# Patient Record
Sex: FEMALE | Race: WHITE | NOT HISPANIC OR LATINO | Employment: FULL TIME | URBAN - METROPOLITAN AREA
[De-identification: names, ages, dates, MRNs, and addresses within clinical notes are randomized per-mention and may not be internally consistent; named-entity substitution may affect disease eponyms.]

---

## 2017-01-16 ENCOUNTER — OFFICE VISIT (OUTPATIENT)
Dept: OPHTHALMOLOGY | Facility: CLINIC | Age: 52
End: 2017-01-16
Attending: OPHTHALMOLOGY
Payer: COMMERCIAL

## 2017-01-16 DIAGNOSIS — H18.529 MAP-DOT-FINGERPRINT CORNEAL DYSTROPHY: ICD-10-CM

## 2017-01-16 DIAGNOSIS — H52.203 HYPEROPIA WITH ASTIGMATISM AND PRESBYOPIA, BILATERAL: ICD-10-CM

## 2017-01-16 DIAGNOSIS — H18.529 ABMD (ANTERIOR BASEMENT MEMBRANE DYSTROPHY): ICD-10-CM

## 2017-01-16 DIAGNOSIS — H52.03 HYPEROPIA WITH ASTIGMATISM AND PRESBYOPIA, BILATERAL: ICD-10-CM

## 2017-01-16 DIAGNOSIS — H26.9 CATARACT OF BOTH EYES: ICD-10-CM

## 2017-01-16 DIAGNOSIS — H52.4 HYPEROPIA WITH ASTIGMATISM AND PRESBYOPIA, BILATERAL: ICD-10-CM

## 2017-01-16 DIAGNOSIS — H10.13 ALLERGIC CONJUNCTIVITIS, BILATERAL: Primary | ICD-10-CM

## 2017-01-16 DIAGNOSIS — H02.403 BLEPHAROPTOSIS, BILATERAL: ICD-10-CM

## 2017-01-16 PROCEDURE — 99213 OFFICE O/P EST LOW 20 MIN: CPT | Mod: ZF

## 2017-01-16 PROCEDURE — 92015 DETERMINE REFRACTIVE STATE: CPT | Mod: ZF

## 2017-01-16 RX ORDER — OLOPATADINE HYDROCHLORIDE 2 MG/ML
1 SOLUTION/ DROPS OPHTHALMIC DAILY
Qty: 1 BOTTLE | Refills: 11 | Status: SHIPPED | OUTPATIENT
Start: 2017-01-16 | End: 2017-09-19

## 2017-01-16 ASSESSMENT — EXTERNAL EXAM - RIGHT EYE: OD_EXAM: NORMAL

## 2017-01-16 ASSESSMENT — CONF VISUAL FIELD
METHOD: COUNTING FINGERS
OS_NORMAL: 1
OD_NORMAL: 1

## 2017-01-16 ASSESSMENT — REFRACTION_MANIFEST
OS_ADD: +2.00
OD_ADD: +2.00
OD_SPHERE: +0.25
OS_AXIS: 005
OD_CYLINDER: SPHERE
OS_SPHERE: +1.50
OS_CYLINDER: +0.50

## 2017-01-16 ASSESSMENT — MARGIN REFLEX DISTANCE
OD_MRD1: 2
OS_MRD1: 2

## 2017-01-16 ASSESSMENT — CUP TO DISC RATIO
OS_RATIO: 0.55
OD_RATIO: 0.55

## 2017-01-16 ASSESSMENT — VISUAL ACUITY
OS_SC: 20/40
OS_SC: 20/400
OS_SC+: -3
METHOD: SNELLEN - LINEAR
OD_SC: J16
OD_SC: 20/20

## 2017-01-16 ASSESSMENT — EXTERNAL EXAM - LEFT EYE: OS_EXAM: NORMAL

## 2017-01-16 ASSESSMENT — TONOMETRY
OS_IOP_MMHG: 16
OD_IOP_MMHG: 18
IOP_METHOD: APPLANATION

## 2017-01-16 NOTE — PROGRESS NOTES
CC: Comprehensive Eye Exam    HPI: Reina Quan is a 51 year old female who presents for comprehensive eye exam. Thinks eyes seem to have gotten worse, blurry at distance and at near. She is using Cromyolyn allergy eye drops 4 times a day but they burn. Has had itchy eyes her entire life. Does not use ointment at night. Does use artificial tears. Does not do warm compresses or lid scrubs.     POH: dry eye syndrome, allergic conjunctivitis, hyperopia/presbyopia   PMH: glomerular focal segmental sclerosis, ulcerative colitis, skin cancer   PSH: kidney transplant 1985 & 6/1992, cholecystectomy, Mohs   Meds: per EPIC, on sirolimus and prednisone  Allergies: ampicillin  FH: father- DM, mother- HTN  SOC: never smoker    Current Eye Medications:   Cromolyn QID OU  Erythromycin QHS OU  Artifical Tears PRN    Assessment & Plan   Reina Quan is a 51 year old female with the following diagnoses:     1. ABMD (anterior basement membrane dystrophy)  2. Map-dot-fingerprint corneal dystrophy   - inferior corneal changes consistent with ABMD L>>R on exam today   - when questioned further, she does describe symptomatic episodes   - begin ophthalmic ointment/Refresh PM QHS OU   - increase lubrication with artificial tears QID OU   - begin lid hygiene with warm compresses and lid scrubs   - hold on punctal plugs given allergic conjunctivitis     3. Allergic conjunctivitis, bilateral   - papillary conjunctivitis on exam today   - using very old Cromyln drops   - switch to Pataday QDAY OU or Patanol BID OU depending on insurance coverage    4. Blepharoptosis, bilateral   - patient has noticed her eyelids interfere with her vision   - request oculoplastic eval    5. Cataract of both eyes   - mild, not visually significant   - monitor     6. Hyperopia with astigmatism and presbyopia, bilateral   - will hold MRx today until surface is improved    Return 1 month with me, next available with Dr. Whiting or Dr. Chavez of  oculoplastics    Seen and discussed with Dr. Elizabeth,    Brad Huerta MD PGY-3  Ophthalmology      Teaching statement:  I have confirmed the content of the chief complaint, history of present illness, review of systems, past medical/surgical and past family/social history sections as obtained by others and edited the information as needed.    I have interviewed and examined the patient and confirm the pertinent findings. I agree with the findings and plan as documented.    Mary Elizabeth MD  Comprehensive Ophthalmology & Ocular Pathology  Department of Ophthalmology and Visual Neurosciences  [email or web address]  Pager [phone]

## 2017-01-16 NOTE — MR AVS SNAPSHOT
After Visit Summary   1/16/2017    Reina Quan    MRN: [id number]           Patient Information     Date Of Birth          1965        Visit Information        Provider Department      1/16/2017 1:00 PM Brad Huerta MD [facility]        Today's Diagnoses     Allergic conjunctivitis, bilateral    -  1     ABMD (anterior basement membrane dystrophy)         Map-dot-fingerprint corneal dystrophy           Care Instructions    You have anterior basement membrane dystrophy (ABMD) or map-dot-fingerprint dystrophy  Lubricate your eyes well  Begin new allergy eye drops as written on bottle  Begin artifical tear ointment (Refresh PM) at night  Begin artificial tears (Refresh) 4 times daily both eyes  Try to do warm compresses  Referral to occuloplastic surgeon: Dr. Whiting or Dr. Chavez     Return 1 month        Follow-ups after your visit        Follow-up notes from your care team     Return in about 1 month (around 2/16/2017) for next available occuloplastics, 1 month with Dr. Huerta.      Your next 10 appointments already scheduled     Jan 19, 2017 10:00 AM   (Arrive by 9:45 AM)   NEW PLASTICS with Kylee Whiting MD   [facility] ([facility])    [address] [place] [id number]   [phone]            Feb 17, 2017  9:40 AM   (Arrive by 9:10 AM)   Return Kidney Transplant with David Burns MD   [facility] Nephrology ([facility])    [address] [place] [id number] [phone]            Feb 17, 2017 11:00 AM   (Arrive by 10:45 AM)   Transplant Skin Check with Tr Ni MD   [facility] Dermatology ([facility])    [address] [place] [id number]   [phone]            Feb 20, 2017  7:45 AM   RETURN GENERAL with Brad Huerta MD   Eye Clinic ([facility])    [facility]  [address]  [facility] [address]   [phone]              Who to contact     Please call your clinic at [phone] to:    Ask questions about your health    Make or cancel appointments    Discuss your medicines    Learn about your test results    Speak to your doctor   If you have compliments or concerns about an experience at your clinic, or if you wish to file a complaint, please contact [facility] Patient Relations at [phone] or email us at [email or web address]         Additional Information About Your Visit        Set[employer] Information     [employer] gives you secure access to your electronic health record. If you see a primary care provider, you can also send messages to your care team and make appointments. If you have questions, please call your primary care clinic.  If you do not have a primary care provider, please call [phone] and they will assist you.      [employer] is an electronic gateway that provides easy, online access to your medical records. With [employer], you can request a clinic appointment, read your test results, renew a prescription or communicate with your care team.     To access your existing account, please contact your [facility] Clinic or call [phone] for assistance.        Care EveryWhere ID     This is your Care EveryWhere ID. This could be used by other organizations to access your [place] medical records  [id number]        Your Vitals Were     Last Period                   02/14/2014            Blood Pressure from Last 3 Encounters:   11/29/16 102/50   11/22/16 114/58   11/20/16 124/69    Weight from Last 3 Encounters:   11/29/16 55.963 kg (123 lb 6 oz)   11/22/16 59.109 kg (130 lb 5 oz)   11/20/16 59.829 kg (131 lb 14.4 oz)              Today, you had the following     No orders found for display         Today's Medication Changes          These changes are accurate as of: 1/16/17   3:11 PM.  If you have any questions, ask your nurse or doctor.               Start taking these medicines.        Dose/Directions    olopatadine HCl 0.2 % Soln   Commonly known as:  PATADAY   Used for:  Allergic conjunctivitis, bilateral        Dose:  1 drop   Place 1 drop into both eyes daily   Quantity:  1 Bottle   Refills:  11            Where to get your medicines      These medications were sent to [facility] - [place], [place] - [address] AT [facility] [address] & [facility]  [address], [facility] [address]     Phone:  [phone]    - olopatadine HCl 0.2 % Soln             Primary Care Provider Office Phone # Fax #    Velvet Smith PA-C [phone]       [facility] [address] [address] [facility] [address]        Thank you!     Thank you for choosing EYE CLINIC  for your care. Our goal is always to provide you with excellent care. Hearing back from our patients is one way we can continue to improve our services. Please take a few minutes to complete the written survey that you may receive in the mail after your visit with us. Thank you!             Your Updated Medication List - Protect others around you: Learn how to safely use, store and throw away your medicines at www.disposemymeds.org.          This list is accurate as of: 1/16/17  3:11 PM.  Always use your most recent med list.                   Brand Name Dispense Instructions for use    atorvastatin 10 MG tablet    LIPITOR    30 tablet    Take 1 tablet (10 mg) by mouth daily       clobetasol 0.05 % external solution    TEMOVATE    50 mL    Apply topically 2 times daily To itchy and red areas of scalp as needed.       cromolyn 4 % ophthalmic solution    OPTICROM    1 Bottle    Place 1 drop into both eyes 4 times daily       erythromycin ophthalmic ointment    ROMYCIN    1 Tube    Place 0.25 inches into both eyes At Bedtime Inside lower lid eye night at bed time        FIRST-MOUTHWASH BLM Susp     237 mL    Swish and swallow 5-10 mLs in mouth every 6 hours as needed       olopatadine HCl 0.2 % Soln    PATADAY    1 Bottle    Place 1 drop into both eyes daily       ondansetron 4 MG ODT tab    ZOFRAN-ODT    30 tablet    Take 1 tablet (4 mg) by mouth every 6 hours as needed for nausea       PARoxetine 20 MG tablet    PAXIL    90 tablet    Take 1 tablet every morning.       predniSONE 5 MG tablet    DELTASONE    15 tablet    Take 1 tablet (5 mg) by mouth every other day       sirolimus 1 MG tablet    RAPAMUNE - GENERIC EQUIVALENT    90 tablet    Take 3 tablets (3 mg) by mouth daily

## 2017-01-16 NOTE — NURSING NOTE
Chief Complaints and History of Present Illnesses   Patient presents with     Annual Eye Exam     HPI    Affected eye(s):  Both   Symptoms:     Blurred vision (Comment: both distance and near, fluctuates a lot per pt)   Difficulty with reading (Comment: uses +3.50 )   No floaters   No flashes   Itching   Burning (Comment: when instilling gtts)         Do you have eye pain now?:  No      Comments:      Patient notes that her eye drops burn, uses Patanol about QOD in BE, not good at using, uses AT prn about QOD per pt    Estrellita Riojas January 16, 2017 1:30 PM

## 2017-01-16 NOTE — PATIENT INSTRUCTIONS
You have anterior basement membrane dystrophy (ABMD) or map-dot-fingerprint dystrophy  Lubricate your eyes well  Begin new allergy eye drops as written on bottle  Begin artifical tear ointment (Refresh PM) at night  Begin artificial tears (Refresh) 4 times daily both eyes  Try to do warm compresses  Referral to occuloplastic surgeon: Dr. Whiting or Dr. Chavez     Return 1 month

## 2017-01-19 ENCOUNTER — TELEPHONE (OUTPATIENT)
Dept: OPHTHALMOLOGY | Facility: CLINIC | Age: 52
End: 2017-01-19

## 2017-01-20 ENCOUNTER — TELEPHONE (OUTPATIENT)
Dept: OPHTHALMOLOGY | Facility: CLINIC | Age: 52
End: 2017-01-20

## 2017-01-20 NOTE — TELEPHONE ENCOUNTER
Olopatadine covered by insurance-- [place] approved  Pt aware via telephone and pharmacy (jamar armstrong) aware  David Baez RN 3:31 PM 01/20/2017    Note to PA specialist for review  David Baez RN 3:31 PM 01/20/2017

## 2017-02-16 DIAGNOSIS — Z79.60 LONG-TERM USE OF IMMUNOSUPPRESSANT MEDICATION: ICD-10-CM

## 2017-02-16 DIAGNOSIS — Z94.0 RENAL TRANSPLANT RECIPIENT: ICD-10-CM

## 2017-02-16 LAB
ERYTHROCYTE [DISTWIDTH] IN BLOOD BY AUTOMATED COUNT: 16.2 % (ref 10–15)
HCT VFR BLD AUTO: 36.3 % (ref 35–47)
HGB BLD-MCNC: 11.5 G/DL (ref 11.7–15.7)
MCH RBC QN AUTO: 27.7 PG (ref 26.5–33)
MCHC RBC AUTO-ENTMCNC: 31.7 G/DL (ref 31.5–36.5)
MCV RBC AUTO: 88 FL (ref 78–100)
PLATELET # BLD AUTO: 29 10E9/L (ref 150–450)
RBC # BLD AUTO: 4.15 10E12/L (ref 3.8–5.2)
WBC # BLD AUTO: 4.9 10E9/L (ref 4–11)

## 2017-02-16 PROCEDURE — 85027 COMPLETE CBC AUTOMATED: CPT | Performed by: INTERNAL MEDICINE

## 2017-02-16 PROCEDURE — 36415 COLL VENOUS BLD VENIPUNCTURE: CPT | Performed by: INTERNAL MEDICINE

## 2017-02-16 PROCEDURE — 80048 BASIC METABOLIC PNL TOTAL CA: CPT | Performed by: INTERNAL MEDICINE

## 2017-02-16 PROCEDURE — 80195 ASSAY OF SIROLIMUS: CPT | Performed by: INTERNAL MEDICINE

## 2017-02-16 ASSESSMENT — ENCOUNTER SYMPTOMS
TACHYCARDIA: 0
EYE PAIN: 1
EXERCISE INTOLERANCE: 0
ORTHOPNEA: 0
TROUBLE SWALLOWING: 0
NECK MASS: 0
EYE WATERING: 1
SORE THROAT: 0
DECREASED LIBIDO: 0
LIGHT-HEADEDNESS: 1
HOT FLASHES: 1
SMELL DISTURBANCE: 0
JOINT SWELLING: 1
HYPERTENSION: 0
LEG SWELLING: 1
MUSCLE CRAMPS: 1
SYNCOPE: 0
STIFFNESS: 1
BACK PAIN: 1
PANIC: 0
SINUS PAIN: 0
ARTHRALGIAS: 1
EYE REDNESS: 0
SINUS CONGESTION: 0
HOARSE VOICE: 0
DEPRESSION: 1
DOUBLE VISION: 0
HYPOTENSION: 0
INSOMNIA: 0
PALPITATIONS: 0
MUSCLE WEAKNESS: 1
POOR WOUND HEALING: 1
EYE IRRITATION: 1
TASTE DISTURBANCE: 0
NECK PAIN: 1

## 2017-02-17 ENCOUNTER — OFFICE VISIT (OUTPATIENT)
Dept: NEPHROLOGY | Facility: CLINIC | Age: 52
End: 2017-02-17
Attending: INTERNAL MEDICINE
Payer: COMMERCIAL

## 2017-02-17 ENCOUNTER — OFFICE VISIT (OUTPATIENT)
Dept: DERMATOLOGY | Facility: CLINIC | Age: 52
End: 2017-02-17

## 2017-02-17 VITALS
SYSTOLIC BLOOD PRESSURE: 122 MMHG | DIASTOLIC BLOOD PRESSURE: 81 MMHG | WEIGHT: 126.7 LBS | BODY MASS INDEX: 23.32 KG/M2 | TEMPERATURE: 97.9 F | HEART RATE: 71 BPM | OXYGEN SATURATION: 99 % | HEIGHT: 62 IN

## 2017-02-17 DIAGNOSIS — Z94.0 KIDNEY REPLACED BY TRANSPLANT: Primary | ICD-10-CM

## 2017-02-17 DIAGNOSIS — D22.5 MELANOCYTIC NEVUS OF TRUNK: Primary | ICD-10-CM

## 2017-02-17 DIAGNOSIS — Z94.89 TRANSPLANT RECIPIENT: ICD-10-CM

## 2017-02-17 DIAGNOSIS — Z12.83 SCREENING EXAM FOR SKIN CANCER: ICD-10-CM

## 2017-02-17 DIAGNOSIS — D84.9 IMMUNOSUPPRESSED STATUS (H): ICD-10-CM

## 2017-02-17 DIAGNOSIS — L66.10 LICHEN PLANOPILARIS: ICD-10-CM

## 2017-02-17 DIAGNOSIS — L57.0 AK (ACTINIC KERATOSIS): ICD-10-CM

## 2017-02-17 DIAGNOSIS — N18.30 CKD (CHRONIC KIDNEY DISEASE) STAGE 3, GFR 30-59 ML/MIN (H): ICD-10-CM

## 2017-02-17 DIAGNOSIS — Z85.89 HISTORY OF SQUAMOUS CELL CARCINOMA: ICD-10-CM

## 2017-02-17 LAB
ANION GAP SERPL CALCULATED.3IONS-SCNC: 11 MMOL/L (ref 3–14)
BUN SERPL-MCNC: 42 MG/DL (ref 7–30)
CALCIUM SERPL-MCNC: 10.3 MG/DL (ref 8.5–10.1)
CHLORIDE SERPL-SCNC: 101 MMOL/L (ref 94–109)
CO2 SERPL-SCNC: 25 MMOL/L (ref 20–32)
CREAT SERPL-MCNC: 1.43 MG/DL (ref 0.52–1.04)
GFR SERPL CREATININE-BSD FRML MDRD: 39 ML/MIN/1.7M2
GLUCOSE SERPL-MCNC: 88 MG/DL (ref 70–99)
POTASSIUM SERPL-SCNC: 4 MMOL/L (ref 3.4–5.3)
SIROLIMUS BLD-MCNC: 3.4 UG/L (ref 5–15)
SODIUM SERPL-SCNC: 137 MMOL/L (ref 133–144)
TME LAST DOSE: ABNORMAL H

## 2017-02-17 PROCEDURE — 99213 OFFICE O/P EST LOW 20 MIN: CPT | Mod: ZF

## 2017-02-17 RX ORDER — CLOBETASOL PROPIONATE 0.5 MG/ML
SOLUTION TOPICAL 2 TIMES DAILY
Qty: 50 ML | Refills: 11 | Status: SHIPPED | OUTPATIENT
Start: 2017-02-17 | End: 2018-04-24

## 2017-02-17 RX ORDER — KETOCONAZOLE 20 MG/ML
SHAMPOO TOPICAL DAILY PRN
Qty: 120 ML | Refills: 11 | Status: SHIPPED | OUTPATIENT
Start: 2017-02-17 | End: 2018-02-28

## 2017-02-17 RX ORDER — OLOPATADINE HYDROCHLORIDE 1 MG/ML
SOLUTION/ DROPS OPHTHALMIC
Refills: 11 | COMMUNITY
Start: 2017-01-20 | End: 2018-02-27

## 2017-02-17 RX ORDER — CALCIPOTRIENE 50 UG/G
CREAM TOPICAL 2 TIMES DAILY
Qty: 60 G | Refills: 3 | Status: SHIPPED | OUTPATIENT
Start: 2017-02-17 | End: 2017-07-11

## 2017-02-17 RX ORDER — KETOCONAZOLE 20 MG/ML
SHAMPOO TOPICAL
Refills: 4 | COMMUNITY
Start: 2016-12-18 | End: 2017-02-17

## 2017-02-17 RX ORDER — FLUOROURACIL 50 MG/G
CREAM TOPICAL 2 TIMES DAILY
Qty: 40 G | Refills: 1 | Status: SHIPPED | OUTPATIENT
Start: 2017-02-17 | End: 2017-07-11

## 2017-02-17 RX ORDER — HYDROXYCHLOROQUINE SULFATE 200 MG/1
200 TABLET, FILM COATED ORAL 2 TIMES DAILY
Qty: 60 TABLET | Refills: 5 | Status: SHIPPED | OUTPATIENT
Start: 2017-02-17 | End: 2019-05-22

## 2017-02-17 ASSESSMENT — PAIN SCALES - GENERAL
PAINLEVEL: NO PAIN (0)
PAINLEVEL: NO PAIN (0)

## 2017-02-17 NOTE — LETTER
2/17/2017       RE: Reina Quan  [address] [address]     Dear Colleague,    Thank you for referring your patient, Reina Quan, to the [facility] DERMATOLOGY at [facility]. Please see a copy of my visit note below.    [facility] Dermatology Note      Dermatology Problem List:  1. S/p Kidney transplant, 1992  2. Multiple SCCs in past requiring excision.  3. Actinic Keratoses of hands, knees.  4. Lichen Planopilaris    Encounter Date: Feb 17, 2017    CC:  Chief Complaint   Patient presents with     Skin Check     skin check, and scalp checked as well.         History of Present Illness:  Ms. Reina Quan is a 51 year old female who presents to the transplant skin care clinic for a skin check and ongoing evaluation and treatment of her lichen planopilaris. She notes several new scaley patches on her left and right hands as well as her left knee, which are itchy and become irritated with her frequent exposure to soaps and bleachers as part of her job as a . She has tried 5 -FU in the past for her hands with limited success.  She also notes that her lichen planopilaris is bothering her more now, and that she often is preventing from sleeping by her scalp itch. She notices it more at the front of her scalp where it it difficult to hide. She did have an eye exam recently that was normal . She did have recent lab work, including a normal BMP yesterday, a normal AST last November, and a normal ALT last June.      Past Medical History:   Patient Active Problem List   Diagnosis     Ulcerative colitis (H)     Migraine     Allergic rhinitis     Hearing loss     Kidney replaced by transplant     Lichen planus     Giant Platelet syndrome     Nephrotic syndrome in diseases classified elsewhere     Major depression in partial remission (H)     CARDIOVASCULAR SCREENING; LDL GOAL LESS THAN 100     Health Care Home     Actinic keratosis      History of skin cancer     History of immunosuppression therapy     CKD (chronic kidney disease) stage 3, GFR 30-59 ml/min     High risk medications (not anticoagulants) long-term use     Seborrheic dermatitis     Dermatitis     Vitiligo     History of SCC (squamous cell carcinoma) of skin     Lichen planopilaris     Hyperlipidemia with target LDL less than 130     Status post kidney transplant     Enterococcus UTI     Past Medical History   Diagnosis Date     Actinic keratosis      Allergic rhinitis, cause unspecified      Anemia      anxiety/depression      PAXIL     congenital hearing loss      uses hearing aids     Dry eyes      Giant Platelet syndrome      Glomerular Focal Sclerosis--transplant 1985      Kidney replaced by transplant 1992     RAPAMUNE/ SIROLIMUS      Lichen planopilaris      LPP followed by derm on doxycycline/ clobetasol     Lichen planus      Menorrhagia      migraine      Squamous cell carcinoma (H)      8 x      Thrombocytopenia (H)      Ulcerative colitis, unspecified      biposy neg 1996     UTI (lower urinary tract infection)      recurrent Dr Burns [facility]     Past Surgical History   Procedure Laterality Date     C nonspecific procedure       Renal transplant right side     C nonspecific procedure       cholecystectomy     Transplant       kidney     Cholecystectomy       Esophagoscopy, gastroscopy, duodenoscopy (egd), combined  11/20/2012     Procedure: COMBINED ESOPHAGOSCOPY, GASTROSCOPY, DUODENOSCOPY (EGD), BIOPSY SINGLE OR MULTIPLE;;  Surgeon: Valentin Hahn MD;  Location:  GI     Mohs micrographic procedure       Dilation and curettage, operative hysteroscopy with morcellator, combined N/A 11/10/2015     Procedure: COMBINED DILATION AND CURETTAGE, OPERATIVE HYSTEROSCOPY WITH MORCELLATOR;  Surgeon: Ghada Melendez MD;  Location: [facility] OR     Biopsy of skin lesion         Social History:  The patient works as a .   Family History:  Family history of eczema in son.      Medications:  Current Outpatient Prescriptions   Medication Sig Dispense Refill     calcipotriene (DOVONOX) 0.005 % cream Apply topically 2 times daily Apply with fluorouracil cream BID for 4 days 60 g 3     fluorouracil (EFUDEX) 5 % cream Apply topically 2 times daily Apply with calcipotriene cream BID for 4 days 40 g 1     hydroxychloroquine (PLAQUENIL) 200 MG tablet Take 1 tablet (200 mg) by mouth 2 times daily 60 tablet 5     triamcinolone acetonide (KENALOG) 10 MG/ML injection Inject 2 mLs (20 mg) into the skin once for 1 dose 2 mL 0     clobetasol (TEMOVATE) 0.05 % external solution Apply topically 2 times daily To itchy and red areas of scalp as needed. 50 mL 11     ketoconazole (NIZORAL) 2 % shampoo Apply topically daily as needed for itching or irritation 120 mL 11     olopatadine HCl (PATADAY) 0.2 % SOLN Place 1 drop into both eyes daily 1 Bottle 11     sirolimus (RAPAMUNE - GENERIC EQUIVALENT) 1 MG tablet Take 3 tablets (3 mg) by mouth daily 90 tablet 3     predniSONE (DELTASONE) 5 MG tablet Take 1 tablet (5 mg) by mouth every other day 15 tablet 6     atorvastatin (LIPITOR) 10 MG tablet Take 1 tablet (10 mg) by mouth daily 30 tablet 11     cromolyn (OPTICROM) 4 % ophthalmic solution Place 1 drop into both eyes 4 times daily 1 Bottle 11     PARoxetine (PAXIL) 20 MG tablet Take 1 tablet every morning. 90 tablet 1     erythromycin (ROMYCIN) ophthalmic ointment Place 0.25 inches into both eyes At Bedtime Inside lower lid eye night at bed time 1 Tube 3     olopatadine (PATANOL) 0.1 % ophthalmic solution Reported on 2/17/2017  11     DPH-Lido-AlHydr-MgHydr-Simeth (FIRST-MOUTHWASH BLM) SUSP Swish and swallow 5-10 mLs in mouth every 6 hours as needed (Patient not taking: Reported on 2/17/2017) 237 mL 1     ondansetron (ZOFRAN-ODT) 4 MG disintegrating tablet Take 1 tablet (4 mg) by mouth every 6 hours as needed for nausea (Patient not taking: Reported on 2/17/2017) 30 tablet 0     Allergies   Allergen  Reactions     Ampicillin Swelling     Swelling of mouth and tongue.      Seasonal Allergies Other (See Comments)     Itchy eyes and rhinitis.         Review of Systems:  -Constitutional: The patient denies fatigue, fevers, chills, unintended weight loss, and night sweats.  -HEENT: Patient denies nonhealing oral sores.  -Skin: As above in HPI. No additional skin concerns.    Physical exam:  Vitals: LMP 02/14/2014  GEN: well appearing, pleasant female in no acute distress.   SKIN: Total skin excluding the undergarment areas was performed. The exam included the head/face, scalp, neck, both arms, chest, back, abdomen, both legs, digits and/or nails.   - There are multiple papules with erythema, overlying scale and irregular texture consistent with actinic keratoses on the hands bilaterally, the right forearm and the right knee.   - Dense collections of benign appearing nevi on the hands, trunk, upper extremities and back.   - Her scalp demonstrated large patches of scarring and follicular loss on the occipital aspect, as well as several patches of erythema with overlying scale on the crown of the scalp. Along these patches there;s evidence of follicular cuffing with scale as well polytrichia in these areas.     -No other lesions of concern on areas examined.     Impression/Plan:  1. Actinic Keratoses. Cryotherapy was preformed today, but to help with continued control of her AKs she was advised to start another round of topical 5-FU. She had not had success with this in the past, but has never tried it with calcipotriene cream as an adjunct. She was advised that there is a greater likelihood of good results with the two therapies combined    Cryotherapy procedure note (performed by faculty): After verbal consent and discussion of risks and benefits including but no limited to dyspigmentation/scar, blister, infection, recurrence,12 keratotic lesions was(were) treated with 1-2mm freeze border for 2 cycles with liquid  nitrogen: 5 on the left hadn, 5 on the right hand, 1 on the right forearm, and 1 on the right shin.. Post cryotherapy instructions were provided.     She will start another round of Efudex 5% cream that she'll take with calcipotriene cream. She will apply both of them twice a day on her hands and lower legs for 4 days.     2. Lichen Planopilaris. Appears less well controlled today. She will receive Kenalog today and then start on hydroxychlorquine treatment for maintenance therapy.    Kenalog intralesional injection procedure note (performed by faculty): After verbal consent and discussion of risks including but not limited to atrophy, pain, and bruising, cleansing with isopropyl alcohol, time out was performed, 2 total cc of Kenalog 10 mg/cc was injected into multiple lesions on the scalp.  The patient tolerated the procedure well and left the Dermatology clinic in good condition.    Start hydroxychloroquine 200 mg po bid.     Continue Ketoconazole shampoo    Continue clobetasol topical therapy to areas that flare.     3. Transplant recipient with resulting immunosuppressed status (on prednisone and sirolimus)  - discussed role of immune system in surveillance of skin for precancers and early lesions  - discussed increased risk of skin cancers in patients on chronic immunosuppression; some agents confer greater risk than others  - counseled regarding photoprotection (SPF30+ broad spectrum sun screen, UPF clothing, broad brimmed hats, avoidance of sunlight during peak hours ~11a-3p)    4. Benign melanocytic nevi of the trunk  - reassurance provided; no lesions concerning for malignancy  - photoprotection (regular use of SPF30+ broad spectrum sunscreen and sun protective clothing) recommended  - ABCDE of melanoma discussed    Follow-up in 3 months.    Staff Involved:  Scribed by Luciano Mcnulty, MS4 for Dr. Ni.      Staff attestation:  The documentation recorded by the scribe accurately reflects the services I  personally performed and the decisions I personally made.    Tr Ni MD  Staff Dermatologist    Department of Dermatology

## 2017-02-17 NOTE — PROGRESS NOTES
[facility] Dermatology Note      Dermatology Problem List:  1. S/p Kidney transplant, 1992  2. Multiple SCCs in past requiring excision.  3. Actinic Keratoses of hands, knees.  4. Lichen Planopilaris    Encounter Date: Feb 17, 2017    CC:  Chief Complaint   Patient presents with     Skin Check     skin check, and scalp checked as well.         History of Present Illness:  Ms. Reina Quan is a 51 year old female who presents to the transplant skin care clinic for a skin check and ongoing evaluation and treatment of her lichen planopilaris. She notes several new scaley patches on her left and right hands as well as her left knee, which are itchy and become irritated with her frequent exposure to soaps and bleachers as part of her job as a . She has tried 5 -FU in the past for her hands with limited success.  She also notes that her lichen planopilaris is bothering her more now, and that she often is preventing from sleeping by her scalp itch. She notices it more at the front of her scalp where it it difficult to hide. She did have an eye exam recently that was normal . She did have recent lab work, including a normal BMP yesterday, a normal AST last November, and a normal ALT last June.      Past Medical History:   Patient Active Problem List   Diagnosis     Ulcerative colitis (H)     Migraine     Allergic rhinitis     Hearing loss     Kidney replaced by transplant     Lichen planus     Giant Platelet syndrome     Nephrotic syndrome in diseases classified elsewhere     Major depression in partial remission (H)     CARDIOVASCULAR SCREENING; LDL GOAL LESS THAN 100     Health Care Home     Actinic keratosis     History of skin cancer     History of immunosuppression therapy     CKD (chronic kidney disease) stage 3, GFR 30-59 ml/min     High risk medications (not anticoagulants) long-term use     Seborrheic dermatitis     Dermatitis     Vitiligo     History of SCC (squamous cell  carcinoma) of skin     Lichen planopilaris     Hyperlipidemia with target LDL less than 130     Status post kidney transplant     Enterococcus UTI     Past Medical History   Diagnosis Date     Actinic keratosis      Allergic rhinitis, cause unspecified      Anemia      anxiety/depression      PAXIL     congenital hearing loss      uses hearing aids     Dry eyes      Giant Platelet syndrome      Glomerular Focal Sclerosis--transplant 1985      Kidney replaced by transplant 1992     RAPAMUNE/ SIROLIMUS      Lichen planopilaris      LPP followed by derm on doxycycline/ clobetasol     Lichen planus      Menorrhagia      migraine      Squamous cell carcinoma (H)      8 x      Thrombocytopenia (H)      Ulcerative colitis, unspecified      biposy neg 1996     UTI (lower urinary tract infection)      recurrent Dr Burns [facility]     Past Surgical History   Procedure Laterality Date     C nonspecific procedure       Renal transplant right side     C nonspecific procedure       cholecystectomy     Transplant       kidney     Cholecystectomy       Esophagoscopy, gastroscopy, duodenoscopy (egd), combined  11/20/2012     Procedure: COMBINED ESOPHAGOSCOPY, GASTROSCOPY, DUODENOSCOPY (EGD), BIOPSY SINGLE OR MULTIPLE;;  Surgeon: Valentin Hahn MD;  Location:  GI     Mohs micrographic procedure       Dilation and curettage, operative hysteroscopy with morcellator, combined N/A 11/10/2015     Procedure: COMBINED DILATION AND CURETTAGE, OPERATIVE HYSTEROSCOPY WITH MORCELLATOR;  Surgeon: Ghada Melendez MD;  Location: [facility] OR     Biopsy of skin lesion         Social History:  The patient works as a .   Family History:  Family history of eczema in son.     Medications:  Current Outpatient Prescriptions   Medication Sig Dispense Refill     calcipotriene (DOVONOX) 0.005 % cream Apply topically 2 times daily Apply with fluorouracil cream BID for 4 days 60 g 3     fluorouracil (EFUDEX) 5 % cream Apply topically 2 times daily Apply  with calcipotriene cream BID for 4 days 40 g 1     hydroxychloroquine (PLAQUENIL) 200 MG tablet Take 1 tablet (200 mg) by mouth 2 times daily 60 tablet 5     triamcinolone acetonide (KENALOG) 10 MG/ML injection Inject 2 mLs (20 mg) into the skin once for 1 dose 2 mL 0     clobetasol (TEMOVATE) 0.05 % external solution Apply topically 2 times daily To itchy and red areas of scalp as needed. 50 mL 11     ketoconazole (NIZORAL) 2 % shampoo Apply topically daily as needed for itching or irritation 120 mL 11     olopatadine HCl (PATADAY) 0.2 % SOLN Place 1 drop into both eyes daily 1 Bottle 11     sirolimus (RAPAMUNE - GENERIC EQUIVALENT) 1 MG tablet Take 3 tablets (3 mg) by mouth daily 90 tablet 3     predniSONE (DELTASONE) 5 MG tablet Take 1 tablet (5 mg) by mouth every other day 15 tablet 6     atorvastatin (LIPITOR) 10 MG tablet Take 1 tablet (10 mg) by mouth daily 30 tablet 11     cromolyn (OPTICROM) 4 % ophthalmic solution Place 1 drop into both eyes 4 times daily 1 Bottle 11     PARoxetine (PAXIL) 20 MG tablet Take 1 tablet every morning. 90 tablet 1     erythromycin (ROMYCIN) ophthalmic ointment Place 0.25 inches into both eyes At Bedtime Inside lower lid eye night at bed time 1 Tube 3     olopatadine (PATANOL) 0.1 % ophthalmic solution Reported on 2/17/2017  11     DPH-Lido-AlHydr-MgHydr-Simeth (FIRST-MOUTHWASH BLM) SUSP Swish and swallow 5-10 mLs in mouth every 6 hours as needed (Patient not taking: Reported on 2/17/2017) 237 mL 1     ondansetron (ZOFRAN-ODT) 4 MG disintegrating tablet Take 1 tablet (4 mg) by mouth every 6 hours as needed for nausea (Patient not taking: Reported on 2/17/2017) 30 tablet 0     Allergies   Allergen Reactions     Ampicillin Swelling     Swelling of mouth and tongue.      Seasonal Allergies Other (See Comments)     Itchy eyes and rhinitis.         Review of Systems:  -Constitutional: The patient denies fatigue, fevers, chills, unintended weight loss, and night sweats.  -HEENT:  Patient denies nonhealing oral sores.  -Skin: As above in HPI. No additional skin concerns.    Physical exam:  Vitals: LMP 02/14/2014  GEN: well appearing, pleasant female in no acute distress.   SKIN: Total skin excluding the undergarment areas was performed. The exam included the head/face, scalp, neck, both arms, chest, back, abdomen, both legs, digits and/or nails.   - There are multiple papules with erythema, overlying scale and irregular texture consistent with actinic keratoses on the hands bilaterally, the right forearm and the right knee.   - Dense collections of benign appearing nevi on the hands, trunk, upper extremities and back.   - Her scalp demonstrated large patches of scarring and follicular loss on the occipital aspect, as well as several patches of erythema with overlying scale on the crown of the scalp. Along these patches there;s evidence of follicular cuffing with scale as well polytrichia in these areas.     -No other lesions of concern on areas examined.     Impression/Plan:  1. Actinic Keratoses. Cryotherapy was preformed today, but to help with continued control of her AKs she was advised to start another round of topical 5-FU. She had not had success with this in the past, but has never tried it with calcipotriene cream as an adjunct. She was advised that there is a greater likelihood of good results with the two therapies combined    Cryotherapy procedure note (performed by faculty): After verbal consent and discussion of risks and benefits including but no limited to dyspigmentation/scar, blister, infection, recurrence,12 keratotic lesions was(were) treated with 1-2mm freeze border for 2 cycles with liquid nitrogen: 5 on the left hadn, 5 on the right hand, 1 on the right forearm, and 1 on the right shin.. Post cryotherapy instructions were provided.     She will start another round of Efudex 5% cream that she'll take with calcipotriene cream. She will apply both of them twice a day on  her hands and lower legs for 4 days.     2. Lichen Planopilaris. Appears less well controlled today. She will receive Kenalog today and then start on hydroxychlorquine treatment for maintenance therapy.    Kenalog intralesional injection procedure note (performed by faculty): After verbal consent and discussion of risks including but not limited to atrophy, pain, and bruising, cleansing with isopropyl alcohol, time out was performed, 2 total cc of Kenalog 10 mg/cc was injected into multiple lesions on the scalp.  The patient tolerated the procedure well and left the Dermatology clinic in good condition.    Start hydroxychloroquine 200 mg po bid.     Continue Ketoconazole shampoo    Continue clobetasol topical therapy to areas that flare.     3. Transplant recipient with resulting immunosuppressed status (on prednisone and sirolimus)  - discussed role of immune system in surveillance of skin for precancers and early lesions  - discussed increased risk of skin cancers in patients on chronic immunosuppression; some agents confer greater risk than others  - counseled regarding photoprotection (SPF30+ broad spectrum sun screen, UPF clothing, broad brimmed hats, avoidance of sunlight during peak hours ~11a-3p)    4. Benign melanocytic nevi of the trunk  - reassurance provided; no lesions concerning for malignancy  - photoprotection (regular use of SPF30+ broad spectrum sunscreen and sun protective clothing) recommended  - ABCDE of melanoma discussed    Follow-up in 3 months.    Staff Involved:  Scribed by Luciano Mcnulty, MS4 for Dr. Ni.      Staff attestation:  The documentation recorded by the scribe accurately reflects the services I personally performed and the decisions I personally made.    Tr Ni MD  Staff Dermatologist    Department of Dermatology

## 2017-02-17 NOTE — PATIENT INSTRUCTIONS
Recommendations for dry skin and dermatitis   1. Bathe or shower daily in lukewarm water  2. Use a gentle non-soap detergent cleanser  - Soaps are alkaline (which can irritate sensitive skin) and remove natural moisturizing factors   - Recommended products, in no particular order, include:   - Bars:    - Aveeno Moisturizing Bar    - Cetaphil Gentle Cleansing Bar    - Dove Sensitive Skin Unscented Beauty Bar    - Olay Ultra Moisture Bar   - Liquid Cleansers:    - Aquanil Cleanser    - CeraVe Hydrating Cleanser    - Cetaphil Gentle Skin Cleanser  - Avoid scented soaps or bath additives unless your doctor tells you otherwise  - Focus on washing the face, underarms, and underwear areas; other sites usually do not need frequent washing  3. Rinse off thoroughly, then pat dry until skin is slightly damp  4. Apply moisturizer to damp skin within 3-5 minutes of exiting the bath/shower  - Recommended products, in no particular order, include:   - Lotions (thinner/lighter, but may be less effective)    - AmLactin Cerapeutic Restoring Body Lotion    - CeraVe Facial Moisturizing Lotion (AM and/or PM)    - Lubriderm Advanced Therapy Lotion   - Creams (thicker, likely the best balance of effectiveness and feel)    - AmLactin Ultra Hydrating Body Cream    - Aveeno Eczema Therapy Moisturizing Cream    - Aveeno Eczema Therapy Itch Relief Balm    - CeraVe Itch Relief Moisturizing Cream   - Ointments (thickest)    - Vaseline  5. If prescribed a topical steroid medication, this may be applied before or after the moisturizer (whichever order you prefer)  6. Reapply moisturizer one or two additional times throughout the day when dry skin is present; once this improves, reduce to daily or every other day as needed to prevent recurrence  7. If dry skin or dermatitis is present on the hands, keep moisturizer near the sink and apply after washing and drying your hands  8. A humidifier may be helpful during the winter months (when ambient  humidity is very low)

## 2017-02-17 NOTE — LETTER
"2/17/2017      RE: Reina Quan  [address]  [facility] [address]       Reason for Visit:  Reina Quan is a 51 year old year old female with LDKT 25 y ago, who presents for \"annual\" evaluation.      HPI:   Was hospitalized in November for E. Coli pyelonephritis (resistant to Bactrim). Had been UTI free for 1 1/2 years since Bactrim was restarted. Was stopped by ID while on other antibiotics and because of concern that creatinine elevation to 2 was due to trimethoprim/smx. Has returned to school for interior design. No current health concerns. Has new skin lesions on hands and has derm surg appt today.    Baseline creatinine had been 1.3-1.5.    ROS: A comprehensive review of systems (10 point) was performed and was negative except as noted in the HPI.    Patient Active Problem List   Diagnosis     Ulcerative colitis (H)     Migraine     Allergic rhinitis     Hearing loss     Kidney replaced by transplant     Lichen planus     Giant Platelet syndrome     Nephrotic syndrome in diseases classified elsewhere     Major depression in partial remission (H)     CARDIOVASCULAR SCREENING; LDL GOAL LESS THAN 100     Health Care Home     Actinic keratosis     History of skin cancer     History of immunosuppression therapy     CKD (chronic kidney disease) stage 3, GFR 30-59 ml/min     High risk medications (not anticoagulants) long-term use     Seborrheic dermatitis     Dermatitis     Vitiligo     History of SCC (squamous cell carcinoma) of skin     Lichen planopilaris     Hyperlipidemia with target LDL less than 130     Status post kidney transplant     Enterococcus UTI     Personal Hx:   History     Social History     Marital Status:      Spouse Name: N/A     Number of Children: 3     Years of Education: N/A     Occupational History     [employer] store      Social History Main Topics     Smoking status: Never Smoker      Smokeless tobacco: Never Used     Alcohol Use: Yes      Comment: 2x /week     " Drug Use: No     Sexual Activity:     Partners: Male     Birth Control/ Protection: Surgical     Other Topics Concern     Not on file     Social History Narrative    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  Living in [place], has a significant other x 7 months. Previously . In a monogamous relationship. Never smoker, social EtOH use.        Pain Score this Visit: No Pain (0)    Allergies   Allergen Reactions     Ampicillin Swelling     Swelling of mouth and tongue.      Seasonal Allergies Other (See Comments)     Itchy eyes and rhinitis.     Medications:  Prior to Admission medications    Medication Sig Start Date End Date Taking? Authorizing Provider   azaTHIOprine (IMURAN) 50 MG tablet Take 1 tablet (50 mg) by mouth daily 11/1/13  Yes David Burns MD   sirolimus (RAPAMUNE) 1 MG tablet Take 3 tablets (3 mg) by mouth daily 11/1/13  Yes David Burns MD   PARoxetine (PAXIL) 20 MG tablet Take 1 tablet every morning. 10/10/13  Yes Sammy Otoole DO   triamcinolone (KENALOG) 0.1 % ointment Apply  topically 2 times daily. 4/16/13  Yes Otoniel Wilson MD   predniSONE (DELTASONE) 5 MG tablet Take 1 tablet by mouth every other day. 2/25/13  Yes David Burns MD   oxyCODONE-acetaminophen (PERCOCET) 5-325 MG per tablet Take 1-2 tablets by mouth every 4 hours as needed for pain. 1/23/13  Yes Franky Longoria MD   ketoconazole (NIZORAL) 2 % shampoo Apply  topically daily as needed for itching. 12/19/12  Yes Otoniel Wilson MD   clobetasol (TEMOVATE) 0.05 % ointment Apply  topically 2 times daily. 12/19/12  Yes Otoniel Wilson MD   clobetasol (TEMOVATE) 0.05 % external solution Apply and gently massage into affected area(s) of the scalp daily 10/24/12  Yes Otoniel Wilson MD   fluocinolone acetonide (DERMA-SMOOTHE/FS SCALP) 0.01 % OIL Massage into scalp daily at bedtime and wash off in morning with Tgel or zinc shampoos 6/11/12  Yes Aniya Owens MD   MULTI-VITAMIN OR  "TABS 1 DAILY   Yes      Vitals:  /81  Pulse 71  Temp 97.9  F (36.6  C) (Oral)  Ht 1.575 m (5' 2\")  Wt 57.5 kg (126 lb 11.2 oz)  LMP 02/14/2014  SpO2 99%  BMI 23.17 kg/m2    Exam:   Alert, in no acute distress. Thin, healthy appearing.  HEENT: Normocephalic, eyes, ears, nose, mouth grossly normal  Nodes:Mild submandibular adenopathy.  Chest: Clear to percussion and auscultation. Normal breath sounds bilaterally  Heart: Regular rate and rhythm. Normal S1 and S2, without murmur, gallop or rub  Abdomen: Normal bowel sounds. No masses or tenderness. Graft is firm in RLQ but not tender; no bruit.  Extremities: No edema  Neuro: Oriented. Normal gait. No tremor.  Skin: Multiple scars on extremities from excisions.     Results:   Recent Results (from the past 168 hour(s))   CBC with platelets    Collection Time: 02/16/17  1:30 PM   Result Value Ref Range    WBC 4.9 4.0 - 11.0 10e9/L    RBC Count 4.15 3.8 - 5.2 10e12/L    Hemoglobin 11.5 (L) 11.7 - 15.7 g/dL    Hematocrit 36.3 35.0 - 47.0 %    MCV 88 78 - 100 fl    MCH 27.7 26.5 - 33.0 pg    MCHC 31.7 31.5 - 36.5 g/dL    RDW 16.2 (H) 10.0 - 15.0 %    Platelet Count 29 (LL) 150 - 450 10e9/L   Basic metabolic panel    Collection Time: 02/16/17  1:30 PM   Result Value Ref Range    Sodium 137 133 - 144 mmol/L    Potassium 4.0 3.4 - 5.3 mmol/L    Chloride 101 94 - 109 mmol/L    Carbon Dioxide 25 20 - 32 mmol/L    Anion Gap 11 3 - 14 mmol/L    Glucose 88 70 - 99 mg/dL    Urea Nitrogen 42 (H) 7 - 30 mg/dL    Creatinine 1.43 (H) 0.52 - 1.04 mg/dL    GFR Estimate 39 (L) >60 mL/min/1.7m2    GFR Estimate If Black 47 (L) >60 mL/min/1.7m2    Calcium 10.3 (H) 8.5 - 10.1 mg/dL   Sirolimus level    Collection Time: 02/16/17  1:30 PM   Result Value Ref Range    Sirolimus Last Dose 1115 2/15/2017     Sirolimus Level 3.4 (L) 5.0 - 15.0 ug/L        Assessment and Plan:   1. LDKT  2. Chronic Kidney Disease (CKD), Stage 3  Had BOYD with pyelonephritis but creatinine is back to her new " "\"baseline\" of 1.5; stable. Sirolimus levels have been 3-6.    3. Anemia- hgb ok.    4. Recurrent UTIs - see HPI. I will discuss with Dr. Disla (ID) value of restarting Bactrim  5. Skin cancer - followed carefully by derm.   6. Depression - stable on meds  7. Hyperlipidemia - on  atorvastain 10 mg.     Return to clinic in 12 months with labs.    Assessment and plan was discussed with the patient.      David Burns MD        CC  Patient Care Team:  Velvet Smith PA-C as PCP - General (Physician Assistant)  Jamar Kennedy MD as Hospitalist (Student in organized health care education/training program)  Paulina Garces MD as MD (Dermatology)  Henok Carmona MD as MD (Dermatology)  April Wang MD as MD (Ophthalmology)  Mary Sheikh MD as MD (Dermatology)  Amanda Disla MD as MD (Infectious Diseases)            "

## 2017-02-17 NOTE — MR AVS SNAPSHOT
After Visit Summary   2/17/2017    Reina Quan    MRN: [id number]           Patient Information     Date Of Birth          1965        Visit Information        Provider Department      2/17/2017 9:40 AM David Burns MD [facility] Nephrology         Follow-ups after your visit        Your next 10 appointments already scheduled     Feb 17, 2017  9:40 AM CST   (Arrive by 9:10 AM)   Return Kidney Transplant with David Burns MD   [facility] Nephrology ([facility])    [address] [place] [id number] [phone]            Feb 17, 2017 11:00 AM CST   (Arrive by 10:45 AM)   Transplant Skin Check with Tr Ni MD   [facility] Dermatology ([facility])    [address] [place] [id number] [phone]            Feb 20, 2017  7:45 AM CST   RETURN GENERAL with Brad Huerta MD   Eye Clinic ([facility])    [facility]  [address]  [address] [address]  [facility] [id number] [phone]              Who to contact     If you have questions or need follow up information about today's clinic visit or your schedule please contact [facility] NEPHROLOGY directly at [phone].  Normal or non-critical lab and imaging results will be communicated to you by MyChart, letter or phone within 4 business days after the clinic has received the results. If you do not hear from us within 7 days, please contact the clinic through MyChart or phone. If you have a critical or abnormal lab result, we will notify you by phone as soon as possible.  Submit refill requests through qcue or call your pharmacy and they will forward the refill request to us. Please allow 3 business days for your refill to be completed.          Additional Information About Your Visit        Conversion Logichart Information     qcue gives you secure access to your electronic health record. If you see a  primary care provider, you can also send messages to your care team and make appointments. If you have questions, please call your primary care clinic.  If you do not have a primary care provider, please call [phone] and they will assist you.        Care EveryWhere ID     This is your Care EveryWhere ID. This could be used by other organizations to access your [place] medical records  [id number]        Your Vitals Were     Last Period                   02/14/2014            Blood Pressure from Last 3 Encounters:   11/29/16 102/50   11/22/16 114/58   11/20/16 124/69    Weight from Last 3 Encounters:   11/29/16 56 kg (123 lb 6 oz)   11/22/16 59.1 kg (130 lb 5 oz)   11/20/16 59.8 kg (131 lb 14.4 oz)              Today, you had the following     No orders found for display       Primary Care Provider Office Phone # Fax #    Velvet Smith PA-C [phone]       [facility] [address] [facility] [address]        Thank you!     Thank you for choosing [facility] NEPHROLOGY  for your care. Our goal is always to provide you with excellent care. Hearing back from our patients is one way we can continue to improve our services. Please take a few minutes to complete the written survey that you may receive in the mail after your visit with us. Thank you!             Your Updated Medication List - Protect others around you: Learn how to safely use, store and throw away your medicines at www.disposemymeds.org.          This list is accurate as of: 2/17/17  9:04 AM.  Always use your most recent med list.                   Brand Name Dispense Instructions for use    atorvastatin 10 MG tablet    LIPITOR    30 tablet    Take 1 tablet (10 mg) by mouth daily       clobetasol 0.05 % external solution    TEMOVATE    50 mL    Apply topically 2 times daily To itchy and red areas of scalp as needed.       cromolyn 4 % ophthalmic solution    OPTICROM    1 Bottle    Place 1 drop into both  eyes 4 times daily       erythromycin ophthalmic ointment    ROMYCIN    1 Tube    Place 0.25 inches into both eyes At Bedtime Inside lower lid eye night at bed time       FIRST-MOUTHWASH BLM Susp     237 mL    Swish and swallow 5-10 mLs in mouth every 6 hours as needed       olopatadine HCl 0.2 % Soln    PATADAY    1 Bottle    Place 1 drop into both eyes daily       ondansetron 4 MG ODT tab    ZOFRAN-ODT    30 tablet    Take 1 tablet (4 mg) by mouth every 6 hours as needed for nausea       PARoxetine 20 MG tablet    PAXIL    90 tablet    Take 1 tablet every morning.       predniSONE 5 MG tablet    DELTASONE    15 tablet    Take 1 tablet (5 mg) by mouth every other day       sirolimus 1 MG tablet    RAPAMUNE - GENERIC EQUIVALENT    90 tablet    Take 3 tablets (3 mg) by mouth daily

## 2017-02-17 NOTE — MR AVS SNAPSHOT
After Visit Summary   2/17/2017    Reina Quan    MRN: [id number]           Patient Information     Date Of Birth          1965        Visit Information        Provider Department      2/17/2017 11:00 AM Tr Ni MD [facility] Dermatology        Today's Diagnoses     AK (actinic keratosis)    -  1    Lichen planopilaris        History of squamous cell carcinoma          Care Instructions    Recommendations for dry skin and dermatitis   1. Bathe or shower daily in lukewarm water  2. Use a gentle non-soap detergent cleanser  - Soaps are alkaline (which can irritate sensitive skin) and remove natural moisturizing factors   - Recommended products, in no particular order, include:   - Bars:    - Aveeno Moisturizing Bar    - Cetaphil Gentle Cleansing Bar    - Dove Sensitive Skin Unscented Beauty Bar    - Olay Ultra Moisture Bar   - Liquid Cleansers:    - Aquanil Cleanser    - CeraVe Hydrating Cleanser    - Cetaphil Gentle Skin Cleanser  - Avoid scented soaps or bath additives unless your doctor tells you otherwise  - Focus on washing the face, underarms, and underwear areas; other sites usually do not need frequent washing  3. Rinse off thoroughly, then pat dry until skin is slightly damp  4. Apply moisturizer to damp skin within 3-5 minutes of exiting the bath/shower  - Recommended products, in no particular order, include:   - Lotions (thinner/lighter, but may be less effective)    - AmLactin Cerapeutic Restoring Body Lotion    - CeraVe Facial Moisturizing Lotion (AM and/or PM)    - Lubriderm Advanced Therapy Lotion   - Creams (thicker, likely the best balance of effectiveness and feel)    - AmLactin Ultra Hydrating Body Cream    - Aveeno Eczema Therapy Moisturizing Cream    - Aveeno Eczema Therapy Itch Relief Balm    - CeraVe Itch Relief Moisturizing Cream   - Ointments (thickest)    - Vaseline  5. If prescribed a topical steroid medication, this may be applied before or after the  moisturizer (whichever order you prefer)  6. Reapply moisturizer one or two additional times throughout the day when dry skin is present; once this improves, reduce to daily or every other day as needed to prevent recurrence  7. If dry skin or dermatitis is present on the hands, keep moisturizer near the sink and apply after washing and drying your hands  8. A humidifier may be helpful during the winter months (when ambient humidity is very low)          Follow-ups after your visit        Your next 10 appointments already scheduled     Feb 20, 2017  7:45 AM CST   RETURN GENERAL with Brad Huerta MD   Eye Clinic ([facility])    [address]  [address] [facility] [address]  [address]  [place] [address] [phone]            May 17, 2017 10:00 AM CDT   (Arrive by 9:45 AM)   Return Visit with Tr Ni MD   [facility] Dermatology ([facility] [place])    [address] [address] [phone]            Feb 16, 2018 10:40 AM CST   (Arrive by 10:10 AM)   Return Kidney Transplant with David Burns MD   [facility] Nephrology ([facility])    [address] [address] [phone]              Who to contact     Please call your clinic at [phone] to:    Ask questions about your health    Make or cancel appointments    Discuss your medicines    Learn about your test results    Speak to your doctor   If you have compliments or concerns about an experience at your clinic, or if you wish to file a complaint, please contact [facility] Patient Relations at [phone] or email us at [email or web address]         Additional Information About Your Visit        MyChart Information     [employer] gives you secure access to your electronic health record. If you see a primary care provider, you can also send messages to your care team and make  appointments. If you have questions, please call your primary care clinic.  If you do not have a primary care provider, please call [phone] and they will assist you.      [employer] is an electronic gateway that provides easy, online access to your medical records. With [employer], you can request a clinic appointment, read your test results, renew a prescription or communicate with your care team.     To access your existing account, please contact your [facility] or call [phone] for assistance.        Care EveryWhere ID     This is your Care EveryWhere ID. This could be used by other organizations to access your [facility] medical records  [id number]        Your Vitals Were     Last Period                   02/14/2014            Blood Pressure from Last 3 Encounters:   02/17/17 122/81   11/29/16 102/50   11/22/16 114/58    Weight from Last 3 Encounters:   02/17/17 57.5 kg (126 lb 11.2 oz)   11/29/16 56 kg (123 lb 6 oz)   11/22/16 59.1 kg (130 lb 5 oz)              We Performed the Following     DESTRUCT PREMALIGNANT LESION, 2-14     DESTRUCT PREMALIGNANT LESION, FIRST     INJECTION INTO SKIN LESIONS >7          Today's Medication Changes          These changes are accurate as of: 2/17/17 12:10 PM.  If you have any questions, ask your nurse or doctor.               Start taking these medicines.        Dose/Directions    calcipotriene 0.005 % cream   Commonly known as:  DOVONOX   Used for:  AK (actinic keratosis)   Started by:  Tr Ni MD        Apply topically 2 times daily Apply with fluorouracil cream BID for 4 days   Quantity:  60 g   Refills:  3       fluorouracil 5 % cream   Commonly known as:  EFUDEX   Used for:  AK (actinic keratosis)   Started by:  Tr Ni MD        Apply topically 2 times daily Apply with calcipotriene cream BID for 4 days   Quantity:  40 g   Refills:  1       hydroxychloroquine 200 MG tablet   Commonly known as:  PLAQUENIL   Used  for:  Lichen planopilaris   Started by:  Tr Ni MD        Dose:  200 mg   Take 1 tablet (200 mg) by mouth 2 times daily   Quantity:  60 tablet   Refills:  5       triamcinolone acetonide 10 MG/ML injection   Commonly known as:  KENALOG   Used for:  Lichen planopilaris   Started by:  Tr Ni MD        Dose:  2 mL   Inject 2 mLs (20 mg) into the skin once for 1 dose   Quantity:  2 mL   Refills:  0         These medicines have changed or have updated prescriptions.        Dose/Directions    ketoconazole 2 % shampoo   Commonly known as:  NIZORAL   This may have changed:    - how to take this  - when to take this  - reasons to take this   Used for:  Lichen planopilaris   Changed by:  Tr Ni MD        Apply topically daily as needed for itching or irritation   Quantity:  120 mL   Refills:  11            Where to get your medicines      These medications were sent to [facility] [address] [facility] [address] [facility] AT [place] [address] & [facility]  [address] [place] [address]     Phone:  [phone]     calcipotriene 0.005 % cream    clobetasol 0.05 % external solution    fluorouracil 5 % cream    hydroxychloroquine 200 MG tablet    ketoconazole 2 % shampoo         Some of these will need a paper prescription and others can be bought over the counter.  Ask your nurse if you have questions.     You don't need a prescription for these medications     triamcinolone acetonide 10 MG/ML injection                Primary Care Provider Office Phone # Fax #    Velvet Smith PA-C [phone]       [facility] [address] [address]  [facility] [address]        Thank you!     Thank you for choosing [facility] DERMAT[facility]  for your care. Our goal is always to provide you with excellent care. Hearing back from our patients is one way we can continue to improve our services. Please take a few minutes to complete the written survey  that you may receive in the mail after your visit with us. Thank you!             Your Updated Medication List - Protect others around you: Learn how to safely use, store and throw away your medicines at www.disposemymeds.org.          This list is accurate as of: 2/17/17 12:10 PM.  Always use your most recent med list.                   Brand Name Dispense Instructions for use    atorvastatin 10 MG tablet    LIPITOR    30 tablet    Take 1 tablet (10 mg) by mouth daily       calcipotriene 0.005 % cream    DOVONOX    60 g    Apply topically 2 times daily Apply with fluorouracil cream BID for 4 days       clobetasol 0.05 % external solution    TEMOVATE    50 mL    Apply topically 2 times daily To itchy and red areas of scalp as needed.       cromolyn 4 % ophthalmic solution    OPTICROM    1 Bottle    Place 1 drop into both eyes 4 times daily       erythromycin ophthalmic ointment    ROMYCIN    1 Tube    Place 0.25 inches into both eyes At Bedtime Inside lower lid eye night at bed time       FIRST-MOUTHWASH BLM Susp     237 mL    Swish and swallow 5-10 mLs in mouth every 6 hours as needed       fluorouracil 5 % cream    EFUDEX    40 g    Apply topically 2 times daily Apply with calcipotriene cream BID for 4 days       hydroxychloroquine 200 MG tablet    PLAQUENIL    60 tablet    Take 1 tablet (200 mg) by mouth 2 times daily       ketoconazole 2 % shampoo    NIZORAL    120 mL    Apply topically daily as needed for itching or irritation       * olopatadine HCl 0.2 % Soln    PATADAY    1 Bottle    Place 1 drop into both eyes daily       * olopatadine 0.1 % ophthalmic solution    PATANOL     Reported on 2/17/2017       ondansetron 4 MG ODT tab    ZOFRAN-ODT    30 tablet    Take 1 tablet (4 mg) by mouth every 6 hours as needed for nausea       PARoxetine 20 MG tablet    PAXIL    90 tablet    Take 1 tablet every morning.       predniSONE 5 MG tablet    DELTASONE    15 tablet    Take 1 tablet (5 mg) by mouth every other day        sirolimus 1 MG tablet    RAPAMUNE - GENERIC EQUIVALENT    90 tablet    Take 3 tablets (3 mg) by mouth daily       triamcinolone acetonide 10 MG/ML injection    KENALOG    2 mL    Inject 2 mLs (20 mg) into the skin once for 1 dose       * Notice:  This list has 2 medication(s) that are the same as other medications prescribed for you. Read the directions carefully, and ask your doctor or other care provider to review them with you.

## 2017-02-17 NOTE — NURSING NOTE
Dermatology Rooming Note    Reina Quan's goals for this visit include:   Chief Complaint   Patient presents with     Skin Check     skin check, and scalp checked as well.       Lizbeth Ya LPN

## 2017-02-17 NOTE — NURSING NOTE
"Chief Complaint   Patient presents with     RECHECK     Follow up CKD stage 3       Initial /81  Pulse 71  Temp 97.9  F (36.6  C) (Oral)  Ht 1.575 m (5' 2\")  Wt 57.5 kg (126 lb 11.2 oz)  LMP 02/14/2014  SpO2 99%  BMI 23.17 kg/m2 Estimated body mass index is 23.17 kg/(m^2) as calculated from the following:    Height as of this encounter: 1.575 m (5' 2\").    Weight as of this encounter: 57.5 kg (126 lb 11.2 oz).  Medication Reconciliation: complete   Oliva MEDRANO CMA    "

## 2017-02-17 NOTE — PROGRESS NOTES
"Reason for Visit:  Reina Quan is a 51 year old year old female with LDKT 25 y ago, who presents for \"annual\" evaluation.      HPI:   Was hospitalized in November for E. Coli pyelonephritis (resistant to Bactrim). Had been UTI free for 1 1/2 years since Bactrim was restarted. Was stopped by ID while on other antibiotics and because of concern that creatinine elevation to 2 was due to trimethoprim/smx. Has returned to school for interior design. No current health concerns. Has new skin lesions on hands and has derm surg appt today.    Baseline creatinine had been 1.3-1.5.    ROS: A comprehensive review of systems (10 point) was performed and was negative except as noted in the HPI.    Patient Active Problem List   Diagnosis     Ulcerative colitis (H)     Migraine     Allergic rhinitis     Hearing loss     Kidney replaced by transplant     Lichen planus     Giant Platelet syndrome     Nephrotic syndrome in diseases classified elsewhere     Major depression in partial remission (H)     CARDIOVASCULAR SCREENING; LDL GOAL LESS THAN 100     Health Care Home     Actinic keratosis     History of skin cancer     History of immunosuppression therapy     CKD (chronic kidney disease) stage 3, GFR 30-59 ml/min     High risk medications (not anticoagulants) long-term use     Seborrheic dermatitis     Dermatitis     Vitiligo     History of SCC (squamous cell carcinoma) of skin     Lichen planopilaris     Hyperlipidemia with target LDL less than 130     Status post kidney transplant     Enterococcus UTI     Personal Hx:   History     Social History     Marital Status:      Spouse Name: N/A     Number of Children: 3     Years of Education: N/A     Occupational History     [employer]      Social History Main Topics     Smoking status: Never Smoker      Smokeless tobacco: Never Used     Alcohol Use: Yes      Comment: 2x /week     Drug Use: No     Sexual Activity:     Partners: Male     Birth Control/ Protection: " Surgical     Other Topics Concern     Not on file     Social History Narrative    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  Living in [place], has a significant other x 7 months. Previously . In a monogamous relationship. Never smoker, social EtOH use.        Pain Score this Visit: No Pain (0)    Allergies   Allergen Reactions     Ampicillin Swelling     Swelling of mouth and tongue.      Seasonal Allergies Other (See Comments)     Itchy eyes and rhinitis.     Medications:  Prior to Admission medications    Medication Sig Start Date End Date Taking? Authorizing Provider   azaTHIOprine (IMURAN) 50 MG tablet Take 1 tablet (50 mg) by mouth daily 11/1/13  Yes David Burns MD   sirolimus (RAPAMUNE) 1 MG tablet Take 3 tablets (3 mg) by mouth daily 11/1/13  Yes David Burns MD   PARoxetine (PAXIL) 20 MG tablet Take 1 tablet every morning. 10/10/13  Yes Sammy Otoole,    triamcinolone (KENALOG) 0.1 % ointment Apply  topically 2 times daily. 4/16/13  Yes Otoniel Wilson MD   predniSONE (DELTASONE) 5 MG tablet Take 1 tablet by mouth every other day. 2/25/13  Yes David Burns MD   oxyCODONE-acetaminophen (PERCOCET) 5-325 MG per tablet Take 1-2 tablets by mouth every 4 hours as needed for pain. 1/23/13  Yes Franky Longoria MD   ketoconazole (NIZORAL) 2 % shampoo Apply  topically daily as needed for itching. 12/19/12  Yes Otoniel Wilson MD   clobetasol (TEMOVATE) 0.05 % ointment Apply  topically 2 times daily. 12/19/12  Yes Otoniel Wilson MD   clobetasol (TEMOVATE) 0.05 % external solution Apply and gently massage into affected area(s) of the scalp daily 10/24/12  Yes Otoniel Wilson MD   fluocinolone acetonide (DERMA-SMOOTHE/FS SCALP) 0.01 % OIL Massage into scalp daily at bedtime and wash off in morning with Tgel or zinc shampoos 6/11/12  Yes Aniya Owens MD   MULTI-VITAMIN OR TABS 1 DAILY   Yes      Vitals:  /81  Pulse 71  Temp 97.9  F (36.6  C) (Oral)   "Ht 1.575 m (5' 2\")  Wt 57.5 kg (126 lb 11.2 oz)  LMP 02/14/2014  SpO2 99%  BMI 23.17 kg/m2    Exam:   Alert, in no acute distress. Thin, healthy appearing.  HEENT: Normocephalic, eyes, ears, nose, mouth grossly normal  Nodes:Mild submandibular adenopathy.  Chest: Clear to percussion and auscultation. Normal breath sounds bilaterally  Heart: Regular rate and rhythm. Normal S1 and S2, without murmur, gallop or rub  Abdomen: Normal bowel sounds. No masses or tenderness. Graft is firm in RLQ but not tender; no bruit.  Extremities: No edema  Neuro: Oriented. Normal gait. No tremor.  Skin: Multiple scars on extremities from excisions.     Results:   Recent Results (from the past 168 hour(s))   CBC with platelets    Collection Time: 02/16/17  1:30 PM   Result Value Ref Range    WBC 4.9 4.0 - 11.0 10e9/L    RBC Count 4.15 3.8 - 5.2 10e12/L    Hemoglobin 11.5 (L) 11.7 - 15.7 g/dL    Hematocrit 36.3 35.0 - 47.0 %    MCV 88 78 - 100 fl    MCH 27.7 26.5 - 33.0 pg    MCHC 31.7 31.5 - 36.5 g/dL    RDW 16.2 (H) 10.0 - 15.0 %    Platelet Count 29 (LL) 150 - 450 10e9/L   Basic metabolic panel    Collection Time: 02/16/17  1:30 PM   Result Value Ref Range    Sodium 137 133 - 144 mmol/L    Potassium 4.0 3.4 - 5.3 mmol/L    Chloride 101 94 - 109 mmol/L    Carbon Dioxide 25 20 - 32 mmol/L    Anion Gap 11 3 - 14 mmol/L    Glucose 88 70 - 99 mg/dL    Urea Nitrogen 42 (H) 7 - 30 mg/dL    Creatinine 1.43 (H) 0.52 - 1.04 mg/dL    GFR Estimate 39 (L) >60 mL/min/1.7m2    GFR Estimate If Black 47 (L) >60 mL/min/1.7m2    Calcium 10.3 (H) 8.5 - 10.1 mg/dL   Sirolimus level    Collection Time: 02/16/17  1:30 PM   Result Value Ref Range    Sirolimus Last Dose 1115 2/15/2017     Sirolimus Level 3.4 (L) 5.0 - 15.0 ug/L        Assessment and Plan:   1. LDKT  2. Chronic Kidney Disease (CKD), Stage 3  Had BOYD with pyelonephritis but creatinine is back to her new \"baseline\" of 1.5; stable. Sirolimus levels have been 3-6.    3. Anemia- hgb ok.    4. " Recurrent UTIs - see HPI. I will discuss with Dr. Disla (ID) value of restarting Bactrim  5. Skin cancer - followed carefully by derm.   6. Depression - stable on meds  7. Hyperlipidemia - on  atorvastain 10 mg.     Return to clinic in 12 months with labs.    Assessment and plan was discussed with the patient.    CC  Patient Care Team:  Velvet Smith PA-C as PCP - General (Physician Assistant)  Jai Kennedy MD as Hospitalist (Student in organized health care education/training program)  Paulina Garces MD as MD (Dermatology)  Henok Carmona MD as MD (Dermatology)  April Wang MD as MD (Ophthalmology)  Mary Sheikh MD as MD (Dermatology)  Amanda Disla MD as MD (Infectious Diseases)  JAI KENNEDY

## 2017-06-09 DIAGNOSIS — D84.9 IMMUNOSUPPRESSED STATUS (H): ICD-10-CM

## 2017-06-09 DIAGNOSIS — Z79.60 LONG-TERM USE OF IMMUNOSUPPRESSANT MEDICATION: ICD-10-CM

## 2017-06-09 DIAGNOSIS — Z94.0 RENAL TRANSPLANT RECIPIENT: ICD-10-CM

## 2017-06-09 DIAGNOSIS — Z94.0 KIDNEY TRANSPLANT RECIPIENT: Primary | ICD-10-CM

## 2017-06-09 LAB
ANION GAP SERPL CALCULATED.3IONS-SCNC: 7 MMOL/L (ref 3–14)
BUN SERPL-MCNC: 46 MG/DL (ref 7–30)
CALCIUM SERPL-MCNC: 9.9 MG/DL (ref 8.5–10.1)
CHLORIDE SERPL-SCNC: 107 MMOL/L (ref 94–109)
CO2 SERPL-SCNC: 26 MMOL/L (ref 20–32)
CREAT SERPL-MCNC: 1.57 MG/DL (ref 0.52–1.04)
ERYTHROCYTE [DISTWIDTH] IN BLOOD BY AUTOMATED COUNT: 15.4 % (ref 10–15)
GFR SERPL CREATININE-BSD FRML MDRD: 35 ML/MIN/1.7M2
GLUCOSE SERPL-MCNC: 96 MG/DL (ref 70–99)
HCT VFR BLD AUTO: 36.4 % (ref 35–47)
HGB BLD-MCNC: 11.6 G/DL (ref 11.7–15.7)
MCH RBC QN AUTO: 28.2 PG (ref 26.5–33)
MCHC RBC AUTO-ENTMCNC: 31.9 G/DL (ref 31.5–36.5)
MCV RBC AUTO: 88 FL (ref 78–100)
PLATELET # BLD AUTO: 42 10E9/L (ref 150–450)
POTASSIUM SERPL-SCNC: 4.1 MMOL/L (ref 3.4–5.3)
RBC # BLD AUTO: 4.12 10E12/L (ref 3.8–5.2)
SODIUM SERPL-SCNC: 140 MMOL/L (ref 133–144)
WBC # BLD AUTO: 5.1 10E9/L (ref 4–11)

## 2017-06-09 PROCEDURE — 85027 COMPLETE CBC AUTOMATED: CPT | Performed by: INTERNAL MEDICINE

## 2017-06-09 PROCEDURE — 80048 BASIC METABOLIC PNL TOTAL CA: CPT | Performed by: INTERNAL MEDICINE

## 2017-06-09 PROCEDURE — 80195 ASSAY OF SIROLIMUS: CPT | Performed by: INTERNAL MEDICINE

## 2017-06-09 PROCEDURE — 36415 COLL VENOUS BLD VENIPUNCTURE: CPT | Performed by: INTERNAL MEDICINE

## 2017-06-12 LAB
SIROLIMUS BLD-MCNC: 3.7 UG/L (ref 5–15)
TME LAST DOSE: 2100 H

## 2017-06-13 ENCOUNTER — TELEPHONE (OUTPATIENT)
Dept: DERMATOLOGY | Facility: CLINIC | Age: 52
End: 2017-06-13

## 2017-06-13 NOTE — TELEPHONE ENCOUNTER
Patient called in and left message with call center to be scheduled earlier than what they have available for a TSC. Areas of concern are on back.     Left voice message for patient to contact clinic.

## 2017-06-16 ENCOUNTER — OFFICE VISIT (OUTPATIENT)
Dept: DERMATOLOGY | Facility: CLINIC | Age: 52
End: 2017-06-16

## 2017-06-16 DIAGNOSIS — D48.5 NEOPLASM OF UNCERTAIN BEHAVIOR OF SKIN: ICD-10-CM

## 2017-06-16 DIAGNOSIS — L80 VITILIGO: ICD-10-CM

## 2017-06-16 DIAGNOSIS — Z51.81 MEDICATION MONITORING ENCOUNTER: ICD-10-CM

## 2017-06-16 DIAGNOSIS — F34.1 DYSTHYMIC DISORDER: ICD-10-CM

## 2017-06-16 DIAGNOSIS — D84.9 IMMUNOSUPPRESSION (H): Primary | ICD-10-CM

## 2017-06-16 DIAGNOSIS — Z85.828 HISTORY OF NONMELANOMA SKIN CANCER: ICD-10-CM

## 2017-06-16 DIAGNOSIS — L81.4 SOLAR LENTIGINOSIS: ICD-10-CM

## 2017-06-16 PROCEDURE — 88305 TISSUE EXAM BY PATHOLOGIST: CPT | Performed by: DERMATOLOGY

## 2017-06-16 ASSESSMENT — PAIN SCALES - GENERAL: PAINLEVEL: NO PAIN (0)

## 2017-06-16 NOTE — LETTER
"6/16/2017     RE: Reina Quan  [address] [place] [address]     Dear Colleague,    Thank you for referring your patient, Reina Quan, to the [facility] DERMATOLOGY at [facility]. Please see a copy of my visit note below.    [facility] Dermatology Note      Dermatology Problem List:  1.NMSC  -SCC \"at least in situ\" 02/2015  -SCC, R anterior shin, s/p excision 06/2014  -SCCIS, s/p excision 05/2014  -SCC, L dorsal thumb, s/p excision 03/2014  -SCC ([facility]), right hand, s/p excision 06/2009  -SCC, R cheek, s/p excision 08/18/2008    2. LPP  -Plaquenil 250 mg initiated 11/2008 and restarted 2017  -Clobetasol 2008  -ILK    3. AK  -HAK, left first finger web, s/p Bx 10/19/2016  -HAK, left second knuckle, s/p Bx 10/19/2016  -HAK biopsied from left dorsal index finger 03/24/2015  -HAK, R dorsal second MCP of hand 03/24/2015  -HAK biopsied form L dorsal hand 02/10/2015  -HAK rebiopsied 03/10/2015  -AK and lichenoid HAK biopsied from L dorsal 3rd MCP 07/10/2014  -AK biopsied from R lateral shin 03/2014  -HAK biopsied from R dorsal thumb 03/2014  -AK rebiopsied from R dorsal thumb 05/2014   -again rebiopsied and read as \"Actinic keratosis with scar\" 06/10/2014  -for the fourth time biopsied 07/10/2014 with recommendation for re-sampling if recurs  -for the fifth time biopsied: Pigmented AK biopsied form R dorsal thumb 02/10/2015  -for the sixth time biopsied 03/10/2015 HAK  -HAK biopsied from R index finger 03/2014    4. Immunocompromised status  -Currently: prednisone, sirolimus  -s/p kidney transplant 2/2 glomerulosclerosis  -s/p azathioprine, cyclosporin, prednisone    5. Hx benign lesion biopsy  -BLK, R dorsal hand, s/p Bx 06/2009  -Macular SK, R midle finger, s/p Bx 07/2014  -\"keratosis, ulcerated, base not seen,\" left dorsal hand fourth web space, s/p biopsy 03/01/2016  -\"endophytic proliferation of mildly atypical squamous epithelim, extending to " "the deep specimen margin\"    Encounter Date: Jun 16, 2017    CC:  Chief Complaint   Patient presents with     Derm Problem     Kathy is here for a skin check, states she has several areas of concern.  Does report having a history of several SCC          History of Present Illness:  Ms. Reina Quan is a 52 year old female who presents today for skin check due to taking sirolimus for kidney transplant and history of NMSC. Additionally she has lichen planopilaris that she would like checked today.     She last saw Dr. Ni 2/2017 at which time she was started on Plaquenil which she states has improved her LPP.     Her primary concerns today are two painful lesions that have persisted. She states her left anterior leg has a painful hypopigmented lesion she recently noticed and is unsure of how long she has had it. Additionally has regions of her hands bilaterally the she would like assessed. In 2/2017 she had 5 lesions on the right hand and 5 lesions on the left hand treated with cryotherapy, noting many have resolved but continues to have a painful raised \"spot\" on her right hand. These lesions do not bleed or become pruritic, but rather cause pain.     She denies other areas of concern and denies new or changing moles.     Past Medical History:   Patient Active Problem List   Diagnosis     Ulcerative colitis (H)     Migraine     Allergic rhinitis     Hearing loss     Kidney replaced by transplant     Lichen planus     Giant Platelet syndrome     Nephrotic syndrome in diseases classified elsewhere     Major depression in partial remission (H)     CARDIOVASCULAR SCREENING; LDL GOAL LESS THAN 100     Health Care Home     Actinic keratosis     History of skin cancer     History of immunosuppression therapy     CKD (chronic kidney disease) stage 3, GFR 30-59 ml/min     High risk medications (not anticoagulants) long-term use     Seborrheic dermatitis     Dermatitis     Vitiligo     History of SCC (squamous cell " carcinoma) of skin     Lichen planopilaris     Hyperlipidemia with target LDL less than 130     Status post kidney transplant     Enterococcus UTI     Past Medical History:   Diagnosis Date     Actinic keratosis      Allergic rhinitis, cause unspecified      Anemia      anxiety/depression     PAXIL     congenital hearing loss     uses hearing aids     Dry eyes      Giant Platelet syndrome      Glomerular Focal Sclerosis--transplant 1985      Kidney replaced by transplant 1992    RAPAMUNE/ SIROLIMUS      Lichen planopilaris     LPP followed by derm on doxycycline/ clobetasol     Lichen planus      Menorrhagia      migraine      Squamous cell carcinoma (H)     8 x      Thrombocytopenia (H)      Ulcerative colitis, unspecified     biposy neg 1996     UTI (lower urinary tract infection)     recurrent Dr Burns [facility]     Past Surgical History:   Procedure Laterality Date     BIOPSY OF SKIN LESION       C NONSPECIFIC PROCEDURE      Renal transplant right side     C NONSPECIFIC PROCEDURE      cholecystectomy     CHOLECYSTECTOMY       DILATION AND CURETTAGE, OPERATIVE HYSTEROSCOPY WITH MORCELLATOR, COMBINED N/A 11/10/2015    Procedure: COMBINED DILATION AND CURETTAGE, OPERATIVE HYSTEROSCOPY WITH MORCELLATOR;  Surgeon: Ghada Melendez MD;  Location: [facility] OR     ESOPHAGOSCOPY, GASTROSCOPY, DUODENOSCOPY (EGD), COMBINED  11/20/2012    Procedure: COMBINED ESOPHAGOSCOPY, GASTROSCOPY, DUODENOSCOPY (EGD), BIOPSY SINGLE OR MULTIPLE;;  Surgeon: Valentin Hahn MD;  Location:  GI     MOHS MICROGRAPHIC PROCEDURE       TRANSPLANT      kidney       Social History:  The patient works. The patient denies use of tanning beds.    Family History:  There is no family history of skin cancer.    Medications:  Current Outpatient Prescriptions   Medication Sig Dispense Refill     nitrofurantoin, macrocrystal-monohydrate, (MACROBID) 100 MG capsule One by mouth every other day 14 capsule 11     olopatadine (PATANOL) 0.1 % ophthalmic solution  Reported on 2/17/2017  11     calcipotriene (DOVONOX) 0.005 % cream Apply topically 2 times daily Apply with fluorouracil cream BID for 4 days 60 g 3     fluorouracil (EFUDEX) 5 % cream Apply topically 2 times daily Apply with calcipotriene cream BID for 4 days 40 g 1     hydroxychloroquine (PLAQUENIL) 200 MG tablet Take 1 tablet (200 mg) by mouth 2 times daily 60 tablet 5     clobetasol (TEMOVATE) 0.05 % external solution Apply topically 2 times daily To itchy and red areas of scalp as needed. 50 mL 11     ketoconazole (NIZORAL) 2 % shampoo Apply topically daily as needed for itching or irritation 120 mL 11     olopatadine HCl (PATADAY) 0.2 % SOLN Place 1 drop into both eyes daily 1 Bottle 11     DPH-Lido-AlHydr-MgHydr-Simeth (FIRST-MOUTHWASH BLM) SUSP Swish and swallow 5-10 mLs in mouth every 6 hours as needed (Patient not taking: Reported on 2/17/2017) 237 mL 1     ondansetron (ZOFRAN-ODT) 4 MG disintegrating tablet Take 1 tablet (4 mg) by mouth every 6 hours as needed for nausea (Patient not taking: Reported on 2/17/2017) 30 tablet 0     sirolimus (RAPAMUNE - GENERIC EQUIVALENT) 1 MG tablet Take 3 tablets (3 mg) by mouth daily 90 tablet 3     predniSONE (DELTASONE) 5 MG tablet Take 1 tablet (5 mg) by mouth every other day 15 tablet 6     atorvastatin (LIPITOR) 10 MG tablet Take 1 tablet (10 mg) by mouth daily 30 tablet 11     cromolyn (OPTICROM) 4 % ophthalmic solution Place 1 drop into both eyes 4 times daily 1 Bottle 11     PARoxetine (PAXIL) 20 MG tablet Take 1 tablet every morning. 90 tablet 1     Allergies   Allergen Reactions     Ampicillin Swelling     Swelling of mouth and tongue.      Seasonal Allergies Other (See Comments)     Itchy eyes and rhinitis.         Review of Systems:  -Constitutional: The patient denies fatigue, fevers, chills, unintended weight loss, and night sweats.  -HEENT: Patient denies nonhealing oral sores.  -Skin: As above in HPI. No additional skin concerns.    Physical  exam:  Vitals: LMP 02/14/2014  Breastfeeding? No  GEN: This is a well developed, well-nourished female in no acute distress, in a pleasant mood.    SKIN: Total skin excluding the undergarment areas was performed. The exam included the head/face, neck, both arms, chest, back, abdomen, both legs, digits and/or nails.   -Right dorsal hand at the 3rd MCP joint - tender, erythematous papule with scale   -Left superior anterior shin - tender, erythematous papule  -multiple brown macules on sun exposed areas   -loss of pigmentation on digits of hands and feet bilaterally, consistent with digital acral vitiligo   -Patient declined breast and genital examination today.  -Scalp: no scarring with alopecia on vertex of scalp, without erythema or hyperfollicular keratosis.   -No other lesions of concern on areas examined.     Impression/Plan:  1. Immunosuppression     On Sirolimus for kidney transplant, patient understands the importance of regular monitoring skin examinations due to her immunosuppression.     2. History of nonmelanoma skin cancer    Patient has had 9 NMSC removed.     Recommended using sun protective clothing and sun screen with SF 50    3. Neoplasm of uncertain behavior   Punch biopsy:  After discussion of benefits and risks including but not limited to bleeding/bruising, pain/swelling, infection, scar, incomplete removal, nerve damage/numbness, recurrence, and non-diagnostic biopsy, written consent, verbal consent and photographs were obtained. Time-out was performed. The area was cleaned with isopropyl alcohol. ***mL of 1% lidocaine with epinephrine was injected to obtain adequate anesthesia of the lesion on the right dorsal hand and left superior anterior shin. A 3 mm punch biopsy was performed at each site.  4-0 prolene sutures were utilized to approximate the epidermal edges.  White petroleum jelly/VaselineTM and a bandage was applied to the wound.  Explicit verbal and written wound care instructions  were provided.  The patient left the Dermatology Clinic in good condition. The patient was counseled to follow up for suture removal in approximately 14 days.    4. Solar Lentiginosis     ***        5. Medication monitoring encounter     Will check LFTs today for monitoring of Plaquenil.    Follow-up 3 months     Staff Involved:  Scribed by Marleni Pittman, MS3 for Dr. Lagunas.      Again, thank you for allowing me to participate in the care of your patient.      Sincerely,    Sarah Lagunas MD

## 2017-06-16 NOTE — PATIENT INSTRUCTIONS
Go to lab      Wound Care After a Biopsy    What is a skin biopsy?  A skin biopsy allows the doctor to examine a very small piece of tissue under the microscope to determine the diagnosis and the best treatment for the skin condition. A local anesthetic (numbing medicine)  is injected with a very small needle into the skin area to be tested. A small piece of skin is taken from the area. Sometimes a suture (stitch) is used.     What are the risks of a skin biopsy?  I will experience scar, bleeding, swelling, pain, crusting and redness. I may experience incomplete removal or recurrence. Risks of this procedure are excessive bleeding, bruising, infection, nerve damage, numbness, thick (hypertrophic or keloidal) scar and non-diagnostic biopsy.    How should I care for my wound for the first 24 hours?    Keep the wound dry and covered for 24 hours    If it bleeds, hold direct pressure on the area for 15 minutes. If bleeding does not stop then go to the emergency room    Avoid strenuous exercise the first 1-2 days or as your doctor instructs you    How should I care for the wound after 24 hours?    After 24 hours, remove the bandage    You may bathe or shower as normal    If you had a scalp biopsy, you can shampoo as usual and can use shower water to clean the biopsy site daily    Clean the wound twice a day with gentle soap and water    Do not scrub, be gentle    Apply white petroleum/Vaseline after cleaning the wound with a cotton swab or a clean finger, and keep the site covered with a Bandaid /bandage. Bandages are not necessary with a scalp biopsy    If you are unable to cover the site with a Bandaid /bandage, re-apply ointment 2-3 times a day to keep the site moist. Moisture will help with healing    Avoid strenuous activity for first 1-2 days    Avoid lakes, rivers, pools, and oceans until the stitches are removed or the site is healed    How do I clean my wound?    Wash hands thoroughly with soap or use hand   before all wound care    Clean the wound with gentle soap and water    Apply white petroleum/Vaseline  to wound after it is clean    Replace the Bandaid /bandage to keep the wound covered for the first few days or as instructed by your doctor    If you had a scalp biopsy, warm shower water to the area on a daily basis should suffice    What should I use to clean my wound?     Cotton-tipped applicators (Qtips )    White petroleum jelly (Vaseline ). Use a clean new container and use Q-tips to apply.    Bandaids   as needed    Gentle soap     How should I care for my wound long term?    Do not get your wound dirty    Keep up with wound care for one week or until the area is healed.    A small scab will form and fall off by itself when the area is completely healed. The area will be red and will become pink in color as it heals. Sun protection is very important for how your scar will turn out. Sunscreen with an SPF 30 or greater is recommended once the area is healed.    If you have stitches, stitches need to be removed in 11-14  days. You may return to our clinic for this or you may have it done locally at your doctor s office.    You should have some soreness but it should be mild and slowly go away over several days. Talk to your doctor about using tylenol for pain,    When should I call my doctor?  If you have increased:     Pain or swelling    Pus or drainage (clear or slightly yellow drainage is ok)    Temperature over 100F    Spreading redness or warmth around wound    When will I hear about my results?  The biopsy results can take 2-3 weeks to come back. The clinic will call you with the results, send you a [employer] message, or have you schedule a follow-up clinic or phone time to discuss the results. Contact our clinics if you do not hear from us in 3 weeks.     Who should I call with questions?    [facility]: [phone]     [facility]  [place]: [phone]    For urgent needs outside of business hours call the [facility] at [phone] and ask for the dermatology resident on call

## 2017-06-16 NOTE — MR AVS SNAPSHOT
After Visit Summary   6/16/2017    Reina Quan    MRN: [id number]           Patient Information     Date Of Birth          1965        Visit Information        Provider Department      6/16/2017 1:15 PM Sarah Lagunas MD [facility] Dermatology        Today's Diagnoses     Immunosuppression (H)    -  1    History of nonmelanoma skin cancer        Solar lentiginosis        Vitiligo        Medication monitoring encounter        Neoplasm of uncertain behavior of skin          Care Instructions    Go to lab      Wound Care After a Biopsy    What is a skin biopsy?  A skin biopsy allows the doctor to examine a very small piece of tissue under the microscope to determine the diagnosis and the best treatment for the skin condition. A local anesthetic (numbing medicine)  is injected with a very small needle into the skin area to be tested. A small piece of skin is taken from the area. Sometimes a suture (stitch) is used.     What are the risks of a skin biopsy?  I will experience scar, bleeding, swelling, pain, crusting and redness. I may experience incomplete removal or recurrence. Risks of this procedure are excessive bleeding, bruising, infection, nerve damage, numbness, thick (hypertrophic or keloidal) scar and non-diagnostic biopsy.    How should I care for my wound for the first 24 hours?    Keep the wound dry and covered for 24 hours    If it bleeds, hold direct pressure on the area for 15 minutes. If bleeding does not stop then go to the emergency room    Avoid strenuous exercise the first 1-2 days or as your doctor instructs you    How should I care for the wound after 24 hours?    After 24 hours, remove the bandage    You may bathe or shower as normal    If you had a scalp biopsy, you can shampoo as usual and can use shower water to clean the biopsy site daily    Clean the wound twice a day with gentle soap and water    Do not scrub, be gentle    Apply white petroleum/Vaseline after cleaning  the wound with a cotton swab or a clean finger, and keep the site covered with a Bandaid /bandage. Bandages are not necessary with a scalp biopsy    If you are unable to cover the site with a Bandaid /bandage, re-apply ointment 2-3 times a day to keep the site moist. Moisture will help with healing    Avoid strenuous activity for first 1-2 days    Avoid lakes, rivers, pools, and oceans until the stitches are removed or the site is healed    How do I clean my wound?    Wash hands thoroughly with soap or use hand  before all wound care    Clean the wound with gentle soap and water    Apply white petroleum/Vaseline  to wound after it is clean    Replace the Bandaid /bandage to keep the wound covered for the first few days or as instructed by your doctor    If you had a scalp biopsy, warm shower water to the area on a daily basis should suffice    What should I use to clean my wound?     Cotton-tipped applicators (Qtips )    White petroleum jelly (Vaseline ). Use a clean new container and use Q-tips to apply.    Bandaids   as needed    Gentle soap     How should I care for my wound long term?    Do not get your wound dirty    Keep up with wound care for one week or until the area is healed.    A small scab will form and fall off by itself when the area is completely healed. The area will be red and will become pink in color as it heals. Sun protection is very important for how your scar will turn out. Sunscreen with an SPF 30 or greater is recommended once the area is healed.    If you have stitches, stitches need to be removed in 11-14  days. You may return to our clinic for this or you may have it done locally at your doctor s office.    You should have some soreness but it should be mild and slowly go away over several days. Talk to your doctor about using tylenol for pain,    When should I call my doctor?  If you have increased:     Pain or swelling    Pus or drainage (clear or slightly yellow drainage is  ok)    Temperature over 100F    Spreading redness or warmth around wound    When will I hear about my results?  The biopsy results can take 2-3 weeks to come back. The clinic will call you with the results, send you a [facility] message, or have you schedule a follow-up clinic or phone time to discuss the results. Contact our clinics if you do not hear from us in 3 weeks.     Who should I call with questions?    [facility]: [phone]     [facility]: [phone]    For urgent needs outside of business hours call the [facility] at [phone] and ask for the dermatology resident on call              Follow-ups after your visit        Follow-up notes from your care team     Return in about 3 months (around 9/16/2017).      Your next 10 appointments already scheduled     Feb 16, 2018 10:40 AM CST   (Arrive by 10:10 AM)   Return Kidney Transplant with David Burns MD   [facility] ([facility] [place])    [address] [place] [id number] [phone]              Future tests that were ordered for you today     Open Future Orders        Priority Expected Expires Ordered    TSH with free T4 reflex Routine  6/16/2018 6/16/2017            Who to contact     Please call your clinic at [phone] to:    Ask questions about your health    Make or cancel appointments    Discuss your medicines    Learn about your test results    Speak to your doctor   If you have compliments or concerns about an experience at your clinic, or if you wish to file a complaint, please contact [facility] Patient Relations at [phone] or email us at [email or web address]         Additional Information About Your Visit        MyChart Information     [employer] gives you secure access to your electronic health record. If you see a primary care provider, you can also send messages to your  care team and make appointments. If you have questions, please call your primary care clinic.  If you do not have a primary care provider, please call [phone] and they will assist you.      [employer] is an electronic gateway that provides easy, online access to your medical records. With [employer], you can request a clinic appointment, read your test results, renew a prescription or communicate with your care team.     To access your existing account, please contact your [facility] or call [phone] for assistance.        Care EveryWhere ID     This is your Care EveryWhere ID. This could be used by other organizations to access your [place] medical records  [id number]        Your Vitals Were     Last Period Breastfeeding?                02/14/2014 No           Blood Pressure from Last 3 Encounters:   02/17/17 122/81   11/29/16 102/50   11/22/16 114/58    Weight from Last 3 Encounters:   02/17/17 57.5 kg (126 lb 11.2 oz)   11/29/16 56 kg (123 lb 6 oz)   11/22/16 59.1 kg (130 lb 5 oz)              We Performed the Following     ALT     AST     BIOPSY SKIN/SUBQ/MUC MEM, EACH ADDTL LESION     BIOPSY SKIN/SUBQ/MUC MEM, SINGLE LESION     Surgical pathology exam          Today's Medication Changes          These changes are accurate as of: 6/16/17  2:36 PM.  If you have any questions, ask your nurse or doctor.               Stop taking these medicines if you haven't already. Please contact your care team if you have questions.     erythromycin ophthalmic ointment   Commonly known as:  ROMYCIN   Stopped by:  Sarah Lagunas MD                    Primary Care Provider Office Phone # Fax #    Velvet Smith PA-C [phone]       [facility] [address] [address] [facility] [address]        Thank you!     Thank you for choosing [facility] DERMATOLOGY  for your care. Our goal is always to provide you with excellent care. Hearing back from our patients is one  way we can continue to improve our services. Please take a few minutes to complete the written survey that you may receive in the mail after your visit with us. Thank you!             Your Updated Medication List - Protect others around you: Learn how to safely use, store and throw away your medicines at www.disposemymeds.org.          This list is accurate as of: 6/16/17  2:36 PM.  Always use your most recent med list.                   Brand Name Dispense Instructions for use    atorvastatin 10 MG tablet    LIPITOR    30 tablet    Take 1 tablet (10 mg) by mouth daily       calcipotriene 0.005 % cream    DOVONOX    60 g    Apply topically 2 times daily Apply with fluorouracil cream BID for 4 days       clobetasol 0.05 % external solution    TEMOVATE    50 mL    Apply topically 2 times daily To itchy and red areas of scalp as needed.       cromolyn 4 % ophthalmic solution    OPTICROM    1 Bottle    Place 1 drop into both eyes 4 times daily       fluorouracil 5 % cream    EFUDEX    40 g    Apply topically 2 times daily Apply with calcipotriene cream BID for 4 days       hydroxychloroquine 200 MG tablet    PLAQUENIL    60 tablet    Take 1 tablet (200 mg) by mouth 2 times daily       ketoconazole 2 % shampoo    NIZORAL    120 mL    Apply topically daily as needed for itching or irritation       lidocaine visc 2% & diphenhydramine 12.5mg/5mL & maalox/mylanta w/simethicone (1:1:1 v/v/v) Susp compounding kit     237 mL    Swish and swallow 5-10 mLs in mouth every 6 hours as needed       nitrofurantoin (macrocrystal-monohydrate) 100 MG capsule    MACROBID    14 capsule    One by mouth every other day       * olopatadine HCl 0.2 % Soln    PATADAY    1 Bottle    Place 1 drop into both eyes daily       * olopatadine 0.1 % ophthalmic solution    PATANOL     Reported on 2/17/2017       ondansetron 4 MG ODT tab    ZOFRAN-ODT    30 tablet    Take 1 tablet (4 mg) by mouth every 6 hours as needed for nausea       PARoxetine 20 MG  tablet    PAXIL    90 tablet    Take 1 tablet every morning.       predniSONE 5 MG tablet    DELTASONE    15 tablet    Take 1 tablet (5 mg) by mouth every other day       sirolimus 1 MG tablet    RAPAMUNE - GENERIC EQUIVALENT    90 tablet    Take 3 tablets (3 mg) by mouth daily       * Notice:  This list has 2 medication(s) that are the same as other medications prescribed for you. Read the directions carefully, and ask your doctor or other care provider to review them with you.

## 2017-06-16 NOTE — TELEPHONE ENCOUNTER
PARoxetine (PAXIL) 20 MG     Last Written Prescription Date: 7/2/15  Last Fill Quantity: 90, # refills: 1  Last Office Visit with G primary care provider:  11/29/16        Last PHQ-9 score on record=   PHQ-9 SCORE 5/18/2016   Total Score -   Total Score 8

## 2017-06-16 NOTE — NURSING NOTE
Dermatology Rooming Note    Reina Quan's goals for this visit include:   Chief Complaint   Patient presents with     Derm Problem     Kathy is here for a skin check, states she has several areas of concern.  Does report having a history of several SCC          Kathy Alonso LPN

## 2017-06-16 NOTE — PROGRESS NOTES
"[facility] Dermatology Note      Dermatology Problem List:  1.NMSC  -SCC \"at least in situ\" 02/2015  -SCC, R anterior shin, s/p excision 06/2014  -SCCIS, s/p excision 05/2014  -SCC, L dorsal thumb, s/p excision 03/2014  -SCC (KA), right hand, s/p excision 06/2009  -SCC, R cheek, s/p excision 08/18/2008    2. LPP  -Plaquenil 250 mg initiated 11/2008 and restarted 2017  -Clobetasol 2008  -ILK    3. AK  -HAK, left first finger web, s/p Bx 10/19/2016  -HAK, left second knuckle, s/p Bx 10/19/2016  -HAK biopsied from left dorsal index finger 03/24/2015  -HAK, R dorsal second MCP of hand 03/24/2015  -HAK biopsied form L dorsal hand 02/10/2015  -HAK rebiopsied 03/10/2015  -AK and lichenoid HAK biopsied from L dorsal 3rd MCP 07/10/2014  -AK biopsied from R lateral shin 03/2014  -HAK biopsied from R dorsal thumb 03/2014  -AK rebiopsied from R dorsal thumb 05/2014   -again rebiopsied and read as \"Actinic keratosis with scar\" 06/10/2014  -for the fourth time biopsied 07/10/2014 with recommendation for re-sampling if recurs  -for the fifth time biopsied: Pigmented AK biopsied form R dorsal thumb 02/10/2015  -for the sixth time biopsied 03/10/2015 HAK  -HAK biopsied from R index finger 03/2014    4. Immunocompromised status  -Currently: prednisone, sirolimus  -s/p kidney transplant 2/2 glomerulosclerosis  -s/p azathioprine, cyclosporin, prednisone    5. Hx benign lesion biopsy  -BLK, R dorsal hand, s/p Bx 06/2009  -Macular SK, R midle finger, s/p Bx 07/2014  -\"keratosis, ulcerated, base not seen,\" left dorsal hand fourth web space, s/p biopsy 03/01/2016  -\"endophytic proliferation of mildly atypical squamous epithelim, extending to the deep specimen margin\"    Encounter Date: Jun 16, 2017    CC:  Chief Complaint   Patient presents with     Derm Problem     Kathy is here for a skin check, states she has several areas of concern.  Does report having a history of several SCC          History of Present " "Illness:  Ms. Reina Quan is a 52 year old female who presents today for skin check due to taking sirolimus for kidney transplant and history of NMSC. Additionally she has lichen planopilaris that she would like checked today.     She last saw Dr. Ni 2/2017 at which time she was started on Plaquenil which she states has improved her LPP.     Her primary concerns today are two painful lesions that have persisted. She states her left anterior leg has a painful hypopigmented lesion she recently noticed and is unsure of how long she has had it. Additionally has regions of her hands bilaterally the she would like assessed. In 2/2017 she had 5 lesions on the right hand and 5 lesions on the left hand treated with cryotherapy, noting many have resolved but continues to have a painful raised \"spot\" on her right hand. These lesions do not bleed or become pruritic, but rather cause pain.     She denies other areas of concern and denies new or changing moles.     Past Medical History:   Patient Active Problem List   Diagnosis     Ulcerative colitis (H)     Migraine     Allergic rhinitis     Hearing loss     Kidney replaced by transplant     Lichen planus     Giant Platelet syndrome     Nephrotic syndrome in diseases classified elsewhere     Major depression in partial remission (H)     CARDIOVASCULAR SCREENING; LDL GOAL LESS THAN 100     Health Care Home     Actinic keratosis     History of skin cancer     History of immunosuppression therapy     CKD (chronic kidney disease) stage 3, GFR 30-59 ml/min     High risk medications (not anticoagulants) long-term use     Seborrheic dermatitis     Dermatitis     Vitiligo     History of SCC (squamous cell carcinoma) of skin     Lichen planopilaris     Hyperlipidemia with target LDL less than 130     Status post kidney transplant     Enterococcus UTI     Past Medical History:   Diagnosis Date     Actinic keratosis      Allergic rhinitis, cause unspecified      Anemia      " anxiety/depression     PAXIL     congenital hearing loss     uses hearing aids     Dry eyes      Giant Platelet syndrome      Glomerular Focal Sclerosis--transplant 1985      Kidney replaced by transplant 1992    RAPAMUNE/ SIROLIMUS      Lichen planopilaris     LPP followed by derm on doxycycline/ clobetasol     Lichen planus      Menorrhagia      migraine      Squamous cell carcinoma (H)     8 x      Thrombocytopenia (H)      Ulcerative colitis, unspecified     biposy neg 1996     UTI (lower urinary tract infection)     recurrent Dr Burns [facility]     Past Surgical History:   Procedure Laterality Date     BIOPSY OF SKIN LESION       C NONSPECIFIC PROCEDURE      Renal transplant right side     C NONSPECIFIC PROCEDURE      cholecystectomy     CHOLECYSTECTOMY       DILATION AND CURETTAGE, OPERATIVE HYSTEROSCOPY WITH MORCELLATOR, COMBINED N/A 11/10/2015    Procedure: COMBINED DILATION AND CURETTAGE, OPERATIVE HYSTEROSCOPY WITH MORCELLATOR;  Surgeon: Ghada Melendez MD;  Location: [facility] OR     ESOPHAGOSCOPY, GASTROSCOPY, DUODENOSCOPY (EGD), COMBINED  11/20/2012    Procedure: COMBINED ESOPHAGOSCOPY, GASTROSCOPY, DUODENOSCOPY (EGD), BIOPSY SINGLE OR MULTIPLE;;  Surgeon: Valentin Hahn MD;  Location: [facility] GI     MOHS MICROGRAPHIC PROCEDURE       TRANSPLANT      kidney       Social History:  The patient works. The patient denies use of tanning beds.    Family History:  There is no family history of skin cancer.    Medications:  Current Outpatient Prescriptions   Medication Sig Dispense Refill     nitrofurantoin, macrocrystal-monohydrate, (MACROBID) 100 MG capsule One by mouth every other day 14 capsule 11     olopatadine (PATANOL) 0.1 % ophthalmic solution Reported on 2/17/2017  11     calcipotriene (DOVONOX) 0.005 % cream Apply topically 2 times daily Apply with fluorouracil cream BID for 4 days 60 g 3     fluorouracil (EFUDEX) 5 % cream Apply topically 2 times daily Apply with calcipotriene cream BID for 4 days 40 g 1      hydroxychloroquine (PLAQUENIL) 200 MG tablet Take 1 tablet (200 mg) by mouth 2 times daily 60 tablet 5     clobetasol (TEMOVATE) 0.05 % external solution Apply topically 2 times daily To itchy and red areas of scalp as needed. 50 mL 11     ketoconazole (NIZORAL) 2 % shampoo Apply topically daily as needed for itching or irritation 120 mL 11     olopatadine HCl (PATADAY) 0.2 % SOLN Place 1 drop into both eyes daily 1 Bottle 11     DPH-Lido-AlHydr-MgHydr-Simeth (FIRST-MOUTHWASH BLM) SUSP Swish and swallow 5-10 mLs in mouth every 6 hours as needed (Patient not taking: Reported on 2/17/2017) 237 mL 1     ondansetron (ZOFRAN-ODT) 4 MG disintegrating tablet Take 1 tablet (4 mg) by mouth every 6 hours as needed for nausea (Patient not taking: Reported on 2/17/2017) 30 tablet 0     sirolimus (RAPAMUNE - GENERIC EQUIVALENT) 1 MG tablet Take 3 tablets (3 mg) by mouth daily 90 tablet 3     predniSONE (DELTASONE) 5 MG tablet Take 1 tablet (5 mg) by mouth every other day 15 tablet 6     atorvastatin (LIPITOR) 10 MG tablet Take 1 tablet (10 mg) by mouth daily 30 tablet 11     cromolyn (OPTICROM) 4 % ophthalmic solution Place 1 drop into both eyes 4 times daily 1 Bottle 11     PARoxetine (PAXIL) 20 MG tablet Take 1 tablet every morning. 90 tablet 1     Allergies   Allergen Reactions     Ampicillin Swelling     Swelling of mouth and tongue.      Seasonal Allergies Other (See Comments)     Itchy eyes and rhinitis.         Review of Systems:  -Constitutional: The patient denies fatigue, fevers, chills, unintended weight loss, and night sweats.  -HEENT: Patient denies nonhealing oral sores.  -Skin: As above in HPI. No additional skin concerns.    Physical exam:  Vitals: LMP 02/14/2014  Breastfeeding? No  GEN: This is a well developed, well-nourished female in no acute distress, in a pleasant mood.    SKIN: Total skin excluding the undergarment areas was performed. The exam included the head/face, neck, both arms, chest, back,  abdomen, both legs, digits and/or nails.   -Right dorsal hand at the 3rd MCP joint - tender, erythematous papule with scale   -Left superior anterior shin - tender, erythematous papule  -multiple brown macules on sun exposed areas   -loss of pigmentation on digits of hands and feet bilaterally, consistent with digital acral vitiligo   -Patient declined breast and genital examination today.  -Scalp: no scarring with alopecia on vertex of scalp, without erythema or perirfollicular keratosis.   -No other lesions of concern on areas examined.     Impression/Plan:  1. Immunosuppression     On Sirolimus for kidney transplant, patient understands the importance of regular monitoring skin examinations due to her immunosuppression.     2. History of nonmelanoma skin cancer, Patient has had 9 NMSC removed.     Recommended using sun protective clothing and sun screen with SF 50    3. Neoplasm of uncertain behavior   Punch biopsy:  After discussion of benefits and risks including but not limited to bleeding/bruising, pain/swelling, infection, scar, incomplete removal, nerve damage/numbness, recurrence, and non-diagnostic biopsy, written consent, verbal consent and photographs were obtained. Time-out was performed. The area was cleaned with isopropyl alcohol. .5mL of 1% lidocaine with epinephrine was injected to obtain adequate anesthesia of the lesion on the right dorsal hand and left superior anterior shin. A 3 mm punch biopsy was performed at each site.  4-0 prolene sutures were utilized to approximate the epidermal edges.  White petroleum jelly/VaselineTM and a bandage was applied to the wound.  Explicit verbal and written wound care instructions were provided.  The patient left the Dermatology Clinic in good condition. The patient was counseled to follow up for suture removal in approximately 14 days.    4. Solar Lentiginosis     Reviewed nature    5. Medication monitoring encounter/LPP    Will check LFTs today for  monitoring of Plaquenil.    May continue clobetasol and ketoconazole shampoo    Follow-up 3 months     Staff Involved:  Scribed by Marleni Pittman MS3 for Dr. Lagunas.      Sarah Lagunas MD    Department of Dermatology  [facility]: Phone: [phone], Fax:[phone]  [facility]: Phone: [phone], Fax: [phone]

## 2017-06-20 NOTE — TELEPHONE ENCOUNTER
Routing refill request to provider for review/approval because:  A break in medication, not filled here since 2015.   PHQ 9 is due to be updated.   Portia Barker, RN  Triage Nurse

## 2017-06-21 LAB — COPATH REPORT: NORMAL

## 2017-06-21 RX ORDER — PAROXETINE 20 MG/1
TABLET, FILM COATED ORAL
Qty: 90 TABLET | Refills: 1 | OUTPATIENT
Start: 2017-06-21

## 2017-07-05 DIAGNOSIS — F32.4 MAJOR DEPRESSIVE DISORDER WITH SINGLE EPISODE, IN PARTIAL REMISSION (H): ICD-10-CM

## 2017-07-07 RX ORDER — PAROXETINE 20 MG/1
TABLET, FILM COATED ORAL
Qty: 90 TABLET | Refills: 0 | OUTPATIENT
Start: 2017-07-07

## 2017-07-07 NOTE — TELEPHONE ENCOUNTER
This is declined again due to patient needs an appointment.   Not filled in more than 2 years here.   Portia Barker, RN  Triage Nurse

## 2017-07-11 ENCOUNTER — OFFICE VISIT (OUTPATIENT)
Dept: FAMILY MEDICINE | Facility: CLINIC | Age: 52
End: 2017-07-11
Payer: COMMERCIAL

## 2017-07-11 VITALS
BODY MASS INDEX: 23.76 KG/M2 | DIASTOLIC BLOOD PRESSURE: 80 MMHG | SYSTOLIC BLOOD PRESSURE: 128 MMHG | HEART RATE: 86 BPM | TEMPERATURE: 98.2 F | WEIGHT: 129.9 LBS | OXYGEN SATURATION: 98 %

## 2017-07-11 DIAGNOSIS — Z12.39 SCREENING FOR BREAST CANCER: Primary | ICD-10-CM

## 2017-07-11 DIAGNOSIS — F34.1 DYSTHYMIC DISORDER: ICD-10-CM

## 2017-07-11 PROCEDURE — 99213 OFFICE O/P EST LOW 20 MIN: CPT | Performed by: PHYSICIAN ASSISTANT

## 2017-07-11 RX ORDER — PAROXETINE 20 MG/1
TABLET, FILM COATED ORAL
Qty: 90 TABLET | Refills: 1 | Status: SHIPPED | OUTPATIENT
Start: 2017-07-11 | End: 2018-02-27

## 2017-07-11 NOTE — NURSING NOTE
"Chief Complaint   Patient presents with     Recheck Medication     needs refills of Paxil       Initial /74 (BP Location: Right arm, Patient Position: Chair, Cuff Size: Adult Regular)  Pulse 86  Temp 98.2  F (36.8  C) (Oral)  Wt 129 lb 14.4 oz (58.9 kg)  LMP 02/14/2014  SpO2 98%  Breastfeeding? No  BMI 23.76 kg/m2 Estimated body mass index is 23.76 kg/(m^2) as calculated from the following:    Height as of 2/17/17: 5' 2\" (1.575 m).    Weight as of this encounter: 129 lb 14.4 oz (58.9 kg).  Medication Reconciliation: incomplete   Apurva Velásquez CMA (AAMA)      "

## 2017-07-11 NOTE — LETTER
My Depression Action Plan  Name: Reina Quan   Date of Birth 1965  Date: 7/11/2017    My doctor: Velvet Smith   My clinic: [facility]  [address] [facility] [address] [phone]          GREEN    ZONE   Good Control    What it looks like:     Things are going generally well. You have normal up s and down s. You may even feel depressed from time to time, but bad moods usually last less than a day.   What you need to do:  1. Continue to care for yourself (see self care plan)  2. Check your depression survival kit and update it as needed  3. Follow your physician s recommendations including any medication.  4. Do not stop taking medication unless you consult with your physician first.           YELLOW         ZONE Getting Worse    What it looks like:     Depression is starting to interfere with your life.     It may be hard to get out of bed; you may be starting to isolate yourself from others.    Symptoms of depression are starting to last most all day and this has happened for several days.     You may have suicidal thoughts but they are not constant.   What you need to do:     1. Call your care team, your response to treatment will improve if you keep your care team informed of your progress. Yellow periods are signs an adjustment may need to be made.     2. Continue your self-care, even if you have to fake it!    3. Talk to someone in your support network    4. Open up your depression survival kit           RED    ZONE Medical Alert - Get Help    What it looks like:     Depression is seriously interfering with your life.     You may experience these or other symptoms: You can t get out of bed most days, can t work or engage in other necessary activities, you have trouble taking care of basic hygiene, or basic responsibilities, thoughts of suicide or death that will not go away, self-injurious behavior.     What you need to do:  1. Call your  care team and request a same-day appointment. If they are not available (weekends or after hours) call your local crisis line, emergency room or 911.      Electronically signed by: Velvet Smith, July 11, 2017    Depression Self Care Plan / Survival Kit    Self-Care for Depression  Here s the deal. Your body and mind are really not as separate as most people think.  What you do and think affects how you feel and how you feel influences what you do and think. This means if you do things that people who feel good do, it will help you feel better.  Sometimes this is all it takes.  There is also a place for medication and therapy depending on how severe your depression is, so be sure to consult with your medical provider and/ or Behavioral Health Consultant if your symptoms are worsening or not improving.     In order to better manage my stress, I will:    Exercise  Get some form of exercise, every day. This will help reduce pain and release endorphins, the  feel good  chemicals in your brain. This is almost as good as taking antidepressants!  This is not the same as joining a gym and then never going! (they count on that by the way ) It can be as simple as just going for a walk or doing some gardening, anything that will get you moving.      Hygiene   Maintain good hygiene (Get out of bed in the morning, Make your bed, Brush your teeth, Take a shower, and Get dressed like you were going to work, even if you are unemployed).  If your clothes don't fit try to get ones that do.    Diet  I will strive to eat foods that are good for me, drink plenty of water, and avoid excessive sugar, caffeine, alcohol, and other mood-altering substances.  Some foods that are helpful in depression are: complex carbohydrates, B vitamins, flaxseed, fish or fish oil, fresh fruits and vegetables.    Psychotherapy  I agree to participate in Individual Therapy (if recommended).    Medication  If prescribed medications, I agree to take  them.  Missing doses can result in serious side effects.  I understand that drinking alcohol, or other illicit drug use, may cause potential side effects.  I will not stop my medication abruptly without first discussing it with my provider.    Staying Connected With Others  I will stay in touch with my friends, family members, and my primary care provider/team.    Use your imagination  Be creative.  We all have a creative side; it doesn t matter if it s oil painting, sand castles, or mud pies! This will also kick up the endorphins.    Witness Beauty  (AKA stop and smell the roses) Take a look outside, even in mid-winter. Notice colors, textures. Watch the squirrels and birds.     Service to others  Be of service to others.  There is always someone else in need.  By helping others we can  get out of ourselves  and remember the really important things.  This also provides opportunities for practicing all the other parts of the program.    Humor  Laugh and be silly!  Adjust your TV habits for less news and crime-drama and more comedy.    Control your stress  Try breathing deep, massage therapy, biofeedback, and meditation. Find time to relax each day.     My support system    Clinic Contact:  Phone number:    Contact 1:  Phone number:    Contact 2:  Phone number:    Worship/:  Phone number:    Therapist:  Phone number:    Local crisis center:    Phone number:    Other community support:  Phone number:

## 2017-07-11 NOTE — PROGRESS NOTES
"  SUBJECTIVE:                                                    Reina Quan is a 52 year old female who presents to clinic today for the following health issues:      Medication Followup of Paxil    Taking Medication as prescribed: yes    Side Effects:  None    Medication Helping Symptoms:  yes       Patient is here today to follow up on Paxil  Has been out for 1 week, feeling that she is out- more tearful  She notes she is a bit more overwhelmed right now with \"life\"  She is trying to move and her son will be leaving for college soon  She is frustrated with the behavior of her ex  Feeling like she is not a \"good mom\" right now  No SI/HI  No drug or alcohol use    Problem list and histories reviewed & adjusted, as indicated.  Additional history: as documented    Patient Active Problem List   Diagnosis     Ulcerative colitis (H)     Migraine     Allergic rhinitis     Hearing loss     Kidney replaced by transplant     Lichen planus     Giant Platelet syndrome     Nephrotic syndrome in diseases classified elsewhere     Major depression in partial remission (H)     CARDIOVASCULAR SCREENING; LDL GOAL LESS THAN 100     Health Care Home     Actinic keratosis     History of skin cancer     History of immunosuppression therapy     CKD (chronic kidney disease) stage 3, GFR 30-59 ml/min     High risk medications (not anticoagulants) long-term use     Seborrheic dermatitis     Dermatitis     Vitiligo     History of SCC (squamous cell carcinoma) of skin     Lichen planopilaris     Hyperlipidemia with target LDL less than 130     Status post kidney transplant     Enterococcus UTI     Past Surgical History:   Procedure Laterality Date     BIOPSY OF SKIN LESION       C NONSPECIFIC PROCEDURE      Renal transplant right side     C NONSPECIFIC PROCEDURE      cholecystectomy     CHOLECYSTECTOMY       DILATION AND CURETTAGE, OPERATIVE HYSTEROSCOPY WITH MORCELLATOR, COMBINED N/A 11/10/2015    Procedure: COMBINED DILATION AND " CURETTAGE, OPERATIVE HYSTEROSCOPY WITH MORCELLATOR;  Surgeon: Ghada Melendez MD;  Location: [facility] OR     ESOPHAGOSCOPY, GASTROSCOPY, DUODENOSCOPY (EGD), COMBINED  11/20/2012    Procedure: COMBINED ESOPHAGOSCOPY, GASTROSCOPY, DUODENOSCOPY (EGD), BIOPSY SINGLE OR MULTIPLE;;  Surgeon: Valentin Hahn MD;  Location: [facility] GI     MOHS MICROGRAPHIC PROCEDURE       TRANSPLANT      kidney       Social History   Substance Use Topics     Smoking status: Never Smoker     Smokeless tobacco: Never Used     Alcohol use 0.0 oz/week     0 Standard drinks or equivalent per week      Comment: 2x /week     Family History   Problem Relation Age of Onset     DIABETES Father      KIDNEY DISEASE Father      nephrolithiasis     Hypertension Mother      C.A.D. Paternal Grandfather      Blood Disease Maternal Grandmother      Asthma Sister      CANCER Other      no family hx of skin cancer     Glaucoma No family hx of      Macular Degeneration No family hx of      Melanoma No family hx of      Skin Cancer No family hx of          Current Outpatient Prescriptions   Medication Sig Dispense Refill     PARoxetine (PAXIL) 20 MG tablet Take 1 tablet every morning. 90 tablet 1     nitrofurantoin, macrocrystal-monohydrate, (MACROBID) 100 MG capsule One by mouth every other day 14 capsule 11     olopatadine (PATANOL) 0.1 % ophthalmic solution Reported on 2/17/2017  11     hydroxychloroquine (PLAQUENIL) 200 MG tablet Take 1 tablet (200 mg) by mouth 2 times daily 60 tablet 5     clobetasol (TEMOVATE) 0.05 % external solution Apply topically 2 times daily To itchy and red areas of scalp as needed. 50 mL 11     ketoconazole (NIZORAL) 2 % shampoo Apply topically daily as needed for itching or irritation 120 mL 11     olopatadine HCl (PATADAY) 0.2 % SOLN Place 1 drop into both eyes daily 1 Bottle 11     sirolimus (RAPAMUNE - GENERIC EQUIVALENT) 1 MG tablet Take 3 tablets (3 mg) by mouth daily 90 tablet 3     predniSONE (DELTASONE) 5 MG tablet Take 1 tablet  (5 mg) by mouth every other day 15 tablet 6     atorvastatin (LIPITOR) 10 MG tablet Take 1 tablet (10 mg) by mouth daily 30 tablet 11     cromolyn (OPTICROM) 4 % ophthalmic solution Place 1 drop into both eyes 4 times daily 1 Bottle 11     [DISCONTINUED] PARoxetine (PAXIL) 20 MG tablet Take 1 tablet every morning. 90 tablet 1     Allergies   Allergen Reactions     Ampicillin Swelling     Swelling of mouth and tongue.      Seasonal Allergies Other (See Comments)     Itchy eyes and rhinitis.       Reviewed and updated as needed this visit by clinical staff       Reviewed and updated as needed this visit by Provider         ROS:  Constitutional, HEENT, cardiovascular, pulmonary, gi and gu systems are negative, except as otherwise noted.    OBJECTIVE:     /80 (BP Location: Right arm, Patient Position: Chair, Cuff Size: Adult Regular)  Pulse 86  Temp 98.2  F (36.8  C) (Oral)  Wt 129 lb 14.4 oz (58.9 kg)  LMP 02/14/2014  SpO2 98%  Breastfeeding? No  BMI 23.76 kg/m2  Body mass index is 23.76 kg/(m^2).  GENERAL: healthy, alert and no distress  NECK: no adenopathy, no asymmetry, masses, or scars and thyroid normal to palpation  RESP: lungs clear to auscultation - no rales, rhonchi or wheezes  CV: regular rate and rhythm, normal S1 S2, no S3 or S4, no murmur, click or rub, no peripheral edema and peripheral pulses strong  MS: no gross musculoskeletal defects noted, no edema    Diagnostic Test Results:  none     ASSESSMENT/PLAN:             1. Depression  Chronic issue, PHQ9/GAD7 updated and reviewed.  Will refill meds for 6 months.  DAP given.  F/U if symptoms worsen or do not improve.  - PARoxetine (PAXIL) 20 MG tablet; Take 1 tablet every morning.  Dispense: 90 tablet; Refill: 1  - DEPRESSION ACTION PLAN (DAP)    Risks, benefits and alternatives were discussed with patient. Agreeable to the plan of care.      Velvet Smith PA-C  [facility]

## 2017-07-11 NOTE — MR AVS SNAPSHOT
After Visit Summary   7/11/2017    Reina Quan    MRN: [id number]           Patient Information     Date Of Birth          1965        Visit Information        Provider Department      7/11/2017 2:30 PM Velvet Smith PA-C [facility]        Today's Diagnoses     Depression           Follow-ups after your visit        Your next 10 appointments already scheduled     Sep 19, 2017  8:45 AM CDT   (Arrive by 8:30 AM)   Return Visit with Brad Bauman MD   [facility] Dermatology ([facility])    [address] [place] [id number] [phone]            Feb 16, 2018 10:40 AM CST   (Arrive by 10:10 AM)   Return Kidney Transplant with David Burns MD   [facility] Nephrology ([facility])    [address] [place] [id number] [phone]              Who to contact     If you have questions or need follow up information about today's clinic visit or your schedule please contact [facility] directly at [phone].  Normal or non-critical lab and imaging results will be communicated to you by TapToLearnhart, letter or phone within 4 business days after the clinic has received the results. If you do not hear from us within 7 days, please contact the clinic through TapToLearnhart or phone. If you have a critical or abnormal lab result, we will notify you by phone as soon as possible.  Submit refill requests through [employer] or call your pharmacy and they will forward the refill request to us. Please allow 3 business days for your refill to be completed.          Additional Information About Your Visit        MyChart Information     [employer] gives you secure access to your electronic health record. If you see a primary care provider, you can also send messages to your care team and make appointments. If you have questions, please call your primary care clinic.  If you do not  have a primary care provider, please call [phone] and they will assist you.        Care EveryWhere ID     This is your Care EveryWhere ID. This could be used by other organizations to access your [facility] medical records  [id number]        Your Vitals Were     Pulse Temperature Last Period Pulse Oximetry Breastfeeding? BMI (Body Mass Index)    86 98.2  F (36.8  C) (Oral) 02/14/2014 98% No 23.76 kg/m2       Blood Pressure from Last 3 Encounters:   07/11/17 144/74   02/17/17 122/81   11/29/16 102/50    Weight from Last 3 Encounters:   07/11/17 129 lb 14.4 oz (58.9 kg)   02/17/17 126 lb 11.2 oz (57.5 kg)   11/29/16 123 lb 6 oz (56 kg)              We Performed the Following     DEPRESSION ACTION PLAN (DAP)          Where to get your medicines      These medications were sent to [facility] [address] [place] [address] [address] [place] [address] & [facility]  [address] [facility] [address]     Phone:  [phone]     PARoxetine 20 MG tablet          Primary Care Provider Office Phone # Fax #    Velvet Smith PA-C [phone]       [facility] [address] [facility] [address]        Equal Access to Services     ANTONIA HUERTAS AH: Hadii aad ku hadasho Soomaali, waaxda luqadaha, qaybta kaalmada junito, adriane samano. So [facility] [phone].    ATENCIÓN: Si habla español, tiene a simms disposición servicios gratuitos de asistencia lingüística. Di al [phone].    We comply with applicable federal civil rights laws and [place] laws. We do not discriminate on the basis of race, color, national origin, age, disability sex, sexual orientation or gender identity.            Thank you!     Thank you for choosing [facility]  for your care. Our goal is always to provide you with excellent care. Hearing back from our patients is one way we can continue to improve our services. Please take a few minutes  to complete the written survey that you may receive in the mail after your visit with us. Thank you!             Your Updated Medication List - Protect others around you: Learn how to safely use, store and throw away your medicines at www.disposemymeds.org.          This list is accurate as of: 7/11/17  2:49 PM.  Always use your most recent med list.                   Brand Name Dispense Instructions for use Diagnosis    atorvastatin 10 MG tablet    LIPITOR    30 tablet    Take 1 tablet (10 mg) by mouth daily    Hyperlipidemia LDL goal < 130       calcipotriene 0.005 % cream    DOVONOX    60 g    Apply topically 2 times daily Apply with fluorouracil cream BID for 4 days    AK (actinic keratosis)       clobetasol 0.05 % external solution    TEMOVATE    50 mL    Apply topically 2 times daily To itchy and red areas of scalp as needed.    Lichen planopilaris       cromolyn 4 % ophthalmic solution    OPTICROM    1 Bottle    Place 1 drop into both eyes 4 times daily    Other chronic allergic conjunctivitis       fluorouracil 5 % cream    EFUDEX    40 g    Apply topically 2 times daily Apply with calcipotriene cream BID for 4 days    AK (actinic keratosis)       hydroxychloroquine 200 MG tablet    PLAQUENIL    60 tablet    Take 1 tablet (200 mg) by mouth 2 times daily    Lichen planopilaris       ketoconazole 2 % shampoo    NIZORAL    120 mL    Apply topically daily as needed for itching or irritation    Lichen planopilaris       lidocaine visc 2% & diphenhydramine 12.5mg/5mL & maalox/mylanta w/simethicone (1:1:1 v/v/v) Susp compounding kit     237 mL    Swish and swallow 5-10 mLs in mouth every 6 hours as needed    Throat pain       nitroFURantoin (macrocrystal-monohydrate) 100 MG capsule    MACROBID    14 capsule    One by mouth every other day    Urinary tract infection, site unspecified       * olopatadine HCl 0.2 % Soln    PATADAY    1 Bottle    Place 1 drop into both eyes daily    Allergic conjunctivitis, bilateral        * olopatadine 0.1 % ophthalmic solution    PATANOL     Reported on 2/17/2017        ondansetron 4 MG ODT tab    ZOFRAN-ODT    30 tablet    Take 1 tablet (4 mg) by mouth every 6 hours as needed for nausea    Nausea       PARoxetine 20 MG tablet    PAXIL    90 tablet    Take 1 tablet every morning.    Dysthymic disorder       predniSONE 5 MG tablet    DELTASONE    15 tablet    Take 1 tablet (5 mg) by mouth every other day    Kidney replaced by transplant       sirolimus 1 MG tablet    RAPAMUNE - GENERIC EQUIVALENT    90 tablet    Take 3 tablets (3 mg) by mouth daily    Living-donor kidney transplant recipient       * Notice:  This list has 2 medication(s) that are the same as other medications prescribed for you. Read the directions carefully, and ask your doctor or other care provider to review them with you.

## 2017-07-17 ASSESSMENT — ANXIETY QUESTIONNAIRES
5. BEING SO RESTLESS THAT IT IS HARD TO SIT STILL: NOT AT ALL
GAD7 TOTAL SCORE: 11
7. FEELING AFRAID AS IF SOMETHING AWFUL MIGHT HAPPEN: MORE THAN HALF THE DAYS
IF YOU CHECKED OFF ANY PROBLEMS ON THIS QUESTIONNAIRE, HOW DIFFICULT HAVE THESE PROBLEMS MADE IT FOR YOU TO DO YOUR WORK, TAKE CARE OF THINGS AT HOME, OR GET ALONG WITH OTHER PEOPLE: SOMEWHAT DIFFICULT
1. FEELING NERVOUS, ANXIOUS, OR ON EDGE: SEVERAL DAYS
3. WORRYING TOO MUCH ABOUT DIFFERENT THINGS: MORE THAN HALF THE DAYS
2. NOT BEING ABLE TO STOP OR CONTROL WORRYING: NEARLY EVERY DAY
6. BECOMING EASILY ANNOYED OR IRRITABLE: MORE THAN HALF THE DAYS

## 2017-07-17 ASSESSMENT — PATIENT HEALTH QUESTIONNAIRE - PHQ9: 5. POOR APPETITE OR OVEREATING: SEVERAL DAYS

## 2017-07-18 ASSESSMENT — ANXIETY QUESTIONNAIRES: GAD7 TOTAL SCORE: 11

## 2017-07-18 ASSESSMENT — PATIENT HEALTH QUESTIONNAIRE - PHQ9: SUM OF ALL RESPONSES TO PHQ QUESTIONS 1-9: 2

## 2017-08-05 ENCOUNTER — HEALTH MAINTENANCE LETTER (OUTPATIENT)
Age: 52
End: 2017-08-05

## 2017-08-25 ENCOUNTER — TELEPHONE (OUTPATIENT)
Dept: DERMATOLOGY | Facility: CLINIC | Age: 52
End: 2017-08-25

## 2017-08-28 NOTE — TELEPHONE ENCOUNTER
[facility] Prior Authorization Team   Phone: [phone]  Fax: [phone]      PA Initiation    Medication: clobetasol (TEMOVATE) 0.05 % external solution  Insurance Company: [employer] [place] - Phone [phone] Fax [phone]  Pharmacy Filling the Rx: "[facility] [address] [place], [place] [address] AT [place] [address] & [facility]  Filling Pharmacy Phone: [phone]  Filling Pharmacy Fax: [phone]  Start Date: 8/28/2017

## 2017-09-01 NOTE — TELEPHONE ENCOUNTER
Called [employer] at [phone], spoke to [employer] rep who stated that the request was received and still under review. Review typically takes 4-5 calendar days but can take up to 10 days.

## 2017-09-11 NOTE — TELEPHONE ENCOUNTER
Prior Authorization Approval    Authorization Effective Date: 8/22/2017  Authorization Expiration Date: 8/22/2018  Medication: clobetasol (TEMOVATE) 0.05 % external solution- Approved  Approved Dose/Quantity: 50ml per 30 days  Reference #: [id number]   Insurance Company: [employer] [place] - Phone [phone] Fax [phone]  Expected CoPay: $6.00     CoPay Card Available: No   Foundation Assistance Needed:    Which Pharmacy is filling the prescription (Not needed for infusion/clinic administered): [facility] [address] [place] [address] [facility] AT [place] [address] & [facility]  Pharmacy Notified: Yes  Patient Notified: Yes

## 2017-09-19 ENCOUNTER — OFFICE VISIT (OUTPATIENT)
Dept: DERMATOLOGY | Facility: CLINIC | Age: 52
End: 2017-09-19

## 2017-09-19 DIAGNOSIS — L43.9 LICHEN PLANUS: ICD-10-CM

## 2017-09-19 DIAGNOSIS — L57.0 AK (ACTINIC KERATOSIS): ICD-10-CM

## 2017-09-19 DIAGNOSIS — D48.5 NEOPLASM OF UNCERTAIN BEHAVIOR OF SKIN: Primary | ICD-10-CM

## 2017-09-19 PROCEDURE — 88305 TISSUE EXAM BY PATHOLOGIST: CPT | Performed by: DERMATOLOGY

## 2017-09-19 PROCEDURE — 88342 IMHCHEM/IMCYTCHM 1ST ANTB: CPT | Performed by: DERMATOLOGY

## 2017-09-19 RX ORDER — LIDOCAINE HYDROCHLORIDE AND EPINEPHRINE 10; 10 MG/ML; UG/ML
3 INJECTION, SOLUTION INFILTRATION; PERINEURAL ONCE
Qty: 3 ML | Refills: 0 | OUTPATIENT
Start: 2017-09-19 | End: 2017-09-19

## 2017-09-19 ASSESSMENT — PAIN SCALES - GENERAL
PAINLEVEL: NO PAIN (0)
PAINLEVEL: MILD PAIN (2)

## 2017-09-19 NOTE — NURSING NOTE
Lidocaine  3mL once for one use, starting 9/19/2017 ending 9/19/2017,  2mL disp, R-0, injection  Injected by Mckayla Hairston, CMA

## 2017-09-19 NOTE — NURSING NOTE
"Dermatology Rooming Note    Reina Quan's goals for this visit include:   Chief Complaint   Patient presents with     Skin Check     Kathy DELATORRE states \" I have a few spots that concern me.\"     Codi Crocker LPN  "

## 2017-09-19 NOTE — MR AVS SNAPSHOT
After Visit Summary   9/19/2017    Reina Quan    MRN: [id number]           Patient Information     Date Of Birth          1965        Visit Information        Provider Department      9/19/2017 8:45 AM Brad Bauman MD [facility] Dermatology        Today's Diagnoses     Neoplasm of uncertain behavior of skin    -  1      Care Instructions    Wound Care After a Biopsy    What is a skin biopsy?  A skin biopsy allows the doctor to examine a very small piece of tissue under the microscope to determine the diagnosis and the best treatment for the skin condition. A local anesthetic (numbing medicine)  is injected with a very small needle into the skin area to be tested. A small piece of skin is taken from the area. Sometimes a suture (stitch) is used.     What are the risks of a skin biopsy?  I will experience scar, bleeding, swelling, pain, crusting and redness. I may experience incomplete removal or recurrence. Risks of this procedure are excessive bleeding, bruising, infection, nerve damage, numbness, thick (hypertrophic or keloidal) scar and non-diagnostic biopsy.    How should I care for my wound for the first 24 hours?    Keep the wound dry and covered for 24 hours    If it bleeds, hold direct pressure on the area for 15 minutes. If bleeding does not stop then go to the emergency room    Avoid strenuous exercise the first 1-2 days or as your doctor instructs you    How should I care for the wound after 24 hours?    After 24 hours, remove the bandage    You may bathe or shower as normal    If you had a scalp biopsy, you can shampoo as usual and can use shower water to clean the biopsy site daily    Clean the wound twice a day with gentle soap and water    Do not scrub, be gentle    Apply white petroleum/Vaseline after cleaning the wound with a cotton swab or a clean finger, and keep the site covered with a Bandaid /bandage. Bandages are not necessary with a scalp biopsy    If you are  unable to cover the site with a Bandaid /bandage, re-apply ointment 2-3 times a day to keep the site moist. Moisture will help with healing    Avoid strenuous activity for first 1-2 days    Avoid lakes, rivers, pools, and oceans until the stitches are removed or the site is healed    How do I clean my wound?    Wash hands thoroughly with soap or use hand  before all wound care    Clean the wound with gentle soap and water    Apply white petroleum/Vaseline  to wound after it is clean    Replace the Bandaid /bandage to keep the wound covered for the first few days or as instructed by your doctor    If you had a scalp biopsy, warm shower water to the area on a daily basis should suffice    What should I use to clean my wound?     Cotton-tipped applicators (Qtips )    White petroleum jelly (Vaseline ). Use a clean new container and use Q-tips to apply.    Bandaids   as needed    Gentle soap     How should I care for my wound long term?    Do not get your wound dirty    Keep up with wound care for one week or until the area is healed.    A small scab will form and fall off by itself when the area is completely healed. The area will be red and will become pink in color as it heals. Sun protection is very important for how your scar will turn out. Sunscreen with an SPF 30 or greater is recommended once the area is healed.    If you have stitches, stitches need to be removed in 14 days. You may return to our clinic for this or you may have it done locally at your doctor s office.    You should have some soreness but it should be mild and slowly go away over several days. Talk to your doctor about using tylenol for pain,    When should I call my doctor?  If you have increased:     Pain or swelling    Pus or drainage (clear or slightly yellow drainage is ok)    Temperature over 100F    Spreading redness or warmth around wound    When will I hear about my results?  The biopsy results can take 2-3 weeks to come back.  The clinic will call you with the results, send you a Bilna message, or have you schedule a follow-up clinic or phone time to discuss the results. Contact our clinics if you do not hear from us in 3 weeks.     Who should I call with questions?    [facility]: [phone]     [facility]: [phone]    For urgent needs outside of business hours call the [facility] at [phone] and ask for the dermatology resident on call            Follow-ups after your visit        Your next 10 appointments already scheduled     Feb 16, 2018 10:40 AM CST   (Arrive by 10:10 AM)   Return Kidney Transplant with David Burns MD   [facility] ([facility] [place])    [address] [place] [id number] [phone]              Who to contact     Please call your clinic at [phone] to:    Ask questions about your health    Make or cancel appointments    Discuss your medicines    Learn about your test results    Speak to your doctor   If you have compliments or concerns about an experience at your clinic, or if you wish to file a complaint, please contact [facility] Patient Relations at [phone] or email us at [email or web address]         Additional Information About Your Visit        [employer] Information     [employer] gives you secure access to your electronic health record. If you see a primary care provider, you can also send messages to your care team and make appointments. If you have questions, please call your primary care clinic.  If you do not have a primary care provider, please call [phone] and they will assist you.      [employer] is an electronic gateway that provides easy, online access to your medical records. With [employer], you can request a clinic appointment, read your test results, renew a prescription or communicate with your care team.     To access  your existing account, please contact your [facility] or call [phone] for assistance.        Care EveryWhere ID     This is your Care EveryWhere ID. This could be used by other organizations to access your [facility] medical records  [id number]        Your Vitals Were     Last Period                   02/14/2014            Blood Pressure from Last 3 Encounters:   07/11/17 128/80   02/17/17 122/81   11/29/16 102/50    Weight from Last 3 Encounters:   07/11/17 58.9 kg (129 lb 14.4 oz)   02/17/17 57.5 kg (126 lb 11.2 oz)   11/29/16 56 kg (123 lb 6 oz)              We Performed the Following     BIOPSY SKIN/SUBQ/MUC MEM, EACH ADDTL LESION     BIOPSY SKIN/SUBQ/MUC MEM, SINGLE LESION     Surgical pathology exam          Today's Medication Changes          These changes are accurate as of: 9/19/17 10:00 AM.  If you have any questions, ask your nurse or doctor.               Start taking these medicines.        Dose/Directions    lidocaine 1% with EPINEPHrine 1:100,000 1 %-1:879073 injection   Used for:  Neoplasm of uncertain behavior of skin   Started by:  Brad Bauman MD        Dose:  3 mL   Inject 3 mLs into the skin once for 1 dose   Quantity:  3 mL   Refills:  0            Where to get your medicines      Some of these will need a paper prescription and others can be bought over the counter.  Ask your nurse if you have questions.     You don't need a prescription for these medications     lidocaine 1% with EPINEPHrine 1:100,000 1 %-1:601542 injection                Primary Care Provider Office Phone # Fax #    Velvet Smith PA-C [phone]       [address] [facility] [address]        Equal Access to Services     [facility] Center[facility] : Hadii aad lenore soliman Soemily, waaxda luqadaha, qaybta kaalmada adeegyada, adriane samano. So [facility] [phone].    ATENCIÓN: Si habla español, tiene a simms disposición servicios gratuitos de  rastaa lingüística. Di al [phone].    We comply with applicable federal civil rights laws and [place] laws. We do not discriminate on the basis of race, color, national origin, age, disability sex, sexual orientation or gender identity.            Thank you!     Thank you for choosing [facility] DERMATOLOGY  for your care. Our goal is always to provide you with excellent care. Hearing back from our patients is one way we can continue to improve our services. Please take a few minutes to complete the written survey that you may receive in the mail after your visit with us. Thank you!             Your Updated Medication List - Protect others around you: Learn how to safely use, store and throw away your medicines at www.disposemymeds.org.          This list is accurate as of: 9/19/17 10:00 AM.  Always use your most recent med list.                   Brand Name Dispense Instructions for use Diagnosis    atorvastatin 10 MG tablet    LIPITOR    30 tablet    Take 1 tablet (10 mg) by mouth daily    Hyperlipidemia LDL goal < 130       clobetasol 0.05 % external solution    TEMOVATE    50 mL    Apply topically 2 times daily To itchy and red areas of scalp as needed.    Lichen planopilaris       cromolyn 4 % ophthalmic solution    OPTICROM    1 Bottle    Place 1 drop into both eyes 4 times daily    Other chronic allergic conjunctivitis       hydroxychloroquine 200 MG tablet    PLAQUENIL    60 tablet    Take 1 tablet (200 mg) by mouth 2 times daily    Lichen planopilaris       ketoconazole 2 % shampoo    NIZORAL    120 mL    Apply topically daily as needed for itching or irritation    Lichen planopilaris       lidocaine 1% with EPINEPHrine 1:100,000 1 %-1:988144 injection     3 mL    Inject 3 mLs into the skin once for 1 dose    Neoplasm of uncertain behavior of skin       nitroFURantoin (macrocrystal-monohydrate) 100 MG capsule    MACROBID    14 capsule    One by mouth every other day    Urinary tract infection,  site unspecified       olopatadine 0.1 % ophthalmic solution    PATANOL     Reported on 2/17/2017        PARoxetine 20 MG tablet    PAXIL    90 tablet    Take 1 tablet every morning.    Dysthymic disorder       predniSONE 5 MG tablet    DELTASONE    15 tablet    Take 1 tablet (5 mg) by mouth every other day    Kidney replaced by transplant       sirolimus 1 MG tablet    GENERIC EQUIVALENT    90 tablet    Take 3 tablets (3 mg) by mouth daily    Living-donor kidney transplant recipient

## 2017-09-19 NOTE — PROGRESS NOTES
"[facility] Dermatology Note    Dermatology Problem List:  1.NMSC  -SCC \"at least in situ\" 02/2015  -SCC, R anterior shin, s/p excision 06/2014  -SCCIS, s/p excision 05/2014  -SCC, L dorsal thumb, s/p excision 03/2014  -SCC (KA), right hand, s/p excision 06/2009  -SCC, R cheek, s/p excision 08/18/2008     2. LPP, last ILK 2/17/17  -Plaquenil 250 mg initiated 11/2008 and restarted 2017  -Clobetasol solution since 2008  -ILK intermittently     3. AK  -HAK, left first finger web, s/p Bx 10/19/2016  -HAK, left second knuckle, s/p Bx 10/19/2016  -HAK biopsied from left dorsal index finger 03/24/2015  -HAK, R dorsal second MCP of hand 03/24/2015  -HAK biopsied form L dorsal hand 02/10/2015  -HAK rebiopsied 03/10/2015  -AK and lichenoid HAK biopsied from L dorsal 3rd MCP 07/10/2014  -AK biopsied from R lateral shin 03/2014  -HAK biopsied from R dorsal thumb 03/2014  -AK rebiopsied from R dorsal thumb 05/2014   -again rebiopsied and read as \"Actinic keratosis with scar\" 06/10/2014  -for the fourth time biopsied 07/10/2014 with recommendation for re-sampling if recurs  -for the fifth time biopsied: Pigmented AK biopsied form R dorsal thumb 02/10/2015  -for the sixth time biopsied 03/10/2015 HAK  -HAK biopsied from R index finger 03/2014     4. Immunocompromised status  -Currently: prednisone, sirolimus  -s/p kidney transplant 2/2 glomerulosclerosis  -s/p azathioprine, cyclosporin, prednisone     5. Hx benign lesion biopsy  -right second metacarpal dorsal hand - lichenoid reaction  -left superior anterior chin- benign keratosis  -BLK, R dorsal hand, s/p Bx 06/2009  -Macular SK, R midle finger, s/p Bx 07/2014  -\"keratosis, ulcerated, base not seen,\" left dorsal hand fourth web space, s/p biopsy 03/01/2016  -\"endophytic proliferation of mildly atypical squamous epithelim, extending to the deep specimen margin\"    6. Vitiligo    Encounter Date: Sep 19, 2017    CC:   Chief Complaint   Patient presents with     " "Skin Check     TSC, Kathy states \" I have a few spots that concern me.\"     History of Present Illness:  Ms. Reina Quan is a 52 year old female with kidney transplant taking sirolimus who presents for evaluation of TSC. Pt was last seen 6/16/2017.    Several spots that concerned her, couple in the legs and hand. Started noticed it since last visit. Did not bleed or irritated. She often come to the clinic every 3-6 months.     The scalp has been acting up lately. She has been taking plaquenil twice a day. She is using ketoconazole shampoo every 3 days. She has not been using clobetasol everyday, only used it when it is itchy. On average every other day.        Past Medical History:   Patient Active Problem List   Diagnosis     Ulcerative colitis (H)     Migraine     Allergic rhinitis     Hearing loss     Kidney replaced by transplant     Lichen planus     Giant Platelet syndrome     Nephrotic syndrome in diseases classified elsewhere     Major depression in partial remission (H)     CARDIOVASCULAR SCREENING; LDL GOAL LESS THAN 100     Health Care Home     Actinic keratosis     History of skin cancer     History of immunosuppression therapy     CKD (chronic kidney disease) stage 3, GFR 30-59 ml/min     High risk medications (not anticoagulants) long-term use     Seborrheic dermatitis     Dermatitis     Vitiligo     History of SCC (squamous cell carcinoma) of skin     Lichen planopilaris     Hyperlipidemia with target LDL less than 130     Status post kidney transplant     Enterococcus UTI     Past Medical History:   Diagnosis Date     Actinic keratosis      Allergic rhinitis, cause unspecified      Anemia      anxiety/depression     PAXIL     congenital hearing loss     uses hearing aids     Dry eyes      Giant Platelet syndrome      Glomerular Focal Sclerosis--transplant 1985      Kidney replaced by transplant 1992    RAPAMUNE/ SIROLIMUS      Lichen planopilaris     LPP followed by derm on doxycycline/ " clobetasol     Lichen planus      Menorrhagia      migraine      Squamous cell carcinoma     8 x      Thrombocytopenia (H)      Ulcerative colitis, unspecified     biposy neg 1996     UTI (lower urinary tract infection)     recurrent Dr Burns [facility]     Past Surgical History:   Procedure Laterality Date     BIOPSY OF SKIN LESION       C NONSPECIFIC PROCEDURE      Renal transplant right side     C NONSPECIFIC PROCEDURE      cholecystectomy     CHOLECYSTECTOMY       DILATION AND CURETTAGE, OPERATIVE HYSTEROSCOPY WITH MORCELLATOR, COMBINED N/A 11/10/2015    Procedure: COMBINED DILATION AND CURETTAGE, OPERATIVE HYSTEROSCOPY WITH MORCELLATOR;  Surgeon: Ghada Melendez MD;  Location: [facility] OR     ESOPHAGOSCOPY, GASTROSCOPY, DUODENOSCOPY (EGD), COMBINED  11/20/2012    Procedure: COMBINED ESOPHAGOSCOPY, GASTROSCOPY, DUODENOSCOPY (EGD), BIOPSY SINGLE OR MULTIPLE;;  Surgeon: Valentin Hahn MD;  Location:  GI     MOHS MICROGRAPHIC PROCEDURE       TRANSPLANT      kidney       Social History:  The patient works as a . The patient denies use of tanning beds.    Family History:  There is no family history of skin cancer. and There is no family history of melanoma.    Medications:  Current Outpatient Prescriptions   Medication Sig Dispense Refill     PARoxetine (PAXIL) 20 MG tablet Take 1 tablet every morning. 90 tablet 1     nitrofurantoin, macrocrystal-monohydrate, (MACROBID) 100 MG capsule One by mouth every other day 14 capsule 11     olopatadine (PATANOL) 0.1 % ophthalmic solution Reported on 2/17/2017  11     hydroxychloroquine (PLAQUENIL) 200 MG tablet Take 1 tablet (200 mg) by mouth 2 times daily 60 tablet 5     clobetasol (TEMOVATE) 0.05 % external solution Apply topically 2 times daily To itchy and red areas of scalp as needed. 50 mL 11     ketoconazole (NIZORAL) 2 % shampoo Apply topically daily as needed for itching or irritation 120 mL 11     sirolimus (RAPAMUNE - GENERIC EQUIVALENT) 1 MG tablet Take 3  tablets (3 mg) by mouth daily 90 tablet 3     predniSONE (DELTASONE) 5 MG tablet Take 1 tablet (5 mg) by mouth every other day 15 tablet 6     atorvastatin (LIPITOR) 10 MG tablet Take 1 tablet (10 mg) by mouth daily 30 tablet 11     cromolyn (OPTICROM) 4 % ophthalmic solution Place 1 drop into both eyes 4 times daily 1 Bottle 11        Allergies   Allergen Reactions     Ampicillin Swelling     Swelling of mouth and tongue.      Seasonal Allergies Other (See Comments)     Itchy eyes and rhinitis.         Review of Systems:  -Not pertinent to the current visit.  -Constitutional: The patient denies fatigue, fevers, chills, unintended weight loss, and night sweats.  -HEENT: Patient denies nonhealing oral sores.  -Skin: As above in HPI. No additional skin concerns.    Physical exam:  Vitals: LMP 02/14/2014  GEN: This is a well developed, well-nourished female in no acute distress, in a pleasant mood.    SKIN: Total skin excluding the undergarment areas was performed. The exam included the head/face, neck, both arms, chest, back, abdomen, both legs, digits and/or nails.     -multiple brown macules on sun exposed areas   -loss of pigmentation on digits of hands and feet bilaterally, consistent with digital acral vitiligo     -left dorsum of 1st and  And two spot 2nd digit with keratotic scale s/p cryotherapy  -shave biopsied   Left mid-forearm ventral side:  hyperkeratotic erythematous papule ~ 1 cm   Left proximal anterior shin (around the prior site of biopsy): erythematous nodule ~ 0.5 cm   Right lateral proximal lower leg: erythematous nodule ~ 0.5 cm   Right lateral distal lower leg: erythematous nodule ~ 0.5 cm  - photography performed without biopsy   Right medial distal lower legs ~ 0.5 cm x 2 spots     -Scalp   no erythema or generalized erythema   With perifollicular scale and well defined area of scarred scalp   LPPAI 0.67 (pruritus and perifollicular scale)    -No other lesions of concern on areas examined.      Impression/Plan:  1. Immunosuppression - On Sirolimus and prednisone for kidney transplant, patient understands the importance of regular monitoring skin examinations due to her immunosuppression.      2. History of nonmelanoma skin cancer- Patient has had 9 NMSC removed. Last NMSC on 2015     3. Neoplasm of uncertain behavior x4 - left forearm, left shin, right calf x2    Shave biopsy:  After discussion of benefits and risks including but not limited to bleeding/bruising, pain/swelling, infection, scar, incomplete removal, nerve damage/numbness, recurrence, and non-diagnostic biopsy, written consent, verbal consent and photographs were obtained. Time-out was performed. The areas were cleaned with isopropyl alcohol.  A total of 5 ml of 1% lidocaine was injected into the 4 areas to obtain adequate anesthesia. A  shave biopsy was performed in each of 4 cases. Hemostasis was achieved with aluminium chloride. Vaseline and a sterile dressing were applied. The patient tolerated the procedure and no complications were noted. The patient was provided with verbal and written post care instructions.      Photography taken at the 4 biopsy spots + 3 more spots that are not biopsied     4. Solar Lentiginosis and vitiligo, same as prior     5. Actinic keratosis x3 on dorsal hand    Cryotherapy procedure note: After verbal consent and discussion of risks and benefits including but no limited to dyspigmentation/scar, blister, and pain, 3 areas as noted above was(were) treated with 1-2mm freeze border for 2 cycles with liquid nitrogen. Post cryotherapy instructions were provided.     6. LPP  Worsening of itchiness and noted to have perifollicular scale (that were not there on 6/16/2017). Pt has not been using clobetasol solution every day    Clobetasol solution to every day    Continue ketaconazole shampoo    Continue daily plaquenil      CC Dr. Smith on close of this encounter.  Follow-up in 3 months, earlier for new or  changing lesions.      staffed the patient.    Staff Involved:  Resident(Cleve Fernando)/Staff(as above)    I have seen and examined this patient and agree with the assessment and plan as documented in the resident's note, and was present for all procedures.    Brad Bauman MD  Dermatology Attending

## 2017-09-19 NOTE — LETTER
"9/19/2017       RE: Reina Quan  [address]  [facility] [address]     Dear Colleague,    Thank you for referring your patient, Reina Quan, to the [facility] DERMATOLOGY at [facility]. Please see a copy of my visit note below.    [facility] Dermatology Note    Dermatology Problem List:  1.NMSC  -SCC \"at least in situ\" 02/2015  -SCC, R anterior shin, s/p excision 06/2014  -SCCIS, s/p excision 05/2014  -SCC, L dorsal thumb, s/p excision 03/2014  -SCC (KA), right hand, s/p excision 06/2009  -SCC, R cheek, s/p excision 08/18/2008     2. LPP, last ILK 2/17/17  -Plaquenil 250 mg initiated 11/2008 and restarted 2017  -Clobetasol solution since 2008  -ILK intermittently     3. AK  -HAK, left first finger web, s/p Bx 10/19/2016  -HAK, left second knuckle, s/p Bx 10/19/2016  -HAK biopsied from left dorsal index finger 03/24/2015  -HAK, R dorsal second MCP of hand 03/24/2015  -HAK biopsied form L dorsal hand 02/10/2015  -HAK rebiopsied 03/10/2015  -AK and lichenoid HAK biopsied from L dorsal 3rd MCP 07/10/2014  -AK biopsied from R lateral shin 03/2014  -HAK biopsied from R dorsal thumb 03/2014  -AK rebiopsied from R dorsal thumb 05/2014   -again rebiopsied and read as \"Actinic keratosis with scar\" 06/10/2014  -for the fourth time biopsied 07/10/2014 with recommendation for re-sampling if recurs  -for the fifth time biopsied: Pigmented AK biopsied form R dorsal thumb 02/10/2015  -for the sixth time biopsied 03/10/2015 HAK  -HAK biopsied from R index finger 03/2014     4. Immunocompromised status  -Currently: prednisone, sirolimus  -s/p kidney transplant 2/2 glomerulosclerosis  -s/p azathioprine, cyclosporin, prednisone     5. Hx benign lesion biopsy  -right second metacarpal dorsal hand - lichenoid reaction  -left superior anterior chin- benign keratosis  -BLK, R dorsal hand, s/p Bx 06/2009  -Macular SK, R midle finger, s/p Bx 07/2014  -\"keratosis, ulcerated, " "base not seen,\" left dorsal hand fourth web space, s/p biopsy 03/01/2016  -\"endophytic proliferation of mildly atypical squamous epithelim, extending to the deep specimen margin\"    6. Vitiligo    Encounter Date: Sep 19, 2017    CC:   Chief Complaint   Patient presents with     Skin Check     TSC, Kathy states \" I have a few spots that concern me.\"     History of Present Illness:  Ms. Reina Quan is a 52 year old female with kidney transplant taking sirolimus who presents for evaluation of TSC. Pt was last seen 6/16/2017.    Several spots that concerned her, couple in the legs and hand. Started noticed it since last visit. Did not bleed or irritated. She often come to the clinic every 3-6 months.     The scalp has been acting up lately. She has been taking plaquenil twice a day. She is using ketoconazole shampoo every 3 days. She has not been using clobetasol everyday, only used it when it is itchy. On average every other day.        Past Medical History:   Patient Active Problem List   Diagnosis     Ulcerative colitis (H)     Migraine     Allergic rhinitis     Hearing loss     Kidney replaced by transplant     Lichen planus     Giant Platelet syndrome     Nephrotic syndrome in diseases classified elsewhere     Major depression in partial remission (H)     CARDIOVASCULAR SCREENING; LDL GOAL LESS THAN 100     Health Care Home     Actinic keratosis     History of skin cancer     History of immunosuppression therapy     CKD (chronic kidney disease) stage 3, GFR 30-59 ml/min     High risk medications (not anticoagulants) long-term use     Seborrheic dermatitis     Dermatitis     Vitiligo     History of SCC (squamous cell carcinoma) of skin     Lichen planopilaris     Hyperlipidemia with target LDL less than 130     Status post kidney transplant     Enterococcus UTI     Past Medical History:   Diagnosis Date     Actinic keratosis      Allergic rhinitis, cause unspecified      Anemia      anxiety/depression     " PAXIL     congenital hearing loss     uses hearing aids     Dry eyes      Giant Platelet syndrome      Glomerular Focal Sclerosis--transplant 1985      Kidney replaced by transplant 1992    RAPAMUNE/ SIROLIMUS      Lichen planopilaris     LPP followed by derm on doxycycline/ clobetasol     Lichen planus      Menorrhagia      migraine      Squamous cell carcinoma     8 x      Thrombocytopenia (H)      Ulcerative colitis, unspecified     biposy neg 1996     UTI (lower urinary tract infection)     recurrent Dr Burns [facility]     Past Surgical History:   Procedure Laterality Date     BIOPSY OF SKIN LESION       C NONSPECIFIC PROCEDURE      Renal transplant right side     C NONSPECIFIC PROCEDURE      cholecystectomy     CHOLECYSTECTOMY       DILATION AND CURETTAGE, OPERATIVE HYSTEROSCOPY WITH MORCELLATOR, COMBINED N/A 11/10/2015    Procedure: COMBINED DILATION AND CURETTAGE, OPERATIVE HYSTEROSCOPY WITH MORCELLATOR;  Surgeon: Ghada Melendez MD;  Location: [facility] OR     ESOPHAGOSCOPY, GASTROSCOPY, DUODENOSCOPY (EGD), COMBINED  11/20/2012    Procedure: COMBINED ESOPHAGOSCOPY, GASTROSCOPY, DUODENOSCOPY (EGD), BIOPSY SINGLE OR MULTIPLE;;  Surgeon: Valentin Hahn MD;  Location: [facility] GI     MOHS MICROGRAPHIC PROCEDURE       TRANSPLANT      kidney       Social History:  The patient works as a . The patient denies use of tanning beds.    Family History:  There is no family history of skin cancer. and There is no family history of melanoma.    Medications:  Current Outpatient Prescriptions   Medication Sig Dispense Refill     PARoxetine (PAXIL) 20 MG tablet Take 1 tablet every morning. 90 tablet 1     nitrofurantoin, macrocrystal-monohydrate, (MACROBID) 100 MG capsule One by mouth every other day 14 capsule 11     olopatadine (PATANOL) 0.1 % ophthalmic solution Reported on 2/17/2017  11     hydroxychloroquine (PLAQUENIL) 200 MG tablet Take 1 tablet (200 mg) by mouth 2 times daily 60 tablet 5     clobetasol (TEMOVATE) 0.05 %  external solution Apply topically 2 times daily To itchy and red areas of scalp as needed. 50 mL 11     ketoconazole (NIZORAL) 2 % shampoo Apply topically daily as needed for itching or irritation 120 mL 11     sirolimus (RAPAMUNE - GENERIC EQUIVALENT) 1 MG tablet Take 3 tablets (3 mg) by mouth daily 90 tablet 3     predniSONE (DELTASONE) 5 MG tablet Take 1 tablet (5 mg) by mouth every other day 15 tablet 6     atorvastatin (LIPITOR) 10 MG tablet Take 1 tablet (10 mg) by mouth daily 30 tablet 11     cromolyn (OPTICROM) 4 % ophthalmic solution Place 1 drop into both eyes 4 times daily 1 Bottle 11        Allergies   Allergen Reactions     Ampicillin Swelling     Swelling of mouth and tongue.      Seasonal Allergies Other (See Comments)     Itchy eyes and rhinitis.         Review of Systems:  -Not pertinent to the current visit.  -Constitutional: The patient denies fatigue, fevers, chills, unintended weight loss, and night sweats.  -HEENT: Patient denies nonhealing oral sores.  -Skin: As above in HPI. No additional skin concerns.    Physical exam:  Vitals: [facility] 02/14/2014  GEN: This is a well developed, well-nourished female in no acute distress, in a pleasant mood.    SKIN: Total skin excluding the undergarment areas was performed. The exam included the head/face, neck, both arms, chest, back, abdomen, both legs, digits and/or nails.     -multiple brown macules on sun exposed areas   -loss of pigmentation on digits of hands and feet bilaterally, consistent with digital acral vitiligo     -left dorsum of 1st and  And two spot 2nd digit with keratotic scale s/p cryotherapy  -shave biopsied   Left mid-forearm ventral side:  hyperkeratotic erythematous papule ~ 1 cm   Left proximal anterior shin (around the prior site of biopsy): erythematous nodule ~ 0.5 cm   Right lateral proximal lower leg: erythematous nodule ~ 0.5 cm   Right lateral distal lower leg: erythematous nodule ~ 0.5 cm  - photography performed without  biopsy   Right medial distal lower legs ~ 0.5 cm x 2 spots     -Scalp   no erythema or generalized erythema   With perifollicular scale and well defined area of scarred scalp   LPPAI 0.67 (pruritus and perifollicular scale)    -No other lesions of concern on areas examined.     Impression/Plan:  1. Immunosuppression - On Sirolimus and prednisone for kidney transplant, patient understands the importance of regular monitoring skin examinations due to her immunosuppression.      2. History of nonmelanoma skin cancer- Patient has had 9 NMSC removed. Last NMSC on 2015     3. Neoplasm of uncertain behavior x4 - left forearm, left shin, right calf x2    Shave biopsy:  After discussion of benefits and risks including but not limited to bleeding/bruising, pain/swelling, infection, scar, incomplete removal, nerve damage/numbness, recurrence, and non-diagnostic biopsy, written consent, verbal consent and photographs were obtained. Time-out was performed. The areas were cleaned with isopropyl alcohol.  A total of 5 ml of 1% lidocaine was injected into the 4 areas to obtain adequate anesthesia. A  shave biopsy was performed in each of 4 cases. Hemostasis was achieved with aluminium chloride. Vaseline and a sterile dressing were applied. The patient tolerated the procedure and no complications were noted. The patient was provided with verbal and written post care instructions.      Photography taken at the 4 biopsy spots + 3 more spots that are not biopsied     4. Solar Lentiginosis and vitiligo, same as prior     5. Actinic keratosis x3 on dorsal hand    Cryotherapy procedure note: After verbal consent and discussion of risks and benefits including but no limited to dyspigmentation/scar, blister, and pain, 3 areas as noted above was(were) treated with 1-2mm freeze border for 2 cycles with liquid nitrogen. Post cryotherapy instructions were provided.     6. LPP  Worsening of itchiness and noted to have perifollicular scale (that  were not there on 6/16/2017). Pt has not been using clobetasol solution every day    Clobetasol solution to every day    Continue ketaconazole shampoo    Continue daily plaquenil      CC Dr. Smith on close of this encounter.  Follow-up in 3 months, earlier for new or changing lesions.      staffed the patient.    Staff Involved:  Resident(Cleve Fernando)/Staff(as above)    I have seen and examined this patient and agree with the assessment and plan as documented in the resident's note, and was present for all procedures.    Brad Bauman MD  Dermatology Attending      Pictures were placed in Pt's chart today for future reference.

## 2017-09-19 NOTE — PATIENT INSTRUCTIONS
Wound Care After a Biopsy    What is a skin biopsy?  A skin biopsy allows the doctor to examine a very small piece of tissue under the microscope to determine the diagnosis and the best treatment for the skin condition. A local anesthetic (numbing medicine)  is injected with a very small needle into the skin area to be tested. A small piece of skin is taken from the area. Sometimes a suture (stitch) is used.     What are the risks of a skin biopsy?  I will experience scar, bleeding, swelling, pain, crusting and redness. I may experience incomplete removal or recurrence. Risks of this procedure are excessive bleeding, bruising, infection, nerve damage, numbness, thick (hypertrophic or keloidal) scar and non-diagnostic biopsy.    How should I care for my wound for the first 24 hours?    Keep the wound dry and covered for 24 hours    If it bleeds, hold direct pressure on the area for 15 minutes. If bleeding does not stop then go to the emergency room    Avoid strenuous exercise the first 1-2 days or as your doctor instructs you    How should I care for the wound after 24 hours?    After 24 hours, remove the bandage    You may bathe or shower as normal    If you had a scalp biopsy, you can shampoo as usual and can use shower water to clean the biopsy site daily    Clean the wound twice a day with gentle soap and water    Do not scrub, be gentle    Apply white petroleum/Vaseline after cleaning the wound with a cotton swab or a clean finger, and keep the site covered with a Bandaid /bandage. Bandages are not necessary with a scalp biopsy    If you are unable to cover the site with a Bandaid /bandage, re-apply ointment 2-3 times a day to keep the site moist. Moisture will help with healing    Avoid strenuous activity for first 1-2 days    Avoid lakes, rivers, pools, and oceans until the stitches are removed or the site is healed    How do I clean my wound?    Wash hands thoroughly with soap or use hand  before all  wound care    Clean the wound with gentle soap and water    Apply white petroleum/Vaseline  to wound after it is clean    Replace the Bandaid /bandage to keep the wound covered for the first few days or as instructed by your doctor    If you had a scalp biopsy, warm shower water to the area on a daily basis should suffice    What should I use to clean my wound?     Cotton-tipped applicators (Qtips )    White petroleum jelly (Vaseline ). Use a clean new container and use Q-tips to apply.    Bandaids   as needed    Gentle soap     How should I care for my wound long term?    Do not get your wound dirty    Keep up with wound care for one week or until the area is healed.    A small scab will form and fall off by itself when the area is completely healed. The area will be red and will become pink in color as it heals. Sun protection is very important for how your scar will turn out. Sunscreen with an SPF 30 or greater is recommended once the area is healed.    If you have stitches, stitches need to be removed in 14 days. You may return to our clinic for this or you may have it done locally at your doctor s office.    You should have some soreness but it should be mild and slowly go away over several days. Talk to your doctor about using tylenol for pain,    When should I call my doctor?  If you have increased:     Pain or swelling    Pus or drainage (clear or slightly yellow drainage is ok)    Temperature over 100F    Spreading redness or warmth around wound    When will I hear about my results?  The biopsy results can take 2-3 weeks to come back. The clinic will call you with the results, send you a [employer] message, or have you schedule a follow-up clinic or phone time to discuss the results. Contact our clinics if you do not hear from us in 3 weeks.     Who should I call with questions?    [facility]: [phone]     [facility]: [phone]    For  urgent needs outside of business hours call the [facility] at [phone] and ask for the dermatology resident on call  Cryotherapy    What is it?    Use of a very cold liquid, such as liquid nitrogen, to freeze and destroy abnormal skin cells that need to be removed    What should I expect?    Tenderness and redness    A small blister that might grow and fill with dark purple blood. There may be crusting.    More than one treatment may be needed if the lesions do not go away.    How do I care for the treated area?    Gently wash the area with your hands when bathing.    Use a thin layer of Vaseline to help with healing. You may use a Band-Aid.     The area should heal within 7-10 days and may leave behind a pink or lighter color.     Do not use an antibiotic or Neosporin ointment.     You may take acetaminophen (Tylenol) for pain.     Call your Doctor if you have:    Severe pain    Signs of infection (warmth, redness, cloudy yellow drainage, and or a bad smell)    Questions or concerns    Who should I call with questions?       [facility]: [phone]       [facility]: [phone]       For urgent needs outside of business hours call the [facility] at [phone]        and ask for the dermatology resident on call

## 2017-09-25 LAB — COPATH REPORT: NORMAL

## 2017-09-27 ENCOUNTER — TELEPHONE (OUTPATIENT)
Dept: TRANSPLANT | Facility: CLINIC | Age: 52
End: 2017-09-27

## 2017-09-27 DIAGNOSIS — Z79.60 LONG-TERM USE OF IMMUNOSUPPRESSANT MEDICATION: ICD-10-CM

## 2017-09-27 DIAGNOSIS — L80 VITILIGO: ICD-10-CM

## 2017-09-27 DIAGNOSIS — Z51.81 MEDICATION MONITORING ENCOUNTER: ICD-10-CM

## 2017-09-27 DIAGNOSIS — Z94.0 KIDNEY TRANSPLANT RECIPIENT: ICD-10-CM

## 2017-09-27 DIAGNOSIS — D84.9 IMMUNOSUPPRESSED STATUS (H): ICD-10-CM

## 2017-09-27 LAB
ERYTHROCYTE [DISTWIDTH] IN BLOOD BY AUTOMATED COUNT: 15.7 % (ref 10–15)
HCT VFR BLD AUTO: 35.3 % (ref 35–47)
HGB BLD-MCNC: 11.1 G/DL (ref 11.7–15.7)
MCH RBC QN AUTO: 27.8 PG (ref 26.5–33)
MCHC RBC AUTO-ENTMCNC: 31.4 G/DL (ref 31.5–36.5)
MCV RBC AUTO: 88 FL (ref 78–100)
PLATELET # BLD AUTO: 40 10E9/L (ref 150–450)
PROT UR-MCNC: 0.38 G/L
PROT/CREAT 24H UR: 0.34 G/G CR (ref 0–0.2)
RBC # BLD AUTO: 4 10E12/L (ref 3.8–5.2)
WBC # BLD AUTO: 3.8 10E9/L (ref 4–11)

## 2017-09-27 PROCEDURE — 80195 ASSAY OF SIROLIMUS: CPT | Performed by: INTERNAL MEDICINE

## 2017-09-27 PROCEDURE — 84156 ASSAY OF PROTEIN URINE: CPT | Performed by: INTERNAL MEDICINE

## 2017-09-27 PROCEDURE — 84450 TRANSFERASE (AST) (SGOT): CPT | Performed by: INTERNAL MEDICINE

## 2017-09-27 PROCEDURE — 85027 COMPLETE CBC AUTOMATED: CPT | Performed by: INTERNAL MEDICINE

## 2017-09-27 PROCEDURE — 80048 BASIC METABOLIC PNL TOTAL CA: CPT | Performed by: INTERNAL MEDICINE

## 2017-09-27 PROCEDURE — 84460 ALANINE AMINO (ALT) (SGPT): CPT | Performed by: INTERNAL MEDICINE

## 2017-09-27 PROCEDURE — 84443 ASSAY THYROID STIM HORMONE: CPT | Performed by: INTERNAL MEDICINE

## 2017-09-27 PROCEDURE — 36415 COLL VENOUS BLD VENIPUNCTURE: CPT | Performed by: INTERNAL MEDICINE

## 2017-09-27 PROCEDURE — 84439 ASSAY OF FREE THYROXINE: CPT | Performed by: INTERNAL MEDICINE

## 2017-09-27 NOTE — TELEPHONE ENCOUNTER
DATE:  9/27/2017   TIME OF RECEIPT FROM LAB:  12:54 PM  LAB TEST:  Platelets  LAB VALUE:  40  RESULTS GIVEN WITH READ-BACK TO (PROVIDER):  Ana M Blanton RN  TIME LAB VALUE REPORTED TO PROVIDER:   12:54 PM

## 2017-09-28 ENCOUNTER — TELEPHONE (OUTPATIENT)
Dept: TRANSPLANT | Facility: CLINIC | Age: 52
End: 2017-09-28

## 2017-09-28 DIAGNOSIS — Z94.0 KIDNEY REPLACED BY TRANSPLANT: Primary | ICD-10-CM

## 2017-09-28 LAB
ALT SERPL W P-5'-P-CCNC: 23 U/L (ref 0–50)
ANION GAP SERPL CALCULATED.3IONS-SCNC: 8 MMOL/L (ref 3–14)
AST SERPL W P-5'-P-CCNC: 24 U/L (ref 0–45)
BUN SERPL-MCNC: 27 MG/DL (ref 7–30)
CALCIUM SERPL-MCNC: 9.6 MG/DL (ref 8.5–10.1)
CHLORIDE SERPL-SCNC: 105 MMOL/L (ref 94–109)
CO2 SERPL-SCNC: 26 MMOL/L (ref 20–32)
CREAT SERPL-MCNC: 1.56 MG/DL (ref 0.52–1.04)
GFR SERPL CREATININE-BSD FRML MDRD: 35 ML/MIN/1.7M2
GLUCOSE SERPL-MCNC: 98 MG/DL (ref 70–99)
POTASSIUM SERPL-SCNC: 4.6 MMOL/L (ref 3.4–5.3)
SIROLIMUS BLD-MCNC: <2 UG/L (ref 5–15)
SODIUM SERPL-SCNC: 139 MMOL/L (ref 133–144)
T4 FREE SERPL-MCNC: 0.8 NG/DL (ref 0.76–1.46)
TME LAST DOSE: ABNORMAL H
TSH SERPL DL<=0.005 MIU/L-ACNC: 4.97 MU/L (ref 0.4–4)

## 2017-09-28 NOTE — TELEPHONE ENCOUNTER
Sirolimus level low < 2.0   Phone call made to patient to discuss low level.  Left return number for coordinator for call back.

## 2017-09-29 ENCOUNTER — TELEPHONE (OUTPATIENT)
Dept: DERMATOLOGY | Facility: CLINIC | Age: 52
End: 2017-09-29

## 2017-09-29 DIAGNOSIS — C44.729 SCC (SQUAMOUS CELL CARCINOMA), LEG, LEFT: Primary | ICD-10-CM

## 2017-09-29 NOTE — TELEPHONE ENCOUNTER
Notes Recorded by Mckayla Hairston CMA on 9/29/2017 at 9:51 AM  Referral placed. Kathy has read the [employer] message.  ------    Notes Recorded by Brad Bauman MD on 9/27/2017 at 1:34 PM  Mckayla,    I am sending this patient a [employer] note with her path results.  Can you please place a Mohs referral for her for the spot on her left shin, since it arose in a scar?    Thanks,  Neville

## 2017-10-03 NOTE — TELEPHONE ENCOUNTER
Pt returned call to inform us that it was not through level will recheck in a week if further questions please call pt

## 2017-10-03 NOTE — TELEPHONE ENCOUNTER
Call placed to patient: No answer. Detailed voice message left requesting a return call to discuss recent sirolimus level and informing patient to recheck level in one weeks. Order sent

## 2017-10-03 NOTE — TELEPHONE ENCOUNTER
Sirolimus level low < 2.0     Goal 4-6  Please phone patient and ask if last Sirolimus level was a good level.  Please have patient repeat level within 1 week.

## 2017-10-04 DIAGNOSIS — L66.10 LICHEN PLANOPILARIS: ICD-10-CM

## 2017-10-04 NOTE — TELEPHONE ENCOUNTER
Attempted to call pt to give results per Dr. Lagunas.  Also upon chart review, Dr. Lagunas agrees per labs it is ok to continue Plaquenil, new Rx was sent to Pharmacy.

## 2017-10-05 ENCOUNTER — TELEPHONE (OUTPATIENT)
Dept: DERMATOLOGY | Facility: CLINIC | Age: 52
End: 2017-10-05

## 2017-10-06 NOTE — TELEPHONE ENCOUNTER
Dr. Bauman, You last saw this patient. I think she needs a refill of plaquenil. I think her labs look good but forwarding to you just in case as you last saw her. She was told to follow up in 3 months.

## 2017-10-08 PROBLEM — D48.5 NEOPLASM OF UNCERTAIN BEHAVIOR OF SKIN: Status: ACTIVE | Noted: 2017-10-08

## 2017-10-25 ENCOUNTER — TELEPHONE (OUTPATIENT)
Dept: DERMATOLOGY | Facility: CLINIC | Age: 52
End: 2017-10-25

## 2017-10-25 ENCOUNTER — OFFICE VISIT (OUTPATIENT)
Dept: DERMATOLOGY | Facility: CLINIC | Age: 52
End: 2017-10-25

## 2017-10-25 VITALS — SYSTOLIC BLOOD PRESSURE: 133 MMHG | HEART RATE: 68 BPM | DIASTOLIC BLOOD PRESSURE: 85 MMHG

## 2017-10-25 DIAGNOSIS — C44.729 SQUAMOUS CELL CARCINOMA OF LEG, LEFT: Primary | ICD-10-CM

## 2017-10-25 ASSESSMENT — PAIN SCALES - GENERAL: PAINLEVEL: NO PAIN (0)

## 2017-10-25 NOTE — LETTER
10/25/2017       RE: Reina Quan  [address]  [facility] [address]     Dear Colleague,    Thank you for referring your patient, Reina Quan, to the [facility] DERMATOLOGIC SURGERY at [facility]. Please see a copy of my visit note below.    MOHS MICROGRAPHIC SURGERY REPORT   Oct 25, 2017    Surgeon: Kan Cummings DO  Resident: MD Alondra    INDICATION:    Preoperative Diagnosis: primary squamous cell carcinoma, superficially invasive, well differentiated  Location: left anterior shin  Postoperative Diagnosis: Same  Preoperative Lesion size: 1.0 x 0.8 cm    After appropriate discussion and informed consent for Mohs surgery and possible repair of the Mohs surgery defect, the patient underwent Mohs surgery as follows:    STAGE I:  The patient was placed on the operating room table.  The area was cleansed with chlorhexidine and infiltrated with 1% Lidocaine and epinephrine. Tumor was debulked with a 3mm curette. Using a #15-blade, complete excision was made around the tumor in 1 section.  Hemostasis was obtained by electrodesiccation.  A dressing was placed.  Tissue was kept as 1 tissue block that was subsequently mapped, color coded and processed in the Mohs Laboratory.  Microscopic tumor  was not found in the tissue block.    With the lesion clear of micrographic tumor, surgery was considered complete.  The defect extended to the fat and measured 1.5 x 1.3 cm.    RECONSTRUCTIVE DERMATOLOGIC SURGERY REPORT  We discussed the options for wound management in full with the patient including risks/benefits/possible outcomes.     Intermediate Layered Linear Closure:  Because of the size and full thickness nature of the defect and tightness of the surrounding skin, an intermediate closure was planned.  Patient was prepped with chlorhexidine, local anesthesia administered, and draped in a sterile fashion. The wound edges were not undermined. Hemostasis was obtained. A  circumferential running subcutaneous 4-0 Vicryl suture was deployed to narrow the shallow wound.  The wound edges were then advanced, nearly closed. Postoperative length was 0.6 cm.      Estimated blood loss, minimal; complications, none; bandaging and wound care, routine. Patient was discharged in good condition. She may return as needed for wound healing concerns. If she has none, follow up with Dr Bauman in general dermatology clinic as scheduled is appropriate.     I, Arturo Clarke, am serving as a scribe to document services personally performed by MACY Maldonado.MARLENY., based on data collection and the provider's statements to me. ,    Provider Disclosure:   The documentation recorded by the scribe accurately reflects the services I personally performed and the decisions made by me.  I personally performed the procedures today.    Kan Cummings DO    Department of Dermatology  [facility]: Phone: [phone], Fax:[phone]  [facility]: Phone: [phone], Fax: [phone]

## 2017-10-25 NOTE — PROGRESS NOTES
MOHS MICROGRAPHIC SURGERY REPORT   Oct 25, 2017    Surgeon: Kan Cummings DO  Resident: MD Alondra    INDICATION:    Preoperative Diagnosis: primary squamous cell carcinoma, superficially invasive, well differentiated  Location: left anterior shin  Postoperative Diagnosis: Same  Preoperative Lesion size: 1.0 x 0.8 cm    After appropriate discussion and informed consent for Mohs surgery and possible repair of the Mohs surgery defect, the patient underwent Mohs surgery as follows:    STAGE I:  The patient was placed on the operating room table.  The area was cleansed with chlorhexidine and infiltrated with 1% Lidocaine and epinephrine. Tumor was debulked with a 3mm curette. Using a #15-blade, complete excision was made around the tumor in 1 section.  Hemostasis was obtained by electrodesiccation.  A dressing was placed.  Tissue was kept as 1 tissue block that was subsequently mapped, color coded and processed in the Mohs Laboratory.  Microscopic tumor  was not found in the tissue block.    With the lesion clear of micrographic tumor, surgery was considered complete.  The defect extended to the fat and measured 1.5 x 1.3 cm.    RECONSTRUCTIVE DERMATOLOGIC SURGERY REPORT  We discussed the options for wound management in full with the patient including risks/benefits/possible outcomes.     Intermediate Layered Linear Closure:  Because of the size and full thickness nature of the defect and tightness of the surrounding skin, an intermediate closure was planned.  Patient was prepped with chlorhexidine, local anesthesia administered, and draped in a sterile fashion. The wound edges were not undermined. Hemostasis was obtained. A circumferential running subcutaneous 4-0 Vicryl suture was deployed to narrow the shallow wound.  The wound edges were then advanced, nearly closed. Postoperative length was 0.6 cm.      Estimated blood loss, minimal; complications, none; bandaging and wound care, routine. Patient was discharged in  good condition. She may return as needed for wound healing concerns. If she has none, follow up with Dr Bauman in general dermatology clinic as scheduled is appropriate.     I, Arturo Clarke, am serving as a scribe to document services personally performed by Dr. Kan Cummings, D.MARLENY., based on data collection and the provider's statements to me. ,    Provider Disclosure:   The documentation recorded by the scribe accurately reflects the services I personally performed and the decisions made by me.  I personally performed the procedures today.    Kan Cummings DO    Department of Dermatology  [facility]: Phone: [phone], Fax:[phone]  [facility]: Phone: [phone], Fax: [phone]

## 2017-10-25 NOTE — TELEPHONE ENCOUNTER
History of Skin cancer : yes    History of Mohs Surgery: yes    History of a solid organ transplant: yes Organ(s): kidney Year(s): 1992 Creatinine: 1.59 Bone Marrow Transplant : no (year, no)    Immunosuppressive Medications: yes (if yes, which ones: prednisone, rampamune)    HIV or Hepatitis B or C : no    Chronic lymphocytic leukemia: no    Diabetes: no (Type 1 or 2: )    Any Bleeding disorders: no    Do you have a Pacemaker or Defibrillator: no (year placed: )    Artificial heart valve: no (mechanical or porcine: )    Joint Replacement in the last 2 years: no Joint(s):  Year(s):     Do you typically take Prophylactic Antibiotics before seeing a dentist or having a procedure?: Typically for kidney transplant but not before procedures    If yes, verify that patient has the antibiotic on hand and instruct to take one hour before their surgery appointment.    If they do not have the antibiotic, verify the patients pharmacy and notify Dr. Sheikh by printing the pre-mohs call sheet.    Do you wear a C Pap Mask: no    If yes, and procedure is on the face, ask patient to bring in the mask with them (Mask only)    Do you have any mobility issues: no    If yes, is a van service is required to transport you to/from your appointment? no    Smoking History (tobacco of any sort): no    Do you have any other health issues that we should know about? no    Do you take any of the following Blood Thinners:    Aspirin: no    Plavix/Aggrastat/Brilinta: no    Warfarin: no (Last INR: no Date: no)    Pradaxa/Eliquis/Xarelto: no    Ibuprofen (Advil/Motrin): prn    Naproxen (Aleve): no    Vitamin E: no    Fish Oil: no    Ginkgo biloba: no    Other:no    donePaient was instructed of the following: Ibuprofen, naproxen, Vitamin E, Fish Oil, and Ginkgo biloba should be stopped 1 week prior to and 1 week after the procedure.    Medication Allergies: see chart    Are medications in Epic: in chart    Done Patient was reminded to take all  medications as usual, other than those listed above, on the day of surgery     donePatient was instructed to bring all medications to clinic on day of surgery     done Patient will bring a     (A  is helpful for the following reasons: some patients need medications to relax, but once given, patients should not drive; sometimes bandages obstruct your vision making it difficult to see and unsafe to drive; the day can get long so it s nice to have a klaus; sometimes the procedure wears people out/makes them quite tired)     dnoe Photograph of the surgical site(s) is/are available     donePatient was reminded that this can be an all-day procedure and that no other appointments should be scheduled on the day of Mohs

## 2017-10-25 NOTE — NURSING NOTE
Chief Complaint   Patient presents with     Skin Cancer     Mohs on the left anterior yousif, SCC     Shahida Sepulveda CMA

## 2017-10-25 NOTE — NURSING NOTE
Closure performed on the left anterior shin. Injected 4.0ml of Xylocaine  (Lidocaine 1% and Epinephrine  (1:100,000))    Defect Photo: AF  Closure Photo: AF    Present for procedure:     Valentina Gaytan MD and Shahida Sepulveda CMA

## 2017-10-25 NOTE — MR AVS SNAPSHOT
After Visit Summary   10/25/2017    Reina Quan    MRN: [id number]           Patient Information     Date Of Birth          1965        Visit Information        Provider Department      10/25/2017 8:00 AM Kan Cummings MD [facility]        Care Instructions    Wound care instructions for Superficial Wounds      After 24 hours you should remove the bandage and begin daily dressing changes as follows:    1. Remove Dressing    2.   Clean and dry the area with tap water using a Q-tip or sterile gauze pad    3.   Apply polysporin ointment, bacitracin ointment, aquaphor ointment, or vaseline ointment over entire wound. Do NOT use neosporin ointment.    4.   Cover the wound with a band-aid or a sterile non-stick gauze pad and micropore paper tape.    Repeat these instructions at least once a day until the wound has completely healed.    No dietary restrictions.    Continue normal activity    The wound will not heal better when exposed to air and allowed to dry out. The wound will heal faster with a better cosmetic result if it is kept moist with ointment and covered with a bandage.  Do not let the wound dry out.    What to expect:    All wounds develop a small ring of redness surrounding the wound that indicate healing. Severe itching with extensive redness usually indicates sensitivity to the ointment or bandage tape used to dress the wound. You should call the nurse triage line if this occurs.    It is normal for there to be bruising or swelling around your surgical site, especially when it is near, or on, the eyelid.    If your wound is draining, this is normal. Larger wounds tend to drain more than smaller wounds. Please call if the draining is extensive or if there is yellow/green discharge. After about a week, the wound size should shrink. The wound is healed when you can see skin has formed over the entire area. A healed wound has a healthy shiny appearance and is  red/dark pink in color. Wounds may take four to six weeks to heal. Larger wounds generally take a few weeks longer to heal than smaller wounds. Once the wound is healed, you may stop dressing changes.    There may be a tightness as the wound heals, but this is normal and will gradually decrease and disappear.    Your wound may be sensitive to temperature changes after it is healed. Avoid extreme temperature changes if you are having discomfort, but this will improve with time.    If the wound seems to itch once it is healed, you may use vaseline to help relieve the itching.          Phone numbers:  During business hours (M-F 8:00-4:30 p.m.)  [facility]-  [phone] Option 1 appt. desk  [phone]  Option 3 nurse triage line  ---------------------------------------------------------  Evenings/Weekends/Holidays  Hospital - [phone]   TTY for hearing vacyimjh-[phone]  *Ask  to page dermatologist on-call  Emergency Lcrs-[phone]  TTY for hearing impaired- [phone]            Follow-ups after your visit        Your next 10 appointments already scheduled     Jan 11, 2018 11:00 AM CST   (Arrive by 10:45 AM)   Return Visit with Brad Bauman MD   [facility] Dermatology ([facility])    [address] [place] [id number] [phone]            Feb 16, 2018 10:40 AM CST   (Arrive by 10:10 AM)   Return Kidney Transplant with David Burns MD   [facility] Nephrology ([facility])    [address] [place] [address] [phone]              Who to contact     Please call your clinic at [phone] to:    Ask questions about your health    Make or cancel appointments    Discuss your medicines    Learn about your test results    Speak to your doctor   If you have compliments or concerns about an experience at your clinic, or if you wish to file a complaint, please  contact [facility] Patient Relations at [phone] or email us at [email or web address]         Additional Information About Your Visit        Jaisonharravin Information     Loop[employer] gives you secure access to your electronic health record. If you see a primary care provider, you can also send messages to your care team and make appointments. If you have questions, please call your primary care clinic.  If you do not have a primary care provider, please call [phone] and they will assist you.      [email or web address] is an electronic gateway that provides easy, online access to your medical records. With [email or web address], you can request a clinic appointment, read your test results, renew a prescription or communicate with your care team.     To access your existing account, please contact your [facility] or call [phone] for assistance.        Care EveryWhere ID     This is your Care EveryWhere ID. This could be used by other organizations to access your [facility] medical records  [id number]        Your Vitals Were     Pulse Last Period                68 02/14/2014           Blood Pressure from Last 3 Encounters:   10/25/17 133/85   07/11/17 128/80   02/17/17 122/81    Weight from Last 3 Encounters:   07/11/17 58.9 kg (129 lb 14.4 oz)   02/17/17 57.5 kg (126 lb 11.2 oz)   11/29/16 56 kg (123 lb 6 oz)              Today, you had the following     No orders found for display       Primary Care Provider Office Phone # Fax #    Velvet Smith PA-C [phone]       [address] [address] [facility] [address]        Equal Access to Services     MARTIR HUERTAS : Hadii aad ku hadasho Soomaali, waaxda luqadaha, qaybta kaalmada adeegyada, adriane samano. So [facility] [phone].    ATENCIÓN: Si habla español, tiene a simms disposición servicios gratuitos de asistencia lingüística. Llame al [phone].    We comply with applicable  federal civil rights laws and [place] laws. We do not discriminate on the basis of race, color, national origin, age, disability, sex, sexual orientation, or gender identity.            Thank you!     Thank you for choosing [facility] DERMAT[facility] SUR[facility]  for your care. Our goal is always to provide you with excellent care. Hearing back from our patients is one way we can continue to improve our services. Please take a few minutes to complete the written survey that you may receive in the mail after your visit with us. Thank you!             Your Updated Medication List - Protect others around you: Learn how to safely use, store and throw away your medicines at www.disposemymeds.org.          This list is accurate as of: 10/25/17 10:18 AM.  Always use your most recent med list.                   Brand Name Dispense Instructions for use Diagnosis    atorvastatin 10 MG tablet    LIPITOR    30 tablet    Take 1 tablet (10 mg) by mouth daily    Hyperlipidemia LDL goal < 130       clobetasol 0.05 % external solution    TEMOVATE    50 mL    Apply topically 2 times daily To itchy and red areas of scalp as needed.    Lichen planopilaris       cromolyn 4 % ophthalmic solution    OPTICROM    1 Bottle    Place 1 drop into both eyes 4 times daily    Other chronic allergic conjunctivitis       hydroxychloroquine 200 MG tablet    PLAQUENIL    60 tablet    Take 1 tablet (200 mg) by mouth 2 times daily    Lichen planopilaris       ketoconazole 2 % shampoo    NIZORAL    120 mL    Apply topically daily as needed for itching or irritation    Lichen planopilaris       nitroFURantoin (macrocrystal-monohydrate) 100 MG capsule    MACROBID    14 capsule    One by mouth every other day    Urinary tract infection, site unspecified       olopatadine 0.1 % ophthalmic solution    PATANOL     Reported on 2/17/2017        PARoxetine 20 MG tablet    PAXIL    90 tablet    Take 1 tablet every morning.    Dysthymic disorder       predniSONE 5 MG  tablet    DELTASONE    15 tablet    Take 1 tablet (5 mg) by mouth every other day    Kidney replaced by transplant       sirolimus 1 MG tablet    GENERIC EQUIVALENT    90 tablet    Take 3 tablets (3 mg) by mouth daily    Living-donor kidney transplant recipient

## 2017-10-25 NOTE — NURSING NOTE
1st layer performed on the left anterior shin. Injected 4.0ml of Xylocaine  (Lidocaine 1% and Epinephrine  (1:100,000))      Present for procedure:    Dr. Cummings and Shahida Sepulveda CMA

## 2017-10-25 NOTE — NURSING NOTE
Vaseline, dental rolls, gauze and micropore tape with coban to secure for pressure. Instructions gone over with patient, verbalized understanding and all questions answered. Patient denied any pain.    Shahida Sepulveda CMA

## 2017-10-25 NOTE — PATIENT INSTRUCTIONS
Wound care instructions for Superficial Wounds      After 24 hours you should remove the bandage and begin daily dressing changes as follows:    1. Remove Dressing    2.   Clean and dry the area with tap water using a Q-tip or sterile gauze pad    3.   Apply polysporin ointment, bacitracin ointment, aquaphor ointment, or vaseline ointment over entire wound. Do NOT use neosporin ointment.    4.   Cover the wound with a band-aid or a sterile non-stick gauze pad and micropore paper tape.    Repeat these instructions at least once a day until the wound has completely healed.    No dietary restrictions.    Continue normal activity    The wound will not heal better when exposed to air and allowed to dry out. The wound will heal faster with a better cosmetic result if it is kept moist with ointment and covered with a bandage.  Do not let the wound dry out.    What to expect:    All wounds develop a small ring of redness surrounding the wound that indicate healing. Severe itching with extensive redness usually indicates sensitivity to the ointment or bandage tape used to dress the wound. You should call the nurse triage line if this occurs.    It is normal for there to be bruising or swelling around your surgical site, especially when it is near, or on, the eyelid.    If your wound is draining, this is normal. Larger wounds tend to drain more than smaller wounds. Please call if the draining is extensive or if there is yellow/green discharge. After about a week, the wound size should shrink. The wound is healed when you can see skin has formed over the entire area. A healed wound has a healthy shiny appearance and is red/dark pink in color. Wounds may take four to six weeks to heal. Larger wounds generally take a few weeks longer to heal than smaller wounds. Once the wound is healed, you may stop dressing changes.    There may be a tightness as the wound heals, but this is normal and will gradually decrease and  disappear.    Your wound may be sensitive to temperature changes after it is healed. Avoid extreme temperature changes if you are having discomfort, but this will improve with time.    If the wound seems to itch once it is healed, you may use vaseline to help relieve the itching.          Phone numbers:  During business hours (M-F 8:00-4:30 p.m.)  [facility]-  [phone] Option 1 appt. desk  [phone]  Option 3 nurse triage line  ---------------------------------------------------------  Evenings/Weekends/Holidays  Hospital - [phone]   TTY for hearing ynlnxnky-[phone]  *Ask  to page dermatologist on-call  Emergency Zzwg-[phone]  TTY for hearing impaired- [phone]

## 2017-11-03 ENCOUNTER — TELEPHONE (OUTPATIENT)
Dept: NEPHROLOGY | Facility: CLINIC | Age: 52
End: 2017-11-03

## 2017-11-03 DIAGNOSIS — E78.5 HYPERLIPIDEMIA: Primary | ICD-10-CM

## 2017-11-03 RX ORDER — ATORVASTATIN CALCIUM 10 MG/1
10 TABLET, FILM COATED ORAL DAILY
Qty: 30 TABLET | Refills: 11 | Status: SHIPPED | OUTPATIENT
Start: 2017-11-03 | End: 2018-02-16

## 2017-12-26 ENCOUNTER — TELEPHONE (OUTPATIENT)
Dept: TRANSPLANT | Facility: CLINIC | Age: 52
End: 2017-12-26

## 2017-12-26 DIAGNOSIS — E78.5 HYPERLIPIDEMIA: ICD-10-CM

## 2017-12-26 DIAGNOSIS — Z94.0 KIDNEY TRANSPLANT RECIPIENT: ICD-10-CM

## 2017-12-26 DIAGNOSIS — D84.9 IMMUNOSUPPRESSED STATUS (H): ICD-10-CM

## 2017-12-26 DIAGNOSIS — Z79.60 LONG-TERM USE OF IMMUNOSUPPRESSANT MEDICATION: ICD-10-CM

## 2017-12-26 LAB
ERYTHROCYTE [DISTWIDTH] IN BLOOD BY AUTOMATED COUNT: 15.4 % (ref 10–15)
HCT VFR BLD AUTO: 36.2 % (ref 35–47)
HGB BLD-MCNC: 11.3 G/DL (ref 11.7–15.7)
MCH RBC QN AUTO: 27.6 PG (ref 26.5–33)
MCHC RBC AUTO-ENTMCNC: 31.2 G/DL (ref 31.5–36.5)
MCV RBC AUTO: 88 FL (ref 78–100)
PLATELET # BLD AUTO: 32 10E9/L (ref 150–450)
RBC # BLD AUTO: 4.1 10E12/L (ref 3.8–5.2)
WBC # BLD AUTO: 3.5 10E9/L (ref 4–11)

## 2017-12-26 PROCEDURE — 80061 LIPID PANEL: CPT | Performed by: INTERNAL MEDICINE

## 2017-12-26 PROCEDURE — 85027 COMPLETE CBC AUTOMATED: CPT | Performed by: INTERNAL MEDICINE

## 2017-12-26 PROCEDURE — 80195 ASSAY OF SIROLIMUS: CPT | Performed by: INTERNAL MEDICINE

## 2017-12-26 PROCEDURE — 36415 COLL VENOUS BLD VENIPUNCTURE: CPT | Performed by: INTERNAL MEDICINE

## 2017-12-26 PROCEDURE — 80048 BASIC METABOLIC PNL TOTAL CA: CPT | Performed by: INTERNAL MEDICINE

## 2017-12-26 NOTE — TELEPHONE ENCOUNTER
DATE:  12/26/2017   TIME OF RECEIPT FROM LAB:  0949  LAB TEST:  Platelets  LAB VALUE:  32   RESULTS GIVEN WITH READ-BACK TO (PROVIDER):  REKHA LAZCANO LPN  TIME LAB VALUE REPORTED TO PROVIDER:   [id number] Jailyn Hurst RN

## 2017-12-27 LAB
ANION GAP SERPL CALCULATED.3IONS-SCNC: 5 MMOL/L (ref 3–14)
BUN SERPL-MCNC: 30 MG/DL (ref 7–30)
CALCIUM SERPL-MCNC: 9.2 MG/DL (ref 8.5–10.1)
CHLORIDE SERPL-SCNC: 109 MMOL/L (ref 94–109)
CHOLEST SERPL-MCNC: 278 MG/DL
CO2 SERPL-SCNC: 28 MMOL/L (ref 20–32)
CREAT SERPL-MCNC: 1.63 MG/DL (ref 0.52–1.04)
GFR SERPL CREATININE-BSD FRML MDRD: 33 ML/MIN/1.7M2
GLUCOSE SERPL-MCNC: 96 MG/DL (ref 70–99)
HDLC SERPL-MCNC: 93 MG/DL
LDLC SERPL CALC-MCNC: 150 MG/DL
NONHDLC SERPL-MCNC: 185 MG/DL
POTASSIUM SERPL-SCNC: 4.1 MMOL/L (ref 3.4–5.3)
SIROLIMUS BLD-MCNC: 5.5 UG/L (ref 5–15)
SODIUM SERPL-SCNC: 142 MMOL/L (ref 133–144)
TME LAST DOSE: NORMAL H
TRIGL SERPL-MCNC: 177 MG/DL

## 2018-01-11 ENCOUNTER — OFFICE VISIT (OUTPATIENT)
Dept: DERMATOLOGY | Facility: CLINIC | Age: 53
End: 2018-01-11
Payer: COMMERCIAL

## 2018-01-11 DIAGNOSIS — L57.0 AK (ACTINIC KERATOSIS): ICD-10-CM

## 2018-01-11 DIAGNOSIS — Z85.828 HISTORY OF NONMELANOMA SKIN CANCER: ICD-10-CM

## 2018-01-11 DIAGNOSIS — D48.5 NEOPLASM OF UNCERTAIN BEHAVIOR OF SKIN: Primary | ICD-10-CM

## 2018-01-11 PROCEDURE — 88305 TISSUE EXAM BY PATHOLOGIST: CPT | Performed by: DERMATOLOGY

## 2018-01-11 RX ORDER — LIDOCAINE HYDROCHLORIDE AND EPINEPHRINE 10; 10 MG/ML; UG/ML
3 INJECTION, SOLUTION INFILTRATION; PERINEURAL ONCE
Qty: 3 ML | Refills: 0 | OUTPATIENT
Start: 2018-01-11 | End: 2018-01-11

## 2018-01-11 ASSESSMENT — PAIN SCALES - GENERAL: PAINLEVEL: NO PAIN (0)

## 2018-01-11 NOTE — NURSING NOTE
Dermatology Rooming Note    Reina Quan's goals for this visit include:   Chief Complaint   Patient presents with     Skin Check     TSC, Kathy notes several lesions of concern.     Codi Crocker LPN

## 2018-01-11 NOTE — PROGRESS NOTES
CHIEF COMPLAINT:  Followup skin check.      SUBJECTIVE:  Reina is a very pleasant 52-year-old female with a past medical history significant for kidney transplant, currently on sirolimus, as well as a history of many nonmelanoma skin cancers, predominantly SCC and SCCIS.  She was last seen in our clinic on 09/19/2017 at which time we biopsied 4 areas of concern.  Three of these demonstrated benign findings, but on the left anterior shin, biopsy demonstrated a superficial invasive squamous cell carcinoma.  This was subsequently treated with Mohs micrographic surgery by Dr. Kan Cummings in our clinic on 10/25/2017.  Today Reina reports that this area is slowly healing.  Today she has several areas of concern, including 2 spots on her left dorsal finger which are tender to the touch and have rapidly recurred after cryotherapy.  She has 2 similar spots also on the right knee and right medial foot.  Otherwise, she has several rough scaly areas but they are not tender to the touch.  She denies any new or changing moles.      REVIEW OF SYSTEMS:  No recent fevers.      PHYSICAL EXAMINATION:   GENERAL:  This is a well-appearing, well-nourished female with a normal mood and affect who is oriented x3.   SKIN:  A cutaneous exam of the head, neck, chest, back, bilateral upper and lower extremities was performed.  On the left dorsal second digit, there are 2 firm, rough, scaly pink papules which are each 4 mm in diameter.  On the left dorsal hand, there is a flat-topped 3 mm, rough, pink scaly papule consistent with actinic keratosis.  On the bilateral arms and legs, there are innumerable solar lentigines, which are uniformly light tan and approximately 2-3 mm in diameter.  On the right anterior knee, there is a hyperkeratotic 3 mm firm, pink papule.  On the right medial foot along the instep, there is a 4 mm, firm, tender to touch pink papule.  On the left thenar eminence, there is a firm, tender 4 mm pink papule.       ASSESSMENT/PLAN:   1.  Neoplasm of uncertain behavior x5.  While each of these resemble hypertrophic actinic keratosis, Reina complains that they are tender and they have recurred after cryotherapy.  Thus, each was biopsied to rule out squamous cell carcinoma.  Please see the procedure note below.   2.  Actinic keratoses x2 on the hands.  These were each treated with liquid nitrogen x5 seconds x2 cycles.  The patient tolerated this without complication.   3.  Solar lentigines.  Reassurance was provided as to the benign nature of these lesions.   4.  History of squamous cell carcinoma on the left shin.  Clinically, there is no evidence of recurrence today.  Reassurance was provided.   5.  She will follow up in our clinic for a full body skin exam in 6 months' time.      PROCEDURE:  After signed informed consent, the affected areas were swabbed with an alcohol pad and injected with 1% lidocaine.  Shave biopsies x5 were taken and sent for histopathology.  In each case, hemostasis was achieved with aluminum chloride 20%.  The defects were covered with petrolatum and a bandage.  The patient tolerated these without complication.       Brad Bauman MD  Dermatology Attending

## 2018-01-11 NOTE — MR AVS SNAPSHOT
After Visit Summary   1/11/2018    Reina Quan    MRN: [id number]           Patient Information     Date Of Birth          1965        Visit Information        Provider Department      1/11/2018 11:00 AM Brad Bauman MD [facility]        Today's Diagnoses     Neoplasm of uncertain behavior of skin    -  1    AK (actinic keratosis)        History of nonmelanoma skin cancer          Care Instructions    Wound Care After a Biopsy    What is a skin biopsy?  A skin biopsy allows the doctor to examine a very small piece of tissue under the microscope to determine the diagnosis and the best treatment for the skin condition. A local anesthetic (numbing medicine)  is injected with a very small needle into the skin area to be tested. A small piece of skin is taken from the area. Sometimes a suture (stitch) is used.     What are the risks of a skin biopsy?  I will experience scar, bleeding, swelling, pain, crusting and redness. I may experience incomplete removal or recurrence. Risks of this procedure are excessive bleeding, bruising, infection, nerve damage, numbness, thick (hypertrophic or keloidal) scar and non-diagnostic biopsy.    How should I care for my wound for the first 24 hours?    Keep the wound dry and covered for 24 hours    If it bleeds, hold direct pressure on the area for 15 minutes. If bleeding does not stop then go to the emergency room    Avoid strenuous exercise the first 1-2 days or as your doctor instructs you    How should I care for the wound after 24 hours?    After 24 hours, remove the bandage    You may bathe or shower as normal    If you had a scalp biopsy, you can shampoo as usual and can use shower water to clean the biopsy site daily    Clean the wound twice a day with gentle soap and water    Do not scrub, be gentle    Apply white petroleum/Vaseline after cleaning the wound with a cotton swab or a clean finger, and keep the site covered with a  Bandaid /bandage. Bandages are not necessary with a scalp biopsy    If you are unable to cover the site with a Bandaid /bandage, re-apply ointment 2-3 times a day to keep the site moist. Moisture will help with healing    Avoid strenuous activity for first 1-2 days    Avoid lakes, rivers, pools, and oceans until the stitches are removed or the site is healed    How do I clean my wound?    Wash hands thoroughly with soap or use hand  before all wound care    Clean the wound with gentle soap and water    Apply white petroleum/Vaseline  to wound after it is clean    Replace the Bandaid /bandage to keep the wound covered for the first few days or as instructed by your doctor    If you had a scalp biopsy, warm shower water to the area on a daily basis should suffice    What should I use to clean my wound?     Cotton-tipped applicators (Qtips )    White petroleum jelly (Vaseline ). Use a clean new container and use Q-tips to apply.    Bandaids   as needed    Gentle soap     How should I care for my wound long term?    Do not get your wound dirty    Keep up with wound care for one week or until the area is healed.    A small scab will form and fall off by itself when the area is completely healed. The area will be red and will become pink in color as it heals. Sun protection is very important for how your scar will turn out. Sunscreen with an SPF 30 or greater is recommended once the area is healed.    If you have stitches, stitches need to be removed in  days. You may return to our clinic for this or you may have it done locally at your doctor s office.    You should have some soreness but it should be mild and slowly go away over several days. Talk to your doctor about using tylenol for pain,    When should I call my doctor?  If you have increased:     Pain or swelling    Pus or drainage (clear or slightly yellow drainage is ok)    Temperature over 100F    Spreading redness or warmth around wound    When will I  hear about my results?  The biopsy results can take 2-3 weeks to come back. The clinic will call you with the results, send you a mychart message, or have you schedule a follow-up clinic or phone time to discuss the results. Contact our clinics if you do not hear from us in 3 weeks.     Who should I call with questions?    [facility]: [phone]     [facility]: [phone]    For urgent needs outside of business hours call the [facility] at [phone] and ask for the dermatology resident on call              Follow-ups after your visit        Follow-up notes from your care team     Return in about 3 months (around 4/11/2018).      Your next 10 appointments already scheduled     Feb 16, 2018 10:40 AM CST   (Arrive by 10:10 AM)   Return Kidney Transplant with David Burns MD   [facility] ([facility])    [address]  [address]  [place] [address] [phone]            Apr 17, 2018 10:45 AM CDT   (Arrive by 10:30 AM)   Return Visit with Brad Bauman MD   [facility] ([facility])    [address]  [address]rd Floor  [place] [address] [phone]              Who to contact     Please call your clinic at [phone] to:    Ask questions about your health    Make or cancel appointments    Discuss your medicines    Learn about your test results    Speak to your doctor   If you have compliments or concerns about an experience at your clinic, or if you wish to file a complaint, please contact [facility] Patient Relations at [phone] or email us at [email or web address]         Additional Information About Your Visit        MyChart Information     [employer] gives you secure access to your electronic health record. If you see a primary care provider, you can also send messages to your care team and make  appointments. If you have questions, please call your primary care clinic.  If you do not have a primary care provider, please call [phone] and they will assist you.      [employer] is an electronic gateway that provides easy, online access to your medical records. With [employer], you can request a clinic appointment, read your test results, renew a prescription or communicate with your care team.     To access your existing account, please contact your [facility] or call [phone] for assistance.        Care EveryWhere ID     This is your Care EveryWhere ID. This could be used by other organizations to access your [place] medical records  [id number]        Your Vitals Were     Last Period                   02/14/2014            Blood Pressure from Last 3 Encounters:   10/25/17 133/85   07/11/17 128/80   02/17/17 122/81    Weight from Last 3 Encounters:   07/11/17 58.9 kg (129 lb 14.4 oz)   02/17/17 57.5 kg (126 lb 11.2 oz)   11/29/16 56 kg (123 lb 6 oz)              We Performed the Following     BIOPSY SKIN/SUBQ/MUC MEM, EACH ADDTL LESION     BIOPSY SKIN/SUBQ/MUC MEM, EACH ADDTL LESION     BIOPSY SKIN/SUBQ/MUC MEM, SINGLE LESION     DESTRUCT PREMALIGNANT LESION, 2-14     DESTRUCT PREMALIGNANT LESION, FIRST     Surgical pathology exam     Surgical pathology exam          Today's Medication Changes          These changes are accurate as of: 1/11/18 11:59 PM.  If you have any questions, ask your nurse or doctor.               Start taking these medicines.        Dose/Directions    lidocaine 1% with EPINEPHrine 1:100,000 1 %-1:005719 injection   Used for:  Neoplasm of uncertain behavior of skin   Started by:  Brad Bauman MD        Dose:  3 mL   Inject 3 mLs into the skin once for 1 dose   Quantity:  3 mL   Refills:  0            Where to get your medicines      Some of these will need a paper prescription and others can be bought over the counter.  Ask your nurse if you have  questions.     You don't need a prescription for these medications     lidocaine 1% with EPINEPHrine 1:100,000 1 %-1:127030 injection                Primary Care Provider Office Phone # Fax #    Velvet Smith PA-C [phone] [address] [place] [address]        Equal Access to Services     MARTIR HUERTAS : Hadii aad ku hadasho Soomaali, waaxda luqadaha, qaybta kaalmada adeegyada, waxay idiin hayaan adeeg kharash lalatoyan . So [facility] [phone].    ATENCIÓN: Si habla espricky, tiene a simms disposición servicios gratuitos de asistencia lingüística. [facility] [phone].    We comply with applicable federal civil rights laws and [place] laws. We do not discriminate on the basis of race, color, national origin, age, disability, sex, sexual orientation, or gender identity.            Thank you!     Thank you for choosing [facility]  for your care. Our goal is always to provide you with excellent care. Hearing back from our patients is one way we can continue to improve our services. Please take a few minutes to complete the written survey that you may receive in the mail after your visit with us. Thank you!             Your Updated Medication List - Protect others around you: Learn how to safely use, store and throw away your medicines at www.disposemymeds.org.          This list is accurate as of: 1/11/18 11:59 PM.  Always use your most recent med list.                   Brand Name Dispense Instructions for use Diagnosis    atorvastatin 10 MG tablet    LIPITOR    30 tablet    Take 1 tablet (10 mg) by mouth daily    Hyperlipidemia       clobetasol 0.05 % external solution    TEMOVATE    50 mL    Apply topically 2 times daily To itchy and red areas of scalp as needed.    Lichen planopilaris       cromolyn 4 % ophthalmic solution    OPTICROM    1 Bottle    Place 1 drop into both eyes 4 times daily    Other chronic allergic conjunctivitis       hydroxychloroquine 200 MG tablet     PLAQUENIL    60 tablet    Take 1 tablet (200 mg) by mouth 2 times daily    Lichen planopilaris       ketoconazole 2 % shampoo    NIZORAL    120 mL    Apply topically daily as needed for itching or irritation    Lichen planopilaris       lidocaine 1% with EPINEPHrine 1:100,000 1 %-1:546427 injection     3 mL    Inject 3 mLs into the skin once for 1 dose    Neoplasm of uncertain behavior of skin       nitroFURantoin (macrocrystal-monohydrate) 100 MG capsule    MACROBID    14 capsule    One by mouth every other day    Urinary tract infection, site unspecified       olopatadine 0.1 % ophthalmic solution    PATANOL     Reported on 2/17/2017        PARoxetine 20 MG tablet    PAXIL    90 tablet    Take 1 tablet every morning.    Dysthymic disorder       predniSONE 5 MG tablet    DELTASONE    15 tablet    Take 1 tablet (5 mg) by mouth every other day    Kidney replaced by transplant       sirolimus 1 MG tablet    GENERIC EQUIVALENT    90 tablet    Take 3 tablets (3 mg) by mouth daily    Living-donor kidney transplant recipient

## 2018-01-11 NOTE — PATIENT INSTRUCTIONS

## 2018-01-11 NOTE — LETTER
1/11/2018       RE: Reina Quan  [address]  [facility] [place] [address]     Dear Colleague,    Thank you for referring your patient, Reina Quan, to the [facility] at [facility]. Please see a copy of my visit note below.    CHIEF COMPLAINT:  Followup skin check.      SUBJECTIVE:  Reian is a very pleasant 52-year-old female with a past medical history significant for kidney transplant, currently on sirolimus, as well as a history of many nonmelanoma skin cancers, predominantly SCC and SCCIS.  She was last seen in our clinic on 09/19/2017 at which time we biopsied 4 areas of concern.  Three of these demonstrated benign findings, but on the left anterior shin, biopsy demonstrated a superficial invasive squamous cell carcinoma.  This was subsequently treated with Mohs micrographic surgery by Dr. Kan Cummings in our clinic on 10/25/2017.  Today Reina reports that this area is slowly healing.  Today she has several areas of concern, including 2 spots on her left dorsal finger which are tender to the touch and have rapidly recurred after cryotherapy.  She has 2 similar spots also on the right knee and right medial foot.  Otherwise, she has several rough scaly areas but they are not tender to the touch.  She denies any new or changing moles.      REVIEW OF SYSTEMS:  No recent fevers.      PHYSICAL EXAMINATION:   GENERAL:  This is a well-appearing, well-nourished female with a normal mood and affect who is oriented x3.   SKIN:  A cutaneous exam of the head, neck, chest, back, bilateral upper and lower extremities was performed.  On the left dorsal second digit, there are 2 firm, rough, scaly pink papules which are each 4 mm in diameter.  On the left dorsal hand, there is a flat-topped 3 mm, rough, pink scaly papule consistent with actinic keratosis.  On the bilateral arms and legs, there are innumerable solar lentigines, which are uniformly light tan and  approximately 2-3 mm in diameter.  On the right anterior knee, there is a hyperkeratotic 3 mm firm, pink papule.  On the right medial foot along the instep, there is a 4 mm, firm, tender to touch pink papule.  On the left thenar eminence, there is a firm, tender 4 mm pink papule.      ASSESSMENT/PLAN:   1.  Neoplasm of uncertain behavior x5.  While each of these resemble hypertrophic actinic keratosis, Reina complains that they are tender and they have recurred after cryotherapy.  Thus, each was biopsied to rule out squamous cell carcinoma.  Please see the procedure note below.   2.  Actinic keratoses x2 on the hands.  These were each treated with liquid nitrogen x5 seconds x2 cycles.  The patient tolerated this without complication.   3.  Solar lentigines.  Reassurance was provided as to the benign nature of these lesions.   4.  History of squamous cell carcinoma on the left shin.  Clinically, there is no evidence of recurrence today.  Reassurance was provided.   5.  She will follow up in our clinic for a full body skin exam in 6 months' time.      PROCEDURE:  After signed informed consent, the affected areas were swabbed with an alcohol pad and injected with 1% lidocaine.  Shave biopsies x5 were taken and sent for histopathology.  In each case, hemostasis was achieved with aluminum chloride 20%.  The defects were covered with petrolatum and a bandage.  The patient tolerated these without complication.       Brad Bauman MD  Dermatology Attending

## 2018-01-12 LAB — COPATH REPORT: NORMAL

## 2018-01-14 PROBLEM — Z85.828 HISTORY OF NONMELANOMA SKIN CANCER: Status: ACTIVE | Noted: 2018-01-14

## 2018-01-16 ENCOUNTER — TELEPHONE (OUTPATIENT)
Dept: DERMATOLOGY | Facility: CLINIC | Age: 53
End: 2018-01-16

## 2018-01-16 DIAGNOSIS — T14.8XXA BLEEDING FROM WOUND: Primary | ICD-10-CM

## 2018-01-16 NOTE — TELEPHONE ENCOUNTER
"Pt called concerned about the biopsy site to the arch of her foot. States she woke up in the middle of the night to let the dog out and the site started bleeding, \"i've never seen so much blood\".  States she placed a dressing on the area and went back to bed with her leg elevated but woke up with a blood soaked dressing.  PT is wondering if she can be seen today by Dr. Bauman or if she should go to an urgent care near her.  Message was sent to the provider and his team to review and call pt with recommendations.   "

## 2018-01-16 NOTE — TELEPHONE ENCOUNTER
Returned call to Ms. Quan regarding bleeding biopsy site on the arch of her foot. She had biopsy in Dr. Bauman's clinic on 1/11/18, and while no problems over the weeked, she has developed bleeding that continues to soak dressings for the past 12-14 hours.     We discussed trying firm compression, no peeking, to the wound bed, with foot elevated if possible, for 15 minutes at a time. If bleeding has not stopped after 3 separate sessions of 15 min compression and elevation, I instructed her to go to urgent care, as Dr. Bauman is only in clinic until noon and then will be in the hospital.    She voiced understanding.    Katlyn Joel MD  PGY-4 Dermatology  Pager: [phone]

## 2018-01-20 ENCOUNTER — HEALTH MAINTENANCE LETTER (OUTPATIENT)
Age: 53
End: 2018-01-20

## 2018-02-02 ASSESSMENT — ENCOUNTER SYMPTOMS
LOSS OF CONSCIOUSNESS: 0
LEG PAIN: 0
WEAKNESS: 0
MEMORY LOSS: 0
INCREASED ENERGY: 1
DOUBLE VISION: 0
ALTERED TEMPERATURE REGULATION: 1
TREMORS: 0
DECREASED APPETITE: 0
FLANK PAIN: 0
POOR WOUND HEALING: 0
EYE IRRITATION: 1
DISTURBANCES IN COORDINATION: 0
HALLUCINATIONS: 0
SLEEP DISTURBANCES DUE TO BREATHING: 0
HOT FLASHES: 1
HEADACHES: 1
EXERCISE INTOLERANCE: 0
POLYPHAGIA: 0
EYE WATERING: 1
DIFFICULTY URINATING: 0
SKIN CHANGES: 0
NAIL CHANGES: 0
DECREASED LIBIDO: 0
HYPOTENSION: 0
DIZZINESS: 0
FEVER: 0
DYSURIA: 0
EYE REDNESS: 0
LIGHT-HEADEDNESS: 0
PALPITATIONS: 1
BRUISES/BLEEDS EASILY: 1
NUMBNESS: 0
SPEECH CHANGE: 0
POLYDIPSIA: 0
WEIGHT LOSS: 0
SWOLLEN GLANDS: 0
HYPERTENSION: 1
ORTHOPNEA: 0
FATIGUE: 1
SEIZURES: 0
WEIGHT GAIN: 1
TINGLING: 0
SYNCOPE: 0
HEMATURIA: 0
EYE PAIN: 1
PARALYSIS: 0

## 2018-02-16 ENCOUNTER — OFFICE VISIT (OUTPATIENT)
Dept: NEPHROLOGY | Facility: CLINIC | Age: 53
End: 2018-02-16
Attending: INTERNAL MEDICINE
Payer: COMMERCIAL

## 2018-02-16 VITALS
OXYGEN SATURATION: 99 % | DIASTOLIC BLOOD PRESSURE: 87 MMHG | HEIGHT: 62 IN | WEIGHT: 128.4 LBS | HEART RATE: 76 BPM | BODY MASS INDEX: 23.63 KG/M2 | SYSTOLIC BLOOD PRESSURE: 130 MMHG

## 2018-02-16 DIAGNOSIS — N39.0 URINARY TRACT INFECTION WITHOUT HEMATURIA, SITE UNSPECIFIED: ICD-10-CM

## 2018-02-16 DIAGNOSIS — I10 HYPERTENSION, UNSPECIFIED TYPE: Primary | ICD-10-CM

## 2018-02-16 DIAGNOSIS — E78.01 FAMILIAL HYPERCHOLESTEROLEMIA: ICD-10-CM

## 2018-02-16 PROCEDURE — G0463 HOSPITAL OUTPT CLINIC VISIT: HCPCS | Mod: ZF

## 2018-02-16 RX ORDER — ATORVASTATIN CALCIUM 10 MG/1
10 TABLET, FILM COATED ORAL DAILY
Qty: 30 TABLET | Refills: 11 | Status: SHIPPED | OUTPATIENT
Start: 2018-02-16 | End: 2019-05-22

## 2018-02-16 RX ORDER — LISINOPRIL 5 MG/1
5 TABLET ORAL DAILY
Qty: 30 TABLET | Refills: 11 | Status: SHIPPED | OUTPATIENT
Start: 2018-02-16 | End: 2019-05-22

## 2018-02-16 ASSESSMENT — PAIN SCALES - GENERAL: PAINLEVEL: NO PAIN (0)

## 2018-02-16 NOTE — PROGRESS NOTES
"Reason for Visit:  Reina Quan is a 52 year old year old female with LDKT 25 y ago, who presents for \"annual\" evaluation.      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 HAs had home BPs greater than 140 in last few months. No wt loss, nausea, insomnia, edema, fever, chills, pain, or adenopathy. Had several AKs removed from hands last month.    Baseline creatinine had been 1.3-1.8.    ROS: A comprehensive review of systems (10 point) was performed and was negative except as noted in the HPI.    Patient Active Problem List   Diagnosis     Ulcerative colitis (H)     Migraine     Allergic rhinitis     Hearing loss     Kidney replaced by transplant     Lichen planus     Giant Platelet syndrome     Nephrotic syndrome in diseases classified elsewhere     Major depression in partial remission (H)     CARDIOVASCULAR SCREENING; LDL GOAL LESS THAN 100     Health Care Home     Actinic keratosis     History of skin cancer     History of immunosuppression therapy     CKD (chronic kidney disease) stage 3, GFR 30-59 ml/min     High risk medications (not anticoagulants) long-term use     Seborrheic dermatitis     Dermatitis     Vitiligo     History of SCC (squamous cell carcinoma) of skin     Lichen planopilaris     Hyperlipidemia with target LDL less than 130     Status post kidney transplant     Enterococcus UTI     AK (actinic keratosis)     Neoplasm of uncertain behavior of skin     History of nonmelanoma skin cancer     Personal Hx:   History     Social History     Marital Status:      Spouse Name: N/A     Number of Children: 3     Years of Education: N/A     Occupational History     [employer]      Social History Main Topics     Smoking status: Never Smoker      Smokeless tobacco: Never Used     Alcohol Use: Yes      Comment: 2x /week     Drug Use: No     Sexual Activity:     " Partners: Male     Birth Control/ Protection: Surgical     Other Topics Concern     Not on file     Social History Narrative    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  Living in [place], has a significant other x 7 months. Previously . In a monogamous relationship. Never smoker, social EtOH use.        Pain Score this Visit: Data Unavailable    Allergies   Allergen Reactions     Ampicillin Swelling     Swelling of mouth and tongue.      Seasonal Allergies Other (See Comments)     Itchy eyes and rhinitis.     Medications:  Current Outpatient Prescriptions   Medication     atorvastatin (LIPITOR) 10 MG tablet     PARoxetine (PAXIL) 20 MG tablet     nitrofurantoin, macrocrystal-monohydrate, (MACROBID) 100 MG capsule     hydroxychloroquine (PLAQUENIL) 200 MG tablet     clobetasol (TEMOVATE) 0.05 % external solution     ketoconazole (NIZORAL) 2 % shampoo     sirolimus (RAPAMUNE - GENERIC EQUIVALENT) 1 MG tablet     predniSONE (DELTASONE) 5 MG tablet     [DISCONTINUED] atorvastatin (LIPITOR) 10 MG tablet     olopatadine (PATANOL) 0.1 % ophthalmic solution     cromolyn (OPTICROM) 4 % ophthalmic solution     No current facility-administered medications for this visit.        Vitals:  B/P: 130/87, T: Data Unavailable, P: 76     Exam:   Alert, in no acute distress. Thin, healthy appearing.  HEENT: Normocephalic, eyes, ears, nose, mouth grossly normal  Nodes:Mild submandibular adenopathy.  Chest: Clear to percussion and auscultation. Normal breath sounds bilaterally  Heart: Regular rate and rhythm. Normal S1 and S2, without murmur, gallop or rub  Abdomen: Normal bowel sounds. No masses or tenderness. Graft is firm in RLQ but not tender; no bruit.  Extremities: No edema  Neuro: Oriented. Normal gait. No tremor.  Skin: Multiple scars on extremities from excisions.     Results:   No results found for this or any previous visit (from the past 168 hour(s)).   12/26/18 creat 1.6 eGFR 33 nl lytes Hgb  11.3 WBC 3.5    Assessment and Plan:   1. LDKT  2. Chronic Kidney Disease (CKD), Stage 3  Renal function within range.  3. Anemia- hgb ok.    4. Recurrent UTIs - see HPI. I discused with Dr. Disla (ID) and she agrees that restarting Bactrim might be of benefit since most past UTIs have been sensitive (not last one).  5. Skin cancer - followed carefully by derm.   6. Depression - stable on meds  7. Hyperlipidemia - remained hyper cholesterolemic when stopped atorvastatin, so will continue on  atorvastain 10 mg.   8. Hypertension - NEW - will start lisinopril 5 mg (gets regular labs already)     Return to clinic in 12 months with labs.    Assessment and plan was discussed with the patient.    CC  Patient Care Team:  Velvet Smith PA-C as PCP - General (Physician Assistant)  Jai Kennedy MD as Hospitalist (Student in organized health care education/training program)  Hneok Carmona MD as MD (Dermatology)  Mary Sheikh MD as MD (Dermatology)  Amanda Disla MD as MD (Infectious Diseases)  JAI KENNEDY

## 2018-02-16 NOTE — MR AVS SNAPSHOT
After Visit Summary   2/16/2018    Reina Quan    MRN: [id number]           Patient Information     Date Of Birth          1965        Visit Information        Provider Department      2/16/2018 10:40 AM David Burns MD [facility]        Today's Diagnoses     Hypertension, unspecified type    -  1    Familial hypercholesterolemia           Follow-ups after your visit        Follow-up notes from your care team     Return in about 1 year (around 2/16/2019).      Your next 10 appointments already scheduled     Apr 17, 2018 10:45 AM CDT   (Arrive by 10:30 AM)   Return Visit with Brad Bauman MD   [facility] ([facility])    [address]  3rd Floor  [facility] [address] [phone]              Who to contact     If you have questions or need follow up information about today's clinic visit or your schedule please contact [facility] directly at [phone].  Normal or non-critical lab and imaging results will be communicated to you by MyChart, letter or phone within 4 business days after the clinic has received the results. If you do not hear from us within 7 days, please contact the clinic through PartyLinehart or phone. If you have a critical or abnormal lab result, we will notify you by phone as soon as possible.  Submit refill requests through Event[employer]ar or call your pharmacy and they will forward the refill request to us. Please allow 3 business days for your refill to be completed.          Additional Information About Your Visit        PartyLinehart Information     Event[employer]ar gives you secure access to your electronic health record. If you see a primary care provider, you can also send messages to your care team and make appointments. If you have questions, please call your primary care clinic.  If you do not have a primary care provider, please call [phone] and they will assist you.        Care EveryWhere ID     This is your  "Care EveryWhere ID. This could be used by other organizations to access your [place] medical records  [id number]        Your Vitals Were     Pulse Height Last Period Pulse Oximetry BMI (Body Mass Index)       76 1.575 m (5' 2\") 05/14/2014 99% 23.48 kg/m2        Blood Pressure from Last 3 Encounters:   02/16/18 130/87   10/25/17 133/85   07/11/17 128/80    Weight from Last 3 Encounters:   02/16/18 58.2 kg (128 lb 6.4 oz)   07/11/17 58.9 kg (129 lb 14.4 oz)   02/17/17 57.5 kg (126 lb 11.2 oz)              Today, you had the following     No orders found for display         Today's Medication Changes          These changes are accurate as of 2/16/18 11:59 PM.  If you have any questions, ask your nurse or doctor.               Start taking these medicines.        Dose/Directions    lisinopril 5 MG tablet   Commonly known as:  PRINIVIL/ZESTRIL   Used for:  Hypertension, unspecified type   Started by:  David Burns MD        Dose:  5 mg   Take 1 tablet (5 mg) by mouth daily   Quantity:  30 tablet   Refills:  11            Where to get your medicines      These medications were sent to [facility] [address] [address] [facility] AT [place] [address] & [facility]  [address] [place] [address]     Phone:  [phone]     atorvastatin 10 MG tablet    lisinopril 5 MG tablet                Primary Care Provider Office Phone # Fax #    Velvet Smith PA-C [phone] [address]  [facility] [address]        Equal Access to Services     [facility] AH: Hadii tamia soliman Soemily, waaxda luqadaha, qaybta kaalmada adeegyaana, adriane samano. So [facility] [phone].    ATENCIÓN: Si habla español, tiene a simms disposición servicios gratuitos de asistencia lingüística. Llame al [phone].    We comply with applicable federal civil rights laws and [place] laws. We do not discriminate on the basis of race, color, national origin, " age, disability, sex, sexual orientation, or gender identity.            Thank you!     Thank you for choosing [facility]  for your care. Our goal is always to provide you with excellent care. Hearing back from our patients is one way we can continue to improve our services. Please take a few minutes to complete the written survey that you may receive in the mail after your visit with us. Thank you!             Your Updated Medication List - Protect others around you: Learn how to safely use, store and throw away your medicines at www.disposemymeds.org.          This list is accurate as of 2/16/18 11:59 PM.  Always use your most recent med list.                   Brand Name Dispense Instructions for use Diagnosis    atorvastatin 10 MG tablet    LIPITOR    30 tablet    Take 1 tablet (10 mg) by mouth daily    Familial hypercholesterolemia       clobetasol 0.05 % external solution    TEMOVATE    50 mL    Apply topically 2 times daily To itchy and red areas of scalp as needed.    Lichen planopilaris       cromolyn 4 % ophthalmic solution    OPTICROM    1 Bottle    Place 1 drop into both eyes 4 times daily    Other chronic allergic conjunctivitis       hydroxychloroquine 200 MG tablet    PLAQUENIL    60 tablet    Take 1 tablet (200 mg) by mouth 2 times daily    Lichen planopilaris       ketoconazole 2 % shampoo    NIZORAL    120 mL    Apply topically daily as needed for itching or irritation    Lichen planopilaris       lisinopril 5 MG tablet    PRINIVIL/ZESTRIL    30 tablet    Take 1 tablet (5 mg) by mouth daily    Hypertension, unspecified type       nitroFURantoin (macrocrystal-monohydrate) 100 MG capsule    MACROBID    14 capsule    One by mouth every other day    Urinary tract infection, site unspecified       olopatadine 0.1 % ophthalmic solution    PATANOL     Reported on 2/17/2017        PARoxetine 20 MG tablet    PAXIL    90 tablet    Take 1 tablet every morning.    Dysthymic disorder       predniSONE 5  MG tablet    DELTASONE    15 tablet    Take 1 tablet (5 mg) by mouth every other day    Kidney replaced by transplant       sirolimus 1 MG tablet    GENERIC EQUIVALENT    90 tablet    Take 3 tablets (3 mg) by mouth daily    Living-donor kidney transplant recipient

## 2018-02-16 NOTE — LETTER
"2/16/2018      RE: Reina Quan  [address]  On license of [facility] [address]       Reason for Visit:  Reina Quan is a 52 year old year old female with LDKT 25 y ago, who presents for \"annual\" evaluation.      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 HAs had home BPs greater than 140 in last few months. No wt loss, nausea, insomnia, edema, fever, chills, pain, or adenopathy. Had several AKs removed from hands last month.    Baseline creatinine had been 1.3-1.8.    ROS: A comprehensive review of systems (10 point) was performed and was negative except as noted in the HPI.    Patient Active Problem List   Diagnosis     Ulcerative colitis (H)     Migraine     Allergic rhinitis     Hearing loss     Kidney replaced by transplant     Lichen planus     Giant Platelet syndrome     Nephrotic syndrome in diseases classified elsewhere     Major depression in partial remission (H)     CARDIOVASCULAR SCREENING; LDL GOAL LESS THAN 100     Health Care Home     Actinic keratosis     History of skin cancer     History of immunosuppression therapy     CKD (chronic kidney disease) stage 3, GFR 30-59 ml/min     High risk medications (not anticoagulants) long-term use     Seborrheic dermatitis     Dermatitis     Vitiligo     History of SCC (squamous cell carcinoma) of skin     Lichen planopilaris     Hyperlipidemia with target LDL less than 130     Status post kidney transplant     Enterococcus UTI     AK (actinic keratosis)     Neoplasm of uncertain behavior of skin     History of nonmelanoma skin cancer     Personal Hx:   History     Social History     Marital Status:      Spouse Name: N/A     Number of Children: 3     Years of Education: N/A     Occupational History     [employer] store      Social History Main Topics     Smoking status: Never Smoker      Smokeless tobacco: Never Used     Alcohol " Use: Yes      Comment: 2x /week     Drug Use: No     Sexual Activity:     Partners: Male     Birth Control/ Protection: Surgical     Other Topics Concern     Not on file     Social History Narrative    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  Living in [place], has a significant other x 7 months. Previously . In a monogamous relationship. Never smoker, social EtOH use.        Pain Score this Visit: Data Unavailable    Allergies   Allergen Reactions     Ampicillin Swelling     Swelling of mouth and tongue.      Seasonal Allergies Other (See Comments)     Itchy eyes and rhinitis.     Medications:  Current Outpatient Prescriptions   Medication     atorvastatin (LIPITOR) 10 MG tablet     PARoxetine (PAXIL) 20 MG tablet     nitrofurantoin, macrocrystal-monohydrate, (MACROBID) 100 MG capsule     hydroxychloroquine (PLAQUENIL) 200 MG tablet     clobetasol (TEMOVATE) 0.05 % external solution     ketoconazole (NIZORAL) 2 % shampoo     sirolimus (RAPAMUNE - GENERIC EQUIVALENT) 1 MG tablet     predniSONE (DELTASONE) 5 MG tablet     [DISCONTINUED] atorvastatin (LIPITOR) 10 MG tablet     olopatadine (PATANOL) 0.1 % ophthalmic solution     cromolyn (OPTICROM) 4 % ophthalmic solution     No current facility-administered medications for this visit.        Vitals:  B/P: 130/87, T: Data Unavailable, P: 76     Exam:   Alert, in no acute distress. Thin, healthy appearing.  HEENT: Normocephalic, eyes, ears, nose, mouth grossly normal  Nodes:Mild submandibular adenopathy.  Chest: Clear to percussion and auscultation. Normal breath sounds bilaterally  Heart: Regular rate and rhythm. Normal S1 and S2, without murmur, gallop or rub  Abdomen: Normal bowel sounds. No masses or tenderness. Graft is firm in RLQ but not tender; no bruit.  Extremities: No edema  Neuro: Oriented. Normal gait. No tremor.  Skin: Multiple scars on extremities from excisions.     Results:   No results found for this or any previous visit  (from the past 168 hour(s)).   12/26/18 creat 1.6 eGFR 33 nl lytes Hgb 11.3 WBC 3.5    Assessment and Plan:   1. LDKT  2. Chronic Kidney Disease (CKD), Stage 3  Renal function within range.  3. Anemia- hgb ok.    4. Recurrent UTIs - see HPI. I will discuss with Dr. Disla (ID) value of restarting Bactrim  5. Skin cancer - followed carefully by derm.   6. Depression - stable on meds  7. Hyperlipidemia - remained hyper cholesterolemic when stopped atorvastatin, so will continue on  atorvastain 10 mg.   8. Hypertension - NEW - will start lisinopril 5 mg (gets regular labs already)     Return to clinic in 12 months with labs.    Assessment and plan was discussed with the patient.    David Burns MD    CC  Patient Care Team:  Velvet Smith PA-C as PCP - General (Physician Assistant)  Jamar Kennedy MD as Hospitalist (Student in organized health care education/training program)  Henok Carmona MD as MD (Dermatology)  Mary Sheikh MD as MD (Dermatology)  Amanda Disla MD as MD (Infectious Diseases)

## 2018-02-16 NOTE — NURSING NOTE
"Chief Complaint   Patient presents with     RECHECK     Kidney follow up       Initial /87  Pulse 76  Ht 1.575 m (5' 2\")  Wt 58.2 kg (128 lb 6.4 oz)  LMP 05/14/2014  SpO2 99%  BMI 23.48 kg/m2 Estimated body mass index is 23.48 kg/(m^2) as calculated from the following:    Height as of this encounter: 1.575 m (5' 2\").    Weight as of this encounter: 58.2 kg (128 lb 6.4 oz).  Medication Reconciliation: complete   SANTOSH TORRES CMA      "

## 2018-02-21 RX ORDER — SULFAMETHOXAZOLE AND TRIMETHOPRIM 400; 80 MG/1; MG/1
1 TABLET ORAL DAILY
Qty: 90 TABLET | Refills: 3 | Status: SHIPPED | OUTPATIENT
Start: 2018-02-21 | End: 2019-05-22

## 2018-02-27 ENCOUNTER — OFFICE VISIT (OUTPATIENT)
Dept: OPHTHALMOLOGY | Facility: CLINIC | Age: 53
End: 2018-02-27
Attending: OPHTHALMOLOGY
Payer: COMMERCIAL

## 2018-02-27 DIAGNOSIS — F34.1 DYSTHYMIC DISORDER: ICD-10-CM

## 2018-02-27 DIAGNOSIS — Z79.899 LONG-TERM USE OF PLAQUENIL: ICD-10-CM

## 2018-02-27 DIAGNOSIS — H04.129 DRY EYE: Primary | ICD-10-CM

## 2018-02-27 DIAGNOSIS — H02.403 BLEPHAROPTOSIS, BILATERAL: ICD-10-CM

## 2018-02-27 PROCEDURE — 92015 DETERMINE REFRACTIVE STATE: CPT | Mod: ZF

## 2018-02-27 PROCEDURE — 83861 MICROFLUID ANALY TEARS: CPT | Mod: ZP | Performed by: OPHTHALMOLOGY

## 2018-02-27 PROCEDURE — G0463 HOSPITAL OUTPT CLINIC VISIT: HCPCS | Mod: ZF

## 2018-02-27 PROCEDURE — 92250 FUNDUS PHOTOGRAPHY W/I&R: CPT | Mod: ZF | Performed by: OPHTHALMOLOGY

## 2018-02-27 PROCEDURE — 92134 CPTRZ OPH DX IMG PST SGM RTA: CPT | Mod: ZF | Performed by: OPHTHALMOLOGY

## 2018-02-27 ASSESSMENT — CONF VISUAL FIELD
OS_NORMAL: 1
OD_NORMAL: 1
METHOD: COUNTING FINGERS

## 2018-02-27 ASSESSMENT — REFRACTION_WEARINGRX
OD_ADD: +2.00
OS_AXIS: 005
OS_SPHERE: +1.50
OS_CYLINDER: +0.50
OS_ADD: +2.00
OD_SPHERE: +0.25
OD_CYLINDER: SPHERE

## 2018-02-27 ASSESSMENT — VISUAL ACUITY
OS_SC+: +1
OS_SC: 20/60
METHOD: SNELLEN - LINEAR
OD_SC: 20/25
OS_PH_SC: 20/30

## 2018-02-27 ASSESSMENT — REFRACTION_MANIFEST
OS_ADD: +2.50
OD_ADD: +2.50
OS_CYLINDER: +0.25
OD_CYLINDER: +0.50
OD_AXIS: 179
OS_SPHERE: +1.25
OS_AXIS: 035
OD_SPHERE: +0.50

## 2018-02-27 ASSESSMENT — TONOMETRY
OS_IOP_MMHG: 14
OD_IOP_MMHG: 14
IOP_METHOD: ICARE

## 2018-02-27 ASSESSMENT — EXTERNAL EXAM - RIGHT EYE: OD_EXAM: NORMAL

## 2018-02-27 ASSESSMENT — CUP TO DISC RATIO
OD_RATIO: 0.55
OS_RATIO: 0.55

## 2018-02-27 ASSESSMENT — EXTERNAL EXAM - LEFT EYE: OS_EXAM: NORMAL

## 2018-02-27 NOTE — Clinical Note
Patient reported she might be taking plaquenil 200 mg daily instead of twice a day. We did plaquenil toxicity baseline screening for her today. May be higher risk due to history of renal transplant.

## 2018-02-27 NOTE — PROGRESS NOTES
CC: Comprehensive Eye Exam    HPI: Reina Quan is a 52 year old female who presents for comprehensive eye exam. Thinks eyes seem to have gotten worse, blurry at distance and at near. She is using Cromyolyn allergy eye drops 4 times a day but they burn. Has had itchy eyes her entire life. Does not use ointment at night. Does use artificial tears. Does not do warm compresses or lid scrubs.     Interval history:  Still having dry itchy eyes frequently. Not using any drops.   Currently on Bactrim for UTI.   Started plaquenil last year, was prescribed 200 mg twice a day but only taking daily.    POH: dry eye syndrome, allergic conjunctivitis, hyperopia/presbyopia   PMH: glomerular focal segmental sclerosis, ulcerative colitis, skin cancer, HTN   PSH: kidney transplant 1985 & 6/1992, cholecystectomy, Mohs   Meds: per EPIC, on sirolimus and prednisone and plaquenil  Allergies: ampicillin  FH: father- DM, mother- HTN  SOC: never smoker    Current Eye Medications:   None    Assessment & Plan   Reina Quan is a 52 year old female with the following diagnoses:     1. ABMD (anterior basement membrane dystrophy)  2. Map-dot-fingerprint corneal dystrophy, Dry eyes   - lubrication with artificial tears QID OU   - begin lid hygiene with warm compresses and lid scrubs    2. Blepharoptosis, bilateral   - oculoplastics referral    3. Cataract of both eyes   - mild, not visually significant   - monitor     4. Hyperopia with astigmatism and presbyopia, bilateral   - given MRx today, refracts to 20/20    5. Plaquenil use   -Started 200 mg twice a day in 2/2012 by dermatology. Using 200mg daily, has been on and off med   - history of kidney transplant   - baseline testing done and normal today      Seen and discussed with Dr. Whitt,    Return 1 year for FAF, OCT Mac, 10-2 visual field     Perry Carmona MD  PGY3          Complete documentation of historical and exam elements from today's encounter can be found in the full  encounter summary report (not reduplicated in this progress note).  I personally obtained the chief complaint(s) and history of present illness.  I confirmed and edited as necessary the review of systems, past medical/surgical history, family history, social history, and examination findings as documented by others; and I examined the patient myself.  I personally reviewed the relevant tests, images, and reports as documented above.  I personally reviewed the ophthalmic test(s) associated with this encounter, agree with the interpretation(s) as documented by the resident/fellow, and have edited the corresponding report(s) as necessary.   I formulated and edited as necessary the assessment and plan and discussed the findings and management plan with the patient and family    Rigoberto Whitt MD PhD  Vitreoretinal Surgery Fellow  [facility]

## 2018-02-27 NOTE — TELEPHONE ENCOUNTER
"Requested Prescriptions   Pending Prescriptions Disp Refills     PARoxetine (PAXIL) 20 MG tablet  Last Written Prescription Date:  7/11/17  Last Fill Quantity: 90,  # refills: 1   Last office visit: 7/11/2017 with prescribing provider:  7/11/2017     Future Office Visit:     90 tablet 1     Sig: Take 1 tablet every morning.    SSRIs Protocol Failed    2/27/2018  5:47 PM       Failed - PHQ-9 score less than 5 in past 6 months    The PHQ-9 criteria is meant to fail. It requires a PHQ-9 score review  PHQ-9 SCORE 7/2/2015 5/18/2016 7/17/2017   Total Score 5 - -   Total Score - 8 2     DEBRA-7 SCORE 11/5/2015 5/18/2016 7/17/2017   Total Score 2 3 11              Failed - No positive pregnancy test in last 12 months       Failed - Recent (6 mo) or future visit with authorizing provider's specialty    Patient had office visit in the last 6 months or has a visit in the next 30 days with authorizing provider.  See \"Patient Info\" tab in inbasket, or \"Choose Columns\" in Meds & Orders section of the refill encounter.           Passed - Patient is age 18 or older       Passed - No active pregnancy on record          "

## 2018-02-28 ENCOUNTER — MYC MEDICAL ADVICE (OUTPATIENT)
Dept: FAMILY MEDICINE | Facility: CLINIC | Age: 53
End: 2018-02-28

## 2018-02-28 DIAGNOSIS — L66.10 LICHEN PLANOPILARIS: ICD-10-CM

## 2018-02-28 RX ORDER — KETOCONAZOLE 20 MG/ML
SHAMPOO TOPICAL DAILY PRN
Qty: 120 ML | Refills: 11 | Status: SHIPPED | OUTPATIENT
Start: 2018-02-28 | End: 2019-05-22

## 2018-02-28 ASSESSMENT — ANXIETY QUESTIONNAIRES
GAD7 TOTAL SCORE: 4
3. WORRYING TOO MUCH ABOUT DIFFERENT THINGS: SEVERAL DAYS
7. FEELING AFRAID AS IF SOMETHING AWFUL MIGHT HAPPEN: NOT AT ALL
GAD7 TOTAL SCORE: 4
4. TROUBLE RELAXING: SEVERAL DAYS
7. FEELING AFRAID AS IF SOMETHING AWFUL MIGHT HAPPEN: NOT AT ALL
5. BEING SO RESTLESS THAT IT IS HARD TO SIT STILL: NOT AT ALL
6. BECOMING EASILY ANNOYED OR IRRITABLE: SEVERAL DAYS
2. NOT BEING ABLE TO STOP OR CONTROL WORRYING: SEVERAL DAYS
1. FEELING NERVOUS, ANXIOUS, OR ON EDGE: NOT AT ALL
GAD7 TOTAL SCORE: 4

## 2018-02-28 ASSESSMENT — PATIENT HEALTH QUESTIONNAIRE - PHQ9
SUM OF ALL RESPONSES TO PHQ QUESTIONS 1-9: 5
SUM OF ALL RESPONSES TO PHQ QUESTIONS 1-9: 5
10. IF YOU CHECKED OFF ANY PROBLEMS, HOW DIFFICULT HAVE THESE PROBLEMS MADE IT FOR YOU TO DO YOUR WORK, TAKE CARE OF THINGS AT HOME, OR GET ALONG WITH OTHER PEOPLE: NOT DIFFICULT AT ALL

## 2018-02-28 NOTE — TELEPHONE ENCOUNTER
Received refill request for ketoconazole shampoo as the resident on call. Reviewed patient's chart and attached communication. Patient last seen 1/11/18 for skin check. RTC scheduled for 4/17/18. Although not specifically discussed, as this is a low risk medication and appropriate for refills. After reviewing the medication list and assessment and plan from last visit, the refill request was accepted.    Jennifer Griggs MD  Medicine-Dermatology PGY-4  [phone]

## 2018-02-28 NOTE — TELEPHONE ENCOUNTER
Last apt: 1/11/18  Future apt: 4/17/18     ASSESSMENT/PLAN:   1.  Neoplasm of uncertain behavior x5.  While each of these resemble hypertrophic actinic keratosis, Reina complains that they are tender and they have recurred after cryotherapy.  Thus, each was biopsied to rule out squamous cell carcinoma.  Please see the procedure note below.   2.  Actinic keratoses x2 on the hands.  These were each treated with liquid nitrogen x5 seconds x2 cycles.  The patient tolerated this without complication.   3.  Solar lentigines.  Reassurance was provided as to the benign nature of these lesions.   4.  History of squamous cell carcinoma on the left shin.  Clinically, there is no evidence of recurrence today.  Reassurance was provided.   5.  She will follow up in our clinic for a full body skin exam in 6 months' time.       PROCEDURE:  After signed informed consent, the affected areas were swabbed with an alcohol pad and injected with 1% lidocaine.  Shave biopsies x5 were taken and sent for histopathology.  In each case, hemostasis was achieved with aluminum chloride 20%.  The defects were covered with petrolatum and a bandage.  The patient tolerated these without complication.

## 2018-03-01 ASSESSMENT — PATIENT HEALTH QUESTIONNAIRE - PHQ9: SUM OF ALL RESPONSES TO PHQ QUESTIONS 1-9: 5

## 2018-03-01 ASSESSMENT — ANXIETY QUESTIONNAIRES: GAD7 TOTAL SCORE: 4

## 2018-03-02 RX ORDER — PAROXETINE 20 MG/1
TABLET, FILM COATED ORAL
Qty: 90 TABLET | Refills: 1 | Status: SHIPPED | OUTPATIENT
Start: 2018-03-02 | End: 2019-06-04

## 2018-04-24 DIAGNOSIS — L66.10 LICHEN PLANOPILARIS: ICD-10-CM

## 2018-04-25 RX ORDER — CLOBETASOL PROPIONATE 0.5 MG/ML
SOLUTION TOPICAL
Qty: 50 ML | Refills: 0 | OUTPATIENT
Start: 2018-04-25

## 2018-04-25 RX ORDER — CLOBETASOL PROPIONATE 0.5 MG/ML
SOLUTION TOPICAL
Qty: 50 ML | Refills: 1 | Status: SHIPPED | OUTPATIENT
Start: 2018-04-25 | End: 2019-05-22

## 2018-04-25 NOTE — TELEPHONE ENCOUNTER
Medication not discussed at her last visit.    Last seen 1/11/18: No future appointment scheduled.    Neoplasm of uncertain behavior x5.  While each of these resemble hypertrophic actinic keratosis, Reina complains that they are tender and they have recurred after cryotherapy.  Thus, each was biopsied to rule out squamous cell carcinoma.  Please see the procedure note below.   2.  Actinic keratoses x2 on the hands.  These were each treated with liquid nitrogen x5 seconds x2 cycles.  The patient tolerated this without complication.   3.  Solar lentigines.  Reassurance was provided as to the benign nature of these lesions.   4.  History of squamous cell carcinoma on the left shin.  Clinically, there is no evidence of recurrence today.  Reassurance was provided.   5.  She will follow up in our clinic for a full body skin exam in 6 months' time.

## 2018-05-21 ENCOUNTER — TELEPHONE (OUTPATIENT)
Dept: TRANSPLANT | Facility: CLINIC | Age: 53
End: 2018-05-21

## 2018-05-21 DIAGNOSIS — Z94.0 KIDNEY TRANSPLANT RECIPIENT: ICD-10-CM

## 2018-05-21 DIAGNOSIS — D84.9 IMMUNOSUPPRESSED STATUS (H): ICD-10-CM

## 2018-05-21 DIAGNOSIS — Z79.60 LONG-TERM USE OF IMMUNOSUPPRESSANT MEDICATION: ICD-10-CM

## 2018-05-21 LAB
ANION GAP SERPL CALCULATED.3IONS-SCNC: 10 MMOL/L (ref 3–14)
BUN SERPL-MCNC: 34 MG/DL (ref 7–30)
CALCIUM SERPL-MCNC: 10.2 MG/DL (ref 8.5–10.1)
CHLORIDE SERPL-SCNC: 107 MMOL/L (ref 94–109)
CO2 SERPL-SCNC: 21 MMOL/L (ref 20–32)
CREAT SERPL-MCNC: 1.62 MG/DL (ref 0.52–1.04)
ERYTHROCYTE [DISTWIDTH] IN BLOOD BY AUTOMATED COUNT: 16.2 % (ref 10–15)
GFR SERPL CREATININE-BSD FRML MDRD: 33 ML/MIN/1.7M2
GLUCOSE SERPL-MCNC: 106 MG/DL (ref 70–99)
HCT VFR BLD AUTO: 34.4 % (ref 35–47)
HGB BLD-MCNC: 10.9 G/DL (ref 11.7–15.7)
MCH RBC QN AUTO: 28.5 PG (ref 26.5–33)
MCHC RBC AUTO-ENTMCNC: 31.7 G/DL (ref 31.5–36.5)
MCV RBC AUTO: 90 FL (ref 78–100)
PLATELET # BLD AUTO: 35 10E9/L (ref 150–450)
POTASSIUM SERPL-SCNC: 4.4 MMOL/L (ref 3.4–5.3)
RBC # BLD AUTO: 3.83 10E12/L (ref 3.8–5.2)
SODIUM SERPL-SCNC: 138 MMOL/L (ref 133–144)
WBC # BLD AUTO: 5.5 10E9/L (ref 4–11)

## 2018-05-21 PROCEDURE — 80195 ASSAY OF SIROLIMUS: CPT | Performed by: INTERNAL MEDICINE

## 2018-05-21 PROCEDURE — 85027 COMPLETE CBC AUTOMATED: CPT | Performed by: INTERNAL MEDICINE

## 2018-05-21 PROCEDURE — 36415 COLL VENOUS BLD VENIPUNCTURE: CPT | Performed by: INTERNAL MEDICINE

## 2018-05-21 PROCEDURE — 80048 BASIC METABOLIC PNL TOTAL CA: CPT | Performed by: INTERNAL MEDICINE

## 2018-05-21 NOTE — TELEPHONE ENCOUNTER
DATE:  5/21/2018   TIME OF RECEIPT FROM LAB:  4:19 PM  LAB TEST:  Platelets  LAB VALUE:  35  RESULTS GIVEN WITH READ-BACK TO (PROVIDER):  Freddy Lee RN  TIME LAB VALUE REPORTED TO PROVIDER:   4:25 PM

## 2018-05-22 LAB
SIROLIMUS BLD-MCNC: 7.3 UG/L (ref 5–15)
TME LAST DOSE: NORMAL H

## 2018-05-23 ENCOUNTER — TELEPHONE (OUTPATIENT)
Dept: TRANSPLANT | Facility: CLINIC | Age: 53
End: 2018-05-23

## 2018-05-23 DIAGNOSIS — Z94.0 KIDNEY REPLACED BY TRANSPLANT: Primary | ICD-10-CM

## 2018-05-23 NOTE — TELEPHONE ENCOUNTER
Patient Call: Transplant Lab/Orders    Post Transplant Days: [phone]  When patient is less than 60 days post-transplant, route high priority    Reason for Call: Rapa was not a full 24 hour trough 05/21/18    Reina reported it as a 7 hour trough. Had labs drawn unplanned.  Callback needed? Yes    Return Call Needed  Same as documented in contacts section  When to return call?: Greater than one day: Route standard priority

## 2018-05-23 NOTE — TELEPHONE ENCOUNTER
ISSUE:  Per below, rapamune level not accurate    PLAN/TASK:  Ask that she repeat level when able, enter order.

## 2018-05-24 NOTE — TELEPHONE ENCOUNTER
Call placed to patient: Patient verbalize understanding to repeat level ensuring a 24 hour trough. Order placed

## 2018-05-29 ENCOUNTER — OFFICE VISIT (OUTPATIENT)
Dept: FAMILY MEDICINE | Facility: CLINIC | Age: 53
End: 2018-05-29
Payer: COMMERCIAL

## 2018-05-29 ENCOUNTER — RADIANT APPOINTMENT (OUTPATIENT)
Dept: GENERAL RADIOLOGY | Facility: CLINIC | Age: 53
End: 2018-05-29
Attending: PHYSICIAN ASSISTANT
Payer: COMMERCIAL

## 2018-05-29 VITALS
DIASTOLIC BLOOD PRESSURE: 70 MMHG | WEIGHT: 129.7 LBS | OXYGEN SATURATION: 98 % | HEIGHT: 62 IN | BODY MASS INDEX: 23.87 KG/M2 | TEMPERATURE: 98.1 F | HEART RATE: 77 BPM | RESPIRATION RATE: 16 BRPM | SYSTOLIC BLOOD PRESSURE: 112 MMHG

## 2018-05-29 DIAGNOSIS — Z12.11 SPECIAL SCREENING FOR MALIGNANT NEOPLASMS, COLON: ICD-10-CM

## 2018-05-29 DIAGNOSIS — L20.82 FLEXURAL ECZEMA: Primary | ICD-10-CM

## 2018-05-29 DIAGNOSIS — R05.3 PERSISTENT COUGH FOR 3 WEEKS OR LONGER: ICD-10-CM

## 2018-05-29 DIAGNOSIS — Z12.31 VISIT FOR SCREENING MAMMOGRAM: ICD-10-CM

## 2018-05-29 PROCEDURE — 71046 X-RAY EXAM CHEST 2 VIEWS: CPT | Mod: FY

## 2018-05-29 PROCEDURE — 99214 OFFICE O/P EST MOD 30 MIN: CPT | Performed by: PHYSICIAN ASSISTANT

## 2018-05-29 RX ORDER — TRIAMCINOLONE ACETONIDE 1 MG/G
CREAM TOPICAL
Qty: 30 G | Refills: 0 | Status: SHIPPED | OUTPATIENT
Start: 2018-05-29 | End: 2018-08-13

## 2018-05-29 NOTE — PROGRESS NOTES
SUBJECTIVE:   Reina Quan is a 53 year old female who presents to clinic today for the following health issues:      1. Rash      Duration: 1-2 weeks    Description  Location: arms near inner elbows, inner calves, chest and going up neck  Itching: severe    Intensity:  severe    Accompanying signs and symptoms: skin looks itchy, also has bug bites    History (similar episodes/previous evaluation): has eczema; usually gets a rash similar to this one every year during warm months but this year is especially bad    Precipitating or alleviating factors:  New exposures:  None  Recent travel: no      Therapies tried and outcome: hydrocortisone cream -  not effective; prednisone only helps with the eczema    Patient is here today for rash that is worsening  Ongoing for a few weeks  Notes some bug bites, but feels like more is going on  No recent travel, soaps/shampoos  Tried Cortisone 10 without relief  Has had this before    2. Ongoing Cough  Patient is also concerned about persistent cough since October, no other symptoms  Is worse while sleeping  In September patient moved into a new house that she was gutting  No treatments tried, has not been seen for it  No fever, chills, weight loss, hot flashes  Feels like it should be loose and wet but nothing comes up    Problem list and histories reviewed & adjusted, as indicated.  Additional history: as documented    Patient Active Problem List   Diagnosis     Ulcerative colitis (H)     Migraine     Allergic rhinitis     Hearing loss     Kidney replaced by transplant     Lichen planus     Giant Platelet syndrome     Nephrotic syndrome in diseases classified elsewhere     Major depression in partial remission (H)     CARDIOVASCULAR SCREENING; LDL GOAL LESS THAN 100     Health Care Home     Actinic keratosis     History of skin cancer     History of immunosuppression therapy     CKD (chronic kidney disease) stage 3, GFR 30-59 ml/min     High risk medications (not  anticoagulants) long-term use     Seborrheic dermatitis     Dermatitis     Vitiligo     History of SCC (squamous cell carcinoma) of skin     Lichen planopilaris     Hyperlipidemia with target LDL less than 130     Status post kidney transplant     Enterococcus UTI     AK (actinic keratosis)     Neoplasm of uncertain behavior of skin     History of nonmelanoma skin cancer     HTN (hypertension)     Past Surgical History:   Procedure Laterality Date     BIOPSY OF SKIN LESION       C NONSPECIFIC PROCEDURE      Renal transplant right side     C NONSPECIFIC PROCEDURE      cholecystectomy     CHOLECYSTECTOMY       DILATION AND CURETTAGE, OPERATIVE HYSTEROSCOPY WITH MORCELLATOR, COMBINED N/A 11/10/2015    Procedure: COMBINED DILATION AND CURETTAGE, OPERATIVE HYSTEROSCOPY WITH MORCELLATOR;  Surgeon: Ghada Melendez MD;  Location: [facility]     ESOPHAGOSCOPY, GASTROSCOPY, DUODENOSCOPY (EGD), COMBINED  11/20/2012    Procedure: COMBINED ESOPHAGOSCOPY, GASTROSCOPY, DUODENOSCOPY (EGD), BIOPSY SINGLE OR MULTIPLE;;  Surgeon: Valentin Hahn MD;  Location: [facility]     MOHS MICROGRAPHIC PROCEDURE       TRANSPLANT      kidney       Social History   Substance Use Topics     Smoking status: Never Smoker     Smokeless tobacco: Never Used     Alcohol use 0.0 oz/week     0 Standard drinks or equivalent per week      Comment: 2x /week     Family History   Problem Relation Age of Onset     DIABETES Father      KIDNEY DISEASE Father      nephrolithiasis     Hypertension Mother      C.A.D. Paternal Grandfather      Blood Disease Maternal Grandmother      Asthma Sister      CANCER Other      no family hx of skin cancer     Glaucoma No family hx of      Macular Degeneration No family hx of      Melanoma No family hx of      Skin Cancer No family hx of          Current Outpatient Prescriptions   Medication Sig Dispense Refill     atorvastatin (LIPITOR) 10 MG tablet Take 1 tablet (10 mg) by mouth daily 30 tablet 11     clobetasol (TEMOVATE) 0.05 %  "external solution APPLY TWICE DAILY TO ITCHY AND RED AREAS OF SCALP AS NEEDED 50 mL 1     hydroxychloroquine (PLAQUENIL) 200 MG tablet Take 1 tablet (200 mg) by mouth 2 times daily 60 tablet 5     ketoconazole (NIZORAL) 2 % shampoo Apply topically daily as needed for itching or irritation 120 mL 11     lisinopril (PRINIVIL/ZESTRIL) 5 MG tablet Take 1 tablet (5 mg) by mouth daily 30 tablet 11     PARoxetine (PAXIL) 20 MG tablet Take 1 tablet every morning. 90 tablet 1     predniSONE (DELTASONE) 5 MG tablet Take 1 tablet (5 mg) by mouth every other day 15 tablet 6     sirolimus (RAPAMUNE - GENERIC EQUIVALENT) 1 MG tablet Take 3 tablets (3 mg) by mouth daily 90 tablet 3     sulfamethoxazole-trimethoprim (BACTRIM/SEPTRA) 400-80 MG per tablet Take 1 tablet by mouth daily 90 tablet 3     triamcinolone (KENALOG) 0.1 % cream Apply sparingly to affected area three times daily for 14 days. 30 g 0     Allergies   Allergen Reactions     Ampicillin Swelling     Swelling of mouth and tongue.      Seasonal Allergies Other (See Comments)     Itchy eyes and rhinitis.       Reviewed and updated as needed this visit by clinical staff  Tobacco  Allergies  Meds  Med Hx  Surg Hx  Fam Hx  Soc Hx      Reviewed and updated as needed this visit by Provider         ROS:  Constitutional, HEENT, cardiovascular, pulmonary, gi and gu systems are negative, except as otherwise noted.    OBJECTIVE:     /70 (BP Location: Right arm, Patient Position: Chair, Cuff Size: Adult Regular)  Pulse 77  Temp 98.1  F (36.7  C) (Oral)  Resp 16  Ht 5' 2\" (1.575 m)  Wt 129 lb 11.2 oz (58.8 kg)  LMP 05/14/2014 (Approximate)  SpO2 98%  Breastfeeding? No  BMI 23.72 kg/m2  Body mass index is 23.72 kg/(m^2).  GENERAL: healthy, alert and no distress  EYES: Eyes grossly normal to inspection, PERRL and conjunctivae and sclerae normal  HENT: ear canals and TM's normal, nose and mouth without ulcers or lesions  NECK: no adenopathy, no asymmetry, masses, " or scars and thyroid normal to palpation  RESP: lungs clear to auscultation - no rales, rhonchi or wheezes  CV: regular rate and rhythm, normal S1 S2, no S3 or S4, no murmur, click or rub, no peripheral edema and peripheral pulses strong  MS: no gross musculoskeletal defects noted, no edema  SKIN: on inner elbows, shoulders, central upper chest and lower legs there is large macular rash that is erythematous and excoriated    Diagnostic Test Results:  CXR - negative    ASSESSMENT/PLAN:             1. Flexural eczema  New problem, will give stronger steroid cream.  If symptoms worsen or do not improve, consider dermatology appointment.  - triamcinolone (KENALOG) 0.1 % cream; Apply sparingly to affected area three times daily for 14 days.  Dispense: 30 g; Refill: 0    2. Persistent cough for 3 weeks or longer  Chronic issue, suspect is reactive to house renovations.  CXR negative.  Will trial inhaled steroid, if not improving with addition of inhaler, may refer to Pulmonology.  - XR Chest 2 Views; Future  - beclomethasone (QVAR) 40 MCG/ACT Inhaler; Inhale 2 puffs into the lungs 2 times daily  Dispense: 1 Inhaler; Refill: 1    3. Visit for screening mammogram  - MA SCREENING DIGITAL BILAT - Future  (s+30); Future    4. Special screening for malignant neoplasms, colon  - GASTROENTEROLOGY ADULT REF PROCEDURE ONLY Tanja Clarke [phone]; [facility]    Risks, benefits and alternatives were discussed with patient. Agreeable to the plan of care.      Velvet Smith PA-C  [facility]

## 2018-05-29 NOTE — MR AVS SNAPSHOT
After Visit Summary   5/29/2018    Reina Quan    MRN: [id number]           Patient Information     Date Of Birth          1965        Visit Information        Provider Department      5/29/2018 2:50 PM Velvet Smith PA-C [facility]        Today's Diagnoses     Flexural eczema    -  1    Persistent cough for 3 weeks or longer        Visit for screening mammogram        Special screening for malignant neoplasms, colon           Follow-ups after your visit        Additional Services     GASTROENTEROLOGY ADULT REF PROCEDURE ONLY Tanja Clarek [phone]; [facility]       Last Lab Result: Creatinine (mg/dL)       Date                     Value                 05/21/2018               1.62 (H)         ----------  Body mass index is 23.72 kg/(m^2).      Patient will be contacted to schedule procedure.     Please be aware that coverage of these services is subject to the terms and limitations of your health insurance plan.  Call member services at your health plan with any benefit or coverage questions.  Any procedures must be performed at a [place] facility OR coordinated by your clinic's referral office.    Please bring the following with you to your appointment:    (1) Any X-Rays, CTs or MRIs which have been performed.  Contact the facility where they were done to arrange for  prior to your scheduled appointment.    (2) List of current medications   (3) This referral request   (4) Any documents/labs given to you for this referral                  Follow-up notes from your care team     Return in about 6 months (around 11/29/2018) for Physical Exam.      Your next 10 appointments already scheduled     Jun 22, 2018 11:00 AM CDT   MA SCREENING DIGITAL BILATERAL with [facility]MA[facility]   [facility] ([facility])    [address] [address] [address] [facility] [address] [phone]           Do not use any powder, lotion or deodorant under  "your arms or on your breast. If you do, we will ask you to remove it before your exam.  Wear comfortable, two-piece clothing.  If you have any allergies, tell your care team.  Bring any previous mammograms from other facilities or have them mailed to the breast center.              Future tests that were ordered for you today     Open Future Orders        Priority Expected Expires Ordered    MA SCREENING DIGITAL BILAT - Future  (s+30) Routine  5/29/2019 5/29/2018            Who to contact     If you have questions or need follow up information about today's clinic visit or your schedule please contact [facility] directly at [phone].  Normal or non-critical lab and imaging results will be communicated to you by StarMobilehart, letter or phone within 4 business days after the clinic has received the results. If you do not hear from us within 7 days, please contact the clinic through StarMobilehart or phone. If you have a critical or abnormal lab result, we will notify you by phone as soon as possible.  Submit refill requests through [employer] or call your pharmacy and they will forward the refill request to us. Please allow 3 business days for your refill to be completed.          Additional Information About Your Visit        StarMobileharQomuty Information     [employer] gives you secure access to your electronic health record. If you see a primary care provider, you can also send messages to your care team and make appointments. If you have questions, please call your primary care clinic.  If you do not have a primary care provider, please call [phone] and they will assist you.        Care EveryWhere ID     This is your Care EveryWhere ID. This could be used by other organizations to access your [place] medical records  [id number]        Your Vitals Were     Pulse Temperature Respirations Height Last Period Pulse Oximetry    77 98.1  F (36.7  C) (Oral) 16 5' 2\" (1.575 m) 05/14/2014 (Approximate) 98%    Breastfeeding? " BMI (Body Mass Index)                No 23.72 kg/m2           Blood Pressure from Last 3 Encounters:   05/29/18 112/70   02/16/18 130/87   10/25/17 133/85    Weight from Last 3 Encounters:   05/29/18 129 lb 11.2 oz (58.8 kg)   02/16/18 128 lb 6.4 oz (58.2 kg)   07/11/17 129 lb 14.4 oz (58.9 kg)              We Performed the Following     GASTROENTEROLOGY ADULT REF PROCEDURE ONLY Tanja Shepherdge [phone]; [facility]          Today's Medication Changes          These changes are accurate as of 5/29/18  3:16 PM.  If you have any questions, ask your nurse or doctor.               Start taking these medicines.        Dose/Directions    beclomethasone 40 MCG/ACT Inhaler   Commonly known as:  QVAR   Used for:  Persistent cough for 3 weeks or longer   Started by:  Velvet Smith PA-C        Dose:  2 puff   Inhale 2 puffs into the lungs 2 times daily   Quantity:  1 Inhaler   Refills:  1       triamcinolone 0.1 % cream   Commonly known as:  KENALOG   Used for:  Flexural eczema   Started by:  Velvet Smith PA-C        Apply sparingly to affected area three times daily for 14 days.   Quantity:  30 g   Refills:  0            Where to get your medicines      These medications were [facility] to [facility] [address] [place], [place] - [address] [facility] AT [facility] [address] [facility]  [address] [facility] [address]     Phone:  [phone]     beclomethasone 40 MCG/ACT Inhaler    triamcinolone 0.1 % cream                Primary Care Provider Office Phone # Fax #    Velvet Smith PA-C [phone] [address]  [facility] [address]        Equal Access to Services     [facility] KIYA AH: Zay Morin, waaxda luqadaha, qaybta kaalmada adeegyada, adriane samano. So [facility] [phone].    ATENCIÓN: Si habla español, tiene a simms disposición servicios gratuitos de asistencia lingüística. Llame al [phone].    We  comply with applicable federal civil rights laws and [place] laws. We do not discriminate on the basis of race, color, national origin, age, disability, sex, sexual orientation, or gender identity.            Thank you!     Thank you for choosing [facility]  for your care. Our goal is always to provide you with excellent care. Hearing back from our patients is one way we can continue to improve our services. Please take a few minutes to complete the written survey that you may receive in the mail after your visit with us. Thank you!             Your Updated Medication List - Protect others around you: Learn how to safely use, store and throw away your medicines at www.disposemymeds.org.          This list is accurate as of 5/29/18  3:16 PM.  Always use your most recent med list.                   Brand Name Dispense Instructions for use Diagnosis    atorvastatin 10 MG tablet    LIPITOR    30 tablet    Take 1 tablet (10 mg) by mouth daily    Familial hypercholesterolemia       beclomethasone 40 MCG/ACT Inhaler    QVAR    1 Inhaler    Inhale 2 puffs into the lungs 2 times daily    Persistent cough for 3 weeks or longer       clobetasol 0.05 % external solution    TEMOVATE    50 mL    APPLY TWICE DAILY TO ITCHY AND RED AREAS OF SCALP AS NEEDED    Lichen planopilaris       hydroxychloroquine 200 MG tablet    PLAQUENIL    60 tablet    Take 1 tablet (200 mg) by mouth 2 times daily    Lichen planopilaris       ketoconazole 2 % shampoo    NIZORAL    120 mL    Apply topically daily as needed for itching or irritation    Lichen planopilaris       lisinopril 5 MG tablet    PRINIVIL/ZESTRIL    30 tablet    Take 1 tablet (5 mg) by mouth daily    Hypertension, unspecified type       PARoxetine 20 MG tablet    PAXIL    90 tablet    Take 1 tablet every morning.    Dysthymic disorder       predniSONE 5 MG tablet    DELTASONE    15 tablet    Take 1 tablet (5 mg) by mouth every other day    Kidney replaced by  transplant       sirolimus 1 MG tablet    GENERIC EQUIVALENT    90 tablet    Take 3 tablets (3 mg) by mouth daily    Living-donor kidney transplant recipient       sulfamethoxazole-trimethoprim 400-80 MG per tablet    BACTRIM/SEPTRA    90 tablet    Take 1 tablet by mouth daily    Urinary tract infection without hematuria, site unspecified       triamcinolone 0.1 % cream    KENALOG    30 g    Apply sparingly to affected area three times daily for 14 days.    Flexural eczema

## 2018-05-30 ENCOUNTER — TELEPHONE (OUTPATIENT)
Dept: FAMILY MEDICINE | Facility: CLINIC | Age: 53
End: 2018-05-30

## 2018-05-30 DIAGNOSIS — R05.3 PERSISTENT COUGH: Primary | ICD-10-CM

## 2018-05-30 NOTE — TELEPHONE ENCOUNTER
PA Initiation    Medication: Qvar Inhaler  Insurance Company: [employer] - Phone [phone] Fax [phone]  Pharmacy Filling the Rx: [facility] [address] [place] [address] AT [place] [address] & [facility]  Filling Pharmacy Phone: [phone]  Filling Pharmacy Fax:    Start Date: 5/30/2018    THIS HAS BEEN SUBMITTED BY THE PRIOR-AUTHORIZATION TEAM. ANY QUESTIONS PLEASE CALL [phone]. THANK YOU

## 2018-05-30 NOTE — TELEPHONE ENCOUNTER
Prior Authorization Retail Medication Request    Medication/Dose: Qvar Inhaler  ICD code (if different than what is on RX):  Persistent Cough for 3 weeks or longer; R05  Previously Tried and Failed:  none  Rationale:  Persistent cough    Insurance Name:  BCBC- Blue Plus  Insurance ID:  [id number]      Pharmacy Information (if different than what is on RX)  Name:  [facility]  Phone:  [phone]  Fax: [phone]    Saurabh Brizuela CMA ([facility])

## 2018-05-31 NOTE — TELEPHONE ENCOUNTER
PRIOR AUTHORIZATION DENIED    Medication: Qvar Inhaler DENIED    Denial Date: 5/31/2018    Denial Rational:MUST TRY/FAIL ARNUITY ELLIPTA:        Appeal Information: IF YOU WOULD LIKE TO APPEAL, PLEASE SUPPLY PA TEAM WITH A LETTER OF MEDICAL NECESSITY.

## 2018-06-01 NOTE — TELEPHONE ENCOUNTER
Per denial letter below patient must try/fail Arnuity Ellipta which is an acceptable formulary alternative.

## 2018-06-22 ENCOUNTER — RADIANT APPOINTMENT (OUTPATIENT)
Dept: MAMMOGRAPHY | Facility: CLINIC | Age: 53
End: 2018-06-22
Payer: COMMERCIAL

## 2018-06-22 DIAGNOSIS — Z12.31 VISIT FOR SCREENING MAMMOGRAM: ICD-10-CM

## 2018-06-22 PROCEDURE — 77067 SCR MAMMO BI INCL CAD: CPT | Mod: TC

## 2018-06-25 ENCOUNTER — TELEPHONE (OUTPATIENT)
Dept: FAMILY MEDICINE | Facility: CLINIC | Age: 53
End: 2018-06-25

## 2018-06-25 DIAGNOSIS — R05.3 PERSISTENT COUGH FOR 3 WEEKS OR LONGER: ICD-10-CM

## 2018-06-27 NOTE — TELEPHONE ENCOUNTER
"Requested Prescriptions   Pending Prescriptions Disp Refills     beclomethasone (QVAR) 40 MCG/ACT Inhaler 1 Inhaler 1     Sig: Inhale 2 puffs into the lungs 2 times daily    Inhaled Steroids Protocol Passed    6/27/2018  8:13 AM       Passed - Patient is age 12 or older       Passed - Recent (12 mo) or future (30 days) visit within the authorizing provider's specialty    Patient had office visit in the last 12 months or has a visit in the next 30 days with authorizing provider or within the authorizing provider's specialty.  See \"Patient Info\" tab in inbasket, or \"Choose Columns\" in Meds & Orders section of the refill encounter.            Last Written Prescription Date:  5/29/2018  Last Fill Quantity: 1,  # refills: 1   Last office visit: 5/29/2018 with prescribing provider:  Velvet Smith    Routing refill request to provider for review/approval because:  Please advise if patient should continue. Dx: persistent cough  No dx of COPD or ASTHMA on problem list      Gladis GARVIN RN, BSN, PHN  Loreto Porras RN        "

## 2018-06-27 NOTE — TELEPHONE ENCOUNTER
Not due for a refill.     beclomethasone (QVAR) 40 MCG/ACT Inhaler was filled on 5/29/2018, qty 1 with 1 refills.

## 2018-07-09 DIAGNOSIS — Z94.0 LIVING-DONOR KIDNEY TRANSPLANT RECIPIENT: Primary | ICD-10-CM

## 2018-07-09 RX ORDER — SIROLIMUS 1 MG/1
3 TABLET, FILM COATED ORAL DAILY
Qty: 90 TABLET | Refills: 11 | Status: SHIPPED | OUTPATIENT
Start: 2018-07-09 | End: 2019-08-27

## 2018-07-09 NOTE — TELEPHONE ENCOUNTER
Patient Call: Medication Refill  Route to LPN  Instruct the patient to first contact their pharmacy. If they have called their pharmacy and require further assistance, route to LPN.    Pharmacy Name: [facility]  Pharmacy Location: [place]  Name of Medication: Rapamune Dose: 1 mg   Pt has been having financial problems and getting her Rapa- has been not following current  dose to make it last longer- Has been out for awhile- tried to get it refilled and it was denied   When will the patient be out of this medication?: Less than 24 hours ([facility] LPN, then page if no answer)

## 2018-07-10 ENCOUNTER — MYC MEDICAL ADVICE (OUTPATIENT)
Dept: FAMILY MEDICINE | Facility: CLINIC | Age: 53
End: 2018-07-10

## 2018-07-10 NOTE — TELEPHONE ENCOUNTER
Received notice of denial from insurance.  Velvet requests that patient ask her insurance which ones are covered.  Sent Hi[employer]version message.  Saurabh Brizuela CMA ([facility])

## 2018-07-17 NOTE — TELEPHONE ENCOUNTER
2nd attempt, LM for pt to call back.  Please ask if she contacted her insurance yet and if so, what did they say?  Saurabh Brizuela CMA ([facility])

## 2018-08-13 DIAGNOSIS — L20.82 FLEXURAL ECZEMA: ICD-10-CM

## 2018-08-13 NOTE — TELEPHONE ENCOUNTER
"Requested Prescriptions   Pending Prescriptions Disp Refills     triamcinolone (KENALOG) 0.1 % cream [Pharmacy Med Name: TRIAMCINOLONE 0.1% CREAM   30GM] 30 g 0    Last Written Prescription Date:  5/29/18  Last Fill Quantity: 30g,  # refills: 0   Last office visit: 5/29/2018 with prescribing provider:  Bailey Future Office Visit:  None scheduled   Sig: APPLY SPARINGLY EXTERNALLY TO THE AFFECTED AREA THREE TIMES DAILY FOR 14 DAYS    Topical Steroids and Nonsteroidals Protocol Passed    8/13/2018  3:03 PM       Passed - Patient is age 6 or older       Passed - Authorizing prescriber's most recent note related to this medication read.    If refill request is for ophthalmic use, please forward request to provider for approval.         Passed - High potency steroid not ordered       Passed - Recent (12 mo) or future (30 days) visit within the authorizing provider's specialty    Patient had office visit in the last 12 months or has a visit in the next 30 days with authorizing provider or within the authorizing provider's specialty.  See \"Patient Info\" tab in inbasket, or \"Choose Columns\" in Meds & Orders section of the refill encounter.              "

## 2018-08-14 RX ORDER — TRIAMCINOLONE ACETONIDE 1 MG/G
CREAM TOPICAL
Qty: 30 G | Refills: 0 | Status: SHIPPED | OUTPATIENT
Start: 2018-08-14 | End: 2019-05-22

## 2018-10-04 ENCOUNTER — TELEPHONE (OUTPATIENT)
Dept: TRANSPLANT | Facility: CLINIC | Age: 53
End: 2018-10-04

## 2018-10-04 DIAGNOSIS — Z94.0 KIDNEY REPLACED BY TRANSPLANT: ICD-10-CM

## 2018-10-04 DIAGNOSIS — D84.9 IMMUNOSUPPRESSED STATUS (H): ICD-10-CM

## 2018-10-04 DIAGNOSIS — Z94.0 KIDNEY TRANSPLANT RECIPIENT: ICD-10-CM

## 2018-10-04 DIAGNOSIS — Z79.60 LONG-TERM USE OF IMMUNOSUPPRESSANT MEDICATION: ICD-10-CM

## 2018-10-04 LAB
ERYTHROCYTE [DISTWIDTH] IN BLOOD BY AUTOMATED COUNT: 15.4 % (ref 10–15)
HCT VFR BLD AUTO: 32.8 % (ref 35–47)
HGB BLD-MCNC: 10.2 G/DL (ref 11.7–15.7)
MCH RBC QN AUTO: 28.3 PG (ref 26.5–33)
MCHC RBC AUTO-ENTMCNC: 31.1 G/DL (ref 31.5–36.5)
MCV RBC AUTO: 91 FL (ref 78–100)
PLATELET # BLD AUTO: 49 10E9/L (ref 150–450)
RBC # BLD AUTO: 3.61 10E12/L (ref 3.8–5.2)
WBC # BLD AUTO: 4.8 10E9/L (ref 4–11)

## 2018-10-04 PROCEDURE — 85027 COMPLETE CBC AUTOMATED: CPT | Performed by: INTERNAL MEDICINE

## 2018-10-04 PROCEDURE — 80195 ASSAY OF SIROLIMUS: CPT | Performed by: INTERNAL MEDICINE

## 2018-10-04 PROCEDURE — 80048 BASIC METABOLIC PNL TOTAL CA: CPT | Performed by: INTERNAL MEDICINE

## 2018-10-04 PROCEDURE — 36415 COLL VENOUS BLD VENIPUNCTURE: CPT | Performed by: INTERNAL MEDICINE

## 2018-10-04 NOTE — TELEPHONE ENCOUNTER
DATE:  10/4/2018   TIME OF RECEIPT FROM LAB:  5918  LAB TEST:  plt  LAB VALUE:  49  RESULTS GIVEN WITH READ-BACK TO (PROVIDER): Earnestine Thompsonrow  TIME LAB VALUE REPORTED TO PROVIDER:   1514

## 2018-10-05 ENCOUNTER — TELEPHONE (OUTPATIENT)
Dept: TRANSPLANT | Facility: CLINIC | Age: 53
End: 2018-10-05

## 2018-10-05 DIAGNOSIS — Z94.0 KIDNEY REPLACED BY TRANSPLANT: Primary | ICD-10-CM

## 2018-10-05 LAB
ALBUMIN UR-MCNC: ABNORMAL MG/DL
ANION GAP SERPL CALCULATED.3IONS-SCNC: 8 MMOL/L (ref 3–14)
APPEARANCE UR: CLEAR
BILIRUB UR QL STRIP: NEGATIVE
BUN SERPL-MCNC: 42 MG/DL (ref 7–30)
CALCIUM SERPL-MCNC: 9.9 MG/DL (ref 8.5–10.1)
CHLORIDE SERPL-SCNC: 104 MMOL/L (ref 94–109)
CO2 SERPL-SCNC: 26 MMOL/L (ref 20–32)
COLOR UR AUTO: YELLOW
CREAT SERPL-MCNC: 1.96 MG/DL (ref 0.52–1.04)
GFR SERPL CREATININE-BSD FRML MDRD: 27 ML/MIN/1.7M2
GLUCOSE SERPL-MCNC: 91 MG/DL (ref 70–99)
GLUCOSE UR STRIP-MCNC: NEGATIVE MG/DL
HGB UR QL STRIP: NEGATIVE
KETONES UR STRIP-MCNC: NEGATIVE MG/DL
LEUKOCYTE ESTERASE UR QL STRIP: NEGATIVE
NITRATE UR QL: NEGATIVE
PH UR STRIP: 6 PH (ref 5–7)
POTASSIUM SERPL-SCNC: 4.6 MMOL/L (ref 3.4–5.3)
RBC #/AREA URNS AUTO: NORMAL /HPF
SIROLIMUS BLD-MCNC: 3.3 UG/L (ref 5–15)
SODIUM SERPL-SCNC: 138 MMOL/L (ref 133–144)
SOURCE: ABNORMAL
SP GR UR STRIP: 1.02 (ref 1–1.03)
TME LAST DOSE: ABNORMAL H
UROBILINOGEN UR STRIP-ACNC: 0.2 EU/DL (ref 0.2–1)
WBC #/AREA URNS AUTO: NORMAL /HPF

## 2018-10-05 PROCEDURE — 81001 URINALYSIS AUTO W/SCOPE: CPT | Performed by: INTERNAL MEDICINE

## 2018-10-05 NOTE — TELEPHONE ENCOUNTER
Call returned from pt. She states that she overall has been feeling poorly for the past two weeks, with a lot of fatigue. She also states that she has had frequent urination.   I asked that she go to collect UA/UC today, as well as EBC/CMV (orders placed)  Over the weekend, I asked that she hydrate as well as possible over the weekend and to repeat all transplant labs next week, including an accurate 24 hour sirolimus level (orders placed)  If she decompensates further, or develops fever - she should be seen in ED - and seen by PCP when able.  Pt verbalizes understanding of plan

## 2018-10-05 NOTE — TELEPHONE ENCOUNTER
ISSUE:  Creatinine above baseline at 1.96     PLAN:  Call placed to pt. No answer. No vm set up, to try back.

## 2018-10-08 ENCOUNTER — TELEPHONE (OUTPATIENT)
Dept: TRANSPLANT | Facility: CLINIC | Age: 53
End: 2018-10-08

## 2018-10-08 DIAGNOSIS — Z94.0 KIDNEY REPLACED BY TRANSPLANT: Primary | ICD-10-CM

## 2018-10-08 NOTE — TELEPHONE ENCOUNTER
Call placed to pt to check in on status (elevated Cr last week, UA negative)  No answer. No vm set up, to try back.

## 2018-10-08 NOTE — TELEPHONE ENCOUNTER
Call returned from pt. She states that she feels better currently. I did ask that she hydrate well and repeat all transplant labs this week, orders in place.  Pt verbalizes understanding of plan

## 2018-10-13 ENCOUNTER — HEALTH MAINTENANCE LETTER (OUTPATIENT)
Age: 53
End: 2018-10-13

## 2018-10-17 ENCOUNTER — TELEPHONE (OUTPATIENT)
Dept: TRANSPLANT | Facility: CLINIC | Age: 53
End: 2018-10-17

## 2018-10-17 DIAGNOSIS — Z94.0 KIDNEY REPLACED BY TRANSPLANT: ICD-10-CM

## 2018-10-17 LAB
CREAT UR-MCNC: 81 MG/DL
ERYTHROCYTE [DISTWIDTH] IN BLOOD BY AUTOMATED COUNT: 15.4 % (ref 10–15)
HCT VFR BLD AUTO: 34.1 % (ref 35–47)
HGB BLD-MCNC: 10.7 G/DL (ref 11.7–15.7)
MCH RBC QN AUTO: 28.5 PG (ref 26.5–33)
MCHC RBC AUTO-ENTMCNC: 31.4 G/DL (ref 31.5–36.5)
MCV RBC AUTO: 91 FL (ref 78–100)
PLATELET # BLD AUTO: 45 10E9/L (ref 150–450)
PROT UR-MCNC: 0.24 G/L
PROT/CREAT 24H UR: 0.29 G/G CR (ref 0–0.2)
RBC # BLD AUTO: 3.76 10E12/L (ref 3.8–5.2)
SIROLIMUS BLD-MCNC: 4.4 UG/L (ref 5–15)
TME LAST DOSE: ABNORMAL H
WBC # BLD AUTO: 4.4 10E9/L (ref 4–11)

## 2018-10-17 PROCEDURE — 87799 DETECT AGENT NOS DNA QUANT: CPT | Performed by: INTERNAL MEDICINE

## 2018-10-17 PROCEDURE — 36415 COLL VENOUS BLD VENIPUNCTURE: CPT | Performed by: INTERNAL MEDICINE

## 2018-10-17 PROCEDURE — 80048 BASIC METABOLIC PNL TOTAL CA: CPT | Performed by: INTERNAL MEDICINE

## 2018-10-17 PROCEDURE — 85027 COMPLETE CBC AUTOMATED: CPT | Performed by: INTERNAL MEDICINE

## 2018-10-17 PROCEDURE — 80195 ASSAY OF SIROLIMUS: CPT | Performed by: INTERNAL MEDICINE

## 2018-10-17 PROCEDURE — 84156 ASSAY OF PROTEIN URINE: CPT | Performed by: INTERNAL MEDICINE

## 2018-10-17 NOTE — TELEPHONE ENCOUNTER
DATE:  10/17/2018   TIME OF RECEIPT FROM LAB:  9639  LAB TEST:  Platelets  LAB VALUE:  45  RESULTS GIVEN WITH READ-BACK TO (PROVIDER): Earnestine Thompsonrow  TIME LAB VALUE REPORTED TO PROVIDER:   0834

## 2018-10-18 DIAGNOSIS — E55.9 VITAMIN D DEFICIENCY: Primary | ICD-10-CM

## 2018-10-18 LAB
ANION GAP SERPL CALCULATED.3IONS-SCNC: 11 MMOL/L (ref 3–14)
BUN SERPL-MCNC: 31 MG/DL (ref 7–30)
CALCIUM SERPL-MCNC: 10.5 MG/DL (ref 8.5–10.1)
CHLORIDE SERPL-SCNC: 103 MMOL/L (ref 94–109)
CO2 SERPL-SCNC: 22 MMOL/L (ref 20–32)
CREAT SERPL-MCNC: 1.79 MG/DL (ref 0.52–1.04)
EBV DNA # SPEC NAA+PROBE: NORMAL {COPIES}/ML
EBV DNA SPEC NAA+PROBE-LOG#: NORMAL {LOG_COPIES}/ML
GFR SERPL CREATININE-BSD FRML MDRD: 30 ML/MIN/1.7M2
GLUCOSE SERPL-MCNC: 108 MG/DL (ref 70–99)
POTASSIUM SERPL-SCNC: 4.3 MMOL/L (ref 3.4–5.3)
SODIUM SERPL-SCNC: 136 MMOL/L (ref 133–144)

## 2018-10-19 LAB
CMV DNA SPEC NAA+PROBE-ACNC: NORMAL [IU]/ML
CMV DNA SPEC NAA+PROBE-LOG#: NORMAL {LOG_IU}/ML
SPECIMEN SOURCE: NORMAL

## 2018-11-28 NOTE — TELEPHONE ENCOUNTER
Any acceptable alternatives?  Velvet Smith PA-C     OTHER SURGICAL HISTORY  01/24/2017    Spinal cord stimulator implant    OTHER SURGICAL HISTORY      [employer] Protege MRI stimulator Model# 0314, 2 octrode leads Model# 5128 at T7/T8. MRI CONDITIONAL 1.5 ONLY CAN ONLY HAVE HEAD IN TR COIL AND EXTREMITY MRI    ID REPAIR ROTATOR CUFF,ACUTE Left 4-30-14    Rotator Cuff Repair,arthroscope labral debridement, open SAD    TONSILLECTOMY      TUNNELED VENOUS PORT PLACEMENT         Allergies   Allergen Reactions    Ace Inhibitors      Unknown reaction    Penicillin G Itching    Penicillins Itching         Current Outpatient Prescriptions:     oxyCODONE-acetaminophen (PERCOCET) 5-325 MG per tablet, Take 1 tablet by mouth 3 times daily as needed for Pain for up to 30 days. ., Disp: 90 tablet, Rfl: 0    isoniazid (NYDRAZID) 300 MG tablet, Take 1 tablet by mouth daily, Disp: 90 tablet, Rfl: 2    pyridoxine (RA VITAMIN B-6) 50 MG tablet, Take 1 tablet by mouth daily, Disp: 90 tablet, Rfl: 2    b complex-C-folic acid (NEPHROCAPS) 1 MG capsule, Take 1 capsule by mouth daily, Disp: 90 capsule, Rfl: 1    docusate sodium (COLACE) 100 MG capsule, Take 1 capsule by mouth 2 times daily as needed for Constipation, Disp: 180 capsule, Rfl: 1    omeprazole (PRILOSEC) 20 MG delayed release capsule, Take 1 capsule by mouth daily, Disp: 90 capsule, Rfl: 1    aspirin 81 MG EC tablet, Take 1 tablet by mouth daily, Disp: 90 tablet, Rfl: 5    Handicap Placard MISC, by Does not apply route M51.36-Degenerative disc disease Z98.890-Decompressive lumbar laminectomy N18.6-ESRD on HD MWF E08.42-Diabetic polyneuropathy EXPIRES: 12/20/2022, Disp: 1 each, Rfl: 0    midodrine (PROAMATINE) 10 MG tablet, Take 10 mg by mouth See Admin Instructions I tab before dialysis, 1 tab midway through dialysis, as needed for SBP<110, Disp: , Rfl:     Misc.  Devices (RAISED TOILET SEAT/LOCK) [facility] RAISED [place] SE[place]   DX:  M48.06, M96.1,  M51.36     WEIGHT  LBS, Disp: 1 each, Rfl: 0  

## 2019-02-15 ENCOUNTER — TELEPHONE (OUTPATIENT)
Dept: TRANSPLANT | Facility: CLINIC | Age: 54
End: 2019-02-15

## 2019-02-15 NOTE — TELEPHONE ENCOUNTER
"Transplant Social Work Services Phone Call      Data: No insurance  Intervention: Received call from patient that she currently does not have insurance. Patient was wondering if there were any assistance programs available. Patient is currently on sirolimus. Discussed the [employer] Patient Assistance Program which patient was interested in. Patient reported she has not had insurance for a few months. Inquired about immunosuppression. Patient reported she has been \"rationing my sirolimus- taking 2 every other day when I should be taking 3 daily\". Asked how much patient has left. She has 11 days worth if she takes 3 pills a day.  Assessment: Previously had [employer] but moved to [place] so she is no longer eligible [place] [employer]. Patient is employed but cannot get employer insurance until April 1st. Patient would benefit from the patient assistance program if she qualifies.   Education provided by [facility]: [employer] Patient Assistance program, taking medications as prescribed and reaching out to transplatnt team  Plan: This writer emailed patient [employer] Patient Assistance application. Patient to complete and email back to this writer. Once received, writer will fax completed application to the program.     Gertrude Sanabria [facility]    Kidney/Pancreas/Auto Islet Transplant Programs      "

## 2019-02-15 NOTE — TELEPHONE ENCOUNTER
Received message from [facility] stating that pt is having insurance issues and missing IS dosing    PLAN:  Call placed to pt. She confirms that she has been occasionally taking Rapa only every other day. I asked that she take it as ordered (3mg daily) and obtain labs next week. She has been advised to fill out assistance program provided to her via email from [facility] today. To check in with Reina next week.  Pt verbalizes understanding of plan

## 2019-02-20 ENCOUNTER — TELEPHONE (OUTPATIENT)
Dept: TRANSPLANT | Facility: CLINIC | Age: 54
End: 2019-02-20

## 2019-02-20 NOTE — TELEPHONE ENCOUNTER
Received patient's portion of [employer] Patient Assistance application. Faxed completed application to [employer]. Can take up to two weeks for approval/denial.    Gertrude Sanabria, [facility]    Kidney/Pancreas/Auto Islet Transplant Programs

## 2019-03-28 ENCOUNTER — TELEPHONE (OUTPATIENT)
Dept: TRANSPLANT | Facility: CLINIC | Age: 54
End: 2019-03-28

## 2019-03-28 NOTE — TELEPHONE ENCOUNTER
ISSUE:  Recently IS noncompliance last month  Needs labs    PLAN:  Call placed to pt. No answer. Left detailed vm asking that she collect labs and return phone call.

## 2019-04-12 NOTE — TELEPHONE ENCOUNTER
"Third call placed to pt. She states that she has been \"very busy\" lately. Lab compliance has been reinforced, as this is crucial in monitoring organ function. Kathy states that she has enough IS meds currently, and will be obtaining labs ASAP. She is asking for follow-up appt - message sent to SOT .   "

## 2019-04-15 ENCOUNTER — TELEPHONE (OUTPATIENT)
Dept: TRANSPLANT | Facility: CLINIC | Age: 54
End: 2019-04-15

## 2019-04-15 NOTE — TELEPHONE ENCOUNTER
----- Message from Earnestine Marino RN sent at 4/12/2019 10:13 AM CDT -----  Regarding: appt  Please call her (number listed in comment) to set up annual nephrology appt

## 2019-05-07 ENCOUNTER — TELEPHONE (OUTPATIENT)
Dept: TRANSPLANT | Facility: CLINIC | Age: 54
End: 2019-05-07

## 2019-05-07 DIAGNOSIS — Z94.0 KIDNEY REPLACED BY TRANSPLANT: ICD-10-CM

## 2019-05-07 LAB
ANION GAP SERPL CALCULATED.3IONS-SCNC: 5 MMOL/L (ref 3–14)
BUN SERPL-MCNC: 37 MG/DL (ref 7–30)
CALCIUM SERPL-MCNC: 10.3 MG/DL (ref 8.5–10.1)
CHLORIDE SERPL-SCNC: 106 MMOL/L (ref 94–109)
CO2 SERPL-SCNC: 26 MMOL/L (ref 20–32)
CREAT SERPL-MCNC: 1.57 MG/DL (ref 0.52–1.04)
ERYTHROCYTE [DISTWIDTH] IN BLOOD BY AUTOMATED COUNT: 16.3 % (ref 10–15)
GFR SERPL CREATININE-BSD FRML MDRD: 37 ML/MIN/{1.73_M2}
GLUCOSE SERPL-MCNC: 115 MG/DL (ref 70–99)
HCT VFR BLD AUTO: 34.6 % (ref 35–47)
HGB BLD-MCNC: 10.8 G/DL (ref 11.7–15.7)
MCH RBC QN AUTO: 28.2 PG (ref 26.5–33)
MCHC RBC AUTO-ENTMCNC: 31.2 G/DL (ref 31.5–36.5)
MCV RBC AUTO: 90 FL (ref 78–100)
PLATELET # BLD AUTO: 37 10E9/L (ref 150–450)
POTASSIUM SERPL-SCNC: 3.9 MMOL/L (ref 3.4–5.3)
PROT UR-MCNC: 0.68 G/L
PROT/CREAT 24H UR: 0.68 G/G CR (ref 0–0.2)
RBC # BLD AUTO: 3.83 10E12/L (ref 3.8–5.2)
SODIUM SERPL-SCNC: 137 MMOL/L (ref 133–144)
WBC # BLD AUTO: 5 10E9/L (ref 4–11)

## 2019-05-07 PROCEDURE — 80195 ASSAY OF SIROLIMUS: CPT | Performed by: INTERNAL MEDICINE

## 2019-05-07 PROCEDURE — 36415 COLL VENOUS BLD VENIPUNCTURE: CPT | Performed by: INTERNAL MEDICINE

## 2019-05-07 PROCEDURE — 85027 COMPLETE CBC AUTOMATED: CPT | Performed by: INTERNAL MEDICINE

## 2019-05-07 PROCEDURE — 84156 ASSAY OF PROTEIN URINE: CPT | Performed by: INTERNAL MEDICINE

## 2019-05-07 PROCEDURE — 80048 BASIC METABOLIC PNL TOTAL CA: CPT | Performed by: INTERNAL MEDICINE

## 2019-05-07 NOTE — TELEPHONE ENCOUNTER
DATE:  5/7/2019   TIME OF RECEIPT FROM LAB:  1506  LAB TEST:  Platelets  LAB VALUE:  37  RESULTS GIVEN WITH READ-BACK TO (PROVIDER):  REKHA LAZCANO LPN  TIME LAB VALUE REPORTED TO PROVIDER:   1508 Earnestine Marino RN    
This is pt's baseline. Giant platelet syndrome.   
Satisfactory

## 2019-05-08 LAB
SIROLIMUS BLD-MCNC: 11.6 UG/L (ref 5–15)
TME LAST DOSE: 900 H

## 2019-05-10 ENCOUNTER — TELEPHONE (OUTPATIENT)
Dept: TRANSPLANT | Facility: CLINIC | Age: 54
End: 2019-05-10

## 2019-05-10 ENCOUNTER — DOCUMENTATION ONLY (OUTPATIENT)
Dept: CARE COORDINATION | Facility: CLINIC | Age: 54
End: 2019-05-10

## 2019-05-10 DIAGNOSIS — Z94.0 KIDNEY REPLACED BY TRANSPLANT: Primary | ICD-10-CM

## 2019-05-10 NOTE — TELEPHONE ENCOUNTER
ISSUE:  Sirolimus level elevated at 11.6 (s/b 3.5) -does not appear to be a 24 hour trough  Overdue for annual nephrology appt    PLAN/TASK:  Please ask that pt repeat this, emphasizing 24 hour level. Ask if she would like to set up nephrology visit - forward to scheduling.

## 2019-05-13 NOTE — TELEPHONE ENCOUNTER
Call placed to patient. Patient confirms inaccurate trough level and v\u to repeat level and schedule annual transplant appointment. Order sent

## 2019-05-20 ENCOUNTER — HOSPITAL ENCOUNTER (EMERGENCY)
Facility: CLINIC | Age: 54
Discharge: HOME | End: 2019-05-20
Attending: EMERGENCY MEDICINE | Admitting: EMERGENCY MEDICINE
Payer: COMMERCIAL

## 2019-05-20 VITALS
DIASTOLIC BLOOD PRESSURE: 89 MMHG | RESPIRATION RATE: 16 BRPM | OXYGEN SATURATION: 98 % | SYSTOLIC BLOOD PRESSURE: 134 MMHG | HEART RATE: 80 BPM | TEMPERATURE: 98.7 F

## 2019-05-20 DIAGNOSIS — D69.6 THROMBOCYTOPENIA (H): ICD-10-CM

## 2019-05-20 DIAGNOSIS — H11.31 SUBCONJUNCTIVAL HEMATOMA, RIGHT: ICD-10-CM

## 2019-05-20 LAB
BASOPHILS # BLD AUTO: 0 10E9/L (ref 0–0.2)
BASOPHILS NFR BLD AUTO: 0.7 %
DIFFERENTIAL METHOD BLD: ABNORMAL
EOSINOPHIL # BLD AUTO: 0.3 10E9/L (ref 0–0.7)
EOSINOPHIL NFR BLD AUTO: 5.9 %
ERYTHROCYTE [DISTWIDTH] IN BLOOD BY AUTOMATED COUNT: 16.8 % (ref 10–15)
HCT VFR BLD AUTO: 35.5 % (ref 35–47)
HGB BLD-MCNC: 10.7 G/DL (ref 11.7–15.7)
IMM GRANULOCYTES # BLD: 0 10E9/L (ref 0–0.4)
IMM GRANULOCYTES NFR BLD: 0.2 %
LYMPHOCYTES # BLD AUTO: 0.6 10E9/L (ref 0.8–5.3)
LYMPHOCYTES NFR BLD AUTO: 13.1 %
MCH RBC QN AUTO: 27.9 PG (ref 26.5–33)
MCHC RBC AUTO-ENTMCNC: 30.1 G/DL (ref 31.5–36.5)
MCV RBC AUTO: 92 FL (ref 78–100)
MONOCYTES # BLD AUTO: 0.5 10E9/L (ref 0–1.3)
MONOCYTES NFR BLD AUTO: 10.9 %
NEUTROPHILS # BLD AUTO: 3.1 10E9/L (ref 1.6–8.3)
NEUTROPHILS NFR BLD AUTO: 69.2 %
NRBC # BLD AUTO: 0 10*3/UL
NRBC BLD AUTO-RTO: 0 /100
PLATELET # BLD AUTO: 47 10E9/L (ref 150–450)
RBC # BLD AUTO: 3.84 10E12/L (ref 3.8–5.2)
WBC # BLD AUTO: 4.4 10E9/L (ref 4–11)

## 2019-05-20 PROCEDURE — 99283 EMERGENCY DEPT VISIT LOW MDM: CPT

## 2019-05-20 PROCEDURE — 85025 COMPLETE CBC W/AUTO DIFF WBC: CPT | Performed by: EMERGENCY MEDICINE

## 2019-05-20 ASSESSMENT — ENCOUNTER SYMPTOMS
EYE DISCHARGE: 0
HEADACHES: 0
EYE REDNESS: 1
EYE PAIN: 1
EYE ITCHING: 0
PHOTOPHOBIA: 0
FEVER: 0

## 2019-05-20 NOTE — ED PROVIDER NOTES
"  History     Chief Complaint:  Right eye problem    HPI   Reina Quan is a 54 year old female status post kidney transplant who presents with right eye hemorrhage. The patient states that today she was driving in her car just an hour or so prior to arrival when she started noticing a pain and swelling sensation in her right eye. Upon investigation she noticed that she had blood/hemorrhage in her right lateral eye that was painful to any movement of the eye. The patient denies any known injury or contact with substances in the eye. She has no history of bleeding disorders though has \"giant platelet syndrome\" and has platelets that run on the low end of 30s to 40s. She is not on blood thinners. The patient denies any vision loss or changes in the right eye. She denies any symptoms in the left eye. There has been no fevers, chills, or headache.    Allergies:  Ampicillin  Seasonal Allergies     Medications:    Lipitor  Qvar  Temovate  Plaquenil  Lisinopril  Paxil    Problem List:      Past Medical History:    Hyperlipidemia  Vitiligo  CKD stage III  Major depression in partial remission  Ulcerative colitis  Migraine  Allergic rhinitis  Giant platelet syndrome  Nephrotic syndrome  Hypertension   Thrombocytopenia  Menorrhagia  Glomerular focal sclerosis     Past Surgical History:    Right renal transplant  Cholecystectomy  D&C  EGD  Mohs micrographic procedure    Family History:    Diabetes, kidney disease, hypertension, CAD    Social History:  Smoking Status: Never Smoker  Alcohol Use: Yes  Patient presents alone.   Marital Status:        Review of Systems   Constitutional: Negative for fever.   Eyes: Positive for pain and redness. Negative for photophobia, discharge, itching and visual disturbance.   Neurological: Negative for headaches.   All other systems reviewed and are negative.      Physical Exam     Patient Vitals for the past 24 hrs:   BP Temp Pulse Resp SpO2   05/20/19 1458 134/89 -- -- -- -- "   05/20/19 1312 (!) 168/109 98.7  F (37.1  C) 80 16 98 %       Physical Exam  Vital signs and nursing notes reviewed.     Constitutional: sitting in chair appears comfortable  HENT: No evidence of facial or head injury.    Eyes: She has a large subconjunctival hemorrhage involving the right eye at the lateral inferior aspect predominantly. No evidence of hyphema. Extra ocular movements are normal. No peripheral vision loss. Visual acuity is normal in the right eye. Ocular pressure is 19 (right) and 20 (Left).  Neck: normal range of motion  Cardiovascular: Normal rate.    Pulmonary/Chest: No respiratory distress.   Musculoskeletal: normal range of motion.  Neurological: Alert and oriented. No focal weakness  Skin: Skin is warm and dry. No rash noted.   Psych: normal affect    Focused      Emergency Department Course     Laboratory:  CBC: HGB 10.7 (L), PLT 47 (LL), o/w WNL (WBC 4.4)     Emergency Department Course:  Past medical records, nursing notes, and vitals reviewed.  1532: I performed an exam of the patient and obtained history, as documented above.     1434: I discussed the case with Dr. Elizalde of Ophthalmology regarding the patient.      1440: I rechecked the patient. Findings and plan explained to the Patient. Patient discharged home with instructions regarding supportive care, medications, and reasons to return. The importance of close follow-up was reviewed.    Impression & Plan      Medical Decision Making:  Reina Quan is a 54 year old female who presents with redness to the right eye with mild discomfort. On exam she had evidence of a very large subconjunctival hemorrhage. There was no involvement of the cornea. She had no hyphema. Her visual acuity was normal and her ocular pressures were also normal. She has mild discomfort with extraocular movements but no limitation noted. She is a somewhat complicated by a history of thrombocytopenia which is not new. I discussed with the patient at length  about findings and diagnosis that this will typically resolve on its own. I did consult Dr. Elizalde of ophthalmology at the [facility] as well and he agreed that she can be safely discharged home. He is happy to follow up with her tomorrow in his clinic if the patient wants the follow up appointment. She can also follow up with her PCP. She is aware that if she develops a sudden vision loss/change, or pain in the eye she is to return for reevaluation and should return immediately. The patient understands the plan and is discharged home.    Diagnosis:    ICD-10-CM    1. Subconjunctival hematoma, right H11.31    2. Thrombocytopenia (H) D69.6      Carlton Alonso  5/20/2019   [facility] EMERGENCY DEPARTMENT  I, Carlton Alonso, am serving as a scribe at 3:32 PM on 5/20/2019 to document services personally performed by Dr. Peña based on my observations and the provider's statements to me.       Escobar Peña MD  05/21/19 0862

## 2019-05-20 NOTE — ED AVS SNAPSHOT
[facility] Emergency Department  [address]  [facility] [address]  Phone:  [phone]  Fax:  [phone]                                    Reina Quan   MRN: [id number]    Department:  [facility] Emergency Department   Date of Visit:  5/20/2019           After Visit Summary Signature Page    I have received my discharge instructions, and my questions have been answered. I have discussed any challenges I see with this plan with the nurse or doctor.    ..........................................................................................................................................  Patient/Patient Representative Signature      ..........................................................................................................................................  Patient Representative Print Name and Relationship to Patient    ..................................................               ................................................  Date                                   Time    ..........................................................................................................................................  Reviewed by Signature/Title    ...................................................              ..............................................  Date                                               Time          22EPIC Rev 08/18

## 2019-05-20 NOTE — ED TRIAGE NOTES
Patient presents to the ED reporting an area of localized swelling and pain in the right eye. Erythema noted. Patient states while driving felt an area of swelling develop in the eye which has since gotten larger. Reports vision in right eye is blurred.

## 2019-05-21 ENCOUNTER — TELEPHONE (OUTPATIENT)
Dept: OPHTHALMOLOGY | Facility: CLINIC | Age: 54
End: 2019-05-21

## 2019-05-21 NOTE — TELEPHONE ENCOUNTER
Large conjunctival hemorrhage f/u from ED visit 5-20-19  Transplant pt and h/o thrombocytopenia    Pt states blood on all part of white part of eye  Offered appt today with Dr. Elizalde and pt unable    Scheduled with Dr. Vale tomorrow AM    Pt aware of date/time/location  David Baez RN 1:07 PM 05/21/19      [facility]    Phone Message    May a detailed message be left on voicemail: yes    Reason for Call: Other: per pt- was in ED on 05/20/19 for a Hemorrhage of eye, please review ED report and call pt to schedule a hospital f/u thanks!!     Action Taken: Message routed to:  [facility] ([facility] eye

## 2019-05-22 ENCOUNTER — OFFICE VISIT (OUTPATIENT)
Dept: OPHTHALMOLOGY | Facility: CLINIC | Age: 54
End: 2019-05-22
Attending: OPTOMETRIST
Payer: COMMERCIAL

## 2019-05-22 DIAGNOSIS — Z79.899 HIGH RISK MEDICATION USE: Primary | ICD-10-CM

## 2019-05-22 DIAGNOSIS — H02.831 DERMATOCHALASIS OF BOTH UPPER EYELIDS: ICD-10-CM

## 2019-05-22 DIAGNOSIS — H11.31 SUBCONJUNCTIVAL HEMORRHAGE OF RIGHT EYE: Primary | ICD-10-CM

## 2019-05-22 DIAGNOSIS — Z79.899 LONG-TERM USE OF PLAQUENIL: ICD-10-CM

## 2019-05-22 DIAGNOSIS — Z79.899 ENCOUNTER FOR LONG-TERM (CURRENT) USE OF HIGH-RISK MEDICATION: ICD-10-CM

## 2019-05-22 DIAGNOSIS — H18.529 ABMD (ANTERIOR BASEMENT MEMBRANE DYSTROPHY): ICD-10-CM

## 2019-05-22 DIAGNOSIS — H02.834 DERMATOCHALASIS OF BOTH UPPER EYELIDS: ICD-10-CM

## 2019-05-22 PROCEDURE — G0463 HOSPITAL OUTPT CLINIC VISIT: HCPCS | Mod: ZF

## 2019-05-22 PROCEDURE — 92134 CPTRZ OPH DX IMG PST SGM RTA: CPT | Mod: ZF | Performed by: OPTOMETRIST

## 2019-05-22 PROCEDURE — 92082 INTERMEDIATE VISUAL FIELD XM: CPT | Mod: ZF | Performed by: OPTOMETRIST

## 2019-05-22 ASSESSMENT — VISUAL ACUITY
OS_PH_SC: 20/30
METHOD: SNELLEN - LINEAR
OD_SC: 20/30
OS_SC: 20/40
OS_PH_SC+: -2

## 2019-05-22 ASSESSMENT — TONOMETRY
IOP_METHOD: ICARE
OS_IOP_MMHG: 15
OD_IOP_MMHG: 15

## 2019-05-22 ASSESSMENT — CUP TO DISC RATIO
OD_RATIO: 0.55
OS_RATIO: 0.55

## 2019-05-22 ASSESSMENT — EXTERNAL EXAM - RIGHT EYE: OD_EXAM: NORMAL

## 2019-05-22 ASSESSMENT — EXTERNAL EXAM - LEFT EYE: OS_EXAM: NORMAL

## 2019-05-22 NOTE — PROGRESS NOTES
HPI:  This is an ED follow up from 05/20/19. Patient has a history of low platelets (last count was 47 on 05/20/19). She does rub her eyes a lot due allergies. No constipation. No blood thinners. No heavy coughing. She also takes plaquenil and has been since 02/2012 prescribed by dermatology. Patient is also s/p kidney transplant 1985 and 06/1992.       Pertinent Medical History:    Low platelets.     Giant platelet syndrome    Migraine    Hearing loss    Allergic rhinitis    Hyperlipidemia    Hypertension    CKD stage 3    Kidney transplant 1985 and 06/1992    Squamous cell carcinoma on the left shin.     Ocular History:    ABMD both eyes.     Blepharoptosis both eyes.     Cataract both eyes    Plaquenil use    Allergic Conjunctivitis - previously used Patanol.     Eye Medications:    None    Assessment and Plan:  1.   Subconjunctival Hemorrhage right eye.     Likely due to low platelets, giant platelet syndrome, and/or eye rubbing.    Try not rub the eyes.     Photos taken and attached to note below.     Monitor in 2 weeks.     2.   Plaquenil use    Started 200 mg BID on 02/2012 by dermatology    Discontinue plaquenil since last year.     Macular OCT and visual fields does not show plaquenil retinopathy.     3.   History of Kidney Transplant    Monitor    4.   Dermatochalasis, both eyes.     Patient is not bothered. Monitor.     5.   Cataract, both eyes.     Not visually significant. Monitor.     6.   ABMD    Continue preservative free artificial tears QID both eyes.     7.   Allergic Conjunctivitis, both eyes.     Previously used Patanol BID both eyes.       Medical History:  Past Medical History:   Diagnosis Date     Actinic keratosis      Allergic rhinitis, cause unspecified      Anemia      anxiety/depression     PAXIL     congenital hearing loss     uses hearing aids     Dry eyes      Giant Platelet syndrome      Glomerular Focal Sclerosis--transplant 1985      Kidney replaced by transplant 1992    RAPAMUNE/  SIROLIMUS      Lichen planopilaris     LPP followed by derm on doxycycline/ clobetasol     Lichen planus      Menorrhagia      migraine      Squamous cell carcinoma     8 x      Thrombocytopenia (H)      Ulcerative colitis, unspecified     biposy neg 1996     UTI (lower urinary tract infection)     recurrent Dr Burns [facility]       Medications:  Current Outpatient Medications   Medication Sig Dispense Refill     atorvastatin (LIPITOR) 10 MG tablet Take 1 tablet (10 mg) by mouth daily 30 tablet 11     beclomethasone (QVAR) 40 MCG/ACT Inhaler Inhale 2 puffs into the lungs 2 times daily 1 Inhaler 1     clobetasol (TEMOVATE) 0.05 % external solution APPLY TWICE DAILY TO ITCHY AND RED AREAS OF SCALP AS NEEDED 50 mL 1     fluticasone furoate (ARNUITY ELLIPTA) 100 MCG/ACT AEPB inhalation powder Inhale 1 puff into the lungs daily 1 each 0     hydroxychloroquine (PLAQUENIL) 200 MG tablet Take 1 tablet (200 mg) by mouth 2 times daily 60 tablet 5     ketoconazole (NIZORAL) 2 % shampoo Apply topically daily as needed for itching or irritation 120 mL 11     lisinopril (PRINIVIL/ZESTRIL) 5 MG tablet Take 1 tablet (5 mg) by mouth daily 30 tablet 11     PARoxetine (PAXIL) 20 MG tablet Take 1 tablet every morning. 90 tablet 1     predniSONE (DELTASONE) 5 MG tablet Take 1 tablet (5 mg) by mouth every other day 15 tablet 6     sirolimus (GENERIC EQUIVALENT) 1 MG tablet Take 3 tablets (3 mg) by mouth daily 90 tablet 11     sulfamethoxazole-trimethoprim (BACTRIM/SEPTRA) 400-80 MG per tablet Take 1 tablet by mouth daily 90 tablet 3     triamcinolone (KENALOG) 0.1 % cream APPLY SPARINGLY EXTERNALLY TO THE AFFECTED AREA THREE TIMES DAILY FOR 14 DAYS 30 g 0   Complete documentation of historical and exam elements from today's encounter can be found in the full encounter summary report (not reduplicated in this progress note). I personally obtained the chief complaint(s) and history of present illness.  I confirmed and edited as necessary the  review of systems, past medical/surgical history, family history, social history, and examination findings as documented by others; and I examined the patient myself. I personally reviewed the relevant tests, images, and reports as documented above. I formulated and edited as necessary the assessment and plan and discussed the findings and management plan with the patient and family. - Mily Vale OD

## 2019-05-22 NOTE — NURSING NOTE
Patient presents for consultation for Encounter for long-term (current) use of high-risk medication with OVF10-2 and MAC OCT. The current vision is- RE no change, not any better- wants to assure the vision will return and see if its anything more than heme. 4/10 pain, discomfort with headache and FBS. Persistent redness- always itching and tears due to allergies. No f/f. OTC ATS eye drops. Krysta Franks COT 11:03 AM May 22, 2019

## 2019-05-23 ENCOUNTER — TELEPHONE (OUTPATIENT)
Dept: OPHTHALMOLOGY | Facility: CLINIC | Age: 54
End: 2019-05-23

## 2019-05-23 NOTE — TELEPHONE ENCOUNTER
No callback by 1020 5-24-19  Left message may call main scheduling line [phone] option 3 to schedule f/u with  in couple weeks   David Baez RN 10:24 AM 05/24/19        Left message with direct number at 1400  David Baez RN 2:00 PM 05/23/19        2 week follow up subconjunctival hemorrhage    Above message from Dr. Vale received today 5-23-19  Will contact pt to schedule  David Baez RN 1:34 PM 05/23/19       PT refused Zofran and morphine. PT states Zofran does not work and she wants phenergan. DR Kimble notified.      Fernando Xavier, RN  01/10/19 0729

## 2019-05-24 ENCOUNTER — TELEPHONE (OUTPATIENT)
Dept: OPHTHALMOLOGY | Facility: CLINIC | Age: 54
End: 2019-05-24

## 2019-05-24 NOTE — TELEPHONE ENCOUNTER
----- Message from Mily Vale OD sent at 5/22/2019 12:57 PM CDT -----  2 week follow up for subconjunctival hemorrhage

## 2019-05-28 ENCOUNTER — OFFICE VISIT (OUTPATIENT)
Dept: DERMATOLOGY | Facility: CLINIC | Age: 54
End: 2019-05-28
Payer: COMMERCIAL

## 2019-05-28 DIAGNOSIS — Z85.828 HISTORY OF SQUAMOUS CELL CARCINOMA OF SKIN: ICD-10-CM

## 2019-05-28 DIAGNOSIS — D22.9 MULTIPLE BENIGN MELANOCYTIC NEVI: ICD-10-CM

## 2019-05-28 DIAGNOSIS — D48.5 NEOPLASM OF UNCERTAIN BEHAVIOR OF SKIN: Primary | ICD-10-CM

## 2019-05-28 DIAGNOSIS — Z94.0 KIDNEY TRANSPLANT RECIPIENT: ICD-10-CM

## 2019-05-28 ASSESSMENT — PAIN SCALES - GENERAL
PAINLEVEL: NO PAIN (0)
PAINLEVEL: NO PAIN (0)

## 2019-05-28 NOTE — NURSING NOTE
Dermatology Rooming Note    Reina Quan's goals for this visit include:   Chief Complaint   Patient presents with     Skin Check     Kathy is here today for a skin check. She is concerned about a spot below her knee and a on her hands.     Bela Diaz CMA

## 2019-05-28 NOTE — PROGRESS NOTES
[facility] Dermatology Note      Dermatology Problem List:  Last TBSE 5/25/2019  1. History of kidney transplant over 20 years ago, on chronic immunosuppression  2. History of multiple SCC's   3. Seven pending biopsies performed 5/28/19, see below    Encounter Date: May 28, 2019    CC:   Chief Complaint   Patient presents with     Skin Check     Kathy is here today for a skin check. She is concerned about a spot below her knee and a on her hands.       History of Present Illness:  Ms. Reina Quan is a 54 year old female who with a history of skin cancer presents for full body skin examination. Her last full body skin examination was 1/11/2018. Today, she notices a few tender pink spots on her body that she is particularly concerned about. She particularly notices these lesions on her right shin and dorsal hands. She denies any lesions that are bleeding or itching. She uses sunscreen regularly on a daily basis. She is otherwise feeling well and in her usual state of health. Of note, she recently graduated from [employer].     Past Medical History:   Patient Active Problem List   Diagnosis     Ulcerative colitis (H)     Migraine     Allergic rhinitis     Hearing loss     Kidney replaced by transplant     Lichen planus     Giant Platelet syndrome     Nephrotic syndrome in diseases classified elsewhere     Major depression in partial remission (H)     CARDIOVASCULAR SCREENING; LDL GOAL LESS THAN 100     Health Care Home     Actinic keratosis     History of skin cancer     History of immunosuppression therapy     CKD (chronic kidney disease) stage 3, GFR 30-59 ml/min (H)     High risk medications (not anticoagulants) long-term use     Seborrheic dermatitis     Dermatitis     Vitiligo     History of SCC (squamous cell carcinoma) of skin     Lichen planopilaris     Hyperlipidemia with target LDL less than 130     Status post kidney transplant     Enterococcus UTI     AK (actinic  keratosis)     Neoplasm of uncertain behavior of skin     History of nonmelanoma skin cancer     HTN (hypertension)     Past Medical History:   Diagnosis Date     Actinic keratosis      Allergic rhinitis, cause unspecified      Anemia      anxiety/depression     PAXIL     congenital hearing loss     uses hearing aids     Dry eyes      Giant Platelet syndrome      Glomerular Focal Sclerosis--transplant 1985      Kidney replaced by transplant 1992    RAPAMUNE/ SIROLIMUS      Lichen planopilaris     LPP followed by derm on doxycycline/ clobetasol     Lichen planus      Menorrhagia      migraine      Squamous cell carcinoma     8 x      Thrombocytopenia (H)      Ulcerative colitis, unspecified     biposy neg 1996     UTI (lower urinary tract infection)     recurrent Dr Burns [facility]     Past Surgical History:   Procedure Laterality Date     BIOPSY OF SKIN LESION       C NONSPECIFIC PROCEDURE      Renal transplant right side     C NONSPECIFIC PROCEDURE      cholecystectomy     CHOLECYSTECTOMY       DILATION AND CURETTAGE, OPERATIVE HYSTEROSCOPY WITH MORCELLATOR, COMBINED N/A 11/10/2015    Procedure: COMBINED DILATION AND CURETTAGE, OPERATIVE HYSTEROSCOPY WITH MORCELLATOR;  Surgeon: Ghada Melendez MD;  [facility]: [facility]     ESOPHAGOSCOPY, GASTROSCOPY, DUODENOSCOPY (EGD), COMBINED  11/20/2012    Procedure: COMBINED ESOPHAGOSCOPY, GASTROSCOPY, DUODENOSCOPY (EGD), BIOPSY SINGLE OR MULTIPLE;;  Surgeon: Valentin Hahn MD;  Location: [facility]     MOHS MICROGRAPHIC PROCEDURE       TRANSPLANT      kidney       Social History:   reports that she has never smoked. She has never used smokeless tobacco. She reports that she drinks alcohol. She reports that she does not use drugs.    Family History:  Family History   Problem Relation Age of Onset     Diabetes Father      Kidney Disease Father         nephrolithiasis     Hypertension Mother      C.A.D. Paternal Grandfather      Blood Disease Maternal Grandmother      Asthma Sister       Cancer Other         no family hx of skin cancer     Glaucoma No family hx of      Macular Degeneration No family hx of      Melanoma No family hx of      Skin Cancer No family hx of        Medications:  Current Outpatient Medications   Medication Sig Dispense Refill     PARoxetine (PAXIL) 20 MG tablet Take 1 tablet every morning. 90 tablet 1     sirolimus (GENERIC EQUIVALENT) 1 MG tablet Take 3 tablets (3 mg) by mouth daily 90 tablet 11        Allergies   Allergen Reactions     Ampicillin Swelling     Swelling of mouth and tongue.      Seasonal Allergies Other (See Comments)     Itchy eyes and rhinitis.       Review of Systems:  -Const: Denies fevers, chills or changes in weight.   -Constitutional: The patient denies fatigue, fevers, chills, unintended weight loss, and night sweats.  -HEENT: Patient denies nonhealing oral sores.  -Skin: As above in HPI. No additional skin concerns.    Physical Exam:  Vitals: LMP 05/14/2014 (Approximate)   GEN: This is a well developed, well-nourished female in no acute distress, in a pleasant mood.    SKIN: Full skin examination including the head/face, neck, both arms, chest, back, abdomen, both legs, digits and/or nails, buttocks.   -3mm pink papule with telangiectasias on left nasal ala  -5mm brown macule with darker superior half on left dorsal forearm  -2mm tender pink papule on right dorsal hand  -2mm pink papule with keratin core on left dorsal hand  -3mm pink papule with keratin core on right anterior thigh  -1.5cm tender pink scaling papule on right shin, superior adjacent to previous SCC scar  -2mm pink papule on right shin, inferior adjacent to previous SCC scar  -Multiple regular brown pigmented macules and papules are identified on the face, trunk and extremities.   -Tan macules mainly concentrated in sun exposed areas  -No other lesions of concern on areas examined.                         Impression/Plan:  1. Neoplasms of Uncertain Behavior x 7:   -NUB1: 3mm pink  papule with telangiectasias on left nasal ala  -NUB 2: 5mm brown macule with darker superior half on left dorsal forearm  -NUB 3: 2mm tender pink papule on right dorsal hand  -NUB 4: 2mm pink papule with keratin core on left dorsal hand  -NUB 5: 3mm pink papule with keratin core on right anterior thigh  -NUB 6: 1.5cm tender pink scaling papule on right shin, superior adjacent to previous SCC scar  -NUB 7: 2mm pink papule on right shin, inferior adjacent to previous SCC scar    Shave biopsy:  After discussion of benefits and risks including but not limited to bleeding/bruising, pain/swelling, infection, scar, incomplete removal, nerve damage/numbness, recurrence, and non-diagnostic biopsy, written consent, verbal consent and photographs were obtained. Time-out was performed. The areas were cleaned with isopropyl alcohol. 6ml of 1% lidocaine with 1:100,000 epinephrine was injected to obtain adequate anesthesia. A shave biopsy was performed. Hemostasis was achieved with aluminium chloride. Vaseline and a sterile dressing were applied. The patient tolerated the procedure and no complications were noted. The patient was provided with verbal and written post care instructions.    2. History of kidney transplant over 20 years ago, on long-term immunosuppression    Again reviewed the increased risk of skin cancer with immunosuppression, particularly a 65 fold increase in the risk of developing squamous cell carcinoma.    Encouraged regular skin checks and reviewed the importance of sun protection, particularly sun avoidance.    3. Multiple clinically benign nevi and solar lentigines on the trunk and extremities    Recommended use of a broad spectrum sunscreen of at least SPF 30 on all sun exposed sites daily.  Apply 20 minutes prior to exposure and repeat application every two hours or after sweating or swimming.  Avoid any intentional indoor or outdoor tanning.      Continue regular skin checks; encouraged visits every 3  to 6 months.    Follow-up in 3 months, earlier for new or changing lesions.       Dr. Bauman staffed the patient.    Staff Involved:  Resident(Alla Perez)/Staff(as above)    I have seen and examined this patient and agree with the assessment and plan as documented in the resident's note, and was present for all procedures.    Brad Bauman MD  Dermatology Attending

## 2019-05-28 NOTE — LETTER
5/28/2019       RE: Reina Quan  [address] [address]     Dear Colleague,    Thank you for referring your patient, Reina Quan, to the [facility] DERMATOLOGY at [facility]. Please see a copy of my visit note below.    [facility] Dermatology Note      Dermatology Problem List:  Last TBSE 5/25/2019  1. History of kidney transplant over 20 years ago, on chronic immunosuppression  2. History of multiple SCC's   3. Seven pending biopsies performed 5/28/19, see below    Encounter Date: May 28, 2019    CC:   Chief Complaint   Patient presents with     Skin Check     Kathy is here today for a skin check. She is concerned about a spot below her knee and a on her hands.       History of Present Illness:  Ms. Reina Quan is a 54 year old female who with a history of skin cancer presents for full body skin examination. Her last full body skin examination was 1/11/2018. Today, she notices a few tender pink spots on her body that she is particularly concerned about. She particularly notices these lesions on her right shin and dorsal hands. She denies any lesions that are bleeding or itching. She uses sunscreen regularly on a daily basis. She is otherwise feeling well and in her usual state of health. Of note, she recently graduated from [employer].     Past Medical History:   Patient Active Problem List   Diagnosis     Ulcerative colitis (H)     Migraine     Allergic rhinitis     Hearing loss     Kidney replaced by transplant     Lichen planus     Giant Platelet syndrome     Nephrotic syndrome in diseases classified elsewhere     Major depression in partial remission (H)     CARDIOVASCULAR SCREENING; LDL GOAL LESS THAN 100     Health Care Home     Actinic keratosis     History of skin cancer     History of immunosuppression therapy     CKD (chronic kidney disease) stage 3, GFR 30-59 ml/min (H)     High risk medications (not  anticoagulants) long-term use     Seborrheic dermatitis     Dermatitis     Vitiligo     History of SCC (squamous cell carcinoma) of skin     Lichen planopilaris     Hyperlipidemia with target LDL less than 130     Status post kidney transplant     Enterococcus UTI     AK (actinic keratosis)     Neoplasm of uncertain behavior of skin     History of nonmelanoma skin cancer     HTN (hypertension)     Past Medical History:   Diagnosis Date     Actinic keratosis      Allergic rhinitis, cause unspecified      Anemia      anxiety/depression     PAXIL     congenital hearing loss     uses hearing aids     Dry eyes      Giant Platelet syndrome      Glomerular Focal Sclerosis--transplant 1985      Kidney replaced by transplant 1992    RAPAMUNE/ SIROLIMUS      Lichen planopilaris     LPP followed by derm on doxycycline/ clobetasol     Lichen planus      Menorrhagia      migraine      Squamous cell carcinoma     8 x      Thrombocytopenia (H)      Ulcerative colitis, unspecified     biposy neg 1996     UTI (lower urinary tract infection)     recurrent Dr Burns [facility]     Past Surgical History:   Procedure Laterality Date     BIOPSY OF SKIN LESION       C NONSPECIFIC PROCEDURE      Renal transplant right side     C NONSPECIFIC PROCEDURE      cholecystectomy     CHOLECYSTECTOMY       DILATION AND CURETTAGE, OPERATIVE HYSTEROSCOPY WITH MORCELLATOR, COMBINED N/A 11/10/2015    Procedure: COMBINED DILATION AND CURETTAGE, OPERATIVE HYSTEROSCOPY WITH MORCELLATOR;  Surgeon: Ghada Melendez MD;  [facility]: [facility]     ESOPHAGOSCOPY, GASTROSCOPY, DUODENOSCOPY (EGD), COMBINED  11/20/2012    Procedure: COMBINED ESOPHAGOSCOPY, GASTROSCOPY, DUODENOSCOPY (EGD), BIOPSY SINGLE OR MULTIPLE;;  Surgeon: Valentin Hahn MD;  Location:  GI     MOHS MICROGRAPHIC PROCEDURE       TRANSPLANT      kidney       Social History:   reports that she has never smoked. She has never used smokeless tobacco. She reports that she drinks alcohol. She reports that  she does not use drugs.    Family History:  Family History   Problem Relation Age of Onset     Diabetes Father      Kidney Disease Father         nephrolithiasis     Hypertension Mother      C.A.D. Paternal Grandfather      Blood Disease Maternal Grandmother      Asthma Sister      Cancer Other         no family hx of skin cancer     Glaucoma No family hx of      Macular Degeneration No family hx of      Melanoma No family hx of      Skin Cancer No family hx of        Medications:  Current Outpatient Medications   Medication Sig Dispense Refill     PARoxetine (PAXIL) 20 MG tablet Take 1 tablet every morning. 90 tablet 1     sirolimus (GENERIC EQUIVALENT) 1 MG tablet Take 3 tablets (3 mg) by mouth daily 90 tablet 11        Allergies   Allergen Reactions     Ampicillin Swelling     Swelling of mouth and tongue.      Seasonal Allergies Other (See Comments)     Itchy eyes and rhinitis.       Review of Systems:  -Const: Denies fevers, chills or changes in weight.   -Constitutional: The patient denies fatigue, fevers, chills, unintended weight loss, and night sweats.  -HEENT: Patient denies nonhealing oral sores.  -Skin: As above in HPI. No additional skin concerns.    Physical Exam:  Vitals: LMP 05/14/2014 (Approximate)   GEN: This is a well developed, well-nourished female in no acute distress, in a pleasant mood.    SKIN: Full skin examination including the head/face, neck, both arms, chest, back, abdomen, both legs, digits and/or nails, buttocks.   -3mm pink papule with telangiectasias on left nasal ala  -5mm brown macule with darker superior half on left dorsal forearm  -2mm tender pink papule on right dorsal hand  -2mm pink papule with keratin core on left dorsal hand  -3mm pink papule with keratin core on right anterior thigh  -1.5cm tender pink scaling papule on right shin, superior adjacent to previous SCC scar  -2mm pink papule on right shin, inferior adjacent to previous SCC scar  -Multiple regular brown  pigmented macules and papules are identified on the face, trunk and extremities.   -Tan macules mainly concentrated in sun exposed areas  -No other lesions of concern on areas examined.                         Impression/Plan:  1. Neoplasms of Uncertain Behavior x 7:   -NUB1: 3mm pink papule with telangiectasias on left nasal ala  -NUB 2: 5mm brown macule with darker superior half on left dorsal forearm  -NUB 3: 2mm tender pink papule on right dorsal hand  -NUB 4: 2mm pink papule with keratin core on left dorsal hand  -NUB 5: 3mm pink papule with keratin core on right anterior thigh  -NUB 6: 1.5cm tender pink scaling papule on right shin, superior adjacent to previous SCC scar  -NUB 7: 2mm pink papule on right shin, inferior adjacent to previous SCC scar    Shave biopsy:  After discussion of benefits and risks including but not limited to bleeding/bruising, pain/swelling, infection, scar, incomplete removal, nerve damage/numbness, recurrence, and non-diagnostic biopsy, written consent, verbal consent and photographs were obtained. Time-out was performed. The areas were cleaned with isopropyl alcohol. 6ml of 1% lidocaine with 1:100,000 epinephrine was injected to obtain adequate anesthesia. A shave biopsy was performed. Hemostasis was achieved with aluminium chloride. Vaseline and a sterile dressing were applied. The patient tolerated the procedure and no complications were noted. The patient was provided with verbal and written post care instructions.    2. History of kidney transplant over 20 years ago, on long-term immunosuppression    Again reviewed the increased risk of skin cancer with immunosuppression, particularly a 65 fold increase in the risk of developing squamous cell carcinoma.    Encouraged regular skin checks and reviewed the importance of sun protection, particularly sun avoidance.    3. Multiple clinically benign nevi and solar lentigines on the trunk and extremities    Recommended use of a broad  spectrum sunscreen of at least SPF 30 on all sun exposed sites daily.  Apply 20 minutes prior to exposure and repeat application every two hours or after sweating or swimming.  Avoid any intentional indoor or outdoor tanning.      Continue regular skin checks; encouraged visits every 3 to 6 months.    Follow-up in 3 months, earlier for new or changing lesions.       Dr. Bauman staffed the patient.    Staff Involved:  Resident(Alla Perez)/Staff(as above)    I have seen and examined this patient and agree with the assessment and plan as documented in the resident's note, and was present for all procedures.    Brad Bauman MD  Dermatology Attending

## 2019-05-28 NOTE — PATIENT INSTRUCTIONS

## 2019-05-28 NOTE — NURSING NOTE
Lidocaine-epinephrine 1-1:363112 % injection   7mL once for one use, starting 5/28/2019 ending 5/28/2019,  2mL disp, R-0, injection  Injected by Dr. Perez

## 2019-05-30 LAB — COPATH REPORT: NORMAL

## 2019-06-04 ENCOUNTER — OFFICE VISIT (OUTPATIENT)
Dept: FAMILY MEDICINE | Facility: CLINIC | Age: 54
End: 2019-06-04
Payer: COMMERCIAL

## 2019-06-04 VITALS
DIASTOLIC BLOOD PRESSURE: 88 MMHG | HEIGHT: 63 IN | HEART RATE: 78 BPM | TEMPERATURE: 97.8 F | SYSTOLIC BLOOD PRESSURE: 114 MMHG | RESPIRATION RATE: 16 BRPM | WEIGHT: 135.6 LBS | BODY MASS INDEX: 24.03 KG/M2 | OXYGEN SATURATION: 100 %

## 2019-06-04 DIAGNOSIS — F32.4 MAJOR DEPRESSIVE DISORDER WITH SINGLE EPISODE, IN PARTIAL REMISSION (H): Primary | ICD-10-CM

## 2019-06-04 DIAGNOSIS — Z12.39 SCREENING FOR BREAST CANCER: ICD-10-CM

## 2019-06-04 DIAGNOSIS — Z12.11 SPECIAL SCREENING FOR MALIGNANT NEOPLASMS, COLON: ICD-10-CM

## 2019-06-04 PROCEDURE — 99213 OFFICE O/P EST LOW 20 MIN: CPT | Performed by: PHYSICIAN ASSISTANT

## 2019-06-04 RX ORDER — PAROXETINE 20 MG/1
TABLET, FILM COATED ORAL
Qty: 90 TABLET | Refills: 1 | Status: SHIPPED | OUTPATIENT
Start: 2019-06-04 | End: 2020-03-04

## 2019-06-04 ASSESSMENT — ANXIETY QUESTIONNAIRES
1. FEELING NERVOUS, ANXIOUS, OR ON EDGE: SEVERAL DAYS
2. NOT BEING ABLE TO STOP OR CONTROL WORRYING: SEVERAL DAYS
6. BECOMING EASILY ANNOYED OR IRRITABLE: SEVERAL DAYS
GAD7 TOTAL SCORE: 5
IF YOU CHECKED OFF ANY PROBLEMS ON THIS QUESTIONNAIRE, HOW DIFFICULT HAVE THESE PROBLEMS MADE IT FOR YOU TO DO YOUR WORK, TAKE CARE OF THINGS AT HOME, OR GET ALONG WITH OTHER PEOPLE: SOMEWHAT DIFFICULT
3. WORRYING TOO MUCH ABOUT DIFFERENT THINGS: SEVERAL DAYS
5. BEING SO RESTLESS THAT IT IS HARD TO SIT STILL: NOT AT ALL
7. FEELING AFRAID AS IF SOMETHING AWFUL MIGHT HAPPEN: NOT AT ALL

## 2019-06-04 ASSESSMENT — PATIENT HEALTH QUESTIONNAIRE - PHQ9
5. POOR APPETITE OR OVEREATING: SEVERAL DAYS
SUM OF ALL RESPONSES TO PHQ QUESTIONS 1-9: 8

## 2019-06-04 ASSESSMENT — MIFFLIN-ST. JEOR: SCORE: 1176.27

## 2019-06-04 NOTE — NURSING NOTE
HM: Was told she no longer needed paps when she last had her pap during surgery in 2015  Unsure about Shingrix vaccine, she is a transplant patient. Ask PCP.  Will wait on mammogram, declines to order or schedule today.  Will schedule colonoscopy for later this summer, order placed today.    Saurabh Brizuela CMA (AAMA)

## 2019-06-04 NOTE — PROGRESS NOTES
Subjective     Reina Quan is a 54 year old female who presents to clinic today for the following health issues:    HPI   Medication Followup of Paxil    Taking Medication as prescribed: NO-has been without insurance, ran out about 4 days ago, was able to take some of her daughter's paxil (same dose)    Side Effects:  None    Medication Helping Symptoms:  yes     Patient notes in regards to mood things are stable  Still notes some feelings of not feeling good enough for her family but overall doing well  No SI/HI  No side effects from medication  Did run out of insurance for awhile, now has insurance  Unsure about Shingrix vaccine, she is a transplant patient  Will schedule colonoscopy for later this summer    Patient Active Problem List   Diagnosis     Ulcerative colitis (H)     Migraine     Allergic rhinitis     Hearing loss     Kidney replaced by transplant     Lichen planus     Giant Platelet syndrome     Nephrotic syndrome in diseases classified elsewhere     Major depression in partial remission (H)     CARDIOVASCULAR SCREENING; LDL GOAL LESS THAN 100     Health Care Home     Actinic keratosis     History of skin cancer     History of immunosuppression therapy     CKD (chronic kidney disease) stage 3, GFR 30-59 ml/min (H)     High risk medications (not anticoagulants) long-term use     Seborrheic dermatitis     Dermatitis     Vitiligo     History of SCC (squamous cell carcinoma) of skin     Lichen planopilaris     Hyperlipidemia with target LDL less than 130     Status post kidney transplant     Enterococcus UTI     AK (actinic keratosis)     Neoplasm of uncertain behavior of skin     History of nonmelanoma skin cancer     HTN (hypertension)     Past Surgical History:   Procedure Laterality Date     BIOPSY OF SKIN LESION       C NONSPECIFIC PROCEDURE      Renal transplant right side     C NONSPECIFIC PROCEDURE      cholecystectomy     CHOLECYSTECTOMY       DILATION AND CURETTAGE, OPERATIVE HYSTEROSCOPY  "WITH MORCELLATOR, COMBINED N/A 11/10/2015    Procedure: COMBINED DILATION AND CURETTAGE, OPERATIVE HYSTEROSCOPY WITH MORCELLATOR;  Surgeon: Ghada Melendez MD;  [facility]: [facility]     ESOPHAGOSCOPY, GASTROSCOPY, DUODENOSCOPY (EGD), COMBINED  11/20/2012    Procedure: COMBINED ESOPHAGOSCOPY, GASTROSCOPY, DUODENOSCOPY (EGD), BIOPSY SINGLE OR MULTIPLE;;  Surgeon: Valentin Hahn MD;  Location: [facility]     MOHS MICROGRAPHIC PROCEDURE       TRANSPLANT      kidney       Social History     Tobacco Use     Smoking status: Never Smoker     Smokeless tobacco: Never Used   Substance Use Topics     Alcohol use: Yes     Alcohol/week: 0.0 oz     Comment: 2x /week     Family History   Problem Relation Age of Onset     Diabetes Father      Kidney Disease Father         nephrolithiasis     Hypertension Mother      C.A.D. Paternal Grandfather      Blood Disease Maternal Grandmother      Asthma Sister      Cancer Other         no family hx of skin cancer     Glaucoma No family hx of      Macular Degeneration No family hx of      Melanoma No family hx of      Skin Cancer No family hx of          Current Outpatient Medications   Medication Sig Dispense Refill     PARoxetine (PAXIL) 20 MG tablet Take 1 tablet every morning. 90 tablet 1     sirolimus (GENERIC EQUIVALENT) 1 MG tablet Take 3 tablets (3 mg) by mouth daily 90 tablet 11     Allergies   Allergen Reactions     Ampicillin Swelling     Swelling of mouth and tongue.      Seasonal Allergies Other (See Comments)     Itchy eyes and rhinitis.       Reviewed and updated as needed this visit by Provider         Review of Systems   ROS COMP: Constitutional, HEENT, cardiovascular, pulmonary, gi and gu systems are negative, except as otherwise noted.      Objective    /88 (BP Location: Right arm, Patient Position: Chair, Cuff Size: Adult Regular)   Pulse 78   Temp 97.8  F (36.6  C) (Oral)   Resp 16   Ht 1.588 m (5' 2.5\")   Wt 61.5 kg (135 lb 9.6 oz)   LMP 05/14/2014 (Approximate)  "  SpO2 100%   Breastfeeding? No   BMI 24.41 kg/m    Body mass index is 24.41 kg/m .  Physical Exam   GENERAL: healthy, alert and no distress  NECK: no adenopathy, no asymmetry, masses, or scars and thyroid normal to palpation  RESP: lungs clear to auscultation - no rales, rhonchi or wheezes  CV: regular rate and rhythm, normal S1 S2, no S3 or S4, no murmur, click or rub, no peripheral edema and peripheral pulses strong  MS: no gross musculoskeletal defects noted, no edema  PSYCH: mentation appears normal, affect normal/bright    Diagnostic Test Results:  none         Assessment & Plan     1. Major depressive disorder with single episode, in partial remission (H)  Chronic issue, stable, PHQ9/GAD7 updated.  Meds refilled.  - PARoxetine (PAXIL) 20 MG tablet; Take 1 tablet every morning.  Dispense: 90 tablet; Refill: 1    2. Special screening for malignant neoplasms, colon  - GASTROENTEROLOGY ADULT REF PROCEDURE ONLY Tanja Clarke [phone]; [facility]    3. Screening for breast cancer  - *MA Screening Digital Bilateral; Future       Risks, benefits and alternatives were discussed with patient. Agreeable to the plan of care.      Return in about 10 days (around 6/14/2019) for Physical Exam.    Velvet Smith PA-C  [facility]

## 2019-06-05 ASSESSMENT — ANXIETY QUESTIONNAIRES: GAD7 TOTAL SCORE: 5

## 2019-06-06 ENCOUNTER — OFFICE VISIT (OUTPATIENT)
Dept: DERMATOLOGY | Facility: CLINIC | Age: 54
End: 2019-06-06
Payer: COMMERCIAL

## 2019-06-06 VITALS — DIASTOLIC BLOOD PRESSURE: 84 MMHG | SYSTOLIC BLOOD PRESSURE: 128 MMHG | HEART RATE: 86 BPM

## 2019-06-06 DIAGNOSIS — C44.722 SQUAMOUS CELL CARCINOMA OF RIGHT LOWER LEG: Primary | ICD-10-CM

## 2019-06-06 ASSESSMENT — PAIN SCALES - GENERAL
PAINLEVEL: NO PAIN (0)
PAINLEVEL: NO PAIN (0)

## 2019-06-06 NOTE — LETTER
6/6/2019       RE: Reina Quan  [address] [facility] [address]     Dear Colleague,    Thank you for referring your patient, Reina Quan, to the [facility] DERMATOLOGIC SURGERY at [facility]. Please see a copy of my visit note below.    [facility] Mohs Dermatologic Surgery Procedure Note    Dermatology Surgery Clinic  [facility]  Clinics and Surgery Center  [address] [place] [id number]    Date of Service:  Jun 6, 2019  Surgery: Mohs micrographic surgery    Surgeon: Devika    Case 1  Repair Type: Puracol  Repair Size: 1.9 x 1.7 cm  Suture Material: Fast Absorbing Gut 5-0  Tumor Type: SCC - Squamous cell carcinoma  Location: R shin, superior  Derm-Path Accession #: [id number]  PreOp Size: 1.5 x 1.3 cm  PostOp Size: 1.9 x 1.7 cm  Mohs Accession #:  IM  Level of Defect: fat    Procedure:  We discussed the principles of treatment and most likely complications including scarring, bleeding, infection, swelling, pain, crusting, nerve damage, large wound,  incomplete excision, wound dehiscence,  nerve damage, recurrence, and a second procedure may be recommended to obtain the best cosmetic or functional result.    Informed consent was obtained and the patient underwent the procedure as follows:  The patient was placed supine on the operating table.  The cancer was identified, outlined with a marker, and verified by the patient.  The entire surgical field was prepped with Hibiclens.  The surgical site was anesthetized using Lidocaine 1%.    The area of clinically apparent tumor was not debulked. The layer of tissue was then surgically excised using a #15 blade and was then transferred onto a specimen sheet maintaining the orientation of the specimen. Hemostasis was obtained using heat cautery. The wound site was then covered with a dressing while the tissue samples were processed for examination.    The excised tissue was  transported to the Mohs histology laboratory maintaining the tissue orientation.  The tissue specimen was relaxed so that the entire surgical margin was in a a single horizontal plane for sectioning and inked for precise mapping.  A precise reference map was drawn to reflect the sectioning of the specimen, colored inking of the margins, and orientation on the patient. The tissue was processed using horizontal sectioning of the base and continuous peripheral margins.  The histopathologic sections were reviewed in conjunction with the reference map.    Total blocks: 1    Total slides:  2    There were no cancer cells visualized on examination, therefore Mohs surgery was complete.      Reconstruction: Puracol Bolster  Because of the large size of the defect and in order to facilitate healing by granulation a Puracol bolster was planned.  After Betasept prep and local anesthesia, the patient was draped.  1 folded sheet of material was used and trimmed to fit the defect.  It was placed into the defect and secured/sutured into place circumferentially using 5-0 fast absorbing gut sutures.   Estimated blood loss, minimal; complications, none; bandaging and wound care, routine. Patient was given written and verbal wound care procedures prior to discharge. Patient was discharged in good condition and will return in 2-3 weeks.                Photo placed into patients chart note for further reference.         Staff Involved:    Scribe Disclosure  I, Luciano Bey, am serving as a scribe to document services personally performed by Dr. Garfield Fortune, based on data collection and the provider's statements to me.     Attending attestation:  I personally performed the entire procedure.  I have reviewed the note and edited it as necessary, and agree with its contents.    Garfield Fortune M.D.  Professor  Director of Dermatologic Surgery  Department of Dermatology  [facility]    Dermatology Surgery [facility]  [facility]  [address] [place] [address]

## 2019-06-06 NOTE — PATIENT INSTRUCTIONS
Wound Care Instructions  I will experience scar, altered skin color, bleeding, swelling, pain, crusting and redness. I may experience altered sensation. Risks are excessive bleeding, infection, muscle weakness, thick (hypertrophic or keloidal) scar, and recurrence,. A second procedure may be recommended to obtain the best cosmetic or functional result.  Possible complications of any surgical procedure are bleeding, infection, scarring, alteration in skin color and sensation, muscle weakness in the area, wound dehiscence or seperation, or recurrence of the lesion or disease. On occasion, after healing, a secondary procedure or revision may be recommended in order to obtain the best cosmetic or functional result.   After your surgery, a pressure bandage will be placed over the area that has sutures. This will help prevent bleeding.   For the First 48 hours After Surgery:  1. Leave the pressure bandage on and keep it dry. If it should come loose, you may retape it, but do not take it off.  2. Relax and take it easy. Do not do any vigorous exercise, heavy lifting, or bending forward. This could cause the wound to bleed.  3. Post-operative pain is usually mild. You may take plain or extra strength Tylenol every 4 hours as needed (do not take more than 4,000mg in one day). Do not take any medicine that contains aspirin, ibuprofen or motrin unless you have been recommended these by a doctor.  Avoid alcohol and vitamin E as these may increase your tendency to bleed.  4. You may put an ice pack around the bandaged area for 20 minutes every 2-3 hours. This may help reduce swelling, bruising, and pain. Make sure the ice pack is waterproof so that the pressure bandage does not get wet.   5. You may see a small amount of drainage or blood on your pressure bandage. This is normal. However, if drainage or bleeding continues or saturates the bandage, you will need to apply firm pressure over the bandage with a washcloth for 15  minutes. If bleeding continues after applying pressure for 15 minutes then go to the nearest emergency room.  48 Hours After Surgery  Carefully remove the bandage and start daily wound care and dressing changes. You may also now shower and get the wound wet. Wash wound with a mild soap and water.  Use caution when washing the wound. Be gentle and do not let the forceful shower stream hit the wound directly.  PAT dry.  Daily Wound Care:  1. Wash wound with a mild soap and water.  Use caution when washing the wound, be gentle and do not let the forceful shower stream hit the wound directly.  2. PAT DRY.  3. Apply Vaseline (from a new container or tube) over the suture line with a Q-tip. It is very important to keep the wound continuously moist, as wounds heal best in a moist environment.  4.  Keep the site covered until sutures are removed, you can cover it with a Telfa (non-stick) dressing and tape or a band-aid.    5. If you are unable to keep wound covered, you must apply Vaseline every 2 - 3 hours (while awake) to ensure it is being kept moist for optimal healing. A dressing overnight is recommended to keep the area moist.   Call Us If:  1. You have pain that is not controlled with Tylenol.  2. You have signs or symptoms of an infection, such as: fever over 100 degrees F, redness, warmth, or foul-smelling or yellow/creamy drainage from the wound.  Who should I call with questions?    [facility]: [phone]     [facility]: [phone]    For urgent needs outside of business hours call the [facility] at [phone] and ask for the dermatology resident on call

## 2019-06-06 NOTE — PROGRESS NOTES
[facility] Mohs Dermatologic Surgery Procedure Note    Dermatology Surgery Clinic  [facility]  Clinics and Surgery Center  [address] [place] [address]    Date of Service:  Jun 6, 2019  Surgery: Mohs micrographic surgery    Surgeon: Devika    Case 1  Repair Type: Puracol  Repair Size: 1.9 x 1.7 cm  Suture Material: Fast Absorbing Gut 5-0  Tumor Type: SCC - Squamous cell carcinoma  Location: R shin, superior  Derm-Path Accession #: [id number]  PreOp Size: 1.5 x 1.3 cm  PostOp Size: 1.9 x 1.7 cm  Mohs Accession #:  IM  Level of Defect: fat    Procedure:  We discussed the principles of treatment and most likely complications including scarring, bleeding, infection, swelling, pain, crusting, nerve damage, large wound,  incomplete excision, wound dehiscence,  nerve damage, recurrence, and a second procedure may be recommended to obtain the best cosmetic or functional result.    Informed consent was obtained and the patient underwent the procedure as follows:  The patient was placed supine on the operating table.  The cancer was identified, outlined with a marker, and verified by the patient.  The entire surgical field was prepped with Hibiclens.  The surgical site was anesthetized using Lidocaine 1%.    The area of clinically apparent tumor was not debulked. The layer of tissue was then surgically excised using a #15 blade and was then transferred onto a specimen sheet maintaining the orientation of the specimen. Hemostasis was obtained using heat cautery. The wound site was then covered with a dressing while the tissue samples were processed for examination.    The excised tissue was transported to the Mohs histology laboratory maintaining the tissue orientation.  The tissue specimen was relaxed so that the entire surgical margin was in a a single horizontal plane for sectioning and inked for precise mapping.  A precise reference map was drawn to reflect the  sectioning of the specimen, colored inking of the margins, and orientation on the patient. The tissue was processed using horizontal sectioning of the base and continuous peripheral margins.  The histopathologic sections were reviewed in conjunction with the reference map.    Total blocks: 1    Total slides:  2    There were no cancer cells visualized on examination, therefore Mohs surgery was complete.      Reconstruction: Puracol Bolster  Because of the large size of the defect and in order to facilitate healing by granulation a Puracol bolster was planned.  After Betasept prep and local anesthesia, the patient was draped.  1 folded sheet of material was used and trimmed to fit the defect.  It was placed into the defect and secured/sutured into place circumferentially using 5-0 fast absorbing gut sutures.   Estimated blood loss, minimal; complications, none; bandaging and wound care, routine. Patient was given written and verbal wound care procedures prior to discharge. Patient was discharged in good condition and will return in 2-3 weeks.                Photo placed into patients chart note for further reference.         Staff Involved:    Scribe Disclosure  I, Luciano Bey, am serving as a scribe to document services personally performed by Dr. Garfield Fortune, based on data collection and the provider's statements to me.     Attending attestation:  I personally performed the entire procedure.  I have reviewed the note and edited it as necessary, and agree with its contents.    Garfield Fortune M.D.  Professor  Director of Dermatologic Surgery  Department of Dermatology  [facility]    Dermatology Surgery Clinic  [facility] and Surgery [place] [phone]

## 2019-06-06 NOTE — NURSING NOTE
Chief Complaint   Patient presents with     Derm Problem     R yousif [facility]     Talya Snowden, EMT

## 2019-06-14 ENCOUNTER — OFFICE VISIT (OUTPATIENT)
Dept: FAMILY MEDICINE | Facility: CLINIC | Age: 54
End: 2019-06-14
Payer: COMMERCIAL

## 2019-06-14 VITALS
HEART RATE: 78 BPM | WEIGHT: 134.8 LBS | DIASTOLIC BLOOD PRESSURE: 73 MMHG | OXYGEN SATURATION: 98 % | RESPIRATION RATE: 16 BRPM | TEMPERATURE: 97.9 F | SYSTOLIC BLOOD PRESSURE: 102 MMHG | BODY MASS INDEX: 24.26 KG/M2

## 2019-06-14 DIAGNOSIS — D84.9 IMMUNOSUPPRESSION (H): ICD-10-CM

## 2019-06-14 DIAGNOSIS — Z00.00 ROUTINE GENERAL MEDICAL EXAMINATION AT A HEALTH CARE FACILITY: Primary | ICD-10-CM

## 2019-06-14 DIAGNOSIS — Z12.4 SCREENING FOR MALIGNANT NEOPLASM OF CERVIX: ICD-10-CM

## 2019-06-14 LAB
ALBUMIN SERPL-MCNC: 3.8 G/DL (ref 3.4–5)
ALP SERPL-CCNC: 97 U/L (ref 40–150)
ALT SERPL W P-5'-P-CCNC: 27 U/L (ref 0–50)
ANION GAP SERPL CALCULATED.3IONS-SCNC: 8 MMOL/L (ref 3–14)
AST SERPL W P-5'-P-CCNC: 27 U/L (ref 0–45)
BILIRUB SERPL-MCNC: 0.2 MG/DL (ref 0.2–1.3)
BUN SERPL-MCNC: 32 MG/DL (ref 7–30)
CALCIUM SERPL-MCNC: 9.8 MG/DL (ref 8.5–10.1)
CHLORIDE SERPL-SCNC: 108 MMOL/L (ref 94–109)
CHOLEST SERPL-MCNC: 354 MG/DL
CO2 SERPL-SCNC: 26 MMOL/L (ref 20–32)
CREAT SERPL-MCNC: 1.59 MG/DL (ref 0.52–1.04)
ERYTHROCYTE [DISTWIDTH] IN BLOOD BY AUTOMATED COUNT: 16.6 % (ref 10–15)
GFR SERPL CREATININE-BSD FRML MDRD: 36 ML/MIN/{1.73_M2}
GLUCOSE SERPL-MCNC: 105 MG/DL (ref 70–99)
HCT VFR BLD AUTO: 35.7 % (ref 35–47)
HDLC SERPL-MCNC: 86 MG/DL
HGB BLD-MCNC: 11.1 G/DL (ref 11.7–15.7)
LDLC SERPL CALC-MCNC: 225 MG/DL
MCH RBC QN AUTO: 28 PG (ref 26.5–33)
MCHC RBC AUTO-ENTMCNC: 31.1 G/DL (ref 31.5–36.5)
MCV RBC AUTO: 90 FL (ref 78–100)
NONHDLC SERPL-MCNC: 268 MG/DL
PLATELET # BLD AUTO: 43 10E9/L (ref 150–450)
POTASSIUM SERPL-SCNC: 4.2 MMOL/L (ref 3.4–5.3)
PROT SERPL-MCNC: 7.8 G/DL (ref 6.8–8.8)
RBC # BLD AUTO: 3.96 10E12/L (ref 3.8–5.2)
SODIUM SERPL-SCNC: 142 MMOL/L (ref 133–144)
T4 FREE SERPL-MCNC: 0.65 NG/DL (ref 0.76–1.46)
TRIGL SERPL-MCNC: 215 MG/DL
TSH SERPL DL<=0.005 MIU/L-ACNC: 4.2 MU/L (ref 0.4–4)
WBC # BLD AUTO: 4.1 10E9/L (ref 4–11)

## 2019-06-14 PROCEDURE — 85027 COMPLETE CBC AUTOMATED: CPT | Performed by: PHYSICIAN ASSISTANT

## 2019-06-14 PROCEDURE — 80061 LIPID PANEL: CPT | Performed by: PHYSICIAN ASSISTANT

## 2019-06-14 PROCEDURE — 36415 COLL VENOUS BLD VENIPUNCTURE: CPT | Performed by: PHYSICIAN ASSISTANT

## 2019-06-14 PROCEDURE — 84443 ASSAY THYROID STIM HORMONE: CPT | Performed by: PHYSICIAN ASSISTANT

## 2019-06-14 PROCEDURE — 87624 HPV HI-RISK TYP POOLED RSLT: CPT | Performed by: PHYSICIAN ASSISTANT

## 2019-06-14 PROCEDURE — 99396 PREV VISIT EST AGE 40-64: CPT | Performed by: PHYSICIAN ASSISTANT

## 2019-06-14 PROCEDURE — G0145 SCR C/V CYTO,THINLAYER,RESCR: HCPCS | Performed by: PHYSICIAN ASSISTANT

## 2019-06-14 PROCEDURE — 80053 COMPREHEN METABOLIC PANEL: CPT | Performed by: PHYSICIAN ASSISTANT

## 2019-06-14 PROCEDURE — 84439 ASSAY OF FREE THYROXINE: CPT | Performed by: PHYSICIAN ASSISTANT

## 2019-06-14 ASSESSMENT — ENCOUNTER SYMPTOMS
FREQUENCY: 0
ABDOMINAL PAIN: 0
CHILLS: 0
CONSTIPATION: 0
HEMATURIA: 0
HEARTBURN: 0
WEAKNESS: 0
DIZZINESS: 0
DIARRHEA: 0
EYE PAIN: 0
NERVOUS/ANXIOUS: 0
BREAST MASS: 0
SHORTNESS OF BREATH: 0
PALPITATIONS: 0
JOINT SWELLING: 0
SORE THROAT: 0
PARESTHESIAS: 0
HEMATOCHEZIA: 0
HEADACHES: 0
NAUSEA: 0
MYALGIAS: 0
ARTHRALGIAS: 1
COUGH: 0
DYSURIA: 0
FEVER: 0

## 2019-06-14 NOTE — PROGRESS NOTES
SUBJECTIVE:   CC: Reina Quan is an 54 year old woman who presents for preventive health visit.     Patient is fasting: Yes.    Healthy Habits:     Getting at least 3 servings of Calcium per day:  Yes    Bi-annual eye exam:  Yes    Dental care twice a year:  NO    Sleep apnea or symptoms of sleep apnea:  Daytime drowsiness    Diet:  Low salt    Frequency of exercise:  1 day/week    Duration of exercise:  15-30 minutes    Taking medications regularly:  Yes    Barriers to taking medications:  None    Medication side effects:  None    PHQ-2 Total Score: 2    Additional concerns today:  No      Today's PHQ-2 Score:   PHQ-2 ( 1999 [employer]) 6/14/2019   Q1: Little interest or pleasure in doing things 1   Q2: Feeling down, depressed or hopeless 1   PHQ-2 Score 2   Q1: Little interest or pleasure in doing things Several days   Q2: Feeling down, depressed or hopeless Several days   PHQ-2 Score 2     Abuse: Current or Past(Physical, Sexual or Emotional)- No  Do you feel safe in your environment? Yes    Social History     Tobacco Use     Smoking status: Never Smoker     Smokeless tobacco: Never Used   Substance Use Topics     Alcohol use: Yes     Alcohol/week: 0.0 oz     Comment: 2x /week         Alcohol Use 6/14/2019   Prescreen: >3 drinks/day or >7 drinks/week? Yes   AUDIT SCORE  3       Reviewed orders with patient.  Reviewed health maintenance and updated orders accordingly - Yes  Patient Active Problem List   Diagnosis     Ulcerative colitis (H)     Migraine     Allergic rhinitis     Hearing loss     Kidney replaced by transplant     Lichen planus     Giant Platelet syndrome     Nephrotic syndrome in diseases classified elsewhere     Major depression in partial remission (H)     CARDIOVASCULAR SCREENING; LDL GOAL LESS THAN 100     Health Care Home     Actinic keratosis     History of skin cancer     History of immunosuppression therapy     CKD (chronic kidney disease) stage 3, GFR 30-59 ml/min (H)     High risk  medications (not anticoagulants) long-term use     Seborrheic dermatitis     Dermatitis     Vitiligo     History of SCC (squamous cell carcinoma) of skin     Lichen planopilaris     Hyperlipidemia with target LDL less than 130     Status post kidney transplant     Enterococcus UTI     AK (actinic keratosis)     Neoplasm of uncertain behavior of skin     History of nonmelanoma skin cancer     HTN (hypertension)     Immunosuppression (H)     Past Surgical History:   Procedure Laterality Date     BIOPSY OF SKIN LESION       C NONSPECIFIC PROCEDURE      Renal transplant right side     C NONSPECIFIC PROCEDURE      cholecystectomy     CHOLECYSTECTOMY       DILATION AND CURETTAGE, OPERATIVE HYSTEROSCOPY WITH MORCELLATOR, COMBINED N/A 11/10/2015    Procedure: COMBINED DILATION AND CURETTAGE, OPERATIVE HYSTEROSCOPY WITH MORCELLATOR;  Surgeon: Ghada Melendez MD;  Location: [facility] OR     ESOPHAGOSCOPY, GASTROSCOPY, DUODENOSCOPY (EGD), COMBINED  11/20/2012    Procedure: COMBINED ESOPHAGOSCOPY, GASTROSCOPY, DUODENOSCOPY (EGD), BIOPSY SINGLE OR MULTIPLE;;  Surgeon: Valentin Hahn MD;  Location: [facility] GI     MOHS MICROGRAPHIC PROCEDURE       TRANSPLANT      kidney       Social History     Tobacco Use     Smoking status: Never Smoker     Smokeless tobacco: Never Used   Substance Use Topics     Alcohol use: Yes     Alcohol/week: 0.0 oz     Comment: 2x /week     Family History   Problem Relation Age of Onset     Diabetes Father      Kidney Disease Father         nephrolithiasis     Hypertension Mother      C.A.D. Paternal Grandfather      Blood Disease Maternal Grandmother      Asthma Sister      Cancer Other         no family hx of skin cancer     Glaucoma No family hx of      Macular Degeneration No family hx of      Melanoma No family hx of      Skin Cancer No family hx of          Current Outpatient Medications   Medication Sig Dispense Refill     PARoxetine (PAXIL) 20 MG tablet Take 1 tablet every morning. 90 tablet 1      sirolimus (GENERIC EQUIVALENT) 1 MG tablet Take 3 tablets (3 mg) by mouth daily 90 tablet 11     Allergies   Allergen Reactions     Ampicillin Swelling     Swelling of mouth and tongue.      Seasonal Allergies Other (See Comments)     Itchy eyes and rhinitis.       Mammogram Screening: Patient over age 50, mutual decision to screen reflected in health maintenance.    Pertinent mammograms are reviewed under the imaging tab.  History of abnormal Pap smear: NO - age 30-65 PAP every 5 years with negative HPV co-testing recommended  PAP / HPV Latest Ref Rng & Units 8/11/2015 10/29/2012 1/24/2011   PAP - OTHER-NIL, See Result NIL ASC-US(A)   HPV 16 DNA NEG Negative - -   HPV 18 DNA NEG Negative - -   OTHER HR HPV NEG Negative - -   HPVSUR RESULT - - - Negative     Reviewed and updated as needed this visit by clinical staff  Tobacco  Allergies  Meds  Med Hx  Surg Hx  Fam Hx  Soc Hx        Reviewed and updated as needed this visit by Provider            Review of Systems   Constitutional: Negative for chills and fever.   HENT: Negative for congestion, ear pain, hearing loss and sore throat.    Eyes: Negative for pain and visual disturbance.   Respiratory: Negative for cough and shortness of breath.    Cardiovascular: Negative for chest pain, palpitations and peripheral edema.   Gastrointestinal: Negative for abdominal pain, constipation, diarrhea, heartburn, hematochezia and nausea.   Breasts:  Negative for tenderness, breast mass and discharge.   Genitourinary: Negative for dysuria, frequency, genital sores, hematuria, pelvic pain, urgency, vaginal bleeding and vaginal discharge.   Musculoskeletal: Positive for arthralgias. Negative for joint swelling and myalgias.   Skin: Negative for rash.   Neurological: Negative for dizziness, weakness, headaches and paresthesias.   Psychiatric/Behavioral: Negative for mood changes. The patient is not nervous/anxious.         OBJECTIVE:   /73 (BP Location: Right leg,  Patient Position: Chair, Cuff Size: Adult Regular)   Pulse 78   Temp 97.9  F (36.6  C) (Oral)   Resp 16   Wt 61.1 kg (134 lb 12.8 oz)   LMP 05/14/2014 (Approximate)   SpO2 98%   Breastfeeding? No   BMI 24.26 kg/m    Physical Exam  GENERAL APPEARANCE: healthy, alert and no distress  EYES: Eyes grossly normal to inspection, PERRL and conjunctivae and sclerae normal  HENT: ear canals and TM's normal, nose and mouth without ulcers or lesions, oropharynx clear and oral mucous membranes moist  NECK: no adenopathy, no asymmetry, masses, or scars and thyroid normal to palpation  RESP: lungs clear to auscultation - no rales, rhonchi or wheezes  BREAST: normal without masses, tenderness or nipple discharge and no palpable axillary masses or adenopathy  CV: regular rate and rhythm, normal S1 S2, no S3 or S4, no murmur, click or rub, no peripheral edema and peripheral pulses strong  ABDOMEN: soft, nontender, no hepatosplenomegaly, no masses and bowel sounds normal   (female): normal female external genitalia, normal urethral meatus, vaginal mucosal atrophy noted, normal cervix, adnexae, and uterus without masses or abnormal discharge  MS: no musculoskeletal defects are noted and gait is age appropriate without ataxia  SKIN: no suspicious lesions or rashes  NEURO: Normal strength and tone, sensory exam grossly normal, mentation intact and speech normal  PSYCH: mentation appears normal and affect normal/bright    Diagnostic Test Results:  none     ASSESSMENT/PLAN:   1. Routine general medical examination at a health care facility  Labs updated today.  - Lipid panel reflex to direct LDL Fasting  - Comprehensive metabolic panel  - TSH with free T4 reflex  - CBC with platelets    2. Immunosuppression (H)  Chronic issue, had kidney transplant and has UC and hx of skin cancer.  Monitored by Derm, Nephrology, Ophthamology on regular basis    3. Screening for malignant neoplasm of cervix  Paln q 5 year pap if negative.  - Pap  "imaged thin layer screen with HPV - recommended age 30 - 65 years (select HPV order below)  - HPV High Risk Types DNA Cervical    COUNSELING:  Reviewed preventive health counseling, as reflected in patient instructions    Estimated body mass index is 24.26 kg/m  as calculated from the following:    Height as of 6/4/19: 1.588 m (5' 2.5\").    Weight as of this encounter: 61.1 kg (134 lb 12.8 oz).         reports that she has never smoked. She has never used smokeless tobacco.      Counseling Resources:  ATP IV Guidelines  Pooled Cohorts Equation Calculator  Breast Cancer Risk Calculator  FRAX Risk Assessment  ICSI Preventive Guidelines  Dietary Guidelines for Americans, 2010  USDA's MyPlate  ASA Prophylaxis  Lung CA Screening    Velvet Smith PA-C  [facility]  "

## 2019-06-15 PROBLEM — D22.9 MULTIPLE BENIGN MELANOCYTIC NEVI: Status: ACTIVE | Noted: 2019-06-15

## 2019-06-15 RX ORDER — LIDOCAINE HYDROCHLORIDE AND EPINEPHRINE 10; 10 MG/ML; UG/ML
3 INJECTION, SOLUTION INFILTRATION; PERINEURAL ONCE
Status: DISCONTINUED | OUTPATIENT
Start: 2019-06-15 | End: 2020-06-29

## 2019-06-19 LAB
COPATH REPORT: NORMAL
PAP: NORMAL

## 2019-06-20 LAB
FINAL DIAGNOSIS: NORMAL
HPV HR 12 DNA CVX QL NAA+PROBE: NEGATIVE
HPV16 DNA SPEC QL NAA+PROBE: NEGATIVE
HPV18 DNA SPEC QL NAA+PROBE: NEGATIVE
SPECIMEN DESCRIPTION: NORMAL
SPECIMEN SOURCE CVX/VAG CYTO: NORMAL

## 2019-07-16 DIAGNOSIS — L20.82 FLEXURAL ECZEMA: ICD-10-CM

## 2019-07-16 RX ORDER — TRIAMCINOLONE ACETONIDE 1 MG/G
CREAM TOPICAL
Qty: 30 G | Refills: 0 | Status: SHIPPED | OUTPATIENT
Start: 2019-07-16 | End: 2020-06-03

## 2019-07-16 NOTE — TELEPHONE ENCOUNTER
"Requested Prescriptions   Pending Prescriptions Disp Refills     triamcinolone (KENALOG) 0.1 % external cream [Pharmacy Med Name: TRIAMCINOLONE 0.1% CREAM   30GM]  Last Written Prescription Date:  8/14/18 discontinued  Last Fill Quantity: 30 g,  # refills: 0   Last office visit: 6/14/2019 with prescribing provider:  Velvet Smith PA-C    Future Office Visit:     30 g 0     Sig: APPLY SPARINGLY EXTERNALLY TO THE AFFECTED AREA THREE TIMES DAILY FOR 14 DAYS       Topical Steroids and Nonsteroidals Protocol Failed - 7/16/2019 10:34 AM        Failed - Medication is active on med list        Passed - Patient is age 6 or older        Passed - Authorizing prescriber's most recent note related to this medication read.     If refill request is for ophthalmic use, please forward request to provider for approval.          Passed - High potency steroid not ordered        Passed - Recent (12 mo) or future (30 days) visit within the authorizing provider's specialty     Patient had office visit in the last 12 months or has a visit in the next 30 days with authorizing provider or within the authorizing provider's specialty.  See \"Patient Info\" tab in inbasket, or \"Choose Columns\" in Meds & Orders section of the refill encounter.                "

## 2019-07-16 NOTE — TELEPHONE ENCOUNTER
Routing refill request to provider for review/approval because:  Drug not active on patient's medication list    Karen Bejarano RN on 7/16/2019 at 10:58 AM

## 2019-08-27 DIAGNOSIS — Z94.0 LIVING-DONOR KIDNEY TRANSPLANT RECIPIENT: Primary | ICD-10-CM

## 2019-08-27 RX ORDER — SIROLIMUS 1 MG/1
3 TABLET, FILM COATED ORAL DAILY
Qty: 90 TABLET | Refills: 0 | Status: SHIPPED | OUTPATIENT
Start: 2019-08-27 | End: 2020-08-21

## 2019-08-28 ENCOUNTER — MYC MEDICAL ADVICE (OUTPATIENT)
Dept: FAMILY MEDICINE | Facility: CLINIC | Age: 54
End: 2019-08-28

## 2019-08-28 ENCOUNTER — TELEPHONE (OUTPATIENT)
Dept: FAMILY MEDICINE | Facility: CLINIC | Age: 54
End: 2019-08-28

## 2019-08-28 DIAGNOSIS — Z85.828 HISTORY OF SCC (SQUAMOUS CELL CARCINOMA) OF SKIN: ICD-10-CM

## 2019-08-28 DIAGNOSIS — D84.9 IMMUNOSUPPRESSION (H): ICD-10-CM

## 2019-08-28 DIAGNOSIS — Z94.0 STATUS POST KIDNEY TRANSPLANT: Primary | ICD-10-CM

## 2019-08-28 NOTE — TELEPHONE ENCOUNTER
Reason for Call:  Other call back    Detailed comments: Pt called stating she has had a change in insurance that is requiring referrals for many things. In specific she needs a [facility], and she also has questions about Paxil. The pharmacy was unable to fill it due to insurance issues as well. Please contact the pt to advise and clarify. Thank you!    Phone Number Patient can be reached at: Home number on file [phone] (home)    Best Time: Any     Can we leave a detailed message on this number? YES    Call taken on 8/28/2019 at 10:33 AM by Nasreen Bronson

## 2019-08-31 NOTE — TELEPHONE ENCOUNTER
Per patient on MyChart:    The 2 referrals I need are:  Dermatology - Surgical removal of skin cancer  Nephrology - Kidney Transplant    And as for the paxil, I got that worked out with my insurance company.  So, nothing needs to be done with that!    Thank you so much!!!  Reina Quan     Routing to PCP.  Saurabh Brizuela CMA ([facility])

## 2019-09-03 NOTE — TELEPHONE ENCOUNTER
Referral info sent via redIT.  Postpone for 1 week to ensure patient receives info.  Saurabh Brizuela CMA ([facility])

## 2019-09-24 ENCOUNTER — TELEPHONE (OUTPATIENT)
Dept: DERMATOLOGY | Facility: CLINIC | Age: 54
End: 2019-09-24

## 2019-09-24 NOTE — TELEPHONE ENCOUNTER
Kathy called via the call center requesting a appointment for a new spot that she is postive is SCC. Kathy would like to come inf or a appointment sooner than the end of October. I am offering Kathy Oct 3rd at 12:15

## 2019-09-25 ENCOUNTER — TELEPHONE (OUTPATIENT)
Dept: FAMILY MEDICINE | Facility: CLINIC | Age: 54
End: 2019-09-25

## 2019-09-25 NOTE — TELEPHONE ENCOUNTER
LM for patient to call back. Please advise the patient that her insurance notified us that she does not have a PCP selected, therefore they are unable to process the referral request. Patient needs to contact her insurance company and update her PCP as Velvet Smith in order for her referral to be processed. Please let me know if she has any questions.  -Ghada Hatfield

## 2019-10-02 ENCOUNTER — HEALTH MAINTENANCE LETTER (OUTPATIENT)
Age: 54
End: 2019-10-02

## 2019-10-02 NOTE — TELEPHONE ENCOUNTER
PCP is selected as brianna, has 2 appointments scheduled with derm.    Do we know if any more follow-up is needed?  Saurabh Brizuela CMA ([facility])

## 2019-10-03 NOTE — TELEPHONE ENCOUNTER
PCP is listed as Velvet with us (in Epic) but with her insurance (they will only accept a referral from Velvet if the patient indicates that). She will not be able to see Derm without the referral.  -Ghada Hatfield

## 2019-10-04 ENCOUNTER — MYC MEDICAL ADVICE (OUTPATIENT)
Dept: FAMILY MEDICINE | Facility: CLINIC | Age: 54
End: 2019-10-04

## 2019-10-04 NOTE — TELEPHONE ENCOUNTER
LM for patient to call back and Sent [employer]t message to patient, she needs to call her insurance and notify them that Velvet is PCP so that derm visit will be covered.    Saurabh Brizuela CMA ([facility])

## 2019-10-07 NOTE — TELEPHONE ENCOUNTER
2nd attempt, called and talked to patient, she will call her insurance and let them know that Velvet is PCP.  Saurabh Brizuela CMA ([facility])

## 2019-10-08 ENCOUNTER — OFFICE VISIT (OUTPATIENT)
Dept: DERMATOLOGY | Facility: CLINIC | Age: 54
End: 2019-10-08
Payer: COMMERCIAL

## 2019-10-08 DIAGNOSIS — Z85.828 HISTORY OF NONMELANOMA SKIN CANCER: ICD-10-CM

## 2019-10-08 DIAGNOSIS — D48.5 NEOPLASM OF UNCERTAIN BEHAVIOR OF SKIN: Primary | ICD-10-CM

## 2019-10-08 RX ORDER — LIDOCAINE HYDROCHLORIDE AND EPINEPHRINE 10; 10 MG/ML; UG/ML
3 INJECTION, SOLUTION INFILTRATION; PERINEURAL ONCE
Status: DISCONTINUED | OUTPATIENT
Start: 2019-10-08 | End: 2020-06-29

## 2019-10-08 ASSESSMENT — PAIN SCALES - GENERAL
PAINLEVEL: NO PAIN (0)
PAINLEVEL: EXTREME PAIN (8)

## 2019-10-08 NOTE — PATIENT INSTRUCTIONS

## 2019-10-08 NOTE — PROGRESS NOTES
DERMATOLOGY FOLLOWUP VISIT      CHIEF COMPLAINT:  Changing bump on right hand.      SUBJECTIVE:  Reina is an extremely pleasant 54-year-old female with a history of kidney transplant in 1992, on chronic immunosuppression.  She was last seen in our clinic on 05/28/2019 at which time we performed multiple biopsies of areas suspicious for squamous cell carcinoma including several biopsies on her forearms and hands.  A lesion on the right shin demonstrated features of squamous cell carcinoma on biopsy.  She subsequently saw Dr. Garfield Fortune in our Dermatology Surgery Clinic for removal.  Today Reina reports that the area we biopsied on her right dorsal hand (which demonstrated features of hypertrophic actinic keratosis, extending to the base of the specimen) has rapidly recurred and is significantly increasing in size.  It initially healed over but then developed a crater-like lump in the past several weeks.  She is concerned that this may be a squamous cell carcinoma as it is notably tender.  She also has a new tender papule on the proximal aspect on her right second digit which has been present for several weeks.  She is concerned that this may be a squamous cell carcinoma as well.  Her other biopsy sites including her left dorsal hand have healed well without problems.      REVIEW OF SYSTEMS:  No recent fevers.      PHYSICAL EXAMINATION:   GENERAL:  This is a well-appearing, well-nourished female with a normal mood and affect who is oriented x3.   SKIN:  A cutaneous exam of the head, neck and bilateral upper extremities was performed.  The patient deferred a full body skin exam today.  On the right dorsal hand, there is a crateriform 1.5 cm erythematous keratotic plaque.  On the lateral aspect of the proximal right second digit, there is a firm, 3 mm pink papule which is tender to palpation.  On the left dorsal hand, there is a well-healed scar without nodularity or erythema.      ASSESSMENT AND PLAN:   1.   Suspect squamous cell carcinoma at a prior biopsy site which previously demonstrated hypertrophic actinic keratosis.  A shave biopsy was performed today.  Please see the procedure note below.   2.  SCC versus hypertrophic AK of the right proximal second digit.  A biopsy was also performed of this area.  Please see the procedure note below.   3.  History of multiple nonmelanoma skin cancers and hypertrophic AKs.  The area on her left dorsal hand is clinically healed.  We will plan to have her follow up for her scheduled full body skin exam in approximately 1 month's time.      PROCEDURE NOTE:  After signed informed consent, the 2 affected areas were swabbed with an alcohol pad and injected with 1% lidocaine with epinephrine.  Biopsies via shave technique were taken and sent for histopathology.  Hemostasis was achieved with aluminum chloride 20% and the defects were covered with petrolatum and a bandage.  The patient tolerated these without complication.       Bard Bauman MD  Dermatology Attending

## 2019-10-08 NOTE — LETTER
10/8/2019       RE: Reina Quan  [address] [facility] [address]     Dear Colleague,    Thank you for referring your patient, Reina Quan, to the [facility] DERMATOLOGY at [facility]. Please see a copy of my visit note below.    DERMATOLOGY FOLLOWUP VISIT      CHIEF COMPLAINT:  Changing bump on right hand.      SUBJECTIVE:  Reina is an extremely pleasant 54-year-old female with a history of kidney transplant in 1992, on chronic immunosuppression.  She was last seen in our clinic on 05/28/2019 at which time we performed multiple biopsies of areas suspicious for squamous cell carcinoma including several biopsies on her forearms and hands.  A lesion on the right shin demonstrated features of squamous cell carcinoma on biopsy.  She subsequently saw Dr. Garfield Fortune in our Dermatology Surgery Clinic for removal.  Today Reina reports that the area we biopsied on her right dorsal hand (which demonstrated features of hypertrophic actinic keratosis, extending to the base of the specimen) has rapidly recurred and is significantly increasing in size.  It initially healed over but then developed a crater-like lump in the past several weeks.  She is concerned that this may be a squamous cell carcinoma as it is notably tender.  She also has a new tender papule on the proximal aspect on her right second digit which has been present for several weeks.  She is concerned that this may be a squamous cell carcinoma as well.  Her other biopsy sites including her left dorsal hand have healed well without problems.      REVIEW OF SYSTEMS:  No recent fevers.      PHYSICAL EXAMINATION:   GENERAL:  This is a well-appearing, well-nourished female with a normal mood and affect who is oriented x3.   SKIN:  A cutaneous exam of the head, neck and bilateral upper extremities was performed.  The patient deferred a full body skin exam today.  On the right dorsal hand, there is a crateriform 1.5  cm erythematous keratotic plaque.  On the lateral aspect of the proximal right second digit, there is a firm, 3 mm pink papule which is tender to palpation.  On the left dorsal hand, there is a well-healed scar without nodularity or erythema.      ASSESSMENT AND PLAN:   1.  Suspect squamous cell carcinoma at a prior biopsy site which previously demonstrated hypertrophic actinic keratosis.  A shave biopsy was performed today.  Please see the procedure note below.   2.  SCC versus hypertrophic AK of the right proximal second digit.  A biopsy was also performed of this area.  Please see the procedure note below.   3.  History of multiple nonmelanoma skin cancers and hypertrophic AKs.  The area on her left dorsal hand is clinically healed.  We will plan to have her follow up for her scheduled full body skin exam in approximately 1 month's time.      PROCEDURE NOTE:  After signed informed consent, the 2 affected areas were swabbed with an alcohol pad and injected with 1% lidocaine with epinephrine.  Biopsies via shave technique were taken and sent for histopathology.  Hemostasis was achieved with aluminum chloride 20% and the defects were covered with petrolatum and a bandage.  The patient tolerated these without complication.       Brad Bauman MD  Dermatology Attending

## 2019-10-08 NOTE — NURSING NOTE
Chief Complaint   Patient presents with     Skin Check     Kathy is here today for a spot check on her right hand. Patient notes a very painful, red, rapidly growing, growth on her right hand.      Thalia Knutson LPN

## 2019-10-10 LAB — COPATH REPORT: NORMAL

## 2019-10-11 ENCOUNTER — TELEPHONE (OUTPATIENT)
Dept: TRANSPLANT | Facility: CLINIC | Age: 54
End: 2019-10-11

## 2019-10-11 DIAGNOSIS — Z94.0 KIDNEY REPLACED BY TRANSPLANT: ICD-10-CM

## 2019-10-11 LAB
ERYTHROCYTE [DISTWIDTH] IN BLOOD BY AUTOMATED COUNT: 16.7 % (ref 10–15)
HCT VFR BLD AUTO: 34.8 % (ref 35–47)
HGB BLD-MCNC: 10.9 G/DL (ref 11.7–15.7)
MCH RBC QN AUTO: 28 PG (ref 26.5–33)
MCHC RBC AUTO-ENTMCNC: 31.3 G/DL (ref 31.5–36.5)
MCV RBC AUTO: 90 FL (ref 78–100)
PLATELET # BLD AUTO: 41 10E9/L (ref 150–450)
PROT UR-MCNC: 0.57 G/L
PROT/CREAT 24H UR: 0.74 G/G CR (ref 0–0.2)
RBC # BLD AUTO: 3.89 10E12/L (ref 3.8–5.2)
WBC # BLD AUTO: 4 10E9/L (ref 4–11)

## 2019-10-11 PROCEDURE — 85027 COMPLETE CBC AUTOMATED: CPT | Performed by: INTERNAL MEDICINE

## 2019-10-11 PROCEDURE — 36415 COLL VENOUS BLD VENIPUNCTURE: CPT | Performed by: INTERNAL MEDICINE

## 2019-10-11 PROCEDURE — 84156 ASSAY OF PROTEIN URINE: CPT | Performed by: INTERNAL MEDICINE

## 2019-10-11 PROCEDURE — 80048 BASIC METABOLIC PNL TOTAL CA: CPT | Performed by: INTERNAL MEDICINE

## 2019-10-11 PROCEDURE — 80195 ASSAY OF SIROLIMUS: CPT | Performed by: INTERNAL MEDICINE

## 2019-10-11 NOTE — TELEPHONE ENCOUNTER
DATE:  10/11/2019   TIME OF RECEIPT FROM LAB:  4:02 PM  LAB TEST:  PLATELETS  LAB VALUE:  42,000  RESULTS GIVEN WITH READ-BACK TO (PROVIDER):  CAROLA Mead RN  TIME LAB VALUE REPORTED TO PROVIDER:   4:10 PM

## 2019-10-12 LAB
ANION GAP SERPL CALCULATED.3IONS-SCNC: 6 MMOL/L (ref 3–14)
BUN SERPL-MCNC: 34 MG/DL (ref 7–30)
CALCIUM SERPL-MCNC: 10.3 MG/DL (ref 8.5–10.1)
CHLORIDE SERPL-SCNC: 106 MMOL/L (ref 94–109)
CO2 SERPL-SCNC: 25 MMOL/L (ref 20–32)
CREAT SERPL-MCNC: 1.44 MG/DL (ref 0.52–1.04)
GFR SERPL CREATININE-BSD FRML MDRD: 41 ML/MIN/{1.73_M2}
GLUCOSE SERPL-MCNC: 115 MG/DL (ref 70–99)
POTASSIUM SERPL-SCNC: 3.7 MMOL/L (ref 3.4–5.3)
SIROLIMUS BLD-MCNC: 3.2 UG/L (ref 5–15)
SODIUM SERPL-SCNC: 137 MMOL/L (ref 133–144)
TME LAST DOSE: ABNORMAL H

## 2019-10-14 ENCOUNTER — TELEPHONE (OUTPATIENT)
Dept: TRANSPLANT | Facility: CLINIC | Age: 54
End: 2019-10-14

## 2019-10-14 NOTE — TELEPHONE ENCOUNTER
Patient due for return follow up with transplant nephrology.   Please direct patient with scheduling phone number.    Repeat transplant labs with urine pr/cr at time of appointment (please enter orders if needed).

## 2019-10-21 ENCOUNTER — TELEPHONE (OUTPATIENT)
Dept: DERMATOLOGY | Facility: CLINIC | Age: 54
End: 2019-10-21

## 2019-10-21 NOTE — TELEPHONE ENCOUNTER
FUTURE VISIT INFORMATION      FUTURE VISIT INFORMATION:    Date: 10.22.19    Time: 7:30    Location:  DermSurg  REFERRAL INFORMATION:    Referring provider:  Dr. Brad Bauman    Referring providers clinic:   [facility]    Reason for visit/diagnosis:  SCCx1, right dorsal hand    RECORDS REQUESTED FROM:       Clinic name Comments Records Status Photos Status   [facility] 10.8.19 Dr. Bauman   Path # [id number] [facility]

## 2019-10-21 NOTE — TELEPHONE ENCOUNTER
[facility] Call Center    Phone Message    May a detailed message be left on voicemail: yes    Reason for Call: Other: Pt is calling to talk to Alison for schedule Mohs surgery, Please call pt back at [phone], thank you     Action Taken: Message routed to:  [facility] ([facility] Derm Surg

## 2019-10-22 ENCOUNTER — OFFICE VISIT (OUTPATIENT)
Dept: DERMATOLOGY | Facility: CLINIC | Age: 54
End: 2019-10-22
Payer: COMMERCIAL

## 2019-10-22 ENCOUNTER — PRE VISIT (OUTPATIENT)
Dept: DERMATOLOGY | Facility: CLINIC | Age: 54
End: 2019-10-22

## 2019-10-22 VITALS — SYSTOLIC BLOOD PRESSURE: 141 MMHG | DIASTOLIC BLOOD PRESSURE: 99 MMHG | HEART RATE: 80 BPM

## 2019-10-22 DIAGNOSIS — C44.622 SQUAMOUS CELL CARCINOMA OF SKIN OF DORSUM OF RIGHT HAND: Primary | ICD-10-CM

## 2019-10-22 RX ORDER — TRAMADOL HYDROCHLORIDE 50 MG/1
50 TABLET ORAL EVERY 6 HOURS PRN
Qty: 10 TABLET | Refills: 0 | Status: SHIPPED | OUTPATIENT
Start: 2019-10-22 | End: 2020-04-14

## 2019-10-22 ASSESSMENT — PAIN SCALES - GENERAL
PAINLEVEL: NO PAIN (0)
PAINLEVEL: NO PAIN (0)

## 2019-10-22 NOTE — PROGRESS NOTES
[facility] Mohs Dermatologic Surgery Procedure Note    Dermatology Surgery Clinic  [facility]  Clinics and Surgery Center  [address] [place] [id number]    Date of Service:  Oct 22, 2019  Surgery: Mohs micrographic surgery    Surgeon: Garfield Fortune MD  Fellow: Rolando Russell MD    Case 1  Repair Type: Full thickness skin graft  Repair Size: 2.0 x 5.0 cm  Suture Material: Monocryl 4-0; Fast Absorbing Gut 5-0  Tumor Type: SCC - Squamous cell carcinoma  Location: Right dorsal hand  Derm-Path Accession #: [id number]  PreOp Size: 2.4 x 2.4 cm  PostOp Size: 2.8 x 3.0 cm  Mohs Accession #:  [id number]  Level of Defect: fascia      Procedure:  We discussed the principles of treatment and most likely complications including scarring, bleeding, infection, swelling, pain, crusting, nerve damage, large wound,  incomplete excision, wound dehiscence,  nerve damage, recurrence, and a second procedure may be recommended to obtain the best cosmetic or functional result.    Informed consent was obtained and the patient underwent the procedure as follows:  The patient was placed supine on the operating table.  The cancer was identified, outlined with a marker, and verified by the patient.  The entire surgical field was prepped with Hibiclens.  The surgical site was anesthetized using Lidocaine 1% with epi 1:100,000.    The area of clinically apparent tumor was not debulked. The layer of tissue was then surgically excised using a #15 blade and was then transferred onto a specimen sheet maintaining the orientation of the specimen. Hemostasis was obtained using electrocoagulation. The wound site was then covered with a dressing while the tissue samples were processed for examination.    The excised tissue was transported to the Mohs histology laboratory maintaining the tissue orientation.  The tissue specimen was relaxed so that the entire surgical margin was in a a single horizontal plane  for sectioning and inked for precise mapping.  A precise reference map was drawn to reflect the sectioning of the specimen, colored inking of the margins, and orientation on the patient. The tissue was processed using horizontal sectioning of the base and continuous peripheral margins.  The histopathologic sections were reviewed in conjunction with the reference map.    Total blocks: 1    Total slides:  3    There were no cancer cells visualized on examination, therefore Mohs surgery was complete.      Reconstruction: Full Thickness Skin Graft    INDICATIONS:  The patient is status post Mohs' cutaneous micrographic excision.  After consideration of all the options, it was determined that a full thickness skin graft would offer the best chance for preservation of normal anatomic and functional relationships.  The patient understood the procedure and risks which include bleeding, infection, graft failure, scar formation, trapdooring and discomfort.  Informed consent was obtained in writing.  The patient understood that a skin graft will go through color changes and may require refinement post-operatively.  The patient was informed that perfect matches may not be possible.  The patient then underwent the procedure as follows:    PROCEDURE:  The patient was placed supine on the operating room table.  The defect was identified. The R dorsal hand margins/donor site was chosen as this had the closest color and texture match to the surrounding defect skin.   An anticipated graft design was drawn at the donor site.  The graft recipient and donor sites were then infiltrated with Lidocaine 1% with epi 1:100,000 and both areas were prepped with Hibiclens and draped in a sterile fashion.    The graft was then harvested excising it with a # 15 scalpel blade. (Graft size 2.5 x 2.2 cm). The graft was placed on saline-soaked gauze.  The donor site wound edges were then undermined.  Hemostasis was obtained with electrocoagulation.   The wound edges were then approximated and the edges were closed with 4-0 Monocryl buried vertical mattress sutures and simple running 5-0 fast absorbing gut sutures epidermal sutures.    Trimming all fat and fibrous tissue from it then thinned the graft.  All visible hair follicles were extracted.  The graft was then laid over the defect and secured with simple interrupted 5-0 fast absorbing gut.  The graft was then trimmed and fully secured using simple running 5-0 fast absorbing gut sutures.  A bolster dressing was placed, which was composed of a Xeroform and vaseline impregnated gauze, polysporin, and secured with bolster sutures.  Wound care instructions were provided in writing and verbally.  The patient left the operating room in good condition and will return in seven to ten days for bolster suture removal and graft evaluation.     Full thickness skin graft size: 2.5 x 2.2 cm  Linear repair for donor site size: 2.0 x 5.0 cm    The attending surgeon was present for key portions of the procedure and always immediately available.                Photo placed into patients chart note for further reference.         Staff Involved:    Scribe Disclosure  I, Ghada Hutson, am serving as a scribe to document services personally performed by Dr. Garfield Fortune, based on data collection and the provider's statements to me.       Attending attestation:  I was present for key elements of the procedure and immediately available for all other portions of the procedure.  I have reviewed the note and edited it as necessary.    Garfield Fortune M.D.  Professor  Director of Dermatologic Surgery  Department of Dermatology  [facility]    Dermatology Surgery Clinic  [facility] Surgery [place] [id number]

## 2019-10-22 NOTE — NURSING NOTE
Chief Complaint   Patient presents with     Skin Cancer     Kathy is here today to have MOHS on the right dorsal hand for an SCC.      Alison Koehler CMA

## 2019-10-22 NOTE — LETTER
10/22/2019       RE: Reina Quan  [address] [facility] [address]     Dear Colleague,    Thank you for referring your patient, Reina Quan, to the [facility] DERMATOLOGIC SURGERY at [facility]. Please see a copy of my visit note below.    [facility] Mohs Dermatologic Surgery Procedure Note    Dermatology Surgery Clinic  [facility]  Clinics and Surgery Center  [address] [place] [id number]    Date of Service:  Oct 22, 2019  Surgery: Mohs micrographic surgery    Surgeon: Garfield Fortune MD  Fellow: Rolando Russell MD    Case 1  Repair Type: Full thickness skin graft  Repair Size: 2.0 x 5.0 cm  Suture Material: Monocryl 4-0; Fast Absorbing Gut 5-0  Tumor Type: SCC - Squamous cell carcinoma  Location: Right dorsal hand  Derm-Path Accession #: [id number]  PreOp Size: 2.4 x 2.4 cm  PostOp Size: 2.8 x 3.0 cm  Mohs Accession #:  IM  Level of Defect: fascia      Procedure:  We discussed the principles of treatment and most likely complications including scarring, bleeding, infection, swelling, pain, crusting, nerve damage, large wound,  incomplete excision, wound dehiscence,  nerve damage, recurrence, and a second procedure may be recommended to obtain the best cosmetic or functional result.    Informed consent was obtained and the patient underwent the procedure as follows:  The patient was placed supine on the operating table.  The cancer was identified, outlined with a marker, and verified by the patient.  The entire surgical field was prepped with Hibiclens.  The surgical site was anesthetized using Lidocaine 1% with epi 1:100,000.    The area of clinically apparent tumor was not debulked. The layer of tissue was then surgically excised using a #15 blade and was then transferred onto a specimen sheet maintaining the orientation of the specimen. Hemostasis was obtained using electrocoagulation. The wound site was then  covered with a dressing while the tissue samples were processed for examination.    The excised tissue was transported to the Mohs histology laboratory maintaining the tissue orientation.  The tissue specimen was relaxed so that the entire surgical margin was in a a single horizontal plane for sectioning and inked for precise mapping.  A precise reference map was drawn to reflect the sectioning of the specimen, colored inking of the margins, and orientation on the patient. The tissue was processed using horizontal sectioning of the base and continuous peripheral margins.  The histopathologic sections were reviewed in conjunction with the reference map.    Total blocks: 1    Total slides:  3    There were no cancer cells visualized on examination, therefore Mohs surgery was complete.      Reconstruction: Full Thickness Skin Graft    INDICATIONS:  The patient is status post Mohs' cutaneous micrographic excision.  After consideration of all the options, it was determined that a full thickness skin graft would offer the best chance for preservation of normal anatomic and functional relationships.  The patient understood the procedure and risks which include bleeding, infection, graft failure, scar formation, trapdooring and discomfort.  Informed consent was obtained in writing.  The patient understood that a skin graft will go through color changes and may require refinement post-operatively.  The patient was informed that perfect matches may not be possible.  The patient then underwent the procedure as follows:    PROCEDURE:  The patient was placed supine on the operating room table.  The defect was identified. The R dorsal hand margins/donor site was chosen as this had the closest color and texture match to the surrounding defect skin.   An anticipated graft design was drawn at the donor site.  The graft recipient and donor sites were then infiltrated with Lidocaine 1% with epi 1:100,000 and both areas were prepped  with Hibiclens and draped in a sterile fashion.    The graft was then harvested excising it with a # 15 scalpel blade. (Graft size 2.5 x 2.2 cm). The graft was placed on saline-soaked gauze.  The donor site wound edges were then undermined.  Hemostasis was obtained with electrocoagulation.  The wound edges were then approximated and the edges were closed with 4-0 Monocryl buried vertical mattress sutures and simple running 5-0 fast absorbing gut sutures epidermal sutures.    Trimming all fat and fibrous tissue from it then thinned the graft.  All visible hair follicles were extracted.  The graft was then laid over the defect and secured with simple interrupted 5-0 fast absorbing gut.  The graft was then trimmed and fully secured using simple running 5-0 fast absorbing gut sutures.  A bolster dressing was placed, which was composed of a Xeroform and vaseline impregnated gauze, polysporin, and secured with bolster sutures.  Wound care instructions were provided in writing and verbally.  The patient left the operating room in good condition and will return in seven to ten days for bolster suture removal and graft evaluation.     Full thickness skin graft size: 2.5 x 2.2 cm  Linear repair for donor site size: 2.0 x 5.0 cm    The attending surgeon was present for key portions of the procedure and always immediately available.                Photo placed into patients chart note for further reference.         Staff Involved:    Scribe Disclosure  I, Ghada Hutson, am serving as a scribe to document services personally performed by Dr. Garfield Fortune, based on data collection and the provider's statements to me.       Attending attestation:  I was present for key elements of the procedure and immediately available for all other portions of the procedure.  I have reviewed the note and edited it as necessary.    Garfield Fortune M.D.  Professor  Director of Dermatologic Surgery  Department of Dermatology  [facility]    Dermatology Surgery Clinic  [facility]  [address] [place] [address]

## 2019-10-22 NOTE — PATIENT INSTRUCTIONS
Wound Care Instructions  I will experience scar, altered skin color, bleeding, swelling, pain, crusting and redness. I may experience altered sensation. Risks are excessive bleeding, infection, muscle weakness, thick (hypertrophic or keloidal) scar, and recurrence,. A second procedure may be recommended to obtain the best cosmetic or functional result.  Possible complications of any surgical procedure are bleeding, infection, scarring, alteration in skin color and sensation, muscle weakness in the area, wound dehiscence or seperation, or recurrence of the lesion or disease. On occasion, after healing, a secondary procedure or revision may be recommended in order to obtain the best cosmetic or functional result.   After your surgery, a pressure bandage will be placed over the area that has sutures. This will help prevent bleeding.   For the First 48 hours After Surgery:  1. Leave the pressure bandage on and keep it dry. If it should come loose, you may retape it, but do not take it off.  2. Relax and take it easy. Do not do any vigorous exercise, heavy lifting, or bending forward. This could cause the wound to bleed.  3. Post-operative pain is usually mild. You may take plain or extra strength Tylenol every 4 hours as needed (do not take more than 4,000mg in one day). Do not take any medicine that contains aspirin, ibuprofen or motrin unless you have been recommended these by a doctor.  Avoid alcohol and vitamin E as these may increase your tendency to bleed.  4. You may put an ice pack around the bandaged area for 20 minutes every 2-3 hours. This may help reduce swelling, bruising, and pain. Make sure the ice pack is waterproof so that the pressure bandage does not get wet.   5. You may see a small amount of drainage or blood on your pressure bandage. This is normal. However, if drainage or bleeding continues or saturates the bandage, you will need to apply firm pressure over the bandage with a washcloth for 15  minutes. If bleeding continues after applying pressure for 15 minutes then go to the nearest emergency room.  48 Hours After Surgery  Carefully remove the bandage and start daily wound care and dressing changes. You may also now shower and get the wound wet. Wash wound with a mild soap and water.  Use caution when washing the wound. Be gentle and do not let the forceful shower stream hit the wound directly.  PAT dry.  Daily Wound Care:  1. Wash wound with a mild soap and water.  Use caution when washing the wound, be gentle and do not let the forceful shower stream hit the wound directly.  2. PAT DRY.  3. Apply Vaseline (from a new container or tube) over the suture line with a Q-tip. It is very important to keep the wound continuously moist, as wounds heal best in a moist environment.  4.  Keep the site covered until sutures are removed, you can cover it with a Telfa (non-stick) dressing and tape or a band-aid.    5. If you are unable to keep wound covered, you must apply Vaseline every 2 - 3 hours (while awake) to ensure it is being kept moist for optimal healing. A dressing overnight is recommended to keep the area moist.   Call Us If:  1. You have pain that is not controlled with Tylenol.  2. You have signs or symptoms of an infection, such as: fever over 100 degrees F, redness, warmth, or foul-smelling or yellow/creamy drainage from the wound.  Who should I call with questions?    [facility]: [phone]     [facility]: [phone]    For urgent needs outside of business hours call the [facility] at [phone] and ask for the dermatology resident on call

## 2019-10-23 ENCOUNTER — TELEPHONE (OUTPATIENT)
Dept: DERMATOLOGY | Facility: CLINIC | Age: 54
End: 2019-10-23

## 2019-10-23 NOTE — TELEPHONE ENCOUNTER
Follow up call attempted following Mohs procedure with Dr. Fortune       Are you having pain?   Are you taking pain medication?   Are you applying ice?    Have you had any noticeable bleeding through the bandage?    Do you have any other concerns?         Please call [phone] option [phone] if you have any questions or concerns.

## 2019-11-05 ENCOUNTER — OFFICE VISIT (OUTPATIENT)
Dept: DERMATOLOGY | Facility: CLINIC | Age: 54
End: 2019-11-05
Payer: COMMERCIAL

## 2019-11-05 ENCOUNTER — MYC MEDICAL ADVICE (OUTPATIENT)
Dept: FAMILY MEDICINE | Facility: CLINIC | Age: 54
End: 2019-11-05

## 2019-11-05 ENCOUNTER — DOCUMENTATION ONLY (OUTPATIENT)
Dept: CARE COORDINATION | Facility: CLINIC | Age: 54
End: 2019-11-05

## 2019-11-05 ENCOUNTER — TELEPHONE (OUTPATIENT)
Dept: FAMILY MEDICINE | Facility: CLINIC | Age: 54
End: 2019-11-05

## 2019-11-05 DIAGNOSIS — C44.622 SCC (SQUAMOUS CELL CARCINOMA), HAND, RIGHT: Primary | ICD-10-CM

## 2019-11-05 ASSESSMENT — PAIN SCALES - GENERAL: PAINLEVEL: NO PAIN (0)

## 2019-11-05 NOTE — TELEPHONE ENCOUNTER
Type of outreach:  Sent [employer] message.  Health Maintenance Due   Topic Date Due     HIV SCREENING  03/17/1980     PNEUMOCOCCAL IMMUNIZATION 19-64 HIGHEST RISK (1 of 3 - PCV13) 03/17/1984     COLONOSCOPY  11/20/2017     MAMMO SCREENING  06/22/2019     INFLUENZA VACCINE (1) 09/01/2019     PHQ-9  12/04/2019     Needs nurse only for BP check, flu vaccine, mammo.  Saurabh Brizuela CMA ([facility])

## 2019-11-05 NOTE — NURSING NOTE
Chief Complaint   Patient presents with     Derm Problem     follow up graft R dorsal hand     Talya Snowden, EMT

## 2019-11-05 NOTE — LETTER
November 19, 2019      Reina Avelina  [address] [address]        Dear Ms. Reina Quan,    We care about your health and have reviewed your health plan including medical conditions, medications, and lab results.  Based on this review, we recommend you take the following action(s):       -schedule a NURSE ONLY APPOINTMENT for a Blood Pressure check and a Flu Vaccine.  This is an appointment that does not involve a provider.  The needs you currently have can be handled by one of our nursing staff and is much quicker than a regular office visit.    -schedule a COLONOSCOPY.  This is an important screening for colon cancer.  Please disregard this reminder if you have had this exam elsewhere within the last year.  It would be helpful for us to receive a copy of these results so we can update your records.  To schedule a colonoscopy, please call our services at [phone].    -schedule a MAMMOGRAM.  This is an important screening for breast cancer.  Please disregard this reminder if you have had this exam elsewhere within the last year.  It would be helpful for us to receive a copy of these results so we can update your records.  To schedule a mammogram, please call our services at [phone].      Thank you for trusting [facility] and we appreciate the opportunity to serve you.  We look forward to supporting your healthcare needs in the future.     Sincerely,  Saurabh Brizuela CMA ([facility])

## 2019-11-05 NOTE — PROGRESS NOTES
[facility] Dermatology Note    Dermatology Surgery Clinic  [facility]  Clinics and Surgery Center  [address] [place] [id number]    Dermatology Problem List:  1. History of kidney transplant over 20 years ago, on chronic immunosuppression  2. History of multiple SCC's   - SCC R dorsal hand, s/p MMS 10/22/19  - SCC superior R yousif, s/p MMS 6/6/19  3. Hx of multiple AKs  4. Hx benign lesion biopsies  5. Vitiligo    Encounter Date: Nov 5, 2019    CC:  Chief Complaint   Patient presents with     Derm Problem     follow up graft R dorsal hand       History of Present Illness:  Ms. Reina Quan is a 54 year old female who presents today for a followup from the previous surgery. Details are below.    Original Procedure Date: October 22, 2019  Reason for visit: MMS for SCC  Bleeding that required medical attention: None  Infection: None  Hospitalization for procedure within 30 days: None  Are you having any functional problems since the surgery: NA     Case(s) Specific Information:  Mohs Case 1:  Procedure Location: Right dorsal hand  Type of Repair: FTSG  Is patient happy with appearance of scar: NA  On 1-10 scale, with 10 being how the area looked before the surgery and 1 being the worst imaginable scar, how do you think it looks today?: NA    The patient was last seen in clinic on 10/22/19 for MMS with FTSG repair on a SCC of the R dorsal hand. Today the patient reports that she is doing well overall. She shares that the wound healing is going quite well and that there is mild erythema at the site. Reports that she is using non-adhesive gauze for wound care. The patient is otherwise feeling well. There are no other skin concerns at this time.      Past Medical History:   Patient Active Problem List   Diagnosis     Ulcerative colitis (H)     Migraine     Allergic rhinitis     Hearing loss     Kidney replaced by transplant     Lichen planus     Giant Platelet  syndrome     Nephrotic syndrome in diseases classified elsewhere     Major depression in partial remission (H)     CARDIOVASCULAR SCREENING; LDL GOAL LESS THAN 100     Health Care Home     Actinic keratosis     History of skin cancer     History of immunosuppression therapy     CKD (chronic kidney disease) stage 3, GFR 30-59 ml/min (H)     High risk medications (not anticoagulants) long-term use     Seborrheic dermatitis     Dermatitis     Vitiligo     History of SCC (squamous cell carcinoma) of skin     Lichen planopilaris     Hyperlipidemia with target LDL less than 130     Status post kidney transplant     Enterococcus UTI     AK (actinic keratosis)     Neoplasm of uncertain behavior of skin     History of nonmelanoma skin cancer     HTN (hypertension)     Immunosuppression (H)     Multiple benign melanocytic nevi     Screening for cervical cancer     Past Medical History:   Diagnosis Date     Actinic keratosis      Allergic rhinitis, cause unspecified      Anemia      anxiety/depression     PAXIL     congenital hearing loss     uses hearing aids     Dry eyes      Giant Platelet syndrome      Glomerular Focal Sclerosis--transplant 1985      Kidney replaced by transplant 1992    RAPAMUNE/ SIROLIMUS      Lichen planopilaris     LPP followed by derm on doxycycline/ clobetasol     Lichen planus      Menorrhagia      migraine      Squamous cell carcinoma     8 x      Thrombocytopenia (H)      Ulcerative colitis, unspecified     biposy neg 1996     UTI (lower urinary tract infection)     recurrent Dr Burns [facility]     Past Surgical History:   Procedure Laterality Date     BIOPSY OF SKIN LESION       C NONSPECIFIC PROCEDURE      Renal transplant right side     C NONSPECIFIC PROCEDURE      cholecystectomy     CHOLECYSTECTOMY       DILATION AND CURETTAGE, OPERATIVE HYSTEROSCOPY WITH MORCELLATOR, COMBINED N/A 11/10/2015    Procedure: COMBINED DILATION AND CURETTAGE, OPERATIVE HYSTEROSCOPY WITH MORCELLATOR;  Surgeon: Nora  Ghada Vilchis MD;  Location: [facility] OR     ESOPHAGOSCOPY, GASTROSCOPY, DUODENOSCOPY (EGD), COMBINED  11/20/2012    Procedure: COMBINED ESOPHAGOSCOPY, GASTROSCOPY, DUODENOSCOPY (EGD), BIOPSY SINGLE OR MULTIPLE;;  Surgeon: Valentin Hahn MD;  Location: [facility] GI     MOHS MICROGRAPHIC PROCEDURE       TRANSPLANT      kidney       Social History:  Social History     Socioeconomic History     Marital status: Single     Spouse name: None     Number of children: 3     Years of education: None     Highest education level: None   Occupational History     Occupation: [employer] store   Social Needs     Financial resource strain: None     Food insecurity:     Worry: None     Inability: None     Transportation needs:     Medical: None     Non-medical: None   Tobacco Use     Smoking status: Never Smoker     Smokeless tobacco: Never Used   Substance and Sexual Activity     Alcohol use: Yes     Alcohol/week: 0.0 standard drinks     Comment: 2x /week     Drug use: No     Sexual activity: Not Currently     Partners: Male     Birth control/protection: Surgical   Lifestyle     Physical activity:     Days per week: None     Minutes per session: None     Stress: None   Relationships     Social connections:     Talks on phone: None     Gets together: None     Attends Pentecostalism service: None     Active member of club or organization: None     Attends meetings of clubs or organizations: None     Relationship status: None     Intimate partner violence:     Fear of current or ex partner: None     Emotionally abused: None     Physically abused: None     Forced sexual activity: None   Other Topics Concern     Parent/sibling w/ CABG, MI or angioplasty before 65F 55M? 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 Doesn't have a job lined up afterward but looking forward to some time off. Has three children, one moved to college this fall. Living in [place], has a significant other x 7 months.  Previously . In a monogamous relationship. Never smoker, social EtOH use.        Family History:  Family History   Problem Relation Age of Onset     Diabetes Father      Kidney Disease Father         nephrolithiasis     Hypertension Mother      C.A.D. Paternal Grandfather      Blood Disease Maternal Grandmother      Asthma Sister      Cancer Other         no family hx of skin cancer     Glaucoma No family hx of      Macular Degeneration No family hx of      Melanoma No family hx of      Skin Cancer No family hx of         Medications:  Current Outpatient Medications   Medication Sig Dispense Refill     PARoxetine (PAXIL) 20 MG tablet Take 1 tablet every morning. 90 tablet 1     sirolimus (GENERIC EQUIVALENT) 1 MG tablet Take 3 tablets (3 mg) by mouth daily 90 tablet 0     traMADol (ULTRAM) 50 MG tablet Take 1 tablet (50 mg) by mouth every 6 hours as needed for severe pain 10 tablet 0     triamcinolone (KENALOG) 0.1 % external cream APPLY SPARINGLY EXTERNALLY TO THE AFFECTED AREA THREE TIMES DAILY FOR 14 DAYS 30 g 0       Allergies   Allergen Reactions     Ampicillin Swelling     Swelling of mouth and tongue.      Seasonal Allergies Other (See Comments)     Itchy eyes and rhinitis.       Review of Systems:  As per HPI.  -Skin Establ Pt: The patient denies any new rash, pruritus, or lesions that are symptomatic, changing or bleeding, except as per HPI.  -Constitutional: The patient is feeling generally well.    Physical exam:  Vitals: LMP 05/14/2014 (Approximate)   GEN: This is a well developed, well-nourished female in no acute distress, in a pleasant mood.   SKIN: Focused examination of the R hand was performed.  - There is a well-healing graft of the R dorsal hand.   - No other lesions of concern on areas examined.       Impression/Plan:  1. SCC of the R dorsal hand, s/p [facility] 10/22/19  Upon review of the surgical site the patient had a well-healing graft of the R dorsal hand. Counseled the pt to continue  with wound care applying a generous amount of Vaseline at the wound.     Instructed to continue daily dressing changes and application of petroleum jelly until healed over with new pink skin and all sutures are dissolved.     Recommended sunscreens SPF #50 or greater and protective clothing. Risk of scar dyspigmentation discussed.     Continue periodic self skin exams and report of any new or changing lesions.     Please refer to previous clinic note for details     Follow-up in 2 weeks        Staff Involved:    Scribe Disclosure  I, Ghada Haresh, am serving as a scribe to document services personally performed by Dr. Garfield Fortune, based on data collection and the provider's statements to me.     Fellow: Rolando Russell MD  Resident: Sanju Woodard MD    Attending Attestation  I attest that the Scribe recorded the interview and exam that I personally performed.  I have reviewed the note and edited it as necessary.    Garfield Fortune M.D.  Professor  Director of Dermatologic Surgery  Department of Dermatology  [facility]

## 2019-11-12 ENCOUNTER — MYC MEDICAL ADVICE (OUTPATIENT)
Dept: FAMILY MEDICINE | Facility: CLINIC | Age: 54
End: 2019-11-12

## 2019-11-19 NOTE — TELEPHONE ENCOUNTER
Type of outreach:  Sent letter.  Health Maintenance Due   Topic Date Due     HIV SCREENING  03/17/1980     PNEUMOCOCCAL IMMUNIZATION 19-64 HIGHEST RISK (1 of 3 - PCV13) 03/17/1984     COLONOSCOPY  11/20/2017     MAMMO SCREENING  06/22/2019     INFLUENZA VACCINE (1) 09/01/2019     PHQ-9  12/04/2019     3rd attempt, Due for NURSE ONLY for BP check, flu vaccine; also needs mammo, colonoscopy.  Saurabh Brizuela CMA ([facility])

## 2019-11-20 NOTE — PATIENT INSTRUCTIONS

## 2020-01-09 DIAGNOSIS — Z94.0 KIDNEY TRANSPLANTED: Primary | ICD-10-CM

## 2020-01-13 ENCOUNTER — TELEPHONE (OUTPATIENT)
Dept: TRANSPLANT | Facility: CLINIC | Age: 55
End: 2020-01-13

## 2020-01-13 DIAGNOSIS — Z94.0 KIDNEY REPLACED BY TRANSPLANT: ICD-10-CM

## 2020-01-13 DIAGNOSIS — Z94.0 KIDNEY TRANSPLANTED: ICD-10-CM

## 2020-01-13 LAB
ERYTHROCYTE [DISTWIDTH] IN BLOOD BY AUTOMATED COUNT: 16.9 % (ref 10–15)
HCT VFR BLD AUTO: 34.1 % (ref 35–47)
HGB BLD-MCNC: 10.5 G/DL (ref 11.7–15.7)
MCH RBC QN AUTO: 27.3 PG (ref 26.5–33)
MCHC RBC AUTO-ENTMCNC: 30.8 G/DL (ref 31.5–36.5)
MCV RBC AUTO: 89 FL (ref 78–100)
PLATELET # BLD AUTO: 36 10E9/L (ref 150–450)
PROT UR-MCNC: 0.91 G/L
PROT/CREAT 24H UR: 0.94 G/G CR (ref 0–0.2)
RBC # BLD AUTO: 3.85 10E12/L (ref 3.8–5.2)
WBC # BLD AUTO: 4.5 10E9/L (ref 4–11)

## 2020-01-13 PROCEDURE — 80195 ASSAY OF SIROLIMUS: CPT | Performed by: INTERNAL MEDICINE

## 2020-01-13 PROCEDURE — 36415 COLL VENOUS BLD VENIPUNCTURE: CPT | Performed by: INTERNAL MEDICINE

## 2020-01-13 PROCEDURE — 80048 BASIC METABOLIC PNL TOTAL CA: CPT | Performed by: INTERNAL MEDICINE

## 2020-01-13 PROCEDURE — 85027 COMPLETE CBC AUTOMATED: CPT | Performed by: INTERNAL MEDICINE

## 2020-01-13 PROCEDURE — 84156 ASSAY OF PROTEIN URINE: CPT | Performed by: INTERNAL MEDICINE

## 2020-01-13 NOTE — TELEPHONE ENCOUNTER
DATE:  1/13/2020   TIME OF RECEIPT FROM LAB:  1221  LAB TEST:  Plt  LAB VALUE:  36,000  RESULTS GIVEN WITH READ-BACK TO (PROVIDER):  Danielle Lee  TIME LAB VALUE REPORTED TO PROVIDER:   1230

## 2020-01-14 ENCOUNTER — OFFICE VISIT (OUTPATIENT)
Dept: NEPHROLOGY | Facility: CLINIC | Age: 55
End: 2020-01-14
Attending: INTERNAL MEDICINE
Payer: COMMERCIAL

## 2020-01-14 VITALS
DIASTOLIC BLOOD PRESSURE: 87 MMHG | HEIGHT: 62 IN | TEMPERATURE: 98.2 F | WEIGHT: 130.7 LBS | OXYGEN SATURATION: 98 % | RESPIRATION RATE: 20 BRPM | BODY MASS INDEX: 24.05 KG/M2 | SYSTOLIC BLOOD PRESSURE: 129 MMHG | HEART RATE: 77 BPM

## 2020-01-14 DIAGNOSIS — Z48.298 AFTERCARE FOLLOWING ORGAN TRANSPLANT: Primary | ICD-10-CM

## 2020-01-14 DIAGNOSIS — R80.8 OTHER PROTEINURIA: ICD-10-CM

## 2020-01-14 DIAGNOSIS — F32.4 MAJOR DEPRESSIVE DISORDER WITH SINGLE EPISODE, IN PARTIAL REMISSION (H): ICD-10-CM

## 2020-01-14 DIAGNOSIS — R53.83 FATIGUE, UNSPECIFIED TYPE: ICD-10-CM

## 2020-01-14 DIAGNOSIS — Z23 NEED FOR INFLUENZA VACCINATION: ICD-10-CM

## 2020-01-14 DIAGNOSIS — E83.52 HYPERCALCEMIA: ICD-10-CM

## 2020-01-14 DIAGNOSIS — D84.9 IMMUNOSUPPRESSION (H): ICD-10-CM

## 2020-01-14 DIAGNOSIS — D48.5 NEOPLASM OF UNCERTAIN BEHAVIOR OF SKIN: ICD-10-CM

## 2020-01-14 DIAGNOSIS — E78.5 HYPERLIPIDEMIA WITH TARGET LDL LESS THAN 130: ICD-10-CM

## 2020-01-14 DIAGNOSIS — Z94.0 KIDNEY REPLACED BY TRANSPLANT: ICD-10-CM

## 2020-01-14 LAB
ANION GAP SERPL CALCULATED.3IONS-SCNC: 7 MMOL/L (ref 3–14)
BUN SERPL-MCNC: 34 MG/DL (ref 7–30)
CALCIUM SERPL-MCNC: 10 MG/DL (ref 8.5–10.1)
CHLORIDE SERPL-SCNC: 105 MMOL/L (ref 94–109)
CO2 SERPL-SCNC: 25 MMOL/L (ref 20–32)
CREAT SERPL-MCNC: 1.55 MG/DL (ref 0.52–1.04)
GFR SERPL CREATININE-BSD FRML MDRD: 37 ML/MIN/{1.73_M2}
GLUCOSE SERPL-MCNC: 112 MG/DL (ref 70–99)
POTASSIUM SERPL-SCNC: 4.6 MMOL/L (ref 3.4–5.3)
SIROLIMUS BLD-MCNC: 6.2 UG/L (ref 5–15)
SODIUM SERPL-SCNC: 137 MMOL/L (ref 133–144)
TME LAST DOSE: NORMAL H

## 2020-01-14 PROCEDURE — G0008 ADMIN INFLUENZA VIRUS VAC: HCPCS | Mod: ZF

## 2020-01-14 PROCEDURE — 90682 RIV4 VACC RECOMBINANT DNA IM: CPT | Mod: ZF | Performed by: STUDENT IN AN ORGANIZED HEALTH CARE EDUCATION/TRAINING PROGRAM

## 2020-01-14 PROCEDURE — 25000128 H RX IP 250 OP 636: Mod: ZF | Performed by: STUDENT IN AN ORGANIZED HEALTH CARE EDUCATION/TRAINING PROGRAM

## 2020-01-14 PROCEDURE — G0463 HOSPITAL OUTPT CLINIC VISIT: HCPCS | Mod: 25,ZF

## 2020-01-14 RX ORDER — ATORVASTATIN CALCIUM 10 MG/1
10 TABLET, FILM COATED ORAL DAILY
Qty: 60 TABLET | Refills: 3 | Status: SHIPPED | OUTPATIENT
Start: 2020-01-14 | End: 2020-06-08

## 2020-01-14 RX ORDER — LISINOPRIL 10 MG/1
5 TABLET ORAL DAILY
Qty: 60 TABLET | Refills: 1 | Status: SHIPPED | OUTPATIENT
Start: 2020-01-14 | End: 2020-06-29

## 2020-01-14 RX ADMIN — INFLUENZA A VIRUS A/BRISBANE/02/2018 (H1N1) RECOMBINANT HEMAGGLUTININ ANTIGEN, INFLUENZA A VIRUS A/KANSAS/14/2017 (H3N2) RECOMBINANT HEMAGGLUTININ ANTIGEN, INFLUENZA B VIRUS B/PHUKET/3073/2013 RECOMBINANT HEMAGGLUTININ ANTIGEN, AND INFLUENZA B VIRUS B/MARYLAND/15/2016 RECOMBINANT HEMAGGLUTININ ANTIGEN 0.5 ML: 45; 45; 45; 45 INJECTION INTRAMUSCULAR at 15:32

## 2020-01-14 ASSESSMENT — MIFFLIN-ST. JEOR: SCORE: 1146.1

## 2020-01-14 ASSESSMENT — PAIN SCALES - GENERAL: PAINLEVEL: NO PAIN (0)

## 2020-01-14 NOTE — NURSING NOTE
"Chief Complaint   Patient presents with     RECHECK     S/P Kidney TX 6/2/1992       Vital signs:  Temp: 98.2  F (36.8  C) Temp src: Oral BP: 129/87 Pulse: 77   Resp: 20 SpO2: 98 %     Height: 157.5 cm (5' 2\") Weight: 59.3 kg (130 lb 11.2 oz)  Estimated body mass index is 23.91 kg/m  as calculated from the following:    Height as of this encounter: 1.575 m (5' 2\").    Weight as of this encounter: 59.3 kg (130 lb 11.2 oz).          Mary Núñez, [facility]  1/14/2020 2:14 PM      "

## 2020-01-14 NOTE — LETTER
1/14/2020       RE: Reina Quan  [address] [address]     Dear Colleague,    Thank you for referring your patient, Reina Quan, to the [facility] NEPHROLOGY at [facility]. Please see a copy of my visit note below.    ACUTE TRANSPLANT NEPHROLOGY VISIT    Assessment & Plan   # LDKT: Stable   - Baseline Cr ~ 1.5-1.8   - Proteinuria: Mild (0.5-1.0 grams)   - Date DSA Last Checked: Feb/2015      Latest DSA: No   - BK Viremia: No   - Kidney Tx Biopsy: No    Cr 1.55mg/dL, which is at her baseline and has been stable for the last 5 years. She has an increasing amount of proteinuria likely from her mTORi, and was previously on ACEi which was stopped last year. We will restart lisinopril 5mg daily to control her proteinuria. We will get an U/S of her native and transplanted kidney (workup of hypercalcemia as noted below)                 - Get DSA    # Immunosuppression: Sirolimus (goal 4-6)   - Changes: No, continue on current regimen. Last sirolimus level was 6.2 (1/13/2020) and no changes to her dose were made.    # Infection Prophylaxis:   - PJP: None    # Hypertension: Controlled;  Goal BP: < 130/80   - Volume status: Euvolemic  EDW~ 54.5kg   - Changes: Yes - will start a low-dose lisinpril 5mg to help with proteinuria.    # Mineral Bone Disorder:   - Calcium; level: High        On Supplement: No    We will check a PTH, vitamin D, SPEP, FLC assay. We are concerned that her skin cancer may be related to this, however we will do a workup and go from there. We will get an U/S of her transplant and native kidneys to identify any lesions that may be suspicious for malignancy.     # Elevated Blood Glucose: -130, we will get a HbA1c. She has also gained 20lbs in the last few months.     # Electrolytes:   - Potassium; level: Normal        On Supplement: No  - Magnesium; level: Not checked recently        On Supplement: No  - Bicarbonate; level: Normal        On  Supplement: No  - Sodium; level: Normal        On Supplement: No    # Anemia: Hgb stable at 10.5, we will get an iron panel    # Fatigue: She reports fatigue has set in mostly over the last few years. She reports a history of seasonal depression. I do not feel this is related to an underlying infection or etiology that is related to her transplant or medications.                   - Get TSH    # Depression: On paxil, dose has been stable for many years.     # Weight loss: Patient is trying to lose ~10lb to get ready for her daughters wedding. She feels tired by the weight and she is trying to eat better and exercise. HbA1c as above.    # Skin Cancer: Patient still follows with dermatology and had many cancers removed and biopsied over the years. If hypercalcemia doesn't improve, we will talk to her dermatologist and inquire if her skin cancer may be related to this.     # Influenza vaccination: Patient will get an influenza vaccination today.     # Age-appropriate cancer screening: Up to date    # Medical Compliance: Yes    # Transplant History:  Etiology of Kidney Failure: Focal segmental glomerulosclerosis (FSGS)  Tx: LDKT  Transplant: 6/2/1992 (Kidney)  Donor Type: Living Donor Class:   Significant changes in immunosuppression: None  Significant transplant-related complications: None    Transplant Office Phone Number: [phone]    Assessment and plan was discussed with the patient and she voiced her understanding and agreement.    Return visit: Return in about 6 months (around 7/14/2020).    Aparna Gonzalez MD    Chief Complaint   Ms. Quan is a 54 year old here for routine follow up.     History of Present Illness     Patient was last seen in the nephrology clinic on 2/16/2018 by Dr Burns. Please see his note for more details.    She is s/p LDKT 1992 and has been doing well over the last several years with minimal interruption of her medications and good compliance.     She was switched from tacrolimus to  sirolimus ~15 years ago due to her skin cancer history. She was also taken off prednisone ~2 years ago.     She still has her native kidneys in place, denies any pain or hematuria.      She denies chest pain and has no shortness of breath. She reports eating and drinking well, and denies any dysphagia or nausea/vomiting. She reports chronic fatigue that has been ongoing for many years. She denies any recent weight loss (has gained 20lb in the last few months) and denies night sweats or chills. She has no diarrhea    Recent Hospitalizations:  [x] No [] Yes    New Medical Issues: [x] No [] Yes    Decreased energy: [x] No [] Yes    Chest pain or SOB with exertion:  [x] No [] Yes    Appetite change or weight change: [x] No [] Yes    Nausea, vomiting or diarrhea:  [x] No [] Yes    Fever, sweats or chills: [x] No [] Yes    Leg swelling: [x] No [] Yes      Home BP: Not checked    Review of Systems   A comprehensive review of systems was obtained and negative, except as noted in the HPI or PMH.    Problem List   Patient Active Problem List   Diagnosis     Ulcerative colitis (H)     Migraine     Allergic rhinitis     Hearing loss     Kidney replaced by transplant     Lichen planus     Giant Platelet syndrome     Nephrotic syndrome in diseases classified elsewhere     Major depression in partial remission (H)     CARDIOVASCULAR SCREENING; LDL GOAL LESS THAN 100     Health Care Home     Actinic keratosis     History of skin cancer     History of immunosuppression therapy     CKD (chronic kidney disease) stage 3, GFR 30-59 ml/min (H)     High risk medications (not anticoagulants) long-term use     Seborrheic dermatitis     Dermatitis     Vitiligo     History of SCC (squamous cell carcinoma) of skin     Lichen planopilaris     Hyperlipidemia with target LDL less than 130     Status post kidney transplant     Enterococcus UTI     AK (actinic keratosis)     Neoplasm of uncertain behavior of skin     History of nonmelanoma skin  "cancer     HTN (hypertension)     Immunosuppression (H)     Multiple benign melanocytic nevi     Screening for cervical cancer       Social History   Social History     Tobacco Use     Smoking status: Never Smoker     Smokeless tobacco: Never Used   Substance Use Topics     Alcohol use: Yes     Alcohol/week: 0.0 standard drinks     Comment: 2x /week     Drug use: No       Allergies   Allergies   Allergen Reactions     Ampicillin Swelling     Swelling of mouth and tongue.      Seasonal Allergies Other (See Comments)     Itchy eyes and rhinitis.       Medications   Current Outpatient Medications   Medication Sig     atorvastatin (LIPITOR) 10 MG tablet Take 1 tablet (10 mg) by mouth daily     lisinopril (PRINIVIL/ZESTRIL) 10 MG tablet Take 0.5 tablets (5 mg) by mouth daily     PARoxetine (PAXIL) 20 MG tablet Take 1 tablet every morning.     sirolimus (GENERIC EQUIVALENT) 1 MG tablet Take 3 tablets (3 mg) by mouth daily     triamcinolone (KENALOG) 0.1 % external cream APPLY SPARINGLY EXTERNALLY TO THE AFFECTED AREA THREE TIMES DAILY FOR 14 DAYS     traMADol (ULTRAM) 50 MG tablet Take 1 tablet (50 mg) by mouth every 6 hours as needed for severe pain (Patient not taking: Reported on 1/14/2020)     Current Facility-Administered Medications   Medication     lidocaine 1% with EPINEPHrine 1:100,000 injection 3 mL     lidocaine 1% with EPINEPHrine 1:100,000 injection 3 mL     Medications Discontinued During This Encounter   Medication Reason     influenza recomb quadrivalent PF (FLUBLOK) injection 0.5 mL Duplicate       Physical Exam   Vital Signs: /87 (BP Location: Right arm, Patient Position: Sitting, Cuff Size: Adult Regular)   Pulse 77   Temp 98.2  F (36.8  C) (Oral)   Resp 20   Ht 1.575 m (5' 2\")   Wt 59.3 kg (130 lb 11.2 oz)   LMP 05/14/2014 (Approximate)   SpO2 98%   BMI 23.91 kg/m      GENERAL APPEARANCE: alert and no distress  HENT: mouth without ulcers or lesions  LYMPHATICS: no cervical or " supraclavicular nodes  RESP: lungs clear to auscultation - no rales, rhonchi or wheezes  CV: regular rhythm, normal rate, no rub, no murmur  EDEMA: no LE edema bilaterally  ABDOMEN: soft, nondistended, nontender, bowel sounds normal  MS: extremities normal - no gross deformities noted, no evidence of inflammation in joints, no muscle tenderness  SKIN: no rash    Data     Renal Latest Ref Rng & Units 1/13/2020 10/11/2019 6/14/2019   Na 133 - 144 mmol/L 137 137 142   K 3.4 - 5.3 mmol/L 4.6 3.7 4.2   Cl 94 - 109 mmol/L 105 106 108   CO2 20 - 32 mmol/L 25 25 26   BUN 7 - 30 mg/dL 34(H) 34(H) 32(H)   Cr 0.52 - 1.04 mg/dL 1.55(H) 1.44(H) 1.59(H)   Glucose 70 - 99 mg/dL 112(H) 115(H) 105(H)   Ca  8.5 - 10.1 mg/dL 10.0 10.3(H) 9.8   Mg 1.6 - 2.3 mg/dL - - -     Bone Health Latest Ref Rng & Units 7/2/2015 2/20/2015 2/19/2015   Phos 2.5 - 4.5 mg/dL 3.4 3.3 2.4(L)     Heme Latest Ref Rng & Units 1/13/2020 10/11/2019 6/14/2019   WBC 4.0 - 11.0 10e9/L 4.5 4.0 4.1   Hgb 11.7 - 15.7 g/dL 10.5(L) 10.9(L) 11.1(L)   Plt 150 - 450 10e9/L 36(LL) 41(LL) 43(LL)     Liver Latest Ref Rng & Units 6/14/2019 9/27/2017 11/28/2016   AP 40 - 150 U/L 97 - -   TBili 0.2 - 1.3 mg/dL 0.2 - -   DBili 0.0 - 0.2 mg/dL - - -   ALT 0 - 50 U/L 27 23 -   AST 0 - 45 U/L 27 24 24   Tot Protein 6.8 - 8.8 g/dL 7.8 - -   Albumin 3.4 - 5.0 g/dL 3.8 - -     Pancreas Latest Ref Rng & Units 3/13/2015 6/20/2012 2/10/2012   Amylase 30 - 110 U/L - - 61   Lipase 73 - 393 U/L 169 79 111     Iron studies Latest Ref Rng & Units 3/5/2015 10/7/2014 2/10/2012   Iron 35 - 180 ug/dL 83 68 96   Iron sat 15 - 46 % 29 20 42   Ferritin 10 - 300 ng/mL 79 85 106     [facility] Txp Virology Latest Ref Rng & Units 10/17/2018 2/19/2015 2/17/2015   CVM DNA Quant - Plasma EDTA PLASMA -   CMV Quant <100 Copies/mL - - -   CMV QT Log <2.0 Log copies/mL - - -   BK Spec - - - Plasma   BK Res BKNEG copies/mL - - BK Virus DNA Not Detected   BK Log <2.7 Log copies/mL - - Not Calculated   The Real-Time  quantitative BK Virus assay was developed and its performance   characteristics determined by the Infect[facility] Diagnostic Laboratory at   the [facility] in [place], [place]. The   primers and probes for each analyte are Analyte Specific Reagents (ASRs)   manufactured by [employer].   ASRs are used in many laboratory tests necessary for standard medical care and   generally do not require U.S. Food and Drug Administration approval. The FDA   has determined that such clearance or approval is not necessary.   This test is used for clinical purposes. It should not be regarded as   investigational or for research. This laboratory is certified under the   Clinical Laboratory Improvement Amendments of 1988 (CLIA-88) as qualified to   perform high complexity clinical laboratory testing.                Patient was seen and evaluated by me, Lamonte Isaac MD. I have reviewed the note and agree with the the plan of care as documented by the fellow.      Again, thank you for allowing me to participate in the care of your patient.      Sincerely,     Kidney/Pancreas 3

## 2020-01-14 NOTE — PATIENT INSTRUCTIONS
1. We will check lab work for your higher calcium level  2. We have restarted lisinopril 5mg daily   3. Please take atorvastatin 10mg daily.  4. We will get an ultrasound of your native kidneys and of the transplanted kidney

## 2020-01-14 NOTE — NURSING NOTE
Clinic Administered Medication Documentation    MEDICATION LIST:   Injectable Medication Documentation    Patient was given Flublok. Prior to medication administration, verified patients identity using patient s name and date of birth. Please see MAR and medication order for additional information. Patient instructed to remain in clinic for 15 minutes and report any adverse reaction to staff immediately .      Was entire vial of medication used? Yes  Vial/Syringe: Syringe  Expiration Date:  06/30/2020  Was this medication supplied by the patient? No     Mary Núñez, [facility] CMA  1/14/2020 3:31 PM

## 2020-01-14 NOTE — LETTER
1/14/2020       RE: Reina Quan  [address] [address]     Dear Colleague,    Thank you for referring your patient, Reina Quan, to the [facility] NEPHROLOGY at [facility]. Please see a copy of my visit note below.    ACUTE TRANSPLANT NEPHROLOGY VISIT    Assessment & Plan   # LDKT: Stable   - Baseline Cr ~ 1.5-1.8   - Proteinuria: Mild (0.5-1.0 grams)   - Date DSA Last Checked: Feb/2015      Latest DSA: No   - BK Viremia: No   - Kidney Tx Biopsy: No    ***    # Immunosuppression: Sirolimus (goal 4-6)   - Changes: No    # Infection Prophylaxis:   - PJP: None    # Hypertension: Controlled;  Goal BP: < 130/80   - Volume status: Euvolemic  EDW~ 54.5kg   - Changes: Yes - will start a low-dose lisinpril 5mg to help with proteinuria.    # Mineral Bone Disorder:   - Calcium; level: High        On Supplement: No    We will check a PTH, vitamin D, SPEP, FLC assay. We are concerned that her skin cancer may be related to this, however we will do a workup and go from there.     # Electrolytes:   - Potassium; level: Normal        On Supplement: No  - Magnesium; level: Not checked recently        On Supplement: No  - Bicarbonate; level: Normal        On Supplement: No  - Sodium; level: Normal        On Supplement: No    # Fatigue: She reports fatigue has set in mostly over the last few years. She reports a history of seasonal depression. I do not feel this is related to an underlying infection or etiology that is related ot her transplant or medications.     # Depression: On paxil, dose has been stable for many years.     # Weight loss: Patient is trying to lose ~10lb to get ready for her daughters wedding. She feels tired by the weight and she is trying to eat better and exercise.     # Skin Cancer: Patient still follows with dermatology and had many cancers removed and biopsied over the years. If hypercalcemia doesn't improve, we will talk to her dermatologist and  inquire if her skin cancer may be related to this.     # Anemia: Will get iron panel, hgb stable at 10    # Influenza vaccination: Patient wants a flu shot today.     # Age-appropriate cancer screening: Up to date    # Medical Compliance: Yes    # Transplant History:  Etiology of Kidney Failure: Focal segmental glomerulosclerosis (FSGS)  Tx: LDKT  Transplant: 6/2/1992 (Kidney)  Donor Type: Living Donor Class:   Significant changes in immunosuppression: None  Significant transplant-related complications: None    Transplant Office Phone Number: [phone]    Assessment and plan was discussed with the patient and she voiced her understanding and agreement.    Return visit: Return in about 6 months (around 7/14/2020).    Aparna Gonzalez MD    Chief Complaint   Ms. Quan is a 54 year old here for routine follow up.     History of Present Illness     Patient was last seen in the nephrology clinic on 2/16/2018 by Dr Burns. Please see his note for more details.    She is s/p LDKT 1992 and has been doing well over the last several years with minimal interruption of her medications and good compliance.     She was switched from tacrolimus to sirolimus ~15 years ago due to her skin cancer history. She was also taken off prednisone ~2 years ago.     She still has her native kidneys in place and      Recent Hospitalizations:  [] No [] Yes    New Medical Issues: [] No [] Yes    Decreased energy: [] No [] Yes    Chest pain or SOB with exertion:  [] No [] Yes    Appetite change or weight change: [] No [] Yes    Nausea, vomiting or diarrhea:  [] No [] Yes    Fever, sweats or chills: [] No [] Yes    Leg swelling: [] No [] Yes      Home BP: Not checked    Review of Systems   A comprehensive review of systems was obtained and negative, except as noted in the HPI or PMH.    Problem List   Patient Active Problem List   Diagnosis     Ulcerative colitis (H)     Migraine     Allergic rhinitis     Hearing loss     Kidney replaced by  transplant     Lichen planus     Giant Platelet syndrome     Nephrotic syndrome in diseases classified elsewhere     Major depression in partial remission (H)     CARDIOVASCULAR SCREENING; LDL GOAL LESS THAN 100     Health Care Home     Actinic keratosis     History of skin cancer     History of immunosuppression therapy     CKD (chronic kidney disease) stage 3, GFR 30-59 ml/min (H)     High risk medications (not anticoagulants) long-term use     Seborrheic dermatitis     Dermatitis     Vitiligo     History of SCC (squamous cell carcinoma) of skin     Lichen planopilaris     Hyperlipidemia with target LDL less than 130     Status post kidney transplant     Enterococcus UTI     AK (actinic keratosis)     Neoplasm of uncertain behavior of skin     History of nonmelanoma skin cancer     HTN (hypertension)     Immunosuppression (H)     Multiple benign melanocytic nevi     Screening for cervical cancer       Social History   Social History     Tobacco Use     Smoking status: Never Smoker     Smokeless tobacco: Never Used   Substance Use Topics     Alcohol use: Yes     Alcohol/week: 0.0 standard drinks     Comment: 2x /week     Drug use: No       Allergies   Allergies   Allergen Reactions     Ampicillin Swelling     Swelling of mouth and tongue.      Seasonal Allergies Other (See Comments)     Itchy eyes and rhinitis.       Medications   Current Outpatient Medications   Medication Sig     atorvastatin (LIPITOR) 10 MG tablet Take 1 tablet (10 mg) by mouth daily     lisinopril (PRINIVIL/ZESTRIL) 10 MG tablet Take 0.5 tablets (5 mg) by mouth daily     PARoxetine (PAXIL) 20 MG tablet Take 1 tablet every morning.     sirolimus (GENERIC EQUIVALENT) 1 MG tablet Take 3 tablets (3 mg) by mouth daily     triamcinolone (KENALOG) 0.1 % external cream APPLY SPARINGLY EXTERNALLY TO THE AFFECTED AREA THREE TIMES DAILY FOR 14 DAYS     traMADol (ULTRAM) 50 MG tablet Take 1 tablet (50 mg) by mouth every 6 hours as needed for severe pain  "(Patient not taking: Reported on 1/14/2020)     Current Facility-Administered Medications   Medication     lidocaine 1% with EPINEPHrine 1:100,000 injection 3 mL     lidocaine 1% with EPINEPHrine 1:100,000 injection 3 mL     Medications Discontinued During This Encounter   Medication Reason     influenza recomb quadrivalent PF (FLUBLOK) injection 0.5 mL Duplicate       Physical Exam   Vital Signs: /87 (BP Location: Right arm, Patient Position: Sitting, Cuff Size: Adult Regular)   Pulse 77   Temp 98.2  F (36.8  C) (Oral)   Resp 20   Ht 1.575 m (5' 2\")   Wt 59.3 kg (130 lb 11.2 oz)   LMP 05/14/2014 (Approximate)   SpO2 98%   BMI 23.91 kg/m       GENERAL APPEARANCE: alert and no distress  HENT: mouth without ulcers or lesions  LYMPHATICS: no cervical or supraclavicular nodes  RESP: lungs clear to auscultation - no rales, rhonchi or wheezes  CV: regular rhythm, normal rate, no rub, no murmur  EDEMA: no LE edema bilaterally  ABDOMEN: soft, nondistended, nontender, bowel sounds normal  MS: extremities normal - no gross deformities noted, no evidence of inflammation in joints, no muscle tenderness  SKIN: no rash    Data     Renal Latest Ref Rng & Units 1/13/2020 10/11/2019 6/14/2019   Na 133 - 144 mmol/L 137 137 142   K 3.4 - 5.3 mmol/L 4.6 3.7 4.2   Cl 94 - 109 mmol/L 105 106 108   CO2 20 - 32 mmol/L 25 25 26   BUN 7 - 30 mg/dL 34(H) 34(H) 32(H)   Cr 0.52 - 1.04 mg/dL 1.55(H) 1.44(H) 1.59(H)   Glucose 70 - 99 mg/dL 112(H) 115(H) 105(H)   Ca  8.5 - 10.1 mg/dL 10.0 10.3(H) 9.8   Mg 1.6 - 2.3 mg/dL - - -     Bone Health Latest Ref Rng & Units 7/2/2015 2/20/2015 2/19/2015   Phos 2.5 - 4.5 mg/dL 3.4 3.3 2.4(L)     Heme Latest Ref Rng & Units 1/13/2020 10/11/2019 6/14/2019   WBC 4.0 - 11.0 10e9/L 4.5 4.0 4.1   Hgb 11.7 - 15.7 g/dL 10.5(L) 10.9(L) 11.1(L)   Plt 150 - 450 10e9/L 36(LL) 41(LL) 43(LL)     Liver Latest Ref Rng & Units 6/14/2019 9/27/2017 11/28/2016   AP 40 - 150 U/L 97 - -   TBili 0.2 - 1.3 mg/dL 0.2 - " -   DBili 0.0 - 0.2 mg/dL - - -   ALT 0 - 50 U/L 27 23 -   AST 0 - 45 U/L 27 24 24   Tot Protein 6.8 - 8.8 g/dL 7.8 - -   Albumin 3.4 - 5.0 g/dL 3.8 - -     Pancreas Latest Ref Rng & Units 3/13/2015 6/20/2012 2/10/2012   Amylase 30 - 110 U/L - - 61   Lipase 73 - 393 U/L 169 79 111     Iron studies Latest Ref Rng & Units 3/5/2015 10/7/2014 2/10/2012   Iron 35 - 180 ug/dL 83 68 96   Iron sat 15 - 46 % 29 20 42   Ferritin 10 - 300 ng/mL 79 85 106     [facility] Txp Virology Latest Ref Rng & Units 10/17/2018 2/19/2015 2/17/2015   CVM DNA Quant - Plasma EDTA PLASMA -   CMV Quant <100 Copies/mL - - -   CMV QT Log <2.0 Log copies/mL - - -   BK Spec - - - Plasma   BK Res BKNEG copies/mL - - BK Virus DNA Not Detected   BK Log <2.7 Log copies/mL - - Not Calculated   The Real-Time quantitative BK Virus assay was developed and its performance   characteristics determined by the Infectious Diseases Diagnostic Laboratory at   the [facility] in [place], [place]. The   primers and probes for each analyte are Analyte Specific Reagents (ASRs)   manufactured by [employer].   ASRs are used in many laboratory tests necessary for standard medical care and   generally do not require U.S. Food and Drug Administration approval. The FDA   has determined that such clearance or approval is not necessary.   This test is used for clinical purposes. It should not be regarded as   investigational or for research. This laboratory is certified under the   Clinical Laboratory Improvement Amendments of 1988 (CLIA-88) as qualified to   perform high complexity clinical laboratory testing.                      Again, thank you for allowing me to participate in the care of your patient.      Sincerely,     Kidney/Pancreas 3

## 2020-01-14 NOTE — PROGRESS NOTES
ACUTE TRANSPLANT NEPHROLOGY VISIT    Assessment & Plan   # LDKT: Stable   - Baseline Cr ~ 1.5-1.8   - Proteinuria: Mild (0.5-1.0 grams)   - Date DSA Last Checked: Feb/2015      Latest DSA: No   - BK Viremia: No   - Kidney Tx Biopsy: No    Cr 1.55mg/dL, which is at her baseline and has been stable for the last 5 years. She has an increasing amount of proteinuria likely from her mTORi, and was previously on ACEi which was stopped last year. We will restart lisinopril 5mg daily to control her proteinuria. We will get an U/S of her native and transplanted kidney (workup of hypercalcemia as noted below)                 - Get DSA    # Immunosuppression: Sirolimus (goal 4-6)   - Changes: No, continue on current regimen. Last sirolimus level was 6.2 (1/13/2020) and no changes to her dose were made.    # Infection Prophylaxis:   - PJP: None    # Hypertension: Controlled;  Goal BP: < 130/80   - Volume status: Euvolemic  EDW~ 54.5kg   - Changes: Yes - will start a low-dose lisinpril 5mg to help with proteinuria.    # Mineral Bone Disorder:   - Calcium; level: High        On Supplement: No    We will check a PTH, vitamin D, SPEP, FLC assay. We are concerned that her skin cancer may be related to this, however we will do a workup and go from there. We will get an U/S of her transplant and native kidneys to identify any lesions that may be suspicious for malignancy.     # Elevated Blood Glucose: -130, we will get a HbA1c. She has also gained 20lbs in the last few months.     # Electrolytes:   - Potassium; level: Normal        On Supplement: No  - Magnesium; level: Not checked recently        On Supplement: No  - Bicarbonate; level: Normal        On Supplement: No  - Sodium; level: Normal        On Supplement: No    # Anemia: Hgb stable at 10.5, we will get an iron panel    # Fatigue: She reports fatigue has set in mostly over the last few years. She reports a history of seasonal depression. I do not feel this is related to  an underlying infection or etiology that is related to her transplant or medications.                   - Get TSH    # Depression: On paxil, dose has been stable for many years.     # Weight loss: Patient is trying to lose ~10lb to get ready for her daughters wedding. She feels tired by the weight and she is trying to eat better and exercise. HbA1c as above.    # Skin Cancer: Patient still follows with dermatology and had many cancers removed and biopsied over the years. If hypercalcemia doesn't improve, we will talk to her dermatologist and inquire if her skin cancer may be related to this.     # Influenza vaccination: Patient will get an influenza vaccination today.     # Age-appropriate cancer screening: Up to date    # Medical Compliance: Yes    # Transplant History:  Etiology of Kidney Failure: Focal segmental glomerulosclerosis (FSGS)  Tx: LDKT  Transplant: 6/2/1992 (Kidney)  Donor Type: Living Donor Class:   Significant changes in immunosuppression: None  Significant transplant-related complications: None    Transplant Office Phone Number: [phone]    Assessment and plan was discussed with the patient and she voiced her understanding and agreement.    Return visit: Return in about 6 months (around 7/14/2020).    Aparna Gonzalez MD    Chief Complaint   Ms. Quan is a 54 year old here for routine follow up.     History of Present Illness     Patient was last seen in the nephrology clinic on 2/16/2018 by Dr Burns. Please see his note for more details.    She is s/p LDKT 1992 and has been doing well over the last several years with minimal interruption of her medications and good compliance.     She was switched from tacrolimus to sirolimus ~15 years ago due to her skin cancer history. She was also taken off prednisone ~2 years ago.     She still has her native kidneys in place, denies any pain or hematuria.      She denies chest pain and has no shortness of breath. She reports eating and drinking well, and  denies any dysphagia or nausea/vomiting. She reports chronic fatigue that has been ongoing for many years. She denies any recent weight loss (has gained 20lb in the last few months) and denies night sweats or chills. She has no diarrhea    Recent Hospitalizations:  [x] No [] Yes    New Medical Issues: [x] No [] Yes    Decreased energy: [x] No [] Yes    Chest pain or SOB with exertion:  [x] No [] Yes    Appetite change or weight change: [x] No [] Yes    Nausea, vomiting or diarrhea:  [x] No [] Yes    Fever, sweats or chills: [x] No [] Yes    Leg swelling: [x] No [] Yes      Home BP: Not checked    Review of Systems   A comprehensive review of systems was obtained and negative, except as noted in the HPI or PMH.    Problem List   Patient Active Problem List   Diagnosis     Ulcerative colitis (H)     Migraine     Allergic rhinitis     Hearing loss     Kidney replaced by transplant     Lichen planus     Giant Platelet syndrome     Nephrotic syndrome in diseases classified elsewhere     Major depression in partial remission (H)     CARDIOVASCULAR SCREENING; LDL GOAL LESS THAN 100     Health Care Home     Actinic keratosis     History of skin cancer     History of immunosuppression therapy     CKD (chronic kidney disease) stage 3, GFR 30-59 ml/min (H)     High risk medications (not anticoagulants) long-term use     Seborrheic dermatitis     Dermatitis     Vitiligo     History of SCC (squamous cell carcinoma) of skin     Lichen planopilaris     Hyperlipidemia with target LDL less than 130     Status post kidney transplant     Enterococcus UTI     AK (actinic keratosis)     Neoplasm of uncertain behavior of skin     History of nonmelanoma skin cancer     HTN (hypertension)     Immunosuppression (H)     Multiple benign melanocytic nevi     Screening for cervical cancer       Social History   Social History     Tobacco Use     Smoking status: Never Smoker     Smokeless tobacco: Never Used   Substance Use Topics     Alcohol  "use: Yes     Alcohol/week: 0.0 standard drinks     Comment: 2x /week     Drug use: No       Allergies   Allergies   Allergen Reactions     Ampicillin Swelling     Swelling of mouth and tongue.      Seasonal Allergies Other (See Comments)     Itchy eyes and rhinitis.       Medications   Current Outpatient Medications   Medication Sig     atorvastatin (LIPITOR) 10 MG tablet Take 1 tablet (10 mg) by mouth daily     lisinopril (PRINIVIL/ZESTRIL) 10 MG tablet Take 0.5 tablets (5 mg) by mouth daily     PARoxetine (PAXIL) 20 MG tablet Take 1 tablet every morning.     sirolimus (GENERIC EQUIVALENT) 1 MG tablet Take 3 tablets (3 mg) by mouth daily     triamcinolone (KENALOG) 0.1 % external cream APPLY SPARINGLY EXTERNALLY TO THE AFFECTED AREA THREE TIMES DAILY FOR 14 DAYS     traMADol (ULTRAM) 50 MG tablet Take 1 tablet (50 mg) by mouth every 6 hours as needed for severe pain (Patient not taking: Reported on 1/14/2020)     Current Facility-Administered Medications   Medication     lidocaine 1% with EPINEPHrine 1:100,000 injection 3 mL     lidocaine 1% with EPINEPHrine 1:100,000 injection 3 mL     Medications Discontinued During This Encounter   Medication Reason     influenza recomb quadrivalent PF (FLUBLOK) injection 0.5 mL Duplicate       Physical Exam   Vital Signs: /87 (BP Location: Right arm, Patient Position: Sitting, Cuff Size: Adult Regular)   Pulse 77   Temp 98.2  F (36.8  C) (Oral)   Resp 20   Ht 1.575 m (5' 2\")   Wt 59.3 kg (130 lb 11.2 oz)   LMP 05/14/2014 (Approximate)   SpO2 98%   BMI 23.91 kg/m      GENERAL APPEARANCE: alert and no distress  HENT: mouth without ulcers or lesions  LYMPHATICS: no cervical or supraclavicular nodes  RESP: lungs clear to auscultation - no rales, rhonchi or wheezes  CV: regular rhythm, normal rate, no rub, no murmur  EDEMA: no LE edema bilaterally  ABDOMEN: soft, nondistended, nontender, bowel sounds normal  MS: extremities normal - no gross deformities noted, no " evidence of inflammation in joints, no muscle tenderness  SKIN: no rash    Data     Renal Latest Ref Rng & Units 1/13/2020 10/11/2019 6/14/2019   Na 133 - 144 mmol/L 137 137 142   K 3.4 - 5.3 mmol/L 4.6 3.7 4.2   Cl 94 - 109 mmol/L 105 106 108   CO2 20 - 32 mmol/L 25 25 26   BUN 7 - 30 mg/dL 34(H) 34(H) 32(H)   Cr 0.52 - 1.04 mg/dL 1.55(H) 1.44(H) 1.59(H)   Glucose 70 - 99 mg/dL 112(H) 115(H) 105(H)   Ca  8.5 - 10.1 mg/dL 10.0 10.3(H) 9.8   Mg 1.6 - 2.3 mg/dL - - -     Bone Health Latest Ref Rng & Units 7/2/2015 2/20/2015 2/19/2015   Phos 2.5 - 4.5 mg/dL 3.4 3.3 2.4(L)     Heme Latest Ref Rng & Units 1/13/2020 10/11/2019 6/14/2019   WBC 4.0 - 11.0 10e9/L 4.5 4.0 4.1   Hgb 11.7 - 15.7 g/dL 10.5(L) 10.9(L) 11.1(L)   Plt 150 - 450 10e9/L 36(LL) 41(LL) 43(LL)     Liver Latest Ref Rng & Units 6/14/2019 9/27/2017 11/28/2016   AP 40 - 150 U/L 97 - -   TBili 0.2 - 1.3 mg/dL 0.2 - -   DBili 0.0 - 0.2 mg/dL - - -   ALT 0 - 50 U/L 27 23 -   AST 0 - 45 U/L 27 24 24   Tot Protein 6.8 - 8.8 g/dL 7.8 - -   Albumin 3.4 - 5.0 g/dL 3.8 - -     Pancreas Latest Ref Rng & Units 3/13/2015 6/20/2012 2/10/2012   Amylase 30 - 110 U/L - - 61   Lipase 73 - 393 U/L 169 79 111     Iron studies Latest Ref Rng & Units 3/5/2015 10/7/2014 2/10/2012   Iron 35 - 180 ug/dL 83 68 96   Iron sat 15 - 46 % 29 20 42   Ferritin 10 - 300 ng/mL 79 85 106     [facility] Txp Virology Latest Ref Rng & Units 10/17/2018 2/19/2015 2/17/2015   CVM DNA Quant - Plasma EDTA PLASMA -   CMV Quant <100 Copies/mL - - -   CMV QT Log <2.0 Log copies/mL - - -   BK Spec - - - Plasma   BK Res BKNEG copies/mL - - BK Virus DNA Not Detected   BK Log <2.7 Log copies/mL - - Not Calculated   The Real-Time quantitative BK Virus assay was developed and its performance   characteristics determined by the Infectious Diseases Diagnostic Laboratory at   the [facility] in [place], [place]. The   primers and probes for each analyte are Analyte Specific Reagents  (ASRs)   manufactured by [employer].   ASRs are used in many laboratory tests necessary for standard medical care and   generally do not require U.S. Food and Drug Administration approval. The FDA   has determined that such clearance or approval is not necessary.   This test is used for clinical purposes. It should not be regarded as   investigational or for research. This laboratory is certified under the   Clinical Laboratory Improvement Amendments of 1988 (CLIA-88) as qualified to   perform high complexity clinical laboratory testing.

## 2020-01-16 NOTE — PROGRESS NOTES
ACUTE TRANSPLANT NEPHROLOGY VISIT    Assessment & Plan   # LDKT: Stable   - Baseline Cr ~ 1.5-1.8   - Proteinuria: Mild (0.5-1.0 grams)   - Date DSA Last Checked: Feb/2015      Latest DSA: No   - BK Viremia: No   - Kidney Tx Biopsy: No    Cr 1.55mg/dL, which is at her baseline and has been stable for the last 5 years. She has an increasing amount of proteinuria likely from her mTORi, and was previously on ACEi which was stopped last year. We will restart lisinopril 5mg daily to control her proteinuria. We will get an U/S of her native and transplanted kidney (workup of hypercalcemia as noted below)                 - Get DSA    # Immunosuppression: Sirolimus (goal 4-6)   - Changes: No, continue on current regimen. Last sirolimus level was 6.2 (1/13/2020) and no changes to her dose were made.    # Infection Prophylaxis:   - PJP: None    # Hypertension: Controlled;  Goal BP: < 130/80   - Volume status: Euvolemic  EDW~ 54.5kg   - Changes: Yes - will start a low-dose lisinpril 5mg to help with proteinuria.    # Mineral Bone Disorder:   - Calcium; level: High        On Supplement: No    We will check a PTH, vitamin D, SPEP, FLC assay. We are concerned that her skin cancer may be related to this, however we will do a workup and go from there. We will get an U/S of her transplant and native kidneys to identify any lesions that may be suspicious for malignancy.     # Elevated Blood Glucose: -130, we will get a HbA1c. She has also gained 20lbs in the last few months.     # Electrolytes:   - Potassium; level: Normal        On Supplement: No  - Magnesium; level: Not checked recently        On Supplement: No  - Bicarbonate; level: Normal        On Supplement: No  - Sodium; level: Normal        On Supplement: No    # Anemia: Hgb stable at 10.5, we will get an iron panel    # Fatigue: She reports fatigue has set in mostly over the last few years. She reports a history of seasonal depression. I do not feel this is related to  an underlying infection or etiology that is related to her transplant or medications.                   - Get TSH    # Depression: On paxil, dose has been stable for many years.     # Weight loss: Patient is trying to lose ~10lb to get ready for her daughters wedding. She feels tired by the weight and she is trying to eat better and exercise. HbA1c as above.    # Skin Cancer: Patient still follows with dermatology and had many cancers removed and biopsied over the years. If hypercalcemia doesn't improve, we will talk to her dermatologist and inquire if her skin cancer may be related to this.     # Influenza vaccination: Patient will get an influenza vaccination today.     # Age-appropriate cancer screening: Up to date    # Medical Compliance: Yes    # Transplant History:  Etiology of Kidney Failure: Focal segmental glomerulosclerosis (FSGS)  Tx: LDKT  Transplant: 6/2/1992 (Kidney)  Donor Type: Living Donor Class:   Significant changes in immunosuppression: None  Significant transplant-related complications: None    Transplant Office Phone Number: [phone]    Assessment and plan was discussed with the patient and she voiced her understanding and agreement.    Return visit: Return in about 6 months (around 7/14/2020).    Aparna Gonzalez MD    Chief Complaint   Ms. Quan is a 54 year old here for routine follow up.     History of Present Illness     Patient was last seen in the nephrology clinic on 2/16/2018 by Dr Burns. Please see his note for more details.    She is s/p LDKT 1992 and has been doing well over the last several years with minimal interruption of her medications and good compliance.     She was switched from tacrolimus to sirolimus ~15 years ago due to her skin cancer history. She was also taken off prednisone ~2 years ago.     She still has her native kidneys in place, denies any pain or hematuria.      She denies chest pain and has no shortness of breath. She reports eating and drinking well, and  denies any dysphagia or nausea/vomiting. She reports chronic fatigue that has been ongoing for many years. She denies any recent weight loss (has gained 20lb in the last few months) and denies night sweats or chills. She has no diarrhea    Recent Hospitalizations:  [x] No [] Yes    New Medical Issues: [x] No [] Yes    Decreased energy: [x] No [] Yes    Chest pain or SOB with exertion:  [x] No [] Yes    Appetite change or weight change: [x] No [] Yes    Nausea, vomiting or diarrhea:  [x] No [] Yes    Fever, sweats or chills: [x] No [] Yes    Leg swelling: [x] No [] Yes      Home BP: Not checked    Review of Systems   A comprehensive review of systems was obtained and negative, except as noted in the HPI or PMH.    Problem List   Patient Active Problem List   Diagnosis     Ulcerative colitis (H)     Migraine     Allergic rhinitis     Hearing loss     Kidney replaced by transplant     Lichen planus     Giant Platelet syndrome     Nephrotic syndrome in diseases classified elsewhere     Major depression in partial remission (H)     CARDIOVASCULAR SCREENING; LDL GOAL LESS THAN 100     Health Care Home     Actinic keratosis     History of skin cancer     History of immunosuppression therapy     CKD (chronic kidney disease) stage 3, GFR 30-59 ml/min (H)     High risk medications (not anticoagulants) long-term use     Seborrheic dermatitis     Dermatitis     Vitiligo     History of SCC (squamous cell carcinoma) of skin     Lichen planopilaris     Hyperlipidemia with target LDL less than 130     Status post kidney transplant     Enterococcus UTI     AK (actinic keratosis)     Neoplasm of uncertain behavior of skin     History of nonmelanoma skin cancer     HTN (hypertension)     Immunosuppression (H)     Multiple benign melanocytic nevi     Screening for cervical cancer       Social History   Social History     Tobacco Use     Smoking status: Never Smoker     Smokeless tobacco: Never Used   Substance Use Topics     Alcohol  "use: Yes     Alcohol/week: 0.0 standard drinks     Comment: 2x /week     Drug use: No       Allergies   Allergies   Allergen Reactions     Ampicillin Swelling     Swelling of mouth and tongue.      Seasonal Allergies Other (See Comments)     Itchy eyes and rhinitis.       Medications   Current Outpatient Medications   Medication Sig     atorvastatin (LIPITOR) 10 MG tablet Take 1 tablet (10 mg) by mouth daily     lisinopril (PRINIVIL/ZESTRIL) 10 MG tablet Take 0.5 tablets (5 mg) by mouth daily     PARoxetine (PAXIL) 20 MG tablet Take 1 tablet every morning.     sirolimus (GENERIC EQUIVALENT) 1 MG tablet Take 3 tablets (3 mg) by mouth daily     triamcinolone (KENALOG) 0.1 % external cream APPLY SPARINGLY EXTERNALLY TO THE AFFECTED AREA THREE TIMES DAILY FOR 14 DAYS     traMADol (ULTRAM) 50 MG tablet Take 1 tablet (50 mg) by mouth every 6 hours as needed for severe pain (Patient not taking: Reported on 1/14/2020)     Current Facility-Administered Medications   Medication     lidocaine 1% with EPINEPHrine 1:100,000 injection 3 mL     lidocaine 1% with EPINEPHrine 1:100,000 injection 3 mL     Medications Discontinued During This Encounter   Medication Reason     influenza recomb quadrivalent PF (FLUBLOK) injection 0.5 mL Duplicate       Physical Exam   Vital Signs: /87 (BP Location: Right arm, Patient Position: Sitting, Cuff Size: Adult Regular)   Pulse 77   Temp 98.2  F (36.8  C) (Oral)   Resp 20   Ht 1.575 m (5' 2\")   Wt 59.3 kg (130 lb 11.2 oz)   LMP 05/14/2014 (Approximate)   SpO2 98%   BMI 23.91 kg/m      GENERAL APPEARANCE: alert and no distress  HENT: mouth without ulcers or lesions  LYMPHATICS: no cervical or supraclavicular nodes  RESP: lungs clear to auscultation - no rales, rhonchi or wheezes  CV: regular rhythm, normal rate, no rub, no murmur  EDEMA: no LE edema bilaterally  ABDOMEN: soft, nondistended, nontender, bowel sounds normal  MS: extremities normal - no gross deformities noted, no " evidence of inflammation in joints, no muscle tenderness  SKIN: no rash    Data     Renal Latest Ref Rng & Units 1/13/2020 10/11/2019 6/14/2019   Na 133 - 144 mmol/L 137 137 142   K 3.4 - 5.3 mmol/L 4.6 3.7 4.2   Cl 94 - 109 mmol/L 105 106 108   CO2 20 - 32 mmol/L 25 25 26   BUN 7 - 30 mg/dL 34(H) 34(H) 32(H)   Cr 0.52 - 1.04 mg/dL 1.55(H) 1.44(H) 1.59(H)   Glucose 70 - 99 mg/dL 112(H) 115(H) 105(H)   Ca  8.5 - 10.1 mg/dL 10.0 10.3(H) 9.8   Mg 1.6 - 2.3 mg/dL - - -     Bone Health Latest Ref Rng & Units 7/2/2015 2/20/2015 2/19/2015   Phos 2.5 - 4.5 mg/dL 3.4 3.3 2.4(L)     Heme Latest Ref Rng & Units 1/13/2020 10/11/2019 6/14/2019   WBC 4.0 - 11.0 10e9/L 4.5 4.0 4.1   Hgb 11.7 - 15.7 g/dL 10.5(L) 10.9(L) 11.1(L)   Plt 150 - 450 10e9/L 36(LL) 41(LL) 43(LL)     Liver Latest Ref Rng & Units 6/14/2019 9/27/2017 11/28/2016   AP 40 - 150 U/L 97 - -   TBili 0.2 - 1.3 mg/dL 0.2 - -   DBili 0.0 - 0.2 mg/dL - - -   ALT 0 - 50 U/L 27 23 -   AST 0 - 45 U/L 27 24 24   Tot Protein 6.8 - 8.8 g/dL 7.8 - -   Albumin 3.4 - 5.0 g/dL 3.8 - -     Pancreas Latest Ref Rng & Units 3/13/2015 6/20/2012 2/10/2012   Amylase 30 - 110 U/L - - 61   Lipase 73 - 393 U/L 169 79 111     Iron studies Latest Ref Rng & Units 3/5/2015 10/7/2014 2/10/2012   Iron 35 - 180 ug/dL 83 68 96   Iron sat 15 - 46 % 29 20 42   Ferritin 10 - 300 ng/mL 79 85 106     [facility] Txp Virology Latest Ref Rng & Units 10/17/2018 2/19/2015 2/17/2015   CVM DNA Quant - Plasma EDTA PLASMA -   CMV Quant <100 Copies/mL - - -   CMV QT Log <2.0 Log copies/mL - - -   BK Spec - - - Plasma   BK Res BKNEG copies/mL - - BK Virus DNA Not Detected   BK Log <2.7 Log copies/mL - - Not Calculated   The Real-Time quantitative BK Virus assay was developed and its performance   characteristics determined by the [facility] at   the [facility] in [place], [place]. The   primers and probes for each analyte are Analyte Specific Reagents  (ASRs)   manufactured by [employer].   ASRs are used in many laboratory tests necessary for standard medical care and   generally do not require U.S. Food and Drug Administration approval. The FDA   has determined that such clearance or approval is not necessary.   This test is used for clinical purposes. It should not be regarded as   investigational or for research. This laboratory is certified under the   Clinical Laboratory Improvement Amendments of 1988 (CLIA-88) as qualified to   perform high complexity clinical laboratory testing.                Patient was seen and evaluated by me, Lamonte Isaac MD. I have reviewed the note and agree with the the plan of care as documented by the fellow.

## 2020-01-17 ENCOUNTER — ANCILLARY PROCEDURE (OUTPATIENT)
Dept: ULTRASOUND IMAGING | Facility: CLINIC | Age: 55
End: 2020-01-17
Attending: STUDENT IN AN ORGANIZED HEALTH CARE EDUCATION/TRAINING PROGRAM
Payer: COMMERCIAL

## 2020-01-17 DIAGNOSIS — Z48.298 AFTERCARE FOLLOWING ORGAN TRANSPLANT: ICD-10-CM

## 2020-01-17 DIAGNOSIS — E83.52 HYPERCALCEMIA: ICD-10-CM

## 2020-01-17 DIAGNOSIS — R80.8 OTHER PROTEINURIA: ICD-10-CM

## 2020-01-17 LAB
ALBUMIN UR-MCNC: 100 MG/DL
APPEARANCE UR: CLEAR
BILIRUB UR QL STRIP: NEGATIVE
COLOR UR AUTO: YELLOW
FERRITIN SERPL-MCNC: 60 NG/ML (ref 8–252)
GLUCOSE UR STRIP-MCNC: NEGATIVE MG/DL
HBA1C MFR BLD: 5.7 % (ref 0–5.6)
HGB UR QL STRIP: ABNORMAL
IRON SATN MFR SERPL: 21 % (ref 15–46)
IRON SERPL-MCNC: 61 UG/DL (ref 35–180)
KETONES UR STRIP-MCNC: NEGATIVE MG/DL
LEUKOCYTE ESTERASE UR QL STRIP: ABNORMAL
MUCOUS THREADS #/AREA URNS LPF: PRESENT /LPF
NITRATE UR QL: NEGATIVE
PH UR STRIP: 5 PH (ref 5–7)
PTH-INTACT SERPL-MCNC: 59 PG/ML (ref 18–80)
RBC #/AREA URNS AUTO: 1 /HPF (ref 0–2)
SOURCE: ABNORMAL
SP GR UR STRIP: 1.01 (ref 1–1.03)
SQUAMOUS #/AREA URNS AUTO: <1 /HPF (ref 0–1)
TIBC SERPL-MCNC: 293 UG/DL (ref 240–430)
TSH SERPL DL<=0.005 MIU/L-ACNC: 3.8 MU/L (ref 0.4–4)
UROBILINOGEN UR STRIP-MCNC: 0 MG/DL (ref 0–2)
WBC #/AREA URNS AUTO: 3 /HPF (ref 0–5)

## 2020-01-19 LAB — DEPRECATED CALCIDIOL+CALCIFEROL SERPL-MC: 25 UG/L (ref 20–75)

## 2020-01-20 LAB
ALBUMIN SERPL ELPH-MCNC: 4.4 G/DL (ref 3.7–5.1)
ALPHA1 GLOB SERPL ELPH-MCNC: 0.4 G/DL (ref 0.2–0.4)
ALPHA2 GLOB SERPL ELPH-MCNC: 0.9 G/DL (ref 0.5–0.9)
B-GLOBULIN SERPL ELPH-MCNC: 0.7 G/DL (ref 0.6–1)
GAMMA GLOB SERPL ELPH-MCNC: 0.9 G/DL (ref 0.7–1.6)
KAPPA LC UR-MCNC: 3.59 MG/DL (ref 0.33–1.94)
KAPPA LC/LAMBDA SER: 1.17 {RATIO} (ref 0.26–1.65)
LAMBDA LC SERPL-MCNC: 3.07 MG/DL (ref 0.57–2.63)
M PROTEIN SERPL ELPH-MCNC: 0 G/DL
PROT PATTERN SERPL ELPH-IMP: NORMAL

## 2020-01-22 ENCOUNTER — DOCUMENTATION ONLY (OUTPATIENT)
Dept: TRANSPLANT | Facility: CLINIC | Age: 55
End: 2020-01-22

## 2020-01-22 NOTE — PROGRESS NOTES
Aparna Gonzalez MD Ututalum, Teresa, RN             Nothing new to add based on these labs. The SPEP looks normal,a nd he elevated FLC assay I also normal, the ratio is stable and within goal so here is no abnormality.     Thanks

## 2020-02-13 RX ORDER — HYDROXYCHLOROQUINE SULFATE 200 MG/1
200 TABLET, FILM COATED ORAL 2 TIMES DAILY
Qty: 60 TABLET | Refills: 5 | Status: CANCELLED | OUTPATIENT
Start: 2020-02-13

## 2020-03-03 DIAGNOSIS — F32.4 MAJOR DEPRESSIVE DISORDER WITH SINGLE EPISODE, IN PARTIAL REMISSION (H): ICD-10-CM

## 2020-03-03 NOTE — TELEPHONE ENCOUNTER
"Requested Prescriptions   Pending Prescriptions Disp Refills     PARoxetine (PAXIL) 20 MG tablet [Pharmacy Med Name: PAROXETINE 20MG TABLETS] 90 tablet 1     Sig: TAKE 1 TABLET BY MOUTH EVERY MORNING   Last Written Prescription Date:  6/4/19  Last Fill Quantity: 90,  # refills: 1   Last office visit: 6/14/2019 with prescribing provider:  Velvet Smith PA-C    Future Office Visit:        SSRIs Protocol Failed - 3/3/2020  2:18 PM        Failed - PHQ-9 score less than 5 in past 6 months     Please review last PHQ-9 score.   PHQ-9 SCORE 7/17/2017 2/28/2018 6/4/2019   PHQ-9 Total Score - - -   PHQ-9 Total Score MyChart - 5 (Mild depression) -   PHQ-9 Total Score 2 5 8     DEBRA-7 SCORE 7/17/2017 2/28/2018 6/4/2019   Total Score - 4 (minimal anxiety) -   Total Score 11 4 5               Failed - Recent (6 mo) or future (30 days) visit within the authorizing provider's specialty     Patient had office visit in the last 6 months or has a visit in the next 30 days with authorizing provider or within the authorizing provider's specialty.  See \"Patient Info\" tab in inbasket, or \"Choose Columns\" in Meds & Orders section of the refill encounter.            Passed - Medication is active on med list        Passed - Patient is age 18 or older        Passed - No active pregnancy on record        Passed - No positive pregnancy test in last 12 months         "

## 2020-03-04 RX ORDER — PAROXETINE 20 MG/1
TABLET, FILM COATED ORAL
Qty: 90 TABLET | Refills: 1 | Status: SHIPPED | OUTPATIENT
Start: 2020-03-04 | End: 2020-06-29

## 2020-03-04 NOTE — TELEPHONE ENCOUNTER
PHQ 2/28/2018 6/4/2019 3/4/2020   PHQ-9 Total Score 5 8 5   Q9: Thoughts of better off dead/self-harm past 2 weeks Not at all Not at all Not at all     DEBRA-7 SCORE 2/28/2018 6/4/2019 3/4/2020   Total Score 4 (minimal anxiety) - 2 (minimal anxiety)   Total Score 4 5 2

## 2020-03-13 ENCOUNTER — TELEPHONE (OUTPATIENT)
Dept: TRANSPLANT | Facility: CLINIC | Age: 55
End: 2020-03-13

## 2020-03-13 NOTE — TELEPHONE ENCOUNTER
Patient Call: Transplant Illness  Stomach pain since Monday 03/09/2020 thinks possible UTI or coming down with bad cold-her boyfriend has had the flu since Monday.     Transplanted organ? kidney  Illness: Pain: Location Stomach

## 2020-03-13 NOTE — TELEPHONE ENCOUNTER
PLAN:  Call back Reinadashawn Quan and discuss symptoms.    OUTCOME:  Spoke with Kathy who reports abdominal pain since Monday night.  She has previous UTI's in the past but she has not felt pain like this.  Denies fever. She usually immediately gets a fever with a UTI.  Describes pain as on and off. Not cramping.   Feels like there are golf balls in her stomach.  Feels the pain when sleeping, and changing positions.  Pain moves around.  Denies pain with voiding. No urgency.  No changes in urine output.  RNCC recommended she see a provider.   States she will set up appointment with her PCP.  Also reports boyfriend having flu-like symptoms who is going to get tested today.  Discussed handwashing and avoid touching face, eyes, nose and mouth.  Verbalized understanding. She will keep RNCC updated. Appreciates call.

## 2020-04-03 ENCOUNTER — TELEPHONE (OUTPATIENT)
Dept: TRANSPLANT | Facility: CLINIC | Age: 55
End: 2020-04-03

## 2020-04-03 NOTE — TELEPHONE ENCOUNTER
Patient Call: General  Route to LPN    Reason for call: Please connect with pt regarding her doing labs the week of the 14th     Call back needed? Yes    Return Call Needed  Same as documented in contacts section  When to return call?: Greater than one day: Route standard priority

## 2020-04-14 ENCOUNTER — TELEPHONE (OUTPATIENT)
Dept: NEPHROLOGY | Facility: CLINIC | Age: 55
End: 2020-04-14

## 2020-04-14 ENCOUNTER — VIRTUAL VISIT (OUTPATIENT)
Dept: NEPHROLOGY | Facility: CLINIC | Age: 55
End: 2020-04-14
Attending: INTERNAL MEDICINE
Payer: COMMERCIAL

## 2020-04-14 DIAGNOSIS — D84.9 IMMUNOSUPPRESSION (H): ICD-10-CM

## 2020-04-14 DIAGNOSIS — Z48.298 AFTERCARE FOLLOWING ORGAN TRANSPLANT: Primary | ICD-10-CM

## 2020-04-14 ASSESSMENT — PAIN SCALES - GENERAL: PAINLEVEL: NO PAIN (0)

## 2020-04-14 NOTE — PROGRESS NOTES
"TRANSPLANT NEPHROLOGY TELEMEDICINE VISIT    Reina Quan is a 55 year old female who is being evaluated via a billable telemedicine (video/telephone) visit on April 14, 2020.     The patient has been notified of following:     \"This telemedicine (video/telephone) visit will be conducted via a video/phone call between you and your physician. We have found that certain health care needs can be provided without the need for a physical exam.  This service lets us provide the care you need with a short video/phone conversation.  If a prescription is necessary, we can send it directly to your pharmacy.  If lab work is needed, we can place an order for that and you can then stop by our lab to have the test done at a later time.    If during the course of this video/phone call the physician feels a telemedicine (video/telephone) visit is not appropriate, you will not be charged for this service and an in clinic visit will be arranged at a later date.\"     Assessment & Plan   # LDKT: Stable              - Baseline Cr ~ 1.5-1.8              - Proteinuria: Mild (0.5-1.0 grams)              - Date DSA Last Checked: Feb/2015      Latest DSA: No              - BK Viremia: No              - Kidney Tx Biopsy: No     # Immunosuppression: Sirolimus (goal 4-6)              - Changes: No, continue on current regimen.      # Infection Prophylaxis:   - PJP: None     # Hypertension: Controlled;   Goal BP: < 130/80              - Volume status: Euvolemic                EDW~ 54.5kg              - Changes: No     # Mineral Bone Disorder:   - Calcium; level: High normal       On Supplement: No     # Elevated Blood Glucose: -130, HbA1c 5.7%.     # Electrolytes:   - Potassium; level: Normal        On Supplement: No  - Magnesium; level: Not checked recently        On Supplement: No  - Bicarbonate; level: Normal        On Supplement: No  - Sodium; level: Normal        On Supplement: No     # Anemia: Hgb stable at 10.5, Iron panel wnl "      # Fatigue: Stable to resolved      # Depression: On paxil, dose has been stable for many years.      # Skin Cancer: stressed the importance of follow     # Age-appropriate cancer screening: Up to date     # Medical Compliance: Yes    # COVID-19 Virus Review: Discussed COVID-19 virus and the potential medical risks.  Reviewed preventative health recommendations, which includes washing hands for 20 seconds, avoid touching your face, and social distancing.  Asked patient to inform the transplant center if they are exposed or diagnosed with this virus.    # Transplant History:  Etiology of Kidney Failure: Focal segmental glomerulosclerosis (FSGS)  Tx: LDKT  Transplant: 6/2/1992 (Kidney)  Donor Type: Living      Donor Class:   Significant changes in immunosuppression: None  Significant transplant-related complications: None     Transplant Office Phone Number: [phone]     Assessment and plan was discussed with the patient and she voiced her understanding and agreement.      Return Visit: 1 year       Reina Quan has a clinical telephone visit for routine follow up and immunosuppression management.    History of Present Illness      Overall Reina is doing well. Has no new complaints or concerns. No NVD. Energy level at baseline.     Recent Hospitalizations:  [x] No [] Yes    New Medical Issues: [x] No [] Yes    Decreased energy: [x] No [] Yes    Chest pain or SOB with exertion:  [x] No [] Yes    Appetite change or weight change: [x] No [] Yes    Nausea, vomiting or diarrhea:  [x] No [] Yes    Fever, sweats or chills: [x] No [] Yes    Leg swelling: [x] No [] Yes      Home BP: 130/90    Review of Systems   A comprehensive review of systems was obtained and negative, except as noted in the HPI or PMH.    I have reviewed and updated the patient's Past Medical History, Social History, and Medication List.    Active Medical Problems  Patient Active Problem List   Diagnosis     Ulcerative colitis (H)      Migraine     Allergic rhinitis     Hearing loss     Kidney replaced by transplant     Lichen planus     Giant Platelet syndrome     Nephrotic syndrome in diseases classified elsewhere     Major depression in partial remission (H)     CARDIOVASCULAR SCREENING; LDL GOAL LESS THAN 100     Health Care Home     Actinic keratosis     History of skin cancer     History of immunosuppression therapy     CKD (chronic kidney disease) stage 3, GFR 30-59 ml/min (H)     High risk medications (not anticoagulants) long-term use     Seborrheic dermatitis     Dermatitis     Vitiligo     History of SCC (squamous cell carcinoma) of skin     Lichen planopilaris     Hyperlipidemia with target LDL less than 130     Status post kidney transplant     Enterococcus UTI     AK (actinic keratosis)     Neoplasm of uncertain behavior of skin     History of nonmelanoma skin cancer     HTN (hypertension)     Immunosuppression (H)     Multiple benign melanocytic nevi     Screening for cervical cancer     Allergies  Ampicillin and Seasonal allergies    Medications  Current Outpatient Medications   Medication Sig     atorvastatin (LIPITOR) 10 MG tablet Take 1 tablet (10 mg) by mouth daily     lisinopril (PRINIVIL/ZESTRIL) 10 MG tablet Take 0.5 tablets (5 mg) by mouth daily     PARoxetine (PAXIL) 20 MG tablet TAKE 1 TABLET BY MOUTH EVERY MORNING     sirolimus (GENERIC EQUIVALENT) 1 MG tablet Take 3 tablets (3 mg) by mouth daily     triamcinolone (KENALOG) 0.1 % external cream APPLY SPARINGLY EXTERNALLY TO THE AFFECTED AREA THREE TIMES DAILY FOR 14 DAYS     traMADol (ULTRAM) 50 MG tablet Take 1 tablet (50 mg) by mouth every 6 hours as needed for severe pain (Patient not taking: Reported on 1/14/2020)     Current Facility-Administered Medications   Medication     lidocaine 1% with EPINEPHrine 1:100,000 injection 3 mL     lidocaine 1% with EPINEPHrine 1:100,000 injection 3 mL       Phone call contact time:  Total time 25 min     Lamonte Isaac MD

## 2020-04-14 NOTE — PROGRESS NOTES
"Reina Quan is a 55 year old female who is being evaluated via a billable video visit.      The patient has been notified of following:     \"This video visit will be conducted via a call between you and your physician/provider. We have found that certain health care needs can be provided without the need for an in-person physical exam.  This service lets us provide the care you need with a video conversation.  If a prescription is necessary we can send it directly to your pharmacy.  If lab work is needed we can place an order for that and you can then stop by our lab to have the test done at a later time.    Video visits are billed at different rates depending on your insurance coverage.  Please reach out to your insurance provider with any questions.    If during the course of the call the physician/provider feels a video visit is not appropriate, you will not be charged for this service.\"    Patient has given verbal consent for Video visit? Yes    How would you like to obtain your AVS? Ynes    Patient would like the video invitation sent by: Send to e-mail at: [email or web address]        Video-Visit Details    Type of service:  Video Visit        Originating Location (pt. Location): Home    Distant Location (provider location):  [facility]     Mode of Communication:  Video Conference via [employer]      Tested video connection with pt, was able to hear and see pt clearly.  "

## 2020-05-26 ENCOUNTER — TELEPHONE (OUTPATIENT)
Dept: TRANSPLANT | Facility: CLINIC | Age: 55
End: 2020-05-26

## 2020-05-26 NOTE — TELEPHONE ENCOUNTER
Patient Call: General  Route to LPN    Reason for call: Please connect with pt regarding going back to work(shes work for a ) she will be wearing a mask at work.    Send letter via [employer]    Call back needed? Yes    Return Call Needed  Same as documented in contacts section  When to return call?: Greater than one day: Route standard priority

## 2020-05-26 NOTE — LETTER
May 27, 2020        From: [facility]  Solid Organ Transplant Services      To Whom It May Concern:     Reina Quan underwent a kidney transplant and is required to take immunosuppressive medication.  As such she currently fits into a  high risk  category for COVID-19.     For patients that work outside the home, we recommend that all reasonable accommodations be made to allow working remotely or modifying work responsibilities to protect their health.     If you have any questions or concerns about the above request, please contact the Solid Organ Transplant Office at [phone], option 5.      Thank you for your partnership.    Sincerely,   [facility]   Solid Organ Transplant Services

## 2020-05-27 NOTE — TELEPHONE ENCOUNTER
Patient Call: General  Route to LPN    Reason for call: patient called to follow up on her work letter regarding the Covid 19 patient would need it by 5/29/20 sent to her my chart.    Call back needed? If needed

## 2020-05-27 NOTE — LETTER
May 27, 2020        From: [facility]  Solid Organ Transplant Services      To Whom It May Concern:     Reina Quan underwent a kidney transplant and is required to take immunosuppressive medication.  As such she currently fits into a  high risk  category for COVID-19.     For patients that work outside the home, we recommend that all reasonable accommodations be made to allow working remotely or modifying work responsibilities to protect their health.     If you have any questions or concerns about the above request, please contact the Solid Organ Transplant Office at [phone], option 5.      Thank you for your partnership.    Sincerely,   [facility]   Solid Organ Transplant Services

## 2020-05-27 NOTE — LETTER
May 27, 2020        From: [facility]  Solid Organ Transplant Services      To Whom It May Concern:     Reina Quan underwent a kidney transplant and is required to take immunosuppressive medication.  As such she currently fits into a  high risk  category for COVID-19.     For patients that work outside the home, we recommend that all reasonable accommodations be made to allow working remotely or modifying work responsibilities to protect their health.     If you have any questions or concerns about the above request, please contact the Solid Organ Transplant Office at [phone], option [phone].      Thank you for your partnership.    Sincerely,   [facility]   Solid Organ Transplant Services

## 2020-06-03 DIAGNOSIS — L20.82 FLEXURAL ECZEMA: ICD-10-CM

## 2020-06-03 RX ORDER — TRIAMCINOLONE ACETONIDE 1 MG/G
CREAM TOPICAL
Qty: 30 G | Refills: 0 | Status: SHIPPED | OUTPATIENT
Start: 2020-06-03 | End: 2021-04-15

## 2020-06-03 NOTE — LETTER
6/3/2020        RE: Reina Quan  [address] [address]        Medication is being filled for 1 time refill only due to:  Patient needs to be seen because due for an appt June 2020.     Will forward to the station, please try and get her scheduled for an appt - physical July 2020.  Thanks!      1st attempt, sent [place] message, needs to be seen in the next 30 days for future refills.       Patient Quality Outreach 2nd Attempt      Summary:    Type of outreach:    Phone, left message for patient/parent to call back.    Due for visit with PCP for med review.    Questions for provider review:    None                                                                                                                    Saurabh Brizuela CMA ([facility])     Chart routed to Care Team.      3rd attempt, mailed out letter.     Has appt on 6/29.  Saurabh Brizuela CMA ([facility])        Sincerely,        Velvet Smith PA-C

## 2020-06-03 NOTE — TELEPHONE ENCOUNTER
Medication is being filled for 1 time refill only due to:  Patient needs to be seen because due for an appt June 2020.     Will forward to the station, please try and get her scheduled for an appt - physical July 2020.  Thanks!

## 2020-06-03 NOTE — LETTER
June 16, 2020      Reina Quan  [address] [address]        Dear Ms. Reina Boucherlin,    We are contacting you today to notify you that you are due for a medication follow up for further refills. Please call [phone] to schedule an appointment.       Sincerely,     Velvet Smith PA-C  shaw

## 2020-06-04 ENCOUNTER — TELEPHONE (OUTPATIENT)
Dept: TRANSPLANT | Facility: CLINIC | Age: 55
End: 2020-06-04

## 2020-06-04 ENCOUNTER — MYC MEDICAL ADVICE (OUTPATIENT)
Dept: FAMILY MEDICINE | Facility: CLINIC | Age: 55
End: 2020-06-04

## 2020-06-04 DIAGNOSIS — Z48.298 AFTERCARE FOLLOWING ORGAN TRANSPLANT: Primary | ICD-10-CM

## 2020-06-04 DIAGNOSIS — Z94.0 KIDNEY REPLACED BY TRANSPLANT: ICD-10-CM

## 2020-06-04 NOTE — TELEPHONE ENCOUNTER
Patient Call: Transplant Lab/Orders  Route to LPN  Post Transplant Days: [id number]  When patient is less than 60 days post-transplant, route high priority    Reason for Call: needs updated labs in Epic for [facility]   Callback needed? If needed

## 2020-06-05 ENCOUNTER — TELEPHONE (OUTPATIENT)
Dept: TRANSPLANT | Facility: CLINIC | Age: 55
End: 2020-06-05

## 2020-06-05 DIAGNOSIS — Z48.298 AFTERCARE FOLLOWING ORGAN TRANSPLANT: ICD-10-CM

## 2020-06-05 DIAGNOSIS — Z94.0 KIDNEY REPLACED BY TRANSPLANT: ICD-10-CM

## 2020-06-05 LAB
ERYTHROCYTE [DISTWIDTH] IN BLOOD BY AUTOMATED COUNT: 18.1 % (ref 10–15)
HCT VFR BLD AUTO: 32.3 % (ref 35–47)
HGB BLD-MCNC: 9.9 G/DL (ref 11.7–15.7)
MCH RBC QN AUTO: 27.8 PG (ref 26.5–33)
MCHC RBC AUTO-ENTMCNC: 30.7 G/DL (ref 31.5–36.5)
MCV RBC AUTO: 91 FL (ref 78–100)
PLATELET # BLD AUTO: 38 10E9/L (ref 150–450)
PROT UR-MCNC: 0.57 G/L
PROT/CREAT 24H UR: 0.6 G/G CR (ref 0–0.2)
RBC # BLD AUTO: 3.56 10E12/L (ref 3.8–5.2)
WBC # BLD AUTO: 4.6 10E9/L (ref 4–11)

## 2020-06-05 PROCEDURE — 80048 BASIC METABOLIC PNL TOTAL CA: CPT | Performed by: INTERNAL MEDICINE

## 2020-06-05 PROCEDURE — 80076 HEPATIC FUNCTION PANEL: CPT | Performed by: INTERNAL MEDICINE

## 2020-06-05 PROCEDURE — 84156 ASSAY OF PROTEIN URINE: CPT | Performed by: INTERNAL MEDICINE

## 2020-06-05 PROCEDURE — 85027 COMPLETE CBC AUTOMATED: CPT | Performed by: INTERNAL MEDICINE

## 2020-06-05 PROCEDURE — 36415 COLL VENOUS BLD VENIPUNCTURE: CPT | Performed by: INTERNAL MEDICINE

## 2020-06-05 PROCEDURE — 80061 LIPID PANEL: CPT | Performed by: INTERNAL MEDICINE

## 2020-06-05 PROCEDURE — 80195 ASSAY OF SIROLIMUS: CPT | Performed by: INTERNAL MEDICINE

## 2020-06-05 NOTE — TELEPHONE ENCOUNTER
DATE:  6/5/2020   TIME OF RECEIPT FROM LAB: 11:58  LAB TEST:  Platelet  LAB VALUE:  38  RESULTS GIVEN WITH READ-BACK TO (PROVIDER):  Freddy Lee  TIME LAB VALUE REPORTED TO PROVIDER:   12:09

## 2020-06-06 LAB
ALBUMIN SERPL-MCNC: 3.9 G/DL (ref 3.4–5)
ALP SERPL-CCNC: 85 U/L (ref 40–150)
ALT SERPL W P-5'-P-CCNC: 28 U/L (ref 0–50)
ANION GAP SERPL CALCULATED.3IONS-SCNC: 7 MMOL/L (ref 3–14)
AST SERPL W P-5'-P-CCNC: 27 U/L (ref 0–45)
BILIRUB DIRECT SERPL-MCNC: <0.1 MG/DL (ref 0–0.2)
BILIRUB SERPL-MCNC: 0.3 MG/DL (ref 0.2–1.3)
BUN SERPL-MCNC: 40 MG/DL (ref 7–30)
CALCIUM SERPL-MCNC: 9.4 MG/DL (ref 8.5–10.1)
CHLORIDE SERPL-SCNC: 107 MMOL/L (ref 94–109)
CHOLEST SERPL-MCNC: 274 MG/DL
CO2 SERPL-SCNC: 23 MMOL/L (ref 20–32)
CREAT SERPL-MCNC: 1.62 MG/DL (ref 0.52–1.04)
GFR SERPL CREATININE-BSD FRML MDRD: 35 ML/MIN/{1.73_M2}
GLUCOSE SERPL-MCNC: 101 MG/DL (ref 70–99)
HDLC SERPL-MCNC: 93 MG/DL
LDLC SERPL CALC-MCNC: 156 MG/DL
NONHDLC SERPL-MCNC: 181 MG/DL
POTASSIUM SERPL-SCNC: 4.9 MMOL/L (ref 3.4–5.3)
PROT SERPL-MCNC: 7.7 G/DL (ref 6.8–8.8)
SIROLIMUS BLD-MCNC: 3.7 UG/L (ref 5–15)
SODIUM SERPL-SCNC: 137 MMOL/L (ref 133–144)
TME LAST DOSE: ABNORMAL H
TRIGL SERPL-MCNC: 124 MG/DL

## 2020-06-08 ENCOUNTER — TELEPHONE (OUTPATIENT)
Dept: TRANSPLANT | Facility: CLINIC | Age: 55
End: 2020-06-08

## 2020-06-08 DIAGNOSIS — Z94.0 KIDNEY TRANSPLANTED: Primary | ICD-10-CM

## 2020-06-08 DIAGNOSIS — D69.6 THROMBOCYTOPENIA (H): ICD-10-CM

## 2020-06-08 DIAGNOSIS — Z48.298 AFTERCARE FOLLOWING ORGAN TRANSPLANT: ICD-10-CM

## 2020-06-08 DIAGNOSIS — E78.5 HYPERLIPIDEMIA WITH TARGET LDL LESS THAN 130: ICD-10-CM

## 2020-06-08 RX ORDER — ATORVASTATIN CALCIUM 10 MG/1
20 TABLET, FILM COATED ORAL AT BEDTIME
Qty: 180 TABLET | Refills: 3 | Status: SHIPPED | OUTPATIENT
Start: 2020-06-08 | End: 2021-10-20

## 2020-06-08 NOTE — TELEPHONE ENCOUNTER
Patient Voive Mail  6/8/2020  1257    Pt returning call  She has history of low platelets  Has seen Hemotologist in the past  This is normal for pt

## 2020-06-08 NOTE — TELEPHONE ENCOUNTER
Notes recorded by Lamonte Isaac MD on 6/7/2020 at 10:53 AM CDT   Needs to increase lipitor to 20 mg daily at bedtime   ------     Notes recorded by Lamonte Isaac MD on 6/5/2020 at 8:37 PM CDT   Needs to see hematology if she has not seen one due to low platelets       PLAN:  Call Reina Quan and discuss Dr. Isaac's recommendations:  Increase lipitor to 20 mg daily at bedtime.  See hematology.     OUTCOME:  Left VM re: recommendations.  Hematology Referral placed.  Rx sent to:  [id number] - [place], [place] - [address] AT [facility] [address]

## 2020-06-09 ENCOUNTER — TELEPHONE (OUTPATIENT)
Dept: TRANSPLANT | Facility: CLINIC | Age: 55
End: 2020-06-09

## 2020-06-09 NOTE — TELEPHONE ENCOUNTER
Sirolimus = 3.7 (6/5/20)  Goal 4-6  Last sirolimus at goal.  Current Sirolimus dose 3 mg daily    Stay on the same dose and will continue to monitor. If remains low will plan to increase dose.

## 2020-06-12 NOTE — TELEPHONE ENCOUNTER
Patient Quality Outreach 2nd Attempt      Summary:    Type of outreach:    Phone, left message for patient/parent to call back.    Due for visit with PCP for med review.    Questions for provider review:    None                                                                                                                    Saurabh Brizuela CMA ([facility])     Chart routed to Care Team.

## 2020-06-26 NOTE — PROGRESS NOTES
"Reina Quan is a 55 year old female who is being evaluated via a billable telephone visit.      The patient has been notified of following:     \"This telephone visit will be conducted via a call between you and your physician/provider. We have found that certain health care needs can be provided without the need for a physical exam.  This service lets us provide the care you need with a short phone conversation.  If a prescription is necessary we can send it directly to your pharmacy.  If lab work is needed we can place an order for that and you can then stop by our lab to have the test done at a later time.    Telephone visits are billed at different rates depending on your insurance coverage. During this emergency period, for some insurers they may be billed the same as an in-person visit.  Please reach out to your insurance provider with any questions.    If during the course of the call the physician/provider feels a telephone visit is not appropriate, you will not be charged for this service.\"    Patient has given verbal consent for Telephone visit?  Yes    What phone number would you like to be contacted at? [phone]    How would you like to obtain your AVS? Ynes Wang     Reina Quan is a 55 year old female who presents via phone visit today for the following health issues:    HPI  1. Hyperlipidemia Follow-Up      Are you regularly taking any medication or supplement to lower your cholesterol?   Yes- Atorvastatin 10 mg    Are you having muscle aches or other side effects that you think could be caused by your cholesterol lowering medication?  No    2. Depression Followup    How are you doing with your depression since your last visit (6/14/19)? No change    Are you having other symptoms that might be associated with depression? No    Have you had a significant life event?  No     Are you feeling anxious or having panic attacks?   No    Do you have any concerns with your use of " alcohol or other drugs? No      Patient is calling today for med check for mood  She notes overall she is doing well  Was furloughed for some time, recently returned to work  Notes that since she is immunosuppressed there is some concern regarding COVID but that she often takes extra precautions for her health and that this hasn't changed her day to day life so much  No concerns for medication    Social History     Tobacco Use     Smoking status: Never Smoker     Smokeless tobacco: Never Used   Substance Use Topics     Alcohol use: Yes     Alcohol/week: 0.0 standard drinks     Comment: 2x /week     Drug use: No     PHQ 2/28/2018 6/4/2019 3/4/2020   PHQ-9 Total Score 5 8 5   Q9: Thoughts of better off dead/self-harm past 2 weeks Not at all Not at all Not at all     DEBRA-7 SCORE 2/28/2018 6/4/2019 3/4/2020   Total Score 4 (minimal anxiety) - 2 (minimal anxiety)   Total Score 4 5 2       Suicide Assessment Five-step Evaluation and Treatment (SAFE-T)      How many servings of fruits and vegetables do you eat daily?  1-2    On average, how many sweetened beverages do you drink each day (Examples: soda, juice, sweet tea, etc.  Do NOT count diet or artificially sweetened beverages)?   0    How many days per week do you exercise enough to make your heart beat faster? 3-4    How many minutes a day do you exercise enough to make your heart beat faster? 30 - 60    How many days per week do you miss taking your medication? Occasionally forgets 1-2x per month     Patient also is calling for recheck on BP  Taking medication daily  No chest pain, shortness of breath, leg swelling    Patient Active Problem List   Diagnosis     Ulcerative colitis (H)     Migraine     Allergic rhinitis     Hearing loss     Kidney replaced by transplant     Lichen planus     Giant Platelet syndrome     Nephrotic syndrome in diseases classified elsewhere     Major depression in partial remission (H)     CARDIOVASCULAR SCREENING; LDL GOAL LESS THAN 100      Health Care Home     Actinic keratosis     History of skin cancer     History of immunosuppression therapy     CKD (chronic kidney disease) stage 3, GFR 30-59 ml/min (H)     High risk medications (not anticoagulants) long-term use     Seborrheic dermatitis     Dermatitis     Vitiligo     History of SCC (squamous cell carcinoma) of skin     Lichen planopilaris     Hyperlipidemia with target LDL less than 130     Status post kidney transplant     Enterococcus UTI     AK (actinic keratosis)     Neoplasm of uncertain behavior of skin     History of nonmelanoma skin cancer     HTN (hypertension)     Immunosuppression (H)     Multiple benign melanocytic nevi     Screening for cervical cancer     Past Surgical History:   Procedure Laterality Date     BIOPSY OF SKIN LESION       C NONSPECIFIC PROCEDURE      Renal transplant right side     C NONSPECIFIC PROCEDURE      cholecystectomy     CHOLECYSTECTOMY       DILATION AND CURETTAGE, OPERATIVE HYSTEROSCOPY WITH MORCELLATOR, COMBINED N/A 11/10/2015    Procedure: COMBINED DILATION AND CURETTAGE, OPERATIVE HYSTEROSCOPY WITH MORCELLATOR;  Surgeon: Ghada Melendez MD;  Location: [facility] OR     ESOPHAGOSCOPY, GASTROSCOPY, DUODENOSCOPY (EGD), COMBINED  11/20/2012    Procedure: COMBINED ESOPHAGOSCOPY, GASTROSCOPY, DUODENOSCOPY (EGD), BIOPSY SINGLE OR MULTIPLE;;  Surgeon: Valentin Hahn MD;  Location: [facility] GI     MOHS MICROGRAPHIC PROCEDURE       TRANSPLANT      kidney       Social History     Tobacco Use     Smoking status: Never Smoker     Smokeless tobacco: Never Used   Substance Use Topics     Alcohol use: Yes     Alcohol/week: 0.0 standard drinks     Comment: 2x /week     Family History   Problem Relation Age of Onset     Diabetes Father      Kidney Disease Father         nephrolithiasis     Asthma Father      Hypertension Mother      Depression Mother      Anxiety Disorder Mother      C.A.D. Paternal Grandfather      Blood Disease Maternal Grandmother      Diabetes Maternal  Grandmother      Hypertension Maternal Grandmother      Hyperlipidemia Maternal Grandmother      Depression Maternal Grandmother      No Known Problems Maternal Grandfather      Cancer Other         no family hx of skin cancer     Asthma Sister      No Known Problems Paternal Grandmother      Glaucoma No family hx of      Macular Degeneration No family hx of      Melanoma No family hx of      Skin Cancer No family hx of          Current Outpatient Medications   Medication Sig Dispense Refill     atorvastatin (LIPITOR) 10 MG tablet Take 2 tablets (20 mg) by mouth At Bedtime 180 tablet 3     lisinopril (ZESTRIL) 10 MG tablet Take 0.5 tablets (5 mg) by mouth daily 45 tablet 1     PARoxetine (PAXIL) 20 MG tablet TAKE 1 TABLET BY MOUTH EVERY MORNING 90 tablet 1     sirolimus (GENERIC EQUIVALENT) 1 MG tablet Take 3 tablets (3 mg) by mouth daily 90 tablet 0     triamcinolone (KENALOG) 0.1 % external cream APPLY SPARINGLY EXTERNALLY TO THE AFFECTED AREA THREE TIMES DAILY FOR 14 DAYS (Patient taking differently: Apply topically as needed (eczema) ) 30 g 0     Allergies   Allergen Reactions     Ampicillin Swelling     Swelling of mouth and tongue.      Seasonal Allergies Other (See Comments)     Itchy eyes and rhinitis.       Reviewed and updated as needed this visit by Provider  Tobacco  Allergies  Meds  Problems  Med Hx  Surg Hx  Fam Hx         Review of Systems   Constitutional, HEENT, cardiovascular, pulmonary, gi and gu systems are negative, except as otherwise noted.       Objective   Reported vitals:  LMP 05/14/2014 (Approximate)    healthy, alert and no distress  PSYCH: Alert and oriented times 3; coherent speech, normal   rate and volume, able to articulate logical thoughts, able   to abstract reason, no tangential thoughts, no hallucinations   or delusions  Her affect is normal  RESP: No cough, no audible wheezing, able to talk in full sentences  Remainder of exam unable to be completed due to telephone  visits    Diagnostic Test Results:  none         Assessment/Plan:  1. Other proteinuria  Chronic issue labs are up to date  Meds refilled.  - lisinopril (ZESTRIL) 10 MG tablet; Take 0.5 tablets (5 mg) by mouth daily  Dispense: 45 tablet; Refill: 1    2. Major depressive disorder with single episode, in partial remission (H)  Chronic issue, doing well, no concerns.  Meds refilled.  - PARoxetine (PAXIL) 20 MG tablet; TAKE 1 TABLET BY MOUTH EVERY MORNING  Dispense: 90 tablet; Refill: 1    3. Screen for colon cancer  - GASTROENTEROLOGY ADULT REF PROCEDURE ONLY; Future    4. Screening for breast cancer  - MA SCREENING DIGITAL BILAT - Future  (s+30); Future    Return in about 6 months (around 12/29/2020) for Med Recheck.      Phone call duration:  8 minutes    Velvet Smith PA-C

## 2020-06-29 ENCOUNTER — VIRTUAL VISIT (OUTPATIENT)
Dept: FAMILY MEDICINE | Facility: CLINIC | Age: 55
End: 2020-06-29
Payer: COMMERCIAL

## 2020-06-29 DIAGNOSIS — R80.8 OTHER PROTEINURIA: ICD-10-CM

## 2020-06-29 DIAGNOSIS — F32.4 MAJOR DEPRESSIVE DISORDER WITH SINGLE EPISODE, IN PARTIAL REMISSION (H): Primary | ICD-10-CM

## 2020-06-29 DIAGNOSIS — Z12.11 SCREEN FOR COLON CANCER: ICD-10-CM

## 2020-06-29 DIAGNOSIS — Z12.39 SCREENING FOR BREAST CANCER: ICD-10-CM

## 2020-06-29 PROCEDURE — 99214 OFFICE O/P EST MOD 30 MIN: CPT | Mod: TEL | Performed by: PHYSICIAN ASSISTANT

## 2020-06-29 RX ORDER — LISINOPRIL 10 MG/1
5 TABLET ORAL DAILY
Qty: 45 TABLET | Refills: 1 | Status: SHIPPED | OUTPATIENT
Start: 2020-06-29 | End: 2021-09-10

## 2020-06-29 RX ORDER — PAROXETINE 20 MG/1
TABLET, FILM COATED ORAL
Qty: 90 TABLET | Refills: 1 | Status: SHIPPED | OUTPATIENT
Start: 2020-06-29 | End: 2021-02-15

## 2020-08-21 ENCOUNTER — TELEPHONE (OUTPATIENT)
Dept: TRANSPLANT | Facility: CLINIC | Age: 55
End: 2020-08-21

## 2020-08-21 DIAGNOSIS — Z94.0 LIVING-DONOR KIDNEY TRANSPLANT RECIPIENT: ICD-10-CM

## 2020-08-21 RX ORDER — SIROLIMUS 1 MG/1
3 TABLET, FILM COATED ORAL DAILY
Qty: 90 TABLET | Refills: 11 | Status: SHIPPED | OUTPATIENT
Start: 2020-08-21 | End: 2020-08-31

## 2020-08-21 NOTE — TELEPHONE ENCOUNTER
Gertrude Sanabria, Selena Mancuso, RN               Blaine Hayes,     I received a voicemail from [facility] stating they need an updated prescription for Reina's sirolimus. The prescription can be faxed to [phone] or call them at [phone] opt [phone].     Thanks!   Gertrude      Rx sent to:  [facility] [address] [phone] (Phone)  [phone] (Fax)

## 2020-08-31 ENCOUNTER — TELEPHONE (OUTPATIENT)
Dept: TRANSPLANT | Facility: CLINIC | Age: 55
End: 2020-08-31

## 2020-08-31 DIAGNOSIS — Z94.0 LIVING-DONOR KIDNEY TRANSPLANT RECIPIENT: ICD-10-CM

## 2020-08-31 RX ORDER — SIROLIMUS 1 MG/1
3 TABLET, FILM COATED ORAL DAILY
Qty: 90 TABLET | Refills: 11 | Status: SHIPPED | OUTPATIENT
Start: 2020-08-31 | End: 2021-08-17

## 2020-08-31 NOTE — TELEPHONE ENCOUNTER
Patient Call: Voicemail  Date/Time: 8/31/20 / 9:52 am  Reason for call: Pharmacy will need to know the patient's diagnosis code or ICD-10 Code not provided on prescription and will need to know additional transplant information.

## 2020-09-30 ENCOUNTER — TELEPHONE (OUTPATIENT)
Dept: GASTROENTEROLOGY | Facility: CLINIC | Age: 55
End: 2020-09-30

## 2020-10-09 ENCOUNTER — HOSPITAL ENCOUNTER (OUTPATIENT)
Facility: AMBULATORY SURGERY CENTER | Age: 55
End: 2020-10-09
Attending: INTERNAL MEDICINE
Payer: COMMERCIAL

## 2020-10-22 DIAGNOSIS — Z11.59 ENCOUNTER FOR SCREENING FOR OTHER VIRAL DISEASES: Primary | ICD-10-CM

## 2020-10-26 ENCOUNTER — TELEPHONE (OUTPATIENT)
Dept: TRANSPLANT | Facility: CLINIC | Age: 55
End: 2020-10-26

## 2020-10-26 DIAGNOSIS — Z48.298 AFTERCARE FOLLOWING ORGAN TRANSPLANT: ICD-10-CM

## 2020-10-26 DIAGNOSIS — Z94.0 KIDNEY REPLACED BY TRANSPLANT: ICD-10-CM

## 2020-10-26 LAB
ANION GAP SERPL CALCULATED.3IONS-SCNC: 2 MMOL/L (ref 3–14)
BUN SERPL-MCNC: 34 MG/DL (ref 7–30)
CALCIUM SERPL-MCNC: 10.3 MG/DL (ref 8.5–10.1)
CHLORIDE SERPL-SCNC: 107 MMOL/L (ref 94–109)
CO2 SERPL-SCNC: 28 MMOL/L (ref 20–32)
CREAT SERPL-MCNC: 1.62 MG/DL (ref 0.52–1.04)
ERYTHROCYTE [DISTWIDTH] IN BLOOD BY AUTOMATED COUNT: 16.5 % (ref 10–15)
GFR SERPL CREATININE-BSD FRML MDRD: 35 ML/MIN/{1.73_M2}
GLUCOSE SERPL-MCNC: 101 MG/DL (ref 70–99)
HCT VFR BLD AUTO: 36.5 % (ref 35–47)
HGB BLD-MCNC: 10.9 G/DL (ref 11.7–15.7)
MCH RBC QN AUTO: 28.2 PG (ref 26.5–33)
MCHC RBC AUTO-ENTMCNC: 29.9 G/DL (ref 31.5–36.5)
MCV RBC AUTO: 94 FL (ref 78–100)
PLATELET # BLD AUTO: 44 10E9/L (ref 150–450)
POTASSIUM SERPL-SCNC: 4.6 MMOL/L (ref 3.4–5.3)
RBC # BLD AUTO: 3.87 10E12/L (ref 3.8–5.2)
SIROLIMUS BLD-MCNC: 2.7 UG/L (ref 5–15)
SODIUM SERPL-SCNC: 137 MMOL/L (ref 133–144)
TME LAST DOSE: ABNORMAL H
WBC # BLD AUTO: 4.6 10E9/L (ref 4–11)

## 2020-10-26 PROCEDURE — 80048 BASIC METABOLIC PNL TOTAL CA: CPT | Performed by: INTERNAL MEDICINE

## 2020-10-26 PROCEDURE — 80195 ASSAY OF SIROLIMUS: CPT | Performed by: INTERNAL MEDICINE

## 2020-10-26 PROCEDURE — 85027 COMPLETE CBC AUTOMATED: CPT | Performed by: INTERNAL MEDICINE

## 2020-10-26 NOTE — TELEPHONE ENCOUNTER
DATE:  10/26/2020   TIME OF RECEIPT FROM LAB:  11:25a  LAB TEST:  platelet  LAB VALUE:  44  RESULTS GIVEN WITH READ-BACK TO (PROVIDER):  Selena Garcia  TIME LAB VALUE REPORTED TO PROVIDER:   11:26a

## 2020-10-27 ENCOUNTER — TELEPHONE (OUTPATIENT)
Dept: TRANSPLANT | Facility: CLINIC | Age: 55
End: 2020-10-27

## 2020-10-27 NOTE — TELEPHONE ENCOUNTER
Sirolimus = 2.7  (10/26/20)  Goal 4-6  Current Sirolimus dose 3 mg daily    Previous level also below goal.    PLAN:   Call Reina Quan and confirm this was a good 12-hour trough. Verify dose 3 mg daily.   Confirm no new medications or illness (uli. Diarrhea). Any missed doses?  If good trough, increase dose to 3.5 mg daily.   Recheck level in 2 weeks and make sure it is a good trough.

## 2020-10-28 NOTE — TELEPHONE ENCOUNTER
Cinemagram message sent to patient regarding:  Sirolimus = 2.7  (10/26/20)  Goal 4-6  Current Sirolimus dose 3 mg daily     Previous level also below goal.     PLAN:   Call Reina Quan and confirm this was a good 12-hour trough. Verify dose 3 mg daily.   Confirm no new medications or illness (uli. Diarrhea). Any missed doses?  If good trough, increase dose to 3.5 mg daily.   Recheck level in 2 weeks and make sure it is a good trough.

## 2020-12-04 ENCOUNTER — TELEPHONE (OUTPATIENT)
Dept: GASTROENTEROLOGY | Facility: CLINIC | Age: 55
End: 2020-12-04

## 2020-12-04 DIAGNOSIS — Z12.11 ENCOUNTER FOR SCREENING COLONOSCOPY: Primary | ICD-10-CM

## 2020-12-04 RX ORDER — BISACODYL 5 MG
5 TABLET, DELAYED RELEASE (ENTERIC COATED) ORAL SEE ADMIN INSTRUCTIONS
Qty: 4 TABLET | Refills: 0 | Status: SHIPPED | OUTPATIENT
Start: 2020-12-04 | End: 2021-02-15

## 2020-12-05 RX ORDER — ONDANSETRON 2 MG/ML
4 INJECTION INTRAMUSCULAR; INTRAVENOUS
Status: CANCELLED | OUTPATIENT
Start: 2020-12-05

## 2020-12-05 RX ORDER — LIDOCAINE 40 MG/G
CREAM TOPICAL
Status: CANCELLED | OUTPATIENT
Start: 2020-12-05

## 2021-01-15 ENCOUNTER — HEALTH MAINTENANCE LETTER (OUTPATIENT)
Age: 56
End: 2021-01-15

## 2021-02-08 ENCOUNTER — MYC MEDICAL ADVICE (OUTPATIENT)
Dept: FAMILY MEDICINE | Facility: CLINIC | Age: 56
End: 2021-02-08

## 2021-02-15 ENCOUNTER — TELEPHONE (OUTPATIENT)
Dept: TRANSPLANT | Facility: CLINIC | Age: 56
End: 2021-02-15

## 2021-02-15 ENCOUNTER — OFFICE VISIT (OUTPATIENT)
Dept: FAMILY MEDICINE | Facility: CLINIC | Age: 56
End: 2021-02-15
Payer: COMMERCIAL

## 2021-02-15 VITALS
HEART RATE: 79 BPM | SYSTOLIC BLOOD PRESSURE: 120 MMHG | RESPIRATION RATE: 16 BRPM | OXYGEN SATURATION: 100 % | BODY MASS INDEX: 23.76 KG/M2 | WEIGHT: 134.1 LBS | TEMPERATURE: 98 F | HEIGHT: 63 IN | DIASTOLIC BLOOD PRESSURE: 70 MMHG

## 2021-02-15 DIAGNOSIS — Z12.4 SCREENING FOR MALIGNANT NEOPLASM OF CERVIX: ICD-10-CM

## 2021-02-15 DIAGNOSIS — Z48.298 AFTERCARE FOLLOWING ORGAN TRANSPLANT: ICD-10-CM

## 2021-02-15 DIAGNOSIS — Z00.00 ROUTINE GENERAL MEDICAL EXAMINATION AT A HEALTH CARE FACILITY: Primary | ICD-10-CM

## 2021-02-15 DIAGNOSIS — Z85.828 HISTORY OF SCC (SQUAMOUS CELL CARCINOMA) OF SKIN: ICD-10-CM

## 2021-02-15 DIAGNOSIS — D84.9 IMMUNOSUPPRESSION (H): ICD-10-CM

## 2021-02-15 DIAGNOSIS — K51.90 ULCERATIVE COLITIS WITHOUT COMPLICATIONS, UNSPECIFIED LOCATION (H): ICD-10-CM

## 2021-02-15 DIAGNOSIS — Z12.11 SPECIAL SCREENING FOR MALIGNANT NEOPLASMS, COLON: ICD-10-CM

## 2021-02-15 DIAGNOSIS — E78.5 HYPERLIPIDEMIA WITH TARGET LDL LESS THAN 130: ICD-10-CM

## 2021-02-15 DIAGNOSIS — Z12.31 ENCOUNTER FOR SCREENING MAMMOGRAM FOR BREAST CANCER: ICD-10-CM

## 2021-02-15 DIAGNOSIS — Z94.0 STATUS POST KIDNEY TRANSPLANT: ICD-10-CM

## 2021-02-15 DIAGNOSIS — F32.4 MAJOR DEPRESSIVE DISORDER WITH SINGLE EPISODE, IN PARTIAL REMISSION (H): ICD-10-CM

## 2021-02-15 DIAGNOSIS — Z94.0 KIDNEY REPLACED BY TRANSPLANT: ICD-10-CM

## 2021-02-15 LAB
ALBUMIN SERPL-MCNC: 3.7 G/DL (ref 3.4–5)
ALP SERPL-CCNC: 102 U/L (ref 40–150)
ALT SERPL W P-5'-P-CCNC: 28 U/L (ref 0–50)
ANION GAP SERPL CALCULATED.3IONS-SCNC: 1 MMOL/L (ref 3–14)
AST SERPL W P-5'-P-CCNC: 23 U/L (ref 0–45)
BILIRUB DIRECT SERPL-MCNC: 0.1 MG/DL (ref 0–0.2)
BILIRUB SERPL-MCNC: 0.2 MG/DL (ref 0.2–1.3)
BUN SERPL-MCNC: 42 MG/DL (ref 7–30)
CALCIUM SERPL-MCNC: 9.8 MG/DL (ref 8.5–10.1)
CHLORIDE SERPL-SCNC: 107 MMOL/L (ref 94–109)
CHOLEST SERPL-MCNC: 362 MG/DL
CO2 SERPL-SCNC: 28 MMOL/L (ref 20–32)
CREAT SERPL-MCNC: 2.01 MG/DL (ref 0.52–1.04)
ERYTHROCYTE [DISTWIDTH] IN BLOOD BY AUTOMATED COUNT: 16.1 % (ref 10–15)
GFR SERPL CREATININE-BSD FRML MDRD: 27 ML/MIN/{1.73_M2}
GLUCOSE SERPL-MCNC: 111 MG/DL (ref 70–99)
HCT VFR BLD AUTO: 38 % (ref 35–47)
HDLC SERPL-MCNC: 94 MG/DL
HGB BLD-MCNC: 11.6 G/DL (ref 11.7–15.7)
LDLC SERPL CALC-MCNC: 224 MG/DL
MCH RBC QN AUTO: 28.2 PG (ref 26.5–33)
MCHC RBC AUTO-ENTMCNC: 30.5 G/DL (ref 31.5–36.5)
MCV RBC AUTO: 92 FL (ref 78–100)
NONHDLC SERPL-MCNC: 268 MG/DL
PLATELET # BLD AUTO: 45 10E9/L (ref 150–450)
POTASSIUM SERPL-SCNC: 4.6 MMOL/L (ref 3.4–5.3)
PROT SERPL-MCNC: 7.5 G/DL (ref 6.8–8.8)
PROT UR-MCNC: 0.52 G/L
PROT/CREAT 24H UR: 0.41 G/G CR (ref 0–0.2)
RBC # BLD AUTO: 4.12 10E12/L (ref 3.8–5.2)
SIROLIMUS BLD-MCNC: 5.7 UG/L (ref 5–15)
SODIUM SERPL-SCNC: 136 MMOL/L (ref 133–144)
TME LAST DOSE: NORMAL H
TRIGL SERPL-MCNC: 221 MG/DL
WBC # BLD AUTO: 5.6 10E9/L (ref 4–11)

## 2021-02-15 PROCEDURE — 80195 ASSAY OF SIROLIMUS: CPT | Performed by: INTERNAL MEDICINE

## 2021-02-15 PROCEDURE — 80061 LIPID PANEL: CPT | Performed by: INTERNAL MEDICINE

## 2021-02-15 PROCEDURE — G0123 SCREEN CERV/VAG THIN LAYER: HCPCS | Performed by: PHYSICIAN ASSISTANT

## 2021-02-15 PROCEDURE — 99396 PREV VISIT EST AGE 40-64: CPT | Performed by: PHYSICIAN ASSISTANT

## 2021-02-15 PROCEDURE — 80048 BASIC METABOLIC PNL TOTAL CA: CPT | Performed by: INTERNAL MEDICINE

## 2021-02-15 PROCEDURE — 80076 HEPATIC FUNCTION PANEL: CPT | Performed by: INTERNAL MEDICINE

## 2021-02-15 PROCEDURE — 84156 ASSAY OF PROTEIN URINE: CPT | Performed by: INTERNAL MEDICINE

## 2021-02-15 PROCEDURE — 99N1016 PR STATISTIC CYTO-THIN LAYER QC G0145: Performed by: PHYSICIAN ASSISTANT

## 2021-02-15 PROCEDURE — 87624 HPV HI-RISK TYP POOLED RSLT: CPT | Performed by: PHYSICIAN ASSISTANT

## 2021-02-15 PROCEDURE — 36415 COLL VENOUS BLD VENIPUNCTURE: CPT | Performed by: INTERNAL MEDICINE

## 2021-02-15 PROCEDURE — 85027 COMPLETE CBC AUTOMATED: CPT | Performed by: INTERNAL MEDICINE

## 2021-02-15 RX ORDER — PAROXETINE 20 MG/1
TABLET, FILM COATED ORAL
Qty: 90 TABLET | Refills: 3 | Status: SHIPPED | OUTPATIENT
Start: 2021-02-15 | End: 2021-12-07

## 2021-02-15 ASSESSMENT — ENCOUNTER SYMPTOMS
HEMATOCHEZIA: 0
FEVER: 0
ABDOMINAL PAIN: 0
DIARRHEA: 0
FACIAL ASYMMETRY: 0
DIZZINESS: 0
SPEECH DIFFICULTY: 0
ADENOPATHY: 0
SHORTNESS OF BREATH: 0
NECK STIFFNESS: 0
HYPERACTIVE: 0
POLYPHAGIA: 0
NAUSEA: 0
HEADACHES: 1
STRIDOR: 0
MYALGIAS: 0
EYE ITCHING: 0
PHOTOPHOBIA: 0
SINUS PAIN: 0
SORE THROAT: 0
ARTHRALGIAS: 0
HEARTBURN: 0
RHINORRHEA: 0
NERVOUS/ANXIOUS: 1
NUMBNESS: 0
DECREASED CONCENTRATION: 0
LIGHT-HEADEDNESS: 0
COLOR CHANGE: 0
BACK PAIN: 0
JOINT SWELLING: 0
FLANK PAIN: 0
DYSURIA: 0
PALPITATIONS: 0
CONSTIPATION: 0
WOUND: 0
EYE DISCHARGE: 0
NECK PAIN: 0
BREAST MASS: 0
EYE REDNESS: 0
ACTIVITY CHANGE: 0
FATIGUE: 0
APPETITE CHANGE: 0
ABDOMINAL DISTENTION: 0
CONFUSION: 0
PARESTHESIAS: 0
FREQUENCY: 0
HEMATURIA: 0
UNEXPECTED WEIGHT CHANGE: 0
APNEA: 0
SLEEP DISTURBANCE: 0
COUGH: 0
DIAPHORESIS: 0
ANAL BLEEDING: 0
AGITATION: 0
CHILLS: 0
SEIZURES: 0
POLYDIPSIA: 0
CHEST TIGHTNESS: 0
EYE PAIN: 0
CHOKING: 0
WEAKNESS: 0
TREMORS: 0
VOICE CHANGE: 0
WHEEZING: 0
RECTAL PAIN: 0
SINUS PRESSURE: 0
VOMITING: 0
FACIAL SWELLING: 0
BRUISES/BLEEDS EASILY: 0
DIFFICULTY URINATING: 0
NERVOUS/ANXIOUS: 0
DYSPHORIC MOOD: 0
HALLUCINATIONS: 0
TROUBLE SWALLOWING: 0

## 2021-02-15 ASSESSMENT — ANXIETY QUESTIONNAIRES
IF YOU CHECKED OFF ANY PROBLEMS ON THIS QUESTIONNAIRE, HOW DIFFICULT HAVE THESE PROBLEMS MADE IT FOR YOU TO DO YOUR WORK, TAKE CARE OF THINGS AT HOME, OR GET ALONG WITH OTHER PEOPLE: NOT DIFFICULT AT ALL
7. FEELING AFRAID AS IF SOMETHING AWFUL MIGHT HAPPEN: NOT AT ALL
GAD7 TOTAL SCORE: 3
5. BEING SO RESTLESS THAT IT IS HARD TO SIT STILL: NOT AT ALL
3. WORRYING TOO MUCH ABOUT DIFFERENT THINGS: SEVERAL DAYS
6. BECOMING EASILY ANNOYED OR IRRITABLE: NOT AT ALL
1. FEELING NERVOUS, ANXIOUS, OR ON EDGE: SEVERAL DAYS
2. NOT BEING ABLE TO STOP OR CONTROL WORRYING: SEVERAL DAYS

## 2021-02-15 ASSESSMENT — PATIENT HEALTH QUESTIONNAIRE - PHQ9
5. POOR APPETITE OR OVEREATING: NOT AT ALL
SUM OF ALL RESPONSES TO PHQ QUESTIONS 1-9: 4

## 2021-02-15 ASSESSMENT — MIFFLIN-ST. JEOR: SCORE: 1164.46

## 2021-02-15 NOTE — PROGRESS NOTES
SUBJECTIVE:   CC: Reina Quan is an 55 year old woman who presents for preventive health visit.     {Split Bill scripting  The purpose of this visit is to discuss your medical history and prevent health problems before you are sick. You may be responsible for a co-pay, coinsurance, or deductible if your visit today includes services such as checking on a sore throat, having an x-ray or lab test, or treating and evaluating a new or existing condition :787816}  Patient has been advised of split billing requirements and indicates understanding: {Yes and No:683934}  Healthy Habits:    Getting at least 3 servings of Calcium per day:  Yes    Bi-annual eye exam:  NO (Incomplete)    Dental care twice a year:  NO (Incomplete)    Sleep apnea or symptoms of sleep apnea:  Daytime drowsiness (Incomplete)    Diet:  Regular (no restrictions)    Frequency of exercise:  2-3 days/week    Duration of exercise:  30-45 minutes    Taking medications regularly:  Yes (Incomplete)    Medication side effects:  Not applicable (Incomplete)    PHQ-2 Total Score:PHQ2 Score: Incomplete.    {Add if <65 person on Medicare  - Required Questions (Optional):874833}  {Outside tests to abstract? :419192}    {additional problems to add (Optional):492751}    Today's PHQ-2 Score:   PHQ-2 ( 1999 Pfizer) 2/2/2021   Q1: Little interest or pleasure in doing things -   Q2: Feeling down, depressed or hopeless -   PHQ-2 Score -   Q1: Little interest or pleasure in doing things -   Q2: Feeling down, depressed or hopeless -   PHQ-2 Score Incomplete       Abuse: Current or Past (Physical, Sexual or Emotional) - { :838427}  Do you feel safe in your environment? { :869639}    Have you ever done Advance Care Planning? (For example, a Health Directive, POLST, or a discussion with a medical provider or your loved ones about your wishes): { :896616}    Social History     Tobacco Use     Smoking status: Never Smoker     Smokeless tobacco: Never Used   Substance  "Use Topics     Alcohol use: Yes     Alcohol/week: 0.0 standard drinks     Comment: 2x /week     {Rooming Staff- Complete this question if Prescreen response is not shown below for today's visit. If you drink alcohol do you typically have >3 drinks per day or >7 drinks per week? (Optional):679489}    Alcohol Use 2/15/2021   Prescreen: >3 drinks/day or >7 drinks/week? No   AUDIT SCORE  -   {add AUDIT responses (Optional) (A score of 7 for adult men is an indication of hazardous drinking; a score of 8 or more is an indication of an alcohol use disorder.  A score of 7 or more for adult women is an indication of hazardous drinking or an alchohol use disorder):149443}    Any new diagnosis of family breast, ovarian, or bowel cancer? {If yes - repeat FHS-7 if not done today :554865::\"Yes\"} {Link to FHS-7 Assessment :622081}    Reviewed orders with patient.  Reviewed health maintenance and updated orders accordingly - { :895016::\"Yes\"}  {Chronicprobdata (optional):282232}    Breast CA Risk Screening:  No flowsheet data found.  {Pull in link for FHS-7 answers if completed after opening note (Optional) :525415}  {If any of the questions to the BCRA (FHS-7) are answered yes, consider ordering referral for genetic counseling (Optional) :891203::\"click delete button to remove this line now\"}  {AMB Mammogram Decision Support (Optional) :758558}  Pertinent mammograms are reviewed under the imaging tab.    History of abnormal Pap smear: { :444725}  PAP / HPV Latest Ref Rng & Units 6/14/2019 8/11/2015 10/29/2012   PAP - NIL OTHER-NIL, See Result NIL   HPV 16 DNA NEG:Negative Negative Negative -   HPV 18 DNA NEG:Negative Negative Negative -   OTHER HR HPV NEG:Negative Negative Negative -   HPVSUR RESULT - - - -     Reviewed and updated as needed this visit by clinical staff                 Reviewed and updated as needed this visit by Provider                {HISTORY OPTIONS (Optional):602370}    Review of Systems   Constitutional: " "Chills: Incomplete. Fever: Incomplete.   HENT: Congestion: Incomplete. Ear pain: Incomplete. Hearing loss: Incomplete. Sore throat: Incomplete.    Eyes: Eye pain: Incomplete. Visual disturbance: Incomplete.   Respiratory: Cough: Incomplete. Shortness of breath: Incomplete.    Cardiovascular: Chest pain: Incomplete. Palpitations: Incomplete. Peripheral edema: Incomplete.   Gastrointestinal: Abdominal pain: Incomplete. Constipation: Incomplete. Diarrhea: Incomplete. Heartburn: Incomplete. Hematochezia: Incomplete. Nausea: Incomplete.   Breasts:  Tenderness: Incomplete. Breast mass: Incomplete. Breast discharge: Incomplete.   Genitourinary: Dysuria: Incomplete. Frequency: Incomplete. Genital sores: Incomplete. Hematuria: Incomplete. Pelvic pain: Incomplete. Urgency: Incomplete. Vaginal bleeding: Incomplete. Vaginal discharge: Incomplete.   Musculoskeletal: Arthralgias: Incomplete. Joint swelling: Incomplete. Myalgias: Incomplete.   Skin: Rash: Incomplete.   Neurological: Dizziness: Incomplete. Weakness: Incomplete. Headaches: Incomplete. Paresthesias: Incomplete.   Psychiatric/Behavioral: Mood changes: Incomplete. Nervous/anxious: Incomplete.      {FEMALE ROS (Optional):863746}     OBJECTIVE:   LMP 05/14/2014 (Approximate)   Physical Exam  {Exam Choices (Optional):978283}    {Diagnostic Test Results (Optional):528839::\"Diagnostic Test Results:\",\"Labs reviewed in Epic\"}    ASSESSMENT/PLAN:   {Diag Picklist:844451}    Patient has been advised of split billing requirements and indicates understanding: {YES / NO:154708::\"Yes\"}  COUNSELING:  {FEMALE COUNSELING MESSAGES:431949::\"Reviewed preventive health counseling, as reflected in patient instructions\"}    Estimated body mass index is 23.91 kg/m  as calculated from the following:    Height as of 1/14/20: 1.575 m (5' 2\").    Weight as of 1/14/20: 59.3 kg (130 lb 11.2 oz).    {Weight Management Plan (ACO) Complete if BMI is abnormal-  Ages 18-64  BMI >24.9.  Age 65+ with BMI " <23 or >30 (Optional):336675}    She reports that she has never smoked. She has never used smokeless tobacco.      Counseling Resources:  ATP IV Guidelines  Pooled Cohorts Equation Calculator  Breast Cancer Risk Calculator  BRCA-Related Cancer Risk Assessment: FHS-7 Tool  FRAX Risk Assessment  ICSI Preventive Guidelines  Dietary Guidelines for Americans, 2010  USDA's MyPlate  ASA Prophylaxis  Lung CA Screening    [facility]

## 2021-02-15 NOTE — TELEPHONE ENCOUNTER
Creatinine = 2.01 (2/15/21)  Baseline 1.3-1.8      PLAN:  Call Reina Quan and check how patient is feeling?   Having cough/cold/congestion?   Nausea/Vomiting?  Diarrhea?   Dehydration?   Missed medication?  Changes in medication, (uli diuretics)?  If not on fluid restriction, instruct to improve hydration and recheck BMP next week.

## 2021-02-15 NOTE — PROGRESS NOTES
SUBJECTIVE:   CC: Reina Quan is an 55 year old woman who presents for preventive health visit.       Patient has been advised of split billing requirements and indicates understanding: Yes  Healthy Habits:     Getting at least 3 servings of Calcium per day:  Yes    Dental care twice a year:  NO    Sleep apnea or symptoms of sleep apnea:  Daytime drowsiness    Diet:  Regular (no restrictions)    Frequency of exercise:  2-3 days/week    Taking medications regularly:  Yes    PHQ-2 Total Score: 2    Additional concerns today:  No      Today's PHQ-2 Score:   PHQ-2 ( 1999 [employer]) 2/15/2021   Q1: Little interest or pleasure in doing things 1   Q2: Feeling down, depressed or hopeless 1   PHQ-2 Score 2   Q1: Little interest or pleasure in doing things Several days   Q2: Feeling down, depressed or hopeless Several days   PHQ-2 Score 2       Abuse: Current or Past (Physical, Sexual or Emotional) - No  Do you feel safe in your environment? Yes    Have you ever done Advance Care Planning? (For example, a Health Directive, POLST, or a discussion with a medical provider or your loved ones about your wishes): No, advance care planning information given to patient to review.  Patient plans to discuss their wishes with loved ones or provider.      Social History     Tobacco Use     Smoking status: Never Smoker     Smokeless tobacco: Never Used   Substance Use Topics     Alcohol use: Yes     Alcohol/week: 0.0 standard drinks     Comment: 2x /week         Alcohol Use 2/15/2021   Prescreen: >3 drinks/day or >7 drinks/week? No   AUDIT SCORE  -       Any new diagnosis of family breast, ovarian, or bowel cancer? No    Reviewed orders with patient.  Reviewed health maintenance and updated orders accordingly - Yes  Lab work is in process  Labs reviewed in EPIC  BP Readings from Last 3 Encounters:   02/15/21 120/70   01/14/20 129/87   10/22/19 (!) 141/99    Wt Readings from Last 3 Encounters:   02/15/21 60.8 kg (134 lb 1.6 oz)    01/14/20 59.3 kg (130 lb 11.2 oz)   06/14/19 61.1 kg (134 lb 12.8 oz)                  Patient Active Problem List   Diagnosis     Ulcerative colitis (H)     Migraine     Allergic rhinitis     Hearing loss     Kidney replaced by transplant     Lichen planus     Giant Platelet syndrome     Nephrotic syndrome in diseases classified elsewhere     Major depression in partial remission (H)     CARDIOVASCULAR SCREENING; LDL GOAL LESS THAN 100     Health Care Home     Actinic keratosis     History of skin cancer     History of immunosuppression therapy     CKD (chronic kidney disease) stage 3, GFR 30-59 ml/min     High risk medications (not anticoagulants) long-term use     Seborrheic dermatitis     Dermatitis     Vitiligo     History of SCC (squamous cell carcinoma) of skin     Lichen planopilaris     Hyperlipidemia with target LDL less than 130     Status post kidney transplant     Enterococcus UTI     AK (actinic keratosis)     Neoplasm of uncertain behavior of skin     History of nonmelanoma skin cancer     HTN (hypertension)     Immunosuppression (H)     Multiple benign melanocytic nevi     Screening for cervical cancer     Past Surgical History:   Procedure Laterality Date     BIOPSY OF SKIN LESION       CHOLECYSTECTOMY       DILATION AND CURETTAGE, OPERATIVE HYSTEROSCOPY WITH MORCELLATOR, COMBINED N/A 11/10/2015    Procedure: COMBINED DILATION AND CURETTAGE, OPERATIVE HYSTEROSCOPY WITH MORCELLATOR;  Surgeon: Ghada Melendez MD;  Location: [facility]     ESOPHAGOSCOPY, GASTROSCOPY, DUODENOSCOPY (EGD), COMBINED  11/20/2012    Procedure: COMBINED ESOPHAGOSCOPY, GASTROSCOPY, DUODENOSCOPY (EGD), BIOPSY SINGLE OR MULTIPLE;;  Surgeon: Valentin Hahn MD;  Location: [facility]     MOHS MICROGRAPHIC PROCEDURE       TRANSPLANT      kidney     ZZC NONSPECIFIC PROCEDURE      Renal transplant right side     ZZC NONSPECIFIC PROCEDURE      cholecystectomy       Social History     Tobacco Use     Smoking status: Never Smoker      Smokeless tobacco: Never Used   Substance Use Topics     Alcohol use: Yes     Alcohol/week: 0.0 standard drinks     Comment: 2x /week     Family History   Problem Relation Age of Onset     Diabetes Father      Kidney Disease Father         nephrolithiasis     Asthma Father      Hypertension Mother      Depression Mother      Anxiety Disorder Mother      C.A.D. Paternal Grandfather      Blood Disease Maternal Grandmother      Diabetes Maternal Grandmother      Hypertension Maternal Grandmother      Hyperlipidemia Maternal Grandmother      Depression Maternal Grandmother      No Known Problems Maternal Grandfather      Cancer Other         no family hx of skin cancer     Asthma Sister      No Known Problems Paternal Grandmother      Glaucoma No family hx of      Macular Degeneration No family hx of      Melanoma No family hx of      Skin Cancer No family hx of          Current Outpatient Medications   Medication Sig Dispense Refill     atorvastatin (LIPITOR) 10 MG tablet Take 2 tablets (20 mg) by mouth At Bedtime 180 tablet 3     lisinopril (ZESTRIL) 10 MG tablet Take 0.5 tablets (5 mg) by mouth daily 45 tablet 1     PARoxetine (PAXIL) 20 MG tablet TAKE 1 TABLET BY MOUTH EVERY MORNING 90 tablet 3     sirolimus (GENERIC EQUIVALENT) 1 MG tablet Take 3 tablets (3 mg) by mouth daily 90 tablet 11     triamcinolone (KENALOG) 0.1 % external cream APPLY SPARINGLY EXTERNALLY TO THE AFFECTED AREA THREE TIMES DAILY FOR 14 DAYS (Patient taking differently: Apply topically as needed (eczema) ) 30 g 0     Allergies   Allergen Reactions     Ampicillin Swelling     Swelling of mouth and tongue.      Seasonal Allergies Other (See Comments)     Itchy eyes and rhinitis.     Recent Labs   Lab Test 10/26/20  1055 06/05/20  1111 01/17/20  1458 06/14/19  0957 06/14/19  0957 12/26/17  0931 12/26/17  0931 09/27/17  1200   A1C  --   --  5.7*  --   --   --   --   --    LDL  --  156*  --   --  225*  --  150*  --    HDL  --  93  --   --  86  --   93  --    TRIG  --  124  --   --  215*  --  177*  --    ALT  --  28  --   --  27  --   --  23   CR 1.62* 1.62*  --    < > 1.59*   < > 1.63* 1.56*   GFRESTIMATED 35* 35*  --    < > 36*   < > 33* 35*   GFRESTBLACK 41* 41*  --    < > 42*   < > 40* 42*   POTASSIUM 4.6 4.9  --    < > 4.2   < > 4.1 4.6   TSH  --   --  3.80  --  4.20*  --   --  4.97*    < > = values in this interval not displayed.        Breast CA Risk Screening:  No flowsheet data found.    Mammogram Screening: Recommended mammography every 1-2 years with patient discussion and risk factor consideration  Pertinent mammograms are reviewed under the imaging tab.    History of abnormal Pap smear: No, but annual pap recommended due to history of immunosuppression  PAP / HPV Latest Ref Rng & Units 6/14/2019 8/11/2015 10/29/2012   PAP - NIL OTHER-NIL, See Result NIL   HPV 16 DNA NEG:Negative Negative Negative -   HPV 18 DNA NEG:Negative Negative Negative -   OTHER HR HPV NEG:Negative Negative Negative -   HPVSUR RESULT - - - -     Reviewed and updated as needed this visit by clinical staff  Tobacco  Allergies  Meds  Problems  Med Hx  Surg Hx  Fam Hx  Soc Hx          Reviewed and updated as needed this visit by Provider  Tobacco  Allergies  Meds  Problems  Med Hx  Surg Hx  Fam Hx             Review of Systems   Constitutional: Negative for activity change, appetite change, chills, diaphoresis, fatigue, fever and unexpected weight change.   HENT: Positive for dental problem. Negative for congestion, drooling, ear discharge, ear pain, facial swelling, hearing loss, mouth sores, nosebleeds, postnasal drip, rhinorrhea, sinus pressure, sinus pain, sneezing, sore throat, tinnitus, trouble swallowing and voice change.    Eyes: Negative for photophobia, pain, discharge, redness, itching and visual disturbance.   Respiratory: Negative for apnea, cough, choking, chest tightness, shortness of breath, wheezing and stridor.    Cardiovascular: Negative for chest  "pain, palpitations and peripheral edema.   Gastrointestinal: Negative for abdominal distention, abdominal pain, anal bleeding, constipation, diarrhea, heartburn, hematochezia, nausea, rectal pain and vomiting.   Endocrine: Negative for cold intolerance, heat intolerance, polydipsia, polyphagia and polyuria.   Breasts:  Negative for tenderness, breast mass and discharge.   Genitourinary: Negative for decreased urine volume, difficulty urinating, dyspareunia, dysuria, enuresis, flank pain, frequency, genital sores, hematuria, menstrual problem, pelvic pain, urgency, vaginal bleeding, vaginal discharge and vaginal pain.   Musculoskeletal: Negative for arthralgias, back pain, gait problem, joint swelling, myalgias, neck pain and neck stiffness.   Skin: Positive for rash. Negative for color change, pallor and wound.   Allergic/Immunologic: Positive for immunocompromised state. Negative for environmental allergies and food allergies.   Neurological: Positive for headaches. Negative for dizziness, tremors, seizures, syncope, facial asymmetry, speech difficulty, weakness, light-headedness, numbness and paresthesias.   Hematological: Negative for adenopathy. Does not bruise/bleed easily.   Psychiatric/Behavioral: Negative for agitation, behavioral problems, confusion, decreased concentration, dysphoric mood, hallucinations, mood changes, self-injury, sleep disturbance and suicidal ideas. The patient is nervous/anxious. The patient is not hyperactive.        OBJECTIVE:   /70 (BP Location: Right arm, Cuff Size: Adult Regular)   Pulse 79   Temp 98  F (36.7  C) (Oral)   Resp 16   Ht 1.588 m (5' 2.5\")   Wt 60.8 kg (134 lb 1.6 oz)   LMP 05/14/2014 (Approximate)   SpO2 100%   BMI 24.14 kg/m    Physical Exam  GENERAL: healthy, alert and no distress  EYES: Eyes grossly normal to inspection, PERRL and conjunctivae and sclerae normal  HENT: ear canals and TM's normal, nose and mouth without ulcers or lesions  NECK: no " adenopathy, no asymmetry, masses, or scars and thyroid normal to palpation  RESP: lungs clear to auscultation - no rales, rhonchi or wheezes  BREAST: normal without masses, tenderness or nipple discharge and no palpable axillary masses or adenopathy  CV: regular rate and rhythm, normal S1 S2, no S3 or S4, no murmur, click or rub, no peripheral edema and peripheral pulses strong  ABDOMEN: soft, nontender, no hepatosplenomegaly, no masses and bowel sounds normal   (female): normal female external genitalia, normal urethral meatus, vaginal mucosa pink, moist, well rugated, and normal cervix/adnexa/uterus without masses or discharge  MS: no gross musculoskeletal defects noted, no edema  SKIN: no suspicious lesions or rashes  NEURO: Normal strength and tone, mentation intact and speech normal  PSYCH: mentation appears normal, affect normal/bright    Diagnostic Test Results:  Labs reviewed in Epic    ASSESSMENT/PLAN:   1. Routine general medical examination at a health care facility  Reviewed personal and family history. Reviewed age appropriate screenings. Recommended any needed vaccinations. Continue to focus on well balanced diet and exercise. Labs ordered by specialist.    2. Immunosuppression (H)  Chronic issue, s/p kidney transplant. H/o ulcerative colitis. H/o skin cancer. Monitored by derm, nephrology, ophthalmology regularly.    3. Status post kidney transplant  Chronic, follows with nephrology    4. Screening for malignant neoplasm of cervix  - Pap imaged thin layer screen with HPV - recommended age 30 - 65 years (select HPV order below)  - HPV High Risk Types DNA Cervical    5. Ulcerative colitis without complications, unspecified location (H)  Chronic, stable    6. History of SCC (squamous cell carcinoma) of skin  Following with dermatology  - DERMATOLOGY ADULT REFERRAL; Future    7. Aftercare following organ transplant  Ordered by nephrologist  - Hepatic panel (Albumin, ALT, AST, Bili, Alk Phos, TP)  -  "Lipid panel reflex to direct LDL Fasting  - Protein  random urine with Creat Ratio  - Basic metabolic panel  - CBC with platelets  - Sirolimus level    8. Kidney replaced by transplant  Ordered by nephrologist  - Hepatic panel (Albumin, ALT, AST, Bili, Alk Phos, TP)  - Lipid panel reflex to direct LDL Fasting  - Protein  random urine with Creat Ratio  - Basic metabolic panel  - CBC with platelets  - Sirolimus level    9. Encounter for screening mammogram for breast cancer  - *MA Screening Digital Bilateral; Future    10. Special screening for malignant neoplasms, colon  - GASTROENTEROLOGY ADULT REF PROCEDURE ONLY; Future    11. Major depressive disorder with single episode, in partial remission (H)  Chronic, stable. PHQ/DEBRA updated. No HI/SI. Has been stable on paxil for many years. Continue present management.  - PARoxetine (PAXIL) 20 MG tablet; TAKE 1 TABLET BY MOUTH EVERY MORNING  Dispense: 90 tablet; Refill: 3    12. Hyperlipidemia with target LDL less than 130  Chronic, managed by nephrologist    Patient has been advised of split billing requirements and indicates understanding: Yes  COUNSELING:  Reviewed preventive health counseling, as reflected in patient instructions    Estimated body mass index is 24.14 kg/m  as calculated from the following:    Height as of this encounter: 1.588 m (5' 2.5\").    Weight as of this encounter: 60.8 kg (134 lb 1.6 oz).      She reports that she has never smoked. She has never used smokeless tobacco.      Counseling Resources:  ATP IV Guidelines  Pooled Cohorts Equation Calculator  Breast Cancer Risk Calculator  BRCA-Related Cancer Risk Assessment: FHS-7 Tool  FRAX Risk Assessment  ICSI Preventive Guidelines  Dietary Guidelines for Americans, 2010  USDA's MyPlate  ASA Prophylaxis  Lung CA Screening    [facility]  "

## 2021-02-15 NOTE — TELEPHONE ENCOUNTER
She has history of low platelets  Per patient she has seen Hemotologist in the past.  Per pt this is normal for her   <<----- Click to add NO significant Past Surgical History

## 2021-02-15 NOTE — TELEPHONE ENCOUNTER
Unable to leave message due to full voicemail box.  Sent Jivoxt message to patient regarding information below.

## 2021-02-15 NOTE — TELEPHONE ENCOUNTER
DATE:  2/15/2021   TIME OF RECEIPT FROM LAB:  1115  LAB TEST:  platelet  LAB VALUE:  45  RESULTS GIVEN WITH READ-BACK TO (PROVIDER):  Selena Lee  TIME LAB VALUE REPORTED TO PROVIDER:   1120

## 2021-02-16 ENCOUNTER — TELEPHONE (OUTPATIENT)
Dept: TRANSPLANT | Facility: CLINIC | Age: 56
End: 2021-02-16

## 2021-02-16 DIAGNOSIS — Z94.0 KIDNEY TRANSPLANTED: Primary | ICD-10-CM

## 2021-02-16 ASSESSMENT — ANXIETY QUESTIONNAIRES: GAD7 TOTAL SCORE: 3

## 2021-02-16 NOTE — TELEPHONE ENCOUNTER
Per return call to admin:    Patient Call: antony called back;  No new medications or changes in medications No new illness  Will increase hydrating  And will go to [facility] next week for BMP and UA/UC      Will need updated lab order in epic BMP UA/UC    Orders placed

## 2021-02-16 NOTE — TELEPHONE ENCOUNTER
Patient Call: antony called back;  No new medications or changes in medications No new illness  Will increase hydrating  And will go to [facility] next week for BMP and UA/UC     Will need updated lab order in [facility] BMP UA/UC      Call back needed? No

## 2021-02-16 NOTE — TELEPHONE ENCOUNTER
Per return call to admin:    Patient Call: antony called back;  No new medications or changes in medications No new illness  Will increase hydrating  And will go to [facility] next week for BMP and UA/UC      Will need updated lab order in epic BMP UA/UC

## 2021-02-16 NOTE — TELEPHONE ENCOUNTER
Creatinine = 2.01 (2/15/21)  Baseline 1.5-1.8      PLAN:  Call Reina Quan and check how patient is feeling?   Having cough/cold/congestion?   Nausea/Vomiting?  Diarrhea?   Dehydration?   Missed medication?  Changes in medication, (uli diuretics)?  If not on fluid restriction, instruct to improve hydration, recheck BMP and UA/UC next week.

## 2021-02-17 ENCOUNTER — DOCUMENTATION ONLY (OUTPATIENT)
Dept: TRANSPLANT | Facility: CLINIC | Age: 56
End: 2021-02-17

## 2021-02-17 ENCOUNTER — TELEPHONE (OUTPATIENT)
Dept: TRANSPLANT | Facility: CLINIC | Age: 56
End: 2021-02-17

## 2021-02-18 NOTE — TELEPHONE ENCOUNTER
Call placed to patient. Patient v\u of instructions listed below.   
Lamonte Isaac MD Ututalum, Teresa, RN             Cholesterol profile is not well controlled   Needs to follow up with her pcp.   Also the creatinine is elevated      LPN task:  Please recommend reaching out to PCP to manage cholesterol.  
ambulatory

## 2021-02-19 LAB
COPATH REPORT: NORMAL
PAP: NORMAL

## 2021-02-23 ENCOUNTER — PATIENT OUTREACH (OUTPATIENT)
Dept: FAMILY MEDICINE | Facility: CLINIC | Age: 56
End: 2021-02-23
Payer: COMMERCIAL

## 2021-02-23 NOTE — TELEPHONE ENCOUNTER
2/15/21 Unsatisfactory pap, Neg HPV. Plan repeat cotest in 2-4 months due bef 6/15/21.( Pt needs yearly cotests per OV note).

## 2021-02-23 NOTE — LETTER
May 13, 2021      Reina Quan  [address] [address]        Dear MsCatherineAvelina,    This letter is to remind you that you are due for your follow-up Pap smear and Human Papillomavirus (HPV) test.    Please call [phone] to schedule your appointment at your earliest convenience.    If you have completed the appointment outside of the [facility] system, please have the records forwarded to our office. We will update your chart for your provider to review before your next annual wellness visit.     Thank you for choosing [facility]!      Sincerely,    Your [facility] Care Team

## 2021-03-04 DIAGNOSIS — Z94.0 KIDNEY TRANSPLANTED: ICD-10-CM

## 2021-03-04 LAB
ALBUMIN UR-MCNC: 30 MG/DL
APPEARANCE UR: CLEAR
BACTERIA #/AREA URNS HPF: ABNORMAL /HPF
BILIRUB UR QL STRIP: NEGATIVE
COLOR UR AUTO: YELLOW
GLUCOSE UR STRIP-MCNC: NEGATIVE MG/DL
HGB UR QL STRIP: NEGATIVE
KETONES UR STRIP-MCNC: NEGATIVE MG/DL
LEUKOCYTE ESTERASE UR QL STRIP: NEGATIVE
MUCOUS THREADS #/AREA URNS LPF: PRESENT /LPF
NITRATE UR QL: NEGATIVE
NON-SQ EPI CELLS #/AREA URNS LPF: ABNORMAL /LPF
PH UR STRIP: 5 PH (ref 5–7)
RBC #/AREA URNS AUTO: ABNORMAL /HPF
SOURCE: ABNORMAL
SP GR UR STRIP: 1.01 (ref 1–1.03)
UROBILINOGEN UR STRIP-ACNC: 0.2 EU/DL (ref 0.2–1)
WBC #/AREA URNS AUTO: ABNORMAL /HPF

## 2021-03-04 PROCEDURE — 36415 COLL VENOUS BLD VENIPUNCTURE: CPT | Performed by: INTERNAL MEDICINE

## 2021-03-04 PROCEDURE — 81001 URINALYSIS AUTO W/SCOPE: CPT | Performed by: INTERNAL MEDICINE

## 2021-03-04 PROCEDURE — 80048 BASIC METABOLIC PNL TOTAL CA: CPT | Performed by: INTERNAL MEDICINE

## 2021-03-05 LAB
ANION GAP SERPL CALCULATED.3IONS-SCNC: 6 MMOL/L (ref 3–14)
BUN SERPL-MCNC: 42 MG/DL (ref 7–30)
CALCIUM SERPL-MCNC: 11 MG/DL (ref 8.5–10.1)
CHLORIDE SERPL-SCNC: 106 MMOL/L (ref 94–109)
CO2 SERPL-SCNC: 24 MMOL/L (ref 20–32)
CREAT SERPL-MCNC: 1.7 MG/DL (ref 0.52–1.04)
GFR SERPL CREATININE-BSD FRML MDRD: 33 ML/MIN/{1.73_M2}
GLUCOSE SERPL-MCNC: 115 MG/DL (ref 70–99)
POTASSIUM SERPL-SCNC: 4.3 MMOL/L (ref 3.4–5.3)
SODIUM SERPL-SCNC: 136 MMOL/L (ref 133–144)

## 2021-03-05 NOTE — RESULT ENCOUNTER NOTE
Cr back to baseline. UC looks ok.  Calcium increased from 2 weeks ago. Sent to Dr. Sapp to review.  Last PTH 59 from last yr

## 2021-03-07 ENCOUNTER — TELEPHONE (OUTPATIENT)
Dept: TRANSPLANT | Facility: CLINIC | Age: 56
End: 2021-03-07

## 2021-03-07 DIAGNOSIS — T86.10 COMPLICATIONS, KIDNEY TRANSPLANT: ICD-10-CM

## 2021-03-07 DIAGNOSIS — Z94.0 KIDNEY TRANSPLANTED: Primary | ICD-10-CM

## 2021-03-07 DIAGNOSIS — Z48.298 AFTERCARE FOLLOWING ORGAN TRANSPLANT: ICD-10-CM

## 2021-03-08 NOTE — TELEPHONE ENCOUNTER
Clayton Sapp MD Ututalum, Teresa, RN             Please check the following labs: PTH, Vitamin D deficiency screen, 1,25 diOH vitamin D, PTHrp and UPC.     Not sure why you sent this to me since Dr. Isaac is the provider that has seen her.     Srikanth    Previous Messages    ----- Message -----   From: Selena Lee, RN   Sent: 3/5/2021   3:10 PM CST   To: Clayton Sapp MD     Cr back to baseline. UC looks ok.   Calcium increased from 2 weeks ago. Sent to Dr. Sapp to review.   Last PTH 59 from last yr         OUTCOME:  Call Reina Quan.  Discuss completing labs:    PTH    Vitamin D deficiency screen    1,25 [place] vitamin D    PTHrp    UPC    OUTCOME:  Left VM  Sent MyChart message.

## 2021-03-15 ENCOUNTER — DOCUMENTATION ONLY (OUTPATIENT)
Dept: CARE COORDINATION | Facility: CLINIC | Age: 56
End: 2021-03-15

## 2021-04-15 ENCOUNTER — OFFICE VISIT (OUTPATIENT)
Dept: DERMATOLOGY | Facility: CLINIC | Age: 56
End: 2021-04-15
Attending: PHYSICIAN ASSISTANT
Payer: COMMERCIAL

## 2021-04-15 DIAGNOSIS — L20.82 FLEXURAL ECZEMA: ICD-10-CM

## 2021-04-15 DIAGNOSIS — L57.0 AK (ACTINIC KERATOSIS): ICD-10-CM

## 2021-04-15 DIAGNOSIS — L66.10 LICHEN PLANOPILARIS: ICD-10-CM

## 2021-04-15 DIAGNOSIS — Z85.828 HISTORY OF SCC (SQUAMOUS CELL CARCINOMA) OF SKIN: ICD-10-CM

## 2021-04-15 DIAGNOSIS — D48.5 NEOPLASM OF UNCERTAIN BEHAVIOR OF SKIN: Primary | ICD-10-CM

## 2021-04-15 PROCEDURE — 11103 TANGNTL BX SKIN EA SEP/ADDL: CPT | Mod: XS | Performed by: DERMATOLOGY

## 2021-04-15 PROCEDURE — 99212 OFFICE O/P EST SF 10 MIN: CPT | Mod: 25 | Performed by: DERMATOLOGY

## 2021-04-15 PROCEDURE — 17003 DESTRUCT PREMALG LES 2-14: CPT | Mod: XS | Performed by: DERMATOLOGY

## 2021-04-15 PROCEDURE — 17000 DESTRUCT PREMALG LESION: CPT | Mod: XS | Performed by: DERMATOLOGY

## 2021-04-15 PROCEDURE — 11102 TANGNTL BX SKIN SINGLE LES: CPT | Performed by: DERMATOLOGY

## 2021-04-15 PROCEDURE — 11900 INJECT SKIN LESIONS </W 7: CPT | Mod: XS | Performed by: DERMATOLOGY

## 2021-04-15 PROCEDURE — 88305 TISSUE EXAM BY PATHOLOGIST: CPT | Performed by: DERMATOLOGY

## 2021-04-15 RX ORDER — CLOBETASOL PROPIONATE 0.05 G/100ML
SHAMPOO TOPICAL
Qty: 118 ML | Refills: 3 | Status: SHIPPED | OUTPATIENT
Start: 2021-04-15 | End: 2022-04-14

## 2021-04-15 RX ORDER — TRIAMCINOLONE ACETONIDE 1 MG/G
CREAM TOPICAL
Qty: 30 G | Refills: 0 | Status: SHIPPED | OUTPATIENT
Start: 2021-04-15 | End: 2021-09-07

## 2021-04-15 ASSESSMENT — PAIN SCALES - GENERAL: PAINLEVEL: NO PAIN (0)

## 2021-04-15 NOTE — PROGRESS NOTES
[facility] Dermatology Note  Encounter Date: Apr 15, 2021  Office Visit     Dermatology Problem List:  0. NUB x 6, shave bx 4/15/21  - Dorsal left hand, SCC vs other (transplant patient)  - R second digit, likely SCC  - R dorsal fifth digit, likely SCC  - L dorsal foot, likely SCC  - R knee, likely SCC  - R proximal arm, SCC vs HAK  1. History of kidney transplant 1992, on chronic immunosuppression  2. History of multiple SCC's   - SCC R dorsal hand, s/p MMS 10/22/19  - SCC superior R yousif, s/p MMS 6/6/19  3. Hx of multiple AKs  4. Hx benign lesion biopsies  5. Vitiligo  6. LPP  - clobetasol 0.05% external solution   - s/p ILK 10 4/15/21  ____________________________________________    Assessment & Plan:    # NUB x 6  - Dorsal left hand, SCC vs other (transplant patient)  - R second digit, likely SCC  - R dorsal fifth digit, likely SCC  - L dorsal foot, likely SCC  - R knee, likely SCC  - R proximal arm, SCC vs HAK    - SHAVE BIOPSY PROCEDURE NOTE: After written informed consent was obtained, a time out was taken to identify the patient and the correct site for biopsy. The lesion in the above locations were cleansed with a 70% isopropyl alcohol wipe, and then injected with lidocaine 1% with epinephrine 1:100,000. Once anesthesia was ensured, the visible surface of the lesion was biopsied using a [place] blade in standard technique. Hemostasis was obtained with pressure and aluminum chloride 20% solution. The specimen was placed in a labeled formalin container and sent to pathology for sectioning and analysis. The wound was dressed with white petrolatum and an adhesive bandage. The patient tolerated the procedure well. Post-procedure instructions and recommendations were provided both verbally and in writing.    # AKs, left foot/dorsum right hand/dorsum left hand  CRYOTHERAPY PROCEDURE NOTE: 3 lesions in the above locations were treated with liquid nitrogen utilizing a 5-10sec thaw time.  Patient was advised that the treated areas will become red, swollen, may develop a blister and then should crust and peel off in the next 1-2 weeks. Post-procedure instructions were provided.    # LPP, flaring  Patient has been unable to get her re fills for her clobetasol shampoo. Today there is evidence of active disease throughout her scalp.  - ILK 10  - Restart clobetasol shampoo daily - new rx sent    Kenalog intralesional injection procedure note  - location(s): Scalp  - strength: 10 mg/ml  - volume: 0.9 ml  After verbal consent and discussion of risks including but not limited to atrophy, pain, and bruising, time out was performed, patient positioned, area cleaned with alcohol, Kenalog injected into 7 affected sites; patient tolerated procedure well    Follow-up: 2-3 month(s) in-person, or earlier for new or changing lesions    Staff and Medical Student:     Lina Muñiz, MS4    I was present with the medical student who participated in the service and in the documentation.  I have verified the history and personally performed the physical exam and medical decision making.  I agree with the assessment and plan of care as documented in the note.  I personally performed all procedures.    Brad Bauman MD  Dermatology Attending    ____________________________________________    CC: Skin Check (Kathy is here today for a skin check. She has 3 areas of concern and her scalp)    HPI:  Ms. Reina Quan is a(n) 56 year old female with a history of kidney transplant in 1992, on chronic immunosuppression who presents today for follow-up  skin exam. She was last seen 10/8/2019 where a shave biopsy was done at a previous SCC site which demonstrated SCC. She had Mohs 10/22/2019. She has multiple areas of concern today scattered throughout her hands and feet. She endorses tenderness for multiple of the lesions.    Also of note, she has run out of clobetasol for her LPP and has had trouble getting it re- filled.  Disease is active today. She endorses severe pruritis throughout her scalp.    Patient is otherwise feeling well, without additional skin concerns.    Labs:  None reviewed.    Physical Exam:  Vitals: LMP 05/14/2014 (Approximate)   SKIN: Total skin excluding the undergarment areas was performed. The exam included the head/face, neck, both arms, chest, back, abdomen, both legs, digits and/or nails.   - Multiple pink papules with overlying scale    - Dorsal left hand - tender   - R second digit,    - R dorsal fifth digit - tender   - L dorsal foot   - R knee - tender    - R proximal arm  - Pink gritty papules on dorsum foot and dorsal hands bilaterally   - Scalp - diffuse perifollicular hyperkeratosis, erythema   - No other lesions of concern on areas examined.     Medications:  Current Outpatient Medications   Medication     atorvastatin (LIPITOR) 10 MG tablet     lisinopril (ZESTRIL) 10 MG tablet     PARoxetine (PAXIL) 20 MG tablet     sirolimus (GENERIC EQUIVALENT) 1 MG tablet     triamcinolone (KENALOG) 0.1 % external cream     No current facility-administered medications for this visit.       Past Medical History:   Patient Active Problem List   Diagnosis     Ulcerative colitis (H)     Migraine     Allergic rhinitis     Hearing loss     Kidney replaced by transplant     Lichen planus     Giant Platelet syndrome     Nephrotic syndrome in diseases classified elsewhere     Major depression in partial remission (H)     CARDIOVASCULAR SCREENING; LDL GOAL LESS THAN 100     Health Care Home     Actinic keratosis     History of skin cancer     History of immunosuppression therapy     CKD (chronic kidney disease) stage 3, GFR 30-59 ml/min     High risk medications (not anticoagulants) long-term use     Seborrheic dermatitis     Dermatitis     Vitiligo     History of SCC (squamous cell carcinoma) of skin     Lichen planopilaris     Hyperlipidemia with target LDL less than 130     Status post kidney transplant      Enterococcus UTI     AK (actinic keratosis)     Neoplasm of uncertain behavior of skin     History of nonmelanoma skin cancer     HTN (hypertension)     Immunosuppression (H)     Multiple benign melanocytic nevi     Screening for cervical cancer     Unsatisfactory cervical Papanicolaou smear     Past Medical History:   Diagnosis Date     Actinic keratosis      Allergic rhinitis, cause unspecified      Anemia      anxiety/depression     PAXIL     Arthritis      congenital hearing loss     uses hearing aids     Depressive disorder      Dry eyes      Giant Platelet syndrome      Glomerular Focal Sclerosis--transplant 1985      Hypertension      Kidney replaced by transplant 1992    RAPAMUNE/ SIROLIMUS      Lichen planopilaris     LPP followed by derm on doxycycline/ clobetasol     Lichen planus      Menorrhagia      migraine      Squamous cell carcinoma     8 x      Thrombocytopenia (H)      Ulcerative colitis, unspecified     biposy neg 1996     Unsatisfactory cervical Papanicolaou smear 02/15/2021     UTI (lower urinary tract infection)     recurrent Dr Katy Andrews PA-C  [address] [place] [address] on close of this encounter.

## 2021-04-15 NOTE — PATIENT INSTRUCTIONS
Wound Care After a Biopsy    What is a skin biopsy?  A skin biopsy allows the doctor to examine a very small piece of tissue under the microscope to determine the diagnosis and the best treatment for the skin condition. A local anesthetic (numbing medicine)  is injected with a very small needle into the skin area to be tested. A small piece of skin is taken from the area. Sometimes a suture (stitch) is used.     What are the risks of a skin biopsy?  I will experience scar, bleeding, swelling, pain, crusting and redness. I may experience incomplete removal or recurrence. Risks of this procedure are excessive bleeding, bruising, infection, nerve damage, numbness, thick (hypertrophic or keloidal) scar and non-diagnostic biopsy.    How should I care for my wound for the first 24 hours?    Keep the wound dry and covered for 24 hours    If it bleeds, hold direct pressure on the area for 15 minutes. If bleeding does not stop then go to the emergency room    Avoid strenuous exercise the first 1-2 days or as your doctor instructs you    How should I care for the wound after 24 hours?    After 24 hours, remove the bandage    You may bathe or shower as normal    If you had a scalp biopsy, you can shampoo as usual and can use shower water to clean the biopsy site daily    Clean the wound twice a day with gentle soap and water    Do not scrub, be gentle    Apply white petroleum/Vaseline after cleaning the wound with a cotton swab or a clean finger, and keep the site covered with a Bandaid /bandage. Bandages are not necessary with a scalp biopsy    If you are unable to cover the site with a Bandaid /bandage, re-apply ointment 2-3 times a day to keep the site moist. Moisture will help with healing    Avoid strenuous activity for first 1-2 days    Avoid lakes, rivers, pools, and oceans until the stitches are removed or the site is healed    How do I clean my wound?    Wash hands thoroughly with soap or use hand  before all  wound care    Clean the wound with gentle soap and water    Apply white petroleum/Vaseline  to wound after it is clean    Replace the Bandaid /bandage to keep the wound covered for the first few days or as instructed by your doctor    If you had a scalp biopsy, warm shower water to the area on a daily basis should suffice    What should I use to clean my wound?     Cotton-tipped applicators (Qtips )    White petroleum jelly (Vaseline ). Use a clean new container and use Q-tips to apply.    Bandaids   as needed    Gentle soap     How should I care for my wound long term?    Do not get your wound dirty    Keep up with wound care for one week or until the area is healed.    A small scab will form and fall off by itself when the area is completely healed. The area will be red and will become pink in color as it heals. Sun protection is very important for how your scar will turn out. Sunscreen with an SPF 30 or greater is recommended once the area is healed.    If you have stitches, stitches need to be removed in 14 days. You may return to our clinic for this or you may have it done locally at your doctor s office.    You should have some soreness but it should be mild and slowly go away over several days. Talk to your doctor about using tylenol for pain,    When should I call my doctor?  If you have increased:     Pain or swelling    Pus or drainage (clear or slightly yellow drainage is ok)    Temperature over 100F    Spreading redness or warmth around wound    When will I hear about my results?  The biopsy results can take 2-3 weeks to come back. The clinic will call you with the results, send you a Audley Travelt message, or have you schedule a follow-up clinic or phone time to discuss the results. Contact our clinics if you do not hear from us in 3 weeks.     Who should I call with questions?    [facility]: [phone]     [facility]: [phone]    For  urgent needs outside of business hours call the [facility] at [phone] and ask for the dermatology resident on call    Cryotherapy    What is it?    Use of a very cold liquid, such as liquid nitrogen, to freeze and destroy abnormal skin cells that need to be removed    What should I expect?    Tenderness and redness    A small blister that might grow and fill with dark purple blood. There may be crusting.    More than one treatment may be needed if the lesions do not go away.    How do I care for the treated area?    Gently wash the area with your hands when bathing.    Use a thin layer of Vaseline to help with healing. You may use a Band-Aid.     The area should heal within 7-10 days and may leave behind a pink or lighter color.     Do not use an antibiotic or Neosporin ointment.     You may take acetaminophen (Tylenol) for pain.     Call your Doctor if you have:    Severe pain    Signs of infection (warmth, redness, cloudy yellow drainage, and or a bad smell)    Questions or concerns    Who should I call with questions?       [facility]: [phone]       [facility]: [phone]       For urgent needs outside of business hours call the [facility] at [phone]        and ask for the dermatology resident on call

## 2021-04-15 NOTE — NURSING NOTE
Dermatology Rooming Note    Reina Quan's goals for this visit include:   Chief Complaint   Patient presents with     Skin Check     Kathy is here today for a skin check. She has 3 areas of concern and her scalp     Bela Diaz CMA

## 2021-04-15 NOTE — LETTER
4/15/2021       RE: Reina Quan  [address] [address]     Dear Colleague,    Thank you for referring your patient, Reina Quan, to the [facility] [place] [facility]. Please see a copy of my visit note below.      [facility] Dermatology Note  Encounter Date: Apr 15, 2021  Office Visit     Dermatology Problem List:  0. NUB x 6, shave bx 4/15/21  - Dorsal left hand, SCC vs other (transplant patient)  - R second digit, likely SCC  - R dorsal fifth digit, likely SCC  - L dorsal foot, likely SCC  - R knee, likely SCC  - R proximal arm, SCC vs HAK  1. History of kidney transplant 1992, on chronic immunosuppression  2. History of multiple SCC's   - SCC R dorsal hand, s/p MMS 10/22/19  - SCC superior R yousif, s/p MMS 6/6/19  3. Hx of multiple AKs  4. Hx benign lesion biopsies  5. Vitiligo  6. LPP  - clobetasol 0.05% external solution   - s/p ILK 10 4/15/21  ____________________________________________    Assessment & Plan:    # NUB x 6  - Dorsal left hand, SCC vs other (transplant patient)  - R second digit, likely SCC  - R dorsal fifth digit, likely SCC  - L dorsal foot, likely SCC  - R knee, likely SCC  - R proximal arm, SCC vs HAK    - SHAVE BIOPSY PROCEDURE NOTE: After written informed consent was obtained, a time out was taken to identify the patient and the correct site for biopsy. The lesion in the above locations were cleansed with a 70% isopropyl alcohol wipe, and then injected with lidocaine 1% with epinephrine 1:100,000. Once anesthesia was ensured, the visible surface of the lesion was biopsied using a [place] blade in standard technique. Hemostasis was obtained with pressure and aluminum chloride 20% solution. The specimen was placed in a labeled formalin container and sent to pathology for sectioning and analysis. The wound was dressed with white petrolatum and an adhesive bandage.  The patient tolerated the procedure well. Post-procedure instructions and recommendations were provided both verbally and in writing.    # AKs, left foot/dorsum right hand/dorsum left hand  CRYOTHERAPY PROCEDURE NOTE: 3 lesions in the above locations were treated with liquid nitrogen utilizing a 5-10sec thaw time. Patient was advised that the treated areas will become red, swollen, may develop a blister and then should crust and peel off in the next 1-2 weeks. Post-procedure instructions were provided.    # LPP, flaring  Patient has been unable to get her re fills for her clobetasol shampoo. Today there is evidence of active disease throughout her scalp.  - ILK 10  - Restart clobetasol shampoo daily - new rx sent    Kenalog intralesional injection procedure note  - location(s): Scalp  - strength: 10 mg/ml  - volume: 0.9 ml  After verbal consent and discussion of risks including but not limited to atrophy, pain, and bruising, time out was performed, patient positioned, area cleaned with alcohol, Kenalog injected into 7 affected sites; patient tolerated procedure well    Follow-up: 2-3 month(s) in-person, or earlier for new or changing lesions    Staff and Medical Student:     Lina Muñiz, MS4    I was present with the medical student who participated in the service and in the documentation.  I have verified the history and personally performed the physical exam and medical decision making.  I agree with the assessment and plan of care as documented in the note.  I personally performed all procedures.    Brad Bauman MD  Dermatology Attending    ____________________________________________    CC: Skin Check (Kathy is here today for a skin check. She has 3 areas of concern and her scalp)    HPI:  Ms. Reina Quan is a(n) 56 year old female with a history of kidney transplant in 1992, on chronic immunosuppression who presents today for follow-up  skin exam. She was last seen 10/8/2019 where a shave biopsy was  done at a previous SCC site which demonstrated SCC. She had Mohs 10/22/2019. She has multiple areas of concern today scattered throughout her hands and feet. She endorses tenderness for multiple of the lesions.    Also of note, she has run out of clobetasol for her LPP and has had trouble getting it re- filled. Disease is active today. She endorses severe pruritis throughout her scalp.    Patient is otherwise feeling well, without additional skin concerns.    Labs:  None reviewed.    Physical Exam:  Vitals: LMP 05/14/2014 (Approximate)   SKIN: Total skin excluding the undergarment areas was performed. The exam included the head/face, neck, both arms, chest, back, abdomen, both legs, digits and/or nails.   - Multiple pink papules with overlying scale    - Dorsal left hand - tender   - R second digit,    - R dorsal fifth digit - tender   - L dorsal foot   - R knee - tender    - R proximal arm  - Pink gritty papules on dorsum foot and dorsal hands bilaterally   - Scalp - diffuse perifollicular hyperkeratosis, erythema   - No other lesions of concern on areas examined.     Medications:  Current Outpatient Medications   Medication     atorvastatin (LIPITOR) 10 MG tablet     lisinopril (ZESTRIL) 10 MG tablet     PARoxetine (PAXIL) 20 MG tablet     sirolimus (GENERIC EQUIVALENT) 1 MG tablet     triamcinolone (KENALOG) 0.1 % external cream     No current facility-administered medications for this visit.       Past Medical History:   Patient Active Problem List   Diagnosis     Ulcerative colitis (H)     Migraine     Allergic rhinitis     Hearing loss     Kidney replaced by transplant     Lichen planus     Giant Platelet syndrome     Nephrotic syndrome in diseases classified elsewhere     Major depression in partial remission (H)     CARDIOVASCULAR SCREENING; LDL GOAL LESS THAN 100     Health Care Home     Actinic keratosis     History of skin cancer     History of immunosuppression therapy     CKD (chronic kidney disease)  stage 3, GFR 30-59 ml/min     High risk medications (not anticoagulants) long-term use     Seborrheic dermatitis     Dermatitis     Vitiligo     History of SCC (squamous cell carcinoma) of skin     Lichen planopilaris     Hyperlipidemia with target LDL less than 130     Status post kidney transplant     Enterococcus UTI     AK (actinic keratosis)     Neoplasm of uncertain behavior of skin     History of nonmelanoma skin cancer     HTN (hypertension)     Immunosuppression (H)     Multiple benign melanocytic nevi     Screening for cervical cancer     Unsatisfactory cervical Papanicolaou smear     Past Medical History:   Diagnosis Date     Actinic keratosis      Allergic rhinitis, cause unspecified      Anemia      anxiety/depression     PAXIL     Arthritis      congenital hearing loss     uses hearing aids     Depressive disorder      Dry eyes      Giant Platelet syndrome      Glomerular Focal Sclerosis--transplant 1985      Hypertension      Kidney replaced by transplant 1992    RAPAMUNE/ SIROLIMUS      Lichen planopilaris     LPP followed by derm on doxycycline/ clobetasol     Lichen planus      Menorrhagia      migraine      Squamous cell carcinoma     8 x      Thrombocytopenia (H)      Ulcerative colitis, unspecified     biposy neg 1996     Unsatisfactory cervical Papanicolaou smear 02/15/2021     UTI (lower urinary tract infection)     recurrent Dr [facility] Andrews PA-C  [address] [id number] on close of this encounter.

## 2021-04-15 NOTE — NURSING NOTE
Drug Administration Record    Prior to injection, verified patient identity using patient's name and date of birth.  Due to injection administration, patient instructed to remain in clinic for 15 minutes  afterwards, and to report any adverse reaction to me immediately.    Drug Name: triamcinolone acetonide(kenalog)  Dose: 1mL of triamcinolone 10mg/mL, 10mg dose  Route administered: ID  NDC #: Kenalog-10 (5949-7028-89)  Amount of waste(mL):4  Reason for waste: Multi dose vial    LOT #: [id number]  SITE: Scalp  : Simple Admit  EXPIRATION DATE: 08/2022

## 2021-04-19 LAB — COPATH REPORT: NORMAL

## 2021-05-27 ENCOUNTER — PATIENT OUTREACH (OUTPATIENT)
Dept: FAMILY MEDICINE | Facility: CLINIC | Age: 56
End: 2021-05-27

## 2021-05-27 NOTE — TELEPHONE ENCOUNTER
Patient Quality Outreach      Summary:    Patient has the following on her problem list/HM:   Depression / Dysthymia review    6 Month Remission: 4-8 month window range: 6/15/2021  12 Month Remission: 10-14 month window range: 12/15/2021       PHQ-9 SCORE 6/4/2019 3/4/2020 2/15/2021   PHQ-9 Total Score - - -   PHQ-9 Total Score MyChart - 5 (Mild depression) -   PHQ-9 Total Score 8 5 4       If PHQ-9 recheck is 5 or more, route to provider for next steps.    Diabetes    Last A1C:  Lab Results   Component Value Date    A1C 5.7 01/17/2020       Last LDL:    Lab Results   Component Value Date     02/15/2021       Is the patient on a Statin? Yes          Is the patient on Aspirin? No    Medications     HMG CoA Reductase Inhibitors     atorvastatin (LIPITOR) 10 MG tablet             Last three blood pressure readings:  BP Readings from Last 3 Encounters:   02/15/21 120/70   01/14/20 129/87   10/22/19 (!) 141/99            Tobacco Use      Smoking status: Never Smoker      Smokeless tobacco: Never Used          Hypertension   Last three blood pressure readings:  BP Readings from Last 3 Encounters:   02/15/21 120/70   01/14/20 129/87   10/22/19 (!) 141/99     Blood pressure: Failed    HTN Guidelines:  ? 139/89     Patient is due/failing the following:   Colonoscopy, Breast Cancer Screening - Mammogram and Immunizations    Type of outreach:    Sent letter.    Questions for provider review:    None                                                                                                                                     Ashley Garner, [facility]     Chart routed to Care Team.

## 2021-05-27 NOTE — LETTER
[facility]  [address] [facility] [id number] [phone]       May 28, 2021    Reina Quan  [address] [place] [address]    Dear Kathy,    We care about your health and have reviewed your health plan and are making recommendations based on this review, to optimize your health.    You are in particular need of attention regarding:  -Breast Cancer Screening  -Colon Cancer Screening    We are recommending that you:                                                                                                                                       -schedule a MAMMOGRAM which is due.         1 in 8 women will develop invasive breast cancer during her lifetime and it is the most common non-skin cancer in American women.  EARLY detection, new treatments, and a better understanding of the disease have increased survival rates - the 5 year survival rate in the 1960s was 63% and today it is close to 90%.          If you are under/uninsured, we recommend you contact the [facility]. They offer mammograms at no charge or on a sliding fee charge. You can schedule with them at [phone]. Please have them to send us the results.           Please disregard this reminder if you have had this exam elsewhere within the last year.  It would be helpful for us to have a copy of your mammogram report in our file so that we can best coordinate your care - please contact us with when your test was done so we can update your record.                                                                                                                      -schedule a COLONOSCOPY to look for colon cancer (due every 10 years or 5 years in higher risk situations.)        Colon cancer is now the second leading cause of cancer-related deaths in the [place] for both men and women and there are over 130,000 new cases and 50,000 deaths per year from colon cancer.  Colonoscopies can prevent  90-95% of these deaths.  Problem lesions can be removed before they ever become cancer.  This test is not only looking for cancer, but also getting rid of precancerious lesions.      If you are under/uninsured, we recommend you contact the [facility] program. [facility] is a free colorectal cancer screening program that provides colonoscopies for eligible under/uninsured [place] men and women. If you are interested in receiving a free colonoscopy, please call [facility] at [phone] (mention code [employer]) to see if you re eligible.     If you do not wish to do a colonoscopy or cannot afford to do one, at this time, there is another option. It is called a FIT test or Fecal Immunochemical Occult Blood Test (take home stool sample kit).  It does not replace the colonoscopy for colorectal cancer screening, but it can detect hidden bleeding in the lower colon.  It does need to be repeated every year and if a positive result is obtained, you would be referred for a colonoscopy.       If you have completed either one of these tests at another facility, please call with the details of when and where the tests were done and if they were normal or not. Or have the records sent to our clinic so that we can best coordinate your care.    In addition, here is a list of due or overdue Health Maintenance reminders.    Health Maintenance Due   Topic Date Due     ANNUAL REVIEW OF HM ORDERS  Never done     Pneumococcal Vaccine (1 of 4 - PCV13) Never done     COVID-19 Vaccine (1) Never done     HIV Screening  Never done     Colorectal Cancer Screening  11/20/2017     Mammogram  06/22/2019     PAP  06/15/2021     HPV Follow Up  06/15/2021       To address the above recommendations, we encourage you to contact us at [phone], via Acc[employer]era or by contacting Central Scheduling toll free at [phone] 24 hours a day. They will assist you with finding the most convenient time and location.    Thank you for trusting [facility] and we appreciate the opportunity to serve you.  We look forward to supporting your healthcare needs in the future.    Healthy Regards,    Your  HEALTH [facility] Team

## 2021-06-14 NOTE — TELEPHONE ENCOUNTER
Patient Quality Outreach 2nd Attempt      Summary:    Type of outreach:    Phone, left message for patient/parent to call back.    Next Steps:  Reach out within 90 days via Sakti3t.    Max number of attempts reached: Yes. Will try again in 90 days if patient still on fail list.    Questions for provider review:    None                                                                                                                    Ashley Garner [facility]     Chart routed to Care Team.

## 2021-06-24 NOTE — TELEPHONE ENCOUNTER
Called the pt.  Rescheduled physical.  Advised to have labs at that appt as Lizbeth Andrews has not placed lab orders yet.  Pt agreeable with that plan.     Edu, I called patient today with BP and HR recordings after titration of HCTZ. Pt states he is feeling fine and denies any lightheadedness or dizziness.     Sat 129/85, 73  Sun 130/77, 76  Mon 122/76, 63  Tues 114/66, 75  Wed 128/71, 66  Thurs 114/70, 81    Any new recommendations?

## 2021-06-28 ENCOUNTER — TELEPHONE (OUTPATIENT)
Dept: TRANSPLANT | Facility: CLINIC | Age: 56
End: 2021-06-28

## 2021-06-28 DIAGNOSIS — Z94.0 KIDNEY TRANSPLANTED: ICD-10-CM

## 2021-06-28 DIAGNOSIS — Z48.298 AFTERCARE FOLLOWING ORGAN TRANSPLANT: ICD-10-CM

## 2021-06-28 DIAGNOSIS — Z94.0 KIDNEY REPLACED BY TRANSPLANT: ICD-10-CM

## 2021-06-28 DIAGNOSIS — T86.10 COMPLICATIONS, KIDNEY TRANSPLANT: ICD-10-CM

## 2021-06-28 LAB
ANION GAP SERPL CALCULATED.3IONS-SCNC: 5 MMOL/L (ref 3–14)
BUN SERPL-MCNC: 25 MG/DL (ref 7–30)
CALCIUM SERPL-MCNC: 9.7 MG/DL (ref 8.5–10.1)
CHLORIDE SERPL-SCNC: 106 MMOL/L (ref 94–109)
CO2 SERPL-SCNC: 27 MMOL/L (ref 20–32)
CREAT SERPL-MCNC: 1.79 MG/DL (ref 0.52–1.04)
CREAT UR-MCNC: 119 MG/DL
DEPRECATED CALCIDIOL+CALCIFEROL SERPL-MC: 18 UG/L (ref 20–75)
ERYTHROCYTE [DISTWIDTH] IN BLOOD BY AUTOMATED COUNT: 15.3 % (ref 10–15)
GFR SERPL CREATININE-BSD FRML MDRD: 31 ML/MIN/{1.73_M2}
GLUCOSE SERPL-MCNC: 130 MG/DL (ref 70–99)
HCT VFR BLD AUTO: 35.4 % (ref 35–47)
HGB BLD-MCNC: 10.9 G/DL (ref 11.7–15.7)
MCH RBC QN AUTO: 28.8 PG (ref 26.5–33)
MCHC RBC AUTO-ENTMCNC: 30.8 G/DL (ref 31.5–36.5)
MCV RBC AUTO: 94 FL (ref 78–100)
PLATELET # BLD AUTO: 46 10E9/L (ref 150–450)
POTASSIUM SERPL-SCNC: 4.2 MMOL/L (ref 3.4–5.3)
PROT UR-MCNC: 0.68 G/L
PROT/CREAT 24H UR: 0.57 G/G CR (ref 0–0.2)
PTH-INTACT SERPL-MCNC: 66 PG/ML (ref 18–80)
RBC # BLD AUTO: 3.78 10E12/L (ref 3.8–5.2)
SIROLIMUS BLD-MCNC: 5 UG/L (ref 5–15)
SODIUM SERPL-SCNC: 138 MMOL/L (ref 133–144)
TME LAST DOSE: 1200 H
WBC # BLD AUTO: 4.3 10E9/L (ref 4–11)

## 2021-06-28 PROCEDURE — 84156 ASSAY OF PROTEIN URINE: CPT | Performed by: INTERNAL MEDICINE

## 2021-06-28 PROCEDURE — 83970 ASSAY OF PARATHORMONE: CPT | Performed by: INTERNAL MEDICINE

## 2021-06-28 PROCEDURE — 80195 ASSAY OF SIROLIMUS: CPT | Performed by: INTERNAL MEDICINE

## 2021-06-28 PROCEDURE — 85027 COMPLETE CBC AUTOMATED: CPT | Performed by: INTERNAL MEDICINE

## 2021-06-28 PROCEDURE — 82306 VITAMIN D 25 HYDROXY: CPT | Performed by: INTERNAL MEDICINE

## 2021-06-28 PROCEDURE — 99000 SPECIMEN HANDLING OFFICE-LAB: CPT | Performed by: INTERNAL MEDICINE

## 2021-06-28 PROCEDURE — 80048 BASIC METABOLIC PNL TOTAL CA: CPT | Performed by: INTERNAL MEDICINE

## 2021-06-28 PROCEDURE — 82652 VIT D 1 25-DIHYDROXY: CPT | Performed by: INTERNAL MEDICINE

## 2021-06-28 PROCEDURE — 36415 COLL VENOUS BLD VENIPUNCTURE: CPT | Performed by: INTERNAL MEDICINE

## 2021-06-28 PROCEDURE — 82542 COL CHROMOTOGRAPHY QUAL/QUAN: CPT | Mod: 90 | Performed by: INTERNAL MEDICINE

## 2021-06-28 NOTE — TELEPHONE ENCOUNTER
DATE:  6/28/2021   TIME OF RECEIPT FROM LAB:  11:20  LAB TEST:  plt  LAB VALUE:  46  RESULTS GIVEN WITH READ-BACK TO (PROVIDER):  Jailyn Hurst  TIME LAB VALUE REPORTED TO PROVIDER:   11:20

## 2021-06-30 LAB — 1,25(OH)2D SERPL-MCNC: 31 PG/ML (ref 19.9–79.3)

## 2021-07-09 LAB — PTH RELATED PROT SERPL-SCNC: 4.4 PMOL/L (ref 0–3.4)

## 2021-07-13 ENCOUNTER — TELEPHONE (OUTPATIENT)
Dept: TRANSPLANT | Facility: CLINIC | Age: 56
End: 2021-07-13

## 2021-07-13 NOTE — TELEPHONE ENCOUNTER
Lamonte Isaac MD [place]um, Teresa, RN  Would make sure her primary is aware, she needs to make sure she is up to date on all age appropriate cancer screening           Previous Messages       ----- Message -----   From: Selena Lee RN   Sent: 7/12/2021  10:04 AM CDT   To: Lamonte Isaac MD     This was ordered in March, I accidentally sent result note to Dr. Sapp d/t Calcium elevated at 11.0   And last yr's PTH 59. He ordered: PTH, Vitamin D deficiency screen, 1,25 diOH vitamin D, PTHrp and UPC.   Calcium back at goal     ----- Message -----   From: Lamonte Isaac MD   Sent: 7/9/2021  10:06 PM CDT   To: Selena Lee RN     Who and why did we order this?   ----- Message -----   From: Selena Lee RN   Sent: 7/9/2021   1:12 PM CDT   To: Lamonte Isaac MD     PTH rel peptide test 4.4   Result sent to Dr. Isaac for review.       PLAN:  Call Reina Quan and ask who her PCP is, not listed in Epic.  Discuss Dr. Isaac's recommendations to make PCP aware of PTH-related peptide is elevated.  If she can give us Provider's name and Facility, RNCC will contact PCP.  Patient needs to be up to date with age appropriate cancer screening.

## 2021-08-16 DIAGNOSIS — Z94.0 LIVING-DONOR KIDNEY TRANSPLANT RECIPIENT: Primary | ICD-10-CM

## 2021-08-16 NOTE — LETTER
Reina Quan  [address] [facility] [address]                August 17, 2021    This letter is regarding your medication refills.    For the best outcome for your transplant and in order for us to refill your prescriptions, we encourage you to have a yearly clinic appointment with a physician and to have current labs. If you are unable to return to our transplant center there are several alternatives. Please call your coordinator to discuss them. .  To make an appointment with a physician here at [facility], please call:  The Transplant Office at [phone] or [phone].  .      The Transplant Staff at [facility]

## 2021-08-17 RX ORDER — SIROLIMUS 1 MG/1
3 TABLET, FILM COATED ORAL DAILY
Qty: 90 TABLET | Refills: 2 | Status: SHIPPED | OUTPATIENT
Start: 2021-08-17 | End: 2022-02-24

## 2021-08-17 NOTE — TELEPHONE ENCOUNTER
LPN task:  Call and invite back for Annual appointment with Transplant Nephrology (Virtual Visit).  Send a message to Scheduling pool to set-up visit.    Rx for Sirolimus sent (1 mo, 2 refills)

## 2021-08-24 ENCOUNTER — PRE VISIT (OUTPATIENT)
Dept: NEPHROLOGY | Facility: CLINIC | Age: 56
End: 2021-08-24

## 2021-09-05 ENCOUNTER — HEALTH MAINTENANCE LETTER (OUTPATIENT)
Age: 56
End: 2021-09-05

## 2021-09-07 DIAGNOSIS — L20.82 FLEXURAL ECZEMA: ICD-10-CM

## 2021-09-07 DIAGNOSIS — R80.8 OTHER PROTEINURIA: ICD-10-CM

## 2021-09-07 RX ORDER — TRIAMCINOLONE ACETONIDE 1 MG/G
CREAM TOPICAL
Qty: 30 G | Refills: 3 | Status: SHIPPED | OUTPATIENT
Start: 2021-09-07 | End: 2022-12-06

## 2021-09-07 NOTE — TELEPHONE ENCOUNTER
triamcinolone (KENALOG) 0.1 % external cream  Last Written Prescription Date:  4/15/2021  Last Fill Quantity: 30,   # refills: 0  Last Office Visit : 4/15/2021  Future Office visit:  11/16/2021  Order mentions use for only 14 days.  Please advise for refilling this prescription for Pt care.    Thank you       Earnestine Franks RN  Central Triage Red Flags/Med Refills

## 2021-09-07 NOTE — TELEPHONE ENCOUNTER
Received refill request for triamcinolone 0.1% cream as the resident on call. Reviewed patient's chart and attached communication. No mention of triamcinolone cream in documentation in visit. Patient called on phone. States she was prescribed for eczema affecting her ankles. Patient uses it sparingly. Understands potential side effects, need for breaks, and limiting long-term consistent use. Patient last seen 4/15/21. RTC scheduled 11/2021.     After reviewing the medication list and assessment and plan from last visit, the refill request was APPROVED.    CC'ing Dr. Bauman as FYONESIMO.    Romain Harden MD  PGY-2 Dermatology

## 2021-09-10 ENCOUNTER — PATIENT OUTREACH (OUTPATIENT)
Dept: FAMILY MEDICINE | Facility: CLINIC | Age: 56
End: 2021-09-10

## 2021-09-10 RX ORDER — LISINOPRIL 10 MG/1
5 TABLET ORAL DAILY
Qty: 45 TABLET | Refills: 0 | Status: SHIPPED | OUTPATIENT
Start: 2021-09-10 | End: 2023-02-13

## 2021-09-10 NOTE — LETTER
[facility]  [address] [address]  [phone]       October 11, 2021    Reina Quan  [address] [facility] [address]    Dear Kathy,    We care about your health and have reviewed your health plan and are making recommendations based on this review, to optimize your health.    You are in particular need of attention regarding:  -Depression  -Breast Cancer Screening  -Colon Cancer Screening    We are recommending that you:  -schedule a FOLLOWUP OFFICE APPOINTMENT with me.         In addition, here is a list of due or overdue Health Maintenance reminders.    Health Maintenance Due   Topic Date Due     ANNUAL REVIEW OF HM ORDERS  Never done     Pneumococcal Vaccine (1 of 4 - PCV13) Never done     HIV Screening  Never done     Colorectal Cancer Screening  11/20/2017     Mammogram  06/22/2019     Depression Assessment  08/15/2021     Flu Vaccine (1) 09/01/2021     COVID-19 Vaccine (2 - Moderna 2-dose series) 10/12/2021       To address the above recommendations, we encourage you to contact us at [phone], via Certona or by contacting Central Scheduling toll free at [phone] 24 hours a day. They will assist you with finding the most convenient time and location.    Thank you for trusting [facility] and we appreciate the opportunity to serve you.  We look forward to supporting your healthcare needs in the future.    Healthy Regards,    Your [facility] Team

## 2021-09-10 NOTE — TELEPHONE ENCOUNTER
Reviewed most recent nephrology note. She was taking intermittently. He recommended she restart and take regularly. Short fill until PCP

## 2021-09-10 NOTE — TELEPHONE ENCOUNTER
Routing refill request to provider for review/approval because:  Labs out of range:  Creatinine elevated.    Creatinine   Date Value Ref Range Status   06/28/2021 1.79 (H) 0.52 - 1.04 mg/dL Final      Pinky Macias RN

## 2021-09-10 NOTE — TELEPHONE ENCOUNTER
Patient Quality Outreach      Summary:    Patient has the following on her problem list/HM:     Hypertension   Last three blood pressure readings:  BP Readings from Last 3 Encounters:   02/15/21 120/70   01/14/20 129/87   10/22/19 (!) 141/99     Blood pressure: Passed    HTN Guidelines:  ? 139/89     Patient is due/failing the following:   Colonoscopy, Breast Cancer Screening - Mammogram and PHQ-9 Needed and Depression follow-up visit    Type of outreach:    Sent Creabilis message.    Questions for provider review:    None                                                                                                                                     Ashley Garner CMA     Chart routed to Care Team.

## 2021-10-11 NOTE — TELEPHONE ENCOUNTER
Patient Quality Outreach 2nd Attempt      Summary:    Type of outreach:    Sent letter.    Next Steps:  Reach out within 90 days via Phone.    Max number of attempts reached: Yes. Will try again in 90 days if patient still on fail list.    Questions for provider review:    None                                                                                                                    Ashley Garner CMA     Chart routed to Care Team.

## 2021-10-12 ENCOUNTER — TELEPHONE (OUTPATIENT)
Dept: TRANSPLANT | Facility: CLINIC | Age: 56
End: 2021-10-12

## 2021-10-12 DIAGNOSIS — Z48.298 AFTERCARE FOLLOWING ORGAN TRANSPLANT: ICD-10-CM

## 2021-10-12 DIAGNOSIS — Z79.899 ENCOUNTER FOR LONG-TERM CURRENT USE OF MEDICATION: ICD-10-CM

## 2021-10-12 DIAGNOSIS — Z94.0 KIDNEY REPLACED BY TRANSPLANT: Primary | ICD-10-CM

## 2021-10-12 NOTE — TELEPHONE ENCOUNTER
Patient Call: Transplant Lab/Orders  Route to LPN  Post Transplant Days: [id number]  When patient is less than 60 days post-transplant, route high priority    Reason for Call: Annual lab reorder orders in Epic   Callback needed? No

## 2021-10-18 ENCOUNTER — LAB (OUTPATIENT)
Dept: LAB | Facility: CLINIC | Age: 56
End: 2021-10-18
Payer: COMMERCIAL

## 2021-10-18 DIAGNOSIS — T86.10 COMPLICATIONS, KIDNEY TRANSPLANT: ICD-10-CM

## 2021-10-18 DIAGNOSIS — Z94.0 KIDNEY REPLACED BY TRANSPLANT: ICD-10-CM

## 2021-10-18 DIAGNOSIS — Z79.899 ENCOUNTER FOR LONG-TERM CURRENT USE OF MEDICATION: ICD-10-CM

## 2021-10-18 DIAGNOSIS — Z48.298 AFTERCARE FOLLOWING ORGAN TRANSPLANT: ICD-10-CM

## 2021-10-18 DIAGNOSIS — Z94.0 KIDNEY TRANSPLANTED: ICD-10-CM

## 2021-10-18 LAB
CREAT UR-MCNC: 88 MG/DL
ERYTHROCYTE [DISTWIDTH] IN BLOOD BY AUTOMATED COUNT: 17 % (ref 10–15)
HCT VFR BLD AUTO: 34.6 % (ref 35–47)
HGB BLD-MCNC: 10.8 G/DL (ref 11.7–15.7)
MCH RBC QN AUTO: 29.5 PG (ref 26.5–33)
MCHC RBC AUTO-ENTMCNC: 31.2 G/DL (ref 31.5–36.5)
MCV RBC AUTO: 95 FL (ref 78–100)
PLATELET # BLD AUTO: 77 10E3/UL (ref 150–450)
PROT UR-MCNC: 0.98 G/L
PROT/CREAT 24H UR: 1.11 G/G CR (ref 0–0.2)
RBC # BLD AUTO: 3.66 10E6/UL (ref 3.8–5.2)
WBC # BLD AUTO: 4.9 10E3/UL (ref 4–11)

## 2021-10-18 PROCEDURE — 84156 ASSAY OF PROTEIN URINE: CPT

## 2021-10-18 PROCEDURE — 85027 COMPLETE CBC AUTOMATED: CPT

## 2021-10-18 PROCEDURE — 80061 LIPID PANEL: CPT

## 2021-10-18 PROCEDURE — 80053 COMPREHEN METABOLIC PANEL: CPT

## 2021-10-18 PROCEDURE — 80195 ASSAY OF SIROLIMUS: CPT

## 2021-10-18 PROCEDURE — 82248 BILIRUBIN DIRECT: CPT

## 2021-10-18 PROCEDURE — 36415 COLL VENOUS BLD VENIPUNCTURE: CPT

## 2021-10-19 ENCOUNTER — TELEPHONE (OUTPATIENT)
Dept: TRANSPLANT | Facility: CLINIC | Age: 56
End: 2021-10-19

## 2021-10-19 DIAGNOSIS — Z48.298 AFTERCARE FOLLOWING ORGAN TRANSPLANT: Primary | ICD-10-CM

## 2021-10-19 DIAGNOSIS — E78.5 HYPERLIPIDEMIA WITH TARGET LDL LESS THAN 130: ICD-10-CM

## 2021-10-19 LAB
ALBUMIN SERPL-MCNC: 3.7 G/DL (ref 3.4–5)
ALP SERPL-CCNC: 121 U/L (ref 40–150)
ALT SERPL W P-5'-P-CCNC: 32 U/L (ref 0–50)
ANION GAP SERPL CALCULATED.3IONS-SCNC: 6 MMOL/L (ref 3–14)
AST SERPL W P-5'-P-CCNC: 29 U/L (ref 0–45)
BILIRUB DIRECT SERPL-MCNC: <0.1 MG/DL (ref 0–0.2)
BILIRUB SERPL-MCNC: 0.3 MG/DL (ref 0.2–1.3)
BUN SERPL-MCNC: 34 MG/DL (ref 7–30)
CALCIUM SERPL-MCNC: 9.8 MG/DL (ref 8.5–10.1)
CHLORIDE BLD-SCNC: 106 MMOL/L (ref 94–109)
CHOLEST SERPL-MCNC: 391 MG/DL
CO2 SERPL-SCNC: 25 MMOL/L (ref 20–32)
CREAT SERPL-MCNC: 1.66 MG/DL (ref 0.52–1.04)
FASTING STATUS PATIENT QL REPORTED: YES
GFR SERPL CREATININE-BSD FRML MDRD: 34 ML/MIN/1.73M2
GLUCOSE BLD-MCNC: 108 MG/DL (ref 70–99)
HDLC SERPL-MCNC: 71 MG/DL
LDLC SERPL CALC-MCNC: ABNORMAL MG/DL
NONHDLC SERPL-MCNC: 320 MG/DL
POTASSIUM BLD-SCNC: 4.3 MMOL/L (ref 3.4–5.3)
PROT SERPL-MCNC: 7.7 G/DL (ref 6.8–8.8)
SIROLIMUS BLD-MCNC: 7.4 UG/L (ref 5–15)
SODIUM SERPL-SCNC: 137 MMOL/L (ref 133–144)
TME LAST DOSE: NORMAL H
TME LAST DOSE: NORMAL H
TRIGL SERPL-MCNC: 409 MG/DL

## 2021-10-19 NOTE — TELEPHONE ENCOUNTER
ISSUE: UPC 1.11 on 10/18/2021    PLAN: Please have patient repeat UPC and have a UA/UC done

## 2021-10-19 NOTE — TELEPHONE ENCOUNTER
ISSUE #1:  Sirolimus = 7.4  (10/18/21)  Goal 4-6  Current Sirolimus dose 3 mg daily     Ref. Range 10/18/2021 14:37 10/18/2021 14:38   Sirolimus Last Dose Date Unknown 10/16/2021    Sirolimus Last Dose Time Unknown 10:00 AM    Sirolimus by Tandem Mass Spectrometry Latest Ref Range: 5.0 - 15.0 ug/L 7.4      Does not look like a good trough.  Previous 2 levels at goal.    PLAN:   Call and confirm this was a good 24-hour trough.   Verify dose 3 mg daily.   Confirm no new medications or illness (uli. Diarrhea).  If good trough, stay on the same dose.   Recheck level in 2 weeks and make sure it is a good 24 hour trough to avoid additional lab draws.    ISSUE #2:  Cholesterol (391)  Triglycerides (409)  On Lipitor 20 mg at bedtime  Message sent to Dr. Isaac.

## 2021-10-20 ENCOUNTER — TELEPHONE (OUTPATIENT)
Dept: TRANSPLANT | Facility: CLINIC | Age: 56
End: 2021-10-20

## 2021-10-20 DIAGNOSIS — E78.5 HYPERLIPIDEMIA WITH TARGET LDL LESS THAN 130: ICD-10-CM

## 2021-10-20 RX ORDER — ATORVASTATIN CALCIUM 10 MG/1
20 TABLET, FILM COATED ORAL AT BEDTIME
Qty: 180 TABLET | Refills: 3 | Status: SHIPPED | OUTPATIENT
Start: 2021-10-20 | End: 2021-10-21

## 2021-10-20 NOTE — TELEPHONE ENCOUNTER
Call returned to patient. No answer. Detailed voice message left instructing patient to repeat UPC and have a UA/UC done.

## 2021-10-20 NOTE — TELEPHONE ENCOUNTER
Pt returning call  Give her a call back    She also needs her Lipitor refilled has been out of it for 2 weeks  Cameron has tried contacting us with no response  [facility] [place] Lipitor 20 mg  - 30 day supply

## 2021-10-20 NOTE — TELEPHONE ENCOUNTER
Call returned to patient. No answer. Detailed voice message left with instructions listed below. Order sent.     PLAN:   Call and confirm this was a good 24-hour trough.   Verify dose 3 mg daily.   Confirm no new medications or illness (uli. Diarrhea).  If good trough, stay on the same dose.   Recheck level in 2 weeks and make sure it is a good 24 hour trough to avoid additional lab draws.

## 2021-10-20 NOTE — TELEPHONE ENCOUNTER
Marlin, MD Rosa Benson Teresa, RN  To see her PCP or lipid provider   In the mean time we can increase lipitor to 40 mg at bedtime and start fish oil 1000 mg po bid with meals   Why is she on mTOR again ?       ----- Message -----   From: Selena Lee, RN   Sent: 10/19/2021   4:58 PM CDT   To: Lamonte Isaac MD   Subject: labs                                             Dr. Isaac,     Cholesterol elevated (391)   Triglycerides (409)   On Lipitor 20 mg at bedtime --- prescribed by Dr. Isaac.     Let me know what you recommend.     Thanks,   Freddy     Dr. Price's note from July 2010:  She has not had any rejections and has done exceedingly well from an allograft standpoint.  Due to the problems she is having with squamous cell carcinoma on her face, lip and hands, I suggested to her that she may be an excellent candidate for conversion to a Rapamune-based immunosuppressive regimen.    PLAN:  Call Reina Quan and discuss Dr. Isaac's recommendations to:    increase lipitor to 40 mg at bedtime    start fish oil 1000 mg po bid with meals    See PCP and have them manage lipids    OUTCOME:  VM left for patient, asking for return phone call.

## 2021-10-21 RX ORDER — METAPROTERENOL SULFATE 10 MG
1000 TABLET ORAL 2 TIMES DAILY
Qty: 120 CAPSULE | Refills: 0 | Status: SHIPPED | OUTPATIENT
Start: 2021-10-21 | End: 2023-02-13

## 2021-10-21 RX ORDER — ATORVASTATIN CALCIUM 10 MG/1
20 TABLET, FILM COATED ORAL AT BEDTIME
Qty: 60 TABLET | Refills: 0 | Status: SHIPPED | OUTPATIENT
Start: 2021-10-21 | End: 2022-12-06

## 2021-10-21 NOTE — TELEPHONE ENCOUNTER
RNCC spoke with Kathy -   Her lipids were drawn on a non-fasting day, when she had been out of her lipitor for a couple of weeks.    RNCC advised this is likely not the sole cause of the increased lipids, but could certainly stay on lipitor at 20mg night, start the fish oil as recommended and follow up with PCP within 30 days for ongoing management.  Rx sent for 30 day fill.    Kathy voiced understanding.    She will repeat UPC and complete UA in two weeks. She denies any s/s of UTI.

## 2021-11-03 ENCOUNTER — TELEPHONE (OUTPATIENT)
Dept: TRANSPLANT | Facility: CLINIC | Age: 56
End: 2021-11-03

## 2021-11-03 DIAGNOSIS — Z79.899 ENCOUNTER FOR LONG-TERM (CURRENT) USE OF HIGH-RISK MEDICATION: ICD-10-CM

## 2021-11-03 DIAGNOSIS — Z48.298 AFTERCARE FOLLOWING ORGAN TRANSPLANT: ICD-10-CM

## 2021-11-03 DIAGNOSIS — Z94.0 KIDNEY TRANSPLANTED: Primary | ICD-10-CM

## 2021-11-03 NOTE — TELEPHONE ENCOUNTER
Discussed at Wednesday Review Meeting:    Recommendations:  Discuss if agreeable to switch Sirolimus to Cellcept 1000 mg BID.  Per Epic patient was never on MMF/Cellcept  Will see how she tolerates MMF.  If with skin cancer, may start 750 mg BID    OUTCOME:  Reports when she was converted to Sirolimus her skin cancer did not improve.  She had a good year this year she had 6 had biopsy and all came back negative.  However, other years she would have 10 or more.  She will look up Cellcept and think about it.  Requesting to repeat lipid panel as she was not fasting when she completed it.  Also wants to check UA to see if she is having a UTI. Orders already placed.  She will update RNCC with what she thinks about switching to Cellcept.

## 2021-11-16 ENCOUNTER — OFFICE VISIT (OUTPATIENT)
Dept: DERMATOLOGY | Facility: CLINIC | Age: 56
End: 2021-11-16
Payer: COMMERCIAL

## 2021-11-16 DIAGNOSIS — L57.0 AK (ACTINIC KERATOSIS): ICD-10-CM

## 2021-11-16 DIAGNOSIS — L66.10 LICHEN PLANOPILARIS: Primary | ICD-10-CM

## 2021-11-16 DIAGNOSIS — L82.1 SK (SEBORRHEIC KERATOSIS): ICD-10-CM

## 2021-11-16 DIAGNOSIS — L81.4 LENTIGINES: ICD-10-CM

## 2021-11-16 PROCEDURE — 17000 DESTRUCT PREMALG LESION: CPT | Mod: GC | Performed by: DERMATOLOGY

## 2021-11-16 PROCEDURE — 99213 OFFICE O/P EST LOW 20 MIN: CPT | Mod: 25 | Performed by: DERMATOLOGY

## 2021-11-16 PROCEDURE — 11900 INJECT SKIN LESIONS </W 7: CPT | Mod: XS | Performed by: DERMATOLOGY

## 2021-11-16 RX ORDER — KETOCONAZOLE 20 MG/ML
SHAMPOO TOPICAL DAILY PRN
Qty: 120 ML | Refills: 11 | Status: SHIPPED | OUTPATIENT
Start: 2021-11-16 | End: 2022-12-06

## 2021-11-16 RX ORDER — CLOBETASOL PROPIONATE 0.5 MG/ML
SOLUTION TOPICAL DAILY PRN
Qty: 50 ML | Refills: 4 | Status: SHIPPED | OUTPATIENT
Start: 2021-11-16 | End: 2023-10-12

## 2021-11-16 ASSESSMENT — PAIN SCALES - GENERAL: PAINLEVEL: NO PAIN (0)

## 2021-11-16 NOTE — PROGRESS NOTES
[facility] Dermatology Note  Encounter Date: Nov 16, 2021  Office Visit     Dermatology Problem List:  0. Monitor at next visit:  - R arm hypopigmented plaque s/p LN 11/16/21 (ddx: AK vs NMSC vs other)  - L upper chest excoriation vs other (noted 11/16/21)  1. History of kidney transplant 1992, on chronic immunosuppression  2. History of multiple SCC's   - SCC R dorsal hand, s/p MMS 10/22/19  - SCC superior R yousif, s/p MMS 6/6/19  3. Hx of multiple AKs  4. Hx benign lesion biopsies  5. Vitiligo  6. LPP  - start clobetasol ext solution daily PRN  - start ketoconazole shampoo  - clobetasol 0.05% shampoo BIW (patient does not want to use more frequently)   - s/p ILK 10 4/15/21, 11/16/21    ____________________________________________    Assessment & Plan:     # LPP  Patient reports scalp it more itchy and a bit tender since covid vaccine. Injections usually help her. She would like to pursue this today. On exam she has quite a bit of perifollicular scale. Will add in clobetasol ext solution and ketoconazole shampoo.    - start clobetasol ext solution daily PRN  - start ketoconazole shampoo BIW-TIW  - cont clobetasol 0.05% shampoo BIW (patient does not want to use more frequently)   - ILK today 11/16/21, see procedure note    # Hx kidney transplant  Her providers are considering switching her from sirolimus to cellcept. Ok per derm, but may cause increase in skin cancers, which was discussed with the patient. No concerning lesions today on exam.   - cont FBSE q3 mo/    # AK vs NUB R forearm  Hypopigmented plaque on R forearm distal to the elbow with perifollicular thickening on dermoscopy. Will freeze today and monitor.  - LN today  - recheck at follow up    Procedures Performed:   - Cryotherapy procedure note, location(s): R forearm. After verbal consent and discussion of risks and benefits including, but not limited to, dyspigmentation/scar, blister, and pain, 1 lesion(s) was(were) treated with 1-2  mm freeze border for 1-2 cycles with liquid nitrogen. Post cryotherapy instructions were provided.  - Intra-lesional triamcinolone procedure note. After positioning and cleansing with isopropyl alcohol, 1 total mL of triamcinolone 10 mg/mL was injected into scalp lesion(s). The patient tolerated the procedure well and left the dermatology clinic in good condition.    Follow-up: 3 month(s) in-person, or earlier for new or changing lesions    Staff and Resident:     Dr Bauman - staff    Josee Akins MD PGY-3  Medicine-Dermatology    I have seen and examined this patient and agree with the assessment and plan as documented in the resident's note, and was present for all procedures.    Brad Bamuan MD  Dermatology Attending  ____________________________________________    CC: Skin Check (Kathy is here today for a skin check )    HPI:  Ms. Reina Quan is a(n) 56 year old female who presents today as a return patient for LPP and skin check. She notes that her scalp has been very itchy and tender since the covid vaccine. She has also noted increased shedding and hair thinning. Has 2 main problem spots on her scalp: vertex and L frontal areas. Otherwise, she has not noticed any new, changing, bleeding, painful skin lesions. No skin lesions she is worried about.   Patient is otherwise feeling well, without additional skin concerns.    Labs Reviewed:  N/A    Physical Exam:  Vitals: LMP 05/14/2014 (Approximate)   SKIN: Full skin, which includes the head/face, both arms, chest, back, abdomen,both legs, genitalia and/or groin buttocks, digits and/or nails, were examined.  - diffuse hair thinning in the scalp area with perifollicular scale throughout  - fontal and vertex scalp with ~3-5 cm shiny patches with significant decreased hair density  - eyebrows & lashes are wnl  - diffuse tan macules in sun exposed areas c/w lentigines  - serosanguinous crust overlying a pink papule on the R upper chest  - waxy stuck on brown  papules on the extremities   - Hypopigmented plaque on R forearm distal to the elbow with perifollicular thickening on dermoscopy.  - No other lesions of concern on areas examined.     Medications:  Current Outpatient Medications   Medication     atorvastatin (LIPITOR) 10 MG tablet     clobetasol propionate (CLOBEX) 0.05 % external shampoo     lisinopril (ZESTRIL) 10 MG tablet     Omega-3 Fish Oil 500 MG capsule     PARoxetine (PAXIL) 20 MG tablet     sirolimus (GENERIC EQUIVALENT) 1 MG tablet     triamcinolone (KENALOG) 0.1 % external cream     Current Facility-Administered Medications   Medication     triamcinolone acetonide (KENALOG-10) injection 10 mg      Past Medical History:   Patient Active Problem List   Diagnosis     Ulcerative colitis (H)     Migraine     Allergic rhinitis     Hearing loss     Kidney replaced by transplant     Lichen planus     Giant Platelet syndrome     Nephrotic syndrome in diseases classified elsewhere     Major depression in partial remission (H)     CARDIOVASCULAR SCREENING; LDL GOAL LESS THAN 100     Health Care Home     Actinic keratosis     History of skin cancer     History of immunosuppression therapy     CKD (chronic kidney disease) stage 3, GFR 30-59 ml/min (H)     High risk medications (not anticoagulants) long-term use     Seborrheic dermatitis     Dermatitis     Vitiligo     History of SCC (squamous cell carcinoma) of skin     Lichen planopilaris     Hyperlipidemia with target LDL less than 130     Status post kidney transplant     Enterococcus UTI     AK (actinic keratosis)     Neoplasm of uncertain behavior of skin     History of nonmelanoma skin cancer     HTN (hypertension)     Immunosuppression (H)     Multiple benign melanocytic nevi     Screening for cervical cancer     Unsatisfactory cervical Papanicolaou smear     Past Medical History:   Diagnosis Date     Actinic keratosis      Allergic rhinitis, cause unspecified      Anemia      anxiety/depression     PAXIL      Arthritis      congenital hearing loss     uses hearing aids     Depressive disorder      Dry eyes      Giant Platelet syndrome      Glomerular Focal Sclerosis--transplant 1985      Hypertension      Kidney replaced by transplant 1992    RAPAMUNE/ SIROLIMUS      Lichen planopilaris     LPP followed by derm on doxycycline/ clobetasol     Lichen planus      Menorrhagia      migraine      Squamous cell carcinoma     8 x      Thrombocytopenia (H)      Ulcerative colitis, unspecified     biposy neg 1996     Unsatisfactory cervical Papanicolaou smear 02/15/2021     UTI (lower urinary tract infection)     recurrent Dr [facility] Andrews PA-C  [address] [phone] on close of this encounter.

## 2021-11-16 NOTE — NURSING NOTE
Dermatology Rooming Note    Reina Quan's goals for this visit include:   Chief Complaint   Patient presents with     Skin Check     Kathy is here today for a skin check      ESTEFANIA Law

## 2021-11-16 NOTE — PATIENT INSTRUCTIONS
Scalp  - start ketoconazole shampoo. Use 2-3 times a week. Apply and lather in then rinse out 5 minutes later.   - start clobetasol external solution daily as needed.   - continue clobetasol shampoo  - injections with ILK were done today.     Follow up in 2-3 months    Cryotherapy    What is it?    Use of a very cold liquid, such as liquid nitrogen, to freeze and destroy abnormal skin cells that need to be removed    What should I expect?    Tenderness and redness    A small blister that might grow and fill with dark purple blood. There may be crusting.    More than one treatment may be needed if the lesions do not go away.    How do I care for the treated area?    Gently wash the area with your hands when bathing.    Use a thin layer of Vaseline to help with healing. You may use a Band-Aid.     The area should heal within 7-10 days and may leave behind a pink or lighter color.     Do not use an antibiotic or Neosporin ointment.     You may take acetaminophen (Tylenol) for pain.     Call your doctor if you have:    Severe pain    Signs of infection (warmth, redness, cloudy yellow drainage, and or a bad smell)    Questions or concerns    Who should I call with questions?       [facility]: [phone]       [facility]: [phone]       For urgent needs outside of business hours call the [facility] at [phone] and ask for the dermatology resident on call

## 2021-11-16 NOTE — NURSING NOTE
Drug Administration Record    Prior to injection, verified patient identity using patient's name and date of birth.  Due to injection administration, patient instructed to remain in clinic for 15 minutes  afterwards, and to report any adverse reaction to me immediately.    Drug Name: triamcinolone acetonide(kenalog)  Dose: 1mL of triamcinolone 10mg/mL, 10mg dose  Route administered: ID  NDC #: Kenalog-10 (2323-9523-64)  Amount of waste(mL):4ml  Reason for waste: Multi dose vial    LOT #: [id number]  SITE:   : APS  EXPIRATION DATE:03/2023

## 2021-11-16 NOTE — LETTER
11/16/2021       RE: Reina Quan  [address] [address]     Dear Colleague,    Thank you for referring your patient, Reina Quan, to the [facility]ERMATOLOG[facility] [place] [facility]. Please see a copy of my visit note below.    [facility] Dermatology Note  Encounter Date: Nov 16, 2021  Office Visit     Dermatology Problem List:  0. Monitor at next visit:  - R arm hypopigmented plaque s/p LN 11/16/21 (ddx: AK vs NMSC vs other)  - L upper chest excoriation vs other (noted 11/16/21)  1. History of kidney transplant 1992, on chronic immunosuppression  2. History of multiple SCC's   - SCC R dorsal hand, s/p MMS 10/22/19  - SCC superior R yousif, s/p MMS 6/6/19  3. Hx of multiple AKs  4. Hx benign lesion biopsies  5. Vitiligo  6. LPP  - start clobetasol ext solution daily PRN  - start ketoconazole shampoo  - clobetasol 0.05% shampoo BIW (patient does not want to use more frequently)   - s/p ILK 10 4/15/21, 11/16/21    ____________________________________________    Assessment & Plan:     # LPP  Patient reports scalp it more itchy and a bit tender since covid vaccine. Injections usually help her. She would like to pursue this today. On exam she has quite a bit of perifollicular scale. Will add in clobetasol ext solution and ketoconazole shampoo.    - start clobetasol ext solution daily PRN  - start ketoconazole shampoo BIW-TIW  - cont clobetasol 0.05% shampoo BIW (patient does not want to use more frequently)   - ILK today 11/16/21, see procedure note    # Hx kidney transplant  Her providers are considering switching her from sirolimus to cellcept. Ok per derm, but may cause increase in skin cancers, which was discussed with the patient. No concerning lesions today on exam.   - cont FBSE q3 mo/    # AK vs NUB R forearm  Hypopigmented plaque on R forearm distal to the elbow with perifollicular thickening on  dermoscopy. Will freeze today and monitor.  - LN today  - recheck at follow up    Procedures Performed:   - Cryotherapy procedure note, location(s): R forearm. After verbal consent and discussion of risks and benefits including, but not limited to, dyspigmentation/scar, blister, and pain, 1 lesion(s) was(were) treated with 1-2 mm freeze border for 1-2 cycles with liquid nitrogen. Post cryotherapy instructions were provided.  - Intra-lesional triamcinolone procedure note. After positioning and cleansing with isopropyl alcohol, 1 total mL of triamcinolone 10 mg/mL was injected into scalp lesion(s). The patient tolerated the procedure well and left the dermatology clinic in good condition.    Follow-up: 3 month(s) in-person, or earlier for new or changing lesions    Staff and Resident:     Dr Bauman - staff    Josee Akins MD PGY-3  Medicine-Dermatology    I have seen and examined this patient and agree with the assessment and plan as documented in the resident's note, and was present for all procedures.    Brad Bauman MD  Dermatology Attending  ____________________________________________    CC: Skin Check (Kathy is here today for a skin check )    HPI:  Ms. Reina Quan is a(n) 56 year old female who presents today as a return patient for LPP and skin check. She notes that her scalp has been very itchy and tender since the covid vaccine. She has also noted increased shedding and hair thinning. Has 2 main problem spots on her scalp: vertex and L frontal areas. Otherwise, she has not noticed any new, changing, bleeding, painful skin lesions. No skin lesions she is worried about.   Patient is otherwise feeling well, without additional skin concerns.    Labs Reviewed:  N/A    Physical Exam:  Vitals: LMP 05/14/2014 (Approximate)   SKIN: Full skin, which includes the head/face, both arms, chest, back, abdomen,both legs, genitalia and/or groin buttocks, digits and/or nails, were examined.  - diffuse hair thinning  in the scalp area with perifollicular scale throughout  - fontal and vertex scalp with ~3-5 cm shiny patches with significant decreased hair density  - eyebrows & lashes are wnl  - diffuse tan macules in sun exposed areas c/w lentigines  - serosanguinous crust overlying a pink papule on the R upper chest  - waxy stuck on brown papules on the extremities   - Hypopigmented plaque on R forearm distal to the elbow with perifollicular thickening on dermoscopy.  - No other lesions of concern on areas examined.     Medications:  Current Outpatient Medications   Medication     atorvastatin (LIPITOR) 10 MG tablet     clobetasol propionate (CLOBEX) 0.05 % external shampoo     lisinopril (ZESTRIL) 10 MG tablet     Omega-3 Fish Oil 500 MG capsule     PARoxetine (PAXIL) 20 MG tablet     sirolimus (GENERIC EQUIVALENT) 1 MG tablet     triamcinolone (KENALOG) 0.1 % external cream     Current Facility-Administered Medications   Medication     triamcinolone acetonide (KENALOG-10) injection 10 mg      Past Medical History:   Patient Active Problem List   Diagnosis     Ulcerative colitis (H)     Migraine     Allergic rhinitis     Hearing loss     Kidney replaced by transplant     Lichen planus     Giant Platelet syndrome     Nephrotic syndrome in diseases classified elsewhere     Major depression in partial remission (H)     CARDIOVASCULAR SCREENING; LDL GOAL LESS THAN 100     Health Care Home     Actinic keratosis     History of skin cancer     History of immunosuppression therapy     CKD (chronic kidney disease) stage 3, GFR 30-59 ml/min (H)     High risk medications (not anticoagulants) long-term use     Seborrheic dermatitis     Dermatitis     Vitiligo     History of SCC (squamous cell carcinoma) of skin     Lichen planopilaris     Hyperlipidemia with target LDL less than 130     Status post kidney transplant     Enterococcus UTI     AK (actinic keratosis)     Neoplasm of uncertain behavior of skin     History of nonmelanoma skin  cancer     HTN (hypertension)     Immunosuppression (H)     Multiple benign melanocytic nevi     Screening for cervical cancer     Unsatisfactory cervical Papanicolaou smear     Past Medical History:   Diagnosis Date     Actinic keratosis      Allergic rhinitis, cause unspecified      Anemia      anxiety/depression     PAXIL     Arthritis      congenital hearing loss     uses hearing aids     Depressive disorder      Dry eyes      Giant Platelet syndrome      Glomerular Focal Sclerosis--transplant 1985      Hypertension      Kidney replaced by transplant 1992    RAPAMUNE/ SIROLIMUS      Lichen planopilaris     LPP followed by derm on doxycycline/ clobetasol     Lichen planus      Menorrhagia      migraine      Squamous cell carcinoma     8 x      Thrombocytopenia (H)      Ulcerative colitis, unspecified     biposy neg 1996     Unsatisfactory cervical Papanicolaou smear 02/15/2021     UTI (lower urinary tract infection)     recurrent Dr Katy Andrews [place]-C  [address] [place] [address] on close of this encounter.

## 2021-12-07 ENCOUNTER — OFFICE VISIT (OUTPATIENT)
Dept: FAMILY MEDICINE | Facility: CLINIC | Age: 56
End: 2021-12-07
Payer: COMMERCIAL

## 2021-12-07 VITALS
HEIGHT: 62 IN | BODY MASS INDEX: 25.4 KG/M2 | WEIGHT: 138 LBS | HEART RATE: 68 BPM | DIASTOLIC BLOOD PRESSURE: 80 MMHG | SYSTOLIC BLOOD PRESSURE: 124 MMHG

## 2021-12-07 DIAGNOSIS — Z48.298 AFTERCARE FOLLOWING ORGAN TRANSPLANT: ICD-10-CM

## 2021-12-07 DIAGNOSIS — F32.4 MAJOR DEPRESSIVE DISORDER WITH SINGLE EPISODE, IN PARTIAL REMISSION (H): ICD-10-CM

## 2021-12-07 LAB
ALBUMIN MFR UR ELPH: 85 MG/DL
ALBUMIN UR-MCNC: 70 MG/DL
APPEARANCE UR: CLEAR
BACTERIA #/AREA URNS HPF: ABNORMAL /HPF
BILIRUB UR QL STRIP: NEGATIVE
COLOR UR AUTO: ABNORMAL
CREAT UR-MCNC: 120 MG/DL
GLUCOSE UR STRIP-MCNC: NEGATIVE MG/DL
HGB UR QL STRIP: NEGATIVE
KETONES UR STRIP-MCNC: NEGATIVE MG/DL
LEUKOCYTE ESTERASE UR QL STRIP: ABNORMAL
NITRATE UR QL: NEGATIVE
PH UR STRIP: 5.5 [PH] (ref 5–7)
PROT/CREAT 24H UR: 0.71 MG/MG CR
RBC #/AREA URNS AUTO: ABNORMAL /HPF
SP GR UR STRIP: 1.02 (ref 1–1.03)
UROBILINOGEN UR STRIP-ACNC: 0.2 E.U./DL
WBC #/AREA URNS AUTO: ABNORMAL /HPF

## 2021-12-07 PROCEDURE — 84156 ASSAY OF PROTEIN URINE: CPT | Performed by: FAMILY MEDICINE

## 2021-12-07 PROCEDURE — 99213 OFFICE O/P EST LOW 20 MIN: CPT | Performed by: FAMILY MEDICINE

## 2021-12-07 PROCEDURE — 81001 URINALYSIS AUTO W/SCOPE: CPT | Performed by: FAMILY MEDICINE

## 2021-12-07 RX ORDER — PAROXETINE 20 MG/1
TABLET, FILM COATED ORAL
Qty: 90 TABLET | Refills: 3 | Status: SHIPPED | OUTPATIENT
Start: 2021-12-07 | End: 2023-01-17

## 2021-12-07 ASSESSMENT — MIFFLIN-ST. JEOR: SCORE: 1169.21

## 2021-12-07 NOTE — PATIENT INSTRUCTIONS
Refills for the paxil were sent to your pharmacy    Please stop by the  to schedule your second Moderna vaccine at your convenience at the [facility] in [place].    Please feel free to stop by the lab to leave a urine sample today to run the tests ordered by your kidney specialist.     Please schedule a mammogram at your convenience.     [facility]  Department    Contact Information    Phone Fax Address   [phone] [address] [address]        I would recommend the following providers here at the [facility], if you would like to establish care with a new primary care provider:     -Dr Mario Moreau  -David Villegas NP  -Roselyn Husain NP    I would be happy to see you in the future, though I will be floating [place] clinics [place], based on the needs of the various clinics. Because of that, I don't have a panel of patients for whom I provide continuity primary care.

## 2021-12-07 NOTE — PROGRESS NOTES
Assessment / Plan    Reina was seen today for recheck medication.    Diagnoses and all orders for this visit:    Major depressive disorder with single episode, in partial remission (H)      Patient was reasonably frustrated that I could not provide continuity care for her.  Explained my role as a float provider.  She understood, but again was frustrated that appointment was scheduled with me in the first place.  Patient provided with names of providers at [facility] that she could establish care with in the future.    Patient has history of major depression, reasonably well controlled with use of paroxetine.  Recommend continuation of this medication, unchanged.  Patient provided with refills x1 year.  She is asked to follow-up if noting any worsening symptoms or significant side effects from the medication.    Return in about 3 months (around 3/7/2022) for to establish care with new primary care provider.    23 minutes spent on the date of the encounter doing chart review, history and exam, documentation and further activities per the note    Subjective   Reina Quan is a 56 year old year old female who presents with the following concerns:     Patient is new to clinic and had originally hoped to establish care today.  We had tried to reach her earlier today to advise that she was not able to establish care with me, but were unable to speak with her. Patient was reasonably frustrated that I could not provide continuity care for her.  Explained my role as a float provider.  She understood, but again was frustrated that appointment was scheduled with me in the first place.   She notes that she is in need of refills for medications.  She is also cared for by the transplant clinic (s/p kidney transplant) and nephrology at [facility].  She had previously received primary care through the [facility] in [place].      Patient reports history of moderate major depressive  disorder and anxiety, reasonably well controlled with use of paroxetine 20 mg daily.  Patient denies any significant side effects from this medication.  She does not think that dose adjustment is needed currently.  She notes that she does have stress in her life, but the medication is helpful in reducing the way that she internalizes them. Denies thoughts of harm to self or others.     Patient Active Problem List   Diagnosis     Ulcerative colitis (H)     Migraine     Allergic rhinitis     Hearing loss     Kidney replaced by transplant     Lichen planus     Giant Platelet syndrome     Nephrotic syndrome in diseases classified elsewhere     Major depression in partial remission (H)     Actinic keratosis     CKD (chronic kidney disease) stage 3, GFR 30-59 ml/min (H)     High risk medications (not anticoagulants) long-term use     Vitiligo     History of SCC (squamous cell carcinoma) of skin     Lichen planopilaris     Hyperlipidemia with target LDL less than 130     HTN (hypertension)     Immunosuppression (H)     Past Surgical History:   Procedure Laterality Date     BIOPSY OF SKIN LESION       CHOLECYSTECTOMY       DILATION AND CURETTAGE, OPERATIVE HYSTEROSCOPY WITH MORCELLATOR, COMBINED N/A 11/10/2015    Procedure: COMBINED DILATION AND CURETTAGE, OPERATIVE HYSTEROSCOPY WITH MORCELLATOR;  Surgeon: Ghada Melendez MD;  Location: [facility] OR     ESOPHAGOSCOPY, GASTROSCOPY, DUODENOSCOPY (EGD), COMBINED  11/20/2012    Procedure: COMBINED ESOPHAGOSCOPY, GASTROSCOPY, DUODENOSCOPY (EGD), BIOPSY SINGLE OR MULTIPLE;;  Surgeon: Valentin Hahn MD;  Location: [facility] GI     MOHS MICROGRAPHIC PROCEDURE       TRANSPLANT      kidney     ZZC NONSPECIFIC PROCEDURE      Renal transplant right side     ZZC NONSPECIFIC PROCEDURE      cholecystectomy       Social History     Tobacco Use     Smoking status: Never Smoker     Smokeless tobacco: Never Used   Substance Use Topics     Alcohol use: Yes     Alcohol/week: 0.0 standard drinks      Comment: 2x /week     Family History   Problem Relation Age of Onset     Diabetes Father      Kidney Disease Father         nephrolithiasis     Asthma Father      Hypertension Mother      Depression Mother      Anxiety Disorder Mother      C.A.D. Paternal Grandfather      Blood Disease Maternal Grandmother      Diabetes Maternal Grandmother      Hypertension Maternal Grandmother      Hyperlipidemia Maternal Grandmother      Depression Maternal Grandmother      No Known Problems Maternal Grandfather      Cancer Other         no family hx of skin cancer     Asthma Sister      No Known Problems Paternal Grandmother      Glaucoma No family hx of      Macular Degeneration No family hx of      Melanoma No family hx of      Skin Cancer No family hx of          Current Outpatient Medications   Medication Sig Dispense Refill     atorvastatin (LIPITOR) 10 MG tablet Take 2 tablets (20 mg) by mouth At Bedtime 60 tablet 0     clobetasol (TEMOVATE) 0.05 % external solution Apply topically daily as needed (itchyy scalp) Apply to scalp daily as needed. 50 mL 4     clobetasol propionate (CLOBEX) 0.05 % external shampoo Use in shower daily or every other day 118 mL 3     ketoconazole (NIZORAL) 2 % external shampoo Apply topically daily as needed for itching, irritation or other (scalp scale) Use 2-3 times a week. Apply and lather in then rinse out 5 minutes later. 120 mL 11     lisinopril (ZESTRIL) 10 MG tablet Take 0.5 tablets (5 mg) by mouth daily 45 tablet 0     Omega-3 Fish Oil 500 MG capsule Take 2 capsules (1,000 mg) by mouth 2 times daily With meals 120 capsule 0     PARoxetine (PAXIL) 20 MG tablet TAKE 1 TABLET BY MOUTH EVERY MORNING 90 tablet 3     triamcinolone (KENALOG) 0.1 % external cream APPLY SPARINGLY EXTERNALLY TO THE AFFECTED AREA TWICE TIMES DAILY FOR 14 DAYS 30 g 3     sirolimus (GENERIC EQUIVALENT) 1 MG tablet Take 3 tablets (3 mg) by mouth daily 90 tablet 2     Allergies   Allergen Reactions     Ampicillin  "Swelling     Swelling of mouth and tongue.      Seasonal Allergies Other (See Comments)     Itchy eyes and rhinitis.       ROS:   Negative except as noted above in HPI.     Objective  /80 (BP Location: Left arm, Patient Position: Sitting, Cuff Size: Adult Regular)   Pulse 68   Ht 1.575 m (5' 2\")   Wt 62.6 kg (138 lb)   LMP 05/14/2014 (Approximate)   Breastfeeding No   BMI 25.24 kg/m       General: Alert, no acute distress.   Affect: pleasant, cooperative.  Speech : Normal rate and content  Thought content: Linear/logical.  Good insight.        Luciano Araujo MD   Family medicine physician  [facility]             Answers for HPI/ROS submitted by the patient on 12/2/2021  Do you take any over the counter pain medicine?  : Yes  What over the counter medicine are you taking for your pain?  : Advil  How often do you take this medicine?: a few times a week  How many servings of fruits and vegetables do you eat daily?: 2-3  How many minutes a day do you exercise enough to make your heart beat faster?: 10 to 19  How many days a week do you exercise enough to make your heart beat faster?: 4  How many days per week do you miss taking your medication?: 3  What makes it hard for you to take your medication every day?: remembering to take      "

## 2021-12-30 ENCOUNTER — TELEPHONE (OUTPATIENT)
Dept: FAMILY MEDICINE | Facility: CLINIC | Age: 56
End: 2021-12-30
Payer: COMMERCIAL

## 2021-12-30 NOTE — LETTER
[facility]  [address] [address] [phone]       April 15, 2022    Reina Quan  [address] [place] [id number]    Dear Reina,    We care about your health and have reviewed your health plan and are making recommendations based on this review, to optimize your health.    You are in particular need of attention regarding:  -Depression  -Breast Cancer Screening    We are recommending that you:  -schedule a FOLLOWUP OFFICE APPOINTMENT for depression with me.       -schedule a MAMMOGRAM which is due.    1 in 8 women will develop invasive breast cancer during her lifetime and it is the most common non-skin cancer in American women.  EARLY detection, new treatments, and a better understanding of the disease have increased survival rates - the 5 year survival rate in the 1960s was 63% and today it is close to 90%.    If you are under/uninsured, we recommend you contact the [facility]. They offer mammograms at no charge or on a sliding fee charge. You can schedule with them at [phone]. Please have them send us the results.      Please disregard this reminder if you have had this exam elsewhere within the last year.  It would be helpful for us to have a copy of your mammogram report in your file so that we can best coordinate your care - please contact us with when your test was done so we can update your record.               In addition, here is a list of due or overdue Health Maintenance reminders.    Health Maintenance Due   Topic Date Due     ANNUAL REVIEW OF HM ORDERS  Never done     Pneumococcal Vaccine (1 of 4 - PCV13) Never done     HIV Screening  Never done     Colorectal Cancer Screening  11/20/2017     Depression Assessment  08/15/2021     Flu Vaccine (1) 09/01/2021     COVID-19 Vaccine (3 - Moderna risk 4-dose series) 02/11/2022     Preventive Care Visit  02/15/2022       To address the above recommendations, we encourage you to  contact us at [phone], via [employer] or by contacting Central Scheduling toll free at [phone] 24 hours a day. They will assist you with finding the most convenient time and location.    Thank you for trusting [facility] and we appreciate the opportunity to serve you.  We look forward to supporting your healthcare needs in the future.    Healthy Regards,    Your [facility] Team

## 2021-12-30 NOTE — TELEPHONE ENCOUNTER
Patient Quality Outreach    Patient is due for the following:   Breast Cancer Screening - Mammogram  Depression  -  PHQ-9 Needed  Immunizations  -  Covid and Influenza    NEXT STEPS:   Schedule a nurse only visit for immunizations     Type of outreach:    Sent [employer] message.      Questions for provider review:    None     Gurmeet French MA

## 2021-12-30 NOTE — LETTER
[facility]  [address] [address] [phone]       January 13, 2022    Reina Quan  [address] [place] [id number]    Dear Kathy,    We care about your health and have reviewed your health plan and are making recommendations based on this review, to optimize your health.    You are in particular need of attention regarding:  -Depression  -Breast Cancer Screening    We are recommending that you:  -schedule a MAMMOGRAM which is due.    1 in 8 women will develop invasive breast cancer during her lifetime and it is the most common non-skin cancer in American women.  EARLY detection, new treatments, and a better understanding of the disease have increased survival rates - the 5 year survival rate in the 1960s was 63% and today it is close to 90%.    If you are under/uninsured, we recommend you contact the [facility]. They offer mammograms at no charge or on a sliding fee charge. You can schedule with them at [phone]. Please have them send us the results.      Please disregard this reminder if you have had this exam elsewhere within the last year.  It would be helpful for us to have a copy of your mammogram report in your file so that we can best coordinate your care - please contact us with when your test was done so we can update your record.                  In addition, here is a list of due or overdue Health Maintenance reminders.    Health Maintenance Due   Topic Date Due     ANNUAL REVIEW OF HM ORDERS  Never done     Pneumococcal Vaccine (1 of 4 - PCV13) Never done     HIV Screening  Never done     Colorectal Cancer Screening  11/20/2017     Mammogram  06/22/2019     Depression Assessment  08/15/2021     Flu Vaccine (1) 09/01/2021     COVID-19 Vaccine (2 - Moderna risk 4-dose series) 10/12/2021     PAP  02/15/2022     HPV Follow Up  02/15/2022       To address the above recommendations, we encourage you to contact us at [phone], via  [employer] or by contacting Central Scheduling toll free at [phone] 24 hours a day. They will assist you with finding the most convenient time and location.    Thank you for trusting [facility] and we appreciate the opportunity to serve you.  We look forward to supporting your healthcare needs in the future.    Healthy Regards,    Your [facility] Team

## 2022-01-13 NOTE — TELEPHONE ENCOUNTER
Patient Quality Outreach    Patient is due for the following:   Breast Cancer Screening - Mammogram  Depression  -  PHQ-9 Needed  Immunizations  -  Covid and Influenza    NEXT STEPS:   Schedule a nurse only visit for immunizations     Type of outreach:    Sent letter.    Next Steps:  Reach out within 90 days via Phone.    Max number of attempts reached: Yes. Will try again in 90 days if patient still on fail list.    Questions for provider review:    None     Gurmeet French MA

## 2022-01-31 ENCOUNTER — OFFICE VISIT (OUTPATIENT)
Dept: FAMILY MEDICINE | Facility: CLINIC | Age: 57
End: 2022-01-31
Payer: COMMERCIAL

## 2022-01-31 VITALS
BODY MASS INDEX: 25.64 KG/M2 | WEIGHT: 140.2 LBS | HEART RATE: 72 BPM | DIASTOLIC BLOOD PRESSURE: 72 MMHG | SYSTOLIC BLOOD PRESSURE: 118 MMHG

## 2022-01-31 DIAGNOSIS — I10 PRIMARY HYPERTENSION: ICD-10-CM

## 2022-01-31 DIAGNOSIS — N93.9 VAGINAL BLEEDING: Primary | ICD-10-CM

## 2022-01-31 DIAGNOSIS — E78.5 DYSLIPIDEMIA: ICD-10-CM

## 2022-01-31 DIAGNOSIS — D69.6 THROMBOCYTOPENIA (H): ICD-10-CM

## 2022-01-31 DIAGNOSIS — Z94.0 KIDNEY REPLACED BY TRANSPLANT: ICD-10-CM

## 2022-01-31 DIAGNOSIS — F33.0 MILD EPISODE OF RECURRENT MAJOR DEPRESSIVE DISORDER (H): ICD-10-CM

## 2022-01-31 DIAGNOSIS — R05.9 COUGH: ICD-10-CM

## 2022-01-31 DIAGNOSIS — Z48.298 AFTERCARE FOLLOWING ORGAN TRANSPLANT: ICD-10-CM

## 2022-01-31 DIAGNOSIS — Z12.11 COLON CANCER SCREENING: ICD-10-CM

## 2022-01-31 DIAGNOSIS — Z79.899 ENCOUNTER FOR LONG-TERM CURRENT USE OF MEDICATION: ICD-10-CM

## 2022-01-31 DIAGNOSIS — Z12.4 CERVICAL CANCER SCREENING: ICD-10-CM

## 2022-01-31 PROBLEM — D48.5 NEOPLASM OF UNCERTAIN BEHAVIOR OF SKIN: Status: RESOLVED | Noted: 2017-10-08 | Resolved: 2022-01-31

## 2022-01-31 PROBLEM — D22.9 MULTIPLE BENIGN MELANOCYTIC NEVI: Status: RESOLVED | Noted: 2019-06-15 | Resolved: 2022-01-31

## 2022-01-31 PROBLEM — R87.615 UNSATISFACTORY CERVICAL PAPANICOLAOU SMEAR: Status: ACTIVE | Noted: 2021-02-15

## 2022-01-31 PROBLEM — L57.0 AK (ACTINIC KERATOSIS): Status: RESOLVED | Noted: 2017-10-08 | Resolved: 2022-01-31

## 2022-01-31 PROBLEM — R87.615 UNSATISFACTORY CERVICAL PAPANICOLAOU SMEAR: Status: RESOLVED | Noted: 2021-02-15 | Resolved: 2022-01-31

## 2022-01-31 PROBLEM — Z85.828 HISTORY OF NONMELANOMA SKIN CANCER: Status: RESOLVED | Noted: 2018-01-14 | Resolved: 2022-01-31

## 2022-01-31 LAB
ALBUMIN MFR UR ELPH: 66.1 MG/DL
ALBUMIN SERPL-MCNC: 4.1 G/DL (ref 3.5–5)
ALP SERPL-CCNC: 101 U/L (ref 45–120)
ALT SERPL W P-5'-P-CCNC: 29 U/L (ref 0–45)
ANION GAP SERPL CALCULATED.3IONS-SCNC: 12 MMOL/L (ref 5–18)
AST SERPL W P-5'-P-CCNC: 28 U/L (ref 0–40)
BILIRUB DIRECT SERPL-MCNC: 0.1 MG/DL
BILIRUB SERPL-MCNC: 0.4 MG/DL (ref 0–1)
BUN SERPL-MCNC: 32 MG/DL (ref 8–22)
CALCIUM SERPL-MCNC: 10.3 MG/DL (ref 8.5–10.5)
CHLORIDE BLD-SCNC: 105 MMOL/L (ref 98–107)
CO2 SERPL-SCNC: 23 MMOL/L (ref 22–31)
CREAT SERPL-MCNC: 1.84 MG/DL (ref 0.6–1.1)
CREAT UR-MCNC: 97 MG/DL
ERYTHROCYTE [DISTWIDTH] IN BLOOD BY AUTOMATED COUNT: 17.2 % (ref 10–15)
GFR SERPL CREATININE-BSD FRML MDRD: 32 ML/MIN/1.73M2
GLUCOSE BLD-MCNC: 96 MG/DL (ref 70–125)
HCT VFR BLD AUTO: 35 % (ref 35–47)
HGB BLD-MCNC: 11.5 G/DL (ref 11.7–15.7)
MCH RBC QN AUTO: 28 PG (ref 26.5–33)
MCHC RBC AUTO-ENTMCNC: 32.9 G/DL (ref 31.5–36.5)
MCV RBC AUTO: 85 FL (ref 78–100)
PLATELET # BLD AUTO: 66 10E3/UL (ref 150–450)
POTASSIUM BLD-SCNC: 4.9 MMOL/L (ref 3.5–5)
PROT SERPL-MCNC: 7.4 G/DL (ref 6–8)
PROT/CREAT 24H UR: 0.68 MG/MG CR
RBC # BLD AUTO: 4.11 10E6/UL (ref 3.8–5.2)
SODIUM SERPL-SCNC: 140 MMOL/L (ref 136–145)
WBC # BLD AUTO: 4.8 10E3/UL (ref 4–11)

## 2022-01-31 PROCEDURE — 82248 BILIRUBIN DIRECT: CPT | Performed by: FAMILY MEDICINE

## 2022-01-31 PROCEDURE — 80195 ASSAY OF SIROLIMUS: CPT | Performed by: FAMILY MEDICINE

## 2022-01-31 PROCEDURE — G0123 SCREEN CERV/VAG THIN LAYER: HCPCS | Performed by: FAMILY MEDICINE

## 2022-01-31 PROCEDURE — 87624 HPV HI-RISK TYP POOLED RSLT: CPT | Performed by: FAMILY MEDICINE

## 2022-01-31 PROCEDURE — 84156 ASSAY OF PROTEIN URINE: CPT | Performed by: FAMILY MEDICINE

## 2022-01-31 PROCEDURE — 80053 COMPREHEN METABOLIC PANEL: CPT | Performed by: FAMILY MEDICINE

## 2022-01-31 PROCEDURE — 85027 COMPLETE CBC AUTOMATED: CPT | Performed by: FAMILY MEDICINE

## 2022-01-31 PROCEDURE — 36415 COLL VENOUS BLD VENIPUNCTURE: CPT | Performed by: FAMILY MEDICINE

## 2022-01-31 PROCEDURE — 99214 OFFICE O/P EST MOD 30 MIN: CPT | Performed by: FAMILY MEDICINE

## 2022-01-31 ASSESSMENT — ANXIETY QUESTIONNAIRES
3. WORRYING TOO MUCH ABOUT DIFFERENT THINGS: SEVERAL DAYS
4. TROUBLE RELAXING: NOT AT ALL
5. BEING SO RESTLESS THAT IT IS HARD TO SIT STILL: NOT AT ALL
6. BECOMING EASILY ANNOYED OR IRRITABLE: SEVERAL DAYS
7. FEELING AFRAID AS IF SOMETHING AWFUL MIGHT HAPPEN: SEVERAL DAYS
2. NOT BEING ABLE TO STOP OR CONTROL WORRYING: SEVERAL DAYS
GAD7 TOTAL SCORE: 5
1. FEELING NERVOUS, ANXIOUS, OR ON EDGE: SEVERAL DAYS
IF YOU CHECKED OFF ANY PROBLEMS ON THIS QUESTIONNAIRE, HOW DIFFICULT HAVE THESE PROBLEMS MADE IT FOR YOU TO DO YOUR WORK, TAKE CARE OF THINGS AT HOME, OR GET ALONG WITH OTHER PEOPLE: NOT DIFFICULT AT ALL

## 2022-01-31 NOTE — PROGRESS NOTES
ASSESSMENT/PLAN:  Reina was seen today for gyn exam, lab only and referral.    Diagnoses and all orders for this visit:    Vaginal bleeding  -     US Pelvic Complete with Transvaginal; Future    Cough  Stop lisinopril, monitor cough, and follow-up in 1 month for BP check and cough  Consider trial of H2 blocker and antihistamine if still symptomatic    Primary hypertension  See management above    Cervical cancer screening  -     Pap screen with HPV - recommended age 30 - 65 years    Colon cancer screening  -     Adult Gastro Ref - Procedure Only; Future    Dyslipidemia  Stable on lipitor 10mg    Mild episode of recurrent major depressive disorder (H)  Stable on paxil 20mg    Aftercare following organ transplant  -     CBC with platelets  -     Basic metabolic panel  -     Sirolimus by [employer]etry  -     Hepatic panel  -     Protein  random urine    Encounter for long-term current use of medication  -     CBC with platelets  -     Basic metabolic panel  -     Sirolimus by [employer] Mass Spectrometry  -     Hepatic panel  -     Protein  random urine    Kidney replaced by transplant  -     CBC with platelets  -     Basic metabolic panel  -     Sirolimus by [employer] Mass Spectrometry  -     Hepatic panel  -     Protein  random urine    Thrombocytopenia (H)  Platelet=77, previous= 46    SUBJECTIVE:    Reina Quan is a 56 year old female who came in today     Has been postmenopausal for 10 years  5 years ago she had an episode of vaginal bleeding that lead to a w/u of endometrial hyperplasia and subsequent D&C.  She has been in her normal state of health until 2 wks ago when she noted 2 days of vaginal spotting. The bleeding has since resolved. Not currently taking any HRT.    Has physical a year ago and pap smear was attempted however there were not enough cells collected. She is looking for pap smear today.    Has had chr cough. Had negative CXR 2018. No h/o GERD. Has seasonal allergies but not taking  allergy meds. Taking lisinopril for HTN. No SOB.     Depression, stable on paxil 20mg  Dyslipidemia, on lipitor 10mg    Kidney transplant receipient, right kidney, 2/2 Focal segmental glomerulosclerosis (FSGS)    Review of Systems (except those mentioned above)  Constitutional: Negative.   HENT: Negative.   Eyes: Negative.   Respiratory: Negative.   Cardiovascular: Negative.   Gastrointestinal: Negative.   Endocrine: Negative.   Genitourinary: Negative.   Musculoskeletal: Negative.   Skin: Negative.   Allergic/Immunologic: Negative.   Neurological: Negative.   Hematological: Negative.   Psychiatric/Behavioral: Negative.     Patient Active Problem List    Diagnosis Date Noted     Immunosuppression (H) 06/14/2019     Priority: Medium     HTN (hypertension) 02/16/2018     Priority: Medium     Hyperlipidemia with target LDL less than 130 09/25/2015     Priority: Medium     Diagnosis updated by automated process. Provider to review and confirm.       Lichen planopilaris      Priority: Medium     LPP followed by derm       Vitiligo 03/30/2015     Priority: Medium     History of SCC (squamous cell carcinoma) of skin 03/30/2015     Priority: Medium     High risk medications (not anticoagulants) long-term use 01/13/2013     Priority: Medium     CKD (chronic kidney disease) stage 3, GFR 30-59 ml/min (H) 10/22/2012     Priority: Medium     Actinic keratosis 07/26/2011     Priority: Medium     Major depression in partial remission (H) 12/23/2009     Priority: Medium     Ulcerative colitis (H) 04/02/2003     Priority: Medium     Not on medication, not followed by GI.       Migraine 04/02/2003     Priority: Medium     Problem list name updated by automated process. Provider to review       Allergic rhinitis 04/02/2003     Priority: Medium     Problem list name updated by automated process. Provider to review       Hearing loss 04/02/2003     Priority: Medium     Problem list name updated by automated process. Provider to review        Kidney replaced by transplant 04/02/2003     Priority: Medium     Lichen planus 04/02/2003     Priority: Medium     Giant Platelet syndrome 04/02/2003     Priority: Medium     Chronic low platelet count, has had no issues.   Not followed by hematologist.       Nephrotic syndrome in diseases classified elsewhere 04/02/2003     Priority: Medium     Problem list name updated by automated process. Provider to review       Allergies   Allergen Reactions     Ampicillin Swelling     Swelling of mouth and tongue.      Seasonal Allergies Other (See Comments)     Itchy eyes and rhinitis.     Current Outpatient Medications   Medication Sig Dispense Refill     atorvastatin (LIPITOR) 10 MG tablet Take 2 tablets (20 mg) by mouth At Bedtime 60 tablet 0     clobetasol (TEMOVATE) 0.05 % external solution Apply topically daily as needed (itchyy scalp) Apply to scalp daily as needed. 50 mL 4     clobetasol propionate (CLOBEX) 0.05 % external shampoo Use in shower daily or every other day 118 mL 3     ketoconazole (NIZORAL) 2 % external shampoo Apply topically daily as needed for itching, irritation or other (scalp scale) Use 2-3 times a week. Apply and lather in then rinse out 5 minutes later. 120 mL 11     lisinopril (ZESTRIL) 10 MG tablet Take 0.5 tablets (5 mg) by mouth daily 45 tablet 0     Omega-3 Fish Oil 500 MG capsule Take 2 capsules (1,000 mg) by mouth 2 times daily With meals 120 capsule 0     PARoxetine (PAXIL) 20 MG tablet TAKE 1 TABLET BY MOUTH EVERY MORNING 90 tablet 3     sirolimus (GENERIC EQUIVALENT) 1 MG tablet Take 3 tablets (3 mg) by mouth daily 90 tablet 2     triamcinolone (KENALOG) 0.1 % external cream APPLY SPARINGLY EXTERNALLY TO THE AFFECTED AREA TWICE TIMES DAILY FOR 14 DAYS 30 g 3     Past Medical History:   Diagnosis Date     Actinic keratosis      Allergic rhinitis, cause unspecified      Anemia      anxiety/depression     PAXIL     Arthritis      congenital hearing loss     uses hearing aids      Depressive disorder      Dry eyes      Giant Platelet syndrome      Glomerular Focal Sclerosis--transplant 1985      Hypertension      Kidney replaced by transplant 1992    RAPAMUNE/ SIROLIMUS      Lichen planopilaris     LPP followed by derm on doxycycline/ clobetasol     Lichen planus      Menorrhagia      migraine      Squamous cell carcinoma     8 x      Thrombocytopenia (H)      Ulcerative colitis, unspecified     biposy neg 1996     Unsatisfactory cervical Papanicolaou smear 02/15/2021     UTI (lower urinary tract infection)     recurrent Dr Burns [facility]     Past Surgical History:   Procedure Laterality Date     BIOPSY OF SKIN LESION       CHOLECYSTECTOMY       DILATION AND CURETTAGE, OPERATIVE HYSTEROSCOPY WITH MORCELLATOR, COMBINED N/A 11/10/2015    Procedure: COMBINED DILATION AND CURETTAGE, OPERATIVE HYSTEROSCOPY WITH MORCELLATOR;  Surgeon: Ghada Melendez MD;  Location: [facility]     ESOPHAGOSCOPY, GASTROSCOPY, DUODENOSCOPY (EGD), COMBINED  11/20/2012    Procedure: COMBINED ESOPHAGOSCOPY, GASTROSCOPY, DUODENOSCOPY (EGD), BIOPSY SINGLE OR MULTIPLE;;  Surgeon: Valentin Hahn MD;  Location: [facility]     MOHS MICROGRAPHIC PROCEDURE       TRANSPLANT      kidney     ZZC NONSPECIFIC PROCEDURE      Renal transplant right side     ZZC NONSPECIFIC PROCEDURE      cholecystectomy     Social History     Socioeconomic History     Marital status: Single     Spouse name: None     Number of children: 3     Years of education: None     Highest education level: None   Occupational History     Occupation: [employer] store   Tobacco Use     Smoking status: Never Smoker     Smokeless tobacco: Never Used   Vaping Use     Vaping Use: Never used   Substance and Sexual Activity     Alcohol use: Yes     Alcohol/week: 0.0 standard drinks     Comment: 2x /week     Drug use: No     Sexual activity: Not Currently     Partners: Male     Birth control/protection: Post-menopausal   Other Topics Concern     Parent/sibling w/ CABG, MI or  angioplasty before 65F 55M? 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 Doesn't have a job lined up afterward but looking forward to some time off. Has three children, one moved to college this fall. Living in [place], has a significant other x 7 months. Previously . In a monogamous relationship. Never smoker, social EtOH use.      Social Determinants of Health     Financial Resource Strain: Not on file   Food Insecurity: Not on file   Transportation Needs: Not on file   Physical Activity: Not on file   Stress: Not on file   Social Connections: Not on file   Intimate Partner Violence: Not on file   Housing Stability: Not on file     Family History   Problem Relation Age of Onset     Diabetes Father      Kidney Disease Father         nephrolithiasis     Asthma Father      Hypertension Mother      Depression Mother      Anxiety Disorder Mother      C.A.D. Paternal Grandfather      Blood Disease Maternal Grandmother      Diabetes Maternal Grandmother      Hypertension Maternal Grandmother      Hyperlipidemia Maternal Grandmother      Depression Maternal Grandmother      No Known Problems Maternal Grandfather      Cancer Other         no family hx of skin cancer     Asthma Sister      No Known Problems Paternal Grandmother      Glaucoma No family hx of      Macular Degeneration No family hx of      Melanoma No family hx of      Skin Cancer No family hx of          OBJECTIVE:    Vitals:    01/31/22 1309   BP: 118/72   BP Location: Left arm   Patient Position: Sitting   Cuff Size: Adult Regular   Pulse: 72   Weight: 63.6 kg (140 lb 3.2 oz)     Body mass index is 25.64 kg/m .    Physical Exam:  Constitutional: Patient is oriented to person, place, and time. Patient appears well-developed and well-nourished. No distress.   Head: Normocephalic and atraumatic.   Right Ear: External ear normal.   Left Ear: External ear normal.   Eyes: Conjunctivae and EOM are  normal. Right eye exhibits no discharge. Left eye exhibits no discharge. No scleral icterus.   Neurological: Patient is alert and oriented to person, place, and time.  Skin: No rash noted. Patient is not diaphoretic. No erythema. No pallor.  Pelvic exam: normal vagina and vulva, normal cervix without lesions or tenderness, uterus normal size anteverted, adenxa normal in size without tenderness, pap smear done

## 2022-02-01 LAB
SIROLIMUS BLD-MCNC: 7.5 UG/L (ref 5–15)
TME LAST DOSE: NORMAL H
TME LAST DOSE: NORMAL H

## 2022-02-02 LAB
HUMAN PAPILLOMA VIRUS 16 DNA: NEGATIVE
HUMAN PAPILLOMA VIRUS 18 DNA: NEGATIVE
HUMAN PAPILLOMA VIRUS FINAL DIAGNOSIS: NORMAL
HUMAN PAPILLOMA VIRUS OTHER HR: NEGATIVE

## 2022-02-07 LAB
BKR LAB AP GYN ADEQUACY: NORMAL
BKR LAB AP GYN INTERPRETATION: NORMAL
BKR LAB AP HPV REFLEX: NORMAL
BKR LAB AP PREVIOUS ABNORMAL: NORMAL
PATH REPORT.COMMENTS IMP SPEC: NORMAL
PATH REPORT.COMMENTS IMP SPEC: NORMAL
PATH REPORT.RELEVANT HX SPEC: NORMAL

## 2022-02-15 ENCOUNTER — ANCILLARY PROCEDURE (OUTPATIENT)
Dept: MAMMOGRAPHY | Facility: CLINIC | Age: 57
End: 2022-02-15
Attending: PHYSICIAN ASSISTANT
Payer: COMMERCIAL

## 2022-02-15 DIAGNOSIS — Z12.31 ENCOUNTER FOR SCREENING MAMMOGRAM FOR BREAST CANCER: ICD-10-CM

## 2022-02-15 PROCEDURE — 77067 SCR MAMMO BI INCL CAD: CPT | Mod: TC | Performed by: RADIOLOGY

## 2022-02-24 DIAGNOSIS — Z94.0 LIVING-DONOR KIDNEY TRANSPLANT RECIPIENT: ICD-10-CM

## 2022-02-24 RX ORDER — SIROLIMUS 1 MG/1
3 TABLET, FILM COATED ORAL DAILY
Qty: 90 TABLET | Refills: 2 | Status: SHIPPED | OUTPATIENT
Start: 2022-02-24 | End: 2022-05-11

## 2022-02-24 NOTE — TELEPHONE ENCOUNTER
[facility] Call Center    Phone Message    May a detailed message be left on voicemail: yes     Reason for Call: Medication Refill Request    Has the patient contacted the pharmacy for the refill? Yes   Name of medication being requested: sirolimus (GENERIC EQUIVALENT) 1 MG tablet  Provider who prescribed the medication: Dr. Isaac  Pharmacy: [facility] [address]  Date medication is needed: 2/24/2022- medication renewal         Action Taken: Message routed to:  [facility] ([facility]    Travel Screening: Not Applicable

## 2022-04-12 DIAGNOSIS — L66.10 LICHEN PLANOPILARIS: ICD-10-CM

## 2022-04-14 RX ORDER — CLOBETASOL PROPIONATE 0.05 G/100ML
SHAMPOO TOPICAL
Qty: 118 ML | Refills: 1 | Status: SHIPPED | OUTPATIENT
Start: 2022-04-14 | End: 2022-12-06

## 2022-04-14 NOTE — TELEPHONE ENCOUNTER
CLOBETASOL 0.05% SHAMPOO 118ML  Last Written Prescription Date:   4/15/2021  Last Fill Quantity: 118,   # refills: 3  Last Office Visit :  11/16/2021  Future Office visit:  None  Routing refill request to provider for review/approval because:  Pt asked to follow up in 3 months from last visit 11/16/2021.  No follow up.   Refer to clinic/provider for review     Earnestine Franks RN  Central Triage Red Flags/Med Refills      Assessment & Plan:    11/16/2021     # LPP  Patient reports scalp it more itchy and a bit tender since covid vaccine. Injections usually help her. She would like to pursue this today. On exam she has quite a bit of perifollicular scale. Will add in clobetasol ext solution and ketoconazole shampoo.    - start clobetasol ext solution daily PRN  - start ketoconazole shampoo [id number]  - cont clobetasol 0.05% shampoo BIW (patient does not want to use more frequently)   - ILK today 11/16/21, see procedure note     # Hx kidney transplant  Her providers are considering switching her from sirolimus to cellcept. Ok per derm, but may cause increase in skin cancers, which was discussed with the patient. No concerning lesions today on exam.   - cont FBSE q3 mo/     # AK vs NUB R forearm  Hypopigmented plaque on R forearm distal to the elbow with perifollicular thickening on dermoscopy. Will freeze today and monitor.  - LN today  - recheck at follow up     Procedures Performed:   - Cryotherapy procedure note, location(s): R forearm. After verbal consent and discussion of risks and benefits including, but not limited to, dyspigmentation/scar, blister, and pain, 1 lesion(s) was(were) treated with 1-2 mm freeze border for 1-2 cycles with liquid nitrogen. Post cryotherapy instructions were provided.  - Intra-lesional triamcinolone procedure note. After positioning and cleansing with isopropyl alcohol, 1 total mL of triamcinolone 10 mg/mL was injected into scalp lesion(s). The patient tolerated the procedure well and left  the dermatology clinic in good condition.     Follow-up: 3 month(s) in-person, or earlier for new or changing lesions     Staff and Resident:      Dr Bauman - staff     Josee Akins MD PGY-3  Medicine-Dermatology

## 2022-04-14 NOTE — TELEPHONE ENCOUNTER
Received refill request for clobetasol shampoo as the resident on call. Reviewed patient's chart and attached communication.     After reviewing the medication list and assessment and plan from last visit, the refill request was accepted.    Patient was due to follow up in March 2022, no appointment made as of now. She is due for a skin check as well.     The patient's information will be forwarded to the scheduling pool for return visit as planned at prior visit, if indicated.    Routed as FYI.    Vijaya Anna MD  PGY-5 Medicine-Dermatology  Pager [phone]

## 2022-04-15 NOTE — TELEPHONE ENCOUNTER
Patient Quality Outreach    Patient is due for the following:   Breast Cancer Screening - Mammogram  Depression  -  Depression follow-up visit  Immunizations  -  Covid    NEXT STEPS:   Schedule a nurse only visit for immunizations     Type of outreach:    Sent letter.      Questions for provider review:    None     Gurmeet French MA

## 2022-04-17 ENCOUNTER — HEALTH MAINTENANCE LETTER (OUTPATIENT)
Age: 57
End: 2022-04-17

## 2022-05-11 DIAGNOSIS — Z94.0 LIVING-DONOR KIDNEY TRANSPLANT RECIPIENT: ICD-10-CM

## 2022-05-11 RX ORDER — SIROLIMUS 1 MG/1
3 TABLET, FILM COATED ORAL DAILY
Qty: 90 TABLET | Refills: 2 | Status: SHIPPED | OUTPATIENT
Start: 2022-05-11 | End: 2023-05-17 | Stop reason: ALTCHOICE

## 2022-05-11 NOTE — TELEPHONE ENCOUNTER
Has new insurance and is back using the [facility]   Is out of Sirolimus 1.0mg     May need new script

## 2022-05-12 DIAGNOSIS — Z94.0 IMMUNOSUPPRESSIVE MANAGEMENT ENCOUNTER FOLLOWING KIDNEY TRANSPLANT: ICD-10-CM

## 2022-05-12 DIAGNOSIS — Z48.298 AFTERCARE FOLLOWING ORGAN TRANSPLANT: ICD-10-CM

## 2022-05-12 DIAGNOSIS — Z94.0 KIDNEY TRANSPLANTED: Primary | ICD-10-CM

## 2022-05-12 DIAGNOSIS — Z79.899 IMMUNOSUPPRESSIVE MANAGEMENT ENCOUNTER FOLLOWING KIDNEY TRANSPLANT: ICD-10-CM

## 2022-05-12 RX ORDER — SIROLIMUS 1 MG/1
3 TABLET, FILM COATED ORAL DAILY
Qty: 90 TABLET | Refills: 0 | Status: SHIPPED | OUTPATIENT
Start: 2022-05-12 | End: 2022-08-15

## 2022-05-23 ENCOUNTER — LAB (OUTPATIENT)
Dept: LAB | Facility: CLINIC | Age: 57
End: 2022-05-23
Payer: COMMERCIAL

## 2022-05-23 DIAGNOSIS — Z48.298 AFTERCARE FOLLOWING ORGAN TRANSPLANT: ICD-10-CM

## 2022-05-23 DIAGNOSIS — Z79.899 ENCOUNTER FOR LONG-TERM CURRENT USE OF MEDICATION: ICD-10-CM

## 2022-05-23 DIAGNOSIS — Z94.0 KIDNEY REPLACED BY TRANSPLANT: ICD-10-CM

## 2022-05-23 LAB
ANION GAP SERPL CALCULATED.3IONS-SCNC: 11 MMOL/L (ref 5–18)
BUN SERPL-MCNC: 33 MG/DL (ref 8–22)
CALCIUM SERPL-MCNC: 10.3 MG/DL (ref 8.5–10.5)
CHLORIDE BLD-SCNC: 105 MMOL/L (ref 98–107)
CHOLEST SERPL-MCNC: 282 MG/DL
CO2 SERPL-SCNC: 26 MMOL/L (ref 22–31)
CREAT SERPL-MCNC: 1.75 MG/DL (ref 0.6–1.1)
ERYTHROCYTE [DISTWIDTH] IN BLOOD BY AUTOMATED COUNT: 19.7 % (ref 10–15)
FASTING STATUS PATIENT QL REPORTED: NO
GFR SERPL CREATININE-BSD FRML MDRD: 33 ML/MIN/1.73M2
GLUCOSE BLD-MCNC: 105 MG/DL (ref 70–125)
HCT VFR BLD AUTO: 36.2 % (ref 35–47)
HDLC SERPL-MCNC: 80 MG/DL
HGB BLD-MCNC: 11.6 G/DL (ref 11.7–15.7)
LDLC SERPL CALC-MCNC: 162 MG/DL
MCH RBC QN AUTO: 30.4 PG (ref 26.5–33)
MCHC RBC AUTO-ENTMCNC: 32 G/DL (ref 31.5–36.5)
MCV RBC AUTO: 95 FL (ref 78–100)
PLATELET # BLD AUTO: 98 10E3/UL (ref 150–450)
POTASSIUM BLD-SCNC: 4.7 MMOL/L (ref 3.5–5)
RBC # BLD AUTO: 3.82 10E6/UL (ref 3.8–5.2)
SODIUM SERPL-SCNC: 142 MMOL/L (ref 136–145)
TRIGL SERPL-MCNC: 202 MG/DL
WBC # BLD AUTO: 5 10E3/UL (ref 4–11)

## 2022-05-23 PROCEDURE — 80195 ASSAY OF SIROLIMUS: CPT

## 2022-05-23 PROCEDURE — 80048 BASIC METABOLIC PNL TOTAL CA: CPT

## 2022-05-23 PROCEDURE — 80061 LIPID PANEL: CPT

## 2022-05-23 PROCEDURE — 36415 COLL VENOUS BLD VENIPUNCTURE: CPT

## 2022-05-23 PROCEDURE — 85027 COMPLETE CBC AUTOMATED: CPT

## 2022-05-24 LAB
SIROLIMUS BLD-MCNC: 6.4 UG/L (ref 5–15)
TME LAST DOSE: NORMAL H
TME LAST DOSE: NORMAL H

## 2022-07-04 ENCOUNTER — APPOINTMENT (OUTPATIENT)
Dept: CT IMAGING | Facility: CLINIC | Age: 57
End: 2022-07-04
Payer: COMMERCIAL

## 2022-07-04 ENCOUNTER — HOSPITAL ENCOUNTER (EMERGENCY)
Facility: CLINIC | Age: 57
Discharge: HOME | End: 2022-07-04
Attending: STUDENT IN AN ORGANIZED HEALTH CARE EDUCATION/TRAINING PROGRAM | Admitting: STUDENT IN AN ORGANIZED HEALTH CARE EDUCATION/TRAINING PROGRAM
Payer: COMMERCIAL

## 2022-07-04 ENCOUNTER — TELEPHONE (OUTPATIENT)
Dept: TRANSPLANT | Facility: CLINIC | Age: 57
End: 2022-07-04

## 2022-07-04 VITALS
HEIGHT: 62 IN | WEIGHT: 140 LBS | BODY MASS INDEX: 25.76 KG/M2 | RESPIRATION RATE: 18 BRPM | SYSTOLIC BLOOD PRESSURE: 155 MMHG | OXYGEN SATURATION: 98 % | DIASTOLIC BLOOD PRESSURE: 95 MMHG | TEMPERATURE: 99.3 F | HEART RATE: 81 BPM

## 2022-07-04 DIAGNOSIS — R59.0 MESENTERIC LYMPHADENOPATHY: ICD-10-CM

## 2022-07-04 DIAGNOSIS — N30.01 ACUTE CYSTITIS WITH HEMATURIA: ICD-10-CM

## 2022-07-04 DIAGNOSIS — Z94.0 STATUS POST KIDNEY TRANSPLANT: ICD-10-CM

## 2022-07-04 LAB
ALBUMIN UR-MCNC: 300 MG/DL
ANION GAP SERPL CALCULATED.3IONS-SCNC: 12 MMOL/L (ref 5–18)
APPEARANCE UR: ABNORMAL
BACTERIA #/AREA URNS HPF: ABNORMAL /HPF
BASOPHILS # BLD AUTO: 0 10E3/UL (ref 0–0.2)
BASOPHILS NFR BLD AUTO: 0 %
BILIRUB UR QL STRIP: NEGATIVE
BUN SERPL-MCNC: 29 MG/DL (ref 8–22)
C REACTIVE PROTEIN LHE: 0.8 MG/DL (ref 0–?)
CALCIUM SERPL-MCNC: 10.1 MG/DL (ref 8.5–10.5)
CHLORIDE BLD-SCNC: 106 MMOL/L (ref 98–107)
CO2 SERPL-SCNC: 21 MMOL/L (ref 22–31)
COLOR UR AUTO: ABNORMAL
CREAT SERPL-MCNC: 1.75 MG/DL (ref 0.6–1.1)
EOSINOPHIL # BLD AUTO: 0.3 10E3/UL (ref 0–0.7)
EOSINOPHIL NFR BLD AUTO: 4 %
ERYTHROCYTE [DISTWIDTH] IN BLOOD BY AUTOMATED COUNT: 16.7 % (ref 10–15)
GFR SERPL CREATININE-BSD FRML MDRD: 33 ML/MIN/1.73M2
GLUCOSE BLD-MCNC: 87 MG/DL (ref 70–125)
GLUCOSE UR STRIP-MCNC: NEGATIVE MG/DL
HCT VFR BLD AUTO: 36.6 % (ref 35–47)
HGB BLD-MCNC: 11.8 G/DL (ref 11.7–15.7)
HGB UR QL STRIP: ABNORMAL
IMM GRANULOCYTES # BLD: 0 10E3/UL
IMM GRANULOCYTES NFR BLD: 0 %
KETONES UR STRIP-MCNC: NEGATIVE MG/DL
LEUKOCYTE ESTERASE UR QL STRIP: ABNORMAL
LYMPHOCYTES # BLD AUTO: 0.6 10E3/UL (ref 0.8–5.3)
LYMPHOCYTES NFR BLD AUTO: 8 %
MCH RBC QN AUTO: 28.4 PG (ref 26.5–33)
MCHC RBC AUTO-ENTMCNC: 32.2 G/DL (ref 31.5–36.5)
MCV RBC AUTO: 88 FL (ref 78–100)
MONOCYTES # BLD AUTO: 0.5 10E3/UL (ref 0–1.3)
MONOCYTES NFR BLD AUTO: 7 %
NEUTROPHILS # BLD AUTO: 6 10E3/UL (ref 1.6–8.3)
NEUTROPHILS NFR BLD AUTO: 81 %
NITRATE UR QL: NEGATIVE
NRBC # BLD AUTO: 0 10E3/UL
NRBC BLD AUTO-RTO: 0 /100
PH UR STRIP: 6.5 [PH] (ref 5–7)
PLATELET # BLD AUTO: 59 10E3/UL (ref 150–450)
POTASSIUM BLD-SCNC: 4.3 MMOL/L (ref 3.5–5)
RADIOLOGIST FLAGS: NORMAL
RBC # BLD AUTO: 4.16 10E6/UL (ref 3.8–5.2)
RBC URINE: >182 /HPF
SODIUM SERPL-SCNC: 139 MMOL/L (ref 136–145)
SP GR UR STRIP: 1.01 (ref 1–1.03)
SQUAMOUS EPITHELIAL: 1 /HPF
UROBILINOGEN UR STRIP-MCNC: <2 MG/DL
WBC # BLD AUTO: 7.4 10E3/UL (ref 4–11)
WBC URINE: 133 /HPF

## 2022-07-04 PROCEDURE — 86140 C-REACTIVE PROTEIN: CPT | Performed by: PHYSICIAN ASSISTANT

## 2022-07-04 PROCEDURE — 81001 URINALYSIS AUTO W/SCOPE: CPT | Performed by: STUDENT IN AN ORGANIZED HEALTH CARE EDUCATION/TRAINING PROGRAM

## 2022-07-04 PROCEDURE — 85025 COMPLETE CBC W/AUTO DIFF WBC: CPT | Performed by: PHYSICIAN ASSISTANT

## 2022-07-04 PROCEDURE — 250N000013 HC RX MED GY IP 250 OP 250 PS 637: Performed by: STUDENT IN AN ORGANIZED HEALTH CARE EDUCATION/TRAINING PROGRAM

## 2022-07-04 PROCEDURE — 36415 COLL VENOUS BLD VENIPUNCTURE: CPT | Performed by: PHYSICIAN ASSISTANT

## 2022-07-04 PROCEDURE — 74176 CT ABD & PELVIS W/O CONTRAST: CPT

## 2022-07-04 PROCEDURE — 99284 EMERGENCY DEPT VISIT MOD MDM: CPT | Mod: 25

## 2022-07-04 PROCEDURE — 81001 URINALYSIS AUTO W/SCOPE: CPT | Performed by: EMERGENCY MEDICINE

## 2022-07-04 PROCEDURE — 80048 BASIC METABOLIC PNL TOTAL CA: CPT | Performed by: PHYSICIAN ASSISTANT

## 2022-07-04 PROCEDURE — 87088 URINE BACTERIA CULTURE: CPT | Performed by: STUDENT IN AN ORGANIZED HEALTH CARE EDUCATION/TRAINING PROGRAM

## 2022-07-04 RX ORDER — CIPROFLOXACIN 500 MG/1
500 TABLET, FILM COATED ORAL 2 TIMES DAILY
Qty: 14 TABLET | Refills: 0 | Status: SHIPPED | OUTPATIENT
Start: 2022-07-04 | End: 2022-07-11

## 2022-07-04 RX ORDER — CIPROFLOXACIN 500 MG/1
500 TABLET, FILM COATED ORAL ONCE
Status: COMPLETED | OUTPATIENT
Start: 2022-07-04 | End: 2022-07-04

## 2022-07-04 RX ADMIN — CIPROFLOXACIN 500 MG: 500 TABLET, FILM COATED ORAL at 20:28

## 2022-07-04 ASSESSMENT — ENCOUNTER SYMPTOMS
NAUSEA: 0
ABDOMINAL PAIN: 1
DIARRHEA: 1
FEVER: 0
CHILLS: 0
DYSURIA: 1
HEMATURIA: 1
VOMITING: 0
BLOOD IN STOOL: 0

## 2022-07-04 NOTE — ED TRIAGE NOTES
Pt presents to the ED with c/o extreme pain with urination and lower abdominal discomfort since this morning. Denies fever. Pt hx of kidney transplant

## 2022-07-04 NOTE — ED PROVIDER NOTES
EMERGENCY DEPARTMENT ENCOUNTER      NAME: Reina Quan  AGE: 57 year old female  YOB: 1965  MRN: [id number]  EVALUATION DATE & TIME: 7/4/2022  5:44 PM    PCP: No Ref-Primary, Physician    ED PROVIDER: Katlyn Malin PA-C      Chief Complaint   Patient presents with     Dysuria         FINAL IMPRESSION:  1. Acute cystitis with hematuria    2. Mesenteric lymphadenopathy    3. Status post kidney transplant          MEDICAL DECISION MAKING:    Pertinent Labs & Imaging studies reviewed. (See chart for details)  57 year old female with a pertinent history of ulcerative colitis, kidney transplant in 1992, giant platelet syndrome, HLD, HTN, lichen planopilaris presents to the Emergency Department for evaluation of RLQ abdominal pain, dysuria, hematuria and diarrhea onset this morning. She denies fevers.     Vitals reviewed and notable for elevated blood pressure. Afebrile. Normal heart rate. Well appearing and in no acute distress. On exam, RLQ and suprapubic tenderness with palpation. No rebound or guarding. No flank or CVA tenderness. Differential diagnosis includes but not limited to UTI, pyelonephritis, ureterolithiasis, colitis, diverticulitis, appendicitis, bowel obstruction.     UA concerning for infection with 500 leukocyte esterase, bacteria, and WBCs and RBCs present. Urine culture pending. Did obtain CT imaging given history of renal transplant and to rule out urinary tract obstruction or other malicious etiology. CT does show mild fat stranding around the renal transplant and renal pelvis which is likely due to urinary tract infection. No genitourinary calculi. Additionally there is new mildly prominent lymph nodes in the small bowel mesentery with surrounding pseudocapsule of fat stranding. Findings are nonspecific and follow-up in 6 months recommended by radiologist to assess for stability. Patient was signed out to Dr. Pierre pending labs. As long as renal function is at baseline anticipate  she will be able to discharge home with oral antibiotics for pyelonephritis with close follow up.     0 minutes of critical care time     ED COURSE:  5:49 PM I met with the patient, obtained history, performed an initial exam, and discussed options and plan for diagnostics and treatment here in the ED.  7:00 PM Patient signed out to Dr. Pierre at scheduled shift change pending labs. Anticipate discharge home if labs are unremarkable.    At the conclusion of the encounter I discussed the results of all of the tests and the disposition. The questions were answered. The patient acknowledged understanding and was agreeable with the care plan.     MEDICATIONS GIVEN IN THE EMERGENCY:  Medications       NEW PRESCRIPTIONS STARTED AT TODAY'S ER VISIT  Discharge Medication List as of 7/4/2022  8:30 PM         =================================================================    HPI:    Patient information was obtained from: Patient    Use of Interpretor: N/A      Reina Quan is a 57 year old female with a pertinent history of ulcerative colitis, kidney transplant in 1992, giant platelet syndrome, HLD, HTN, lichen planopilaris who presents to this ED via private vehicle for evaluation of dysuria.    Patient woke up this morning with pain on right side. Urination makes pain severe. She notes blood in her urine. She used to get UTIs about twice a year but has not had one for several years. This feels different than any UTI she has ever had as the pain is more severe. She reports diarrhea that onset this morning. No blood in stool. No fevers, chills, vomiting, vaginal bleeding or discharge.     REVIEW OF SYSTEMS:  Review of Systems   Constitutional: Negative for chills and fever.   Gastrointestinal: Positive for abdominal pain and diarrhea. Negative for blood in stool, nausea and vomiting.   Genitourinary: Positive for dysuria and hematuria. Negative for vaginal bleeding and vaginal discharge.   All other systems reviewed and  are negative.      PAST MEDICAL HISTORY:  Past Medical History:   Diagnosis Date     Actinic keratosis      Allergic rhinitis, cause unspecified      Anemia      anxiety/depression     PAXIL     Arthritis      congenital hearing loss     uses hearing aids     Depressive disorder      Dry eyes      Giant Platelet syndrome      Glomerular Focal Sclerosis--transplant 1985      Hypertension      Kidney replaced by transplant 1992    RAPAMUNE/ SIROLIMUS      Lichen planopilaris     LPP followed by derm on doxycycline/ clobetasol     Lichen planus      Menorrhagia      migraine      Squamous cell carcinoma     8 x      Thrombocytopenia (H)      Ulcerative colitis, unspecified     biposy neg 1996     Unsatisfactory cervical Papanicolaou smear 02/15/2021     UTI (lower urinary tract infection)     recurrent Dr Burns [facility]       PAST SURGICAL HISTORY:  Past Surgical History:   Procedure Laterality Date     BIOPSY OF SKIN LESION       CHOLECYSTECTOMY       DILATION AND CURETTAGE, OPERATIVE HYSTEROSCOPY WITH MORCELLATOR, COMBINED N/A 11/10/2015    Procedure: COMBINED DILATION AND CURETTAGE, OPERATIVE HYSTEROSCOPY WITH MORCELLATOR;  Surgeon: Ghada Melendez MD;  Location: [facility]     ESOPHAGOSCOPY, GASTROSCOPY, DUODENOSCOPY (EGD), COMBINED  11/20/2012    Procedure: COMBINED ESOPHAGOSCOPY, GASTROSCOPY, DUODENOSCOPY (EGD), BIOPSY SINGLE OR MULTIPLE;;  Surgeon: Valentin Hahn MD;  Location:  GI     MOHS MICROGRAPHIC PROCEDURE       TRANSPLANT      kidney     ZZC NONSPECIFIC PROCEDURE      Renal transplant right side     ZZC NONSPECIFIC PROCEDURE      cholecystectomy           CURRENT MEDICATIONS:      Current Facility-Administered Medications:      triamcinolone acetonide (KENALOG-10) injection 10 mg, 10 mg, Intra-Lesional, Once, Brad Bauman MD    Current Outpatient Medications:      ciprofloxacin (CIPRO) 500 MG tablet, Take 1 tablet (500 mg) by mouth 2 times daily for 7 days, Disp: 14 tablet, Rfl: 0     atorvastatin  (LIPITOR) 10 MG tablet, Take 2 tablets (20 mg) by mouth At Bedtime, Disp: 60 tablet, Rfl: 0     clobetasol (TEMOVATE) 0.05 % external solution, Apply topically daily as needed (itchyy scalp) Apply to scalp daily as needed., Disp: 50 mL, Rfl: 4     clobetasol propionate (CLOBEX) 0.05 % external shampoo, APPLY IN SHOWER TO SCALP TWICE WEEKLY, Disp: 118 mL, Rfl: 1     ketoconazole (NIZORAL) 2 % external shampoo, Apply topically daily as needed for itching, irritation or other (scalp scale) Use 2-3 times a week. Apply and lather in then rinse out 5 minutes later., Disp: 120 mL, Rfl: 11     lisinopril (ZESTRIL) 10 MG tablet, Take 0.5 tablets (5 mg) by mouth daily, Disp: 45 tablet, Rfl: 0     Omega-3 Fish Oil 500 MG capsule, Take 2 capsules (1,000 mg) by mouth 2 times daily With meals, Disp: 120 capsule, Rfl: 0     PARoxetine (PAXIL) 20 MG tablet, TAKE 1 TABLET BY MOUTH EVERY MORNING, Disp: 90 tablet, Rfl: 3     sirolimus (GENERIC EQUIVALENT) 1 MG tablet, Take 3 tablets (3 mg) by mouth daily, Disp: 90 tablet, Rfl: 0     sirolimus (GENERIC EQUIVALENT) 1 MG tablet, Take 3 tablets (3 mg) by mouth daily, Disp: 90 tablet, Rfl: 2     triamcinolone (KENALOG) 0.1 % external cream, APPLY SPARINGLY EXTERNALLY TO THE AFFECTED AREA TWICE TIMES DAILY FOR 14 DAYS, Disp: 30 g, Rfl: 3      ALLERGIES:  Allergies   Allergen Reactions     Ampicillin Swelling     Swelling of mouth and tongue.      Seasonal Allergies Other (See Comments)     Itchy eyes and rhinitis.       FAMILY HISTORY:  Family History   Problem Relation Age of Onset     Diabetes Father      Kidney Disease Father         nephrolithiasis     Asthma Father      Hypertension Mother      Depression Mother      Anxiety Disorder Mother      C.A.D. Paternal Grandfather      Blood Disease Maternal Grandmother      Diabetes Maternal Grandmother      Hypertension Maternal Grandmother      Hyperlipidemia Maternal Grandmother      Depression Maternal Grandmother      No Known Problems  "Maternal Grandfather      Cancer Other         no family hx of skin cancer     Asthma Sister      No Known Problems Paternal Grandmother      Glaucoma No family hx of      Macular Degeneration No family hx of      Melanoma No family hx of      Skin Cancer No family hx of        SOCIAL HISTORY:   Social History     Socioeconomic History     Marital status: Single     Number of children: 3   Occupational History     Occupation: [employer] store   Tobacco Use     Smoking status: Never Smoker     Smokeless tobacco: Never Used   Vaping Use     Vaping Use: Never used   Substance and Sexual Activity     Alcohol use: Yes     Alcohol/week: 0.0 standard drinks     Comment: 2x /week     Drug use: No     Sexual activity: Not Currently     Partners: Male     Birth control/protection: Post-menopausal   Other Topics Concern     Parent/sibling w/ CABG, MI or angioplasty before 65F 55M? 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 Doesn't have a job lined up afterward but looking forward to some time off. Has three children, one moved to college this fall. Living in [place], has a significant other x 7 months. Previously . In a monogamous relationship. Never smoker, social EtOH use.        VITALS:  Patient Vitals for the past 24 hrs:   BP Temp Temp src Pulse Resp SpO2 Height Weight   07/04/22 2030 (!) 155/95 -- -- 81 -- 98 % -- --   07/04/22 2015 (!) 152/98 -- -- 77 -- 98 % -- --   07/04/22 1954 (!) 136/94 -- -- 77 -- 97 % -- --   07/04/22 1738 (!) 170/112 99.3  F (37.4  C) Oral 85 18 98 % 1.575 m (5' 2\") 63.5 kg (140 lb)       PHYSICAL EXAM  Constitutional: Well developed, Well nourished, NAD  HENT: Normocephalic, Atraumatic, wearing mask.  Neck: Supple, No stridor.  Eyes: Conjunctiva normal, No discharge.   Respiratory: Normal breath sounds, No respiratory distress, No wheezing, Speaks full sentences easily. No cough.  Cardiovascular: Normal heart rate, Regular rhythm, " No murmurs, No rubs, No gallops. Chest wall nontender.  GI: Soft, RLQ and suprapubic tenderness, No masses, No flank or CVA tenderness. No rebound or guarding.  Musculoskeletal: No edema. No cyanosis, No clubbing. Good range of motion in all major joints. No tenderness to palpation or major deformities noted.  Integument: Warm, Dry, No erythema, No rash. No petechiae.  Neurologic: Alert & oriented x 3, Normal motor function, Normal sensory function, No focal deficits noted. Normal gait.  Psychiatric: Affect normal, Judgment normal, Mood normal. Cooperative.    LAB:  All pertinent labs reviewed and interpreted.  Recent Results (from the past 24 hour(s))   UA with Microscopic reflex to Culture    Collection Time: 07/04/22  5:44 PM    Specimen: Urine, Clean Catch   Result Value Ref Range    Color Urine Light Yellow Colorless, Straw, Light Yellow, Yellow    Appearance Urine Turbid (A) Clear    Glucose Urine Negative Negative mg/dL    Bilirubin Urine Negative Negative    Ketones Urine Negative Negative mg/dL    Specific Gravity Urine 1.013 1.001 - 1.030    Blood Urine >1.0 mg/dL (A) Negative    pH Urine 6.5 5.0 - 7.0    Protein Albumin Urine 300  (A) Negative mg/dL    Urobilinogen Urine <2.0 <2.0 mg/dL    Nitrite Urine Negative Negative    Leukocyte Esterase Urine 500 Shweta/uL (A) Negative    Bacteria Urine Few (A) None Seen /HPF    RBC Urine >182 (H) <=2 /HPF    WBC Urine 133 (H) <=5 /HPF    Squamous Epithelials Urine 1 <=1 /HPF         RADIOLOGY:  Reviewed all pertinent imaging. Please see official radiology report.  CT Abdomen Pelvis w/o Contrast   Final Result   IMPRESSION:    1.  Right lower quadrant renal transplant with mild pelviectasis. No genitourinary calculi. Additionally, there is mild fat stranding around the renal transplant and renal pelvis. Findings could represent urinary tract infection. Correlate with    urinalysis. Follow-up with renal transplant ultrasound is recommended. No genitourinary calculi.     2.  New mildly prominent lymph nodes in the small bowel mesentery with surrounding pseudocapsule of fat stranding. Findings are nonspecific but can be seen with mesenteric panniculitis. Given this is new from 2012 a follow-up in 6 months is recommended    to assess for stability.         [Recommend Follow Up: Mildly prominent lymph nodes in the small bowel mesentery]      This report will be copied to the [facility] to ensure a provider acknowledges the finding.                 Katlyn Malin PA-C  Emergency Medicine  [facility]  7/4/2022     Katlyn Malin PA-C  07/05/22 1129

## 2022-07-04 NOTE — TELEPHONE ENCOUNTER
"Reina called to state she has pain with urination and a constant urge to urinate.  Reina states this last happened 5 years ago, when she had frequent UTI.  Reina states she has not had a fever and does not have other symptoms.    Reina was advised that on call coordinator could order a UA/UC for tomorrow, or she could be seen in urgent care.  Reina states she will monitor \"for the next couple hours and if it gets worse or I get a fever, I will go in.\"  Reina would like primary coordinator to follow up tomorrow, 7/5.      "

## 2022-07-05 ENCOUNTER — TELEPHONE (OUTPATIENT)
Dept: FAMILY MEDICINE | Facility: CLINIC | Age: 57
End: 2022-07-05

## 2022-07-05 LAB — BACTERIA UR CULT: ABNORMAL

## 2022-07-05 NOTE — ED PROVIDER NOTES
Emergency Department Midlevel Supervisory Note     I personally saw the patient and performed a substantive portion of the visit including all aspects of the medical decision making.    ED Course:  7:40 PM Katlyn Malin PA-C staffed patient with me. I agree with their assessment and plan of management, and I will see the patient.  8:04 PM I met with the patient to introduce myself, gather additional history, perform my initial exam, and discuss the plan. Patient is comfortable with discharge. Reviewed supportive cares, symptomatic treatment, outpatient follow up, and reasons to return to the Emergency Department.       Brief HPI:     Reina Quan is a 57 year old female with a history of ulcerative colitis, kidney transplant 1992, giant platelet syndrome, HLD, HTN, and lichen planopilaris, who presents for evaluation of right sided abdominal pain that radiates into her bilateral groin and right lower extremity along with hematuria and non-bloody diarrhea that began this morning. Exacerbating factors include voiding. Patient endorses a history of recurrent UTIs though this does not feel similar. Otherwise denies fevers, chills, vomiting, or vaginal bleeding or discharge.      I, Mary Kaplan, am serving as a scribe to document services personally performed by Marcelo Pierre DO, based on my observations and the provider's statements to me.   I, Marcelo Pierre DO, attest that Mary Kaplan was acting in a scribe capacity, has observed my performance of the services and has documented them in accordance with my direction.    Brief Physical Exam:  Constitutional:  Alert, in no acute distress  EYES: Conjunctivae clear  HENT:  Atraumatic, normocephalic  Respiratory:  Respirations even, unlabored, in no acute respiratory distress  Cardiovascular:  Regular rate and rhythm, good peripheral perfusion  GI: Soft, nondistended, nontender, no palpable masses, no rebound, no guarding   Musculoskeletal:  No edema. No cyanosis. Range  of motion major extremities intact.    Integument: Warm, Dry, No erythema, No rash.   Neurologic:  Alert & oriented, no focal deficits noted  Psych: Normal mood and affect     MDM:  Briefly pt is a 57-year-old history of kidney transplant 30 years ago here with UTI symptoms including burning lower pelvic discomfort and polyuria since this morning.  No fevers chills nausea vomiting.    Labs overall reassuring.  No BOYD.  CT showing most likely evolving pyelonephritis with no abscess.  Patient sent home with ciprofloxacin.  Has a penicillin allergy.  Patient otherwise looks well.  No signs of septicemia.  She has no fever here, inappropriate tachycardia or leukocytosis.       1. Acute cystitis with hematuria        Labs and Imaging:  Results for orders placed or performed during the hospital encounter of 07/04/22   CT Abdomen Pelvis w/o Contrast   Result Value Ref Range    Radiologist flags       Mildly prominent lymph nodes in the small bowel mesentery    Impression    IMPRESSION:   1.  Right lower quadrant renal transplant with mild pelviectasis. No genitourinary calculi. Additionally, there is mild fat stranding around the renal transplant and renal pelvis. Findings could represent urinary tract infection. Correlate with   urinalysis. Follow-up with renal transplant ultrasound is recommended. No genitourinary calculi.   2.  New mildly prominent lymph nodes in the small bowel mesentery with surrounding pseudocapsule of fat stranding. Findings are nonspecific but can be seen with mesenteric panniculitis. Given this is new from 2012 a follow-up in 6 months is recommended   to assess for stability.      [Recommend Follow Up: Mildly prominent lymph nodes in the small bowel mesentery]    This report will be copied to the [facility] to ensure a provider acknowledges the finding.        UA with Microscopic reflex to Culture    Specimen: Urine, Clean Catch   Result Value Ref Range    Color Urine Light Yellow  Colorless, Straw, Light Yellow, Yellow    Appearance Urine Turbid (A) Clear    Glucose Urine Negative Negative mg/dL    Bilirubin Urine Negative Negative    Ketones Urine Negative Negative mg/dL    Specific Gravity Urine 1.013 1.001 - 1.030    Blood Urine >1.0 mg/dL (A) Negative    pH Urine 6.5 5.0 - 7.0    Protein Albumin Urine 300  (A) Negative mg/dL    Urobilinogen Urine <2.0 <2.0 mg/dL    Nitrite Urine Negative Negative    Leukocyte Esterase Urine 500 Shweta/uL (A) Negative    Bacteria Urine Few (A) None Seen /HPF    RBC Urine >182 (H) <=2 /HPF    WBC Urine 133 (H) <=5 /HPF    Squamous Epithelials Urine 1 <=1 /HPF   Basic metabolic panel   Result Value Ref Range    Sodium 139 136 - 145 mmol/L    Potassium 4.3 3.5 - 5.0 mmol/L    Chloride 106 98 - 107 mmol/L    Carbon Dioxide (CO2) 21 (L) 22 - 31 mmol/L    Anion Gap 12 5 - 18 mmol/L    Urea Nitrogen 29 (H) 8 - 22 mg/dL    Creatinine 1.75 (H) 0.60 - 1.10 mg/dL    Calcium 10.1 8.5 - 10.5 mg/dL    Glucose 87 70 - 125 mg/dL    GFR Estimate 33 (L) >60 mL/min/1.73m2   CRP inflammation   Result Value Ref Range    CRP 0.8 (H) 0.0 - <0.8 mg/dL   CBC with platelets and differential   Result Value Ref Range    WBC Count 7.4 4.0 - 11.0 10e3/uL    RBC Count 4.16 3.80 - 5.20 10e6/uL    Hemoglobin 11.8 11.7 - 15.7 g/dL    Hematocrit 36.6 35.0 - 47.0 %    MCV 88 78 - 100 fL    MCH 28.4 26.5 - 33.0 pg    MCHC 32.2 31.5 - 36.5 g/dL    RDW 16.7 (H) 10.0 - 15.0 %    Platelet Count 59 (L) 150 - 450 10e3/uL    % Neutrophils 81 %    % Lymphocytes 8 %    % Monocytes 7 %    % Eosinophils 4 %    % Basophils 0 %    % Immature Granulocytes 0 %    NRBCs per 100 WBC 0 <1 /100    Absolute Neutrophils 6.0 1.6 - 8.3 10e3/uL    Absolute Lymphocytes 0.6 (L) 0.8 - 5.3 10e3/uL    Absolute Monocytes 0.5 0.0 - 1.3 10e3/uL    Absolute Eosinophils 0.3 0.0 - 0.7 10e3/uL    Absolute Basophils 0.0 0.0 - 0.2 10e3/uL    Absolute Immature Granulocytes 0.0 <=0.4 10e3/uL    Absolute NRBCs 0.0 10e3/uL     I have  reviewed the relevant laboratory and radiology studies      DO DEBORA Pierce [facility] EMERGENCY ROOM  [facility] [address] [phone]      Marcelo Pierre DO  07/04/22 2015

## 2022-07-05 NOTE — TELEPHONE ENCOUNTER
Received a call from Latricia from [facility] - [phone].    Radiologists number is Hank Senior [facility] - [phone].    Pt had a CT of ab, pelvis without contrast - ordered in the ER on 7/4/22.  It says they recommend a f/u, mildly prominent lymph nodes in the small bowel mesentery.      Will forward to Lizbeth Andrews.  Do you want pt to just schedule a f/u?  Has not seen you since February 2021.  Thanks!

## 2022-07-05 NOTE — TELEPHONE ENCOUNTER
Per [facility] notes was at [facility] ED for dysuria. Was discharged on Cipro.

## 2022-07-05 NOTE — LETTER
August 31, 2022      Reina Quan  [address] [address]        Dear ,    We have tried to call and leave messages a few times from Lizbeth VARGAS office. We are sending out a letter with the communication.       We received a call from [facility] from your ED visit on 7/4/22 about abnormal results with your cat scan.   We would recommend a follow up to discuss these findings and plan of care.       Please make an appointment with your provider to review or follow up on your test results.  Appointments can be made by calling [phone].          If you have any questions or concerns, please call the clinic at the number listed above.       Sincerely,    Cherelle FRIEND RN

## 2022-07-06 NOTE — TELEPHONE ENCOUNTER
Called and left a message to call us back.  When she calls back, please try and help her schedule per below.  Thanks! - was just in the ER, so could be ER f/u.

## 2022-07-11 ENCOUNTER — TELEPHONE (OUTPATIENT)
Dept: TRANSPLANT | Facility: CLINIC | Age: 57
End: 2022-07-11

## 2022-07-11 NOTE — TELEPHONE ENCOUNTER
Pt recently in ER with kidney infection  They did some tests she wants us to review  finally feeling a little better today  Just wants to make sure she isnot missing something

## 2022-07-12 ENCOUNTER — TELEPHONE (OUTPATIENT)
Dept: TRANSPLANT | Facility: CLINIC | Age: 57
End: 2022-07-12

## 2022-07-12 ENCOUNTER — MYC MEDICAL ADVICE (OUTPATIENT)
Dept: TRANSPLANT | Facility: CLINIC | Age: 57
End: 2022-07-12

## 2022-07-12 NOTE — TELEPHONE ENCOUNTER
Pt transferred to triage- could not hear pt.     RN tried calling back with no answer.   TCB    Cherelle FRIEND RN

## 2022-07-12 NOTE — TELEPHONE ENCOUNTER
Senait, MD Rosa Urias Teresa, RN  If her symptoms are gone, than nothing else.       ----- Message -----   From: Selena Lee, RK   Sent: 7/11/2022   4:25 PM CDT   To: Clayton Sapp MD   Subject: uti 7/4/22                                       Dr. Spap,     Pt was at ED for a UTI 7/4/22 and was discharged on Cipro.   She is curious if we need her to do anything else.   She wants transplant to review labs completed in ED.   States she feels much better.   Do you want repeat UA/UC?     Thanks,   Freddy Quick message sent.

## 2022-07-13 NOTE — TELEPHONE ENCOUNTER
No MyChart reply. Not read.    PLAN:  Call Reina Quan and discuss Dr. Sapp's recommendations.    OUTCOME:  No answer. Left VM re: if asymptomatic after completing Abx for UTI, no need to do anything.

## 2022-08-15 DIAGNOSIS — Z94.0 KIDNEY TRANSPLANTED: Primary | ICD-10-CM

## 2022-08-15 DIAGNOSIS — Z79.899 IMMUNOSUPPRESSIVE MANAGEMENT ENCOUNTER FOLLOWING KIDNEY TRANSPLANT: ICD-10-CM

## 2022-08-15 DIAGNOSIS — Z94.0 IMMUNOSUPPRESSIVE MANAGEMENT ENCOUNTER FOLLOWING KIDNEY TRANSPLANT: ICD-10-CM

## 2022-08-15 DIAGNOSIS — Z48.298 AFTERCARE FOLLOWING ORGAN TRANSPLANT: ICD-10-CM

## 2022-08-15 RX ORDER — SIROLIMUS 1 MG/1
3 TABLET, FILM COATED ORAL DAILY
Qty: 90 TABLET | Refills: 11 | Status: SHIPPED | OUTPATIENT
Start: 2022-08-15 | End: 2023-03-20

## 2022-08-15 NOTE — TELEPHONE ENCOUNTER
Lizbeth from [facility] called needing refill for Sirolimus 1 mg faxed to [phone], or call to give verbal. Patient will be out of medication soon

## 2022-10-22 ENCOUNTER — HEALTH MAINTENANCE LETTER (OUTPATIENT)
Age: 57
End: 2022-10-22

## 2022-10-25 ENCOUNTER — TELEPHONE (OUTPATIENT)
Dept: TRANSPLANT | Facility: CLINIC | Age: 57
End: 2022-10-25

## 2022-10-25 DIAGNOSIS — Z79.899 ENCOUNTER FOR LONG-TERM CURRENT USE OF MEDICATION: ICD-10-CM

## 2022-10-25 DIAGNOSIS — Z94.0 KIDNEY REPLACED BY TRANSPLANT: ICD-10-CM

## 2022-10-25 DIAGNOSIS — Z48.298 AFTERCARE FOLLOWING ORGAN TRANSPLANT: Primary | ICD-10-CM

## 2022-10-25 NOTE — TELEPHONE ENCOUNTER
Transplant Lab/Orders  Route to LPN  Post Transplant Days: [id number]  When patient is less than 60 days post-transplant, route high priority    Patient went to [facility] in [place] today & they would not draw her without orders.     Reason for Call: Annual lab reorder  Callback needed? Yes  Return Call Needed  Same as documented in contacts section

## 2022-11-03 ENCOUNTER — LAB (OUTPATIENT)
Dept: LAB | Facility: CLINIC | Age: 57
End: 2022-11-03
Payer: COMMERCIAL

## 2022-11-03 DIAGNOSIS — Z79.899 ENCOUNTER FOR LONG-TERM CURRENT USE OF MEDICATION: ICD-10-CM

## 2022-11-03 DIAGNOSIS — Z48.298 AFTERCARE FOLLOWING ORGAN TRANSPLANT: ICD-10-CM

## 2022-11-03 DIAGNOSIS — Z94.0 KIDNEY REPLACED BY TRANSPLANT: ICD-10-CM

## 2022-11-03 LAB
ALBUMIN MFR UR ELPH: 110 MG/DL
ALBUMIN SERPL BCG-MCNC: 4.5 G/DL (ref 3.5–5.2)
ALP SERPL-CCNC: 103 U/L (ref 35–104)
ALT SERPL W P-5'-P-CCNC: 29 U/L (ref 10–35)
ANION GAP SERPL CALCULATED.3IONS-SCNC: 14 MMOL/L (ref 7–15)
AST SERPL W P-5'-P-CCNC: 39 U/L (ref 10–35)
BILIRUB DIRECT SERPL-MCNC: <0.2 MG/DL (ref 0–0.3)
BILIRUB SERPL-MCNC: 0.3 MG/DL
BUN SERPL-MCNC: 32 MG/DL (ref 6–20)
CALCIUM SERPL-MCNC: 10.3 MG/DL (ref 8.6–10)
CHLORIDE SERPL-SCNC: 103 MMOL/L (ref 98–107)
CHOLEST SERPL-MCNC: 417 MG/DL
CREAT SERPL-MCNC: 1.78 MG/DL (ref 0.51–0.95)
CREAT UR-MCNC: 124 MG/DL
DEPRECATED HCO3 PLAS-SCNC: 23 MMOL/L (ref 22–29)
ERYTHROCYTE [DISTWIDTH] IN BLOOD BY AUTOMATED COUNT: 16.5 % (ref 10–15)
GFR SERPL CREATININE-BSD FRML MDRD: 33 ML/MIN/1.73M2
GLUCOSE SERPL-MCNC: 130 MG/DL (ref 70–99)
HCT VFR BLD AUTO: 37.3 % (ref 35–47)
HDLC SERPL-MCNC: 81 MG/DL
HGB BLD-MCNC: 12 G/DL (ref 11.7–15.7)
LDLC SERPL CALC-MCNC: 282 MG/DL
MCH RBC QN AUTO: 29 PG (ref 26.5–33)
MCHC RBC AUTO-ENTMCNC: 32.2 G/DL (ref 31.5–36.5)
MCV RBC AUTO: 90 FL (ref 78–100)
NONHDLC SERPL-MCNC: 336 MG/DL
PLATELET # BLD AUTO: 72 10E3/UL (ref 150–450)
POTASSIUM SERPL-SCNC: 4.4 MMOL/L (ref 3.4–5.3)
PROT SERPL-MCNC: 7.4 G/DL (ref 6.4–8.3)
PROT/CREAT 24H UR: 0.89 MG/MG CR (ref 0–0.2)
RBC # BLD AUTO: 4.14 10E6/UL (ref 3.8–5.2)
SIROLIMUS BLD-MCNC: 6.2 UG/L (ref 5–15)
SODIUM SERPL-SCNC: 140 MMOL/L (ref 136–145)
TME LAST DOSE: NORMAL H
TME LAST DOSE: NORMAL H
TRIGL SERPL-MCNC: 271 MG/DL
WBC # BLD AUTO: 4.4 10E3/UL (ref 4–11)

## 2022-11-03 PROCEDURE — 85027 COMPLETE CBC AUTOMATED: CPT

## 2022-11-03 PROCEDURE — 36415 COLL VENOUS BLD VENIPUNCTURE: CPT

## 2022-11-03 PROCEDURE — 84156 ASSAY OF PROTEIN URINE: CPT

## 2022-11-03 PROCEDURE — 80061 LIPID PANEL: CPT

## 2022-11-03 PROCEDURE — 80195 ASSAY OF SIROLIMUS: CPT

## 2022-11-03 PROCEDURE — 82248 BILIRUBIN DIRECT: CPT

## 2022-11-03 PROCEDURE — 80053 COMPREHEN METABOLIC PANEL: CPT

## 2022-12-01 ENCOUNTER — TELEPHONE (OUTPATIENT)
Dept: TRANSPLANT | Facility: CLINIC | Age: 57
End: 2022-12-01

## 2022-12-01 ENCOUNTER — APPOINTMENT (OUTPATIENT)
Dept: CT IMAGING | Facility: CLINIC | Age: 57
End: 2022-12-01
Attending: STUDENT IN AN ORGANIZED HEALTH CARE EDUCATION/TRAINING PROGRAM
Payer: COMMERCIAL

## 2022-12-01 ENCOUNTER — HOSPITAL ENCOUNTER (EMERGENCY)
Facility: CLINIC | Age: 57
Discharge: HOME | End: 2022-12-01
Attending: EMERGENCY MEDICINE | Admitting: EMERGENCY MEDICINE
Payer: COMMERCIAL

## 2022-12-01 VITALS
OXYGEN SATURATION: 98 % | TEMPERATURE: 98.9 F | SYSTOLIC BLOOD PRESSURE: 162 MMHG | DIASTOLIC BLOOD PRESSURE: 105 MMHG | HEIGHT: 61 IN | BODY MASS INDEX: 26.43 KG/M2 | RESPIRATION RATE: 16 BRPM | WEIGHT: 140 LBS | HEART RATE: 108 BPM

## 2022-12-01 DIAGNOSIS — L20.82 FLEXURAL ECZEMA: ICD-10-CM

## 2022-12-01 DIAGNOSIS — J06.9 VIRAL URI WITH COUGH: ICD-10-CM

## 2022-12-01 DIAGNOSIS — L66.10 LICHEN PLANOPILARIS: ICD-10-CM

## 2022-12-01 DIAGNOSIS — S30.1XXA RECTUS SHEATH HEMATOMA, INITIAL ENCOUNTER: ICD-10-CM

## 2022-12-01 DIAGNOSIS — R10.32 ABDOMINAL PAIN, LEFT LOWER QUADRANT: ICD-10-CM

## 2022-12-01 LAB
ANION GAP SERPL CALCULATED.3IONS-SCNC: 11 MMOL/L (ref 5–18)
BUN SERPL-MCNC: 27 MG/DL (ref 8–22)
CALCIUM SERPL-MCNC: 10.5 MG/DL (ref 8.5–10.5)
CHLORIDE BLD-SCNC: 106 MMOL/L (ref 98–107)
CO2 SERPL-SCNC: 22 MMOL/L (ref 22–31)
CREAT SERPL-MCNC: 1.88 MG/DL (ref 0.6–1.1)
ERYTHROCYTE [DISTWIDTH] IN BLOOD BY AUTOMATED COUNT: 15.7 % (ref 10–15)
FLUAV RNA SPEC QL NAA+PROBE: NEGATIVE
FLUBV RNA RESP QL NAA+PROBE: NEGATIVE
GFR SERPL CREATININE-BSD FRML MDRD: 31 ML/MIN/1.73M2
GLUCOSE BLD-MCNC: 111 MG/DL (ref 70–125)
HCT VFR BLD AUTO: 36.1 % (ref 35–47)
HGB BLD-MCNC: 11.4 G/DL (ref 11.7–15.7)
MCH RBC QN AUTO: 28.6 PG (ref 26.5–33)
MCHC RBC AUTO-ENTMCNC: 31.6 G/DL (ref 31.5–36.5)
MCV RBC AUTO: 91 FL (ref 78–100)
PLATELET # BLD AUTO: 98 10E3/UL (ref 150–450)
POTASSIUM BLD-SCNC: 4.3 MMOL/L (ref 3.5–5)
RBC # BLD AUTO: 3.98 10E6/UL (ref 3.8–5.2)
RSV RNA SPEC NAA+PROBE: NEGATIVE
SARS-COV-2 RNA RESP QL NAA+PROBE: NEGATIVE
SODIUM SERPL-SCNC: 139 MMOL/L (ref 136–145)
WBC # BLD AUTO: 7.3 10E3/UL (ref 4–11)

## 2022-12-01 PROCEDURE — 36415 COLL VENOUS BLD VENIPUNCTURE: CPT | Performed by: STUDENT IN AN ORGANIZED HEALTH CARE EDUCATION/TRAINING PROGRAM

## 2022-12-01 PROCEDURE — 99284 EMERGENCY DEPT VISIT MOD MDM: CPT | Mod: 25

## 2022-12-01 PROCEDURE — C9803 HOPD COVID-19 SPEC COLLECT: HCPCS

## 2022-12-01 PROCEDURE — 71250 CT THORAX DX C-: CPT

## 2022-12-01 PROCEDURE — 82310 ASSAY OF CALCIUM: CPT | Performed by: STUDENT IN AN ORGANIZED HEALTH CARE EDUCATION/TRAINING PROGRAM

## 2022-12-01 PROCEDURE — 85027 COMPLETE CBC AUTOMATED: CPT | Performed by: STUDENT IN AN ORGANIZED HEALTH CARE EDUCATION/TRAINING PROGRAM

## 2022-12-01 PROCEDURE — 87637 SARSCOV2&INF A&B&RSV AMP PRB: CPT | Performed by: STUDENT IN AN ORGANIZED HEALTH CARE EDUCATION/TRAINING PROGRAM

## 2022-12-01 RX ORDER — DEXTROMETHORPHAN HBR. AND GUAIFENESIN 10; 100 MG/5ML; MG/5ML
5 SOLUTION ORAL 4 TIMES DAILY PRN
Qty: 118 ML | Refills: 0 | Status: SHIPPED | OUTPATIENT
Start: 2022-12-01 | End: 2023-02-13

## 2022-12-01 RX ORDER — BENZONATATE 100 MG/1
100 CAPSULE ORAL 3 TIMES DAILY PRN
Qty: 15 CAPSULE | Refills: 0 | Status: SHIPPED | OUTPATIENT
Start: 2022-12-01 | End: 2022-12-06

## 2022-12-01 ASSESSMENT — ENCOUNTER SYMPTOMS
ABDOMINAL PAIN: 1
FEVER: 0
COUGH: 1
CHILLS: 0
VOMITING: 0
NAUSEA: 0
SHORTNESS OF BREATH: 1

## 2022-12-01 NOTE — ED PROVIDER NOTES
EMERGENCY DEPARTMENT ENCOUNTER      NAME: Reina Quan  AGE: 57 year old female  YOB: 1965  MRN: [id number]  EVALUATION DATE & TIME: 12/1/2022  5:32 PM    PCP: No Ref-Primary, Physician    ED PROVIDER: Cherelle Alegre PA-C    Chief Complaint   Patient presents with     Cough     Abdominal Pain     FINAL IMPRESSION:  1. Rectus sheath hematoma, initial encounter    2. Viral URI with cough    3. Abdominal pain, left lower quadrant      MEDICAL DECISION MAKING:    Pertinent Labs & Imaging studies reviewed. (See chart for details)  Reina Quan is a 57 year old female who presents for evaluation of 1.5 week h/o cough with acute onset sharp LLQ abdominal pain this morning after a coughing fit episode. History of low platelets, but no blood thinner use. H/o transplant kidney.     On my initial evaluation, vitals normal. On physical exam, patient is awake, alert, no acute distress resting comfortably in chair. slightly uncomfortable, but not ill or toxic appearing. heart sounds normal, lungs with mild expiratory wheezing bilaterally. no crackles. No respiratory distress or increased work of breathing. she is significantly tender on palpation over her entire abdomen, worsened over LUQ and LLQ. no distention, mild voluntary rebound, no guarding. normal bowel sounds. no obvious masses. no ecchymosis or other skin changes. no cva tenderness bilaterally. no flank ecchymosis.     Differential diagnosis includes pancreatitis, diverticulitis, appendicitis, gastritis, small bowel obstruction, large bowel obstruction, hernia, hematoma, muscle strain, COVID, influenza, bronchitis, pneumonia, other viral URI. emergency department workup included CBC, BMP, covid, influenza swabs, UA, CT chest, abdomen, pelvis w/o contrast.     CT notable for left lower rectus sheath hematoma measuring up to 16 x 6.6 x 3.9 cm extending from the umbilicus to the pubic symphysis.  No other acute intra abdominal pathology, no  pneumonia.  Concern for the size of hematoma. paged general surgery and spoke with Dr. Villegas who did not feel surgical intervention was necessary and recommended symptomatic management. Updated patient on this and plan for discharge with strict return precautions.  Low suspicion for other intra-abdominal pathology listed above as hematoma more likely cause of symptoms so no additional blood work was obtained during today's evaluation.  Low suspicion for any emergent cause of patient's symptoms to require immediate hospitalization.    In terms of her cough, lungs with mild expiratory wheezing, but no crackles. Chest CT without pneumonia. covid and influenza negative. Suspect this is a viral URI such as bronchitis. Will send home with tessalon and tussin for cough at night.     Patient has had serial examinations and notes significant improvement.     Patient was discharged in stable condition with treatment plan as below. Instructed to follow up with primary care provider in 3 days and return to the emergency department with any new or worsening of symptoms. Patient expressed understanding, feels comfortable, and is in agreement with this plan. All questions addressed prior to discharge.    Medical Decision Making    Supplemental history from: N/A    External Record(s) Reviewed: Outpatient Record: telephone interactions    Differential Diagnosis: See MDM charting for differential considered.     I performed an independent interpretation of the: N/A    Discussed with radiology regarding test interpretation: N/A    Discussion of management with another provider: See chart documentation, if applicable and General Surgery    The following testing was considered but ultimately not selected: None     I considered prescription management with: Symptomatic Management    The patient's care impacted: None    Consideration of Admission/Observation: N/A - Patient discharged without consideration for admission    Care  significantly affected by Social Determinants of Health including: N/A    ED COURSE:  5:53 PM I reviewed the patient's chart. I met with the patient to gather history and to perform my initial exam.    I wore appropriate PPE during this encounter including: facemask & eye protection   6:03 PM I staffed this patient case with Dr. Vasquez who agrees with plan at this time and will see the patient for their history, exam, and complete evaluation.  6:06 PM I paged general surgery.   6:17 PM I spoke with general surgery, Dr. Villegas, who recommended pain control. Did not feel surgical intervention was necessary at this point.   6:24 PM I rechecked the patient and updated her on conversation with general surgery. We discussed plan for discharge including treatment plan, follow-up and return precautions to emergency department.  Patient voiced understanding and in agreement with this plan.    At the conclusion of the encounter I discussed the results of all of the tests and the disposition. The questions were answered. The patient or family acknowledged understanding and was agreeable with the care plan.     MEDICATIONS GIVEN IN THE EMERGENCY:  Medications - No data to display    NEW PRESCRIPTIONS STARTED AT TODAY'S ER VISIT  Discharge Medication List as of 12/1/2022  6:26 PM      START taking these medications    Details   benzonatate (TESSALON) 100 MG capsule Take 1 capsule (100 mg) by mouth 3 times daily as needed for cough, Disp-15 capsule, R-0, Local Print      dextromethorphan-guaiFENesin (TUSSIN DM)  MG/5ML liquid Take 5 mLs by mouth 4 times daily as needed (cough), Disp-118 mL, R-0, Local Print             =================================================================    HPI:    Patient information was obtained from: Patient     Use of Interpretor: N/A        Reina Quan is a 57 year old female with a pertinent history of CKD (stage III), s/p kidney transplant, giant platelet syndrome, SCC, hld, htn,  cholecystectomy, EGC, who presents to this ED by walking for evaluation of cough and abdominal pain.     Two weeks ago, patient was diagnosed with bronchitis. This past week, patient states that she has had a terrible cough which has been causing her abdominal pain around the area where her kidney was transplanted (left side). She states that it feels like she pulled a muscle in that area. Patient has never felt this pain before.     This morning, patient states the pain has worsened. When she sits, pain is a 3/10 but when she coughs, the pain gets is a 9/10. Patient took an Advil but it has not alleviated the pain. Additionally, patient states that when he applies a bit of pressure in that area while coughing, it lessens the pain a bit. She notes slight shortness of breath due to the cough but denies nausea, vomiting, chest pain, fever, chills or any other complaints at this time. Of note, patient has not seen a nephrologist in the past 5 years but has regular labs every 3 months.       REVIEW OF SYSTEMS:  Review of Systems   Constitutional: Negative for chills and fever.   Respiratory: Positive for cough and shortness of breath (caused by cough).    Cardiovascular: Negative for chest pain.   Gastrointestinal: Positive for abdominal pain (near kidney transplant site). Negative for nausea and vomiting.        PAST MEDICAL HISTORY:  Past Medical History:   Diagnosis Date     Actinic keratosis      Allergic rhinitis, cause unspecified      Anemia      anxiety/depression     PAXIL     Arthritis      congenital hearing loss     uses hearing aids     Depressive disorder      Dry eyes      Giant Platelet syndrome      Glomerular Focal Sclerosis--transplant 1985      Hypertension      Kidney replaced by transplant 1992    RAPAMUNE/ SIROLIMUS      Lichen planopilaris     LPP followed by derm on doxycycline/ clobetasol     Lichen planus      Menorrhagia      migraine      Squamous cell carcinoma     8 x      Thrombocytopenia  (H)      Ulcerative colitis, unspecified     biposy neg 1996     Unsatisfactory cervical Papanicolaou smear 02/15/2021     UTI (lower urinary tract infection)     recurrent Dr Burns [facility]       PAST SURGICAL HISTORY:  Past Surgical History:   Procedure Laterality Date     BIOPSY OF SKIN LESION       CHOLECYSTECTOMY       DILATION AND CURETTAGE, OPERATIVE HYSTEROSCOPY WITH MORCELLATOR, COMBINED N/A 11/10/2015    Procedure: COMBINED DILATION AND CURETTAGE, OPERATIVE HYSTEROSCOPY WITH MORCELLATOR;  Surgeon: Ghada Melendez MD;  Location: [facility]     ESOPHAGOSCOPY, GASTROSCOPY, DUODENOSCOPY (EGD), COMBINED  11/20/2012    Procedure: COMBINED ESOPHAGOSCOPY, GASTROSCOPY, DUODENOSCOPY (EGD), BIOPSY SINGLE OR MULTIPLE;;  Surgeon: Valentin Hahn MD;  Location:  GI     MOHS MICROGRAPHIC PROCEDURE       TRANSPLANT      kidney     ZZC NONSPECIFIC PROCEDURE      Renal transplant right side     ZZ NONSPECIFIC PROCEDURE      cholecystectomy       CURRENT MEDICATIONS:      Current Facility-Administered Medications:      triamcinolone acetonide (KENALOG-10) injection 10 mg, 10 mg, Intra-Lesional, Once, Brad Bauman MD    Current Outpatient Medications:      benzonatate (TESSALON) 100 MG capsule, Take 1 capsule (100 mg) by mouth 3 times daily as needed for cough, Disp: 15 capsule, Rfl: 0     dextromethorphan-guaiFENesin (TUSSIN DM)  MG/5ML liquid, Take 5 mLs by mouth 4 times daily as needed (cough), Disp: 118 mL, Rfl: 0     atorvastatin (LIPITOR) 10 MG tablet, Take 2 tablets (20 mg) by mouth At Bedtime, Disp: 60 tablet, Rfl: 0     clobetasol (TEMOVATE) 0.05 % external solution, Apply topically daily as needed (itchyy scalp) Apply to scalp daily as needed., Disp: 50 mL, Rfl: 4     clobetasol propionate (CLOBEX) 0.05 % external shampoo, APPLY IN SHOWER TO SCALP TWICE WEEKLY, Disp: 118 mL, Rfl: 1     ketoconazole (NIZORAL) 2 % external shampoo, Apply topically daily as needed for itching, irritation or other (scalp  scale) Use 2-3 times a week. Apply and lather in then rinse out 5 minutes later., Disp: 120 mL, Rfl: 11     lisinopril (ZESTRIL) 10 MG tablet, Take 0.5 tablets (5 mg) by mouth daily, Disp: 45 tablet, Rfl: 0     Omega-3 Fish Oil 500 MG capsule, Take 2 capsules (1,000 mg) by mouth 2 times daily With meals, Disp: 120 capsule, Rfl: 0     PARoxetine (PAXIL) 20 MG tablet, TAKE 1 TABLET BY MOUTH EVERY MORNING, Disp: 90 tablet, Rfl: 3     sirolimus (GENERIC EQUIVALENT) 1 MG tablet, Take 3 tablets (3 mg) by mouth daily, Disp: 90 tablet, Rfl: 11     sirolimus (GENERIC EQUIVALENT) 1 MG tablet, Take 3 tablets (3 mg) by mouth daily, Disp: 90 tablet, Rfl: 2     triamcinolone (KENALOG) 0.1 % external cream, APPLY SPARINGLY EXTERNALLY TO THE AFFECTED AREA TWICE TIMES DAILY FOR 14 DAYS, Disp: 30 g, Rfl: 3    ALLERGIES:  Allergies   Allergen Reactions     Ampicillin Swelling     Swelling of mouth and tongue.      Seasonal Allergies Other (See Comments)     Itchy eyes and rhinitis.       FAMILY HISTORY:  Family History   Problem Relation Age of Onset     Diabetes Father      Kidney Disease Father         nephrolithiasis     Asthma Father      Hypertension Mother      Depression Mother      Anxiety Disorder Mother      C.A.D. Paternal Grandfather      Blood Disease Maternal Grandmother      Diabetes Maternal Grandmother      Hypertension Maternal Grandmother      Hyperlipidemia Maternal Grandmother      Depression Maternal Grandmother      No Known Problems Maternal Grandfather      Cancer Other         no family hx of skin cancer     Asthma Sister      No Known Problems Paternal Grandmother      Glaucoma No family hx of      Macular Degeneration No family hx of      Melanoma No family hx of      Skin Cancer No family hx of        SOCIAL HISTORY:   Social History     Socioeconomic History     Marital status: Single     Spouse name: None     Number of children: 3     Years of education: None     Highest education level: None  "  Occupational History     Occupation: [employer] store   Tobacco Use     Smoking status: Never     Smokeless tobacco: Never   Vaping Use     Vaping Use: Never used   Substance and Sexual Activity     Alcohol use: Yes     Alcohol/week: 0.0 standard drinks     Comment: 2x /week     Drug use: No     Sexual activity: Not Currently     Partners: Male     Birth control/protection: Post-menopausal   Other Topics Concern     Parent/sibling w/ CABG, MI or angioplasty before 65F 55M? 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 Doesn't have a job lined up afterward but looking forward to some time off. Has three children, one moved to college this fall. Living in [place], has a significant other x 7 months. Previously . In a monogamous relationship. Never smoker, social EtOH use.        VITALS:  Patient Vitals for the past 24 hrs:   BP Temp Pulse Resp SpO2 Height Weight   12/01/22 1338 (!) 162/105 98.9  F (37.2  C) 108 16 98 % 1.549 m (5' 1\") 63.5 kg (140 lb)       PHYSICAL EXAM    Constitutional: Well developed, Well nourished, NAD, slightly uncomfortable appearing, but not ill or toxic  HENT: Normocephalic, Atraumatic, Bilateral external ears normal, Oropharynx normal, mucous membranes moist, Nose normal.   Neck: Normal range of motion, No tenderness, Supple, No stridor.  Eyes: PERRL, EOMI, Conjunctiva normal, No discharge.   Respiratory: Normal breath sounds, No respiratory distress, mild expiratory wheezing bilaterally, no crackles,, Speaks full sentences easily. cough  Cardiovascular: Normal heart rate, Regular rhythm, No murmurs, No rubs, No gallops. Chest wall nontender.  GI: Soft,significantly tender on palpation over her entire abdomen, worsened over LUQ and LLQ. no distention, mild voluntary rebound, no guarding. normal bowel sounds. no obvious masses. no ecchymosis or other skin changes. no cva tenderness bilaterally. no flank ecchymosis. "   Musculoskeletal: 2+ DP pulses. No edema. No cyanosis, No clubbing. Good range of motion in all major joints. No tenderness to palpation or major deformities noted. No tenderness of the CTLS spine.   Integument: Warm, Dry, No erythema, No rash. No petechiae.  Neurologic: Alert & oriented x 3, Normal motor function, Normal sensory function, No focal deficits noted. Normal gait.  Psychiatric: Affect normal, Judgment normal, Mood normal. Cooperative.    LAB:  All pertinent labs reviewed and interpreted.  Labs Ordered and Resulted from Time of ED Arrival to Time of ED Departure   CBC WITH PLATELETS - Abnormal       Result Value    WBC Count 7.3      RBC Count 3.98      Hemoglobin 11.4 (*)     Hematocrit 36.1      MCV 91      MCH 28.6      MCHC 31.6      RDW 15.7 (*)     Platelet Count 98 (*)    BASIC METABOLIC PANEL - Abnormal    Sodium 139      Potassium 4.3      Chloride 106      Carbon Dioxide (CO2) 22      Anion Gap 11      Urea Nitrogen 27 (*)     Creatinine 1.88 (*)     Calcium 10.5      Glucose 111      GFR Estimate 31 (*)    INFLUENZA A/B & SARS-COV2 PCR MULTIPLEX - Normal    Influenza A PCR Negative      Influenza B PCR Negative      RSV PCR Negative      SARS CoV2 PCR Negative         RADIOLOGY:  Reviewed all pertinent imaging. Please see official radiology report.  CT Chest Abdomen Pelvis w/o Contrast   Final Result   IMPRESSION:      1.  Left lower rectus sheath hematoma measuring up to 16 x 6.6 x 3.9 cm extending from the level of the umbilicus to the pubic symphysis. No other body wall hematomas.      2.  Normal noncontrast appearance of the right lower quadrant renal transplant. No transplant hydronephrosis or peritransplant fluid collections.      3.  No pneumonia or other acute intrathoracic abnormality.          EKG:    None    PROCEDURES:   None    Diagnosis:  1. Rectus sheath hematoma, initial encounter    2. Viral URI with cough    3. Abdominal pain, left lower quadrant      Cherelle ECHOLS am  serving as a scribe to document services personally performed by Cherelle Alegre PA-C based on my observation and the provider's statements to me. I, Cherelle Alegre PA-C attest that Cherelle Parada is acting in a scribe capacity, has observed my performance of the services and has documented them in accordance with my direction.    Cherelle Alegre PA-C  Emergency Medicine  [facility]  12/1/2022     Cherelle Alegre PA-C  12/01/22 1834

## 2022-12-01 NOTE — TELEPHONE ENCOUNTER
Patient called and is in a lot of pain and will be going to the [facility].

## 2022-12-01 NOTE — ED NOTES
ED Provider In Triage Note  [facility]  Encounter Date: Dec 1, 2022    Chief Complaint   Patient presents with     Cough     Abdominal Pain       Brief HP  Reina Quan is a 57 year old female presenting to the Emergency Department with a chief complaint of;    Kidney transplant 30 years ago  Sick with sort throat x2 weeks, coughing x1 week   Transplanted kidney (in lower abdomen/pelvis) feels swollen to her  No changes in urination    Brief Physical Exam:  LMP 05/14/2014 (Approximate)   General: Non-toxic appearing  HEENT: Atraumatic  Resp: No respiratory distress  Abdomen: Non-peritoneal  Neuro: Alert, oriented, answers questions appropriately  Psych: Behavior appropriate      Plan Initiated in Triage:  CT w/o contrast, labs, UA      PIT Dispo:   Return to lobby while awaiting workup and ED bed availability    Herman Hall MD on 12/1/2022 at 1:38 PM    Patient was evaluated by the Physician in Triage due to a limitation of available rooms in the Emergency Department. A plan of care was discussed based on the information obtained on the initial evaluation and patient was consuled to return back to the Emergency Department lobby after this initial evalutaiton until results were obtained or a room became available in the Emergency Department. Patient was counseled not to leave prior to receiving the results of their workup.     Herman Hall MD  [facility] EMERGENCY ROOM  [address] [place] [id number] [phone]     Herman Hall MD  12/01/22 1943

## 2022-12-01 NOTE — TELEPHONE ENCOUNTER
Patient Call: General  Route to LPN    Reason for call: called in regards suffered a bronc cold for the past two weeks and has been coughing hard. Patient mentioned pain and might have pulled a muscle when coughing. Pain has moved to around to kidney site. Patient is not sure what to do and needs assistance. Call back number is [phone]. Would be available to talk around 1:30pm.        Call back needed? Yes    Return Call Needed  Same as documented in contacts section  When to return call?: Same day: Route High Priority

## 2022-12-01 NOTE — TELEPHONE ENCOUNTER
"Called back Reina Quan.  Reports have been coughing for a week and a half now.  C/o stomach pain from coughing.  Today abdominal pain is worse and is sharp.  Can't tell if it is muscle or something else.  Pain is over transplanted kidney.  Recommended going to the [facility].  States she alraedy a minute away from [facility].  States it feels like something burst inside of me, I just need them to check maybe an ultrasound\"  Discussed if issue is transplant related they will most likely transfer her if they need higher level of care.  Verbalized understanding.    "

## 2022-12-01 NOTE — ED TRIAGE NOTES
Pt reports having diagnosis of bronchitis for about 2 weeks ago with coughing for a week. Pt reports she has been coughing so hard that she is now having pain in abdomen at site of kidney transplant. Kidney was transplanted 30 years ago     Triage Assessment     Row Name 12/01/22 3713       Triage Assessment (Adult)    Airway WDL WDL       Respiratory WDL    Respiratory WDL WDL       Skin Circulation/Temperature WDL    Skin Circulation/Temperature WDL WDL       Cardiac WDL    Cardiac WDL WDL       Peripheral/Neurovascular WDL    Peripheral Neurovascular WDL WDL       Cognitive/Neuro/Behavioral WDL    Cognitive/Neuro/Behavioral WDL WDL

## 2022-12-02 NOTE — DISCHARGE INSTRUCTIONS
Please bring this paperwork with you to your follow-up appointment.    You were seen in the urgent care/emergency department for abdominal pain following a cough.     You had a CT scan done today, which showed a large hematoma (collection of blood beneath the skin). We spoke with an on call general surgeon, who recommended heat/ice and pain control. Did not feel surgical intervention was necessary.     For your symptoms:  Heat and ice to the area alternating.  Place ice over the area every 2-3 hours for 20 to 30 minutes.  May use heating pads for 20 to 30 minutes in between ice.    Tylenol/ibuprofen as needed  You may take up to 650 mg of Tylenol (acetaminophen) up to 4 times daily and up to 600 mg of ibuprofen up to 4 times daily as needed for fever, pain.  Please do not take more than the daily maximum recommended dose (tylenol = 4 grams, ibuprofen = 2.4 grams) as it can cause harm to your liver, kidneys, stomach.  It is best to take ibuprofen with food. Please read labels of any over-the-counter medicine you may be taking as it may contain Tylenol (acetaminophen) or Advil (ibuprofen).     Follow up with your primary care provider for recheck in 3 days for ER follow-up    Return to the emergency department if you develop pain, vomiting, fevers, or any other new worsening or concerning symptoms. We'd be happy to see you again.    Thank you for allowing us to be part of your care today.    Take care!  -Cherelle Alegre PA-C

## 2022-12-02 NOTE — ED PROVIDER NOTES
"Emergency Department Staff Physician Note     I had a face to face encounter with this patient seen by the Advanced Practice Provider (SHAE).  I have seen, examined, and discussed the patient with the SHAE and agree with their assessment and plan of management.    Relevant HPI:     Reina Quan is a 57 year old female who presents to the Emergency Department for evaluation of cough and abdominal pain.      Two weeks ago, patient was diagnosed with bronchitis. This past week, patient states that she has had a terrible cough which has been causing her abdominal pain around the area where her kidney was transplanted (left side). This morning, patient states the pain has worsened. When she sits, pain is a 3/10 but when she coughs, the pain is a 9/10.    I, Lindsey Donaldson, am serving as a scribe to document services personally performed by Dr. Vasquez, based on my observations and the provider's statements to me.   I, Valentin Vasquez D.O., attest that Lindsey Donaldson was acting in a scribe capacity, has observed my performance of the services and has documented them in accordance with my direction.    ED Course:  6:03 PM I received the patient report from the SHAE, Cherelle Alegre PA-C. I agree with their assessment and plan of management, and I will see the patient.  6:06 PM I met with the patient to introduce myself, gather additional history, perform my initial exam, and discuss the plan.     Brief Physical Exam:  VITAL SIGNS: BP (!) 162/105   Pulse 108   Temp 98.9  F (37.2  C)   Resp 16   Ht 1.549 m (5' 1\")   Wt 63.5 kg (140 lb)   LMP 05/14/2014 (Approximate)   SpO2 98%   BMI 26.45 kg/m      General Appearance: Well-appearing, well-nourished, no acute distress  Head:  Normocephalic, without obvious abnormality, atraumatic  Eyes:  PERRL, conjunctiva/corneas clear, EOM's intact,  ENT:  Lips, mucosa, and tongue normal, membranes are moist without pallor  Neck:  Normal ROM, symmetrical, trachea midline    Cardio: "  Regular rate and rhythm, no murmur, rub or gallop, 2+ pulses symmetric in all extremities  Pulm:  Clear to auscultation bilaterally, respirations unlabored,   Abdomen:  Soft, left lower quadrant tenderness, no rebound or guarding.  Musculoskeletal: Full ROM, no edema, no cyanosis, good ROM of major joints  Integument:  Warm, Dry, No erythema, No rash.    Neurologic:  Alert & oriented.  No focal deficits appreciated.  Ambulatory.  Psychiatric:  Affect normal, Judgment normal, Mood normal.         Impression / ED Plan:  I had a face to face encounter with this patient seen by the Advanced Practice Provider (SHAE).  I have seen, examined, and discussed the patient with the SHAE and agree with their assessment and plan of management. I personally saw the patient and performed a substantive portion of the visit including all aspects of the medical decision making.    Reina Quan is a 57 year old female presents to the ED for evaluation of left lower quadrant abdominal pain that occurred after coughing and feeling a popping sensation.  CT imaging did show a hematoma in the area of her pain, and there was no other obvious emergent or inflammatory process.  There is no clinical concern for surgical abdomen.  Her hematoma we did address with general surgery who recommended pain control but otherwise did not believe that anything was necessary for intervention.  Patient was comfortable with this plan.  She will follow-up as an outpatient.  Return precautions discussed    1. Rectus sheath hematoma, initial encounter    2. Viral URI with cough    3. Abdominal pain, left lower quadrant        Valentin Vasquez D.O.  Staff Physician  [facility] Emergency Department      Valentin Vasquez,   12/02/22 0728

## 2022-12-05 DIAGNOSIS — L66.10 LICHEN PLANOPILARIS: ICD-10-CM

## 2022-12-06 DIAGNOSIS — E78.5 HYPERLIPIDEMIA WITH TARGET LDL LESS THAN 130: Primary | ICD-10-CM

## 2022-12-06 RX ORDER — ATORVASTATIN CALCIUM 10 MG/1
20 TABLET, FILM COATED ORAL AT BEDTIME
Qty: 180 TABLET | Refills: 0 | Status: SHIPPED | OUTPATIENT
Start: 2022-12-06 | End: 2023-02-13

## 2022-12-06 RX ORDER — TRIAMCINOLONE ACETONIDE 1 MG/G
CREAM TOPICAL
Qty: 30 G | Refills: 0 | Status: SHIPPED | OUTPATIENT
Start: 2022-12-06 | End: 2023-09-07

## 2022-12-06 RX ORDER — KETOCONAZOLE 20 MG/ML
SHAMPOO TOPICAL
Qty: 120 ML | Refills: 0 | Status: SHIPPED | OUTPATIENT
Start: 2022-12-06 | End: 2023-02-28

## 2022-12-06 RX ORDER — CLOBETASOL PROPIONATE 0.05 G/100ML
SHAMPOO TOPICAL
Qty: 118 ML | Refills: 1 | Status: SHIPPED | OUTPATIENT
Start: 2022-12-06 | End: 2023-10-12

## 2022-12-06 RX ORDER — CLOBETASOL PROPIONATE 0.5 MG/ML
SOLUTION TOPICAL
Qty: 50 ML | Refills: 4 | OUTPATIENT
Start: 2022-12-06

## 2022-12-06 NOTE — TELEPHONE ENCOUNTER
Received refill request for ketoconazole shampoo and triamcinolone cream as the resident on call. Reviewed patient's chart and attached communication. Patient last seen 11/2021 for skin check and LPP. RTC 1/2023. After reviewing the medication list and assessment and plan from last visit, the refill request was accepted for 1 month duration, can discuss further therapies at follow up appointment.    The patient's information will be forwarded to the attending physician as fyi.    Julián Becerra MD

## 2022-12-06 NOTE — TELEPHONE ENCOUNTER
clobetasol propionate (CLOBEX) 0.05 % external shampoo   Last Written Prescription Date:   4/14/2022  Last Fill Quantity: 118,   # refills: 1  Last Office Visit :  11/16/2021  Future Office visit:   1/24/2023  118 mL, 1 Refill sent to loco Franks RN  Central Triage Red Flags/Med Refills

## 2022-12-06 NOTE — TELEPHONE ENCOUNTER
KETOCONAZOLE 2% SHAMPOO 120ML  Last Written Prescription Date:   11/16/2021  Last Fill Quantity: 120,   # refills: 11  Last Office Visit :  11/16/2021  Future Office visit:  1/24/2023  Send to the Provider for review      TRIAMCINOLONE 0.1% CREAM   30GM  Last Written Prescription Date:   11/16/2021  Last Fill Quantity: 120,   # refills: 11  Last Office Visit :  11/16/2021  Future Office visit:  1/24/2023  This med says only use for 14 days.  Refer to Provider for review     Earnestine Franks RN  Central Triage Red Flags/Med Refills

## 2023-01-16 ENCOUNTER — TELEPHONE (OUTPATIENT)
Dept: FAMILY MEDICINE | Facility: CLINIC | Age: 58
End: 2023-01-16

## 2023-01-16 DIAGNOSIS — F32.4 MAJOR DEPRESSIVE DISORDER WITH SINGLE EPISODE, IN PARTIAL REMISSION (H): ICD-10-CM

## 2023-01-16 NOTE — TELEPHONE ENCOUNTER
Patient called regarding her Paxil prescription. She was a  patient for years and last saw QUYEN, but the patient tried to move to the [facility] since it was closer. They established care with a provider there, bu the provider suddenly quit and now the patient is looking to come back to . The patient has an establish care appointment with QUYEN on 2/13, and she is asking if we could refill her Paxil just until then. The patient says that she can do anything we need to get this refilled ASAP. The patient prefers the [facility] in [place] for their pharmacy. Routing to PCP and POD since PCP is out of clinic right now.    Gissel Albert

## 2023-01-17 RX ORDER — PAROXETINE 20 MG/1
TABLET, FILM COATED ORAL
Qty: 30 TABLET | Refills: 0 | Status: SHIPPED | OUTPATIENT
Start: 2023-01-17 | End: 2023-02-13

## 2023-01-24 ENCOUNTER — OFFICE VISIT (OUTPATIENT)
Dept: DERMATOLOGY | Facility: CLINIC | Age: 58
End: 2023-01-24
Payer: COMMERCIAL

## 2023-01-24 DIAGNOSIS — D49.2 NEOPLASM OF UNSPECIFIED BEHAVIOR OF BONE, SOFT TISSUE, AND SKIN: ICD-10-CM

## 2023-01-24 DIAGNOSIS — Z85.828 HISTORY OF NONMELANOMA SKIN CANCER: ICD-10-CM

## 2023-01-24 DIAGNOSIS — L57.0 AK (ACTINIC KERATOSIS): ICD-10-CM

## 2023-01-24 DIAGNOSIS — Q82.8 POROKERATOSIS: ICD-10-CM

## 2023-01-24 DIAGNOSIS — L66.10 LICHEN PLANOPILARIS: Primary | ICD-10-CM

## 2023-01-24 PROCEDURE — 11900 INJECT SKIN LESIONS </W 7: CPT | Mod: 59 | Performed by: DERMATOLOGY

## 2023-01-24 PROCEDURE — 88305 TISSUE EXAM BY PATHOLOGIST: CPT | Mod: TC | Performed by: DERMATOLOGY

## 2023-01-24 PROCEDURE — 11102 TANGNTL BX SKIN SINGLE LES: CPT | Performed by: DERMATOLOGY

## 2023-01-24 PROCEDURE — 88305 TISSUE EXAM BY PATHOLOGIST: CPT | Mod: 26 | Performed by: DERMATOLOGY

## 2023-01-24 PROCEDURE — 99213 OFFICE O/P EST LOW 20 MIN: CPT | Mod: 25 | Performed by: DERMATOLOGY

## 2023-01-24 PROCEDURE — 17000 DESTRUCT PREMALG LESION: CPT | Mod: 59 | Performed by: DERMATOLOGY

## 2023-01-24 ASSESSMENT — PAIN SCALES - GENERAL: PAINLEVEL: NO PAIN (0)

## 2023-01-24 NOTE — LETTER
Date:February 20, 2023      Provider requested that no letter be sent. Do not send.       [facility]

## 2023-01-24 NOTE — PROGRESS NOTES
[facility] Dermatology Note  Encounter Date: January 24, 2023  Office Visit      Dermatology Problem List:  0. Monitor at next visit:  - R arm hypopigmented plaque s/p LN 11/16/21 (ddx: AK vs NMSC vs other)  - L upper chest excoriation vs other (noted 11/16/21)  1. History of kidney transplant 1992, on chronic immunosuppression  2. History of multiple SCC's   - SCC R dorsal hand, s/p MMS 10/22/19  - SCC superior R yousif, s/p MMS 6/6/19  3. Hx of multiple AKs  4. Hx benign lesion biopsies  5. Vitiligo  6. LPP  - start clobetasol ext solution daily PRN  - start ketoconazole shampoo  - clobetasol 0.05% shampoo BIW (patient does not want to use more frequently)   - s/p ILK 10 4/15/21, 11/16/21     ____________________________________________     Assessment & Plan:      # LPP.  Chronic active problem.  Patient reports she is overall improved with home topicals, but still has persistent itch of vertex scalp.  - Cont clobetasol ext solution daily PRN  - Cont ketoconazole shampoo BIW-TIW  - cont clobetasol 0.05% shampoo BIW (patient does not want to use more frequently)   - ILK today, see procedure note     # Hx kidney transplant and multiple nonmelanoma skin cancers.   Clinically no evidence of recurrence at prior SCC sites.  - Reassurance   - cont FBSE q3-6 months     # Hypertrophic AK x2 on dorsal hands  - LN today - see procedure   - recheck at follow up    # Porokeratosis R medial foot.  - LN2 today - see procedure note    # NUB R shin - DDx HAK vs SCC.    - Shave biopsy today     Procedures Performed:   - Cryotherapy procedure note, location(s): dorsal hands, right medial foot. After verbal consent and discussion of risks and benefits including, but not limited to, dyspigmentation/scar, blister, and pain, 3 lesion(s) was(were) treated (two AK, one porokeratosis) with 1-2 mm freeze border for 1-2 cycles with liquid nitrogen. Post cryotherapy instructions were provided.    IL Kenalog  - Intra-lesional  triamcinolone procedure note. After positioning and cleansing with isopropyl alcohol, 0.9 total mL of triamcinolone 10 mg/mL was injected into 9 scalp lesion(s). The patient tolerated the procedure well and left the dermatology clinic in good condition.    Shave biopsy  - After signed informed consent, the affected area was swabbed with an alcohol pad and injected with 1% lidocaine with 1:100,000 epinephrine.  Biopsy via shave technique was performed and sent to pathology for review.  Hemostasis was achieved with AlCl 20%, and petrolatum and bandage were applied.  Patient tolerated procedure without complication.  Appropriate wound care instructions were provided.     Follow-up: 4 month(s) in-person, or earlier for new or changing lesions        Brad Bauman MD  Dermatology Attending  ____________________________________________    CC: Skin Check (Kathy is here today for a skin check )     HPI:  Ms. Reina Quan is a(n) 57 year old female who presents today as a return patient for LPP and skin check. She notes that her scalp has been itchy especially at the very top of her scalp.     She has a sore spot on her right shin.  She does not note any problems at prior skin cancer surgery sites.  No areas of active bleeding.    Patient is otherwise feeling well, without additional skin concerns.     Labs Reviewed:  N/A     Physical Exam:  SKIN: head/face, both arms, chest, back, abdomen,both legs, digits and/or nails, were examined.  - diffuse hair thinning in the scalp area with perifollicular scale throughout  - fontal and vertex scalp with ~3-5 cm shiny patches with significant decreased hair density  - eyebrows & lashes are wnl  - diffuse tan macules in sun exposed areas c/w lentigines  - waxy stuck on brown papules on the extremities   - Dorsal hands with two rough scaly 4mm pink papules  - Right medial foot with flat topped 4mm papule with peripheral rim of scale  - Right shin with eroded 4mm pink papule  -  No other lesions of concern on areas examined.

## 2023-01-24 NOTE — LETTER
1/24/2023       RE: Reina Quan  [address] [facility] [address]     Dear Colleague,    Thank you for referring your patient, Reina Quan, to the [facility] DERMATOLOGY [facility] [place] at [facility]. Please see a copy of my visit note below.    [facility] Dermatology Note  Encounter Date: January 24, 2023  Office Visit      Dermatology Problem List:  0. Monitor at next visit:  - R arm hypopigmented plaque s/p LN 11/16/21 (ddx: AK vs NMSC vs other)  - L upper chest excoriation vs other (noted 11/16/21)  1. History of kidney transplant 1992, on chronic immunosuppression  2. History of multiple SCC's   - SCC R dorsal hand, s/p MMS 10/22/19  - SCC superior R yousif, s/p MMS 6/6/19  3. Hx of multiple AKs  4. Hx benign lesion biopsies  5. Vitiligo  6. LPP  - start clobetasol ext solution daily PRN  - start ketoconazole shampoo  - clobetasol 0.05% shampoo BIW (patient does not want to use more frequently)   - s/p ILK 10 4/15/21, 11/16/21     ____________________________________________     Assessment & Plan:      # LPP.  Chronic active problem.  Patient reports she is overall improved with home topicals, but still has persistent itch of vertex scalp.  - Cont clobetasol ext solution daily PRN  - Cont ketoconazole shampoo BIW-TIW  - cont clobetasol 0.05% shampoo BIW (patient does not want to use more frequently)   - ILK today, see procedure note     # Hx kidney transplant and multiple nonmelanoma skin cancers.   Clinically no evidence of recurrence at prior SCC sites.  - Reassurance   - cont FBSE q3-6 months     # Hypertrophic AK x2 on dorsal hands  - LN today - see procedure   - recheck at follow up    # Porokeratosis R medial foot.  - LN2 today - see procedure note    # NUB R shin - DDx HAK vs SCC.    - Shave biopsy today     Procedures Performed:   - Cryotherapy procedure note, location(s): dorsal hands, right medial foot. After  verbal consent and discussion of risks and benefits including, but not limited to, dyspigmentation/scar, blister, and pain, 3 lesion(s) was(were) treated (two AK, one porokeratosis) with 1-2 mm freeze border for 1-2 cycles with liquid nitrogen. Post cryotherapy instructions were provided.    IL Kenalog  - Intra-lesional triamcinolone procedure note. After positioning and cleansing with isopropyl alcohol, 0.9 total mL of triamcinolone 10 mg/mL was injected into 9 scalp lesion(s). The patient tolerated the procedure well and left the dermatology clinic in good condition.    Shave biopsy  - After signed informed consent, the affected area was swabbed with an alcohol pad and injected with 1% lidocaine with 1:100,000 epinephrine.  Biopsy via shave technique was performed and sent to pathology for review.  Hemostasis was achieved with AlCl 20%, and petrolatum and bandage were applied.  Patient tolerated procedure without complication.  Appropriate wound care instructions were provided.     Follow-up: 4 month(s) in-person, or earlier for new or changing lesions        Brad Bauman MD  Dermatology Attending  ____________________________________________    CC: Skin Check (Kathy is here today for a skin check )     HPI:  Ms. Reina Quan is a(n) 57 year old female who presents today as a return patient for LPP and skin check. She notes that her scalp has been itchy especially at the very top of her scalp.     She has a sore spot on her right shin.  She does not note any problems at prior skin cancer surgery sites.  No areas of active bleeding.    Patient is otherwise feeling well, without additional skin concerns.     Labs Reviewed:  N/A     Physical Exam:  SKIN: head/face, both arms, chest, back, abdomen,both legs, digits and/or nails, were examined.  - diffuse hair thinning in the scalp area with perifollicular scale throughout  - fontal and vertex scalp with ~3-5 cm shiny patches with significant decreased hair  density  - eyebrows & lashes are wnl  - diffuse tan macules in sun exposed areas c/w lentigines  - waxy stuck on brown papules on the extremities   - Dorsal hands with two rough scaly 4mm pink papules  - Right medial foot with flat topped 4mm papule with peripheral rim of scale  - Right shin with eroded 4mm pink papule  - No other lesions of concern on areas examined.       Again, thank you for allowing me to participate in the care of your patient.      Sincerely,    Brad Bauman MD

## 2023-01-24 NOTE — NURSING NOTE
Lidocaine-epinephrine 1-1:129389 % injection   0.3mL once for one use, starting 1/24/2023 ending 1/24/2023,  2mL disp, R-0, injection  Injected by Kristina Brownlee RN

## 2023-01-24 NOTE — NURSING NOTE
Drug Administration Record    Prior to injection, verified patient identity using patient's name and date of birth.  Due to injection administration, patient instructed to remain in clinic for 15 minutes  afterwards, and to report any adverse reaction to me immediately.    Drug Name: triamcinolone acetonide(kenalog)  Dose: 1mL of triamcinolone 10mg/mL, 10mg dose  Route administered: ID  NDC #: Kenalog-10 (3295-3137-67)  Amount of waste(mL):4ml  Reason for waste: Multi dose vial    LOT #: [id number]  SITE: see provider note  : @Pay  EXPIRATION DATE: 08/2024

## 2023-01-24 NOTE — NURSING NOTE
Dermatology Rooming Note    Reina Quan's goals for this visit include:   Chief Complaint   Patient presents with     Skin Check     Reina is here today for a skin check      ESTEFANIA Law

## 2023-01-25 LAB
PATH REPORT.COMMENTS IMP SPEC: NORMAL
PATH REPORT.COMMENTS IMP SPEC: NORMAL
PATH REPORT.FINAL DX SPEC: NORMAL
PATH REPORT.GROSS SPEC: NORMAL
PATH REPORT.MICROSCOPIC SPEC OTHER STN: NORMAL
PATH REPORT.RELEVANT HX SPEC: NORMAL

## 2023-02-07 ENCOUNTER — PATIENT OUTREACH (OUTPATIENT)
Dept: DERMATOLOGY | Facility: CLINIC | Age: 58
End: 2023-02-07
Payer: COMMERCIAL

## 2023-02-07 NOTE — TELEPHONE ENCOUNTER
Attempted to reach patient to schedule follow up in the Dermatology Clinic.  No answer,  LM on VM to call office and [employer] message sent..    Schedule with Dr. Brad Bauman.

## 2023-02-12 ASSESSMENT — ANXIETY QUESTIONNAIRES
2. NOT BEING ABLE TO STOP OR CONTROL WORRYING: SEVERAL DAYS
8. IF YOU CHECKED OFF ANY PROBLEMS, HOW DIFFICULT HAVE THESE MADE IT FOR YOU TO DO YOUR WORK, TAKE CARE OF THINGS AT HOME, OR GET ALONG WITH OTHER PEOPLE?: NOT DIFFICULT AT ALL
GAD7 TOTAL SCORE: 4
4. TROUBLE RELAXING: SEVERAL DAYS
7. FEELING AFRAID AS IF SOMETHING AWFUL MIGHT HAPPEN: SEVERAL DAYS
1. FEELING NERVOUS, ANXIOUS, OR ON EDGE: NOT AT ALL
6. BECOMING EASILY ANNOYED OR IRRITABLE: NOT AT ALL
5. BEING SO RESTLESS THAT IT IS HARD TO SIT STILL: NOT AT ALL
7. FEELING AFRAID AS IF SOMETHING AWFUL MIGHT HAPPEN: SEVERAL DAYS
IF YOU CHECKED OFF ANY PROBLEMS ON THIS QUESTIONNAIRE, HOW DIFFICULT HAVE THESE PROBLEMS MADE IT FOR YOU TO DO YOUR WORK, TAKE CARE OF THINGS AT HOME, OR GET ALONG WITH OTHER PEOPLE: NOT DIFFICULT AT ALL
GAD7 TOTAL SCORE: 4
3. WORRYING TOO MUCH ABOUT DIFFERENT THINGS: SEVERAL DAYS
GAD7 TOTAL SCORE: 4

## 2023-02-12 ASSESSMENT — PATIENT HEALTH QUESTIONNAIRE - PHQ9
SUM OF ALL RESPONSES TO PHQ QUESTIONS 1-9: 10
10. IF YOU CHECKED OFF ANY PROBLEMS, HOW DIFFICULT HAVE THESE PROBLEMS MADE IT FOR YOU TO DO YOUR WORK, TAKE CARE OF THINGS AT HOME, OR GET ALONG WITH OTHER PEOPLE: NOT DIFFICULT AT ALL
SUM OF ALL RESPONSES TO PHQ QUESTIONS 1-9: 10

## 2023-02-13 ENCOUNTER — OFFICE VISIT (OUTPATIENT)
Dept: FAMILY MEDICINE | Facility: CLINIC | Age: 58
End: 2023-02-13
Payer: COMMERCIAL

## 2023-02-13 VITALS
OXYGEN SATURATION: 96 % | RESPIRATION RATE: 13 BRPM | BODY MASS INDEX: 26.43 KG/M2 | DIASTOLIC BLOOD PRESSURE: 82 MMHG | SYSTOLIC BLOOD PRESSURE: 134 MMHG | HEIGHT: 61 IN | WEIGHT: 140 LBS | HEART RATE: 89 BPM | TEMPERATURE: 98.3 F

## 2023-02-13 DIAGNOSIS — Z00.00 ROUTINE GENERAL MEDICAL EXAMINATION AT A HEALTH CARE FACILITY: Primary | ICD-10-CM

## 2023-02-13 DIAGNOSIS — D84.9 IMMUNOSUPPRESSION (H): ICD-10-CM

## 2023-02-13 DIAGNOSIS — Z94.0 KIDNEY REPLACED BY TRANSPLANT: ICD-10-CM

## 2023-02-13 DIAGNOSIS — K51.90 ULCERATIVE COLITIS WITHOUT COMPLICATIONS, UNSPECIFIED LOCATION (H): ICD-10-CM

## 2023-02-13 DIAGNOSIS — F32.4 MAJOR DEPRESSIVE DISORDER WITH SINGLE EPISODE, IN PARTIAL REMISSION (H): ICD-10-CM

## 2023-02-13 DIAGNOSIS — Z12.11 SCREEN FOR COLON CANCER: ICD-10-CM

## 2023-02-13 DIAGNOSIS — Z12.31 ENCOUNTER FOR SCREENING MAMMOGRAM FOR BREAST CANCER: ICD-10-CM

## 2023-02-13 DIAGNOSIS — E78.2 MIXED HYPERLIPIDEMIA: ICD-10-CM

## 2023-02-13 PROCEDURE — 99396 PREV VISIT EST AGE 40-64: CPT | Performed by: PHYSICIAN ASSISTANT

## 2023-02-13 PROCEDURE — 99214 OFFICE O/P EST MOD 30 MIN: CPT | Mod: 25 | Performed by: PHYSICIAN ASSISTANT

## 2023-02-13 RX ORDER — ATORVASTATIN CALCIUM 10 MG/1
20 TABLET, FILM COATED ORAL AT BEDTIME
Qty: 180 TABLET | Refills: 3 | Status: SHIPPED | OUTPATIENT
Start: 2023-02-13 | End: 2023-05-10

## 2023-02-13 RX ORDER — PAROXETINE 20 MG/1
TABLET, FILM COATED ORAL
Qty: 90 TABLET | Refills: 3 | Status: SHIPPED | OUTPATIENT
Start: 2023-02-13 | End: 2024-03-07

## 2023-02-13 ASSESSMENT — PAIN SCALES - GENERAL: PAINLEVEL: NO PAIN (0)

## 2023-02-13 ASSESSMENT — PATIENT HEALTH QUESTIONNAIRE - PHQ9
SUM OF ALL RESPONSES TO PHQ QUESTIONS 1-9: 10
10. IF YOU CHECKED OFF ANY PROBLEMS, HOW DIFFICULT HAVE THESE PROBLEMS MADE IT FOR YOU TO DO YOUR WORK, TAKE CARE OF THINGS AT HOME, OR GET ALONG WITH OTHER PEOPLE: NOT DIFFICULT AT ALL

## 2023-02-13 ASSESSMENT — ANXIETY QUESTIONNAIRES: GAD7 TOTAL SCORE: 4

## 2023-02-13 NOTE — PROGRESS NOTES
"   SUBJECTIVE:   CC: Reina is an 57 year old who presents for preventive health visit.     Patient has been advised of split billing requirements and indicates understanding: Yes    {Outside tests to abstract? :648092}    {additional problems to add (Optional):159202}    Today's PHQ-2 Score:   PHQ-2 ( 1999 [employer]) 2/12/2023   Q1: Little interest or pleasure in doing things -   Q2: Feeling down, depressed or hopeless -   PHQ-2 Score -   PHQ-2 Total Score (12-17 Years)- Positive if 3 or more points; Administer PHQ-A if positive -   Q1: Little interest or pleasure in doing things -   Q2: Feeling down, depressed or hopeless -   PHQ-2 Score Incomplete           Social History     Tobacco Use     Smoking status: Never     Smokeless tobacco: Never   Substance Use Topics     Alcohol use: Yes     Alcohol/week: 0.0 standard drinks     Comment: 2x /week         Alcohol Use 2/15/2021   Prescreen: >3 drinks/day or >7 drinks/week? No   AUDIT SCORE  -       Reviewed orders with patient.  Reviewed health maintenance and updated orders accordingly - { :182307::\"Yes\"}  {Chronicprobdata (optional):845922}    Breast Cancer Screening:    FHS-7:   Breast CA Risk Assessment (FHS-7) 2/15/2022   Did any of your first-degree relatives have breast or ovarian cancer? No   Did any of your relatives have bilateral breast cancer? No   Did any man in your family have breast cancer? No   Did any woman in your family have breast and ovarian cancer? No   Did any woman in your family have breast cancer before age 50 y? No   Do you have 2 or more relatives with breast and/or ovarian cancer? No   Do you have 2 or more relatives with breast and/or bowel cancer? No     {If any of the questions to the BCRA (FHS-7) are answered yes, consider ordering referral for genetic counseling (Optional) :241342::\"click delete button to remove this line now\"}  {AMB Mammogram Decision Support (Optional) :436394}  Pertinent mammograms are reviewed under the imaging " "tab.    History of abnormal Pap smear: { :699879}  PAP / HPV Latest Ref Rng & Units 1/31/2022 2/15/2021 6/14/2019   PAP   Negative for Intraepithelial Lesion or Malignancy (NILM) - -   PAP (Historical) - - UNSAT NIL   HPV16 Negative Negative Negative Negative   HPV18 Negative Negative Negative Negative   HRHPV Negative Negative Negative Negative     Reviewed and updated as needed this visit by clinical staff   Tobacco  Allergies  Meds  Problems  Med Hx  Surg Hx  Fam Hx  Soc   Hx        Reviewed and updated as needed this visit by Provider   Tobacco  Allergies  Meds  Problems  Med Hx  Surg Hx  Fam Hx         {HISTORY OPTIONS (Optional):603619}    Review of Systems  {FEMALE ROS (Optional):037026}     OBJECTIVE:   /82   Pulse 89   Temp 98.3  F (36.8  C) (Oral)   Resp 13   Ht 1.549 m (5' 1\")   Wt 63.5 kg (140 lb)   LMP 05/14/2014 (Approximate)   SpO2 96%   BMI 26.45 kg/m    Physical Exam  {Exam Choices (Optional):791279}    {Diagnostic Test Results (Optional):639451::\"Diagnostic Test Results:\",\"Labs reviewed in Epic\"}    ASSESSMENT/PLAN:   {Diag Picklist:900088}    {Patient advised of split billing (Optional):085406}      COUNSELING:  {FEMALE COUNSELING MESSAGES:425611::\"Reviewed preventive health counseling, as reflected in patient instructions\"}      BMI:   Estimated body mass index is 26.45 kg/m  as calculated from the following:    Height as of this encounter: 1.549 m (5' 1\").    Weight as of this encounter: 63.5 kg (140 lb).   {Weight Management Plan needed for ACO:494333}      She reports that she has never smoked. She has never used smokeless tobacco.      {Counseling Resources  [place] Preventive Services Task Force  Cholesterol Screening  Health diet/nutrition  Pooled Cohorts Equation Calculator  USDA's MyPlate  ASA Prophylaxis  Lung CA Screening  Osteoporosis prevention/bone health :294850}  {Breast Cancer Risk Calculator  BRCA-Related Cancer Risk Assessment FHS-7 Tool :399836}  Lizbeth TRAVIS" NAVJOT [facility]

## 2023-02-13 NOTE — PATIENT INSTRUCTIONS
Your last TDAP vaccine was 5/18/2016    Preventive Health Recommendations  Female Ages 50 - 64    Yearly exam: See your health care provider every year in order to  o Review health changes.   o Discuss preventive care.    o Review your medicines if your doctor has prescribed any.      Get a Pap test every three years (unless you have an abnormal result and your provider advises testing more often).    If you get Pap tests with HPV test, you only need to test every 5 years, unless you have an abnormal result.     You do not need a Pap test if your uterus was removed (hysterectomy) and you have not had cancer.    You should be tested each year for STDs (sexually transmitted diseases) if you're at risk.     Have a mammogram every 1 to 2 years.    Have a colonoscopy at age 50, or have a yearly FIT test (stool test). These exams screen for colon cancer.      Have a cholesterol test every 5 years, or more often if advised.    Have a diabetes test (fasting glucose) every three years. If you are at risk for diabetes, you should have this test more often.     If you are at risk for osteoporosis (brittle bone disease), think about having a bone density scan (DEXA).    Shots: Get a flu shot each year. Get a tetanus shot every 10 years.    Nutrition:     Eat at least 5 servings of fruits and vegetables each day.    Eat whole-grain bread, whole-wheat pasta and brown rice instead of white grains and rice.    Get adequate Calcium and Vitamin D.     Lifestyle    Exercise at least 150 minutes a week (30 minutes a day, 5 days a week). This will help you control your weight and prevent disease.    Limit alcohol to one drink per day.    No smoking.     Wear sunscreen to prevent skin cancer.     See your dentist every six months for an exam and cleaning.    See your eye doctor every 1 to 2 years.    Preventive Health Recommendations  Female Ages 50 - 64    Yearly exam: See your health care provider every year in order to  o Review  health changes.   o Discuss preventive care.    o Review your medicines if your doctor has prescribed any.      Get a Pap test every three years (unless you have an abnormal result and your provider advises testing more often).    If you get Pap tests with HPV test, you only need to test every 5 years, unless you have an abnormal result.     You do not need a Pap test if your uterus was removed (hysterectomy) and you have not had cancer.    You should be tested each year for STDs (sexually transmitted diseases) if you're at risk.     Have a mammogram every 1 to 2 years.    Have a colonoscopy at age 50, or have a yearly FIT test (stool test). These exams screen for colon cancer.      Have a cholesterol test every 5 years, or more often if advised.    Have a diabetes test (fasting glucose) every three years. If you are at risk for diabetes, you should have this test more often.     If you are at risk for osteoporosis (brittle bone disease), think about having a bone density scan (DEXA).    Shots: Get a flu shot each year. Get a tetanus shot every 10 years.    Nutrition:     Eat at least 5 servings of fruits and vegetables each day.    Eat whole-grain bread, whole-wheat pasta and brown rice instead of white grains and rice.    Get adequate Calcium and Vitamin D.     Lifestyle    Exercise at least 150 minutes a week (30 minutes a day, 5 days a week). This will help you control your weight and prevent disease.    Limit alcohol to one drink per day.    No smoking.     Wear sunscreen to prevent skin cancer.     See your dentist every six months for an exam and cleaning.    See your eye doctor every 1 to 2 years.    Preventive Health Recommendations  Female Ages 50 - 64    Yearly exam: See your health care provider every year in order to  o Review health changes.   o Discuss preventive care.    o Review your medicines if your doctor has prescribed any.      Get a Pap test every three years (unless you have an abnormal  result and your provider advises testing more often).    If you get Pap tests with HPV test, you only need to test every 5 years, unless you have an abnormal result.     You do not need a Pap test if your uterus was removed (hysterectomy) and you have not had cancer.    You should be tested each year for STDs (sexually transmitted diseases) if you're at risk.     Have a mammogram every 1 to 2 years.    Have a colonoscopy at age 50, or have a yearly FIT test (stool test). These exams screen for colon cancer.      Have a cholesterol test every 5 years, or more often if advised.    Have a diabetes test (fasting glucose) every three years. If you are at risk for diabetes, you should have this test more often.     If you are at risk for osteoporosis (brittle bone disease), think about having a bone density scan (DEXA).    Shots: Get a flu shot each year. Get a tetanus shot every 10 years.    Nutrition:     Eat at least 5 servings of fruits and vegetables each day.    Eat whole-grain bread, whole-wheat pasta and brown rice instead of white grains and rice.    Get adequate Calcium and Vitamin D.     Lifestyle    Exercise at least 150 minutes a week (30 minutes a day, 5 days a week). This will help you control your weight and prevent disease.    Limit alcohol to one drink per day.    No smoking.     Wear sunscreen to prevent skin cancer.     See your dentist every six months for an exam and cleaning.    See your eye doctor every 1 to 2 years.

## 2023-02-13 NOTE — PROGRESS NOTES
"  Assessment & Plan     Routine general medical examination at a health care facility  Reviewed personal and family history. Reviewed age appropriate screenings. Reviewed healthy BP and BMI ranges. Counseled on lifestyle modifications for optimal mental and physical health.  Discussed age-appropriate health maintenance. Recommended any needed vaccinations - she declines all vaccines today. Continue to focus on well balanced diet and exercise.     Screen for colon cancer  - Colonoscopy Screening  Referral; Future    Encounter for screening mammogram for breast cancer  - MA SCREENING DIGITAL BILAT - Future  (s+30); Future    Mixed hyperlipidemia  She has been taking atorvastatin 20 mg daily. Recent lipids on 11/3/22 were very high (total cholesterol 417, triglycerides 271, ); she was not fasting. She has been following with her nephrologist for lipids but they will no longer be managing this. She is taking sirolimus which can increase lipids. Plan to check fasting lipids and follow-up per results. Consider switching to more potent statin like rosuvastatin.  - Lipid panel reflex to direct LDL Fasting; Future  - atorvastatin (LIPITOR) 10 MG tablet; Take 2 tablets (20 mg) by mouth At Bedtime    Major depressive disorder with single episode, in partial remission (H)  Chronic, well controlled. Continue present management.  - PARoxetine (PAXIL) 20 MG tablet; TAKE 1 TABLET BY MOUTH EVERY MORNING    Kidney replaced by transplant  Immunosuppression (H)  Chronic, follows with nephrology    Ulcerative colitis without complications, unspecified location (H)  Chronic, doing well       BMI:   Estimated body mass index is 26.45 kg/m  as calculated from the following:    Height as of this encounter: 1.549 m (5' 1\").    Weight as of this encounter: 63.5 kg (140 lb).   Weight management plan: Discussed healthy diet and exercise guidelines      Return in about 1 year (around 2/13/2024) for Preventive Physical " Exam.    Lizbeth Andrews PA-C  [facility] CHERELLE Huang is a 57 year old, presenting for the following health issues:  Recheck Medication and Physical (Add on)      History of Present Illness       Mental Health Follow-up:  Patient presents to follow-up on Depression & Anxiety.Patient's depression since last visit has been:  No change  The patient is not having other symptoms associated with depression.  Patient's anxiety since last visit has been:  No change  The patient is not having other symptoms associated with anxiety.  Any significant life events: relationship concerns, job concerns, financial concerns, housing concerns, grief or loss and health concerns  Patient is not feeling anxious or having panic attacks.  Patient has no concerns about alcohol or drug use.    She eats 2-3 servings of fruits and vegetables daily.She consumes 0 sweetened beverage(s) daily.She exercises with enough effort to increase her heart rate 10 to 19 minutes per day.  She exercises with enough effort to increase her heart rate 3 or less days per week. She is missing 2 dose(s) of medications per week.  She is not taking prescribed medications regularly due to remembering to take.    Today's PHQ-9         PHQ-9 Total Score: 10    PHQ-9 Q9 Thoughts of better off dead/self-harm past 2 weeks :   Not at all    How difficult have these problems made it for you to do your work, take care of things at home, or get along with other people: Not difficult at all  Today's DEBRA-7 Score: 4      Kidney transplant receipient, right kidney, 2/2 Focal segmental glomerulosclerosis (FSGS), immunosuppression. Also history of ulcerative colitis. She is monitored by dermatology, nephrology, ophthalmology regularly.    Depression: Stable on Paxil dose for over 20 years. No side effects or concerns.    PHQ 3/4/2020 2/15/2021 2/12/2023   PHQ-9 Total Score 5 4 10   Q9: Thoughts of better off dead/self-harm past 2 weeks Not at  "all Not at all Not at all     DEBRA-7 SCORE 3/4/2020 2/15/2021 2/12/2023   Total Score 2 (minimal anxiety) - 4 (minimal anxiety)   Total Score 2 3 4     Hyperlipidemia: Atorvastatin 20 mg daily. Recent lipids were very high but she was not fasting. She has been following with her nephrologist but they will no longer be managing her lipids. She is taking sirolimus which can increase lipids. Plan to check fasting lipids and follow-up per results.    Annual Wellness Visit  {  Patient has been advised of split billing requirements and indicates understanding: Yes     Physical Health:    In general, how would you rate your overall physical health? good    Outside of work, how many days during the week do you exercise?2-3 days/week    Outside of work, approximately how many minutes a day do you exercise?15-30 minutes    If you drink alcohol do you typically have >3 drinks per day or >7 drinks per week? No    Do you usually eat at least 4 servings of fruit and vegetables a day, include whole grains & fiber and avoid regularly eating high fat or \"junk\" foods? Yes    Do you have any problems taking medications regularly? No    Do you have any side effects from medications? not applicable    Mental Health:    In general, how would you rate your overall mental or emotional health? good  PHQ-2 Score:      Do you feel safe in your environment? Yes    Have you ever done Advance Care Planning? (For example, a Health Directive, POLST, or a discussion with a medical provider or your loved ones about your wishes)? No, advance care planning information given to patient to review.  Patient declined advance care planning discussion at this time.      Do you have sleep apnea, excessive snoring or daytime drowsiness?: no    Current providers sharing in care for this patient include:   Patient Care Team:  No Ref-Primary, Physician as PCP - Jamar Alaniz MD as Hospitalist (Student in organized health care education/training " "program)  Amanda Disla MD as MD (Infectious Diseases)  Tr Ni MD as MD (Dermatology)  Brad Bauman MD as MD (Dermapathology)  Lizbeth Andrews PA-C as Referring Physician (Family Medicine)  Brad Bauman MD as Assigned Surgical Provider  Diogo Sands MD as Assigned PCP    Patient has been advised of split billing requirements and indicates understanding: Yes    Review of Systems   Constitutional, HEENT, cardiovascular, pulmonary, gi and gu systems are negative, except as otherwise noted.      Objective    /82   Pulse 89   Temp 98.3  F (36.8  C) (Oral)   Resp 13   Ht 1.549 m (5' 1\")   Wt 63.5 kg (140 lb)   LMP 05/14/2014 (Approximate)   SpO2 96%   BMI 26.45 kg/m    Body mass index is 26.45 kg/m .  Physical Exam   GENERAL: healthy, alert and no distress  EYES: Eyes grossly normal to inspection, PERRL and conjunctivae and sclerae normal  HENT: ear canals and TM's normal, nose and mouth without ulcers or lesions  NECK: no adenopathy, no asymmetry, masses, or scars and thyroid normal to palpation  RESP: lungs clear to auscultation - no rales, rhonchi or wheezes  CV: regular rate and rhythm, normal S1 S2, no S3 or S4, no murmur, click or rub, no peripheral edema and peripheral pulses strong  ABDOMEN: soft, nontender, no hepatosplenomegaly, no masses and bowel sounds normal  MS: no gross musculoskeletal defects noted, no edema  SKIN: no suspicious lesions or rashes  NEURO: Normal strength and tone, mentation intact and speech normal  PSYCH: mentation appears normal, affect normal/bright          "

## 2023-02-18 ENCOUNTER — PATIENT OUTREACH (OUTPATIENT)
Dept: DERMATOLOGY | Facility: CLINIC | Age: 58
End: 2023-02-18
Payer: COMMERCIAL

## 2023-02-18 PROBLEM — L57.0 AK (ACTINIC KERATOSIS): Status: ACTIVE | Noted: 2017-10-08

## 2023-02-18 PROBLEM — D49.2 NEOPLASM OF UNSPECIFIED BEHAVIOR OF BONE, SOFT TISSUE, AND SKIN: Status: ACTIVE | Noted: 2023-02-18

## 2023-02-18 PROBLEM — Q82.8 POROKERATOSIS: Status: ACTIVE | Noted: 2023-02-18

## 2023-02-18 NOTE — LETTER
February 18, 2023          Reina Quan  [address] [address]        Dear Reina Quan:    We recently reviewed your medical records from [facility] and found that you are due for an appointment with Dr. Brad Bauman in May.  Our office has attempted to reach you via telephone and left a message.    At your earliest convenience, please call the clinic at [phone] to arrange the recommended exam and possible testing.  Please kindly mention this letter when calling so that your appointment(s) can be accurately scheduled.      Sincerely,       The Clinic Staff        Medical Record Number:  [id number]

## 2023-02-18 NOTE — TELEPHONE ENCOUNTER
Attempted to reach patient to schedule follow up in the Dermatology Clinic.  No answer,  LM on VM to call office and Letter mailed..    Schedule with Dr. Brad Bauman 5/2023.

## 2023-02-24 DIAGNOSIS — L66.10 LICHEN PLANOPILARIS: ICD-10-CM

## 2023-02-28 RX ORDER — KETOCONAZOLE 20 MG/ML
SHAMPOO TOPICAL
Qty: 120 ML | Refills: 9 | Status: SHIPPED | OUTPATIENT
Start: 2023-02-28 | End: 2023-10-12

## 2023-02-28 NOTE — TELEPHONE ENCOUNTER
Last Clinic Visit: 1/24/2023 [facility] Derm[facility]    Refilled qty to 12 months from last visit (process #1/Derm protocol).

## 2023-03-20 ENCOUNTER — TELEPHONE (OUTPATIENT)
Dept: TRANSPLANT | Facility: CLINIC | Age: 58
End: 2023-03-20
Payer: COMMERCIAL

## 2023-03-20 DIAGNOSIS — Z94.0 KIDNEY TRANSPLANTED: ICD-10-CM

## 2023-03-20 DIAGNOSIS — Z48.298 AFTERCARE FOLLOWING ORGAN TRANSPLANT: ICD-10-CM

## 2023-03-20 DIAGNOSIS — Z79.899 IMMUNOSUPPRESSIVE MANAGEMENT ENCOUNTER FOLLOWING KIDNEY TRANSPLANT: ICD-10-CM

## 2023-03-20 DIAGNOSIS — Z94.0 IMMUNOSUPPRESSIVE MANAGEMENT ENCOUNTER FOLLOWING KIDNEY TRANSPLANT: ICD-10-CM

## 2023-03-20 RX ORDER — SIROLIMUS 1 MG/1
3 TABLET, FILM COATED ORAL DAILY
Qty: 12 TABLET | Refills: 1 | Status: SHIPPED | OUTPATIENT
Start: 2023-03-20 | End: 2023-03-21

## 2023-03-20 NOTE — TELEPHONE ENCOUNTER
Spoke to a RN earlier whom stated that this patient needs this medication. However, Pharmacy can not bill Patient insurance, due to Not being Specialty location.  Cost is Under 200 dollars. For 24 0.5mg Sirolimus (patient would be taking 6 tabs)     I was hoping you could resend this to [facility]. Patient could always drive up to location or have this delivered.   Hopefully this could be an option.     Patient rather not take medication if its going to cost her that much. I just want to make sure we cant get this to the patient.   Please reach out if this doesn't make since.    Kristina Sabillon, Chanel  [facility]  [address] [place] [address] [phone]

## 2023-03-20 NOTE — TELEPHONE ENCOUNTER
Contacted [facility] and they will send an urgent fill for pt. unfortunately this could be a few days. Pt ok with small refill to [facility]. Confirmed they have some in stock. Pt will  so she does not run out (last pill today).

## 2023-03-20 NOTE — TELEPHONE ENCOUNTER
Per Reina is out of her Sirolimus  since Saturday          Sirolimus1.0mg takes 3mg once per day needs us to call [facility] #[phone] they are able to ship over night.    Reina's # [phone]

## 2023-03-21 RX ORDER — SIROLIMUS 1 MG/1
3 TABLET, FILM COATED ORAL DAILY
Qty: 12 TABLET | Refills: 1 | Status: SHIPPED | OUTPATIENT
Start: 2023-03-21 | End: 2023-03-21

## 2023-03-21 RX ORDER — SIROLIMUS 1 MG/1
3 TABLET, FILM COATED ORAL DAILY
Qty: 12 TABLET | Refills: 1 | Status: SHIPPED | OUTPATIENT
Start: 2023-03-21 | End: 2023-07-19

## 2023-03-21 NOTE — TELEPHONE ENCOUNTER
Prescription for Sirolimus for 8 days sent to [facility].    Called Reina Quan. No answer.  Unable to leave  , mailbox is full.  Enable Injections message sent.

## 2023-03-27 NOTE — PROGRESS NOTES
Covid19 letter for work sent via [employer].   [FreeTextEntry1] : 50 yo M with HIV on ART, DM (HgA1C 6.9), DLD, had ACS in 2019, cardiac Cath c/w with multivessel disease, reduced EF. Patient underwent CABG x 4 (LIMA- LAD, SVG- OM1, SVG- OM2, radial- PDA) on 9/10/19. Patient tolerated procedure well, and had no issues coming off bypass.  Post op EF improved to 35% from 25% preop. Repeat echo - EF 45-50%. Nuclear 3/22 - predominantly fixed defect with some kalin-infarct ischemia, EF 40%.\par \par Restarted Crestor - Labs reviewed today - actually better. He reports his CPAP was recalled - got replacement. He reports episode of atypical chest discomfort, resolved since the last visit. Stress echo reviewed - anterior-apical defect, partially fixed. ECG changes with exercise. Walked 9 minutes on Prashant w/o angina.

## 2023-05-09 ENCOUNTER — LAB (OUTPATIENT)
Dept: LAB | Facility: CLINIC | Age: 58
End: 2023-05-09
Payer: COMMERCIAL

## 2023-05-09 DIAGNOSIS — Z48.298 AFTERCARE FOLLOWING ORGAN TRANSPLANT: ICD-10-CM

## 2023-05-09 DIAGNOSIS — Z79.899 ENCOUNTER FOR LONG-TERM CURRENT USE OF MEDICATION: ICD-10-CM

## 2023-05-09 DIAGNOSIS — Z94.0 KIDNEY REPLACED BY TRANSPLANT: ICD-10-CM

## 2023-05-09 DIAGNOSIS — E78.2 MIXED HYPERLIPIDEMIA: ICD-10-CM

## 2023-05-09 DIAGNOSIS — N18.30 CKD (CHRONIC KIDNEY DISEASE) STAGE 3, GFR 30-59 ML/MIN (H): ICD-10-CM

## 2023-05-09 LAB
ALBUMIN MFR UR ELPH: 199 MG/DL (ref 1–14)
ALBUMIN SERPL BCG-MCNC: 4.5 G/DL (ref 3.5–5.2)
ALP SERPL-CCNC: 109 U/L (ref 35–104)
ALT SERPL W P-5'-P-CCNC: 37 U/L (ref 10–35)
ANION GAP SERPL CALCULATED.3IONS-SCNC: 13 MMOL/L (ref 7–15)
AST SERPL W P-5'-P-CCNC: 35 U/L (ref 10–35)
BILIRUB DIRECT SERPL-MCNC: <0.2 MG/DL (ref 0–0.3)
BILIRUB SERPL-MCNC: 0.3 MG/DL
BUN SERPL-MCNC: 31.2 MG/DL (ref 6–20)
CALCIUM SERPL-MCNC: 10.2 MG/DL (ref 8.6–10)
CHLORIDE SERPL-SCNC: 104 MMOL/L (ref 98–107)
CHOLEST SERPL-MCNC: 379 MG/DL
CREAT SERPL-MCNC: 2.02 MG/DL (ref 0.51–0.95)
CREAT UR-MCNC: 131 MG/DL
DEPRECATED HCO3 PLAS-SCNC: 22 MMOL/L (ref 22–29)
ERYTHROCYTE [DISTWIDTH] IN BLOOD BY AUTOMATED COUNT: 15.9 % (ref 10–15)
GFR SERPL CREATININE-BSD FRML MDRD: 28 ML/MIN/1.73M2
GLUCOSE SERPL-MCNC: 126 MG/DL (ref 70–99)
HCT VFR BLD AUTO: 32.7 % (ref 35–47)
HDLC SERPL-MCNC: 75 MG/DL
HGB BLD-MCNC: 11 G/DL (ref 11.7–15.7)
LDLC SERPL CALC-MCNC: ABNORMAL MG/DL
MCH RBC QN AUTO: 31.3 PG (ref 26.5–33)
MCHC RBC AUTO-ENTMCNC: 33.6 G/DL (ref 31.5–36.5)
MCV RBC AUTO: 93 FL (ref 78–100)
NONHDLC SERPL-MCNC: 304 MG/DL
PLATELET # BLD AUTO: 53 10E3/UL (ref 150–450)
POTASSIUM SERPL-SCNC: 4.5 MMOL/L (ref 3.4–5.3)
PROT SERPL-MCNC: 7.4 G/DL (ref 6.4–8.3)
PROT/CREAT 24H UR: 1.52 MG/MG CR (ref 0–0.2)
RBC # BLD AUTO: 3.51 10E6/UL (ref 3.8–5.2)
SODIUM SERPL-SCNC: 139 MMOL/L (ref 136–145)
TRIGL SERPL-MCNC: 431 MG/DL
WBC # BLD AUTO: 4.3 10E3/UL (ref 4–11)

## 2023-05-09 PROCEDURE — 85027 COMPLETE CBC AUTOMATED: CPT

## 2023-05-09 PROCEDURE — 84156 ASSAY OF PROTEIN URINE: CPT

## 2023-05-09 PROCEDURE — 82570 ASSAY OF URINE CREATININE: CPT

## 2023-05-09 PROCEDURE — 82043 UR ALBUMIN QUANTITATIVE: CPT

## 2023-05-09 PROCEDURE — 36415 COLL VENOUS BLD VENIPUNCTURE: CPT

## 2023-05-09 PROCEDURE — 80061 LIPID PANEL: CPT

## 2023-05-09 PROCEDURE — 82248 BILIRUBIN DIRECT: CPT

## 2023-05-09 PROCEDURE — 83721 ASSAY OF BLOOD LIPOPROTEIN: CPT | Mod: 59

## 2023-05-09 PROCEDURE — 80195 ASSAY OF SIROLIMUS: CPT

## 2023-05-09 PROCEDURE — 80053 COMPREHEN METABOLIC PANEL: CPT

## 2023-05-10 ENCOUNTER — TELEPHONE (OUTPATIENT)
Dept: TRANSPLANT | Facility: CLINIC | Age: 58
End: 2023-05-10
Payer: COMMERCIAL

## 2023-05-10 DIAGNOSIS — E78.5 HYPERLIPIDEMIA WITH TARGET LDL LESS THAN 130: Primary | ICD-10-CM

## 2023-05-10 DIAGNOSIS — Z94.0 KIDNEY TRANSPLANTED: Primary | ICD-10-CM

## 2023-05-10 LAB
CREAT UR-MCNC: 131 MG/DL
LDLC SERPL DIRECT ASSAY-MCNC: 213 MG/DL
MICROALBUMIN UR-MCNC: 1143 MG/L
MICROALBUMIN/CREAT UR: 872.52 MG/G CR (ref 0–25)
SIROLIMUS BLD-MCNC: 6.4 UG/L (ref 5–15)
TME LAST DOSE: NORMAL H
TME LAST DOSE: NORMAL H

## 2023-05-10 RX ORDER — ATORVASTATIN CALCIUM 40 MG/1
40 TABLET, FILM COATED ORAL AT BEDTIME
Qty: 90 TABLET | Refills: 1 | Status: SHIPPED | OUTPATIENT
Start: 2023-05-10 | End: 2024-03-07

## 2023-05-10 NOTE — TELEPHONE ENCOUNTER
Call placed to patient. No answer. Detailed voice message let with instructions listed below. Will try back

## 2023-05-10 NOTE — TELEPHONE ENCOUNTER
5/9/23:    Creatinine = 2.02   Baseline 1.5-1.8    Urine protein / creatinine ratio =  1.52    Hx of recurrent UTI's      PLAN:  Check for recent illness.  Any fever?  Any missed medication?  Changes in medication, (uli diuretics)?  Any pain over the transplanted kidney?  Any nausea / vomiting / diarrhea?  Dehydrated?   How are blood pressures running?  Recommend improving hydration and recheck BMP, UPC, UA/UC in 1-2 weeks.

## 2023-05-11 NOTE — TELEPHONE ENCOUNTER
Call placed to patient. No answer. Detailed voice message left instructing patient to improve hydration and repeat labs in 1-2 weeks. Order sent

## 2023-05-16 ENCOUNTER — LAB (OUTPATIENT)
Dept: LAB | Facility: CLINIC | Age: 58
End: 2023-05-16
Payer: COMMERCIAL

## 2023-05-16 DIAGNOSIS — Z94.0 KIDNEY TRANSPLANTED: ICD-10-CM

## 2023-05-16 LAB
ALBUMIN UR-MCNC: >=300 MG/DL
APPEARANCE UR: CLEAR
BACTERIA #/AREA URNS HPF: ABNORMAL /HPF
BILIRUB UR QL STRIP: NEGATIVE
COLOR UR AUTO: YELLOW
GLUCOSE UR STRIP-MCNC: NEGATIVE MG/DL
HGB UR QL STRIP: ABNORMAL
KETONES UR STRIP-MCNC: NEGATIVE MG/DL
LEUKOCYTE ESTERASE UR QL STRIP: NEGATIVE
NITRATE UR QL: NEGATIVE
PH UR STRIP: 5.5 [PH] (ref 5–8)
RBC #/AREA URNS AUTO: ABNORMAL /HPF
SP GR UR STRIP: 1.02 (ref 1–1.03)
SQUAMOUS #/AREA URNS AUTO: ABNORMAL /LPF
UROBILINOGEN UR STRIP-ACNC: 0.2 E.U./DL
WBC #/AREA URNS AUTO: ABNORMAL /HPF

## 2023-05-16 PROCEDURE — 80048 BASIC METABOLIC PNL TOTAL CA: CPT

## 2023-05-16 PROCEDURE — 84156 ASSAY OF PROTEIN URINE: CPT

## 2023-05-16 PROCEDURE — 36415 COLL VENOUS BLD VENIPUNCTURE: CPT

## 2023-05-16 PROCEDURE — 81001 URINALYSIS AUTO W/SCOPE: CPT

## 2023-05-17 ENCOUNTER — TELEPHONE (OUTPATIENT)
Dept: TRANSPLANT | Facility: CLINIC | Age: 58
End: 2023-05-17
Payer: COMMERCIAL

## 2023-05-17 DIAGNOSIS — R80.9 PROTEINURIA: ICD-10-CM

## 2023-05-17 DIAGNOSIS — Z48.298 AFTERCARE FOLLOWING ORGAN TRANSPLANT: ICD-10-CM

## 2023-05-17 DIAGNOSIS — Z94.0 KIDNEY TRANSPLANTED: Primary | ICD-10-CM

## 2023-05-17 DIAGNOSIS — Z94.0 IMMUNOSUPPRESSIVE MANAGEMENT ENCOUNTER FOLLOWING KIDNEY TRANSPLANT: ICD-10-CM

## 2023-05-17 DIAGNOSIS — Z79.899 IMMUNOSUPPRESSIVE MANAGEMENT ENCOUNTER FOLLOWING KIDNEY TRANSPLANT: ICD-10-CM

## 2023-05-17 LAB
ALBUMIN MFR UR ELPH: 186 MG/DL (ref 1–14)
ANION GAP SERPL CALCULATED.3IONS-SCNC: 15 MMOL/L (ref 7–15)
BUN SERPL-MCNC: 26.8 MG/DL (ref 6–20)
CALCIUM SERPL-MCNC: 9.9 MG/DL (ref 8.6–10)
CHLORIDE SERPL-SCNC: 103 MMOL/L (ref 98–107)
CREAT SERPL-MCNC: 1.87 MG/DL (ref 0.51–0.95)
CREAT UR-MCNC: 120 MG/DL
DEPRECATED HCO3 PLAS-SCNC: 21 MMOL/L (ref 22–29)
GFR SERPL CREATININE-BSD FRML MDRD: 31 ML/MIN/1.73M2
GLUCOSE SERPL-MCNC: 132 MG/DL (ref 70–99)
POTASSIUM SERPL-SCNC: 4 MMOL/L (ref 3.4–5.3)
PROT/CREAT 24H UR: 1.55 MG/MG CR (ref 0–0.2)
SODIUM SERPL-SCNC: 139 MMOL/L (ref 136–145)

## 2023-05-17 RX ORDER — MYCOPHENOLATE MOFETIL 250 MG/1
750 CAPSULE ORAL 2 TIMES DAILY
Qty: 540 CAPSULE | Refills: 3 | Status: SHIPPED | OUTPATIENT
Start: 2023-05-17 | End: 2023-06-02

## 2023-05-17 NOTE — TELEPHONE ENCOUNTER
Patient Call: General  Route to LPN    Reason for call: Pt called in saying that she has had concerning labs and would like to speak directly to coordinator     Call back needed? Yes    When to return call?: Greater than one day: Route standard priority

## 2023-05-17 NOTE — TELEPHONE ENCOUNTER
Called back Reina Quan.  No answer. Left VM re: lab results.      ADDENDUM:  Proteinuria discussed at Chronic review meeting.    Has been on mono therapy: Sirolimus 3 mg (4-6)    5/9/23 - Cr (2.02) and UPCR (1.52) elevated -- Cr baseline 1.5-1.8, UPCR runs 0.6-0.8 last 2022 5/16 - recheck Cr back to baseline but UPCr remains elevated (1.87).    Recommendations:  Stop sirolimus.  Switch to Mycophenolate 750 mg BID and check levels.  Weekly labs x 4    Called and discussed recommendations with Reina Quan  Verbalized understanding and agreement to plan.    Prescription sent to:  [facility] - [place], [place] [address] Phone:  [phone]   Fax:  [phone]

## 2023-05-30 ENCOUNTER — LAB (OUTPATIENT)
Dept: LAB | Facility: CLINIC | Age: 58
End: 2023-05-30
Payer: COMMERCIAL

## 2023-05-30 DIAGNOSIS — Z94.0 IMMUNOSUPPRESSIVE MANAGEMENT ENCOUNTER FOLLOWING KIDNEY TRANSPLANT: ICD-10-CM

## 2023-05-30 DIAGNOSIS — T86.10 COMPLICATIONS, KIDNEY TRANSPLANT: ICD-10-CM

## 2023-05-30 DIAGNOSIS — Z48.298 AFTERCARE FOLLOWING ORGAN TRANSPLANT: ICD-10-CM

## 2023-05-30 DIAGNOSIS — R80.9 PROTEINURIA: ICD-10-CM

## 2023-05-30 DIAGNOSIS — Z79.899 IMMUNOSUPPRESSIVE MANAGEMENT ENCOUNTER FOLLOWING KIDNEY TRANSPLANT: ICD-10-CM

## 2023-05-30 DIAGNOSIS — Z94.0 KIDNEY TRANSPLANTED: ICD-10-CM

## 2023-05-30 LAB
ANION GAP SERPL CALCULATED.3IONS-SCNC: 15 MMOL/L (ref 7–15)
BUN SERPL-MCNC: 31.6 MG/DL (ref 6–20)
CALCIUM SERPL-MCNC: 10.3 MG/DL (ref 8.6–10)
CHLORIDE SERPL-SCNC: 101 MMOL/L (ref 98–107)
CREAT SERPL-MCNC: 1.83 MG/DL (ref 0.51–0.95)
DEPRECATED HCO3 PLAS-SCNC: 21 MMOL/L (ref 22–29)
ERYTHROCYTE [DISTWIDTH] IN BLOOD BY AUTOMATED COUNT: 16.9 % (ref 10–15)
GFR SERPL CREATININE-BSD FRML MDRD: 31 ML/MIN/1.73M2
GLUCOSE SERPL-MCNC: 125 MG/DL (ref 70–99)
HCT VFR BLD AUTO: 33.5 % (ref 35–47)
HGB BLD-MCNC: 10.4 G/DL (ref 11.7–15.7)
MCH RBC QN AUTO: 28.6 PG (ref 26.5–33)
MCHC RBC AUTO-ENTMCNC: 31 G/DL (ref 31.5–36.5)
MCV RBC AUTO: 92 FL (ref 78–100)
PLATELET # BLD AUTO: 87 10E3/UL (ref 150–450)
POTASSIUM SERPL-SCNC: 4.3 MMOL/L (ref 3.4–5.3)
RBC # BLD AUTO: 3.64 10E6/UL (ref 3.8–5.2)
SODIUM SERPL-SCNC: 137 MMOL/L (ref 136–145)
WBC # BLD AUTO: 4.9 10E3/UL (ref 4–11)

## 2023-05-30 PROCEDURE — 83550 IRON BINDING TEST: CPT

## 2023-05-30 PROCEDURE — 85027 COMPLETE CBC AUTOMATED: CPT

## 2023-05-30 PROCEDURE — 80048 BASIC METABOLIC PNL TOTAL CA: CPT

## 2023-05-30 PROCEDURE — 83540 ASSAY OF IRON: CPT

## 2023-05-30 PROCEDURE — 82728 ASSAY OF FERRITIN: CPT

## 2023-05-30 PROCEDURE — 83540 ASSAY OF IRON: CPT | Mod: 59

## 2023-05-30 PROCEDURE — 36415 COLL VENOUS BLD VENIPUNCTURE: CPT

## 2023-05-30 PROCEDURE — 80180 DRUG SCRN QUAN MYCOPHENOLATE: CPT

## 2023-05-31 DIAGNOSIS — T86.10 COMPLICATIONS, KIDNEY TRANSPLANT: ICD-10-CM

## 2023-05-31 DIAGNOSIS — Z94.0 KIDNEY TRANSPLANTED: Primary | ICD-10-CM

## 2023-05-31 DIAGNOSIS — Z48.298 AFTERCARE FOLLOWING ORGAN TRANSPLANT: ICD-10-CM

## 2023-05-31 LAB
FERRITIN SERPL-MCNC: 232 NG/ML (ref 11–328)
IRON BINDING CAPACITY (ROCHE): 265 UG/DL (ref 240–430)
IRON SATN MFR SERPL: 32 % (ref 15–46)
IRON SERPL-MCNC: 84 UG/DL (ref 37–145)
IRON SERPL-MCNC: 84 UG/DL (ref 37–145)

## 2023-06-01 ENCOUNTER — HEALTH MAINTENANCE LETTER (OUTPATIENT)
Age: 58
End: 2023-06-01

## 2023-06-01 LAB
MYCOPHENOLATE SERPL LC/MS/MS-MCNC: 6.23 MG/L (ref 1–3.5)
MYCOPHENOLATE-G SERPL LC/MS/MS-MCNC: 118.7 MG/L (ref 30–95)
TME LAST DOSE: ABNORMAL H
TME LAST DOSE: ABNORMAL H

## 2023-06-02 ENCOUNTER — TELEPHONE (OUTPATIENT)
Dept: TRANSPLANT | Facility: CLINIC | Age: 58
End: 2023-06-02
Payer: COMMERCIAL

## 2023-06-02 DIAGNOSIS — Z94.0 KIDNEY TRANSPLANTED: ICD-10-CM

## 2023-06-02 DIAGNOSIS — Z94.0 IMMUNOSUPPRESSIVE MANAGEMENT ENCOUNTER FOLLOWING KIDNEY TRANSPLANT: ICD-10-CM

## 2023-06-02 DIAGNOSIS — Z79.899 IMMUNOSUPPRESSIVE MANAGEMENT ENCOUNTER FOLLOWING KIDNEY TRANSPLANT: ICD-10-CM

## 2023-06-02 DIAGNOSIS — R80.9 PROTEINURIA: ICD-10-CM

## 2023-06-02 DIAGNOSIS — Z48.298 AFTERCARE FOLLOWING ORGAN TRANSPLANT: ICD-10-CM

## 2023-06-02 RX ORDER — MYCOPHENOLATE MOFETIL 250 MG/1
500 CAPSULE ORAL 2 TIMES DAILY
Qty: 360 CAPSULE | Refills: 3
Start: 2023-06-02 | End: 2023-07-10

## 2023-06-02 NOTE — TELEPHONE ENCOUNTER
Kan Sibley MD Ututalum, Teresa, RN  Ok to decrease MMF to 500mg bid       ----- Message -----   From: Selena Lee RN   Sent: 6/2/2023   8:44 AM CDT   To: Kan Sibley MD   Subject: mmf                                               Dr. Sibley,     She was on monotherapy Sirolimus but we recently switched to  bid d/t proteinuria, Cr was also elevated.   Mycophenolic acid =  6.23  (5/30/23) --- first level after switch   Current MMF dose 750 mg BID   Confirms level is a 12 hour level.   Reports very mild symptoms of nausea, insomnia and restless leg after starting MMF.     Decrease dose or stay at 750?     Thanks,   Freddy      OUTCOME:  Detailed message left to decrease dose and continue weekly labs.  Updated Med list.

## 2023-06-02 NOTE — TELEPHONE ENCOUNTER
Sirolimus switched to Mycophenolate.    Mycophenolic acid =  6.23  (5/30/23) --- first level after switch  Goal 1.0-3.5  Current MMF dose 750 mg BID    PLAN:   Call and confirm this was a good 12-hour trough.   Verify current dose.   Confirm no new medications or illness (uli. Diarrhea).      OUTCOME:  Confirms level is a 12 hour level.  Reports very mild symptoms of nausea, insomnia and restless leg after starting MMF.  Sent to Dr. Sibley to review.

## 2023-06-13 ENCOUNTER — LAB (OUTPATIENT)
Dept: LAB | Facility: CLINIC | Age: 58
End: 2023-06-13
Payer: COMMERCIAL

## 2023-06-13 DIAGNOSIS — R80.9 PROTEINURIA: ICD-10-CM

## 2023-06-13 DIAGNOSIS — Z48.298 AFTERCARE FOLLOWING ORGAN TRANSPLANT: ICD-10-CM

## 2023-06-13 DIAGNOSIS — Z79.899 IMMUNOSUPPRESSIVE MANAGEMENT ENCOUNTER FOLLOWING KIDNEY TRANSPLANT: ICD-10-CM

## 2023-06-13 DIAGNOSIS — Z94.0 IMMUNOSUPPRESSIVE MANAGEMENT ENCOUNTER FOLLOWING KIDNEY TRANSPLANT: ICD-10-CM

## 2023-06-13 DIAGNOSIS — Z94.0 KIDNEY TRANSPLANTED: ICD-10-CM

## 2023-06-13 LAB
ALBUMIN MFR UR ELPH: 67.2 MG/DL
ANION GAP SERPL CALCULATED.3IONS-SCNC: 13 MMOL/L (ref 7–15)
BUN SERPL-MCNC: 27.8 MG/DL (ref 6–20)
CALCIUM SERPL-MCNC: 10.4 MG/DL (ref 8.6–10)
CHLORIDE SERPL-SCNC: 103 MMOL/L (ref 98–107)
CREAT SERPL-MCNC: 1.81 MG/DL (ref 0.51–0.95)
CREAT UR-MCNC: 70.9 MG/DL
DEPRECATED HCO3 PLAS-SCNC: 21 MMOL/L (ref 22–29)
ERYTHROCYTE [DISTWIDTH] IN BLOOD BY AUTOMATED COUNT: 18.5 % (ref 10–15)
GFR SERPL CREATININE-BSD FRML MDRD: 32 ML/MIN/1.73M2
GLUCOSE SERPL-MCNC: 122 MG/DL (ref 70–99)
HCT VFR BLD AUTO: 32.8 % (ref 35–47)
HGB BLD-MCNC: 10.6 G/DL (ref 11.7–15.7)
MCH RBC QN AUTO: 30.3 PG (ref 26.5–33)
MCHC RBC AUTO-ENTMCNC: 32.3 G/DL (ref 31.5–36.5)
MCV RBC AUTO: 94 FL (ref 78–100)
MYCOPHENOLATE SERPL LC/MS/MS-MCNC: 1.47 MG/L (ref 1–3.5)
MYCOPHENOLATE-G SERPL LC/MS/MS-MCNC: 53.5 MG/L (ref 30–95)
PLATELET # BLD AUTO: 116 10E3/UL (ref 150–450)
POTASSIUM SERPL-SCNC: 4.5 MMOL/L (ref 3.4–5.3)
PROT/CREAT 24H UR: 0.95 MG/MG CR (ref 0–0.2)
RBC # BLD AUTO: 3.5 10E6/UL (ref 3.8–5.2)
SODIUM SERPL-SCNC: 137 MMOL/L (ref 136–145)
TME LAST DOSE: NORMAL H
TME LAST DOSE: NORMAL H
WBC # BLD AUTO: 5.8 10E3/UL (ref 4–11)

## 2023-06-13 PROCEDURE — 84156 ASSAY OF PROTEIN URINE: CPT

## 2023-06-13 PROCEDURE — 80180 DRUG SCRN QUAN MYCOPHENOLATE: CPT

## 2023-06-13 PROCEDURE — 80048 BASIC METABOLIC PNL TOTAL CA: CPT

## 2023-06-13 PROCEDURE — 85027 COMPLETE CBC AUTOMATED: CPT

## 2023-06-13 PROCEDURE — 36415 COLL VENOUS BLD VENIPUNCTURE: CPT

## 2023-06-18 DIAGNOSIS — L66.10 LICHEN PLANOPILARIS: ICD-10-CM

## 2023-06-21 NOTE — TELEPHONE ENCOUNTER
CLOBETASOL 0.05% SHAMPOO 118ML      Last Written Prescription Date:  12-6-22  Last Fill Quantity: 118 ml,   # refills: 1  Last Office Visit : 1-24-23 ( RTC 4 M)  Future Office visit:  10-12-23  Derm process 3    Routing refill request to provider for review/approval because:  Next appt outside of RTC time frame

## 2023-07-10 DIAGNOSIS — R80.9 PROTEINURIA: ICD-10-CM

## 2023-07-10 DIAGNOSIS — Z94.0 IMMUNOSUPPRESSIVE MANAGEMENT ENCOUNTER FOLLOWING KIDNEY TRANSPLANT: ICD-10-CM

## 2023-07-10 DIAGNOSIS — Z79.899 IMMUNOSUPPRESSIVE MANAGEMENT ENCOUNTER FOLLOWING KIDNEY TRANSPLANT: ICD-10-CM

## 2023-07-10 DIAGNOSIS — Z48.298 AFTERCARE FOLLOWING ORGAN TRANSPLANT: ICD-10-CM

## 2023-07-10 DIAGNOSIS — Z94.0 KIDNEY TRANSPLANTED: Primary | ICD-10-CM

## 2023-07-10 RX ORDER — MYCOPHENOLATE MOFETIL 250 MG/1
500 CAPSULE ORAL 2 TIMES DAILY
Qty: 360 CAPSULE | Refills: 3 | Status: SHIPPED | OUTPATIENT
Start: 2023-07-10 | End: 2024-06-28

## 2023-07-10 NOTE — TELEPHONE ENCOUNTER
Pt said her pharmacy told her the dr canceled her prescription of mycophenolate the generic version and she does not know why she only has 3 pills left .   [facility] [phone]

## 2023-07-19 DIAGNOSIS — Z48.298 AFTERCARE FOLLOWING ORGAN TRANSPLANT: ICD-10-CM

## 2023-07-19 DIAGNOSIS — Z94.0 KIDNEY TRANSPLANTED: Primary | ICD-10-CM

## 2023-07-19 DIAGNOSIS — Z94.0 IMMUNOSUPPRESSIVE MANAGEMENT ENCOUNTER FOLLOWING KIDNEY TRANSPLANT: ICD-10-CM

## 2023-07-19 DIAGNOSIS — Z79.899 IMMUNOSUPPRESSIVE MANAGEMENT ENCOUNTER FOLLOWING KIDNEY TRANSPLANT: ICD-10-CM

## 2023-07-19 RX ORDER — SIROLIMUS 1 MG/1
3 TABLET, FILM COATED ORAL DAILY
Qty: 90 TABLET | Refills: 11 | Status: SHIPPED | OUTPATIENT
Start: 2023-07-19 | End: 2023-10-10 | Stop reason: ALTCHOICE

## 2023-09-03 DIAGNOSIS — L20.82 FLEXURAL ECZEMA: ICD-10-CM

## 2023-09-07 RX ORDER — TRIAMCINOLONE ACETONIDE 1 MG/G
CREAM TOPICAL
Qty: 30 G | Refills: 0 | Status: SHIPPED | OUTPATIENT
Start: 2023-09-07 | End: 2024-06-27

## 2023-09-07 NOTE — TELEPHONE ENCOUNTER
TRIAMCINOLONE 0.1% CREAM   30GM       APPLY SPARINGLY TO THE AFFECTED AREA TWICE DAILY X 14 DAYS   Last Written Prescription Date:  12-6-22  Last Fill Quantity: 30,g   # refills: 0  Last Office Visit : 1-24-23  Future Office visit:  10-12-23  Derm process 4      Routing refill request to provider for review/approval because:  Med not in last clinic note  ? RF

## 2023-09-16 RX ORDER — CLOBETASOL PROPIONATE 0.05 G/100ML
SHAMPOO TOPICAL
Qty: 118 ML | Refills: 2 | OUTPATIENT
Start: 2023-09-16

## 2023-09-22 ENCOUNTER — LAB (OUTPATIENT)
Dept: LAB | Facility: CLINIC | Age: 58
End: 2023-09-22
Payer: COMMERCIAL

## 2023-09-22 DIAGNOSIS — Z48.298 AFTERCARE FOLLOWING ORGAN TRANSPLANT: ICD-10-CM

## 2023-09-22 DIAGNOSIS — Z94.0 KIDNEY TRANSPLANTED: ICD-10-CM

## 2023-09-22 DIAGNOSIS — Z94.0 IMMUNOSUPPRESSIVE MANAGEMENT ENCOUNTER FOLLOWING KIDNEY TRANSPLANT: ICD-10-CM

## 2023-09-22 DIAGNOSIS — Z79.899 IMMUNOSUPPRESSIVE MANAGEMENT ENCOUNTER FOLLOWING KIDNEY TRANSPLANT: ICD-10-CM

## 2023-09-22 DIAGNOSIS — R80.9 PROTEINURIA: ICD-10-CM

## 2023-09-22 LAB
ANION GAP SERPL CALCULATED.3IONS-SCNC: 15 MMOL/L (ref 7–15)
BUN SERPL-MCNC: 45.7 MG/DL (ref 6–20)
CALCIUM SERPL-MCNC: 11.2 MG/DL (ref 8.6–10)
CHLORIDE SERPL-SCNC: 102 MMOL/L (ref 98–107)
CREAT SERPL-MCNC: 1.82 MG/DL (ref 0.51–0.95)
DEPRECATED HCO3 PLAS-SCNC: 23 MMOL/L (ref 22–29)
EGFRCR SERPLBLD CKD-EPI 2021: 32 ML/MIN/1.73M2
ERYTHROCYTE [DISTWIDTH] IN BLOOD BY AUTOMATED COUNT: 15.4 % (ref 10–15)
GLUCOSE SERPL-MCNC: 156 MG/DL (ref 70–99)
HCT VFR BLD AUTO: 33 % (ref 35–47)
HGB BLD-MCNC: 11.9 G/DL (ref 11.7–15.7)
MCH RBC QN AUTO: 36.2 PG (ref 26.5–33)
MCHC RBC AUTO-ENTMCNC: 36.1 G/DL (ref 31.5–36.5)
MCV RBC AUTO: 100 FL (ref 78–100)
PLATELET # BLD AUTO: 113 10E3/UL (ref 150–450)
POTASSIUM SERPL-SCNC: 4.1 MMOL/L (ref 3.4–5.3)
RBC # BLD AUTO: 3.29 10E6/UL (ref 3.8–5.2)
SODIUM SERPL-SCNC: 140 MMOL/L (ref 136–145)
WBC # BLD AUTO: 5 10E3/UL (ref 4–11)

## 2023-09-22 PROCEDURE — 80180 DRUG SCRN QUAN MYCOPHENOLATE: CPT

## 2023-09-22 PROCEDURE — 80048 BASIC METABOLIC PNL TOTAL CA: CPT

## 2023-09-22 PROCEDURE — 36415 COLL VENOUS BLD VENIPUNCTURE: CPT

## 2023-09-22 PROCEDURE — 85027 COMPLETE CBC AUTOMATED: CPT

## 2023-09-25 ENCOUNTER — TELEPHONE (OUTPATIENT)
Dept: TRANSPLANT | Facility: CLINIC | Age: 58
End: 2023-09-25
Payer: COMMERCIAL

## 2023-09-25 DIAGNOSIS — Z48.298 AFTERCARE FOLLOWING ORGAN TRANSPLANT: ICD-10-CM

## 2023-09-25 DIAGNOSIS — E83.52 HYPERCALCEMIA: ICD-10-CM

## 2023-09-25 DIAGNOSIS — T86.10 COMPLICATIONS, KIDNEY TRANSPLANT: ICD-10-CM

## 2023-09-25 DIAGNOSIS — Z94.0 KIDNEY TRANSPLANTED: Primary | ICD-10-CM

## 2023-09-25 LAB
MYCOPHENOLATE SERPL LC/MS/MS-MCNC: 1.22 MG/L (ref 1–3.5)
MYCOPHENOLATE-G SERPL LC/MS/MS-MCNC: 60.6 MG/L (ref 30–95)
TME LAST DOSE: NORMAL H
TME LAST DOSE: NORMAL H

## 2023-09-25 NOTE — TELEPHONE ENCOUNTER
Calcium =  11.2  (9/22/23)  Previous calcium levels 10.2-10.4    PLAN:  Verify if taking any Vitamin D or calcium supplement.    OUTCOME:  States she does not take any supplements.  Not even Tums.  Denies changes in diet.  She did have some sore throat and feels something swollen.   Discussed that could be her lymph nodes.  Discussed will make Dr Sibley aware and update her with a plan.    Message sent to Dr. Sibley.

## 2023-09-25 NOTE — TELEPHONE ENCOUNTER
Kan Sibley MD Ututalum, Teresa, RN  That is a good place to start. Also would add ionized calcium. If those are unrevelaling will have to do more studies. Unclear if she has a viral illness. If calcium and her lymph nodes don't decrease will need to do a scan to rule out PTLD, sarcoid, etc.      ----- Message -----  From: Selena Lee RN  Sent: 9/25/2023   2:06 PM CDT  To: Kan Sibley MD  Subject: hyperCa                                          Dr Sibley,    Calcium =  11.2  (9/22/23)  Previous calcium levels 10.2-10.4    States she does not take any supplements.  Not even Tums.  Denies changes in diet.  She did have some sore throat and feels something swollen.  Discussed that could be her lymph nodes.  Discussed will make Dr Sibley aware and update her with a plan.    Check PTH, Phos, vit D?    Thanks,  Freddy      OUTCOME:  Discussed recommendations.  Orders placed:  PTH  Phos  Vit D  iCa

## 2023-10-09 ENCOUNTER — LAB (OUTPATIENT)
Dept: LAB | Facility: CLINIC | Age: 58
End: 2023-10-09
Payer: COMMERCIAL

## 2023-10-09 DIAGNOSIS — Z48.298 AFTERCARE FOLLOWING ORGAN TRANSPLANT: ICD-10-CM

## 2023-10-09 DIAGNOSIS — T86.10 COMPLICATIONS, KIDNEY TRANSPLANT: ICD-10-CM

## 2023-10-09 DIAGNOSIS — E83.52 HYPERCALCEMIA: ICD-10-CM

## 2023-10-09 DIAGNOSIS — Z94.0 KIDNEY TRANSPLANTED: ICD-10-CM

## 2023-10-09 DIAGNOSIS — Z79.899 ENCOUNTER FOR LONG-TERM CURRENT USE OF MEDICATION: ICD-10-CM

## 2023-10-09 DIAGNOSIS — Z94.0 KIDNEY REPLACED BY TRANSPLANT: ICD-10-CM

## 2023-10-09 LAB
ANION GAP SERPL CALCULATED.3IONS-SCNC: 14 MMOL/L (ref 7–15)
BUN SERPL-MCNC: 49.4 MG/DL (ref 6–20)
CA-I BLD-MCNC: 5.4 MG/DL (ref 4.4–5.2)
CALCIUM SERPL-MCNC: 10.7 MG/DL (ref 8.6–10)
CHLORIDE SERPL-SCNC: 104 MMOL/L (ref 98–107)
CREAT SERPL-MCNC: 1.9 MG/DL (ref 0.51–0.95)
DEPRECATED HCO3 PLAS-SCNC: 21 MMOL/L (ref 22–29)
EGFRCR SERPLBLD CKD-EPI 2021: 30 ML/MIN/1.73M2
ERYTHROCYTE [DISTWIDTH] IN BLOOD BY AUTOMATED COUNT: 14.5 % (ref 10–15)
GLUCOSE SERPL-MCNC: 134 MG/DL (ref 70–99)
HCT VFR BLD AUTO: 38.5 % (ref 35–47)
HGB BLD-MCNC: 12.4 G/DL (ref 11.7–15.7)
MCH RBC QN AUTO: 32.2 PG (ref 26.5–33)
MCHC RBC AUTO-ENTMCNC: 32.2 G/DL (ref 31.5–36.5)
MCV RBC AUTO: 100 FL (ref 78–100)
PHOSPHATE SERPL-MCNC: 3.8 MG/DL (ref 2.5–4.5)
PLATELET # BLD AUTO: 116 10E3/UL (ref 150–450)
POTASSIUM SERPL-SCNC: 4.6 MMOL/L (ref 3.4–5.3)
PTH-INTACT SERPL-MCNC: 33 PG/ML (ref 15–65)
RBC # BLD AUTO: 3.85 10E6/UL (ref 3.8–5.2)
SODIUM SERPL-SCNC: 139 MMOL/L (ref 135–145)
VIT D+METAB SERPL-MCNC: 24 NG/ML (ref 20–50)
WBC # BLD AUTO: 5 10E3/UL (ref 4–11)

## 2023-10-09 PROCEDURE — 82330 ASSAY OF CALCIUM: CPT

## 2023-10-09 PROCEDURE — 36415 COLL VENOUS BLD VENIPUNCTURE: CPT

## 2023-10-09 PROCEDURE — 80048 BASIC METABOLIC PNL TOTAL CA: CPT

## 2023-10-09 PROCEDURE — 85027 COMPLETE CBC AUTOMATED: CPT

## 2023-10-09 PROCEDURE — 83970 ASSAY OF PARATHORMONE: CPT

## 2023-10-09 PROCEDURE — 82306 VITAMIN D 25 HYDROXY: CPT

## 2023-10-09 PROCEDURE — 84100 ASSAY OF PHOSPHORUS: CPT

## 2023-10-10 ENCOUNTER — TELEPHONE (OUTPATIENT)
Dept: TRANSPLANT | Facility: CLINIC | Age: 58
End: 2023-10-10
Payer: COMMERCIAL

## 2023-10-10 ENCOUNTER — MYC MEDICAL ADVICE (OUTPATIENT)
Dept: TRANSPLANT | Facility: CLINIC | Age: 58
End: 2023-10-10
Payer: COMMERCIAL

## 2023-10-10 DIAGNOSIS — T86.10 COMPLICATIONS, KIDNEY TRANSPLANT: ICD-10-CM

## 2023-10-10 DIAGNOSIS — E83.52 HYPERCALCEMIA: Primary | ICD-10-CM

## 2023-10-10 DIAGNOSIS — Z48.298 AFTERCARE FOLLOWING ORGAN TRANSPLANT: ICD-10-CM

## 2023-10-10 DIAGNOSIS — E55.9 VITAMIN D DEFICIENCY: ICD-10-CM

## 2023-10-10 DIAGNOSIS — E83.52 HYPERCALCEMIA: ICD-10-CM

## 2023-10-10 DIAGNOSIS — Z94.0 KIDNEY TRANSPLANTED: Primary | ICD-10-CM

## 2023-10-10 DIAGNOSIS — Z79.899 ENCOUNTER FOR LONG-TERM (CURRENT) USE OF HIGH-RISK MEDICATION: ICD-10-CM

## 2023-10-10 NOTE — TELEPHONE ENCOUNTER
"Kan Sibley MD Ututalum, Teresa, RN  Thanks. The PTH is \"normal\" but would be considered high with the hypercalcemia.  The calcium is not so high that I think we need to start sensipar at this point. Please repeat labs in 1 month with ionized calcium      ----- Message -----  From: Selena Lee RN  Sent: 10/10/2023  12:16 PM CDT  To: Kan Sibley MD    PTH, Phos Vit D -- WNL  Calcium slightly improved.  iCa slightly elevatetd.  Results sent to Dr Sibley for review.      10/9/23:  PTH = 33  Phos = 3.8  Vit D = 24  Ionized Calcium = 5.4      OUTCOME:  Orders placed.  [facility] message sent.    "

## 2023-10-12 ENCOUNTER — OFFICE VISIT (OUTPATIENT)
Dept: DERMATOLOGY | Facility: CLINIC | Age: 58
End: 2023-10-12
Payer: COMMERCIAL

## 2023-10-12 DIAGNOSIS — L57.0 ACTINIC KERATOSIS: Primary | ICD-10-CM

## 2023-10-12 DIAGNOSIS — Z85.828 HISTORY OF SCC (SQUAMOUS CELL CARCINOMA) OF SKIN: ICD-10-CM

## 2023-10-12 DIAGNOSIS — L30.9 DERMATITIS: ICD-10-CM

## 2023-10-12 DIAGNOSIS — L66.10 LICHEN PLANOPILARIS: ICD-10-CM

## 2023-10-12 DIAGNOSIS — D48.5 NEOPLASM OF UNCERTAIN BEHAVIOR OF SKIN: ICD-10-CM

## 2023-10-12 PROCEDURE — 99214 OFFICE O/P EST MOD 30 MIN: CPT | Mod: 25 | Performed by: DERMATOLOGY

## 2023-10-12 PROCEDURE — 17000 DESTRUCT PREMALG LESION: CPT | Mod: XS | Performed by: DERMATOLOGY

## 2023-10-12 PROCEDURE — 17003 DESTRUCT PREMALG LES 2-14: CPT | Mod: XS | Performed by: DERMATOLOGY

## 2023-10-12 PROCEDURE — 11103 TANGNTL BX SKIN EA SEP/ADDL: CPT | Mod: GC | Performed by: DERMATOLOGY

## 2023-10-12 PROCEDURE — 11102 TANGNTL BX SKIN SINGLE LES: CPT | Mod: GC | Performed by: DERMATOLOGY

## 2023-10-12 PROCEDURE — 88305 TISSUE EXAM BY PATHOLOGIST: CPT | Mod: TC | Performed by: DERMATOLOGY

## 2023-10-12 RX ORDER — KETOCONAZOLE 20 MG/ML
SHAMPOO TOPICAL
Qty: 120 ML | Refills: 9 | Status: SHIPPED | OUTPATIENT
Start: 2023-10-12

## 2023-10-12 RX ORDER — CLOBETASOL PROPIONATE 0.05 G/100ML
SHAMPOO TOPICAL
Qty: 118 ML | Refills: 1 | Status: SHIPPED | OUTPATIENT
Start: 2023-10-12 | End: 2024-06-27

## 2023-10-12 RX ORDER — CLOBETASOL PROPIONATE 0.5 MG/ML
SOLUTION TOPICAL DAILY PRN
Qty: 50 ML | Refills: 4 | Status: SHIPPED | OUTPATIENT
Start: 2023-10-12

## 2023-10-12 RX ORDER — CLOBETASOL PROPIONATE 0.5 MG/G
OINTMENT TOPICAL 2 TIMES DAILY
Qty: 60 G | Refills: 3 | Status: SHIPPED | OUTPATIENT
Start: 2023-10-12

## 2023-10-12 RX ORDER — NIACINAMIDE 500 MG
500 TABLET ORAL 2 TIMES DAILY WITH MEALS
Qty: 60 TABLET | Refills: 3 | Status: SHIPPED | OUTPATIENT
Start: 2023-10-12

## 2023-10-12 ASSESSMENT — PAIN SCALES - GENERAL: PAINLEVEL: NO PAIN (0)

## 2023-10-12 NOTE — LETTER
10/12/2023       RE: Reina Quan  [address] [id number]     Dear Colleague,    Thank you for referring your patient, Reina Quan, to the [facility] [place] at [facility]. Please see a copy of my visit note below.    [facility] Dermatology Note  Encounter Date: Oct 12, 2023  Date of Last Dermatology Office Visit: 1/24/2023     Dermatology Problem List:  #. NUB  R leg proximal and R leg distal (likely SCC)  Right hip.   Switched from cellcept to MM May of 23  0. Monitor at next visit:  - R arm hypopigmented plaque s/p LN 11/16/21 (ddx: AK vs NMSC vs other)  - L upper chest excoriation vs other (noted 11/16/21)  1. History of kidney transplant 1992, on chronic immunosuppression  2. History of multiple SCC's   - SCC R dorsal hand, s/p MMS 10/22/19  - SCC superior R yousif, s/p MMS 6/6/19  3. Hx of multiple AKs  4. Hx benign lesion biopsies  5. Vitiligo  6. LPP  - Continue clobetasol ext solution daily PRN  - Continue ketoconazole shampoo  - Continue clobetasol 0.05% shampoo BIW (patient does not want to use more frequently)   - S/p ILK 10 4/15/21, 11/16/21     ____________________________________________    Assessment & Plan:     #. Neoplasm of uncertain behavior of the skin - RIGHT LEG PROXIMAL and DISTAL, and RIGHT HIP  A total of three shave biopsies were performed today.   Differential at this time includes SCC, HAK for lower leg, possible BCC (?) for right hip.  - Offered biopsy to the patient for histologic analysis.   - Described risks and benefits of biopsy; patient wishes to proceed.  - Verbal consent obtained shave biopsy performed  - See procedure note below.     #. Actinic keratosis - multiple  Assessment:   Present on the left hand second dorsal digit x 3  Plan:  -Discussed precancerous nature of these lesions. Recommended treatment to prevent progression to a squamous cell  carcinoma.  -Reviewed treatment options with patient, including topical medications, cryotherapy, and clinical monitoring.   -Offered cryotherapy during clinic visit today for destruction of premalignant lesions. Patient   -Verbal consent obtained, see procedure note(s) below.   -Advised patient to call for follow up if not resolved in 1 month.   -Advised yearly skin checks (6 months for this patient)     # LPP.  Chronic active problem.  Patient reports she is overall improved with home topicals, but still has persistent itch of vertex scalp.  - Cont clobetasol ext solution daily PRN  - Cont ketoconazole shampoo BIW-TIW  - Cont clobetasol 0.05% shampoo BIW (patient does not want to use more frequently)      # Hx kidney transplant and multiple nonmelanoma skin cancers.   Clinically no evidence of recurrence at prior SCC sites.  Possible to switch from sirolimus to mycophenolate has her more prone to nonmelanoma skin cancers (sirolimus has some protective benefit).  We advised her today to discuss this topic with her transplant team, as well as monitor until next May.  Should she continue to develop more nonmelanoma skin cancers, it may be reasonable to switch back.  - Reassurance   - Cont FBSE q3-6 months  - Start nicotinamide 500 mg BID.       Procedures Performed: See below  - Shave biopsy procedure note, location(s): RIGHT LEG PROXIMAL and DISTAL, and RIGHT HIP. After discussion of benefits and risks including but not limited to bleeding, infection, scar, incomplete removal, recurrence, and non-diagnostic biopsy, written consent and photographs were obtained. The area was cleaned with isopropyl alcohol. 0.5mL of 1% lidocaine with epinephrine was injected to obtain adequate anesthesia of lesion(s). Shave biopsy at site(s) performed. Hemostasis was achieved with aluminium chloride. Petrolatum ointment and a sterile dressing were applied. The patient tolerated the procedure and no complications were noted. The  patient was provided with verbal and written post care instructions.   - Cryotherapy procedure note, location(s): LEFT DORSAL HAND. After verbal consent and discussion of risks and benefits including, but not limited to, dyspigmentation/scar, blister, and pain, 3 lesion(s) was(were) treated with 1-2 mm freeze border for 1-2 cycles with liquid nitrogen. Post cryotherapy instructions were provided.    Follow-up: 6 month(s) in-person, or earlier for new or changing lesions    Staff and Resident:     Staff: Dr. Mitul Spann MD  PGY-4, Internal Medicine-Dermatology  Pager: [phone]  ____________________________________________    CC: Skin Check (Reina is here today for a skin check )      HPI:  Ms. Reina Quan is a(n) 58 year old female who presents today as a new patient for the following chief complaint(s):     Skin Check (Reina is here today for a skin check )     Patient notes a few concerns since her last skin check with Dr. Bauman.  She notes 3 new lesions at the lower right leg that are tender and are concerning for possible skin cancer.  She notes that these appeared shortly after switching from sirolimus to mycophenolate for her chronic immunosuppression.    Additionally, she has some gritty spots at the dorsum of the left hand, and thinks that her dermatitis dermatitis may be worse.  She asks for refills of her regular dermatologic medications (topicals) today.    Patient is otherwise feeling well, without additional skin concerns.    Labs Reviewed:  N/A    Physical Exam:  Vitals: LMP 05/14/2014 (Approximate)   General: Well developed adult. Resting comfortably; no acute distress. Conversational and cooperative with exam.  HEENT: Anicteric sclera. EOMI. Mucous membranes moist.   Skin Exam: A full skin exam was performed. Specifically, this includes the head/face and neck, upper extremities, breasts, chest, back, abdomen, lower extremities, genitalia, and buttocks. Patient consent  was confirmed before performing sensitive portions of the exam.   - Molina Type III: Sun-occluded skin with slight background pigmentation. Slightly tanned in sun-exposed areas..   - Patient with greater than 100 nevi  - Diffuse hair thinning in the scalp area with perifollicular scale throughout; eyebrows and eyelashes within normal limits  - Diffuse tan macules in sun exposed areas c/w lentigines  - Waxy stuck on brown papules on the extremities   - Dorsal left hands with three rough scaly 2-3mm pink papules  - Right lateral shin with two pink tender papules with dry overlying scale.   - Right hip with magenta ~3mm papule, follicularly based on dermoscopy, tender to palpation.   - No other lesions of concern on areas examined.   Psych: Appropriate mood and affect for situation.    Physical Exam:  SKIN: head/face, both arms, chest, back, abdomen,both legs, digits and/or nails, were examined.  - diffuse hair thinning in the scalp area with perifollicular scale throughout  - fontal and vertex scalp with ~3-5 cm shiny patches with significant decreased hair density  - eyebrows & lashes are wnl  - diffuse tan macules in sun exposed areas c/w lentigines  - waxy stuck on brown papules on the extremities   - Dorsal hands with two rough scaly 4mm pink papules  - Right medial foot with flat topped 4mm papule with peripheral rim of scale  - Right shin with eroded 4mm pink papule  - No other lesions of concern on areas examined.     Medications:  Current Outpatient Medications   Medication    atorvastatin (LIPITOR) 40 MG tablet    clobetasol (TEMOVATE) 0.05 % external solution    clobetasol propionate (CLOBEX) 0.05 % external shampoo    ketoconazole (NIZORAL) 2 % external shampoo    mycophenolate (GENERIC EQUIVALENT) 250 MG capsule    PARoxetine (PAXIL) 20 MG tablet    triamcinolone (KENALOG) 0.1 % external cream     Current Facility-Administered Medications   Medication    triamcinolone acetonide (KENALOG-10) injection  10 mg        Past Medical History:   Patient Active Problem List   Diagnosis    Ulcerative colitis (H)    Migraine    Allergic rhinitis    Hearing loss    Kidney replaced by transplant    Lichen planus    Giant Platelet syndrome    Nephrotic syndrome in diseases classified elsewhere    Major depression in partial remission (H24)    Actinic keratosis    CKD (chronic kidney disease) stage 3, GFR 30-59 ml/min (H)    High risk medications (not anticoagulants) long-term use    Vitiligo    History of SCC (squamous cell carcinoma) of skin    Lichen planopilaris    Hyperlipidemia with target LDL less than 130    AK (actinic keratosis)    History of nonmelanoma skin cancer    HTN (hypertension)    Immunosuppression (H24)    Neoplasm of unspecified behavior of bone, soft tissue, and skin    Porokeratosis     Past Medical History:   Diagnosis Date    Actinic keratosis     Allergic rhinitis, cause unspecified     Anemia     anxiety/depression     PAXIL    Arthritis     congenital hearing loss     uses hearing aids    Depressive disorder     Dry eyes     Giant Platelet syndrome     Glomerular Focal Sclerosis--transplant 1985     Hypertension     Kidney replaced by transplant 1992    RAPAMUNE/ SIROLIMUS     Lichen planopilaris     LPP followed by derm on doxycycline/ clobetasol    Lichen planus     Menorrhagia     migraine     Squamous cell carcinoma     8 x     Thrombocytopenia (H24)     Ulcerative colitis, unspecified     biposy neg 1996    Unsatisfactory cervical Papanicolaou smear 02/15/2021    UTI (lower urinary tract infection)     recurrent Dr Burns [facility]       CC Referred Self, MD  No address on file on close of this encounter.

## 2023-10-12 NOTE — PATIENT INSTRUCTIONS
Wound Care After a Biopsy    What is a skin biopsy?  A skin biopsy allows the doctor to examine a very small piece of tissue under the microscope to determine the diagnosis and the best treatment for the skin condition. A local anesthetic (numbing medicine) is injected with a very small needle into the skin area to be tested. A small piece of skin is taken from the area. Sometimes a suture (stitch) is used.     What are the risks of a skin biopsy?  I will experience scar, bleeding, swelling, pain, crusting and redness. I may experience incomplete removal or recurrence. Risks of this procedure are excessive bleeding, bruising, infection, nerve damage, numbness, thick (hypertrophic or keloidal) scar and non-diagnostic biopsy.    How should I care for my wound for the first 24 hours?  Keep the wound dry and covered for 24 hours  If it bleeds, hold direct pressure on the area for 15 minutes. If bleeding does not stop, call us or go to the emergency room  Avoid strenuous exercise the first 1-2 days or as your doctor instructs you    How should I care for the wound after 24 hours?  After 24 hours, remove the bandage  You may bathe or shower as normal  If you had a scalp biopsy, you can shampoo as usual and can use shower water to clean the biopsy site daily  Clean the wound once a day with gentle soap and water  Do not scrub, be gentle  Apply white petroleum/Vaseline after cleaning the wound with a cotton swab or a clean finger, and keep the site covered with a Bandaid /bandage. Bandages are not necessary with a scalp biopsy  If you are unable to cover the site with a Bandaid /bandage, re-apply ointment 2-3 times a day to keep the site moist. Moisture will help with healing  Avoid strenuous activity for first 1-2 days  Avoid lakes, rivers, pools, and oceans until the stitches are removed or the site is healed    How do I clean my wound?  Wash hands thoroughly with soap or use hand  before all wound care  Clean  the wound with gentle soap and water  Apply white petroleum/Vaseline  to wound after it is clean  Replace the Bandaid /bandage to keep the wound covered for the first few days or as instructed by your doctor  If you had a scalp biopsy, warm shower water to the area on a daily basis should suffice    What should I use to clean my wound?   Cotton-tipped applicators (Qtips )  White petroleum jelly (Vaseline ). Use a clean new container and use Q-tips to apply.  Bandaids  as needed  Gentle soap     How should I care for my wound long term?  Do not get your wound dirty  Keep up with wound care for one week or until the area is healed.  If you have stitches, stitches need to be removed in 7 days. You may return to our clinic for this or you may have it done locally at your doctor s office.  A small scab will form and fall off by itself when the area is completely healed. The area will be red and will become pink in color as it heals. Sun protection is very important for how your scar will turn out. Sunscreen with an SPF 30 or greater is recommended once the area is healed.  You should have some soreness but it should be mild and slowly go away over several days. Talk to your doctor about using tylenol for pain,    When should I call my doctor?  If you have increased:   Pain or swelling  Pus or drainage (clear or slightly yellow drainage is ok)  Temperature over 100F  Spreading redness or warmth around wound    When will I hear about my results?  The biopsy results can take 2 weeks to come back.  Your results will automatically release to [employer] before your provider has even reviewed them.  The clinic will call you with the results, send you a [employer] message, or have you schedule a follow-up clinic or phone time to discuss the results.  Contact our clinics if you do not hear from us in 2 weeks.    Who should I call with questions?  [facility]: [phone]  [facility]  [place]: [phone]  For urgent needs outside of business hours call the [facility] at [phone] and ask for the dermatology resident on call

## 2023-10-12 NOTE — PROGRESS NOTES
[facility] Dermatology Note  Encounter Date: Oct 12, 2023  Date of Last Dermatology Office Visit: 1/24/2023     Dermatology Problem List:  #. NUB  R leg proximal and R leg distal (likely SCC)  Right hip.   Switched from cellcept to MM May of 23  0. Monitor at next visit:  - R arm hypopigmented plaque s/p LN 11/16/21 (ddx: AK vs NMSC vs other)  - L upper chest excoriation vs other (noted 11/16/21)  1. History of kidney transplant 1992, on chronic immunosuppression  2. History of multiple SCC's   - SCC R dorsal hand, s/p MMS 10/22/19  - SCC superior R yousif, s/p MMS 6/6/19  3. Hx of multiple AKs  4. Hx benign lesion biopsies  5. Vitiligo  6. LPP  - Continue clobetasol ext solution daily PRN  - Continue ketoconazole shampoo  - Continue clobetasol 0.05% shampoo BIW (patient does not want to use more frequently)   - S/p ILK 10 4/15/21, 11/16/21     ____________________________________________    Assessment & Plan:     #. Neoplasm of uncertain behavior of the skin - RIGHT LEG PROXIMAL and DISTAL, and RIGHT HIP  A total of three shave biopsies were performed today.   Differential at this time includes SCC, HAK for lower leg, possible BCC (?) for right hip.  - Offered biopsy to the patient for histologic analysis.   - Described risks and benefits of biopsy; patient wishes to proceed.  - Verbal consent obtained shave biopsy performed  - See procedure note below.     #. Actinic keratosis - multiple  Assessment:   Present on the left hand second dorsal digit x 3  Plan:  -Discussed precancerous nature of these lesions. Recommended treatment to prevent progression to a squamous cell carcinoma.  -Reviewed treatment options with patient, including topical medications, cryotherapy, and clinical monitoring.   -Offered cryotherapy during clinic visit today for destruction of premalignant lesions. Patient   -Verbal consent obtained, see procedure note(s) below.   -Advised patient to call for follow up if not  resolved in 1 month.   -Advised yearly skin checks (6 months for this patient)     # LPP.  Chronic active problem.  Patient reports she is overall improved with home topicals, but still has persistent itch of vertex scalp.  - Cont clobetasol ext solution daily PRN  - Cont ketoconazole shampoo BIW-TIW  - Cont clobetasol 0.05% shampoo BIW (patient does not want to use more frequently)      # Hx kidney transplant and multiple nonmelanoma skin cancers.   Clinically no evidence of recurrence at prior SCC sites.  Possible to switch from sirolimus to mycophenolate has her more prone to nonmelanoma skin cancers (sirolimus has some protective benefit).  We advised her today to discuss this topic with her transplant team, as well as monitor until next May.  Should she continue to develop more nonmelanoma skin cancers, it may be reasonable to switch back.  - Reassurance   - Cont FBSE q3-6 months  - Start nicotinamide 500 mg BID.       Procedures Performed: See below  - Shave biopsy procedure note, location(s): RIGHT LEG PROXIMAL and DISTAL, and RIGHT HIP. After discussion of benefits and risks including but not limited to bleeding, infection, scar, incomplete removal, recurrence, and non-diagnostic biopsy, written consent and photographs were obtained. The area was cleaned with isopropyl alcohol. 0.5mL of 1% lidocaine with epinephrine was injected to obtain adequate anesthesia of lesion(s). Shave biopsy at site(s) performed. Hemostasis was achieved with aluminium chloride. Petrolatum ointment and a sterile dressing were applied. The patient tolerated the procedure and no complications were noted. The patient was provided with verbal and written post care instructions.   - Cryotherapy procedure note, location(s): LEFT DORSAL HAND. After verbal consent and discussion of risks and benefits including, but not limited to, dyspigmentation/scar, blister, and pain, 3 lesion(s) was(were) treated with 1-2 mm freeze border for 1-2 cycles  with liquid nitrogen. Post cryotherapy instructions were provided.    Follow-up: 6 month(s) in-person, or earlier for new or changing lesions    Staff and Resident:     Staff: Dr. Mitul Spann MD  PGY-4, Internal Medicine-Dermatology  Pager: [phone]    I have seen and examined this patient and agree with the assessment and plan as documented in the resident's note, and was present for all procedures.    Brad Bauman MD  Dermatology Attending    ____________________________________________    CC: Skin Check (Reina is here today for a skin check )      HPI:  Ms. Reina Quan is a(n) 58 year old female who presents today as a new patient for the following chief complaint(s):     Skin Check (Reina is here today for a skin check )     Patient notes a few concerns since her last skin check with Dr. Bauman.  She notes 3 new lesions at the lower right leg that are tender and are concerning for possible skin cancer.  She notes that these appeared shortly after switching from sirolimus to mycophenolate for her chronic immunosuppression.    Additionally, she has some gritty spots at the dorsum of the left hand, and thinks that her dermatitis dermatitis may be worse.  She asks for refills of her regular dermatologic medications (topicals) today.    Patient is otherwise feeling well, without additional skin concerns.    Labs Reviewed:  N/A    Physical Exam:  Vitals: LMP 05/14/2014 (Approximate)   General: Well developed adult. Resting comfortably; no acute distress. Conversational and cooperative with exam.  HEENT: Anicteric sclera. EOMI. Mucous membranes moist.   Skin Exam: A full skin exam was performed. Specifically, this includes the head/face and neck, upper extremities, breasts, chest, back, abdomen, lower extremities, genitalia, and buttocks. Patient consent was confirmed before performing sensitive portions of the exam.   - Molina Type III: Sun-occluded skin with slight background  pigmentation. Slightly tanned in sun-exposed areas..   - Patient with greater than 100 nevi  - Diffuse hair thinning in the scalp area with perifollicular scale throughout; eyebrows and eyelashes within normal limits  - Diffuse tan macules in sun exposed areas c/w lentigines  - Waxy stuck on brown papules on the extremities   - Dorsal left hands with three rough scaly 2-3mm pink papules  - Right lateral shin with two pink tender papules with dry overlying scale.   - Right hip with magenta ~3mm papule, follicularly based on dermoscopy, tender to palpation.   - No other lesions of concern on areas examined.   Psych: Appropriate mood and affect for situation.    Physical Exam:  SKIN: head/face, both arms, chest, back, abdomen,both legs, digits and/or nails, were examined.  - diffuse hair thinning in the scalp area with perifollicular scale throughout  - fontal and vertex scalp with ~3-5 cm shiny patches with significant decreased hair density  - eyebrows & lashes are wnl  - diffuse tan macules in sun exposed areas c/w lentigines  - waxy stuck on brown papules on the extremities   - Dorsal hands with two rough scaly 4mm pink papules  - Right medial foot with flat topped 4mm papule with peripheral rim of scale  - Right shin with eroded 4mm pink papule  - No other lesions of concern on areas examined.     Medications:  Current Outpatient Medications   Medication     atorvastatin (LIPITOR) 40 MG tablet     clobetasol (TEMOVATE) 0.05 % external solution     clobetasol propionate (CLOBEX) 0.05 % external shampoo     ketoconazole (NIZORAL) 2 % external shampoo     mycophenolate (GENERIC EQUIVALENT) 250 MG capsule     PARoxetine (PAXIL) 20 MG tablet     triamcinolone (KENALOG) 0.1 % external cream     Current Facility-Administered Medications   Medication     triamcinolone acetonide (KENALOG-10) injection 10 mg        Past Medical History:   Patient Active Problem List   Diagnosis     Ulcerative colitis (H)     Migraine      Allergic rhinitis     Hearing loss     Kidney replaced by transplant     Lichen planus     Giant Platelet syndrome     Nephrotic syndrome in diseases classified elsewhere     Major depression in partial remission (H24)     Actinic keratosis     CKD (chronic kidney disease) stage 3, GFR 30-59 ml/min (H)     High risk medications (not anticoagulants) long-term use     Vitiligo     History of SCC (squamous cell carcinoma) of skin     Lichen planopilaris     Hyperlipidemia with target LDL less than 130     AK (actinic keratosis)     History of nonmelanoma skin cancer     HTN (hypertension)     Immunosuppression (H24)     Neoplasm of unspecified behavior of bone, soft tissue, and skin     Porokeratosis     Past Medical History:   Diagnosis Date     Actinic keratosis      Allergic rhinitis, cause unspecified      Anemia      anxiety/depression     PAXIL     Arthritis      congenital hearing loss     uses hearing aids     Depressive disorder      Dry eyes      Giant Platelet syndrome      Glomerular Focal Sclerosis--transplant 1985      Hypertension      Kidney replaced by transplant 1992    RAPAMUNE/ SIROLIMUS      Lichen planopilaris     LPP followed by derm on doxycycline/ clobetasol     Lichen planus      Menorrhagia      migraine      Squamous cell carcinoma     8 x      Thrombocytopenia (H24)      Ulcerative colitis, unspecified     biposy neg 1996     Unsatisfactory cervical Papanicolaou smear 02/15/2021     UTI (lower urinary tract infection)     recurrent Dr Burns [facility]       CC Referred Self, MD  No address on file on close of this encounter.

## 2023-10-16 ENCOUNTER — ANCILLARY PROCEDURE (OUTPATIENT)
Dept: MAMMOGRAPHY | Facility: CLINIC | Age: 58
End: 2023-10-16
Attending: PHYSICIAN ASSISTANT
Payer: COMMERCIAL

## 2023-10-16 DIAGNOSIS — Z12.31 ENCOUNTER FOR SCREENING MAMMOGRAM FOR BREAST CANCER: ICD-10-CM

## 2023-10-16 LAB
PATH REPORT.COMMENTS IMP SPEC: ABNORMAL
PATH REPORT.COMMENTS IMP SPEC: ABNORMAL
PATH REPORT.COMMENTS IMP SPEC: YES
PATH REPORT.FINAL DX SPEC: ABNORMAL
PATH REPORT.GROSS SPEC: ABNORMAL
PATH REPORT.MICROSCOPIC SPEC OTHER STN: ABNORMAL
PATH REPORT.RELEVANT HX SPEC: ABNORMAL

## 2023-10-16 PROCEDURE — 77067 SCR MAMMO BI INCL CAD: CPT | Mod: TC | Performed by: RADIOLOGY

## 2023-10-17 ENCOUNTER — TELEPHONE (OUTPATIENT)
Dept: TRANSPLANT | Facility: CLINIC | Age: 58
End: 2023-10-17
Payer: COMMERCIAL

## 2023-10-17 NOTE — TELEPHONE ENCOUNTER
Final Diagnosis   A. RIGHT LEG PROXIMAL:  - Consistent with squamous cell carcinoma, well-differentiated - (see description)     B. RIGHT LEG DISTAL:  - Ruptured folliculitis, with adjacent changes of hypertrophic actinic keratosis - (see description)     C. RIGHT LATERAL THIGH:  - Superficially invasive squamous cell carcinoma, well differentiated - (see description)       Kan Sibley MD [place], Teresa, RN  Noted. Would not make any changes. She is on as low of immunosuppression she can be on without risking rejection or further kidney damage      ----- Message -----  From: Selena Lee RN  Sent: 10/17/2023  10:13 AM CDT  To: Kan Sibley MD    Monotherapy.Sirolimus switched to MMF d/t proteinuria

## 2023-10-23 ENCOUNTER — LAB (OUTPATIENT)
Dept: LAB | Facility: CLINIC | Age: 58
End: 2023-10-23
Payer: COMMERCIAL

## 2023-10-23 DIAGNOSIS — Z94.0 KIDNEY TRANSPLANTED: ICD-10-CM

## 2023-10-23 DIAGNOSIS — Z79.899 IMMUNOSUPPRESSIVE MANAGEMENT ENCOUNTER FOLLOWING KIDNEY TRANSPLANT: ICD-10-CM

## 2023-10-23 DIAGNOSIS — R80.9 PROTEINURIA: ICD-10-CM

## 2023-10-23 DIAGNOSIS — Z48.298 AFTERCARE FOLLOWING ORGAN TRANSPLANT: ICD-10-CM

## 2023-10-23 DIAGNOSIS — Z94.0 IMMUNOSUPPRESSIVE MANAGEMENT ENCOUNTER FOLLOWING KIDNEY TRANSPLANT: ICD-10-CM

## 2023-10-23 LAB
ANION GAP SERPL CALCULATED.3IONS-SCNC: 13 MMOL/L (ref 7–15)
BUN SERPL-MCNC: 40.1 MG/DL (ref 6–20)
CALCIUM SERPL-MCNC: 11 MG/DL (ref 8.6–10)
CHLORIDE SERPL-SCNC: 101 MMOL/L (ref 98–107)
CREAT SERPL-MCNC: 2.07 MG/DL (ref 0.51–0.95)
DEPRECATED HCO3 PLAS-SCNC: 22 MMOL/L (ref 22–29)
EGFRCR SERPLBLD CKD-EPI 2021: 27 ML/MIN/1.73M2
ERYTHROCYTE [DISTWIDTH] IN BLOOD BY AUTOMATED COUNT: 14.1 % (ref 10–15)
GLUCOSE SERPL-MCNC: 99 MG/DL (ref 70–99)
HCT VFR BLD AUTO: 36.7 % (ref 35–47)
HGB BLD-MCNC: 11.9 G/DL (ref 11.7–15.7)
MCH RBC QN AUTO: 31 PG (ref 26.5–33)
MCHC RBC AUTO-ENTMCNC: 32.4 G/DL (ref 31.5–36.5)
MCV RBC AUTO: 96 FL (ref 78–100)
PLATELET # BLD AUTO: 127 10E3/UL (ref 150–450)
POTASSIUM SERPL-SCNC: 4.8 MMOL/L (ref 3.4–5.3)
RBC # BLD AUTO: 3.84 10E6/UL (ref 3.8–5.2)
SODIUM SERPL-SCNC: 136 MMOL/L (ref 135–145)
WBC # BLD AUTO: 5.8 10E3/UL (ref 4–11)

## 2023-10-23 PROCEDURE — 36415 COLL VENOUS BLD VENIPUNCTURE: CPT

## 2023-10-23 PROCEDURE — 80180 DRUG SCRN QUAN MYCOPHENOLATE: CPT

## 2023-10-23 PROCEDURE — 85027 COMPLETE CBC AUTOMATED: CPT

## 2023-10-23 PROCEDURE — 80048 BASIC METABOLIC PNL TOTAL CA: CPT

## 2023-10-24 ENCOUNTER — TELEPHONE (OUTPATIENT)
Dept: DERMATOLOGY | Facility: CLINIC | Age: 58
End: 2023-10-24
Payer: COMMERCIAL

## 2023-10-24 DIAGNOSIS — Z94.0 KIDNEY TRANSPLANTED: Primary | ICD-10-CM

## 2023-10-24 DIAGNOSIS — Z94.0 IMMUNOSUPPRESSIVE MANAGEMENT ENCOUNTER FOLLOWING KIDNEY TRANSPLANT: ICD-10-CM

## 2023-10-24 DIAGNOSIS — Z79.899 IMMUNOSUPPRESSIVE MANAGEMENT ENCOUNTER FOLLOWING KIDNEY TRANSPLANT: ICD-10-CM

## 2023-10-24 DIAGNOSIS — Z48.298 AFTERCARE FOLLOWING ORGAN TRANSPLANT: ICD-10-CM

## 2023-10-24 LAB
MYCOPHENOLATE SERPL LC/MS/MS-MCNC: 6.1 MG/L (ref 1–3.5)
MYCOPHENOLATE-G SERPL LC/MS/MS-MCNC: 118.3 MG/L (ref 30–95)
TME LAST DOSE: ABNORMAL H
TME LAST DOSE: ABNORMAL H

## 2023-10-24 NOTE — TELEPHONE ENCOUNTER
Kan Sibley MD [place], Teresa, RN  Step 1: please obtain SPEP w/ ifx, UPEP w/ ifx, K/L, 1,25 vitamin D, PTH related peptide. The PTH was barely elevated so I think this may be PTH independent. If that workup is negative we would start sensipar     ----- Message -----  From: Selena Lee RN  Sent: 10/24/2023  10:00 AM CDT  To: Kan Sibley MD    Ca remains elevated. Not on Ca,Vit D supp.  What ca level do we start sensipar?        Calcium = 11.0  (10/23/23)    PLAN:  Check the following labs:  SPEP w/ ifx  UPEP w/ ifx  K/L  Vit D  PTH related peptide      OUTCOME  Orders placed.      LPN task:  Call and have labs completed.

## 2023-10-24 NOTE — TELEPHONE ENCOUNTER
Called patient to schedule surgery with Dr. Fortune    Date of Surgery: 12/18    Surgery type: mohs    Consult scheduled: No    Has patient had mohs with us before? Yes    Additional comments: fritz Holley on 10/24/2023 at 10:05 AM

## 2023-10-25 ENCOUNTER — TELEPHONE (OUTPATIENT)
Dept: TRANSPLANT | Facility: CLINIC | Age: 58
End: 2023-10-25
Payer: COMMERCIAL

## 2023-10-25 DIAGNOSIS — Z98.890 OTHER SPECIFIED POSTPROCEDURAL STATES: ICD-10-CM

## 2023-10-25 DIAGNOSIS — Z94.0 KIDNEY REPLACED BY TRANSPLANT: Primary | ICD-10-CM

## 2023-10-25 DIAGNOSIS — Z48.298 AFTERCARE FOLLOWING ORGAN TRANSPLANT: ICD-10-CM

## 2023-10-25 DIAGNOSIS — Z79.899 ENCOUNTER FOR LONG-TERM CURRENT USE OF MEDICATION: ICD-10-CM

## 2023-10-25 NOTE — TELEPHONE ENCOUNTER
Mycophenolic acid = 6.10  (10/23/23)  Current Mycophenolate dose 500 mg BID    PLAN:   Call and confirm this was a good 12-hour trough.   Verify current dose.   Confirm no new medications or illness  Check for nausea/vomiting/diarrhea with high level.  stay on the same dose.   Recheck level with next labs (serum and urine tests ordered) and   make sure it is a good 12 hour trough to avoid additional lab draws.

## 2023-10-25 NOTE — TELEPHONE ENCOUNTER
FUTURE VISIT INFORMATION      FUTURE VISIT INFORMATION:  Date: 12.18.23  Time: 9:30  Location: [facility]  REFERRAL INFORMATION:  Referring provider:  Mitul  Referring providers clinic:  Derm  Reason for visit/diagnosis  R leg proximal SCC, R lateral thigh SCCIS mohs. return pt.      RECORDS REQUESTED FROM:       Clinic name Comments Records Status Imaging Status   Derm 10.12.23  Path # [id number] Epic Epic

## 2023-10-25 NOTE — TELEPHONE ENCOUNTER
Left message to patient and sent [employer] message regarding:  Mycophenolic acid = 6.10  (10/23/23)  Current Mycophenolate dose 500 mg BID     PLAN:   Call and confirm this was a good 12-hour trough.   Verify current dose.   Confirm no new medications or illness  Check for nausea/vomiting/diarrhea with high level.  stay on the same dose.   Recheck level with next labs (serum and urine tests ordered) and   make sure it is a good 12 hour trough to avoid additional lab draws

## 2023-11-02 ENCOUNTER — OFFICE VISIT (OUTPATIENT)
Dept: TRANSPLANT | Facility: CLINIC | Age: 58
End: 2023-11-02
Attending: INTERNAL MEDICINE
Payer: COMMERCIAL

## 2023-11-02 VITALS
SYSTOLIC BLOOD PRESSURE: 147 MMHG | TEMPERATURE: 98.1 F | BODY MASS INDEX: 28.63 KG/M2 | WEIGHT: 151.5 LBS | OXYGEN SATURATION: 98 % | DIASTOLIC BLOOD PRESSURE: 88 MMHG | HEART RATE: 82 BPM

## 2023-11-02 DIAGNOSIS — Z48.298 AFTERCARE FOLLOWING ORGAN TRANSPLANT: ICD-10-CM

## 2023-11-02 DIAGNOSIS — Z12.11 COLON CANCER SCREENING: ICD-10-CM

## 2023-11-02 DIAGNOSIS — I15.1 HTN, KIDNEY TRANSPLANT RELATED: ICD-10-CM

## 2023-11-02 DIAGNOSIS — D84.9 IMMUNOSUPPRESSED STATUS (H): ICD-10-CM

## 2023-11-02 DIAGNOSIS — Z94.0 HTN, KIDNEY TRANSPLANT RELATED: ICD-10-CM

## 2023-11-02 DIAGNOSIS — N25.81 SECONDARY HYPERPARATHYROIDISM (H): ICD-10-CM

## 2023-11-02 DIAGNOSIS — E83.52 HYPERCALCEMIA: ICD-10-CM

## 2023-11-02 DIAGNOSIS — Z94.0 KIDNEY REPLACED BY TRANSPLANT: Primary | ICD-10-CM

## 2023-11-02 PROCEDURE — 99213 OFFICE O/P EST LOW 20 MIN: CPT | Performed by: INTERNAL MEDICINE

## 2023-11-02 PROCEDURE — 99214 OFFICE O/P EST MOD 30 MIN: CPT | Performed by: INTERNAL MEDICINE

## 2023-11-02 RX ORDER — AMLODIPINE BESYLATE 5 MG/1
5 TABLET ORAL AT BEDTIME
Qty: 90 TABLET | Refills: 3 | Status: SHIPPED | OUTPATIENT
Start: 2023-11-02 | End: 2024-09-27

## 2023-11-02 RX ORDER — CINACALCET 30 MG/1
30 TABLET, FILM COATED ORAL DAILY
Qty: 90 TABLET | Refills: 2 | Status: SHIPPED | OUTPATIENT
Start: 2023-11-02 | End: 2024-09-27

## 2023-11-02 ASSESSMENT — PAIN SCALES - GENERAL: PAINLEVEL: NO PAIN (0)

## 2023-11-02 NOTE — PATIENT INSTRUCTIONS
Patient Recommendations:  - Start cinacalcet 30mg daily   -Start amlodipine 5mg at bedtime   -I ordered an ultrasound of your parathyroid glands  -I ordered a screening colonoscopy for you  -I recommend COVID booster, flu, Shingrix, PCV 20 (prevnar 20)    Transplant Patient Information  Your Post Transplant Coordinator is: Freddy Lee  For non urgent items, we encourage you to contact your coordinator/care team online via [employer]  You and your care team can also contact your transplant coordinator Monday - Friday, 8am - 5pm at [phone] (Option 2 to reach the coordinator or Option 4 to schedule an appointment).  After hours for urgent matters, please call [facility] at [phone].

## 2023-11-02 NOTE — PROGRESS NOTES
CHRONIC TRANSPLANT NEPHROLOGY VISIT    Assessment & Plan   # LDKT: Trend up over time   - Baseline Creatinine:  ~ 1.8-2.1   - Proteinuria: Mild (0.5-1.0 grams). UPCR 0.95g/g 5/13/23   - Date DSA Last Checked: Feb/2015      Latest DSA: No   - BK Viremia: Not checked recently due to time from transplant   - Kidney Tx Biopsy: No   -Labs q3 months    # Immunosuppression: Mycophenolate mofetil (dose 500 mg every 12 hours)   - Continue with intensive monitoring of immunosuppression for efficacy and toxicity.   - Changes: No. Switched from sirolimus to MMF in 5/2023    # Infection Prophylaxis:   - PJP: None    # Hypertension: Not checked recently, borderline control in 130s systolic at clinic visits;  Goal BP: < 130/80   - Changes: Yes - start amlodipine 5mg at bedtime         # Anemia in Chronic Renal Disease: Hgb: Stable      BEVERLY: No   - Iron studies: Not checked recently    # Mineral Bone Disorder:   - Secondary renal hyperparathyroidism vs primary hyperparathyroidism vs 1 alpha hydroxylating condition  - PTH level: Normal (18-80 pg/ml)        On treatment: None, start cinacalcet 30mg daily for hyperCa   - Vitamin D; level: Normal        On supplement: No  - Calcium; level: High        On supplement: No  - Phosphorus; level: Normal        On supplement: No    -Previously evaluated 1/2020 for hyperCa with negative SPEP w/ ifx, K/L. PTHrP slightly elevated  -Start cinacalcet 30mg daily   -Obtain 1,25 vitamin D with next check     # Electrolytes:   - Potassium; level: Normal        On supplement: No  - Magnesium; level: Not checked recently, but was normal last check        On supplement: No  - Bicarbonate; level: Normal        On supplement: No    # Depression: on Paxil. Stable.    # Skin Cancer: New lesions: several. Following with Dermatology.   -Biopsies 10/17/23 showed SCC of R proximal leg and superficially invasive SCC R lateral thigh.   -She has Mohs scheduled 12/12/23   - Discussed sun protection and recommend  regular follow up with Dermatology.    # Health Maintenance and Vaccination Review: Recommend:  COVID, flu, Shingrix, PCV 20 . Refer for colonoscopy    # Medical Compliance: No.  Evidence of infrequent transplant follow-up.  - Discussed importance of checking labs regularly as recommended, taking medications as prescribed and attending scheduled medical appointments.    # Transplant History:  Etiology of Kidney Failure: Focal segmental glomerulosclerosis (FSGS)  Tx: LDKT  Transplant: 6/2/1992 (Kidney)  Significant changes in immunosuppression: None  Significant transplant-related complications: None    Transplant Office Phone Number: [phone]    Assessment and plan was discussed with the patient and she voiced her understanding and agreement.    Return visit: Return in about 1 year (around 11/2/2024).    Kan Sibley MD         Chief Complaint   Ms. Quan is a 58 year old here for kidney transplant and immunosuppression management.    History of Present Illness   The patient overall feels well. She denies any recent hospitalizations. She denies nausea, vomiting, diarrhea, fever, chills, shortness of breath, chest pain, LE edema, unintentional weight loss, nights sweats, dysuria, hematuria.     She has been feeling more tired lately. She is still working full time as an  and selling window treatments.     Home BP: Not checked    Problem List   Patient Active Problem List   Diagnosis    Ulcerative colitis (H)    Migraine    Allergic rhinitis    Hearing loss    Kidney replaced by transplant    Lichen planus    Giant Platelet syndrome    Nephrotic syndrome in diseases classified elsewhere    Major depression in partial remission (H24)    Actinic keratosis    CKD (chronic kidney disease) stage 3, GFR 30-59 ml/min (H)    High risk medications (not anticoagulants) long-term use    Vitiligo    History of SCC (squamous cell carcinoma) of skin    Lichen planopilaris    Hyperlipidemia with target LDL  less than 130    AK (actinic keratosis)    History of nonmelanoma skin cancer    HTN (hypertension)    Immunosuppression (H24)    Neoplasm of unspecified behavior of bone, soft tissue, and skin    Porokeratosis       Allergies   Allergies   Allergen Reactions    Ampicillin Swelling     Swelling of mouth and tongue.     Seasonal Allergies Other (See Comments)     Itchy eyes and rhinitis.       Medications   Current Outpatient Medications   Medication Sig    atorvastatin (LIPITOR) 40 MG tablet Take 1 tablet (40 mg) by mouth At Bedtime    clobetasol (TEMOVATE) 0.05 % external ointment Apply topically 2 times daily Use sparingly at active areas of dermatitis for no more than 14 days or until resolved, whichever is sooner.    clobetasol (TEMOVATE) 0.05 % external solution Apply topically daily as needed (itchyy scalp) Apply to scalp daily as needed.    clobetasol propionate (CLOBEX) 0.05 % external shampoo APPLY IN SHOWER TO SCALP TWICE WEEKLY    ketoconazole (NIZORAL) 2 % external shampoo APPLY TOPICALLY DAILY AS NEEDED FOR ITCHING, IRRITATION OR OTHER( SCALP SCALE) USE 2-3 TIMES A WEEK. APPLY AND LATHER THEN RINSE OUT 5 MINUTES    mycophenolate (GENERIC EQUIVALENT) 250 MG capsule Take 2 capsules (500 mg) by mouth 2 times daily    niacinamide 500 MG tablet Take 1 tablet (500 mg) by mouth 2 times daily (with meals)    PARoxetine (PAXIL) 20 MG tablet TAKE 1 TABLET BY MOUTH EVERY MORNING    triamcinolone (KENALOG) 0.1 % external cream APPLY SPARINGLY TO THE AFFECTED AREA TWICE DAILY X 14 DAYS     Current Facility-Administered Medications   Medication    triamcinolone acetonide (KENALOG-10) injection 10 mg     There are no discontinued medications.    Physical Exam     Blood pressure (!) 147/88, pulse 82, temperature 98.1  F (36.7  C), temperature source Oral, weight 68.7 kg (151 lb 8 oz), last menstrual period 05/14/2014, SpO2 98%, not currently breastfeeding.   GENERAL APPEARANCE: alert and no distress  HENT: mouth  without ulcers or lesions  LYMPHATICS: no cervical or supraclavicular nodes  RESP: lungs clear to auscultation - no rales, rhonchi or wheezes  CV: regular rhythm, normal rate, no rub, no murmur  EDEMA: no LE edema bilaterally  ABDOMEN: soft, nondistended, nontender, bowel sounds normal  MS: extremities normal - no gross deformities noted, no evidence of inflammation in joints, no muscle tenderness  SKIN: no rash  NEURO: normal strength and tone, sensory exam grossly normal, mentation intact and speech normal  PSYCH: mentation appears normal and affect normal/bright  TX KIDNEY: normal  DIALYSIS ACCESS: none      Data         Latest Ref Rng & Units 10/23/2023     2:44 PM 10/9/2023     1:08 PM 9/22/2023     2:31 PM   Renal   Sodium 135 - 145 mmol/L 136  139  140    K 3.4 - 5.3 mmol/L 4.8  4.6  4.1    Cl 98 - 107 mmol/L 101  104  102    Cl (external) 98 - 107 mmol/L 101  104  102    CO2 22 - 29 mmol/L 22  21  23    Urea Nitrogen 6.0 - 20.0 mg/dL 40.1  49.4  45.7    Creatinine 0.51 - 0.95 mg/dL 2.07  1.90  1.82    Glucose 70 - 99 mg/dL 99  134  156    Calcium 8.6 - 10.0 mg/dL 11.0  10.7  11.2          Latest Ref Rng & Units 10/9/2023     1:08 PM 6/28/2021    10:36 AM 1/17/2020     2:58 PM   Bone Health   Phosphorus 2.5 - 4.5 mg/dL 3.8      Parathyroid Hormone Intact 15 - 65 pg/mL 33  66  59    Vit D Def 20 - 50 ng/mL 24  18  25          Latest Ref Rng & Units 10/23/2023     2:44 PM 10/9/2023     1:08 PM 9/22/2023     2:31 PM   Heme   WBC 4.0 - 11.0 10e3/uL 5.8  5.0  5.0    Hgb 11.7 - 15.7 g/dL 11.9  12.4  11.9    Plt 150 - 450 10e3/uL 127  116  113          Latest Ref Rng & Units 5/9/2023    11:37 AM 11/3/2022    11:21 AM 1/31/2022     1:55 PM   Liver   AP 35 - 104 U/L 109  103  101    TBili <=1.2 mg/dL 0.3  0.3  0.4    Bilirubin Direct <=0.5 mg/dL   0.1    Bilirubin Direct 0.00 - 0.30 mg/dL <0.20  <0.20     ALT 10 - 35 U/L 37  29  29    AST 10 - 35 U/L 35  39  28    Tot Protein 6.4 - 8.3 g/dL 7.4  7.4  7.4    Albumin  3.5 - 5.0 g/dL   4.1    Albumin 3.5 - 5.2 g/dL 4.5  4.5           Latest Ref Rng & Units 1/17/2020     2:58 PM 3/13/2015    12:30 PM 6/20/2012     7:40 AM   Pancreas   A1C 0 - 5.6 % 5.7      Lipase 73 - 393 U/L  169  79          Latest Ref Rng & Units 5/30/2023    11:41 AM 1/17/2020     2:58 PM 3/5/2015    10:11 AM   Iron studies   Iron 37 - 145 ug/dL  37 - 145 ug/dL 84     84  61  83    Iron Saturation Index 15 - 46 %  21  29    Iron Sat Index 15 - 46 % 32      Ferritin 11 - 328 ng/mL 232  60  79          Latest Ref Rng & Units 10/17/2018     1:21 PM 2/19/2015     7:08 AM 2/17/2015     5:02 AM   [facility]   CVM DNA Quant  Plasma  EDTA PLASMA     CMV QUANT IU/ML CMVND^CMV DNA Not Detected [IU]/mL CMV DNA Not Detected  Canceled, Test credited   Quantity not sufficient  Notified Shanel LEMA @ 2673 on 2.19.15. JG  The MARIA ANTONIA AmpliPrep/MARIA ANTONIA TaqMan CMV Test is an FDA-approved in vitro nucleic   acid amplification test for the quantitation of cytomegalovirus DNA in human   plasma (EDTA plasma) using the MARIA ANTONIA AmpliPrep Instrument for automated viral   nucleic acid extraction and the MARIA ANTONIA TaqMan Analyzer or [employer] TaqMan for   automated Real Time amplification and detection of the viral nucleic acid   target.   Titer results are reported in International Units/mL (IU/mL using 1st WHO   International standard for Human Cytomegalovirus for Nucleic Acid Amplification   based assays. The conversion factor between CMV DNA copis/mL (as defined by the   Roche MARIA ANTONIA TaqMan CMV test) and International Units is the CMV DNA   concentration in IU/mL x 1.1 copies/IU = CMV DNA in copies/mL.   This assay has received FDA approval for the testing of human plasma only. The   Infect[facility] Diagnostic Laboratory at the [facility], [place], has validated the performance characteristics of the Roche   CMV assay for plasma, bronchial alveolar lavage/wash and urine.       LOG IU/ML OF CMVQNT <2.1  [Log_IU]/mL Not Calculated  Not Calculated     BK Spec    Plasma    BK Res BKNEG copies/mL   BK Virus DNA Not Detected    BK Log <2.7 Log copies/mL   Not Calculated   The Real-Time quantitative BK Virus assay was developed and its performance   characteristics determined by the Infectious Diseases Diagnostic Laboratory at   the [facility] in [place], [place]. The   primers and probes for each analyte are Analyte Specific Reagents (ASRs)   manufactured by [employer].   ASRs are used in many laboratory tests necessary for standard medical care and   generally do not require U.S. Food and Drug Administration approval. The FDA   has determined that such clearance or approval is not necessary.   This test is used for clinical purposes. It should not be regarded as   investigational or for research. This laboratory is certified under the   Clinical Laboratory Improvement Amendments of 1988 (CLIA-88) as qualified to   perform high complexity clinical laboratory testing.      EBV DNA COPIES/ML EBVNEG^EBV DNA Not Detected [Copies]/mL EBV DNA Not Detected      EBV DNA LOG OF COPIES <2.7 [Log_copies]/mL Not Calculated               Recent Labs   Lab Test 05/30/23  1141 06/13/23  1152 09/22/23  1431 10/23/23  1444   DOSMPA 5/29/2023  11:00 PM 6/12/2023  10:00 PM  --   --    MPACID 6.23* 1.47 1.22 6.10*   MPAG 118.7* 53.5 60.6 118.3*

## 2023-11-02 NOTE — NURSING NOTE
Chief Complaint   Patient presents with    RECHECK       BP (!) 147/88   Pulse 82   Temp 98.1  F (36.7  C) (Oral)   Wt 68.7 kg (151 lb 8 oz)   LMP 05/14/2014 (Approximate)   SpO2 98%   BMI 28.63 kg/m      Alexandr Alonso on 11/2/2023 at 4:12 PM

## 2023-11-02 NOTE — LETTER
11/2/2023         RE: Reina Quan  [address] [facility] [address]        Dear Colleague,    Thank you for referring your patient, Reina Quan, to the [facility]ANSPL[facility]. Please see a copy of my visit note below.        CHRONIC TRANSPLANT NEPHROLOGY VISIT    Assessment & Plan  # LDKT: Trend up over time   - Baseline Creatinine:  ~ 1.8-2.1   - Proteinuria: Mild (0.5-1.0 grams). UPCR 0.95g/g 5/13/23   - Date DSA Last Checked: Feb/2015      Latest DSA: No   - BK Viremia: Not checked recently due to time from transplant   - Kidney Tx Biopsy: No   -Labs q3 months    # Immunosuppression: Mycophenolate mofetil (dose 500 mg every 12 hours)   - Continue with intensive monitoring of immunosuppression for efficacy and toxicity.   - Changes: No. Switched from sirolimus to MMF in 5/2023    # Infection Prophylaxis:   - PJP: None    # Hypertension: Not checked recently, borderline control in 130s systolic at clinic visits;  Goal BP: < 130/80   - Changes: Yes - start amlodipine 5mg at bedtime         # Anemia in Chronic Renal Disease: Hgb: Stable      BEVERLY: No   - Iron studies: Not checked recently    # Mineral Bone Disorder:   - Secondary renal hyperparathyroidism vs primary hyperparathyroidism vs 1 alpha hydroxylating condition  - PTH level: Normal (18-80 pg/ml)        On treatment: None, start cinacalcet 30mg daily for hyperCa   - Vitamin D; level: Normal        On supplement: No  - Calcium; level: High        On supplement: No  - Phosphorus; level: Normal        On supplement: No    -Previously evaluated 1/2020 for hyperCa with negative SPEP w/ ifx, K/L. PTHrP slightly elevated  -Start cinacalcet 30mg daily   -Obtain 1,25 vitamin D with next check     # Electrolytes:   - Potassium; level: Normal        On supplement: No  - Magnesium; level: Not checked recently, but was normal last check        On supplement: No  - Bicarbonate; level: Normal        On supplement: No    # Depression: on  Paxil. Stable.    # Skin Cancer: New lesions: several. Following with Dermatology.   -Biopsies 10/17/23 showed SCC of R proximal leg and superficially invasive SCC R lateral thigh.   -She has Mohs scheduled 12/12/23   - Discussed sun protection and recommend regular follow up with Dermatology.    # Health Maintenance and Vaccination Review: Recommend:  COVID, flu, Shingrix, PCV 20 . Refer for colonoscopy    # Medical Compliance: No.  Evidence of infrequent transplant follow-up.  - Discussed importance of checking labs regularly as recommended, taking medications as prescribed and attending scheduled medical appointments.    # Transplant History:  Etiology of Kidney Failure: Focal segmental glomerulosclerosis (FSGS)  Tx: LDKT  Transplant: 6/2/1992 (Kidney)  Significant changes in immunosuppression: None  Significant transplant-related complications: None    Transplant Office Phone Number: [phone]    Assessment and plan was discussed with the patient and she voiced her understanding and agreement.    Return visit: Return in about 1 year (around 11/2/2024).    Kan Sibley MD         Chief Complaint  Ms. Quan is a 58 year old here for kidney transplant and immunosuppression management.    History of Present Illness  The patient overall feels well. She denies any recent hospitalizations. She denies nausea, vomiting, diarrhea, fever, chills, shortness of breath, chest pain, LE edema, unintentional weight loss, nights sweats, dysuria, hematuria.     She has been feeling more tired lately. She is still working full time as an  and selling window treatments.     Home BP: Not checked    Problem List  Patient Active Problem List   Diagnosis     Ulcerative colitis (H)     Migraine     Allergic rhinitis     Hearing loss     Kidney replaced by transplant     Lichen planus     Giant Platelet syndrome     Nephrotic syndrome in diseases classified elsewhere     Major depression in partial remission  (H24)     Actinic keratosis     CKD (chronic kidney disease) stage 3, GFR 30-59 ml/min (H)     High risk medications (not anticoagulants) long-term use     Vitiligo     History of SCC (squamous cell carcinoma) of skin     Lichen planopilaris     Hyperlipidemia with target LDL less than 130     AK (actinic keratosis)     History of nonmelanoma skin cancer     HTN (hypertension)     Immunosuppression (H24)     Neoplasm of unspecified behavior of bone, soft tissue, and skin     Porokeratosis       Allergies  Allergies   Allergen Reactions     Ampicillin Swelling     Swelling of mouth and tongue.      Seasonal Allergies Other (See Comments)     Itchy eyes and rhinitis.       Medications  Current Outpatient Medications   Medication Sig     atorvastatin (LIPITOR) 40 MG tablet Take 1 tablet (40 mg) by mouth At Bedtime     clobetasol (TEMOVATE) 0.05 % external ointment Apply topically 2 times daily Use sparingly at active areas of dermatitis for no more than 14 days or until resolved, whichever is sooner.     clobetasol (TEMOVATE) 0.05 % external solution Apply topically daily as needed (itchyy scalp) Apply to scalp daily as needed.     clobetasol propionate (CLOBEX) 0.05 % external shampoo APPLY IN SHOWER TO SCALP TWICE WEEKLY     ketoconazole (NIZORAL) 2 % external shampoo APPLY TOPICALLY DAILY AS NEEDED FOR ITCHING, IRRITATION OR OTHER( SCALP SCALE) USE 2-3 TIMES A WEEK. APPLY AND LATHER THEN RINSE OUT 5 MINUTES     mycophenolate (GENERIC EQUIVALENT) 250 MG capsule Take 2 capsules (500 mg) by mouth 2 times daily     niacinamide 500 MG tablet Take 1 tablet (500 mg) by mouth 2 times daily (with meals)     PARoxetine (PAXIL) 20 MG tablet TAKE 1 TABLET BY MOUTH EVERY MORNING     triamcinolone (KENALOG) 0.1 % external cream APPLY SPARINGLY TO THE AFFECTED AREA TWICE DAILY X 14 DAYS     Current Facility-Administered Medications   Medication     triamcinolone acetonide (KENALOG-10) injection 10 mg     There are no discontinued  medications.    Physical Exam    Blood pressure (!) 147/88, pulse 82, temperature 98.1  F (36.7  C), temperature source Oral, weight 68.7 kg (151 lb 8 oz), last menstrual period 05/14/2014, SpO2 98%, not currently breastfeeding.   GENERAL APPEARANCE: alert and no distress  HENT: mouth without ulcers or lesions  LYMPHATICS: no cervical or supraclavicular nodes  RESP: lungs clear to auscultation - no rales, rhonchi or wheezes  CV: regular rhythm, normal rate, no rub, no murmur  EDEMA: no LE edema bilaterally  ABDOMEN: soft, nondistended, nontender, bowel sounds normal  MS: extremities normal - no gross deformities noted, no evidence of inflammation in joints, no muscle tenderness  SKIN: no rash  NEURO: normal strength and tone, sensory exam grossly normal, mentation intact and speech normal  PSYCH: mentation appears normal and affect normal/bright  TX KIDNEY: normal  DIALYSIS ACCESS: none      Data        Latest Ref Rng & Units 10/23/2023     2:44 PM 10/9/2023     1:08 PM 9/22/2023     2:31 PM   Renal   Sodium 135 - 145 mmol/L 136  139  140    K 3.4 - 5.3 mmol/L 4.8  4.6  4.1    Cl 98 - 107 mmol/L 101  104  102    Cl (external) 98 - 107 mmol/L 101  104  102    CO2 22 - 29 mmol/L 22  21  23    Urea Nitrogen 6.0 - 20.0 mg/dL 40.1  49.4  45.7    Creatinine 0.51 - 0.95 mg/dL 2.07  1.90  1.82    Glucose 70 - 99 mg/dL 99  134  156    Calcium 8.6 - 10.0 mg/dL 11.0  10.7  11.2          Latest Ref Rng & Units 10/9/2023     1:08 PM 6/28/2021    10:36 AM 1/17/2020     2:58 PM   Bone Health   Phosphorus 2.5 - 4.5 mg/dL 3.8      Parathyroid Hormone Intact 15 - 65 pg/mL 33  66  59    Vit D Def 20 - 50 ng/mL 24  18  25          Latest Ref Rng & Units 10/23/2023     2:44 PM 10/9/2023     1:08 PM 9/22/2023     2:31 PM   Heme   WBC 4.0 - 11.0 10e3/uL 5.8  5.0  5.0    Hgb 11.7 - 15.7 g/dL 11.9  12.4  11.9    Plt 150 - 450 10e3/uL 127  116  113          Latest Ref Rng & Units 5/9/2023    11:37 AM 11/3/2022    11:21 AM 1/31/2022     1:55  PM   Liver   AP 35 - 104 U/L 109  103  101    TBili <=1.2 mg/dL 0.3  0.3  0.4    Bilirubin Direct <=0.5 mg/dL   0.1    Bilirubin Direct 0.00 - 0.30 mg/dL <0.20  <0.20     ALT 10 - 35 U/L 37  29  29    AST 10 - 35 U/L 35  39  28    Tot Protein 6.4 - 8.3 g/dL 7.4  7.4  7.4    Albumin 3.5 - 5.0 g/dL   4.1    Albumin 3.5 - 5.2 g/dL 4.5  4.5           Latest Ref Rng & Units 1/17/2020     2:58 PM 3/13/2015    12:30 PM 6/20/2012     7:40 AM   Pancreas   A1C 0 - 5.6 % 5.7      Lipase 73 - 393 U/L  169  79          Latest Ref Rng & Units 5/30/2023    11:41 AM 1/17/2020     2:58 PM 3/5/2015    10:11 AM   Iron studies   Iron 37 - 145 ug/dL  37 - 145 ug/dL 84     84  61  83    Iron Saturation Index 15 - 46 %  21  29    Iron Sat Index 15 - 46 % 32      Ferritin 11 - 328 ng/mL 232  60  79          Latest Ref Rng & Units 10/17/2018     1:21 PM 2/19/2015     7:08 AM 2/17/2015     5:02 AM   [facility] Txp Virology   CVM DNA Quant  Plasma  EDTA PLASMA     CMV QUANT IU/ML CMVND^CMV DNA Not Detected [IU]/mL CMV DNA Not Detected  Canceled, Test credited   Quantity not sufficient  Notified Shanel LEMA @ [id number] on 2.19.15. JG  The MARIA ANTONIA AmpliPrep/MARIA ANTONIA TaqMan CMV Test is an FDA-approved in vitro nucleic   acid amplification test for the quantitation of cytomegalovirus DNA in human   plasma (EDTA plasma) using the MARIA ANTONIA AmpliPrep Instrument for automated viral   nucleic acid extraction and the MARIA ANTONIA TaqMan Analyzer or MailMag TaqMan for   automated Real Time amplification and detection of the viral nucleic acid   target.   Titer results are reported in International Units/mL (IU/mL using 1st WHO   International standard for Human Cytomegalovirus for Nucleic Acid Amplification   based assays. The conversion factor between CMV DNA copis/mL (as defined by the   Roche MARIA ANTONIA TaqMan CMV test) and International Units is the CMV DNA   concentration in IU/mL x 1.1 copies/IU = CMV DNA in copies/mL.   This assay has received FDA approval for the testing of human  plasma only. The   Infectious Disease Diagnostic Laboratory at the [facility], [place], has validated the performance characteristics of the Roche   CMV assay for plasma, bronchial alveolar lavage/wash and urine.       LOG IU/ML OF CMVQNT <2.1 [Log_IU]/mL Not Calculated  Not Calculated     BK Spec    Plasma    BK Res BKNEG copies/mL   BK Virus DNA Not Detected    BK Log <2.7 Log copies/mL   Not Calculated   The Real-Time quantitative BK Virus assay was developed and its performance   characteristics determined by the Infectious Diseases Diagnostic Laboratory at   the [facility] in [place], [place]. The   primers and probes for each analyte are Analyte Specific Reagents (ASRs)   manufactured by [employer].   ASRs are used in many laboratory tests necessary for standard medical care and   generally do not require U.S. Food and Drug Administration approval. The FDA   has determined that such clearance or approval is not necessary.   This test is used for clinical purposes. It should not be regarded as   investigational or for research. This laboratory is certified under the   Clinical Laboratory Improvement Amendments of 1988 (CLIA-88) as qualified to   perform high complexity clinical laboratory testing.      EBV DNA COPIES/ML EBVNEG^EBV DNA Not Detected [Copies]/mL EBV DNA Not Detected      EBV DNA LOG OF COPIES <2.7 [Log_copies]/mL Not Calculated               Recent Labs   Lab Test 05/30/23  1141 06/13/23  1152 09/22/23  1431 10/23/23  1444   DOSMPA 5/29/2023  11:00 PM 6/12/2023  10:00 PM  --   --    MPACID 6.23* 1.47 1.22 6.10*   MPAG 118.7* 53.5 60.6 118.3*         Again, thank you for allowing me to participate in the care of your patient.        Sincerely,        Kan Sibley MD

## 2023-11-03 ENCOUNTER — TELEPHONE (OUTPATIENT)
Dept: TRANSPLANT | Facility: CLINIC | Age: 58
End: 2023-11-03
Payer: COMMERCIAL

## 2023-11-03 DIAGNOSIS — Z94.0 KIDNEY TRANSPLANTED: Primary | ICD-10-CM

## 2023-11-03 DIAGNOSIS — Z48.298 AFTERCARE FOLLOWING ORGAN TRANSPLANT: ICD-10-CM

## 2023-11-03 DIAGNOSIS — T86.10 COMPLICATIONS, KIDNEY TRANSPLANT: ICD-10-CM

## 2023-11-03 DIAGNOSIS — E83.52 HYPERCALCEMIA: ICD-10-CM

## 2023-11-03 NOTE — TELEPHONE ENCOUNTER
Patient is eligible to fill with [facility].  Patient currently filling with   [id number] - [place], [place] - [address] AT [facility] OF [address] [facility]    pharmacy, Spoke with patient she wanted to stick with [employer] at this time [facility] .

## 2023-11-03 NOTE — TELEPHONE ENCOUNTER
Kan Sibley MD Ututalum, Teresa, RN  I ordered cinacalcet for her. With next labs please repeat PTH, ionized calcium, 1,25 vitamin D.    Thanks,  Kan        OUTCOME:  Orders placed.

## 2023-11-09 ENCOUNTER — HOSPITAL ENCOUNTER (OUTPATIENT)
Dept: ULTRASOUND IMAGING | Facility: CLINIC | Age: 58
Discharge: HOME | End: 2023-11-09
Attending: INTERNAL MEDICINE | Admitting: INTERNAL MEDICINE
Payer: COMMERCIAL

## 2023-11-09 DIAGNOSIS — E83.52 HYPERCALCEMIA: ICD-10-CM

## 2023-11-09 PROCEDURE — 76536 US EXAM OF HEAD AND NECK: CPT

## 2023-11-10 ENCOUNTER — TELEPHONE (OUTPATIENT)
Dept: TRANSPLANT | Facility: CLINIC | Age: 58
End: 2023-11-10
Payer: COMMERCIAL

## 2023-11-10 DIAGNOSIS — Z48.298 AFTERCARE FOLLOWING ORGAN TRANSPLANT: ICD-10-CM

## 2023-11-10 DIAGNOSIS — E83.52 HYPERCALCEMIA: ICD-10-CM

## 2023-11-10 DIAGNOSIS — T86.10 COMPLICATIONS, KIDNEY TRANSPLANT: Primary | ICD-10-CM

## 2023-11-10 DIAGNOSIS — Z94.0 KIDNEY TRANSPLANTED: ICD-10-CM

## 2023-11-10 NOTE — TELEPHONE ENCOUNTER
11/9/23 US Parathyroid:  IMPRESSION: Thyroid measures 2.9 x 1.2 x 1.0 cm on the right and 3.8 x  1.1 x 0.7 cm on the left. Isthmus is 1 mm thick. No thyroid nodules.  No nodules to suggest a parathyroid adenoma or hyperplasia  demonstrated.     Latest Reference Range & Units 10/09/23 13:08   Vitamin D, Total (25-Hydroxy) 20 - 50 ng/mL 24       Kan Sibley MD Ututalum, Teresa, RN  Let's get 1,25 vitamin D level and if that is not elevated would obtain sestamibi of parathyroids      Kan Sibley MD Ututalum, Teresa, RN  Ok then I don't think this is parathyoid mediated. Something else is going on. Please obtain PTHrP as well      ----- Message -----  From: Selena Lee RN  Sent: 11/10/2023  10:22 AM CST  To: Kan Sibley MD    It was 24 on 10/9/23. Just repeat?      OUTCOME:  Order placed for PTH related peptide.

## 2023-11-12 PROBLEM — D48.5 NEOPLASM OF UNCERTAIN BEHAVIOR OF SKIN: Status: ACTIVE | Noted: 2023-11-12

## 2023-11-12 PROBLEM — L30.9 DERMATITIS: Status: ACTIVE | Noted: 2023-11-12

## 2023-11-13 ENCOUNTER — TELEPHONE (OUTPATIENT)
Dept: ENDOCRINOLOGY | Facility: CLINIC | Age: 58
End: 2023-11-13
Payer: COMMERCIAL

## 2023-11-13 NOTE — TELEPHONE ENCOUNTER
Spoke with pt and scheduled sooner per Georgina BECKMAN on 1/9   Schedule [facility] spot with Dr Vital or Dr Loyd  within 1-2 months per protocol Georgina Bagley, RK [place] on 11/13/2023 at 5:15 PM

## 2023-11-13 NOTE — TELEPHONE ENCOUNTER
Kan Sibley MD Ututalum, Teresa, RN Sorry for the back and forth. Please obtain SPEP w/ ifx, K/L, UPEP w/ fix and refer to endocrinology. I don't know where the calcium is coming from      PLAN:  SPEP with immunofixation  UPEP with immunofixation  K/L  Refer to Endo      OUTCOME:  Orders placed.  E[employer] message sent.

## 2023-12-04 ENCOUNTER — LAB (OUTPATIENT)
Dept: LAB | Facility: CLINIC | Age: 58
End: 2023-12-04
Payer: COMMERCIAL

## 2023-12-04 DIAGNOSIS — E55.9 VITAMIN D DEFICIENCY: ICD-10-CM

## 2023-12-04 DIAGNOSIS — Z79.899 ENCOUNTER FOR LONG-TERM (CURRENT) USE OF HIGH-RISK MEDICATION: ICD-10-CM

## 2023-12-04 DIAGNOSIS — N18.32 STAGE 3B CHRONIC KIDNEY DISEASE (H): Primary | ICD-10-CM

## 2023-12-04 DIAGNOSIS — E83.52 HYPERCALCEMIA: ICD-10-CM

## 2023-12-04 DIAGNOSIS — Z48.298 AFTERCARE FOLLOWING ORGAN TRANSPLANT: ICD-10-CM

## 2023-12-04 DIAGNOSIS — T86.10 COMPLICATIONS, KIDNEY TRANSPLANT: ICD-10-CM

## 2023-12-04 DIAGNOSIS — Z94.0 KIDNEY TRANSPLANTED: ICD-10-CM

## 2023-12-04 LAB
CA-I BLD-MCNC: 5.4 MG/DL (ref 4.4–5.2)
CREAT UR-MCNC: 93.3 MG/DL
MICROALBUMIN UR-MCNC: 466 MG/L
MICROALBUMIN/CREAT UR: 499.46 MG/G CR (ref 0–25)
TOTAL PROTEIN SERUM FOR ELP: 7.3 G/DL (ref 6.4–8.3)
VIT D+METAB SERPL-MCNC: 19 NG/ML (ref 20–50)

## 2023-12-04 PROCEDURE — 36415 COLL VENOUS BLD VENIPUNCTURE: CPT

## 2023-12-04 PROCEDURE — 82330 ASSAY OF CALCIUM: CPT

## 2023-12-04 PROCEDURE — 83521 IG LIGHT CHAINS FREE EACH: CPT

## 2023-12-04 PROCEDURE — 99000 SPECIMEN HANDLING OFFICE-LAB: CPT

## 2023-12-04 PROCEDURE — 84155 ASSAY OF PROTEIN SERUM: CPT

## 2023-12-04 PROCEDURE — 86334 IMMUNOFIX E-PHORESIS SERUM: CPT | Performed by: PATHOLOGY

## 2023-12-04 PROCEDURE — 84165 PROTEIN E-PHORESIS SERUM: CPT | Performed by: PATHOLOGY

## 2023-12-04 PROCEDURE — 84166 PROTEIN E-PHORESIS/URINE/CSF: CPT | Performed by: PATHOLOGY

## 2023-12-04 PROCEDURE — 82570 ASSAY OF URINE CREATININE: CPT

## 2023-12-04 PROCEDURE — 82043 UR ALBUMIN QUANTITATIVE: CPT

## 2023-12-04 PROCEDURE — 82306 VITAMIN D 25 HYDROXY: CPT

## 2023-12-04 PROCEDURE — 86335 IMMUNFIX E-PHORSIS/URINE/CSF: CPT | Performed by: PATHOLOGY

## 2023-12-04 PROCEDURE — 82542 COL CHROMOTOGRAPHY QUAL/QUAN: CPT | Mod: 90

## 2023-12-05 ENCOUNTER — DOCUMENTATION ONLY (OUTPATIENT)
Dept: TRANSPLANT | Facility: CLINIC | Age: 58
End: 2023-12-05
Payer: COMMERCIAL

## 2023-12-05 ENCOUNTER — TELEPHONE (OUTPATIENT)
Dept: TRANSPLANT | Facility: CLINIC | Age: 58
End: 2023-12-05
Payer: COMMERCIAL

## 2023-12-05 DIAGNOSIS — Z48.298 AFTERCARE FOLLOWING ORGAN TRANSPLANT: ICD-10-CM

## 2023-12-05 DIAGNOSIS — Z94.0 KIDNEY TRANSPLANTED: Primary | ICD-10-CM

## 2023-12-05 DIAGNOSIS — T86.10 COMPLICATIONS, KIDNEY TRANSPLANT: ICD-10-CM

## 2023-12-05 LAB
ALBUMIN MFR UR ELPH: 59.4 %
ALBUMIN SERPL ELPH-MCNC: 4.6 G/DL (ref 3.7–5.1)
ALPHA1 GLOB MFR UR ELPH: 10.1 %
ALPHA1 GLOB SERPL ELPH-MCNC: 0.3 G/DL (ref 0.2–0.4)
ALPHA2 GLOB MFR UR ELPH: 7.6 %
ALPHA2 GLOB SERPL ELPH-MCNC: 0.8 G/DL (ref 0.5–0.9)
B-GLOBULIN MFR UR ELPH: 11.6 %
B-GLOBULIN SERPL ELPH-MCNC: 0.7 G/DL (ref 0.6–1)
GAMMA GLOB MFR UR ELPH: 11.3 %
GAMMA GLOB SERPL ELPH-MCNC: 0.9 G/DL (ref 0.7–1.6)
KAPPA LC FREE SER-MCNC: 4.46 MG/DL (ref 0.33–1.94)
KAPPA LC FREE/LAMBDA FREE SER NEPH: 1.11 {RATIO} (ref 0.26–1.65)
LAMBDA LC FREE SERPL-MCNC: 4.03 MG/DL (ref 0.57–2.63)
M PROTEIN MFR UR ELPH: 0 %
M PROTEIN SERPL ELPH-MCNC: 0 G/DL
PROT ELPH PNL UR ELPH: NORMAL
PROT PATTERN SERPL ELPH-IMP: NORMAL
PROT PATTERN SERPL IFE-IMP: NORMAL
PROT PATTERN UR ELPH-IMP: ABNORMAL

## 2023-12-05 NOTE — PROGRESS NOTES
Kan Sibley MD Ututalum, Teresa, RN  Ok thanks. That is normal. Need PTHrP and endocrinology     ----- Message -----  From: Selena Lee RN  Sent: 12/5/2023   1:35 PM CST  To: Kan Sibley MD    Vit d 1,25 was 24 from 10/9/23  ----- Message -----  From: Kan Sibley MD  Sent: 12/5/2023   5:44 AM CST  To: Selena Lee RN    They didn't draw PTHrP or 1,25 vitamin D        PTHrP result is pending

## 2023-12-08 ENCOUNTER — DOCUMENTATION ONLY (OUTPATIENT)
Dept: TRANSPLANT | Facility: CLINIC | Age: 58
End: 2023-12-08
Payer: COMMERCIAL

## 2023-12-08 LAB — PTH RELATED PROT SERPL-SCNC: 4.7 PMOL/L

## 2023-12-11 ENCOUNTER — TELEPHONE (OUTPATIENT)
Dept: TRANSPLANT | Facility: CLINIC | Age: 58
End: 2023-12-11
Payer: COMMERCIAL

## 2023-12-11 NOTE — TELEPHONE ENCOUNTER
ISSUE: Elevated calcium     Latest Reference Range & Units 06/28/21 10:36 12/04/23 11:52   PTH Related Peptide Test 0.0 - 3.4 pmol/L 4.4 (H) 4.7 (H)   (H): Data is abnormally high    Kan Sibley MD Ututalum, Teresa RN  PTHrP unchanged from prior. She needs to see endocrinology please    --- scheduled to see Endo January 9, 2024

## 2023-12-18 ENCOUNTER — PRE VISIT (OUTPATIENT)
Dept: DERMATOLOGY | Facility: CLINIC | Age: 58
End: 2023-12-18

## 2023-12-18 ENCOUNTER — OFFICE VISIT (OUTPATIENT)
Dept: DERMATOLOGY | Facility: CLINIC | Age: 58
End: 2023-12-18
Payer: COMMERCIAL

## 2023-12-18 VITALS — SYSTOLIC BLOOD PRESSURE: 139 MMHG | DIASTOLIC BLOOD PRESSURE: 89 MMHG | HEART RATE: 74 BPM

## 2023-12-18 DIAGNOSIS — C44.722 SQUAMOUS CELL CARCINOMA OF RIGHT LOWER LEG: ICD-10-CM

## 2023-12-18 DIAGNOSIS — C44.722 SQUAMOUS CELL CARCINOMA OF RIGHT THIGH: Primary | ICD-10-CM

## 2023-12-18 PROCEDURE — 17313 MOHS 1 STAGE T/A/L: CPT | Mod: GC | Performed by: DERMATOLOGY

## 2023-12-18 PROCEDURE — 12032 INTMD RPR S/A/T/EXT 2.6-7.5: CPT | Mod: GC | Performed by: DERMATOLOGY

## 2023-12-18 ASSESSMENT — PAIN SCALES - GENERAL: PAINLEVEL: NO PAIN (0)

## 2023-12-18 NOTE — NURSING NOTE
Chief Complaint   Patient presents with    mohs     Right leg proximal and right lateral thigh     Katlyn T CMA

## 2023-12-18 NOTE — LETTER
12/18/2023       RE: Reina Quan  [address] [facility] [address]     Dear Colleague,    Thank you for referring your patient, Reina Quan, to the [facility]MATOLOGIC SURG[facility]IC [place] [facility]. Please see a copy of my visit note below.    [facility] Dermatologic Surgery Clinic [place] Procedure Note      Date of Service:  Dec 18, 2023  Surgery: Mohs micrographic surgery    Case 1  Repair Type: Intermediate  Repair Size: 2.5 cm  Suture Material: 4-0 Monocryl / 4-0 Prolene  Tumor Type: Squamous Cell Carcinoma  Location: R Lateral Thigh  Derm-Path Accession #: [id number]   PreOp Size: 0.7 x 0.8 cm  PostOp Size: 1.1 x 1.3 cm  Mohs Accession #:   Level of Defect: Fat      Procedure:  We discussed the principles of treatment and most likely complications including scarring, bleeding, infection, swelling, pain, crusting, nerve damage, large wound,  incomplete excision, wound dehiscence,  nerve damage, recurrence, and a second procedure may be recommended to obtain the best cosmetic or functional result.    Informed consent was obtained and the patient underwent the procedure as follows:  The patient was placed supine on the operating table.  The cancer was identified, outlined with a marker, and verified by the patient.  The entire surgical field was prepped with Hibiclens.  The surgical site was anesthetized using Lidocaine 1% with epi 1:100,000.      The area of clinically apparent tumor was not debulked. The layer of tissue was then surgically excised using a #15 blade and was then transferred onto a specimen sheet maintaining the orientation of the specimen. Hemostasis was obtained using electrofulguration. The wound site was then covered with a dressing while the tissue samples were processed for examination.    The excised tissue was transported to the Mohs histology laboratory maintaining the tissue  orientation.  The tissue specimen was relaxed so that the entire surgical margin was in a a single horizontal plane for sectioning and inked for precise mapping.  A precise reference map was drawn to reflect the sectioning of the specimen, colored inking of the margins, and orientation on the patient. The tissue was processed using horizontal sectioning of the base and continuous peripheral margins.  The histopathologic sections were reviewed in conjunction with the reference map.    Total blocks: 1    Total slides:  2    There were no cancer cells visualized on examination, therefore Mohs surgery was complete.    REPAIR: An intermediate layered linear closure was selected as the procedure which would maximally preserve both function and cosmesis.    After the excision of the tumor, the area was carefully undermined. Hemostasis was obtained with electrocoagulation.  Closure was oriented so that the wound was parallel to the patient's relaxed skin tension lines. The subcutaneous and dermal layers were then closed with 4-0 Monocryl sutures. The epidermis was then carefully approximated along the length of the wound using 4-0 Prolene simple running sutures.     Estimated blood loss was less than 10 ml for all surgical sites. A sterile pressure dressing was applied and wound care instructions, with a written handout, were given. The patient was discharged from the Dermatologic Surgery Center alert and ambulatory.    Suture removal: 14 days     [facility] Dermatologic Surgery Clinic [place] Procedure Note      Date of Service:  Dec 18, 2023  Surgery: Mohs micrographic surgery    Case 2  Repair Type: Intermediate  Repair Size: 2.0 cm  Suture Material: 4-0 Monocryl / 4-0 Prolene  Tumor Type: Squamous Cell Carcinoma  Location: R Proximal Leg  Derm-Path Accession #: [id number]   PreOp Size: 0.3 x 0.8 cm  PostOp Size: 1.2 x 1.1 cm  Mohs Accession #:   Level of Defect: Fat      Procedure:  We discussed the  principles of treatment and most likely complications including scarring, bleeding, infection, swelling, pain, crusting, nerve damage, large wound,  incomplete excision, wound dehiscence,  nerve damage, recurrence, and a second procedure may be recommended to obtain the best cosmetic or functional result.    Informed consent was obtained and the patient underwent the procedure as follows:  The patient was placed supine on the operating table.  The cancer was identified, outlined with a marker, and verified by the patient.  The entire surgical field was prepped with Hibiclens.  The surgical site was anesthetized using Lidocaine 1% with epi 1:100,000.    The area of clinically apparent tumor was not debulked. The layer of tissue was then surgically excised using a #15 blade and was then transferred onto a specimen sheet maintaining the orientation of the specimen. Hemostasis was obtained using electrofulguration. The wound site was then covered with a dressing while the tissue samples were processed for examination.    The excised tissue was transported to the Mohs histology laboratory maintaining the tissue orientation.  The tissue specimen was relaxed so that the entire surgical margin was in a a single horizontal plane for sectioning and inked for precise mapping.  A precise reference map was drawn to reflect the sectioning of the specimen, colored inking of the margins, and orientation on the patient.  The tissue was processed using horizontal sectioning of the base and continuous peripheral margins.  The histopathologic sections were reviewed in conjunction with the reference map.    Total blocks: 1    Total slides:  2    There were no cancer cells visualized on examination, therefore Mohs surgery was complete.    REPAIR: An intermediate layered linear closure was selected as the procedure which would maximally preserve both function and cosmesis.    After the excision of the tumor, the area was carefully  undermined. Hemostasis was obtained with electrocoagulation.  Closure was oriented so that the wound was parallel to the patient's relaxed skin tension lines. The subcutaneous and dermal layers were then closed with 4-0 Monocryl sutures. The epidermis was then carefully approximated along the length of the wound using 4-0 Prolene simple running sutures.     Estimated blood loss was less than 10 ml for all surgical sites. A sterile pressure dressing was applied and wound care instructions, with a written handout, were given. The patient was discharged from the Dermatologic Surgery Center alert and ambulatory.    Suture removal: 14 days     Harini Torres PA-C performed the closures under supervision of Dr. Garfield Fortune.     The attending surgeon was present for key portion of procedure and always immediately available.    Scribe Disclosure:   I, REBECCA DODGE, am serving as a scribe; to document services personally performed by Garfield Fortune MD -based on data collection and the provider's statements to me.     Patricia Robbins MD  Micrographic Surgery and Dermatologic Oncology (MSDO) Fellow, PGY-5      Attending attestation:  I was present for key elements of the procedure and immediately available for all other portions of the procedure.  I have reviewed the note and edited it as necessary.    Garfield Fortune M.D.  Professor  Director of Dermatologic Surgery  Department of Dermatology  [facility]    Dermatology Surgery Clinic  [facility] Surgery [address] [address]

## 2023-12-18 NOTE — PROGRESS NOTES
[facility] [place] Procedure Note      Date of Service:  Dec 18, 2023  Surgery: Mohs micrographic surgery    Case 1  Repair Type: Intermediate  Repair Size: 2.5 cm  Suture Material: 4-0 Monocryl / 4-0 Prolene  Tumor Type: Squamous Cell Carcinoma  Location: R Lateral Thigh  Derm-Path Accession #: [id number]   PreOp Size: 0.7 x 0.8 cm  PostOp Size: 1.1 x 1.3 cm  Mohs Accession #:   Level of Defect: Fat      Procedure:  We discussed the principles of treatment and most likely complications including scarring, bleeding, infection, swelling, pain, crusting, nerve damage, large wound,  incomplete excision, wound dehiscence,  nerve damage, recurrence, and a second procedure may be recommended to obtain the best cosmetic or functional result.    Informed consent was obtained and the patient underwent the procedure as follows:  The patient was placed supine on the operating table.  The cancer was identified, outlined with a marker, and verified by the patient.  The entire surgical field was prepped with Hibiclens.  The surgical site was anesthetized using Lidocaine 1% with epi 1:100,000.      The area of clinically apparent tumor was not debulked. The layer of tissue was then surgically excised using a #15 blade and was then transferred onto a specimen sheet maintaining the orientation of the specimen. Hemostasis was obtained using electrofulguration. The wound site was then covered with a dressing while the tissue samples were processed for examination.    The excised tissue was transported to the Mohs histology laboratory maintaining the tissue orientation.  The tissue specimen was relaxed so that the entire surgical margin was in a a single horizontal plane for sectioning and inked for precise mapping.  A precise reference map was drawn to reflect the sectioning of the specimen, colored inking of the margins, and orientation on the patient. The tissue was processed using  horizontal sectioning of the base and continuous peripheral margins.  The histopathologic sections were reviewed in conjunction with the reference map.    Total blocks: 1    Total slides:  2    There were no cancer cells visualized on examination, therefore Mohs surgery was complete.    REPAIR: An intermediate layered linear closure was selected as the procedure which would maximally preserve both function and cosmesis.    After the excision of the tumor, the area was carefully undermined. Hemostasis was obtained with electrocoagulation.  Closure was oriented so that the wound was parallel to the patient's relaxed skin tension lines. The subcutaneous and dermal layers were then closed with 4-0 Monocryl sutures. The epidermis was then carefully approximated along the length of the wound using 4-0 Prolene simple running sutures.     Estimated blood loss was less than 10 ml for all surgical sites. A sterile pressure dressing was applied and wound care instructions, with a written handout, were given. The patient was discharged from the [facility] alert and ambulatory.    Suture removal: 14 days     [facility] [place] Procedure Note      Date of Service:  Dec 18, 2023  Surgery: Mohs micrographic surgery    Case 2  Repair Type: Intermediate  Repair Size: 2.0 cm  Suture Material: 4-0 Monocryl / 4-0 Prolene  Tumor Type: Squamous Cell Carcinoma  Location: R Proximal Leg  Derm-Path Accession #: [id number]   PreOp Size: 0.3 x 0.8 cm  PostOp Size: 1.2 x 1.1 cm  Mohs Accession #:   Level of Defect: Fat      Procedure:  We discussed the principles of treatment and most likely complications including scarring, bleeding, infection, swelling, pain, crusting, nerve damage, large wound,  incomplete excision, wound dehiscence,  nerve damage, recurrence, and a second procedure may be recommended to obtain the best cosmetic or functional result.    Informed consent was  obtained and the patient underwent the procedure as follows:  The patient was placed supine on the operating table.  The cancer was identified, outlined with a marker, and verified by the patient.  The entire surgical field was prepped with Hibiclens.  The surgical site was anesthetized using Lidocaine 1% with epi 1:100,000.    The area of clinically apparent tumor was not debulked. The layer of tissue was then surgically excised using a #15 blade and was then transferred onto a specimen sheet maintaining the orientation of the specimen. Hemostasis was obtained using electrofulguration. The wound site was then covered with a dressing while the tissue samples were processed for examination.    The excised tissue was transported to the Mohs histology laboratory maintaining the tissue orientation.  The tissue specimen was relaxed so that the entire surgical margin was in a a single horizontal plane for sectioning and inked for precise mapping.  A precise reference map was drawn to reflect the sectioning of the specimen, colored inking of the margins, and orientation on the patient.  The tissue was processed using horizontal sectioning of the base and continuous peripheral margins.  The histopathologic sections were reviewed in conjunction with the reference map.    Total blocks: 1    Total slides:  2    There were no cancer cells visualized on examination, therefore Mohs surgery was complete.    REPAIR: An intermediate layered linear closure was selected as the procedure which would maximally preserve both function and cosmesis.    After the excision of the tumor, the area was carefully undermined. Hemostasis was obtained with electrocoagulation.  Closure was oriented so that the wound was parallel to the patient's relaxed skin tension lines. The subcutaneous and dermal layers were then closed with 4-0 Monocryl sutures. The epidermis was then carefully approximated along the length of the wound using 4-0 Prolene simple  running sutures.     Estimated blood loss was less than 10 ml for all surgical sites. A sterile pressure dressing was applied and wound care instructions, with a written handout, were given. The patient was discharged from the [facility] alert and ambulatory.    Suture removal: 14 days     Harini Torres PA-C performed the closures under supervision of Dr. Garfield Fortune.     The attending surgeon was present for key portion of procedure and always immediately available.    Scribe Disclosure:   I, REBECCA DODGE, am serving as a scribe; to document services personally performed by Garfield Fortune MD -based on data collection and the provider's statements to me.     Patricia Robbins MD  Micrographic Surgery and Dermatologic Oncology (MSDO) Fellow, PGY-5      Attending attestation:  I was present for key elements of the procedure and immediately available for all other portions of the procedure.  I have reviewed the note and edited it as necessary.    Garfield Fortune M.D.  Professor  Director of Dermatologic Surgery  Department of Dermatology  [facility]    Dermatology Surgery Clinic  [facility] [place] [id number]

## 2023-12-18 NOTE — PATIENT INSTRUCTIONS
Wound care    I will experience scar, bleeding, swelling, pain, crusting and redness. I may experience incomplete removal or recurrence. Risks are bleeding, bruising, swelling, infection, nerve damage, & a large wound. A second procedure may be recommended to obtain the best cosmetic or functional result.       A three month office visit with your Surgeon is recommended for scar evaluation. Please reach out sooner if you have concerns about you surgical site/wound.    Caring for your skin after surgery    After your surgery, a pressure bandage will be placed over the area that has stitches. This is important to prevent bleeding. Please follow these instructions over the next 1 to 2 weeks. Following this regimen will help to prevent complications as your wound heals.     For the first 48 hours after your surgery:    Leave the pressure dressing on and keep it dry. If it should come loose, you may re-tape it, but do not take it off.  Relax and take it easy. Do not do any vigorous exercise or heavy lifting. This could cause the wound to bleed.  Post-Operative pain is usually mild. If you are able to take tylenol, You may take plain or extra-strength Tylenol (acetaminophen) As directed on the bottle (do not take more than 4,000mg in one day). If you are able to take ibuprofen, you can alternate the tylenol and ibuprofen.   Avoid alcohol as this may increase your tendency to bleed.   You may put an ice pack around the bandaged area for 20 minutes at a time as needed. This may help reduce swelling, bruising, and pain. Make sure the ice pack is waterproof so that the pressure bandage doesn t get wet.  If the wound is on the face try to sleep with your head elevated. Either in a recliner or propped up in bed, this will decrease swelling around the eyes.   You may see a small amount of drainage or blood on your pressure bandage. This is normal. However:  If drainage or bleeding continues or saturates the bandage, you will  "need to apply firm pressure over the bandage with a piece of gauze for 15 minutes.  If bleeding continues after applying pressure for 15 minutes, apply an ice pack to the bandaged area for 15 minutes.  If bleeding still continues, call our office or go to the nearest emergency room.    Remove pressure dressing 48 hours after surgery:    Carefully remove the pressure bandage. If it seems sticky or too difficult to get off, you may need to soak it off in the shower.  After the pressure dressing is removed, you may shower and get the wound wet. However, Do Not let the forceful stream of the shower hit the wound directly.  Follow these wound care and dressing change instructions:   In the shower wash the surgical site last with its own separate wash cloth.  You may allow water to run over the site. Take a clean wash cloth wet with soapy warm water and gently pat the suture site to help remove any crust or drainage.   Do Not rub or scrub the site    After site is clean pat dry and apply a thin layer of Vaseline ointment  over the suture site with a cotton swab or clean finger.   Cover the suture site with Telfa (non-stick) dressing. You may tape a piece of gauze over the Telfa for extra protection if you wish.  Continue wound care at least once a day, twice if you are active or around a contaminated environment.  Continue daily wound care until your surgical site is completely healed.   Sutures need to be removed in two weeks (2024)      If you are able to take acetaminophen (\"Tylenol,\" etc.) and ibuprofen (\"Advil\" or \"Motrin,\" etc.), then you may STAGGER these medications by taking 400 mg of ibuprofen (usually two tabs) every 8 hours and 1,000 mg of acetaminophen (e.g., two tabs of extra-strength Tylenol) every 8 hours.    This means, for example, that you could take the followin,000 mg of Tylenol, followed 4 hours later by 400 mg Ibuprofen, followed 4 hours later with 1,000 mg of Tylenol, followed 4 hours " later by 400 mg Ibuprofen, followed 4 hours later with 1,000 mg of Tylenol, and so forth.     Essentially, you can take either 1,000 mg of Tylenol or 400 mg of ibuprofen in alternating fashion EVERY FOUR HOURS.    Do NOT exceed more than 4,000 mg of Tylenol or 3,200 mg of ibuprofen per 24 hours. If you are not able to take Tylenol or ibuprofen as above due to other health issues (or a physician has told you directly that you are not allowed to take one of them, say due to pre-existing severe liver or kidney issues), then disregard the above directions.    Scientific evidence supports that this combination/schedule of pain medications is just as effective, if not more effective, than taking a narcotic pain medicine.       Follow up will be a 3 month scar evaluation either in person or via a telephone visit with you sending in a photo via [employer]. Unless you have been told to follow up sooner or if you have concerns and would like to be see sooner. Please call or send us in a [employer] message if possible and attach a photo.        What to expect:    The first couple of days your wound may be tender and may bleed slightly when doing wound care.  There may be swelling and bruising around the wound, especially if it is near the eyes. For your comfort, you may apply ice or cold compresses to the bruises after your have removed the pressure bandage.  The area around your wound may be numb for several weeks or even months.  You may experience periodic sharp pain or mild itching around the wound as it heals.   The suture line will look dark for a while but will lighten over time.       When to call us:    You have bleeding that will not stop after applying pressure and ice.  You have pain that is not controlled with Tylenol (acetaminophen.)  You have signs or symptoms of an infection such as:  Fever over 100 degrees Fahrenheit  Redness, warmth or foul-smelling drainage from the wound  If you have any questions, or are not  sure how to take care of the wound.    Phone numbers:    During business hours (M-F 8:00-4:30 p.m.)  [facility]-  [phone] Option 1 appt. desk  [phone]  Option 3 nurse triage line  ---------------------------------------------------------  Evenings/Weekends/Holidays  Hospital - [phone]   TTY for hearing [phone]  *Ask  to page dermatologist on-call  Emergency Lnkg-[phone]  TTY for hearing impaired- [phone]

## 2023-12-19 ENCOUNTER — TELEPHONE (OUTPATIENT)
Dept: DERMATOLOGY | Facility: CLINIC | Age: 58
End: 2023-12-19
Payer: COMMERCIAL

## 2023-12-19 NOTE — TELEPHONE ENCOUNTER
Follow up call completed following Mohs procedure with Dr. Fortune.     The following questions were asked:    What are you doing to manage your pain? Just a bit this morning- taking Advil   Is it helping? Yes  Are you applying ice? Yes  Have you had any noticeable bleeding through the bandage? No  Do you have any concerns? No      Please call [phone] option 3 if you have any additional questions.      Lizbeth BERMUDEZ RN

## 2023-12-22 NOTE — CONFIDENTIAL NOTE
RECORDS RECEIVED FROM: internal    DATE RECEIVED: 1.9.24   NOTES (FOR ALL VISITS) STATUS DETAILS   OFFICE NOTES from referring provider internal    Kan Sibley MD        MEDICATION LIST internal     IMAGING        CT (HEAD/NECK/CHEST/ABDOMEN) internal  12.1.22, 7/4/22,      LABS     DIABETES: HBGA1C, CREATININE, FASTING LIPIDS, MICROALBUMIN URINE, POTASSIUM, TSH, T4    THYROID: TSH, T4, CBC, THYRODLONULIN, TOTAL T3, FREE T4, CALCITONIN, CEA internal  Vitamin D- 12.4.23  Bmp- 10.23.23  Cbc- 10.23.23  Parathyroid horm- 10.9.23  Lipid- 5.9.23

## 2024-01-09 ENCOUNTER — LAB (OUTPATIENT)
Dept: LAB | Facility: CLINIC | Age: 59
End: 2024-01-09
Payer: COMMERCIAL

## 2024-01-09 ENCOUNTER — OFFICE VISIT (OUTPATIENT)
Dept: ENDOCRINOLOGY | Facility: CLINIC | Age: 59
End: 2024-01-09
Attending: INTERNAL MEDICINE
Payer: COMMERCIAL

## 2024-01-09 ENCOUNTER — PRE VISIT (OUTPATIENT)
Dept: ENDOCRINOLOGY | Facility: CLINIC | Age: 59
End: 2024-01-09

## 2024-01-09 VITALS
DIASTOLIC BLOOD PRESSURE: 86 MMHG | BODY MASS INDEX: 27.42 KG/M2 | HEIGHT: 62 IN | SYSTOLIC BLOOD PRESSURE: 136 MMHG | HEART RATE: 87 BPM | OXYGEN SATURATION: 96 % | WEIGHT: 149 LBS

## 2024-01-09 DIAGNOSIS — Z48.298 AFTERCARE FOLLOWING ORGAN TRANSPLANT: ICD-10-CM

## 2024-01-09 DIAGNOSIS — Z94.0 KIDNEY REPLACED BY TRANSPLANT: ICD-10-CM

## 2024-01-09 DIAGNOSIS — Z98.890 OTHER SPECIFIED POSTPROCEDURAL STATES: ICD-10-CM

## 2024-01-09 DIAGNOSIS — E83.52 HYPERCALCEMIA: ICD-10-CM

## 2024-01-09 DIAGNOSIS — Z79.899 ENCOUNTER FOR LONG-TERM CURRENT USE OF MEDICATION: ICD-10-CM

## 2024-01-09 DIAGNOSIS — Z94.0 KIDNEY TRANSPLANTED: ICD-10-CM

## 2024-01-09 LAB
ANION GAP SERPL CALCULATED.3IONS-SCNC: 12 MMOL/L (ref 7–15)
BUN SERPL-MCNC: 43.2 MG/DL (ref 6–20)
CA-I BLD-MCNC: 5.6 MG/DL (ref 4.4–5.2)
CALCIUM SERPL-MCNC: 11.1 MG/DL (ref 8.6–10)
CALCIUM SERPL-MCNC: 11.1 MG/DL (ref 8.6–10)
CHLORIDE SERPL-SCNC: 101 MMOL/L (ref 98–107)
CREAT SERPL-MCNC: 1.94 MG/DL (ref 0.51–0.95)
DEPRECATED HCO3 PLAS-SCNC: 25 MMOL/L (ref 22–29)
EGFRCR SERPLBLD CKD-EPI 2021: 29 ML/MIN/1.73M2
ERYTHROCYTE [DISTWIDTH] IN BLOOD BY AUTOMATED COUNT: 15.1 % (ref 10–15)
GLUCOSE SERPL-MCNC: 104 MG/DL (ref 70–99)
HCT VFR BLD AUTO: 39.7 % (ref 35–47)
HGB BLD-MCNC: 12.5 G/DL (ref 11.7–15.7)
MAGNESIUM SERPL-MCNC: 2.3 MG/DL (ref 1.7–2.3)
MCH RBC QN AUTO: 30.4 PG (ref 26.5–33)
MCHC RBC AUTO-ENTMCNC: 31.5 G/DL (ref 31.5–36.5)
MCV RBC AUTO: 97 FL (ref 78–100)
PHOSPHATE SERPL-MCNC: 3.2 MG/DL (ref 2.5–4.5)
PLATELET # BLD AUTO: 125 10E3/UL (ref 150–450)
POTASSIUM SERPL-SCNC: 4.1 MMOL/L (ref 3.4–5.3)
PTH-INTACT SERPL-MCNC: 34 PG/ML (ref 15–65)
RBC # BLD AUTO: 4.11 10E6/UL (ref 3.8–5.2)
SODIUM SERPL-SCNC: 138 MMOL/L (ref 135–145)
WBC # BLD AUTO: 6.6 10E3/UL (ref 4–11)

## 2024-01-09 PROCEDURE — 84100 ASSAY OF PHOSPHORUS: CPT | Performed by: PATHOLOGY

## 2024-01-09 PROCEDURE — 82652 VIT D 1 25-DIHYDROXY: CPT | Performed by: INTERNAL MEDICINE

## 2024-01-09 PROCEDURE — 99204 OFFICE O/P NEW MOD 45 MIN: CPT | Performed by: INTERNAL MEDICINE

## 2024-01-09 PROCEDURE — 82542 COL CHROMOTOGRAPHY QUAL/QUAN: CPT | Mod: 90 | Performed by: PATHOLOGY

## 2024-01-09 PROCEDURE — 83970 ASSAY OF PARATHORMONE: CPT | Performed by: PATHOLOGY

## 2024-01-09 PROCEDURE — 99000 SPECIMEN HANDLING OFFICE-LAB: CPT | Performed by: PATHOLOGY

## 2024-01-09 PROCEDURE — 80048 BASIC METABOLIC PNL TOTAL CA: CPT | Performed by: PATHOLOGY

## 2024-01-09 PROCEDURE — 36415 COLL VENOUS BLD VENIPUNCTURE: CPT | Performed by: PATHOLOGY

## 2024-01-09 PROCEDURE — 83735 ASSAY OF MAGNESIUM: CPT | Performed by: PATHOLOGY

## 2024-01-09 PROCEDURE — 85027 COMPLETE CBC AUTOMATED: CPT | Performed by: PATHOLOGY

## 2024-01-09 PROCEDURE — 82330 ASSAY OF CALCIUM: CPT | Performed by: PATHOLOGY

## 2024-01-09 ASSESSMENT — PAIN SCALES - GENERAL: PAINLEVEL: NO PAIN (0)

## 2024-01-09 NOTE — PROGRESS NOTES
Endocrine Clinic Consult        Reason for consult:  Date: 01/08/2024    Referring Physician: No Ref-Primary, Physician      Assessment   Reina Quan is a 58 year old years old female who is here for evaluation of Hypercalcemia . There possibly no evidence of clinical features suggestive of hypercalcemia including fatigue and memory problems. Pt has elevated Calcium in setting of [id number]/4 CKD st post renal transplant. Work up show PTHi in mid nl range, low VitD, non significant change in SPEP UPEP and immunofixation. No medication nor supplements related to hypercalcemia. St p renal tx 1992 with decreasing renal function now CKD3b/4. At this point, DD primary hyperparathyroidism, rule out granulomatous disease.PTHiP has been minimal elevated, and had been decreasing which is reassuring, will recheck. Pt notes that ulcerative colitis has been in remission for about 15 years without meds.        Plan:   Check labs for PTHi, VitD, Ioinized Calcium creatinine, 1,25 DihydroxyVitD PTHrp  If indicative of primary hyperpara, will obtain sestamibi parathyroid scan      Cynthia Loyd MD  Endocrinology and Diabetes  Telephone contact:  [facility] [place] [phone]  [facility] [phone]            HPI:   Reina Quan is a 58 year old female who presents for evaluation of  Hypercalcemia    Clinical symptom assessment  Risk Factors:    No Family history of hyperparathyroidism or high calcium or MEN, No diagnosis of sarcoidosis, No history of prolonged immobilization, No history of thiazide diuretics use, Not on Calcium and Vit D supplements, Not on Vit A supplements, and No history of thyroid disorders or supplementation  No diagnosis of sarcoidosis  No history of immobilization  No history of thiazide diuretic treatment   No history of lithium treatment  Calcium supplements:no  No history of high dose Vitamin A supplementation  History of thyroid  disorder:no  No family history of hyperparathyroidism, high Calcium or MEN  no    Calcium and Vit D supplements : no  Other supplements: no    Symptoms suggestive of hyper or hypocalcemia:    Symptoms anxiety, depression or cognitive dysfucntion no, but on Paxil  Severe symptoms in past such as lethargy, confusion, stupor or coma associated with high calcium a little  GI complaints - Constipation Anorexia Weight changes Nausea no  History of Peptic ulcers or pancreatitis.no  Renal symptoms- polyuria, polydipsia yes- nocutria 3-4 times daily, not changed over the years  History of renal stones or renal failure yes  Palpitations  some  Muscle weakness or bone pain no    Past Medical History:   Diagnosis Date    Actinic keratosis     Allergic rhinitis, cause unspecified     Anemia     anxiety/depression     PAXIL    Arthritis     congenital hearing loss     uses hearing aids    Depressive disorder     Dry eyes     Giant Platelet syndrome     Glomerular Focal Sclerosis--transplant 1985     Hypertension     Kidney replaced by transplant 1992    RAPAMUNE/ SIROLIMUS     Lichen planopilaris     LPP followed by derm on doxycycline/ clobetasol    Lichen planus     Menorrhagia     migraine     Squamous cell carcinoma     8 x     Thrombocytopenia (H24)     Ulcerative colitis, unspecified     biposy neg 1996    Unsatisfactory cervical Papanicolaou smear 02/15/2021    UTI (lower urinary tract infection)     recurrent Dr Burns [facility]     Past Surgical History:   Procedure Laterality Date    BIOPSY OF SKIN LESION      CHOLECYSTECTOMY      DILATION AND CURETTAGE, OPERATIVE HYSTEROSCOPY WITH MORCELLATOR, COMBINED N/A 11/10/2015    Procedure: COMBINED DILATION AND CURETTAGE, OPERATIVE HYSTEROSCOPY WITH MORCELLATOR;  Surgeon: Ghada Melendez MD;  Location: [facility] OR    ESOPHAGOSCOPY, GASTROSCOPY, DUODENOSCOPY (EGD), COMBINED  11/20/2012    Procedure: COMBINED ESOPHAGOSCOPY, GASTROSCOPY, DUODENOSCOPY (EGD), BIOPSY SINGLE OR MULTIPLE;;   Surgeon: Valentin Hahn MD;  Location: [facility]    MOHS MICROGRAPHIC PROCEDURE      TRANSPLANT      kidney    ZZC NONSPECIFIC PROCEDURE      Renal transplant right side    ZZC NONSPECIFIC PROCEDURE      cholecystectomy     Family History   Problem Relation Age of Onset    Diabetes Father     Kidney Disease Father         nephrolithiasis    Asthma Father     Hypertension Mother     Depression Mother     Anxiety Disorder Mother     C.A.D. Paternal Grandfather     Blood Disease Maternal Grandmother     Diabetes Maternal Grandmother     Hypertension Maternal Grandmother     Hyperlipidemia Maternal Grandmother     Depression Maternal Grandmother     No Known Problems Maternal Grandfather     Cancer Other         no family hx of skin cancer    Asthma Sister     No Known Problems Paternal Grandmother     Glaucoma No family hx of     Macular Degeneration No family hx of     Melanoma No family hx of     Skin Cancer No family hx of      Social History     Socioeconomic History    Marital status: Single     Spouse name: Not on file    Number of children: 3    Years of education: Not on file    Highest education level: Not on file   Occupational History    Occupation: [employer] store   Tobacco Use    Smoking status: Never    Smokeless tobacco: Never   Vaping Use    Vaping Use: Never used   Substance and Sexual Activity    Alcohol use: Yes     Alcohol/week: 0.0 standard drinks of alcohol     Comment: 2x /week    Drug use: No    Sexual activity: Not Currently     Partners: Male     Birth control/protection: Post-menopausal   Other Topics Concern    Parent/sibling w/ CABG, MI or angioplasty before 65F 55M? 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 Doesn't have a job lined up afterward but looking forward to some time off. Has three children, one moved to college this fall. Living in [place], has a significant other x 7 months. Previously . In a monogamous  relationship. Never smoker, social EtOH use.      Social Determinants of Health     Financial Resource Strain: Not on file   Food Insecurity: Not on file   Transportation Needs: Not on file   Physical Activity: Not on file   Stress: Not on file   Social Connections: Not on file   Interpersonal Safety: Not on file   Housing Stability: Not on file        Allergies   Allergen Reactions    Ampicillin Swelling     Swelling of mouth and tongue.     Seasonal Allergies Other (See Comments)     Itchy eyes and rhinitis.     Current Outpatient Medications   Medication    amLODIPine (NORVASC) 5 MG tablet    atorvastatin (LIPITOR) 40 MG tablet    cinacalcet (SENSIPAR) 30 MG tablet    clobetasol (TEMOVATE) 0.05 % external ointment    clobetasol (TEMOVATE) 0.05 % external solution    clobetasol propionate (CLOBEX) 0.05 % external shampoo    ketoconazole (NIZORAL) 2 % external shampoo    mycophenolate (GENERIC EQUIVALENT) 250 MG capsule    niacinamide 500 MG tablet    PARoxetine (PAXIL) 20 MG tablet    triamcinolone (KENALOG) 0.1 % external cream     Current Facility-Administered Medications   Medication    triamcinolone acetonide (KENALOG-10) injection 10 mg           Exam:  LMP 05/14/2014 (Approximate)    Wt Readings from Last 4 Encounters:   11/02/23 68.7 kg (151 lb 8 oz)   02/13/23 63.5 kg (140 lb)   12/01/22 63.5 kg (140 lb)   07/04/22 63.5 kg (140 lb)     Constitutional: healthy, alert, no distress and cooperative  Head: Normocephalic. EOMI.   Neck: Neck supple. No adenopathy. Thyroid  symmetric, normal size, Carotids without bruits.  Cardiovascular: regular rhythm, no tachycardia  Respiratory: Lungs clear  Gastrointestinal: Abdomen soft, non-tender. BS normal. No striae  Neurologic: Normal speech, reflexes normal.   Psychiatric: mentation appears normal     Last Basic Metabolic Panel:      Lab Results   Component Value Date    PHOS 3.8 10/09/2023    PHOS 3.4 07/02/2015         CBC RESULTS:   Recent Labs   Lab Test  10/23/23  1444   WBC 5.8   RBC 3.84   HGB 11.9   HCT 36.7   MCV 96   MCH 31.0   MCHC 32.4   RDW 14.1   *     Recent Labs   Lab Test 10/23/23  1444 10/09/23  1308    139   POTASSIUM 4.8 4.6   CHLORIDE 101 104   CO2 22 21*   ANIONGAP 13 14   GLC 99 134*   BUN 40.1* 49.4*   CR 2.07* 1.90*   HARSHA 11.0* 10.7*     Lab Results   Component Value Date    HARSHA 11.0 (H) 10/23/2023    HARSHA 10.7 (H) 10/09/2023    HARSHA 11.2 (H) 09/22/2023    HARSHA 10.4 (H) 06/13/2023    ALBUMIN 4.5 05/09/2023    ICAW 5.4 (H) 12/04/2023    ALT 37 (H) 05/09/2023    PHOS 3.8 10/09/2023    PTHI 33 10/09/2023    PTHI 66 06/28/2021    PTHI 59 01/17/2020    AST 35 05/09/2023    BILITOTAL 0.3 05/09/2023    CR 2.07 (H) 10/23/2023    CR 1.90 (H) 10/09/2023    CR 1.82 (H) 09/22/2023     10/23/2023    TSH 3.80 01/17/2020    ALKPHOS 109 (H) 05/09/2023    HGB 11.9 10/23/2023     Lab Results   Component Value Date    HARSHA 11.0 (H) 10/23/2023    HARSHA 10.7 (H) 10/09/2023    HARSHA 11.2 (H) 09/22/2023    HARSHA 10.4 (H) 06/13/2023    HARSHA 10.3 (H) 05/30/2023    HARSHA 9.9 05/16/2023    HARSHA 10.2 (H) 05/09/2023    HARSHA 10.5 12/01/2022    HARSHA 10.3 (H) 11/03/2022    HARSHA 10.1 07/04/2022    HARSHA 10.3 05/23/2022    HARSHA 10.3 01/31/2022    HARSHA 9.8 10/18/2021    HARSHA 9.7 06/28/2021    HARSHA 11.0 (H) 03/04/2021                                 Results for orders placed during the hospital encounter of 11/09/23    US Parathyroid    Narrative  ULTRASOUND PARATHYROID  11/9/2023 11:18 AM    HISTORY: Evaluate for parathyroid adenoma vs parathyroid hyperplasia.  Hypercalcemia.    COMPARISON: None    Impression  IMPRESSION: Thyroid measures 2.9 x 1.2 x 1.0 cm on the right and 3.8 x  1.1 x 0.7 cm on the left. Isthmus is 1 mm thick. No thyroid nodules.  No nodules to suggest a parathyroid adenoma or hyperplasia  demonstrated.    AMBAR ROMAN MD      SYSTEM ID:  [id number]    Results for orders placed during the hospital encounter of 07/04/22    CT Abdomen Pelvis w/o  Contrast    Narrative  EXAM: CT ABDOMEN PELVIS W/O CONTRAST  LOCATION: [facility]  DATE/TIME: 7/4/2022 6:58 PM    INDICATION: Right lower quadrant pain, hematuria, renal transplant, concern for stone.  COMPARISON: 2/15/2012.  TECHNIQUE: CT scan of the abdomen and pelvis was performed without IV contrast. Multiplanar reformats were obtained. Dose reduction techniques were used.  CONTRAST: None.    FINDINGS:  LOWER CHEST: Small fat-containing right posterior Bochdalek diaphragmatic hernia. Mild bibasilar atelectasis.    HEPATOBILIARY: Cholecystectomy.    PANCREAS: Normal.    SPLEEN: Normal.    ADRENAL GLANDS: Normal.    KIDNEYS/BLADDER: Atrophic native kidneys. Right lower quadrant renal transplant. There is mild pelviectasis of the renal transplant. Additionally, there is mild fat stranding around the renal transplant and pelvis. No convincing genitourinary calculi.    BOWEL: No obstruction or inflammatory change. Mild colonic stool burden. Normal appendix.    LYMPH NODES: There are a few mildly prominent lymph nodes in the small bowel mesentery with surrounding pseudocapsule of fat stranding. This appears new from prior exam.    VASCULATURE: Unremarkable.    PELVIC ORGANS: Normal.    MUSCULOSKELETAL: Degenerative changes of the spine.    Impression  IMPRESSION:  1.  Right lower quadrant renal transplant with mild pelviectasis. No genitourinary calculi. Additionally, there is mild fat stranding around the renal transplant and renal pelvis. Findings could represent urinary tract infection. Correlate with  urinalysis. Follow-up with renal transplant ultrasound is recommended. No genitourinary calculi.  2.  New mildly prominent lymph nodes in the small bowel mesentery with surrounding pseudocapsule of fat stranding. Findings are nonspecific but can be seen with mesenteric panniculitis. Given this is new from 2012 a follow-up in 6 months is recommended  to assess for stability.      [Recommend  Follow Up: Mildly prominent lymph nodes in the small bowel mesentery]    This report will be copied to the [facility] to ensure a provider acknowledges the finding.

## 2024-01-09 NOTE — LETTER
1/9/2024       RE: Reina Quan  [address] [facility] [address]     Dear Colleague,    Thank you for referring your patient, Reina Quan, to the [facility] ENDOCRINOLOGY CLINIC [place] at [facility]. Please see a copy of my visit note below.        Endocrine Clinic Consult        Reason for consult:  Date: 01/08/2024    Referring Physician: No Ref-Primary, Physician      Assessment  Reina Quan is a 58 year old years old female who is here for evaluation of Hypercalcemia . There possibly no evidence of clinical features suggestive of hypercalcemia including fatigue and memory problems. Pt has elevated Calcium in setting of Zzxsg8m/4 CKD st post renal transplant. Work up show PTHi in mid nl range, low VitD, non significant change in SPEP UPEP and immunofixation. No medication nor supplements related to hypercalcemia. St p renal tx 1992 with decreasing renal function now CKD3b/4. At this point, DD primary hyperparathyroidism, rule out granulomatous disease.PTHiP has been minimal elevated, and had been decreasing which is reassuring, will recheck. Pt notes that ulcerative colitis has been in remission for about 15 years without meds.        Plan:   Check labs for PTHi, VitD, Ioinized Calcium creatinine, 1,25 DihydroxyVitD PTHrp  If indicative of primary hyperpara, will obtain sestamibi parathyroid scan      Cynthia Loyd MD  Endocrinology and Diabetes  Telephone contact:  [facility] [place] [phone]  [facility] [phone]            HPI:   Reina Quan is a 58 year old female who presents for evaluation of  Hypercalcemia    Clinical symptom assessment  Risk Factors:    No Family history of hyperparathyroidism or high calcium or MEN, No diagnosis of sarcoidosis, No history of prolonged immobilization, No history of thiazide diuretics use, Not on Calcium and Vit D  supplements, Not on Vit A supplements, and No history of thyroid disorders or supplementation  No diagnosis of sarcoidosis  No history of immobilization  No history of thiazide diuretic treatment   No history of lithium treatment  Calcium supplements:no  No history of high dose Vitamin A supplementation  History of thyroid disorder:no  No family history of hyperparathyroidism, high Calcium or MEN  no    Calcium and Vit D supplements : no  Other supplements: no    Symptoms suggestive of hyper or hypocalcemia:    Symptoms anxiety, depression or cognitive dysfucntion no, but on Paxil  Severe symptoms in past such as lethargy, confusion, stupor or coma associated with high calcium a little  GI complaints - Constipation Anorexia Weight changes Nausea no  History of Peptic ulcers or pancreatitis.no  Renal symptoms- polyuria, polydipsia yes- nocutria 3-4 times daily, not changed over the years  History of renal stones or renal failure yes  Palpitations  some  Muscle weakness or bone pain no    Past Medical History:   Diagnosis Date    Actinic keratosis     Allergic rhinitis, cause unspecified     Anemia     anxiety/depression     PAXIL    Arthritis     congenital hearing loss     uses hearing aids    Depressive disorder     Dry eyes     Giant Platelet syndrome     Glomerular Focal Sclerosis--transplant 1985     Hypertension     Kidney replaced by transplant 1992    RAPAMUNE/ SIROLIMUS     Lichen planopilaris     LPP followed by derm on doxycycline/ clobetasol    Lichen planus     Menorrhagia     migraine     Squamous cell carcinoma     8 x     Thrombocytopenia (H24)     Ulcerative colitis, unspecified     biposy neg 1996    Unsatisfactory cervical Papanicolaou smear 02/15/2021    UTI (lower urinary tract infection)     recurrent Dr Burns [facility]     Past Surgical History:   Procedure Laterality Date    BIOPSY OF SKIN LESION      CHOLECYSTECTOMY      DILATION AND CURETTAGE, OPERATIVE HYSTEROSCOPY WITH MORCELLATOR, COMBINED  N/A 11/10/2015    Procedure: COMBINED DILATION AND CURETTAGE, OPERATIVE HYSTEROSCOPY WITH MORCELLATOR;  Surgeon: Ghada Melendez MD;  Location: [facility] OR    ESOPHAGOSCOPY, GASTROSCOPY, DUODENOSCOPY (EGD), COMBINED  11/20/2012    Procedure: COMBINED ESOPHAGOSCOPY, GASTROSCOPY, DUODENOSCOPY (EGD), BIOPSY SINGLE OR MULTIPLE;;  Surgeon: Valentin Hahn MD;  Location: [facility]    MOHS MICROGRAPHIC PROCEDURE      TRANSPLANT      kidney    ZZC NONSPECIFIC PROCEDURE      Renal transplant right side    ZZC NONSPECIFIC PROCEDURE      cholecystectomy     Family History   Problem Relation Age of Onset    Diabetes Father     Kidney Disease Father         nephrolithiasis    Asthma Father     Hypertension Mother     Depression Mother     Anxiety Disorder Mother     C.A.D. Paternal Grandfather     Blood Disease Maternal Grandmother     Diabetes Maternal Grandmother     Hypertension Maternal Grandmother     Hyperlipidemia Maternal Grandmother     Depression Maternal Grandmother     No Known Problems Maternal Grandfather     Cancer Other         no family hx of skin cancer    Asthma Sister     No Known Problems Paternal Grandmother     Glaucoma No family hx of     Macular Degeneration No family hx of     Melanoma No family hx of     Skin Cancer No family hx of      Social History     Socioeconomic History    Marital status: Single     Spouse name: Not on file    Number of children: 3    Years of education: Not on file    Highest education level: Not on file   Occupational History    Occupation: [employer] store   Tobacco Use    Smoking status: Never    Smokeless tobacco: Never   Vaping Use    Vaping Use: Never used   Substance and Sexual Activity    Alcohol use: Yes     Alcohol/week: 0.0 standard drinks of alcohol     Comment: 2x /week    Drug use: No    Sexual activity: Not Currently     Partners: Male     Birth control/protection: Post-menopausal   Other Topics Concern    Parent/sibling w/ CABG, MI or angioplasty before 65F 55M? 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 Has three children, one moved to college this fall. Living in [place], has a significant other x 7 months. Previously . In a monogamous relationship. Never smoker, social EtOH use.      Social Determinants of Health     Financial Resource Strain: Not on file   Food Insecurity: Not on file   Transportation Needs: Not on file   Physical Activity: Not on file   Stress: Not on file   Social Connections: Not on file   Interpersonal Safety: Not on file   Housing Stability: Not on file        Allergies   Allergen Reactions    Ampicillin Swelling     Swelling of mouth and tongue.     Seasonal Allergies Other (See Comments)     Itchy eyes and rhinitis.     Current Outpatient Medications   Medication    amLODIPine (NORVASC) 5 MG tablet    atorvastatin (LIPITOR) 40 MG tablet    cinacalcet (SENSIPAR) 30 MG tablet    clobetasol (TEMOVATE) 0.05 % external ointment    clobetasol (TEMOVATE) 0.05 % external solution    clobetasol propionate (CLOBEX) 0.05 % external shampoo    ketoconazole (NIZORAL) 2 % external shampoo    mycophenolate (GENERIC EQUIVALENT) 250 MG capsule    niacinamide 500 MG tablet    PARoxetine (PAXIL) 20 MG tablet    triamcinolone (KENALOG) 0.1 % external cream     Current Facility-Administered Medications   Medication    triamcinolone acetonide (KENALOG-10) injection 10 mg           Exam:  LMP 05/14/2014 (Approximate)    Wt Readings from Last 4 Encounters:   11/02/23 68.7 kg (151 lb 8 oz)   02/13/23 63.5 kg (140 lb)   12/01/22 63.5 kg (140 lb)   07/04/22 63.5 kg (140 lb)     Constitutional: healthy, alert, no distress and cooperative  Head: Normocephalic. EOMI.   Neck: Neck supple. No adenopathy. Thyroid  symmetric, normal size, Carotids without bruits.  Cardiovascular: regular rhythm, no tachycardia  Respiratory: Lungs  clear  Gastrointestinal: Abdomen soft, non-tender. BS normal. No striae  Neurologic: Normal speech, reflexes normal.   Psychiatric: mentation appears normal     Last Basic Metabolic Panel:      Lab Results   Component Value Date    PHOS 3.8 10/09/2023    PHOS 3.4 07/02/2015         CBC RESULTS:   Recent Labs   Lab Test 10/23/23  1444   WBC 5.8   RBC 3.84   HGB 11.9   HCT 36.7   MCV 96   MCH 31.0   MCHC 32.4   RDW 14.1   *     Recent Labs   Lab Test 10/23/23  1444 10/09/23  1308    139   POTASSIUM 4.8 4.6   CHLORIDE 101 104   CO2 22 21*   ANIONGAP 13 14   GLC 99 134*   BUN 40.1* 49.4*   CR 2.07* 1.90*   HARSHA 11.0* 10.7*     Lab Results   Component Value Date    HARSHA 11.0 (H) 10/23/2023    HARSHA 10.7 (H) 10/09/2023    HARSHA 11.2 (H) 09/22/2023    HARSHA 10.4 (H) 06/13/2023    ALBUMIN 4.5 05/09/2023    ICAW 5.4 (H) 12/04/2023    ALT 37 (H) 05/09/2023    PHOS 3.8 10/09/2023    PTHI 33 10/09/2023    PTHI 66 06/28/2021    PTHI 59 01/17/2020    AST 35 05/09/2023    BILITOTAL 0.3 05/09/2023    CR 2.07 (H) 10/23/2023    CR 1.90 (H) 10/09/2023    CR 1.82 (H) 09/22/2023     10/23/2023    TSH 3.80 01/17/2020    ALKPHOS 109 (H) 05/09/2023    HGB 11.9 10/23/2023     Lab Results   Component Value Date    HARSHA 11.0 (H) 10/23/2023    HARSHA 10.7 (H) 10/09/2023    HARSHA 11.2 (H) 09/22/2023    HARSHA 10.4 (H) 06/13/2023    HARSHA 10.3 (H) 05/30/2023    HARSHA 9.9 05/16/2023    HARSHA 10.2 (H) 05/09/2023    HARSHA 10.5 12/01/2022    HARSHA 10.3 (H) 11/03/2022    HARSHA 10.1 07/04/2022    HARSHA 10.3 05/23/2022    HARSHA 10.3 01/31/2022    HARSHA 9.8 10/18/2021    HARSHA 9.7 06/28/2021    HARSHA 11.0 (H) 03/04/2021                                 Results for orders placed during the hospital encounter of 11/09/23    US Parathyroid    Narrative  ULTRASOUND PARATHYROID  11/9/2023 11:18 AM    HISTORY: Evaluate for parathyroid adenoma vs parathyroid hyperplasia.  Hypercalcemia.    COMPARISON: None    Impression  IMPRESSION: Thyroid measures 2.9 x 1.2 x 1.0 cm on the right  and 3.8 x  1.1 x 0.7 cm on the left. Isthmus is 1 mm thick. No thyroid nodules.  No nodules to suggest a parathyroid adenoma or hyperplasia  demonstrated.    AMBAR ROMAN MD      SYSTEM ID:  [id number]    Results for orders placed during the hospital encounter of 07/04/22    CT Abdomen Pelvis w/o Contrast    Narrative  EXAM: CT ABDOMEN PELVIS W/O CONTRAST  LOCATION: [facility]  DATE/TIME: 7/4/2022 6:58 PM    INDICATION: Right lower quadrant pain, hematuria, renal transplant, concern for stone.  COMPARISON: 2/15/2012.  TECHNIQUE: CT scan of the abdomen and pelvis was performed without IV contrast. Multiplanar reformats were obtained. Dose reduction techniques were used.  CONTRAST: None.    FINDINGS:  LOWER CHEST: Small fat-containing right posterior Bochdalek diaphragmatic hernia. Mild bibasilar atelectasis.    HEPATOBILIARY: Cholecystectomy.    PANCREAS: Normal.    SPLEEN: Normal.    ADRENAL GLANDS: Normal.    KIDNEYS/BLADDER: Atrophic native kidneys. Right lower quadrant renal transplant. There is mild pelviectasis of the renal transplant. Additionally, there is mild fat stranding around the renal transplant and pelvis. No convincing genitourinary calculi.    BOWEL: No obstruction or inflammatory change. Mild colonic stool burden. Normal appendix.    LYMPH NODES: There are a few mildly prominent lymph nodes in the small bowel mesentery with surrounding pseudocapsule of fat stranding. This appears new from prior exam.    VASCULATURE: Unremarkable.    PELVIC ORGANS: Normal.    MUSCULOSKELETAL: Degenerative changes of the spine.    Impression  IMPRESSION:  1.  Right lower quadrant renal transplant with mild pelviectasis. No genitourinary calculi. Additionally, there is mild fat stranding around the renal transplant and renal pelvis. Findings could represent urinary tract infection. Correlate with  urinalysis. Follow-up with renal transplant ultrasound is recommended. No genitourinary  calculi.  2.  New mildly prominent lymph nodes in the small bowel mesentery with surrounding pseudocapsule of fat stranding. Findings are nonspecific but can be seen with mesenteric panniculitis. Given this is new from 2012 a follow-up in 6 months is recommended  to assess for stability.      [Recommend Follow Up: Mildly prominent lymph nodes in the small bowel mesentery]    This report will be copied to the [facility] to ensure a provider acknowledges the finding.

## 2024-01-09 NOTE — PATIENT INSTRUCTIONS
Welcome to the [facility] Endocrinology and Diabetes Clinics     Our Endocrinology Clinics are here to provide you with a team-based, collaborative approach in the diagnosis and treatment of patients with diabetes and endocrine disorders. The team is made up of Physicians, Physician Assistants, Certified Diabetes Educators, Registered Nurses, Medical Assistants, Emergency Medical Technicians, and many others, all of whom have the unified goal of providing our patients with high quality care.     Please see below for some helpful tips to best navigate and use the [facility] Endocrinology clinic:     Bend Respect: At [facility], we are committed to a respectful and safe space for all patients, visitors, and staff.  We believe that mutual respect between patients and their care team is the foundation of quality care.  It is our expectation that you will be treated with respect by your care team.  In turn, we ask that all communication with the care team (written and verbal) be respectful and free from profanity, threatening, or abusive language.  Disrespectful communication undermines our therapeutic relationship with you and may result in us being unable to continue to provide your care.    Refills: A provider must see you at least annually to prescribe and refill medications. This is to ensure your safety as well as meet insurance and compliance regulations.    Scheduling: Many of our Providers offer both in-person or video visits. Please call to schedule any needed follow ups as soon as possible because our provider schedules fill up very quickly. Our care team has the right to require an in-person visit when they believe that it is medically necessary.    Missed Appointments: If you need to cancel or miss your scheduled appointment, please call the clinic at [phone] to reschedule.  Please note if you repeatedly miss appointments or repeatedly miss appointments without  calling to inform us ahead of time (no-show), the clinic may elect to not allow you to reschedule without speaking to a manager, may require a Partnership In Care Agreement prior to rescheduling, or could result in you no longer being able to receive care from the clinic. Providing the clinic with timely notification if you have to miss an appointment, allows us to better serve the needs of all of our patients.    Primary Care Provider: Our Endocrinologists are Specialists in their field. We expect you to have a Primary Care Provider established to handle any needs outside of your diabetes and endocrine care.  We would be happy to assist you find a Primary Care Provider, if you do not have one.    NetHooks: NetHooks is a wonderful resource that allows you access to your Care Team via online or the artem. Please ask a member of the team if you would like help creating an account. Please note that it may take up to 2 business days for a response. [employer]Hook[employer] messages are not reviewed on weekends or after business hours.  Emergent or urgent care needs should never be communicated via NetHooks.  If you experience a medical emergency call 911 or go to the nearest emergency room.    Labs: It is recommended that you stay within the [facility] for labs but you are welcome to obtain ordered labs (with some exceptions) from any location of your choice as long as they are able to complete and process the needed labs. If you need us to fax orders to your preferred lab, please provide us the name and fax number of the lab you would like to go to so we can fax the orders. If your labs are drawn outside of the [facility], please have them fax the results to [phone] ([place]) or [phone] ([place]) or via [employer]. It is your responsibility to ensure that outside lab results are sent to us.    We look forward to working with you. Please do not hesitate to reach out with any questions.    Thank  you,    The Endocrine Team    [facility] Address:   [place] Address:     [address] [place] [address]    Phone: [phone]  Fax: [phone] [address] [place] [address]    Phone: [phone]  Fax: [phone]     [facility] Cost Estimate Phone Number: [phone]    General Lab and Imaging Scheduling Phone Number: [phone]

## 2024-01-10 LAB — 1,25(OH)2D SERPL-MCNC: 14.4 PG/ML (ref 19.9–79.3)

## 2024-01-12 LAB — PTH RELATED PROT SERPL-SCNC: 4.9 PMOL/L

## 2024-03-05 ENCOUNTER — TELEPHONE (OUTPATIENT)
Dept: ENDOCRINOLOGY | Facility: CLINIC | Age: 59
End: 2024-03-05
Payer: COMMERCIAL

## 2024-03-05 DIAGNOSIS — Z94.0 KIDNEY TRANSPLANTED: Primary | ICD-10-CM

## 2024-03-05 DIAGNOSIS — E83.52 HYPERCALCEMIA: ICD-10-CM

## 2024-03-05 NOTE — TELEPHONE ENCOUNTER
Bone scan for elevated PTHrP and hyeprcalcemia, elevated 1-25 VitD    I called an discussed results and next tet with pt      Cynthia Loyd MD  Staff Physician  Division of Endocrinology  [facility]  Pager #[phone]

## 2024-03-07 DIAGNOSIS — F32.4 MAJOR DEPRESSIVE DISORDER WITH SINGLE EPISODE, IN PARTIAL REMISSION (H): ICD-10-CM

## 2024-03-07 DIAGNOSIS — E78.5 HYPERLIPIDEMIA WITH TARGET LDL LESS THAN 130: ICD-10-CM

## 2024-03-07 RX ORDER — PAROXETINE 20 MG/1
TABLET, FILM COATED ORAL
Qty: 90 TABLET | Refills: 0 | Status: SHIPPED | OUTPATIENT
Start: 2024-03-07 | End: 2024-04-09

## 2024-03-07 RX ORDER — ATORVASTATIN CALCIUM 40 MG/1
40 TABLET, FILM COATED ORAL AT BEDTIME
Qty: 90 TABLET | Refills: 0 | Status: SHIPPED | OUTPATIENT
Start: 2024-03-07 | End: 2024-04-10

## 2024-03-07 NOTE — LETTER
March 8, 2024      Reina Quan  [address] [address]        Helria Huang,    We recently received a call from your pharmacy requesting a refill request for your medications. We left a message at [phone] to notify you that you are due for an OFFICE VISIT for further refills.     We have authorized a one time refill of your medication to allow time for you to schedule your appointment.     Please call [phone] to schedule an appointment or if you have MyChart you can schedule with your provider as well.     Taking care of your health is important to us, and ongoing visits with your provider are vital to your care. We look forward to seeing you in the near future.     Thank you for using [facility] for your medical needs.     Sincerely,        Lizbeth Andrews PA-C

## 2024-03-08 NOTE — TELEPHONE ENCOUNTER
BEREKETM requesting a call back for an appt. Two more attempts will be made.    Genny Griffin  Lead   [facility]

## 2024-03-08 NOTE — TELEPHONE ENCOUNTER
Sent [employer] message requesting a call back for an appt. One more attempt will be made.     Genny Griffin  Lead   [facility]

## 2024-03-08 NOTE — TELEPHONE ENCOUNTER
Due for physical and labs/med check. Will send 90 day refill. Please help schedule.    Lizbeth Andrews PA-C

## 2024-03-19 ENCOUNTER — HOSPITAL ENCOUNTER (EMERGENCY)
Facility: CLINIC | Age: 59
Discharge: HOME | End: 2024-03-19
Attending: EMERGENCY MEDICINE | Admitting: EMERGENCY MEDICINE
Payer: COMMERCIAL

## 2024-03-19 ENCOUNTER — APPOINTMENT (OUTPATIENT)
Dept: RADIOLOGY | Facility: CLINIC | Age: 59
End: 2024-03-19
Attending: EMERGENCY MEDICINE
Payer: COMMERCIAL

## 2024-03-19 VITALS
OXYGEN SATURATION: 95 % | DIASTOLIC BLOOD PRESSURE: 83 MMHG | WEIGHT: 150.7 LBS | RESPIRATION RATE: 18 BRPM | TEMPERATURE: 99.9 F | SYSTOLIC BLOOD PRESSURE: 151 MMHG | BODY MASS INDEX: 27.56 KG/M2 | HEART RATE: 87 BPM

## 2024-03-19 DIAGNOSIS — J10.1 INFLUENZA A: ICD-10-CM

## 2024-03-19 LAB
FLUAV RNA SPEC QL NAA+PROBE: POSITIVE
FLUBV RNA RESP QL NAA+PROBE: NEGATIVE
RSV RNA SPEC NAA+PROBE: NEGATIVE
SARS-COV-2 RNA RESP QL NAA+PROBE: NEGATIVE

## 2024-03-19 PROCEDURE — 87637 SARSCOV2&INF A&B&RSV AMP PRB: CPT | Performed by: EMERGENCY MEDICINE

## 2024-03-19 PROCEDURE — 99284 EMERGENCY DEPT VISIT MOD MDM: CPT | Mod: 25

## 2024-03-19 PROCEDURE — 71046 X-RAY EXAM CHEST 2 VIEWS: CPT

## 2024-03-19 RX ORDER — BENZONATATE 100 MG/1
100 CAPSULE ORAL 2 TIMES DAILY PRN
Qty: 20 CAPSULE | Refills: 0 | Status: SHIPPED | OUTPATIENT
Start: 2024-03-19 | End: 2024-04-09

## 2024-03-19 RX ORDER — OSELTAMIVIR PHOSPHATE 30 MG/1
30 CAPSULE ORAL DAILY
Qty: 5 CAPSULE | Refills: 0 | Status: SHIPPED | OUTPATIENT
Start: 2024-03-19 | End: 2024-03-24

## 2024-03-19 ASSESSMENT — ENCOUNTER SYMPTOMS
FLANK PAIN: 0
SORE THROAT: 1
DYSURIA: 0
COUGH: 1
HEMATURIA: 0
MYALGIAS: 1
FEVER: 1
ABDOMINAL PAIN: 0
DIFFICULTY URINATING: 0
HEADACHES: 1

## 2024-03-19 ASSESSMENT — COLUMBIA-SUICIDE SEVERITY RATING SCALE - C-SSRS
2. HAVE YOU ACTUALLY HAD ANY THOUGHTS OF KILLING YOURSELF IN THE PAST MONTH?: NO
6. HAVE YOU EVER DONE ANYTHING, STARTED TO DO ANYTHING, OR PREPARED TO DO ANYTHING TO END YOUR LIFE?: NO
1. IN THE PAST MONTH, HAVE YOU WISHED YOU WERE DEAD OR WISHED YOU COULD GO TO SLEEP AND NOT WAKE UP?: NO

## 2024-03-19 NOTE — ED TRIAGE NOTES
Patient presents to the ED with a cough/fever/sore throat/pain in chest with coughing since Sunday. Patient is a kidney transplant patient and has been managing her fevers at home with tylenol. T max at home on Sunday 102. Had a 100 degree temp this AM but has not taken anything. Patient states when she coughs, she coughs up a dark yellow phlegm.

## 2024-03-19 NOTE — ED PROVIDER NOTES
EMERGENCY DEPARTMENT ENCOUNTER      NAME: Reina Quan  AGE: 59 year old female  YOB: 1965  MRN: [id number]  EVALUATION DATE & TIME: No admission date for patient encounter.    PCP: DEBORA Dobbs [place]    ED PROVIDER: Brady Hoang DO      Chief Complaint   Patient presents with    Cough    Pharyngitis    Fever         FINAL IMPRESSION:  1. Influenza A          ED COURSE & MEDICAL DECISION MAKIN-year-old female with history of renal transplant presented to the ED for evaluation of cough, sore throat, fever, chest discomfort, body aches and ongoing for the last few days.  Upon arrival to the ED the patient was hypertensive and her temperature was 99.9.  She had appear to be in any obvious distress or discomfort and she was not acutely ill-appearing.  The patient's physical exam was unremarkable.    Chest x-ray was nondiagnostic.  Testing for influenza A was positive.    The patient was reevaluated and informed of the positive influenza results.  She was informed that her symptoms are likely secondary to the influenza infection.  After educating and reassuring the patient regarding the influenza infection she felt comfortable returning home.  The patient was sent home with Tamiflu to use daily for the next 5 days.  She was instructed to rest and to use Tylenol ibuprofen over the next few days as needed.  She was also sent home with Tessalon pearls to use for her cough.  The patient was instructed to follow-up with her primary care provider for reevaluation or to return back to ED sooner for any worsening respiratory difficulty or any other new or concerning symptoms.     Pertinent Labs & Imaging studies reviewed. (See chart for details)  11:38 AM I met with the patient to gather history and to perform my initial exam. We discussed plans for the ED course, including diagnostic testing and treatment.       At the conclusion of the encounter I discussed the results of all of  the tests and the disposition. The questions were answered. The patient or family acknowledged understanding and was agreeable with the care plan.     Medical Decision Making  Obtained supplemental history:Supplemental history obtained?: No  Reviewed external records: External records reviewed?: Outpatient Record: Endocrinology Visit 1/9/24  Care impacted by chronic illness:Chronic Kidney Disease, Hyperlipidemia, Hypertension, and Other: kidney transplant recipient  Care significantly affected by social determinants of health:N/A  Did you consider but not order tests?: Work up considered but not performed and documented in chart, if applicable  Did you interpret images independently?: Independent interpretation of ECG and images noted in documentation, when applicable.  Consultation discussion with other provider:Did you involve another provider (consultant, , pharmacy, etc.)?: No  Discharge. I prescribed additional prescription strength medication(s) as charted. See documentation for any additional details.      PPE worn: n95 mask, goggles    MEDICATIONS GIVEN IN THE EMERGENCY:  Medications - No data to display    NEW PRESCRIPTIONS STARTED AT TODAY'S ER VISIT  Discharge Medication List as of 3/19/2024 11:55 AM        START taking these medications    Details   benzonatate (TESSALON) 100 MG capsule Take 1 capsule (100 mg) by mouth 2 times daily as needed for cough, Disp-20 capsule, R-0, E-Prescribe      oseltamivir (TAMIFLU) 30 MG capsule Take 1 capsule (30 mg) by mouth daily for 5 days, Disp-5 capsule, R-0, E-Prescribe                =================================================================    HPI    Patient information was obtained from: Patient    Use of : N/A      Reina Quan is a 59 year old female with a pertinent history of kidney transplant recipient, HTN, HLD, and CKD3b who presents to this ED by walk in for evaluation of fever and cough.    The patient reports she has had 3  days of fevers, cough, sore throat, body aches, and a headache. She initially thought she had a cold, but after having fevers of 102 and 101.9 on Sunday and Monday evening she decided to present to the ED for evaluation as is recommended by her transplant team whenever she has a fever greater than 101. She denies any chest pain, abdominal pain, pain at her graft site, or urinary symptoms. She reports a history of UTIs without urinary symptoms and only fevers as her UTI symptoms, but reports she has not had a UTI in the past 6 years. She is also concerned because about 2 years ago she developed a hematoma at her transplant site after a heavy coughing fit.    REVIEW OF SYSTEMS   Review of Systems   Constitutional:  Positive for fever.   HENT:  Positive for sore throat.    Respiratory:  Positive for cough.    Cardiovascular:  Negative for chest pain.   Gastrointestinal:  Negative for abdominal pain.   Genitourinary:  Negative for difficulty urinating, dysuria, flank pain, hematuria and urgency.   Musculoskeletal:  Positive for myalgias.   Neurological:  Positive for headaches.       PAST MEDICAL HISTORY:  Past Medical History:   Diagnosis Date    Actinic keratosis     Allergic rhinitis, cause unspecified     Anemia     anxiety/depression     PAXIL    Arthritis     congenital hearing loss     uses hearing aids    Depressive disorder     Dry eyes     Giant Platelet syndrome     Glomerular Focal Sclerosis--transplant 1985     Hypertension     Kidney replaced by transplant 1992    RAPAMUNE/ SIROLIMUS     Lichen planopilaris     LPP followed by derm on doxycycline/ clobetasol    Lichen planus     Menorrhagia     migraine     Squamous cell carcinoma     8 x     Thrombocytopenia (H24)     Ulcerative colitis, unspecified     biposy neg 1996    Unsatisfactory cervical Papanicolaou smear 02/15/2021    UTI (lower urinary tract infection)     recurrent Dr Burns [facility]       PAST SURGICAL HISTORY:  Past Surgical History:    Procedure Laterality Date    BIOPSY OF SKIN LESION      CHOLECYSTECTOMY      DILATION AND CURETTAGE, OPERATIVE HYSTEROSCOPY WITH MORCELLATOR, COMBINED N/A 11/10/2015    Procedure: COMBINED DILATION AND CURETTAGE, OPERATIVE HYSTEROSCOPY WITH MORCELLATOR;  Surgeon: Ghdaa Melendez MD;  Location: [facility] OR    ESOPHAGOSCOPY, GASTROSCOPY, DUODENOSCOPY (EGD), COMBINED  11/20/2012    Procedure: COMBINED ESOPHAGOSCOPY, GASTROSCOPY, DUODENOSCOPY (EGD), BIOPSY SINGLE OR MULTIPLE;;  Surgeon: Valentin Hahn MD;  Location: [facility]    MOHS MICROGRAPHIC PROCEDURE      TRANSPLANT      kidney    ZZC NONSPECIFIC PROCEDURE      Renal transplant right side    ZZC NONSPECIFIC PROCEDURE      cholecystectomy           CURRENT MEDICATIONS:    benzonatate (TESSALON) 100 MG capsule  oseltamivir (TAMIFLU) 30 MG capsule  amLODIPine (NORVASC) 5 MG tablet  atorvastatin (LIPITOR) 40 MG tablet  cinacalcet (SENSIPAR) 30 MG tablet  clobetasol (TEMOVATE) 0.05 % external ointment  clobetasol (TEMOVATE) 0.05 % external solution  clobetasol propionate (CLOBEX) 0.05 % external shampoo  ketoconazole (NIZORAL) 2 % external shampoo  mycophenolate (GENERIC EQUIVALENT) 250 MG capsule  niacinamide 500 MG tablet  PARoxetine (PAXIL) 20 MG tablet  triamcinolone (KENALOG) 0.1 % external cream        ALLERGIES:  Allergies   Allergen Reactions    Ampicillin Swelling     Swelling of mouth and tongue.     Seasonal Allergies Other (See Comments)     Itchy eyes and rhinitis.       FAMILY HISTORY:  Family History   Problem Relation Age of Onset    Diabetes Father     Kidney Disease Father         nephrolithiasis    Asthma Father     Hypertension Mother     Depression Mother     Anxiety Disorder Mother     C.A.D. Paternal Grandfather     Blood Disease Maternal Grandmother     Diabetes Maternal Grandmother     Hypertension Maternal Grandmother     Hyperlipidemia Maternal Grandmother     Depression Maternal Grandmother     No Known Problems Maternal Grandfather     Cancer  Other         no family hx of skin cancer    Asthma Sister     No Known Problems Paternal Grandmother     Glaucoma No family hx of     Macular Degeneration No family hx of     Melanoma No family hx of     Skin Cancer No family hx of        SOCIAL HISTORY:   Social History     Socioeconomic History    Marital status: Single    Number of children: 3   Occupational History    Occupation: [employer] store   Tobacco Use    Smoking status: Never    Smokeless tobacco: Never   Vaping Use    Vaping Use: Never used   Substance and Sexual Activity    Alcohol use: Yes     Alcohol/week: 0.0 standard drinks of alcohol     Comment: 2x /week    Drug use: No    Sexual activity: Not Currently     Partners: Male     Birth control/protection: Post-menopausal   Other Topics Concern    Parent/sibling w/ CABG, MI or angioplasty before 65F 55M? 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 Doesn't have a job lined up afterward but looking forward to some time off. Has three children, one moved to college this fall. Living in [place], has a significant other x 7 months. Previously . In a monogamous relationship. Never smoker, social EtOH use.        VITALS:  BP (!) 151/83 (BP Location: Left arm, Patient Position: Semi-Alarcon's, Cuff Size: Adult Regular)   Pulse 87   Temp 99.9  F (37.7  C) (Temporal)   Resp 18   Wt 68.4 kg (150 lb 11.2 oz)   LMP 05/14/2014 (Approximate)   SpO2 95%   BMI 27.56 kg/m      PHYSICAL EXAM    General presentation: Alert, Vital signs reviewed. NAD  HENT: ENT inspection is normal. Oropharynx is moist and clear.   Eye: Pupils are equal and reactive to light. EOMI  Neck: The neck is supple, with full ROM, with no evidence of meningismus.  Pulmonary: Currently in no acute respiratory distress. Normal, non labored respirations, the lung sounds are normal with good equal air movement. Clear to auscultation bilaterally.   Circulatory: Regular rate and  rhythm. Peripheral pulses are strong and equal. No murmurs, rubs, or gallops.   Abdominal: The abdomen is soft. Nontender. No rigidity, guarding, or rebound. Bowel sounds normal.   Neurologic: Alert, oriented to person, place, and time. No motor deficit. No sensory deficit. Cranial nerves II through XII are intact.  Musculoskeletal: No extremity tenderness. Full range of motion in all extremities. No extremity edema.   Skin: Skin color is normal. No rash. Warm. Dry to touch.      LAB:  All pertinent labs reviewed and interpreted.  Results for orders placed or performed during the hospital encounter of 03/19/24   Chest XR,  PA & LAT    Impression    IMPRESSION: Negative chest.   Symptomatic Influenza A/B, RSV, & SARS-CoV2 PCR (COVID-19) Nasopharyngeal    Specimen: Nasopharyngeal; Swab   Result Value Ref Range    Influenza A PCR Positive (A) Negative    Influenza B PCR Negative Negative    RSV PCR Negative Negative    SARS CoV2 PCR Negative Negative       RADIOLOGY:  Reviewed all pertinent imaging. Please see official radiology report.  Chest XR,  PA & LAT   Final Result   IMPRESSION: Negative chest.              I, Armando Painting, am serving as a scribe to document services personally performed by Brady Hoang DO based on my observation and the provider's statements to me. IBrady, attest that Armando Painting is acting in a scribe capacity, has observed my performance of the services and has documented them in accordance with my direction.    Brady Hoang DO  Emergency Medicine  [facility] EMERGENCY ROOM  [facility] [facility] [id number] [phone]     Brady Hoang DO  03/19/24 [id number]

## 2024-03-19 NOTE — DISCHARGE INSTRUCTIONS
You tested positive for influenza A here today emergency department.  This appears consistent with your symptoms.  Take the Tamiflu daily as directed to treat the influenza infection.  Use the Tessalon Perles as needed for any further coughing episodes.  It is also recommended that you rest and plenty of fluids over the next few days.  Follow-up with your primary care provider for reevaluation or return back to ED sooner for any new or concerning symptoms.

## 2024-03-24 ENCOUNTER — HEALTH MAINTENANCE LETTER (OUTPATIENT)
Age: 59
End: 2024-03-24

## 2024-03-27 ENCOUNTER — MYC MEDICAL ADVICE (OUTPATIENT)
Dept: FAMILY MEDICINE | Facility: CLINIC | Age: 59
End: 2024-03-27
Payer: COMMERCIAL

## 2024-03-27 DIAGNOSIS — E78.5 HYPERLIPIDEMIA WITH TARGET LDL LESS THAN 130: Primary | ICD-10-CM

## 2024-04-08 ASSESSMENT — PATIENT HEALTH QUESTIONNAIRE - PHQ9
SUM OF ALL RESPONSES TO PHQ QUESTIONS 1-9: 8
SUM OF ALL RESPONSES TO PHQ QUESTIONS 1-9: 8
10. IF YOU CHECKED OFF ANY PROBLEMS, HOW DIFFICULT HAVE THESE PROBLEMS MADE IT FOR YOU TO DO YOUR WORK, TAKE CARE OF THINGS AT HOME, OR GET ALONG WITH OTHER PEOPLE: SOMEWHAT DIFFICULT

## 2024-04-09 ENCOUNTER — OFFICE VISIT (OUTPATIENT)
Dept: FAMILY MEDICINE | Facility: CLINIC | Age: 59
End: 2024-04-09
Payer: COMMERCIAL

## 2024-04-09 ENCOUNTER — LAB (OUTPATIENT)
Dept: LAB | Facility: CLINIC | Age: 59
End: 2024-04-09
Payer: COMMERCIAL

## 2024-04-09 VITALS
HEART RATE: 77 BPM | SYSTOLIC BLOOD PRESSURE: 128 MMHG | HEIGHT: 61 IN | TEMPERATURE: 98.2 F | OXYGEN SATURATION: 99 % | DIASTOLIC BLOOD PRESSURE: 86 MMHG | BODY MASS INDEX: 27.89 KG/M2 | WEIGHT: 147.7 LBS | RESPIRATION RATE: 16 BRPM

## 2024-04-09 DIAGNOSIS — D84.9 IMMUNOSUPPRESSED STATUS (H): ICD-10-CM

## 2024-04-09 DIAGNOSIS — M25.561 CHRONIC PAIN OF BOTH KNEES: ICD-10-CM

## 2024-04-09 DIAGNOSIS — E78.5 HYPERLIPIDEMIA WITH TARGET LDL LESS THAN 130: ICD-10-CM

## 2024-04-09 DIAGNOSIS — N95.0 POST-MENOPAUSAL BLEEDING: Primary | ICD-10-CM

## 2024-04-09 DIAGNOSIS — G89.29 CHRONIC PAIN OF BOTH KNEES: ICD-10-CM

## 2024-04-09 DIAGNOSIS — Z79.899 ENCOUNTER FOR LONG-TERM CURRENT USE OF MEDICATION: ICD-10-CM

## 2024-04-09 DIAGNOSIS — T86.10 COMPLICATIONS, KIDNEY TRANSPLANT: ICD-10-CM

## 2024-04-09 DIAGNOSIS — Z48.298 AFTERCARE FOLLOWING ORGAN TRANSPLANT: ICD-10-CM

## 2024-04-09 DIAGNOSIS — Z94.0 KIDNEY TRANSPLANTED: ICD-10-CM

## 2024-04-09 DIAGNOSIS — F32.4 MAJOR DEPRESSIVE DISORDER WITH SINGLE EPISODE, IN PARTIAL REMISSION (H): ICD-10-CM

## 2024-04-09 DIAGNOSIS — Z12.4 SCREENING FOR MALIGNANT NEOPLASM OF CERVIX: ICD-10-CM

## 2024-04-09 DIAGNOSIS — M25.562 CHRONIC PAIN OF BOTH KNEES: ICD-10-CM

## 2024-04-09 DIAGNOSIS — Z94.0 KIDNEY REPLACED BY TRANSPLANT: ICD-10-CM

## 2024-04-09 DIAGNOSIS — E83.52 HYPERCALCEMIA: ICD-10-CM

## 2024-04-09 DIAGNOSIS — Z98.890 OTHER SPECIFIED POSTPROCEDURAL STATES: ICD-10-CM

## 2024-04-09 PROBLEM — C44.722 SQUAMOUS CELL CARCINOMA OF RIGHT THIGH: Status: ACTIVE | Noted: 2023-11-12

## 2024-04-09 LAB
ALBUMIN MFR UR ELPH: 109 MG/DL
ALT SERPL W P-5'-P-CCNC: 26 U/L (ref 0–50)
ANION GAP SERPL CALCULATED.3IONS-SCNC: 14 MMOL/L (ref 7–15)
BUN SERPL-MCNC: 36.4 MG/DL (ref 8–23)
CALCIUM SERPL-MCNC: 10.4 MG/DL (ref 8.6–10)
CHLORIDE SERPL-SCNC: 105 MMOL/L (ref 98–107)
CHOLEST SERPL-MCNC: 233 MG/DL
CREAT SERPL-MCNC: 2.37 MG/DL (ref 0.51–0.95)
CREAT UR-MCNC: 95.2 MG/DL
DEPRECATED HCO3 PLAS-SCNC: 22 MMOL/L (ref 22–29)
EGFRCR SERPLBLD CKD-EPI 2021: 23 ML/MIN/1.73M2
ERYTHROCYTE [DISTWIDTH] IN BLOOD BY AUTOMATED COUNT: 14.3 % (ref 10–15)
FASTING STATUS PATIENT QL REPORTED: YES
GLUCOSE SERPL-MCNC: 120 MG/DL (ref 70–99)
HCT VFR BLD AUTO: 36.4 % (ref 35–47)
HDLC SERPL-MCNC: 81 MG/DL
HGB BLD-MCNC: 11.6 G/DL (ref 11.7–15.7)
LDLC SERPL CALC-MCNC: 115 MG/DL
MCH RBC QN AUTO: 30.4 PG (ref 26.5–33)
MCHC RBC AUTO-ENTMCNC: 31.9 G/DL (ref 31.5–36.5)
MCV RBC AUTO: 95 FL (ref 78–100)
NONHDLC SERPL-MCNC: 152 MG/DL
PLATELET # BLD AUTO: 114 10E3/UL (ref 150–450)
POTASSIUM SERPL-SCNC: 4.3 MMOL/L (ref 3.4–5.3)
PROT/CREAT 24H UR: 1.14 MG/MG CR (ref 0–0.2)
RBC # BLD AUTO: 3.82 10E6/UL (ref 3.8–5.2)
SODIUM SERPL-SCNC: 141 MMOL/L (ref 135–145)
TRIGL SERPL-MCNC: 186 MG/DL
WBC # BLD AUTO: 4.6 10E3/UL (ref 4–11)

## 2024-04-09 PROCEDURE — 84460 ALANINE AMINO (ALT) (SGPT): CPT

## 2024-04-09 PROCEDURE — 80180 DRUG SCRN QUAN MYCOPHENOLATE: CPT

## 2024-04-09 PROCEDURE — G0145 SCR C/V CYTO,THINLAYER,RESCR: HCPCS | Performed by: PHYSICIAN ASSISTANT

## 2024-04-09 PROCEDURE — 99215 OFFICE O/P EST HI 40 MIN: CPT | Performed by: PHYSICIAN ASSISTANT

## 2024-04-09 PROCEDURE — 36415 COLL VENOUS BLD VENIPUNCTURE: CPT

## 2024-04-09 PROCEDURE — 84156 ASSAY OF PROTEIN URINE: CPT

## 2024-04-09 PROCEDURE — 80048 BASIC METABOLIC PNL TOTAL CA: CPT

## 2024-04-09 PROCEDURE — 80061 LIPID PANEL: CPT

## 2024-04-09 PROCEDURE — 87624 HPV HI-RISK TYP POOLED RSLT: CPT | Performed by: PHYSICIAN ASSISTANT

## 2024-04-09 PROCEDURE — 85027 COMPLETE CBC AUTOMATED: CPT

## 2024-04-09 RX ORDER — PAROXETINE 20 MG/1
TABLET, FILM COATED ORAL
Qty: 90 TABLET | Refills: 2 | Status: SHIPPED | OUTPATIENT
Start: 2024-04-09

## 2024-04-09 ASSESSMENT — ANXIETY QUESTIONNAIRES
GAD7 TOTAL SCORE: 3
6. BECOMING EASILY ANNOYED OR IRRITABLE: NOT AT ALL
3. WORRYING TOO MUCH ABOUT DIFFERENT THINGS: SEVERAL DAYS
2. NOT BEING ABLE TO STOP OR CONTROL WORRYING: SEVERAL DAYS
GAD7 TOTAL SCORE: 3
7. FEELING AFRAID AS IF SOMETHING AWFUL MIGHT HAPPEN: NOT AT ALL
1. FEELING NERVOUS, ANXIOUS, OR ON EDGE: SEVERAL DAYS
5. BEING SO RESTLESS THAT IT IS HARD TO SIT STILL: NOT AT ALL
IF YOU CHECKED OFF ANY PROBLEMS ON THIS QUESTIONNAIRE, HOW DIFFICULT HAVE THESE PROBLEMS MADE IT FOR YOU TO DO YOUR WORK, TAKE CARE OF THINGS AT HOME, OR GET ALONG WITH OTHER PEOPLE: NOT DIFFICULT AT ALL

## 2024-04-09 ASSESSMENT — PAIN SCALES - GENERAL: PAINLEVEL: NO PAIN (0)

## 2024-04-09 ASSESSMENT — PATIENT HEALTH QUESTIONNAIRE - PHQ9: 5. POOR APPETITE OR OVEREATING: NOT AT ALL

## 2024-04-09 NOTE — PROGRESS NOTES
Assessment & Plan     Post-menopausal bleeding  Noticed brownish red blood on toilet paper when she wiped after urination about 1 week ago  No blood in underwear or in toilet bowl  No blood in urine  The bleeding has since resolved. Not currently taking any HRT.  Has been postmenopausal for 12 years  2015 - years ago she had an episode of vaginal bleeding that lead to a w/u of endometrial hyperplasia and subsequent D&C. Hysteroscopy 11/2015 - benign polyp tissue on pathology, nothing concerning for malignancy.  1/2022 - reported 2 days of vaginal spotting. Pelvic US was ordered but she did not complete this. Has not had recurrent issues until recently.   Plan for pelvic US, follow-up per results  - US Pelvic Complete with Transvaginal; Future    Screening for malignant neoplasm of cervix  Immunosuppressed status (H24)  Has been recommended to have annual paps due to her history of immunosuppression  Last pap 1/31/22 pap NIL, HPV neg  - Pap screen with HPV - recommended age 30 - 65 years    Major depressive disorder with single episode, in partial remission (H24)  Chronic, controlled. Continue present management.  - PARoxetine (PAXIL) 20 MG tablet; TAKE 1 TABLET BY MOUTH EVERY MORNING    Hyperlipidemia with target LDL less than 130  She was taking sirolimus which can increase lipids. Lipids remained very high despite atorvastatin therapy. Atorvastatin increased to 40 mg daily after lipids 5/2023 were still high. Recommended to recheck labs in 2 months but has not returned for recheck. Her transplant team changed her from sirolimus to mycophenolate in July 2023 due to high lipids. She has not had cholesterol checked since then. She had fasting labs drawn today. Plan to follow-up per results.    Chronic pain of both knees  Would like to see ortho  - Orthopedic  Referral; Future    41 minutes spent on the date of the encounter doing chart review, history and exam, documentation and further activities per the  note      Subjective   Reina is a 59 year old, presenting for the following health issues:  Headache and History of Present Illness        4/9/2024    10:47 AM   Additional Questions   Roomed by Harini FARMER CMA   Accompanied by ASIM         4/9/2024    10:47 AM   Patient Reported Additional Medications   Patient reports taking the following new medications None     History of Present Illness       Reason for visit:  Cholesterol check. Knee pain, bleeding after menopause  Symptom onset:  More than a month  Symptom intensity:  Severe  Symptom progression:  Worsening  Had these symptoms before:  Yes  Has tried/received treatment for these symptoms:  No    She eats 2-3 servings of fruits and vegetables daily.She consumes 0 sweetened beverage(s) daily.She exercises with enough effort to increase her heart rate 20 to 29 minutes per day.  She exercises with enough effort to increase her heart rate 4 days per week. She is missing 1 dose(s) of medications per week.  She is not taking prescribed medications regularly due to remembering to take.     Chronic bilateral knee pain, sometimes pain seems to come from hips. No new injury or trauma. Would like to see ortho.    Post menopausal bleeding:  Noticed brownish red blood on toilet paper when she wiped after urination about 1 week ago  No blood in underwear or in toilet bowl  No blood in urine  The bleeding has since resolved. Not currently taking any HRT.  Has been postmenopausal for 12 years  2015 - years ago she had an episode of vaginal bleeding that lead to a w/u of endometrial hyperplasia and subsequent D&C. Hysteroscopy 11/2015 - benign polyp tissue on pathology, nothing concerning for malignancy.  1/2022 - reported 2 days of vaginal spotting. Pelvic US was ordered but she did not complete this. Has not had recurrent issues until recently.     Has been recommended to have annual paps due to her history of immunosuppression  Last pap 1/31/22 pap NIL, HPV  "neg    Depression:  Paxil 20 mg daily  Tolerating medication well, no side effects or concerns      2/15/2021    10:32 AM 2/12/2023     3:13 PM 4/8/2024    12:05 PM   PHQ   PHQ-9 Total Score 4 10 8   Q9: Thoughts of better off dead/self-harm past 2 weeks Not at all Not at all Not at all         2/15/2021    10:32 AM 2/12/2023     3:13 PM 4/9/2024    11:06 AM   DEBRA-7 SCORE   Total Score  4 (minimal anxiety)    Total Score 3 4 3     Hyperlipidemia  She was taking sirolimus which can increase lipids. Lipids remained very high despite atorvastatin therapy. Atorvastatin increased to 40 mg daily after lipids 5/2023 were still high. Recommended to recheck labs in 2 months but has not returned for recheck. Her transplant team changed her from sirolimus to mycophenolate in July 2023 due to high lipids. She has not had cholesterol checked since then. She had fasting labs drawn today. Plan to follow-up per results.      Review of Systems  Constitutional, HEENT, cardiovascular, pulmonary, gi and gu systems are negative, except as otherwise noted.      Objective    /86 (BP Location: Right arm, Patient Position: Sitting, Cuff Size: Adult Regular)   Pulse 77   Temp 98.2  F (36.8  C) (Oral)   Resp 16   Ht 1.549 m (5' 1\")   Wt 67 kg (147 lb 11.2 oz)   LMP 05/14/2014 (Approximate)   SpO2 99%   BMI 27.91 kg/m    Body mass index is 27.91 kg/m .  Physical Exam   GENERAL: alert and no distress  RESP: breathing comfortably  CV: regular rate   (female): normal female external genitalia, normal urethral meatus , normal vaginal mucosa, and normal cervix without masses or discharge  NEURO: Mentation intact and speech normal  PSYCH: mentation appears normal, affect normal/bright          Signed Electronically by: Lizbeth Andrews PA-C    "

## 2024-04-10 ENCOUNTER — TELEPHONE (OUTPATIENT)
Dept: TRANSPLANT | Facility: CLINIC | Age: 59
End: 2024-04-10
Payer: COMMERCIAL

## 2024-04-10 DIAGNOSIS — E78.5 HYPERLIPIDEMIA WITH TARGET LDL LESS THAN 130: ICD-10-CM

## 2024-04-10 DIAGNOSIS — Z94.0 KIDNEY TRANSPLANTED: ICD-10-CM

## 2024-04-10 DIAGNOSIS — R79.89 ELEVATED SERUM CREATININE: Primary | ICD-10-CM

## 2024-04-10 DIAGNOSIS — Z48.298 AFTERCARE FOLLOWING ORGAN TRANSPLANT: ICD-10-CM

## 2024-04-10 LAB
MYCOPHENOLATE SERPL LC/MS/MS-MCNC: 2.79 MG/L (ref 1–3.5)
MYCOPHENOLATE-G SERPL LC/MS/MS-MCNC: 58.9 MG/L (ref 30–95)
TME LAST DOSE: NORMAL H
TME LAST DOSE: NORMAL H

## 2024-04-10 RX ORDER — ATORVASTATIN CALCIUM 40 MG/1
40 TABLET, FILM COATED ORAL AT BEDTIME
Qty: 90 TABLET | Refills: 3 | Status: SHIPPED | OUTPATIENT
Start: 2024-04-10

## 2024-04-10 NOTE — TELEPHONE ENCOUNTER
Creatinine = 2.37  (4/9/24)  Baseline 1.8-2.2      PLAN:  Check for recent illness.  Any fever?  Any missed medication?  Any nausea / vomiting / diarrhea?  Any cough / cold / congestion?  Any frequency / urgency / burning with urination?  Any nausea / vomiting / diarrhea?  Any cough / cold / congestion?  Any frequency / urgency / burning with urination?  Dehydrated?   Recommend improving hydration and recheck BMP in 1 - 2 weeks.    OUTCOME:  Left a detailed message.  Lab order placed.  [employer] message sent as well.

## 2024-04-18 ENCOUNTER — ANCILLARY PROCEDURE (OUTPATIENT)
Dept: ULTRASOUND IMAGING | Facility: CLINIC | Age: 59
End: 2024-04-18
Attending: PHYSICIAN ASSISTANT
Payer: COMMERCIAL

## 2024-04-18 DIAGNOSIS — N95.0 POST-MENOPAUSAL BLEEDING: ICD-10-CM

## 2024-04-18 PROBLEM — Z12.4 SCREENING FOR CERVICAL CANCER: Status: RESOLVED | Noted: 2021-02-15 | Resolved: 2022-01-31

## 2024-04-18 PROCEDURE — 76830 TRANSVAGINAL US NON-OB: CPT | Performed by: FAMILY MEDICINE

## 2024-04-18 PROCEDURE — 76856 US EXAM PELVIC COMPLETE: CPT | Performed by: FAMILY MEDICINE

## 2024-04-19 DIAGNOSIS — N95.0 POST-MENOPAUSAL BLEEDING: Primary | ICD-10-CM

## 2024-04-23 ENCOUNTER — LAB (OUTPATIENT)
Dept: LAB | Facility: CLINIC | Age: 59
End: 2024-04-23
Payer: COMMERCIAL

## 2024-04-23 DIAGNOSIS — Z48.298 AFTERCARE FOLLOWING ORGAN TRANSPLANT: ICD-10-CM

## 2024-04-23 DIAGNOSIS — R79.89 ELEVATED SERUM CREATININE: ICD-10-CM

## 2024-04-23 DIAGNOSIS — Z94.0 KIDNEY TRANSPLANTED: ICD-10-CM

## 2024-04-23 PROCEDURE — 80048 BASIC METABOLIC PNL TOTAL CA: CPT

## 2024-04-23 PROCEDURE — 36415 COLL VENOUS BLD VENIPUNCTURE: CPT

## 2024-04-23 NOTE — PROGRESS NOTES
ASSESSMENT & PLAN     Today we discussed the underlying etiology/pathology of patient's   1. Chondromalacia of patella, bilateral    2. Left hip pain    3. Kidney replaced by transplant    4. Immunosuppressed status (H24)    5. Stage 3b chronic kidney disease      -We had a very thorough discussion today in regards to patient's musculoskeletal complaints of her bilateral knees as well as her left hemipelvic pain  - Patient was reassured that x-ray wise she has no evidence of high-grade osteoarthritis with certainly no bone-on-bone findings.  She certainly may have had chondromalacia changes at age 14 but never underwent any other treatment including no quadricep strengthening or patellar tracking program.  She certainly does have hypersensitivity of the patellar facets consistent with chondromalacia history and patellofemoral syndrome bilaterally.  - Patient has positive left hip impingement findings on physical exam reproducing her left hemipelvic and groin pain but x-rays do not show any high-grade osteoarthritis, lesion or AVN.  Certainly patient may have a degenerative labral tear or possibly subacute femoral acetabular impingement not noted on x-ray.  - We discussed the etiology of osteoarthritis as well as various treatment options for the mainstay of management of osteoarthritis including rest, activity modification, proper technique with ambulation of steps/stairs which is to lead with good leg going up and lead with painful/symptomatic leg going down,  maintaining healthy weight management due to the 1:4 rule for joint stress with excess weight, herbal supplementation such as turmeric to decrease inflammation , healthy nutrition, use of oral NSAIDs (if not contraindicated due to patient being on chronic blood thinner medication or other medical conditions) with supplemental Tylenol up to 3000 mg daily, topical pain relievers such as Voltaren 1% gel to be applied 3 times a day to the area of pain,  consideration for intra-articular cortisone injections, viscosupplementation or PRP injections, formal physical therapy for joint mobilization and strengthening and possible surgical consideration if all conservative treatments fail.   -Patient does have a history of kidney transplant with chronic immunosuppression and elevated renal labs.  Patient should not utilize NSAIDs at any time.  We did discuss all the above treatment options and patient at this time is comfortable doing watchful waiting continuing with her normal exercise routine at [employer].  Patient declined referral to physical therapy for patellofemoral tracking and quadricep strengthening and all other intervention at this time.  - Patient certainly may follow-up as needed.  If her left hemipelvic pain continues to persist we did discuss proceeding with left hip MR arthrogram with intra-articular cortisone injection as an option.    -Call direct clinic number [[phone]] at any time with questions or concerns in regards to your recent office visit with me.     Tuan Barkley PA-C  [facility]    This note was completed in part using a voice recognition software, any grammatical or context distortion are unintentional and inherent to the software.           SUBJECTIVE  Reina Quan is a/an 59 year old female who is seen in consultation at the request of  Lizbeth Andrews PA-C for evaluation of bilateral knee pain. The patient is seen by themselves.     Expanded HPI: Patient states that she has had bilateral knee discomfort since at least age 14.  No injury or trauma reported.  She states that she was evaluated around that age and was told that she had chondromalacia of the patella and that she needed to quit doing gymnastics or her knees would end up in a dire position.  She continued doing normal activities including gymnastics but were compressive knee sleeves.  No other treatment was ever initiated for her  knees including no therapy, no oral medication, no injection therapy and certainly no surgery has ever been done.  She still remains active.  Location of discomfort for the left knee is anterior and she points around the lateral retinaculum region of the patella with no particular pain elsewhere in the knee.  For her right knee it is a little more diffuse around the anterior patella including the medial and lateral borders of the retinaculum with no particular pain elsewhere.  She denies any really kiana mechanical symptoms.  She is noticing that she is developing some increased left hemipelvic pain mainly in the groin region which she believes is likely a hip flexor problem.  There are time periods where she has difficulty doing prolonged ambulation and other times that she functions well.      Onset: 45 years(s) ago. Reports insidious onset without acute precipitating event.. Pt was diagnosed with chondromalacia at 14 y.o. 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 More recently the pain has increased to noticing the pain daily, no matter the activity level. Pt also immunosuppressed with kidney replacement.   Location of Pain: bilateral knee - lately L>R; pain on lateral knee;  pain from the left anterior groin area to the left knee at times.   Rating of Pain at worst: 10/10  Rating of Pain Currently: 3/10  Worsened by: twisting, some exercise (walking), standing or sitting for long period, going up stairs with left leg for balance, left hip flexion while seated  Better with: none  Treatments tried: rest/activity avoidance, ice occasionally, and ibuprofen occasionally  Quality: sharp, stabbing  Associated symptoms: numbness, tingling, weakness of the hip and knees at times, and feeling of instability, feeling of hyperextending the knees while walking  Orthopedic history: NO  Relevant surgical history: NO  Social history: social history: works at   for [employer] (field work); walking regularly, biking    Past Medical History:   Diagnosis Date    Actinic keratosis     Allergic rhinitis, cause unspecified     Anemia     anxiety/depression     PAXIL    Arthritis     congenital hearing loss     uses hearing aids    Depressive disorder     Dry eyes     Giant Platelet syndrome     Glomerular Focal Sclerosis--transplant 1985     Hypertension     Kidney replaced by transplant 1992    RAPAMUNE/ SIROLIMUS     Lichen planopilaris     LPP followed by derm on doxycycline/ clobetasol    Lichen planus     Menorrhagia     migraine     Squamous cell carcinoma     8 x     Thrombocytopenia (H24)     Ulcerative colitis, unspecified     biposy neg 1996    Unsatisfactory cervical Papanicolaou smear 02/15/2021    UTI (lower urinary tract infection)     recurrent Dr Burns [facility]     Social History     Socioeconomic History    Marital status: Single    Number of children: 3   Occupational History    Occupation: [employer]   Tobacco Use    Smoking status: Never     Passive exposure: Never    Smokeless tobacco: Never   Vaping Use    Vaping status: Never Used   Substance and Sexual Activity    Alcohol use: Yes     Alcohol/week: 0.0 standard drinks of alcohol     Comment: 2x /week    Drug use: No    Sexual activity: Not Currently     Partners: Male     Birth control/protection: Post-menopausal   Other Topics Concern    Parent/sibling w/ CABG, MI or angioplasty before 65F 55M? 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 Doesn't have a job lined up afterward but looking forward to some time off. Has three children, one moved to college this fall. Living in [place], has a significant other x 7 months. Previously . In a monogamous relationship. Never smoker, social EtOH use.      Social Determinants of Health     Interpersonal Safety: Low Risk  (4/9/2024)    Interpersonal Safety     Do you feel  physically and emotionally safe where you currently live?: Yes     Within the past 12 months, have you been hit, slapped, kicked or otherwise physically hurt by someone?: No     Within the past 12 months, have you been humiliated or emotionally abused in other ways by your partner or ex-partner?: No         Patient's past medical, surgical, social, and family histories were personally reviewed today and no changes are noted.    REVIEW OF SYSTEMS:  10 point ROS is negative other than symptoms noted above in HPI, Past Medical History or as stated below  Constitutional: NEGATIVE for fever, chills, change in weight  Skin: NEGATIVE for worrisome rashes, moles or lesions  GI/: NEGATIVE for bowel or bladder changes  Neuro: NEGATIVE for weakness, dizziness or paresthesias    OBJECTIVE:  Vital signs as noted in EPIC for 4/23/2024  General: healthy, alert and in no distress  HEENT: no scleral icterus or conjunctival erythema  Skin: no suspicious lesions or rash. No jaundice.  CV: no pedal edema  Resp: normal respiratory effort without conversational dyspnea   Psych: normal mood and affect  Gait: normal steady gait with appropriate coordination and balance  Neuro: Normal light sensory exam of lower extremity      MSK:  Exam shows a pleasant 59-year-old female who ambulates full weightbearing without assistive device.  No particular antalgia.  Negative Trendelenburg gait.  Range of motion of the lumbar spine shows hypermobility with her able to place her palms flat on the floor with no particular discomfort.  Lumbar extension causes some transverse low back pain but no symptoms in the hemipelvis knees or legs.  Lateral bending is normal.  She does have some mild discomfort of the low back with rotation of the spine at endpoint.  Examination of both lower extremities show no bruising, no swelling and no ecchymosis.  No effusion of the knees.  No excessive warmth.  She has full active knee extension bilaterally and flexion well  past 1 or 35 degrees bilaterally with no particular crepitation.  No pain throughout the extensor mechanisms of the legs including quadriceps, patella and infrapatellar tendon.  She does show tenderness of the lateral joint lines of both knees and hypersensitivity to the lateral facets of both patellas and also some increased discomfort to the left medial facet of the patella.  Translation of the patella does not cause pain but isolated compression testing of the patella with isolated quad activation generates pain in the patellofemoral joint.  Ligament exam is stable at 0 and 30 degrees of the MCL and LCL without laxity or pain.  Anterior and posterior drawer show stable endpoints without discomfort.  Circumduction maneuvers are unremarkable including Apley grind.  She has no lower extremity swelling or edema.  Calf is soft and supple.  No tenderness in the popliteal fossa no evidence of Baker's cyst.  She is grossly neurovascularly intact L2-S1.  Passive and active range of motion of the right hip is full and pain-free with negative impingement testing.  Internal rotation of the left hip is full but does not generate left hemipelvic and groin pain upon terminal internal rotation with positive impingement testing generating and reproducible pain in the left groin and hemipelvis region.  No pain to palpation throughout the lumbar column including paraspinous muscles and SI joints.  No pain over the trochanters bilaterally.        Independent visualization of the below image:  Three-view x-ray of patient's bilateral knees are personally reviewed today.  No evidence of fracture or dislocation.  Slight degenerative changes are noted on the lateral compartment of both knees with early osteophyte formation noted on the lateral borders of the tibial plateau.  No high-grade osteoarthritic findings noted.  Patellas are seated centrally.  No evidence of AVN or osteochondral dissecans or loose bodies.  2 view x-ray of the  patient's pelvis and left hip are obtained and reviewed.  Joint space largely maintained.  No evidence of high-grade osteoarthritis, loose body or AVN.  Age-appropriate degenerative change noted.  There is degenerative change of the pubic symphysis.      Patient's conditions were thoroughly discussed during today's visit with total time reviewing patient's previous medical records/history/radiology, face-to-face examination and discussion and plan of care with the patient and documentation being 40 minutes.    Tuan Barkley PA-C  [facility]    This note was completed in part using a voice recognition software, any grammatical or context distortion are unintentional and inherent to the software.

## 2024-04-24 ENCOUNTER — TELEPHONE (OUTPATIENT)
Dept: TRANSPLANT | Facility: CLINIC | Age: 59
End: 2024-04-24
Payer: COMMERCIAL

## 2024-04-24 DIAGNOSIS — D84.9 IMMUNOSUPPRESSED STATUS (H): ICD-10-CM

## 2024-04-24 DIAGNOSIS — Z79.899 ENCOUNTER FOR LONG-TERM (CURRENT) USE OF HIGH-RISK MEDICATION: ICD-10-CM

## 2024-04-24 DIAGNOSIS — Z48.298 AFTERCARE FOLLOWING ORGAN TRANSPLANT: ICD-10-CM

## 2024-04-24 DIAGNOSIS — Z94.0 KIDNEY TRANSPLANTED: ICD-10-CM

## 2024-04-24 DIAGNOSIS — R79.89 ELEVATED SERUM CREATININE: Primary | ICD-10-CM

## 2024-04-24 LAB
ANION GAP SERPL CALCULATED.3IONS-SCNC: 15 MMOL/L (ref 7–15)
BUN SERPL-MCNC: 54.9 MG/DL (ref 8–23)
CALCIUM SERPL-MCNC: 10.8 MG/DL (ref 8.6–10)
CHLORIDE SERPL-SCNC: 101 MMOL/L (ref 98–107)
CREAT SERPL-MCNC: 2.38 MG/DL (ref 0.51–0.95)
DEPRECATED HCO3 PLAS-SCNC: 22 MMOL/L (ref 22–29)
EGFRCR SERPLBLD CKD-EPI 2021: 23 ML/MIN/1.73M2
GLUCOSE SERPL-MCNC: 110 MG/DL (ref 70–99)
POTASSIUM SERPL-SCNC: 4.3 MMOL/L (ref 3.4–5.3)
SODIUM SERPL-SCNC: 138 MMOL/L (ref 135–145)

## 2024-04-24 NOTE — TELEPHONE ENCOUNTER
Calcium = 10.8  (4/23/24)    Kan Sibley MD [place], Teresa, RN  Is she taking sensipar 30mg daily? If so, would increase to 60mg daily    PLAN:  Increase Sensipar to 60 mg daily.    OUTCOME:  States she stopped taking her Sensipar.  States she will start taking it again.        Creatinine 2.38 (4/23/24)  Baseline 1.8-2.2    PLAN:  Check for recent illness.  Any fever?  Any missed medication?  Changes in medication, (uli diuretics)?  Any pain over the transplanted kidney?  Any nausea / vomiting / diarrhea?  Any cough / cold / congestion?  Any frequency / urgency / burning with urination?  Dehydrated?   Recommend improving hydration and recheck BMP in 1 - 2 weeks.  Add UA/UC.    OUTCOME:  Denies any UTI symptoms.  Will repeat BMP and check a UA/UC.  Orders placed.

## 2024-04-25 ENCOUNTER — OFFICE VISIT (OUTPATIENT)
Dept: ORTHOPEDICS | Facility: CLINIC | Age: 59
End: 2024-04-25
Payer: COMMERCIAL

## 2024-04-25 ENCOUNTER — ANCILLARY PROCEDURE (OUTPATIENT)
Dept: GENERAL RADIOLOGY | Facility: CLINIC | Age: 59
End: 2024-04-25
Attending: PHYSICIAN ASSISTANT
Payer: COMMERCIAL

## 2024-04-25 VITALS
WEIGHT: 147 LBS | BODY MASS INDEX: 27.75 KG/M2 | SYSTOLIC BLOOD PRESSURE: 110 MMHG | HEIGHT: 61 IN | DIASTOLIC BLOOD PRESSURE: 70 MMHG

## 2024-04-25 DIAGNOSIS — N18.32 STAGE 3B CHRONIC KIDNEY DISEASE (H): ICD-10-CM

## 2024-04-25 DIAGNOSIS — M22.40 CHONDROMALACIA OF PATELLA, UNSPECIFIED LATERALITY: Primary | ICD-10-CM

## 2024-04-25 DIAGNOSIS — M25.561 BILATERAL KNEE PAIN: ICD-10-CM

## 2024-04-25 DIAGNOSIS — Z94.0 KIDNEY REPLACED BY TRANSPLANT: ICD-10-CM

## 2024-04-25 DIAGNOSIS — M25.552 LEFT HIP PAIN: ICD-10-CM

## 2024-04-25 DIAGNOSIS — M25.562 BILATERAL KNEE PAIN: ICD-10-CM

## 2024-04-25 DIAGNOSIS — D84.9 IMMUNOSUPPRESSED STATUS (H): ICD-10-CM

## 2024-04-25 PROCEDURE — 99203 OFFICE O/P NEW LOW 30 MIN: CPT | Performed by: PHYSICIAN ASSISTANT

## 2024-04-25 PROCEDURE — 73501 X-RAY EXAM HIP UNI 1 VIEW: CPT | Mod: TC | Performed by: RADIOLOGY

## 2024-04-25 PROCEDURE — 73562 X-RAY EXAM OF KNEE 3: CPT | Mod: TC | Performed by: RADIOLOGY

## 2024-04-25 NOTE — LETTER
4/25/2024         RE: Reina Quan  [address] [facility] [address]        Dear Colleague,    Thank you for referring your patient, Reina Quan, to the [facility]. Please see a copy of my visit note below.    ASSESSMENT & PLAN     Today we discussed the underlying etiology/pathology of patient's   1. Chondromalacia of patella, bilateral    2. Left hip pain    3. Kidney replaced by transplant    4. Immunosuppressed status (H24)    5. Stage 3b chronic kidney disease      -We had a very thorough discussion today in regards to patient's musculoskeletal complaints of her bilateral knees as well as her left hemipelvic pain  - Patient was reassured that x-ray wise she has no evidence of high-grade osteoarthritis with certainly no bone-on-bone findings.  She certainly may have had chondromalacia changes at age 14 but never underwent any other treatment including no quadricep strengthening or patellar tracking program.  She certainly does have hypersensitivity of the patellar facets consistent with chondromalacia history and patellofemoral syndrome bilaterally.  - Patient has positive left hip impingement findings on physical exam reproducing her left hemipelvic and groin pain but x-rays do not show any high-grade osteoarthritis, lesion or AVN.  Certainly patient may have a degenerative labral tear or possibly subacute femoral acetabular impingement not noted on x-ray.  - We discussed the etiology of osteoarthritis as well as various treatment options for the mainstay of management of osteoarthritis including rest, activity modification, proper technique with ambulation of steps/stairs which is to lead with good leg going up and lead with painful/symptomatic leg going down,  maintaining healthy weight management due to the 1:4 rule for joint stress with excess weight, herbal supplementation such as turmeric to decrease inflammation , healthy nutrition,  use of oral NSAIDs (if not contraindicated due to patient being on chronic blood thinner medication or other medical conditions) with supplemental Tylenol up to 3000 mg daily, topical pain relievers such as Voltaren 1% gel to be applied 3 times a day to the area of pain, consideration for intra-articular cortisone injections, viscosupplementation or PRP injections, formal physical therapy for joint mobilization and strengthening and possible surgical consideration if all conservative treatments fail.   -Patient does have a history of kidney transplant with chronic immunosuppression and elevated renal labs.  Patient should not utilize NSAIDs at any time.  We did discuss all the above treatment options and patient at this time is comfortable doing watchful waiting continuing with her normal exercise routine at [employer].  Patient declined referral to physical therapy for patellofemoral tracking and quadricep strengthening and all other intervention at this time.  - Patient certainly may follow-up as needed.  If her left hemipelvic pain continues to persist we did discuss proceeding with left hip MR arthrogram with intra-articular cortisone injection as an option.    -Call direct clinic number [[phone]] at any time with questions or concerns in regards to your recent office visit with me.     Tuan Barkley PA-C  [facility]    This note was completed in part using a voice recognition software, any grammatical or context distortion are unintentional and inherent to the software.           SUBJECTIVE  Reina Quan is a/an 59 year old female who is seen in consultation at the request of  Lizbeth Andrews PA-C for evaluation of bilateral knee pain. The patient is seen by themselves.     Expanded HPI: Patient states that she has had bilateral knee discomfort since at least age 14.  No injury or trauma reported.  She states that she was evaluated around that age and was told that  she had chondromalacia of the patella and that she needed to quit doing gymnastics or her knees would end up in a dire position.  She continued doing normal activities including gymnastics but were compressive knee sleeves.  No other treatment was ever initiated for her knees including no therapy, no oral medication, no injection therapy and certainly no surgery has ever been done.  She still remains active.  Location of discomfort for the left knee is anterior and she points around the lateral retinaculum region of the patella with no particular pain elsewhere in the knee.  For her right knee it is a little more diffuse around the anterior patella including the medial and lateral borders of the retinaculum with no particular pain elsewhere.  She denies any really kiana mechanical symptoms.  She is noticing that she is developing some increased left hemipelvic pain mainly in the groin region which she believes is likely a hip flexor problem.  There are time periods where she has difficulty doing prolonged ambulation and other times that she functions well.      Onset: 45 years(s) ago. Reports insidious onset without acute precipitating event.. Pt was diagnosed with chondromalacia at 14 y.o. 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 More recently the pain has increased to noticing the pain daily, no matter the activity level. Pt also immunosuppressed with kidney replacement.   Location of Pain: bilateral knee - lately L>R; pain on lateral knee;  pain from the left anterior groin area to the left knee at times.   Rating of Pain at worst: 10/10  Rating of Pain Currently: 3/10  Worsened by: twisting, some exercise (walking), standing or sitting for long period, going up stairs with left leg for balance, left hip flexion while seated  Better with: none  Treatments tried: rest/activity avoidance, ice occasionally, and ibuprofen occasionally  Quality:  sharp, stabbing  Associated symptoms: numbness, tingling, weakness of the hip and knees at times, and feeling of instability, feeling of hyperextending the knees while walking  Orthopedic history: NO  Relevant surgical history: NO  Social history: social history: works at  for [employer] (field work); walking regularly, biking    Past Medical History:   Diagnosis Date     Actinic keratosis      Allergic rhinitis, cause unspecified      Anemia      anxiety/depression     PAXIL     Arthritis      congenital hearing loss     uses hearing aids     Depressive disorder      Dry eyes      Giant Platelet syndrome      Glomerular Focal Sclerosis--transplant 1985      Hypertension      Kidney replaced by transplant 1992    RAPAMUNE/ SIROLIMUS      Lichen planopilaris     LPP followed by derm on doxycycline/ clobetasol     Lichen planus      Menorrhagia      migraine      Squamous cell carcinoma     8 x      Thrombocytopenia (H24)      Ulcerative colitis, unspecified     biposy neg 1996     Unsatisfactory cervical Papanicolaou smear 02/15/2021     UTI (lower urinary tract infection)     recurrent Dr Burns [facility]     Social History     Socioeconomic History     Marital status: Single     Number of children: 3   Occupational History     Occupation: [employer]   Tobacco Use     Smoking status: Never     Passive exposure: Never     Smokeless tobacco: Never   Vaping Use     Vaping status: Never Used   Substance and Sexual Activity     Alcohol use: Yes     Alcohol/week: 0.0 standard drinks of alcohol     Comment: 2x /week     Drug use: No     Sexual activity: Not Currently     Partners: Male     Birth control/protection: Post-menopausal   Other Topics Concern     Parent/sibling w/ CABG, MI or angioplasty before 65F 55M? 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 Doesn't have a job lined up afterward but looking forward to some time off. Has three  children, one moved to college this fall. Living in [place], has a significant other x 7 months. Previously . In a monogamous relationship. Never smoker, social EtOH use.      Social Determinants of Health     Interpersonal Safety: Low Risk  (4/9/2024)    Interpersonal Safety      Do you feel physically and emotionally safe where you currently live?: Yes      Within the past 12 months, have you been hit, slapped, kicked or otherwise physically hurt by someone?: No      Within the past 12 months, have you been humiliated or emotionally abused in other ways by your partner or ex-partner?: No         Patient's past medical, surgical, social, and family histories were personally reviewed today and no changes are noted.    REVIEW OF SYSTEMS:  10 point ROS is negative other than symptoms noted above in HPI, Past Medical History or as stated below  Constitutional: NEGATIVE for fever, chills, change in weight  Skin: NEGATIVE for worrisome rashes, moles or lesions  GI/: NEGATIVE for bowel or bladder changes  Neuro: NEGATIVE for weakness, dizziness or paresthesias    OBJECTIVE:  Vital signs as noted in EPIC for 4/23/2024  General: healthy, alert and in no distress  HEENT: no scleral icterus or conjunctival erythema  Skin: no suspicious lesions or rash. No jaundice.  CV: no pedal edema  Resp: normal respiratory effort without conversational dyspnea   Psych: normal mood and affect  Gait: normal steady gait with appropriate coordination and balance  Neuro: Normal light sensory exam of lower extremity      MSK:  Exam shows a pleasant 59-year-old female who ambulates full weightbearing without assistive device.  No particular antalgia.  Negative Trendelenburg gait.  Range of motion of the lumbar spine shows hypermobility with her able to place her palms flat on the floor with no particular discomfort.  Lumbar extension causes some transverse low back pain but no symptoms in the hemipelvis knees or legs.  Lateral  bending is normal.  She does have some mild discomfort of the low back with rotation of the spine at endpoint.  Examination of both lower extremities show no bruising, no swelling and no ecchymosis.  No effusion of the knees.  No excessive warmth.  She has full active knee extension bilaterally and flexion well past 1 or 35 degrees bilaterally with no particular crepitation.  No pain throughout the extensor mechanisms of the legs including quadriceps, patella and infrapatellar tendon.  She does show tenderness of the lateral joint lines of both knees and hypersensitivity to the lateral facets of both patellas and also some increased discomfort to the left medial facet of the patella.  Translation of the patella does not cause pain but isolated compression testing of the patella with isolated quad activation generates pain in the patellofemoral joint.  Ligament exam is stable at 0 and 30 degrees of the MCL and LCL without laxity or pain.  Anterior and posterior drawer show stable endpoints without discomfort.  Circumduction maneuvers are unremarkable including Apley grind.  She has no lower extremity swelling or edema.  Calf is soft and supple.  No tenderness in the popliteal fossa no evidence of Baker's cyst.  She is grossly neurovascularly intact L2-S1.  Passive and active range of motion of the right hip is full and pain-free with negative impingement testing.  Internal rotation of the left hip is full but does not generate left hemipelvic and groin pain upon terminal internal rotation with positive impingement testing generating and reproducible pain in the left groin and hemipelvis region.  No pain to palpation throughout the lumbar column including paraspinous muscles and SI joints.  No pain over the trochanters bilaterally.        Independent visualization of the below image:  Three-view x-ray of patient's bilateral knees are personally reviewed today.  No evidence of fracture or dislocation.  Slight  degenerative changes are noted on the lateral compartment of both knees with early osteophyte formation noted on the lateral borders of the tibial plateau.  No high-grade osteoarthritic findings noted.  Patellas are seated centrally.  No evidence of AVN or osteochondral dissecans or loose bodies.  2 view x-ray of the patient's pelvis and left hip are obtained and reviewed.  Joint space largely maintained.  No evidence of high-grade osteoarthritis, loose body or AVN.  Age-appropriate degenerative change noted.  There is degenerative change of the pubic symphysis.      Patient's conditions were thoroughly discussed during today's visit with total time reviewing patient's previous medical records/history/radiology, face-to-face examination and discussion and plan of care with the patient and documentation being 40 minutes.    Tuan Barkley PA-C  [facility]    This note was completed in part using a voice recognition software, any grammatical or context distortion are unintentional and inherent to the software.       Again, thank you for allowing me to participate in the care of your patient.        Sincerely,        Tuan Barkley PA-C

## 2024-04-25 NOTE — PATIENT INSTRUCTIONS
Today we discussed the underlying etiology/pathology of patient's   1. Chondromalacia of patella, bilateral    2. Left hip pain    3. Kidney replaced by transplant    4. Immunosuppressed status (H24)    5. Stage 3b chronic kidney disease      -We had a very thorough discussion today in regards to patient's musculoskeletal complaints of her bilateral knees as well as her left hemipelvic pain  - Patient was reassured that x-ray wise she has no evidence of high-grade osteoarthritis with certainly no bone-on-bone findings.  She certainly may have had chondromalacia changes at age 14 but never underwent any other treatment including no quadricep strengthening or patellar tracking program.  She certainly does have hypersensitivity of the patellar facets consistent with chondromalacia history and patellofemoral syndrome bilaterally.  - Patient has positive left hip impingement findings on physical exam reproducing her left hemipelvic and groin pain but x-rays do not show any high-grade osteoarthritis, lesion or AVN.  Certainly patient may have a degenerative labral tear or possibly subacute femoral acetabular impingement not noted on x-ray.  - We discussed the etiology of osteoarthritis as well as various treatment options for the mainstay of management of osteoarthritis including rest, activity modification, proper technique with ambulation of steps/stairs which is to lead with good leg going up and lead with painful/symptomatic leg going down,  maintaining healthy weight management due to the 1:4 rule for joint stress with excess weight, herbal supplementation such as turmeric to decrease inflammation , healthy nutrition, use of oral NSAIDs (if not contraindicated due to patient being on chronic blood thinner medication or other medical conditions) with supplemental Tylenol up to 3000 mg daily, topical pain relievers such as Voltaren 1% gel to be applied 3 times a day to the area of pain, consideration for  intra-articular cortisone injections, viscosupplementation or PRP injections, formal physical therapy for joint mobilization and strengthening and possible surgical consideration if all conservative treatments fail.   -Patient does have a history of kidney transplant with chronic immunosuppression and elevated renal labs.  Patient should not utilize NSAIDs at any time.  We did discuss all the above treatment options and patient at this time is comfortable doing watchful waiting continuing with her normal exercise routine at [employer].  Patient declined referral to physical therapy for patellofemoral tracking and quadricep strengthening and all other intervention at this time.  - Patient certainly may follow-up as needed.  If her left hemipelvic pain continues to persist we did discuss proceeding with left hip MR arthrogram with intra-articular cortisone injection as an option.    -Call direct clinic number [[phone]] at any time with questions or concerns in regards to your recent office visit with me.     Tuan Barkley PA-C  [facility]    This note was completed in part using a voice recognition software, any grammatical or context distortion are unintentional and inherent to the software.

## 2024-05-07 ENCOUNTER — LAB (OUTPATIENT)
Dept: LAB | Facility: CLINIC | Age: 59
End: 2024-05-07
Payer: COMMERCIAL

## 2024-05-07 DIAGNOSIS — Z79.899 ENCOUNTER FOR LONG-TERM (CURRENT) USE OF HIGH-RISK MEDICATION: ICD-10-CM

## 2024-05-07 DIAGNOSIS — Z94.0 KIDNEY TRANSPLANTED: ICD-10-CM

## 2024-05-07 DIAGNOSIS — Z48.298 AFTERCARE FOLLOWING ORGAN TRANSPLANT: ICD-10-CM

## 2024-05-07 DIAGNOSIS — D84.9 IMMUNOSUPPRESSED STATUS (H): ICD-10-CM

## 2024-05-07 DIAGNOSIS — R79.89 ELEVATED SERUM CREATININE: ICD-10-CM

## 2024-05-07 LAB
ALBUMIN UR-MCNC: 100 MG/DL
APPEARANCE UR: CLEAR
BACTERIA #/AREA URNS HPF: ABNORMAL /HPF
BILIRUB UR QL STRIP: NEGATIVE
COLOR UR AUTO: YELLOW
GLUCOSE UR STRIP-MCNC: NEGATIVE MG/DL
HGB UR QL STRIP: ABNORMAL
KETONES UR STRIP-MCNC: NEGATIVE MG/DL
LEUKOCYTE ESTERASE UR QL STRIP: NEGATIVE
NITRATE UR QL: NEGATIVE
PH UR STRIP: 5.5 [PH] (ref 5–7)
RBC #/AREA URNS AUTO: ABNORMAL /HPF
SP GR UR STRIP: 1.02 (ref 1–1.03)
SQUAMOUS #/AREA URNS AUTO: ABNORMAL /LPF
UROBILINOGEN UR STRIP-ACNC: 0.2 E.U./DL
WBC #/AREA URNS AUTO: ABNORMAL /HPF

## 2024-05-07 PROCEDURE — 80048 BASIC METABOLIC PNL TOTAL CA: CPT

## 2024-05-07 PROCEDURE — 36415 COLL VENOUS BLD VENIPUNCTURE: CPT

## 2024-05-07 PROCEDURE — 81001 URINALYSIS AUTO W/SCOPE: CPT

## 2024-05-08 LAB
ANION GAP SERPL CALCULATED.3IONS-SCNC: 13 MMOL/L (ref 7–15)
BUN SERPL-MCNC: 43.9 MG/DL (ref 8–23)
CALCIUM SERPL-MCNC: 11.1 MG/DL (ref 8.6–10)
CHLORIDE SERPL-SCNC: 102 MMOL/L (ref 98–107)
CREAT SERPL-MCNC: 2.58 MG/DL (ref 0.51–0.95)
DEPRECATED HCO3 PLAS-SCNC: 22 MMOL/L (ref 22–29)
EGFRCR SERPLBLD CKD-EPI 2021: 21 ML/MIN/1.73M2
GLUCOSE SERPL-MCNC: 110 MG/DL (ref 70–99)
POTASSIUM SERPL-SCNC: 4.6 MMOL/L (ref 3.4–5.3)
SODIUM SERPL-SCNC: 137 MMOL/L (ref 135–145)

## 2024-05-09 ENCOUNTER — TELEPHONE (OUTPATIENT)
Dept: TRANSPLANT | Facility: CLINIC | Age: 59
End: 2024-05-09
Payer: COMMERCIAL

## 2024-05-09 DIAGNOSIS — Z48.298 AFTERCARE FOLLOWING ORGAN TRANSPLANT: ICD-10-CM

## 2024-05-09 DIAGNOSIS — D84.9 IMMUNOSUPPRESSED STATUS (H): ICD-10-CM

## 2024-05-09 DIAGNOSIS — Z94.0 KIDNEY REPLACED BY TRANSPLANT: ICD-10-CM

## 2024-05-09 DIAGNOSIS — R79.89 ELEVATED SERUM CREATININE: Primary | ICD-10-CM

## 2024-05-09 DIAGNOSIS — Z94.0 KIDNEY TRANSPLANTED: ICD-10-CM

## 2024-05-09 DIAGNOSIS — R53.83 FATIGUE: ICD-10-CM

## 2024-05-09 RX ORDER — ALBUTEROL SULFATE 0.83 MG/ML
2.5 SOLUTION RESPIRATORY (INHALATION)
Status: CANCELLED | OUTPATIENT
Start: 2024-05-09

## 2024-05-09 RX ORDER — HEPARIN SODIUM,PORCINE 10 UNIT/ML
5-20 VIAL (ML) INTRAVENOUS DAILY PRN
Status: CANCELLED | OUTPATIENT
Start: 2024-05-09

## 2024-05-09 RX ORDER — HEPARIN SODIUM (PORCINE) LOCK FLUSH IV SOLN 100 UNIT/ML 100 UNIT/ML
5 SOLUTION INTRAVENOUS
Status: CANCELLED | OUTPATIENT
Start: 2024-05-09

## 2024-05-09 RX ORDER — METHYLPREDNISOLONE SODIUM SUCCINATE 125 MG/2ML
125 INJECTION, POWDER, LYOPHILIZED, FOR SOLUTION INTRAMUSCULAR; INTRAVENOUS
Status: CANCELLED
Start: 2024-05-09

## 2024-05-09 RX ORDER — EPINEPHRINE 1 MG/ML
0.3 INJECTION, SOLUTION, CONCENTRATE INTRAVENOUS EVERY 5 MIN PRN
Status: CANCELLED | OUTPATIENT
Start: 2024-05-09

## 2024-05-09 RX ORDER — ALBUTEROL SULFATE 90 UG/1
1-2 AEROSOL, METERED RESPIRATORY (INHALATION)
Status: CANCELLED
Start: 2024-05-09

## 2024-05-09 RX ORDER — DIPHENHYDRAMINE HYDROCHLORIDE 50 MG/ML
50 INJECTION INTRAMUSCULAR; INTRAVENOUS
Status: CANCELLED
Start: 2024-05-09

## 2024-05-09 NOTE — TELEPHONE ENCOUNTER
Kan Sibley MD  5/8/2024  7:10 AM CDT Back to Top      Please arrange for IV fluids, 1L NS, increase sensipar to 60mg daily (you told me that she restarted 30mg daily)     PLAN:  IV 1 L NS  Increase Sensipar to 60 mg daily      OUTCOME:  Reports she has not been diligent with taking Sensipar.  She will continue taking Sensipar 30 mg daily.  Requesting IV infusion at [facility].  Therapy plan placed.  Reports Bad cough for years.  Currently feeling tired and complains of brain fog.  Feels bloated after each meal. States she eats healthy.  She is due for a colonoscopy and she will get one soon.  RNCC will check BMP, CMV, EBV.      ADDENDUM:  Kan Sibley MD Ututalum, Teresa, RN  Thank you. All of this sounds reasonable. She needs to talk to her PCP      ----- Message -----  From: Selena Lee RN  Sent: 5/9/2024  11:16 AM CDT  To: Kan Sibley MD  Subject: not urgent                                      Dr Sibley,    Reports she started taking sensipar but misses some doses.  She start taking 30 mg regularly.  Therapy plan placed for IV. Will check labs the next day.  She is c/o chronic cough for months. Reports few doctors have worked her up but couldn't find any cause.  She is c/o fatigue and brain fog. I will check CMV, EBV.  Anything else I should check for?  She is also c/o bloating after each meal.  I told her to reach out to PCP.  She is due for colonoscopy and will be completing it soon.    Let me know if you have any other recommendations for her.    Thanks,  Freddy      OUTCOME:  MyCahrt message sent.

## 2024-05-14 ENCOUNTER — INFUSION THERAPY VISIT (OUTPATIENT)
Dept: INFUSION THERAPY | Facility: CLINIC | Age: 59
End: 2024-05-14
Attending: INTERNAL MEDICINE
Payer: COMMERCIAL

## 2024-05-14 VITALS
HEART RATE: 83 BPM | DIASTOLIC BLOOD PRESSURE: 85 MMHG | TEMPERATURE: 98.4 F | RESPIRATION RATE: 16 BRPM | SYSTOLIC BLOOD PRESSURE: 151 MMHG | OXYGEN SATURATION: 96 %

## 2024-05-14 DIAGNOSIS — R79.89 ELEVATED SERUM CREATININE: Primary | ICD-10-CM

## 2024-05-14 DIAGNOSIS — Z48.298 AFTERCARE FOLLOWING ORGAN TRANSPLANT: ICD-10-CM

## 2024-05-14 DIAGNOSIS — Z94.0 KIDNEY REPLACED BY TRANSPLANT: ICD-10-CM

## 2024-05-14 PROCEDURE — 96360 HYDRATION IV INFUSION INIT: CPT

## 2024-05-14 PROCEDURE — 258N000003 HC RX IP 258 OP 636: Performed by: INTERNAL MEDICINE

## 2024-05-14 RX ORDER — CINACALCET 30 MG/1
30 TABLET, FILM COATED ORAL DAILY
COMMUNITY
End: 2024-08-09

## 2024-05-14 RX ORDER — HEPARIN SODIUM,PORCINE 10 UNIT/ML
5-20 VIAL (ML) INTRAVENOUS DAILY PRN
Status: CANCELLED | OUTPATIENT
Start: 2024-05-14

## 2024-05-14 RX ORDER — AMLODIPINE BESYLATE 5 MG/1
5 TABLET ORAL DAILY
COMMUNITY
End: 2024-09-27

## 2024-05-14 RX ORDER — DIPHENHYDRAMINE HYDROCHLORIDE 50 MG/ML
50 INJECTION INTRAMUSCULAR; INTRAVENOUS
Status: CANCELLED
Start: 2024-05-14

## 2024-05-14 RX ORDER — METHYLPREDNISOLONE SODIUM SUCCINATE 125 MG/2ML
125 INJECTION, POWDER, LYOPHILIZED, FOR SOLUTION INTRAMUSCULAR; INTRAVENOUS
Status: CANCELLED
Start: 2024-05-14

## 2024-05-14 RX ORDER — HEPARIN SODIUM (PORCINE) LOCK FLUSH IV SOLN 100 UNIT/ML 100 UNIT/ML
5 SOLUTION INTRAVENOUS
Status: CANCELLED | OUTPATIENT
Start: 2024-05-14

## 2024-05-14 RX ORDER — ALBUTEROL SULFATE 0.83 MG/ML
2.5 SOLUTION RESPIRATORY (INHALATION)
Status: CANCELLED | OUTPATIENT
Start: 2024-05-14

## 2024-05-14 RX ORDER — EPINEPHRINE 1 MG/ML
0.3 INJECTION, SOLUTION INTRAMUSCULAR; SUBCUTANEOUS EVERY 5 MIN PRN
Status: CANCELLED | OUTPATIENT
Start: 2024-05-14

## 2024-05-14 RX ORDER — ALBUTEROL SULFATE 90 UG/1
1-2 AEROSOL, METERED RESPIRATORY (INHALATION)
Status: CANCELLED
Start: 2024-05-14

## 2024-05-14 RX ADMIN — SODIUM CHLORIDE 1000 ML: 9 INJECTION, SOLUTION INTRAVENOUS at 15:16

## 2024-05-14 ASSESSMENT — PAIN SCALES - GENERAL: PAINLEVEL: NO PAIN (0)

## 2024-05-14 NOTE — PATIENT INSTRUCTIONS
Dear Reina Quan    Thank you for choosing [facility] ([facility]) for your infusion.  The following information is a summary of our appointment as well as important reminders.      We look forward in seeing you on your next appointment here at [facility] ([facility]).  Please don t hesitate to call us at [phone] to reschedule any of your appointments or to speak with one of the [facility] registered nurses.  It was a pleasure taking care of you today.    Sincerely,    [facility]  [address] [place] [address]  [id number]  Phone:  [phone]

## 2024-05-14 NOTE — PROGRESS NOTES
Infusion Nursing Note:  Reina Quan presents today for IV fluids.    Patient seen by provider today: No   present during visit today: Not Applicable.    Note: 1L NS infused over 60 mins.      Intravenous Access:  Peripheral IV placed.    Treatment Conditions:  Not Applicable.    BP (!) 146/75 (BP Location: Left arm, Patient Position: Sitting, Cuff Size: Adult Regular)   Pulse 84   Temp 98.4  F (36.9  C) (Oral)   Resp 16   LMP 05/14/2014 (Approximate)   SpO2 96%     Administrations This Visit       sodium chloride 0.9% BOLUS 1,000 mL       Admin Date  05/14/2024 Action  $New Bag Dose  1,000 mL Rate  1,000 mL/hr Route  Intravenous Documented By  Ashley Leon RN                  Post Infusion Assessment:  Patient tolerated infusion without incident.  Site patent and intact, free from redness, edema or discomfort.  No evidence of extravasations.  Access discontinued per protocol.     Discharge Plan:   AVS to patient via MYCHART.  No future appointments in [facility] at this time.  Patient discharged in stable condition accompanied by: self.  Departure Mode: Ambulatory.    Ashley Leon RN

## 2024-05-15 ENCOUNTER — LAB (OUTPATIENT)
Dept: LAB | Facility: CLINIC | Age: 59
End: 2024-05-15
Payer: COMMERCIAL

## 2024-05-15 DIAGNOSIS — D84.9 IMMUNOSUPPRESSED STATUS (H): ICD-10-CM

## 2024-05-15 DIAGNOSIS — Z48.298 AFTERCARE FOLLOWING ORGAN TRANSPLANT: ICD-10-CM

## 2024-05-15 DIAGNOSIS — Z94.0 KIDNEY TRANSPLANTED: ICD-10-CM

## 2024-05-15 DIAGNOSIS — R79.89 ELEVATED SERUM CREATININE: ICD-10-CM

## 2024-05-15 DIAGNOSIS — R53.83 FATIGUE: ICD-10-CM

## 2024-05-15 PROCEDURE — 80048 BASIC METABOLIC PNL TOTAL CA: CPT

## 2024-05-15 PROCEDURE — 36415 COLL VENOUS BLD VENIPUNCTURE: CPT

## 2024-05-15 PROCEDURE — 87799 DETECT AGENT NOS DNA QUANT: CPT

## 2024-05-16 LAB
ANION GAP SERPL CALCULATED.3IONS-SCNC: 14 MMOL/L (ref 7–15)
BUN SERPL-MCNC: 38.1 MG/DL (ref 8–23)
CALCIUM SERPL-MCNC: 9.6 MG/DL (ref 8.6–10)
CHLORIDE SERPL-SCNC: 105 MMOL/L (ref 98–107)
CMV DNA SPEC NAA+PROBE-ACNC: NOT DETECTED IU/ML
CREAT SERPL-MCNC: 2.44 MG/DL (ref 0.51–0.95)
DEPRECATED HCO3 PLAS-SCNC: 21 MMOL/L (ref 22–29)
EBV DNA SERPL NAA+PROBE-ACNC: NOT DETECTED IU/ML
EGFRCR SERPLBLD CKD-EPI 2021: 22 ML/MIN/1.73M2
GLUCOSE SERPL-MCNC: 100 MG/DL (ref 70–99)
POTASSIUM SERPL-SCNC: 4.2 MMOL/L (ref 3.4–5.3)
SODIUM SERPL-SCNC: 140 MMOL/L (ref 135–145)

## 2024-05-17 ENCOUNTER — TELEPHONE (OUTPATIENT)
Dept: TRANSPLANT | Facility: CLINIC | Age: 59
End: 2024-05-17
Payer: COMMERCIAL

## 2024-05-17 NOTE — TELEPHONE ENCOUNTER
Kan Sibley MD Ututalum, Teresa, RN  I think her new baseline is 2-2.4      ----- Message -----  From: Selena Lee RN  Sent: 5/17/2024   1:26 PM CDT  To: Kan Sibley MD    Cr remains elevated. Had 1L NS the day before.  Sent to Dr Sibley for recs.        OUTCOME:  Updated creatinine threshold.

## 2024-06-18 DIAGNOSIS — L20.82 FLEXURAL ECZEMA: ICD-10-CM

## 2024-06-18 DIAGNOSIS — L66.10 LICHEN PLANOPILARIS: ICD-10-CM

## 2024-06-19 ENCOUNTER — TELEPHONE (OUTPATIENT)
Dept: TRANSPLANT | Facility: CLINIC | Age: 59
End: 2024-06-19
Payer: COMMERCIAL

## 2024-06-19 ENCOUNTER — TELEPHONE (OUTPATIENT)
Dept: GASTROENTEROLOGY | Facility: CLINIC | Age: 59
End: 2024-06-19
Payer: COMMERCIAL

## 2024-06-19 DIAGNOSIS — Z94.0 KIDNEY TRANSPLANTED: Primary | ICD-10-CM

## 2024-06-19 DIAGNOSIS — T86.10 COMPLICATIONS, KIDNEY TRANSPLANT: ICD-10-CM

## 2024-06-19 DIAGNOSIS — Z12.11 SPECIAL SCREENING FOR MALIGNANT NEOPLASMS, COLON: Primary | ICD-10-CM

## 2024-06-19 DIAGNOSIS — Z48.298 AFTERCARE FOLLOWING ORGAN TRANSPLANT: ICD-10-CM

## 2024-06-19 NOTE — TELEPHONE ENCOUNTER
"Called to report \"Not feeling great\"  Waking up nauseous and feeling nauseous the whole day.  Was gagging and vomiting after she had coffee.  \"I just feel off\"  Chronic cough x 3 years now.   Has been seen for it but never found out what is causing it.  Right chest pain above her breast and some SOB started today.  States probably caused byShe is driving to work.  RNCC discussed if gets worse she needs to go to the ER.  Stated \" yes, but it is nothing like that. I probably pulled a muscle.\"  Requesting to complete lab orders.  Made aware lab orders will be placed.  Recommended reaching out to PCP to be evaluated.  Verbalized understanding and agreement to plan.    "

## 2024-06-19 NOTE — TELEPHONE ENCOUNTER
"Endoscopy Scheduling Screen    Have you had a positive Covid test in the last 14 days?  No    What is your communication preference for Instructions and/or Bowel Prep?   MyChart    What insurance is in the chart?  Other:  HP/Cigna    Ordering/Referring Provider: SAHRA CORNELIUS    (If ordering provider performs procedure, schedule with ordering provider unless otherwise instructed. )    BMI: Estimated body mass index is 27.78 kg/m  as calculated from the following:    Height as of 4/25/24: 1.549 m (5' 1\").    Weight as of 4/25/24: 66.7 kg (147 lb).     Sedation Ordered  moderate sedation.   If patient BMI > 50 do not schedule in [facility].    If patient BMI > 45 do not schedule at [facility].    Are you taking methadone or Suboxone?  No    Have you had difficulties, pain, or discomfort during past endoscopy procedures?  No    Are you taking any prescription medications for pain 3 or more times per week?   NO, No RN review required.    Do you have a history of malignant hyperthermia?  No    (Females) Are you currently pregnant?   No     Have you been diagnosed or told you have pulmonary hypertension?   No    Do you have an LVAD?  No    Have you been told you have moderate to severe sleep apnea?  No    Have you been told you have COPD, asthma, or any other lung disease?  No    Do you have any heart conditions?  No     Have you ever had or are you waiting for an organ transplant?  Yes. Have you had or are on on the wait list for a heart/lung transplant? No, may schedule at all facilities except [facility]    Have you had a stroke or transient ischemic attack (TIA aka \"mini stroke\" in the last 6 months?   No    Have you been diagnosed with or been told you have cirrhosis of the liver?   No    Are you currently on dialysis?   No    Do you need assistance transferring?   No    BMI: Estimated body mass index is 27.78 kg/m  as calculated from the following:    Height as of 4/25/24: 1.549 m (5' 1\").    Weight as of 4/25/24: 66.7 kg (147 lb). "     Is patients BMI > 40 and scheduling location [facility]?  No    Do you take an injectable medication for weight loss or diabetes (excluding insulin)?  No    Do you take the medication Naltrexone?  No    Do you take blood thinners?  No       Prep   Are you currently on dialysis or do you have chronic kidney disease?  Yes (Vanytely Prep) Kidney Transplant on 6/2/92    Do you have a diagnosis of diabetes?  No    Do you have a diagnosis of cystic fibrosis (CF)?  No    On a regular basis do you go 3 -5 days between bowel movements?  No    BMI > 40?  No    Preferred Pharmacy:    [id number] - [place], [place] - [address] AT [facility] [address]  [facility] [address]  Phone: [phone] Fax: [phone]      Final Scheduling Details     Procedure scheduled  Colonoscopy    Surgeon:  Jorgito     Date of procedure:  10/9     Pre-OP / PAC:   No - Not required for this site.    Location  [facility] - Patient preference.    Sedation   Moderate Sedation - Per order.      Patient Reminders:   You will receive a call from a Nurse to review instructions and health history.  This assessment must be completed prior to your procedure.  Failure to complete the Nurse assessment may result in the procedure being cancelled.      On the day of your procedure, please designate an adult(s) who can drive you home stay with you for the next 24 hours. The medicines used in the exam will make you sleepy. You will not be able to drive.      You cannot take public transportation, ride share services, or non-medical taxi service without a responsible caregiver.  Medical transport services are allowed with the requirement that a responsible caregiver will receive you at your destination.  We require that drivers and caregivers are confirmed prior to your procedure.

## 2024-06-20 ENCOUNTER — LAB (OUTPATIENT)
Dept: LAB | Facility: CLINIC | Age: 59
End: 2024-06-20
Payer: COMMERCIAL

## 2024-06-20 DIAGNOSIS — T86.10 COMPLICATIONS, KIDNEY TRANSPLANT: ICD-10-CM

## 2024-06-20 DIAGNOSIS — Z94.0 KIDNEY TRANSPLANTED: ICD-10-CM

## 2024-06-20 DIAGNOSIS — Z48.298 AFTERCARE FOLLOWING ORGAN TRANSPLANT: ICD-10-CM

## 2024-06-20 LAB
ALBUMIN UR-MCNC: >=300 MG/DL
ANION GAP SERPL CALCULATED.3IONS-SCNC: 15 MMOL/L (ref 7–15)
APPEARANCE UR: CLEAR
BACTERIA #/AREA URNS HPF: ABNORMAL /HPF
BILIRUB UR QL STRIP: NEGATIVE
BUN SERPL-MCNC: 51.1 MG/DL (ref 8–23)
CALCIUM SERPL-MCNC: 10 MG/DL (ref 8.6–10)
CHLORIDE SERPL-SCNC: 104 MMOL/L (ref 98–107)
COLOR UR AUTO: YELLOW
CREAT SERPL-MCNC: 3.06 MG/DL (ref 0.51–0.95)
DEPRECATED HCO3 PLAS-SCNC: 18 MMOL/L (ref 22–29)
EGFRCR SERPLBLD CKD-EPI 2021: 17 ML/MIN/1.73M2
ERYTHROCYTE [DISTWIDTH] IN BLOOD BY AUTOMATED COUNT: 15.1 % (ref 10–15)
GLUCOSE SERPL-MCNC: 129 MG/DL (ref 70–99)
GLUCOSE UR STRIP-MCNC: NEGATIVE MG/DL
GRAN CASTS #/AREA URNS LPF: ABNORMAL /LPF
HCT VFR BLD AUTO: 35 % (ref 35–47)
HGB BLD-MCNC: 11.5 G/DL (ref 11.7–15.7)
HGB UR QL STRIP: ABNORMAL
HYALINE CASTS #/AREA URNS LPF: ABNORMAL /LPF
KETONES UR STRIP-MCNC: NEGATIVE MG/DL
LEUKOCYTE ESTERASE UR QL STRIP: ABNORMAL
MCH RBC QN AUTO: 31.4 PG (ref 26.5–33)
MCHC RBC AUTO-ENTMCNC: 32.9 G/DL (ref 31.5–36.5)
MCV RBC AUTO: 96 FL (ref 78–100)
NITRATE UR QL: NEGATIVE
PH UR STRIP: 5.5 [PH] (ref 5–8)
PLATELET # BLD AUTO: 144 10E3/UL (ref 150–450)
POTASSIUM SERPL-SCNC: 3.9 MMOL/L (ref 3.4–5.3)
RBC # BLD AUTO: 3.66 10E6/UL (ref 3.8–5.2)
RBC #/AREA URNS AUTO: ABNORMAL /HPF
SODIUM SERPL-SCNC: 137 MMOL/L (ref 135–145)
SP GR UR STRIP: >=1.03 (ref 1–1.03)
SQUAMOUS #/AREA URNS AUTO: ABNORMAL /LPF
TRANS CELLS #/AREA URNS HPF: ABNORMAL /HPF
UROBILINOGEN UR STRIP-ACNC: 0.2 E.U./DL
WBC # BLD AUTO: 6.7 10E3/UL (ref 4–11)
WBC #/AREA URNS AUTO: ABNORMAL /HPF

## 2024-06-20 PROCEDURE — 36415 COLL VENOUS BLD VENIPUNCTURE: CPT

## 2024-06-20 PROCEDURE — 85027 COMPLETE CBC AUTOMATED: CPT

## 2024-06-20 PROCEDURE — 80048 BASIC METABOLIC PNL TOTAL CA: CPT

## 2024-06-20 PROCEDURE — 81001 URINALYSIS AUTO W/SCOPE: CPT

## 2024-06-21 ENCOUNTER — TELEPHONE (OUTPATIENT)
Dept: TRANSPLANT | Facility: CLINIC | Age: 59
End: 2024-06-21
Payer: COMMERCIAL

## 2024-06-21 DIAGNOSIS — Z48.298 AFTERCARE FOLLOWING ORGAN TRANSPLANT: ICD-10-CM

## 2024-06-21 DIAGNOSIS — T86.10 COMPLICATIONS, KIDNEY TRANSPLANT: ICD-10-CM

## 2024-06-21 DIAGNOSIS — Z94.0 KIDNEY TRANSPLANTED: Primary | ICD-10-CM

## 2024-06-27 ENCOUNTER — LAB (OUTPATIENT)
Dept: LAB | Facility: CLINIC | Age: 59
End: 2024-06-27
Payer: COMMERCIAL

## 2024-06-27 DIAGNOSIS — Z94.0 KIDNEY TRANSPLANTED: ICD-10-CM

## 2024-06-27 DIAGNOSIS — Z48.298 AFTERCARE FOLLOWING ORGAN TRANSPLANT: ICD-10-CM

## 2024-06-27 DIAGNOSIS — R79.89 ELEVATED SERUM CREATININE: Primary | ICD-10-CM

## 2024-06-27 DIAGNOSIS — R79.89 ELEVATED SERUM CREATININE: ICD-10-CM

## 2024-06-27 LAB
ANION GAP SERPL CALCULATED.3IONS-SCNC: 14 MMOL/L (ref 7–15)
BUN SERPL-MCNC: 54.5 MG/DL (ref 8–23)
CALCIUM SERPL-MCNC: 12 MG/DL (ref 8.6–10)
CHLORIDE SERPL-SCNC: 104 MMOL/L (ref 98–107)
CREAT SERPL-MCNC: 3.39 MG/DL (ref 0.51–0.95)
DEPRECATED HCO3 PLAS-SCNC: 19 MMOL/L (ref 22–29)
EGFRCR SERPLBLD CKD-EPI 2021: 15 ML/MIN/1.73M2
GLUCOSE SERPL-MCNC: 117 MG/DL (ref 70–99)
POTASSIUM SERPL-SCNC: 3.8 MMOL/L (ref 3.4–5.3)
SODIUM SERPL-SCNC: 137 MMOL/L (ref 135–145)

## 2024-06-27 PROCEDURE — 80048 BASIC METABOLIC PNL TOTAL CA: CPT

## 2024-06-27 PROCEDURE — 36415 COLL VENOUS BLD VENIPUNCTURE: CPT

## 2024-06-27 RX ORDER — TRIAMCINOLONE ACETONIDE 1 MG/G
CREAM TOPICAL
Qty: 30 G | Refills: 0 | Status: SHIPPED | OUTPATIENT
Start: 2024-06-27

## 2024-06-27 RX ORDER — CLOBETASOL PROPIONATE 0.05 G/100ML
SHAMPOO TOPICAL
Qty: 118 ML | Refills: 1 | Status: SHIPPED | OUTPATIENT
Start: 2024-06-27

## 2024-06-27 NOTE — TELEPHONE ENCOUNTER
"triamcinolone (KENALOG) 0.1 % external cream 30 g 0 9/7/2023 -- No   Sig: APPLY SPARINGLY TO THE AFFECTED AREA TWICE DAILY X 14 DAYS   Process 4  clobetasol propionate (CLOBEX) 0.05 % external shampoo 118 mL 1 10/12/2023 -- No   Sig: APPLY IN SHOWER TO SCALP TWICE WEEKLY   Process 3  ----------------------  Last Office Visit : 10/12/23  Future Office visit:     12/10/2024 10:15 AM (15 min)  José Miguel   Arrive by: 10:00 AM   RETURN DERMATOLOGY   [facility] ([facility])   Brad Bauman MD     ----------------------  Per last visit note:  \"Follow-up: 6 month(s) in-person \"    Routing refill request to provider for review/approval because:  Clobetasol refill pended d/t: over 3 mo since last visit - next appt December 2024  Request for refill of Triamcinolone routed d/t last Script was written for 14 days use only.        Pass/Fail Protocol Criteria:  Kenalog: Derm Process 4  Clobex: Derm Process 3      "

## 2024-06-28 ENCOUNTER — TELEPHONE (OUTPATIENT)
Dept: TRANSPLANT | Facility: CLINIC | Age: 59
End: 2024-06-28
Payer: COMMERCIAL

## 2024-06-28 ENCOUNTER — REFERRAL (OUTPATIENT)
Dept: TRANSPLANT | Facility: CLINIC | Age: 59
End: 2024-06-28

## 2024-06-28 DIAGNOSIS — R79.89 ELEVATED SERUM CREATININE: Primary | ICD-10-CM

## 2024-06-28 DIAGNOSIS — T86.91 TRANSPLANT FAILURE DUE TO REJECTION: ICD-10-CM

## 2024-06-28 DIAGNOSIS — T86.12 KIDNEY TRANSPLANT FAILURE: ICD-10-CM

## 2024-06-28 DIAGNOSIS — D84.9 IMMUNOSUPPRESSED STATUS (H): ICD-10-CM

## 2024-06-28 DIAGNOSIS — Z79.899 IMMUNOSUPPRESSIVE MANAGEMENT ENCOUNTER FOLLOWING KIDNEY TRANSPLANT: ICD-10-CM

## 2024-06-28 DIAGNOSIS — R05.3 CHRONIC COUGH: ICD-10-CM

## 2024-06-28 DIAGNOSIS — Z48.298 AFTERCARE FOLLOWING ORGAN TRANSPLANT: ICD-10-CM

## 2024-06-28 DIAGNOSIS — R91.1 LUNG NODULE: ICD-10-CM

## 2024-06-28 DIAGNOSIS — Z94.0 KIDNEY TRANSPLANTED: Primary | ICD-10-CM

## 2024-06-28 DIAGNOSIS — Z94.0 IMMUNOSUPPRESSIVE MANAGEMENT ENCOUNTER FOLLOWING KIDNEY TRANSPLANT: ICD-10-CM

## 2024-06-28 DIAGNOSIS — Z85.828 HISTORY OF SKIN CANCER: ICD-10-CM

## 2024-06-28 DIAGNOSIS — J92.9 PLEURAL THICKENING: ICD-10-CM

## 2024-06-28 DIAGNOSIS — R80.9 PROTEINURIA: ICD-10-CM

## 2024-06-28 DIAGNOSIS — N18.5 CHRONIC KIDNEY DISEASE, STAGE V (H): ICD-10-CM

## 2024-06-28 DIAGNOSIS — Z76.82 ORGAN TRANSPLANT CANDIDATE: ICD-10-CM

## 2024-06-28 DIAGNOSIS — N18.4 CHRONIC KIDNEY DISEASE, STAGE IV (SEVERE) (H): ICD-10-CM

## 2024-06-28 RX ORDER — MYCOPHENOLATE MOFETIL 250 MG/1
500 CAPSULE ORAL 2 TIMES DAILY
Qty: 360 CAPSULE | Refills: 3 | Status: SHIPPED | OUTPATIENT
Start: 2024-06-28 | End: 2024-07-30

## 2024-06-28 NOTE — LETTER
Reina Quan  [address] [facility] [address]        Dear Reina,    Thank you for your interest in the Transplant Center at [facility]. We look forward to being a part of your care team and assisting you through the transplant process.    As we discussed, your transplant coordinator is Lanette Ortiz (Kidney).  You may call your coordinator at any time with questions or concerns.  Your first scheduled call will be on 7/09/2024 at 8am-12pm.  If this needs to change, call [phone].    Please complete the following.    Fill out and return the enclosed forms  Authorization for Electronic Communication  Authorization to Discuss Protected Health Information  Authorization for Release of Protected Health Information    Sign up for:  MyGoGamest, access to your electronic medical record (see enclosed pamphlet)  [email or web address], a transplant education website                                                                        My Transplant Place     You can use these tools to learn more about your transplant, communicate with your care team, and track your medical details      Sincerely,      Solid Organ Transplant  [facility]    cc: Referring Physician PCP

## 2024-06-28 NOTE — LETTER
Reina Boucherlin  [address] [address]                July 9, 2024    Arelyria Reina,     It was a pleasure to speak with you over the phone recently in preparation of your pre-kidney transplant evaluation. I am sending this letter to review the pre-transplant information we covered.     Please see your schedule in your My Chart for your pre-kidney transplant evaluation on 10/07/2024 starting at 7:30 AM. All your appointments will be at the:     [facility]  [address] [place] [address]    For parking options, please park in the open lot across the street from the front door of our Clinic.  Otherwise, enter the [facility] / arrival plaza from [facility] and attendants can assist you based on your needs.  parking is available for those with limited mobility M-F from 7:00 am to 5:00 pm.     All in-person provider appointments will be on the third floor, 3A. Lab, EKG, and chest x-ray will be on the 1st floor. Echocardiogram will be on the 3rd floor. There are help desks in the clinic that can provide additional information, if you should need assistance.    Please bring your designated care person(s) to your appointment day to help listen to the patient education and ask questions that are important to you. You can eat/drink normally on this day. Please do not fast, as the appointment day will most likely go until 3 PM. There is a coffee shop on street level for you to purchase food and you can also bring food from home. Please be aware there are no microwaves or refrigerators for patient use at the clinic. Also, take all your prescribed medications as ordered on this day. There are no medication lab tests ordered during your appointments.    Upon completion of your appointments, I will compile the outcomes and have your results reviewed at the Transplant Team Selection Committee on Wednesday, 10/16/2024, of the following  week. This is a medical review meeting only, you will not be asked to attend. I will call you within 10 business days after this meeting to inform you of the outcomes and to assist in planning for the completion of your evaluation. I will also send you a transplant evaluation summary letter after our call.     You will receive an email from our Transplant Office which contains a Receipt of Information consent and patient educational materials. Please read and electronically sign the Receipt of Information consent as well as read the educational materials prior to your evaluation appointments on 10/07/2024.     Your virtual education class is scheduled for 07/29/2024 at 8:00 am. Education must be completed prior to activation on the transplant list.   I am including the link to the same video set you will watch in class for your reference. You will need to filter for English and kidney to find the video set.       Additional transplant resources are as follows:   www.unos.org. UNOS, or United Network of Organ Sharing, is the national organization in our country that maintains all the organ wait lists and is responsible for the rules and regulations for organ allocation. I recommend looking at the Transplant Living section as this area has content created for patients.   www.srtr.org SRTR, or the Scientific Registry for Transplant Recipients is a national data base that all Transplant Centers report their success and failure rate for all organ types twice per year. The results are public knowledge and do provide a good perspective of organ transplant.     If the transplant providers tell you at your appointments that you should start to have live donors register with our Program to initiate their evaluations, please provide this registration website: [email or web address]   The donor will receive a detailed email response back with information and next steps specific to their situation. It is  important that the donor responds to the email in order to move forward with their evaluation. Donors can also call our Office and ask to speak with a live donor coordinator in the event of questions at [phone].     Please let me know of any questions or concerns.     Thank you,  Lanette Ortiz, RN, BSN  Pre Kidney Pancreas Transplant Coordinator   [facility]  Solid Organ Transplant Care   [address] [place] [address]  [email or web address]   Office Number: [phone] Direct Number: [phone]   Fax Number: [phone]  Employed by [facility]     CC's: Lizbeth VARGAS

## 2024-07-01 ENCOUNTER — TELEPHONE (OUTPATIENT)
Dept: TRANSPLANT | Facility: CLINIC | Age: 59
End: 2024-07-01
Payer: COMMERCIAL

## 2024-07-01 DIAGNOSIS — Z94.0 KIDNEY TRANSPLANTED: Primary | ICD-10-CM

## 2024-07-01 DIAGNOSIS — Z94.0 KIDNEY TRANSPLANTED: ICD-10-CM

## 2024-07-01 DIAGNOSIS — Z48.298 AFTERCARE FOLLOWING ORGAN TRANSPLANT: ICD-10-CM

## 2024-07-01 DIAGNOSIS — R79.89 ELEVATED SERUM CREATININE: Primary | ICD-10-CM

## 2024-07-01 DIAGNOSIS — R79.89 ELEVATED SERUM CREATININE: ICD-10-CM

## 2024-07-01 DIAGNOSIS — T86.10 COMPLICATIONS, KIDNEY TRANSPLANT: ICD-10-CM

## 2024-07-01 DIAGNOSIS — R06.09 DYSPNEA ON EXERTION: ICD-10-CM

## 2024-07-01 NOTE — PROGRESS NOTES
FINDINGS  (1/17/20):  The transplant kidney is located in the right lower quadrant        Renal transplant biopsy ordered.

## 2024-07-01 NOTE — TELEPHONE ENCOUNTER
Clayton Sapp MD Ututalum, Teresa, RN  Yes on kidney transplant ultrasound and would also order a cardiac echo, if not done recently.    I know she is 32 yrs out, but with this degree of worsening function, would consider a kidney transplant biopsy to make sure we aren't missing anything, if she is willing, knowing that it is likely just chronic changes.  I would only do the biopsy because of the marked change in creatinine recently.    Srikanth      ----- Message -----  From: Selena Lee RN  Sent: 6/28/2024  10:50 AM CDT  To: Clayton Sapp MD  Subject: elevated Cr                                      Dr Sapp,    Cr continues to trend up. 3.39.  Baseline 2.0-2.4  No diarrhea, has been really pushing fluids.  She says she is not dehydratted.  She has been fatigued and having sob with w/ activity.  This has been for a while now.  This along with a chronic cough.   She has seen several providers. She was told just allergies.  She denies any cardiac issues.    I will check UA/UC.DSA  Do you want renal txp US?    Thanks,  Freddy      PLAN:  Renal Transplant ultrasound  Cardiac echo  Kidney biopsy.      OUTCOME:

## 2024-07-02 VITALS — WEIGHT: 140 LBS | BODY MASS INDEX: 25.76 KG/M2 | HEIGHT: 62 IN

## 2024-07-02 NOTE — TELEPHONE ENCOUNTER
SOT KIDNEY INTAKE   July 2, 2024 1:17 PM - Jennifer Pandey LPN:     PCP: Lizbeth Andrews PA-C    Referring Organization: [facility]   Referring Provider: Kan Sibley MD   Referring Diagnosis: Complications of previous txp S/P txp    GFR/Date: 15 (6/27/24)    Is patient diabetic? No      Previous transplants: Kidney Tx 6/02/1992   Cancer history: SCC of skin 3/20/2015, also noted 11/2023  Cardiac history: None   Biopsy: Renal transplant biopsy ordered   Oxygen use at rest: 0 with activity: 0    BMI: 25.61     Is patient in a group home/assisted living? No  Does patient have a guardian? No    Referral intake process completed.  Patient is aware that after financial approval is received, medical records will be requested.   Patient confirmed for a callback from transplant coordinator on 7/09/2024. (within 2 weeks)  Tentative evaluation date 10/07/2024 slot 2 .    Confirmed coordinator will discuss evaluation process in more detail at the time of their call.   Patient is aware of the need to arrange age appropriate cancer screening, vaccinations, and dental care.  Reminded patient to complete questionnaire, complete medical records release, and review packet prior to evaluation visit .  Assessed patient for special needs (ie-wheelchair, assistance, guardian, and ):  None   Patient instructed to call [phone] with questions.     Patient gave verbal consent during intake call to obtain medical records and documents outside of [facility]:  Yes

## 2024-07-09 ENCOUNTER — TELEPHONE (OUTPATIENT)
Dept: TRANSPLANT | Facility: CLINIC | Age: 59
End: 2024-07-09
Payer: COMMERCIAL

## 2024-07-09 NOTE — TELEPHONE ENCOUNTER
Reviewed pt's chart for pre-kidney transplant evaluation planning. Coordinator first call on 07/09/2024. PKE STD on 10/07/2024 slot 2.      services required: no. Is pt able to attend virtual education class?  Yes.      Pt has not had a transplant kidney biopsy yet, one is scheduled on 07/25/2025.  Qualifying GFR of 15 on 06/27/2024.  Pt is not on dialysis.  Pt is not diabetic, is not insulin.     Health hx: S/P LDKT 1992, primary kidney disease is focal segmental glomerulosclerosis. Immunosuppression: mycophenolate mofetil 500 mg every 12 hours/ switched from sirolimus to MMF in 05/2023.   Heart hx: No chronic heart history. Echocardiogram 07/15/2024 had EF of 60-65%.   Lung hx: No chronic lung history, other then patient reports a chronic cough x last few years. CT chest/abd/ pelvic 12/01/2022 has biapical pleural thickening/scarring and 2 mm right UL pulmonary nodule.  Surgical hx: Cholecystectomy.  Ortho hx: Knee pain, no history of knee replacement - has spurs does not need a replacement. Ortho last seen 04/25/2024.   Derm hx: Multiple squamous cell carcinoma episodes, Moh's procedure 12/18/2023 R lateral thigh,     Pt is a non smoker, does occasional wine, and not recreational drugs. Does does not have current infection, has eczema with some active, or active cancer.    Health maintenance items: BMI 27.78 on 04/25/2024.  Colonoscopy: 2012, due now scheduled for 10/2024.  Dental: UTD - goes every 6 months.  Last Pap: 04/09/2024.  Mammogram: 10/16/2023.  Vaccinations: needs COVID booster, hep B, pneumovax and Shingrix.     Pt is independent w/ ADLs.  Pt lives in house w/ friend.  3 children lives in town support following transplant. Pt potential living donors.     Imaging Available: CT chest/ abd/ pelvic 12/01/2022, new CT chest/ abd/ pelvic ordered again now due to chronic cough and need to assess target vessel status per Dr. Sapp.     Contacted patient and introduced myself as their Transplant  Coordinator, also introduced the role of the Transplant Coordinator in the transplant process.  Explained the purpose of this call including reviewing next steps and answering questions.      Confirmed Referring Provider, Dr. Kan Sibley; and Primary Care Physician, Lizbeth VARGAS. Explained to the patient of the importance of continued communication with referring providers and primary care physicians.      Reviewed components of transplant evaluation process including necessary appointments, tests, and procedures.  Instructed pt to bring 1-2 people with them to eval and to eat and drink and normally on eval day    Answered questions for patient regarding evaluation, provided my name and contact information and requested they call/message with any additional questions or concerns.  Informed patient they will receive a letter with information discussed in referral call. Determined that patient would like information regarding transplant by:       Regular Mail, Email, DropGiftshart, and/or Phone Call.  Encouraged the use of zerobound.    Scheduled for pre transplant patient education class on 07/29/2024 at 8:00 am. Instructed pt to see My Chart for eval schedule. Link for educational videos were provided in my letter today.  Additional informational web sites about transplant were discussed. Links provided to www.unos.org and www.srtr.org in letter sent to patient.  Pt expressed excellent understanding of all and was in excellent agreement with the plan.     Confirmed STD 10/07/2024 slot 2. Informed pt she will receive an email from our Office prior to eval with a Receipt of Info and educational materials - instructed to read materials and sign consent prior to eval.     Smartset orders entered for PKE 10/07/2024 slot 2.     Generated pre transplant patient education letter today in EPIC - electronically sent to pt/ providers.

## 2024-07-10 ENCOUNTER — TELEPHONE (OUTPATIENT)
Dept: DERMATOLOGY | Facility: CLINIC | Age: 59
End: 2024-07-10
Payer: COMMERCIAL

## 2024-07-10 NOTE — TELEPHONE ENCOUNTER
[facility]- was going to offer 9:00 tomorrow 7/11 with Dr. Macedo (resident continuity clinic). Appointment is on hold.    SHANNAN Browne

## 2024-07-10 NOTE — TELEPHONE ENCOUNTER
Left Voicemail (1st Attempt) for the patient to call back and schedule the following:    Appointment type: Return  Provider: Dr. Bauman  Return date: 1st available   Specialty phone number: [phone]

## 2024-07-10 NOTE — TELEPHONE ENCOUNTER
"----- Message from Latricia CARY sent at 7/10/2024  1:42 PM CDT -----  Regarding: FW: sooner appointment  Please see below   Sooner Appt request  Pt scheduled with Dr Bauman on 12/10/2024  Pt is return Pt    Only openings I see sooner are Nov with PA and was not sure if could see her or not    Please review and reach out to Pt or to coordinator below if can arrange something sooner for Pt please    Thank you,  Latricia  Referral Specialist     If responding to this message please allow 24-48 business hours for a reply. If you need sooner assistance please call [facility] at [phone], or [facility] at [phone] between the hours of 7:00am to 5:00pm Monday-Friday.  ----- Message -----  From: Selena Lee RN  Sent: 7/10/2024  12:19 PM CDT  To: Dermatology Scheduling Access Center  Subject: sooner appointment                               Hi Team,    Patient states she has areas that are concerning and Derm can only get her in again in Dec 2024.  Is there a way to have her get seen sooner with these concerns?    Thanks,  Selena \"CLAUDETTE\" Rosa  Post Kidney / Pancreas Transplant Coordinator  [facility]  T: [phone]  "

## 2024-07-11 DIAGNOSIS — N18.32 STAGE 3B CHRONIC KIDNEY DISEASE (H): Primary | ICD-10-CM

## 2024-07-15 ENCOUNTER — ANCILLARY PROCEDURE (OUTPATIENT)
Dept: CARDIOLOGY | Facility: CLINIC | Age: 59
End: 2024-07-15
Attending: INTERNAL MEDICINE
Payer: COMMERCIAL

## 2024-07-15 ENCOUNTER — DOCUMENTATION ONLY (OUTPATIENT)
Dept: TRANSPLANT | Facility: CLINIC | Age: 59
End: 2024-07-15

## 2024-07-15 ENCOUNTER — LAB (OUTPATIENT)
Dept: LAB | Facility: CLINIC | Age: 59
End: 2024-07-15
Payer: COMMERCIAL

## 2024-07-15 ENCOUNTER — ANCILLARY PROCEDURE (OUTPATIENT)
Dept: ULTRASOUND IMAGING | Facility: CLINIC | Age: 59
End: 2024-07-15
Attending: INTERNAL MEDICINE
Payer: COMMERCIAL

## 2024-07-15 DIAGNOSIS — R06.09 DYSPNEA ON EXERTION: ICD-10-CM

## 2024-07-15 DIAGNOSIS — T86.10 COMPLICATIONS, KIDNEY TRANSPLANT: ICD-10-CM

## 2024-07-15 DIAGNOSIS — N18.32 STAGE 3B CHRONIC KIDNEY DISEASE (H): ICD-10-CM

## 2024-07-15 DIAGNOSIS — Z94.0 KIDNEY TRANSPLANTED: ICD-10-CM

## 2024-07-15 DIAGNOSIS — Z48.298 AFTERCARE FOLLOWING ORGAN TRANSPLANT: ICD-10-CM

## 2024-07-15 DIAGNOSIS — E83.52 HYPERCALCEMIA: ICD-10-CM

## 2024-07-15 DIAGNOSIS — R79.89 ELEVATED SERUM CREATININE: ICD-10-CM

## 2024-07-15 LAB
ALBUMIN SERPL BCG-MCNC: 4.8 G/DL (ref 3.5–5.2)
ANION GAP SERPL CALCULATED.3IONS-SCNC: 12 MMOL/L (ref 7–15)
BUN SERPL-MCNC: 47.4 MG/DL (ref 8–23)
CALCIUM SERPL-MCNC: 10.4 MG/DL (ref 8.6–10)
CHLORIDE SERPL-SCNC: 104 MMOL/L (ref 98–107)
CREAT SERPL-MCNC: 3.64 MG/DL (ref 0.51–0.95)
EGFRCR SERPLBLD CKD-EPI 2021: 14 ML/MIN/1.73M2
ERYTHROCYTE [DISTWIDTH] IN BLOOD BY AUTOMATED COUNT: 14.1 % (ref 10–15)
GLUCOSE SERPL-MCNC: 93 MG/DL (ref 70–99)
HCO3 SERPL-SCNC: 23 MMOL/L (ref 22–29)
HCT VFR BLD AUTO: 32.7 % (ref 35–47)
HGB BLD-MCNC: 10.8 G/DL (ref 11.7–15.7)
LVEF ECHO: NORMAL
MCH RBC QN AUTO: 30.9 PG (ref 26.5–33)
MCHC RBC AUTO-ENTMCNC: 33 G/DL (ref 31.5–36.5)
MCV RBC AUTO: 94 FL (ref 78–100)
PHOSPHATE SERPL-MCNC: 5 MG/DL (ref 2.5–4.5)
PLATELET # BLD AUTO: 132 10E3/UL (ref 150–450)
POTASSIUM SERPL-SCNC: 3.9 MMOL/L (ref 3.4–5.3)
PTH-INTACT SERPL-MCNC: 22 PG/ML (ref 15–65)
RBC # BLD AUTO: 3.49 10E6/UL (ref 3.8–5.2)
SODIUM SERPL-SCNC: 139 MMOL/L (ref 135–145)
VIT D+METAB SERPL-MCNC: 23 NG/ML (ref 20–50)
WBC # BLD AUTO: 5.4 10E3/UL (ref 4–11)

## 2024-07-15 PROCEDURE — 82306 VITAMIN D 25 HYDROXY: CPT

## 2024-07-15 PROCEDURE — 85027 COMPLETE CBC AUTOMATED: CPT | Performed by: PATHOLOGY

## 2024-07-15 PROCEDURE — 80069 RENAL FUNCTION PANEL: CPT | Performed by: PATHOLOGY

## 2024-07-15 PROCEDURE — 99000 SPECIMEN HANDLING OFFICE-LAB: CPT | Performed by: PATHOLOGY

## 2024-07-15 PROCEDURE — 83970 ASSAY OF PARATHORMONE: CPT | Performed by: PATHOLOGY

## 2024-07-15 PROCEDURE — 93306 TTE W/DOPPLER COMPLETE: CPT | Performed by: INTERNAL MEDICINE

## 2024-07-15 PROCEDURE — 36415 COLL VENOUS BLD VENIPUNCTURE: CPT | Performed by: PATHOLOGY

## 2024-07-15 PROCEDURE — 76776 US EXAM K TRANSPL W/DOPPLER: CPT | Mod: GC | Performed by: RADIOLOGY

## 2024-07-18 DIAGNOSIS — N18.32 STAGE 3B CHRONIC KIDNEY DISEASE (H): Primary | ICD-10-CM

## 2024-07-18 NOTE — PROGRESS NOTES
7/18- Placed on the journey per Dr. Sapp and sent appt request to coordinator to schedule w/Latricia. Called pt to let her know the plan and provide my contact information.   Daniele FRIEND RN  Nephrology care coordinator  [facility]

## 2024-07-19 ENCOUNTER — PATIENT OUTREACH (OUTPATIENT)
Dept: NEPHROLOGY | Facility: CLINIC | Age: 59
End: 2024-07-19
Payer: COMMERCIAL

## 2024-07-19 DIAGNOSIS — N18.32 STAGE 3B CHRONIC KIDNEY DISEASE (H): Primary | ICD-10-CM

## 2024-07-19 NOTE — PROGRESS NOTES
7/19- Called Reina to introduce self as RNCC, provide my contact information, see how she's been feeling, and get her an appt w/Latricia. No answer. LVM to return my call.  Daniele FRIEND RN  Nephrology care coordinator  [facility]  7/19- Reina returned my call and stated that she hasn't been feeling very well. She is mainly very tired and not eating the best. We have her scheduled to establish care and I will get her referred for [facility] education as well as a unit tour. I have attached to her care team, referred to [place], and provided my direct line as her contact at [facility]. Writer will follow closely.  Daniele FRIEND RN  Nephrology care coordinator  [place]

## 2024-07-24 ENCOUNTER — LAB (OUTPATIENT)
Dept: LAB | Facility: CLINIC | Age: 59
End: 2024-07-24
Payer: COMMERCIAL

## 2024-07-24 DIAGNOSIS — N18.32 STAGE 3B CHRONIC KIDNEY DISEASE (H): ICD-10-CM

## 2024-07-24 LAB
ALBUMIN UR-MCNC: >=300 MG/DL
APPEARANCE UR: CLEAR
BACTERIA #/AREA URNS HPF: ABNORMAL /HPF
BILIRUB UR QL STRIP: NEGATIVE
COLOR UR AUTO: YELLOW
ERYTHROCYTE [DISTWIDTH] IN BLOOD BY AUTOMATED COUNT: 13.7 % (ref 10–15)
GLUCOSE UR STRIP-MCNC: NEGATIVE MG/DL
HCT VFR BLD AUTO: 32.6 % (ref 35–47)
HGB BLD-MCNC: 10.4 G/DL (ref 11.7–15.7)
HGB UR QL STRIP: ABNORMAL
KETONES UR STRIP-MCNC: NEGATIVE MG/DL
LEUKOCYTE ESTERASE UR QL STRIP: NEGATIVE
MCH RBC QN AUTO: 30.8 PG (ref 26.5–33)
MCHC RBC AUTO-ENTMCNC: 31.9 G/DL (ref 31.5–36.5)
MCV RBC AUTO: 96 FL (ref 78–100)
NITRATE UR QL: NEGATIVE
PH UR STRIP: 5.5 [PH] (ref 5–8)
PLATELET # BLD AUTO: 135 10E3/UL (ref 150–450)
RBC # BLD AUTO: 3.38 10E6/UL (ref 3.8–5.2)
RBC #/AREA URNS AUTO: ABNORMAL /HPF
SP GR UR STRIP: 1.02 (ref 1–1.03)
SQUAMOUS #/AREA URNS AUTO: ABNORMAL /LPF
UROBILINOGEN UR STRIP-ACNC: 0.2 E.U./DL
WBC # BLD AUTO: 5.8 10E3/UL (ref 4–11)
WBC #/AREA URNS AUTO: ABNORMAL /HPF

## 2024-07-24 PROCEDURE — 85027 COMPLETE CBC AUTOMATED: CPT

## 2024-07-24 PROCEDURE — 84156 ASSAY OF PROTEIN URINE: CPT

## 2024-07-24 PROCEDURE — 81001 URINALYSIS AUTO W/SCOPE: CPT

## 2024-07-24 PROCEDURE — 80069 RENAL FUNCTION PANEL: CPT

## 2024-07-24 PROCEDURE — 82043 UR ALBUMIN QUANTITATIVE: CPT

## 2024-07-24 PROCEDURE — 36415 COLL VENOUS BLD VENIPUNCTURE: CPT

## 2024-07-24 PROCEDURE — 82306 VITAMIN D 25 HYDROXY: CPT

## 2024-07-24 PROCEDURE — 82570 ASSAY OF URINE CREATININE: CPT

## 2024-07-25 ENCOUNTER — APPOINTMENT (OUTPATIENT)
Dept: INTERVENTIONAL RADIOLOGY/VASCULAR | Facility: CLINIC | Age: 59
End: 2024-07-25
Attending: INTERNAL MEDICINE
Payer: COMMERCIAL

## 2024-07-25 ENCOUNTER — LAB (OUTPATIENT)
Dept: LAB | Facility: CLINIC | Age: 59
End: 2024-07-25
Payer: COMMERCIAL

## 2024-07-25 ENCOUNTER — HOSPITAL ENCOUNTER (OUTPATIENT)
Facility: CLINIC | Age: 59
Discharge: HOME | End: 2024-07-25
Attending: INTERNAL MEDICINE | Admitting: INTERNAL MEDICINE
Payer: COMMERCIAL

## 2024-07-25 ENCOUNTER — APPOINTMENT (OUTPATIENT)
Dept: MEDSURG UNIT | Facility: CLINIC | Age: 59
End: 2024-07-25
Attending: INTERNAL MEDICINE
Payer: COMMERCIAL

## 2024-07-25 VITALS
TEMPERATURE: 98.4 F | BODY MASS INDEX: 26.52 KG/M2 | WEIGHT: 145 LBS | DIASTOLIC BLOOD PRESSURE: 79 MMHG | OXYGEN SATURATION: 97 % | HEART RATE: 76 BPM | SYSTOLIC BLOOD PRESSURE: 123 MMHG | RESPIRATION RATE: 16 BRPM

## 2024-07-25 DIAGNOSIS — T86.10 COMPLICATIONS, KIDNEY TRANSPLANT: ICD-10-CM

## 2024-07-25 DIAGNOSIS — Z48.298 AFTERCARE FOLLOWING ORGAN TRANSPLANT: ICD-10-CM

## 2024-07-25 DIAGNOSIS — R79.89 ELEVATED SERUM CREATININE: ICD-10-CM

## 2024-07-25 DIAGNOSIS — Z94.0 KIDNEY TRANSPLANTED: ICD-10-CM

## 2024-07-25 LAB
ACANTHOCYTES BLD QL SMEAR: ABNORMAL
ALBUMIN MFR UR ELPH: 130 MG/DL
ALBUMIN MFR UR ELPH: 133 MG/DL
ALBUMIN SERPL BCG-MCNC: 4.6 G/DL (ref 3.5–5.2)
ALBUMIN UR-MCNC: 100 MG/DL
ANION GAP SERPL CALCULATED.3IONS-SCNC: 13 MMOL/L (ref 7–15)
ANION GAP SERPL CALCULATED.3IONS-SCNC: 15 MMOL/L (ref 7–15)
APPEARANCE UR: CLEAR
AUER BODIES BLD QL SMEAR: ABNORMAL
BASO STIPL BLD QL SMEAR: ABNORMAL
BASOPHILS # BLD AUTO: 0 10E3/UL (ref 0–0.2)
BASOPHILS NFR BLD AUTO: 1 %
BILIRUB UR QL STRIP: NEGATIVE
BITE CELLS BLD QL SMEAR: ABNORMAL
BK VIRUS SPECIMEN TYPE: NORMAL
BKV DNA # SPEC NAA+PROBE: NOT DETECTED IU/ML
BLISTER CELLS BLD QL SMEAR: ABNORMAL
BUN SERPL-MCNC: 57.3 MG/DL (ref 8–23)
BUN SERPL-MCNC: 58.4 MG/DL (ref 8–23)
BURR CELLS BLD QL SMEAR: ABNORMAL
CALCIUM SERPL-MCNC: 10.5 MG/DL (ref 8.8–10.4)
CALCIUM SERPL-MCNC: 10.6 MG/DL (ref 8.8–10.4)
CHLORIDE SERPL-SCNC: 104 MMOL/L (ref 98–107)
CHLORIDE SERPL-SCNC: 107 MMOL/L (ref 98–107)
COLOR UR AUTO: ABNORMAL
CREAT SERPL-MCNC: 3.52 MG/DL (ref 0.51–0.95)
CREAT SERPL-MCNC: 3.54 MG/DL (ref 0.51–0.95)
CREAT UR-MCNC: 87.4 MG/DL
CREAT UR-MCNC: 87.4 MG/DL
CREAT UR-MCNC: 99 MG/DL
DACRYOCYTES BLD QL SMEAR: ABNORMAL
EGFRCR SERPLBLD CKD-EPI 2021: 14 ML/MIN/1.73M2
EGFRCR SERPLBLD CKD-EPI 2021: 14 ML/MIN/1.73M2
ELLIPTOCYTES BLD QL SMEAR: ABNORMAL
EOSINOPHIL # BLD AUTO: 0.3 10E3/UL (ref 0–0.7)
EOSINOPHIL NFR BLD AUTO: 6 %
ERYTHROCYTE [DISTWIDTH] IN BLOOD BY AUTOMATED COUNT: 14 % (ref 10–15)
FRAGMENTS BLD QL SMEAR: ABNORMAL
GLUCOSE SERPL-MCNC: 116 MG/DL (ref 70–99)
GLUCOSE SERPL-MCNC: 119 MG/DL (ref 70–99)
GLUCOSE UR STRIP-MCNC: NEGATIVE MG/DL
HCO3 SERPL-SCNC: 18 MMOL/L (ref 22–29)
HCO3 SERPL-SCNC: 19 MMOL/L (ref 22–29)
HCT VFR BLD AUTO: 32 % (ref 35–47)
HGB BLD-MCNC: 10.1 G/DL (ref 11.7–15.7)
HGB C CRYSTALS: ABNORMAL
HGB UR QL STRIP: ABNORMAL
HOWELL-JOLLY BOD BLD QL SMEAR: ABNORMAL
HYALINE CASTS: 1 /LPF
IMM GRANULOCYTES # BLD: 0 10E3/UL
IMM GRANULOCYTES NFR BLD: 0 %
INR PPP: 0.93 (ref 0.85–1.15)
KETONES UR STRIP-MCNC: NEGATIVE MG/DL
LEUKOCYTE ESTERASE UR QL STRIP: ABNORMAL
LYMPHOCYTES # BLD AUTO: 0.6 10E3/UL (ref 0.8–5.3)
LYMPHOCYTES NFR BLD AUTO: 11 %
MCH RBC QN AUTO: 30.7 PG (ref 26.5–33)
MCHC RBC AUTO-ENTMCNC: 31.6 G/DL (ref 31.5–36.5)
MCV RBC AUTO: 97 FL (ref 78–100)
MICROALBUMIN UR-MCNC: 665 MG/L
MICROALBUMIN/CREAT UR: 760.87 MG/G CR (ref 0–25)
MONOCYTES # BLD AUTO: 0.6 10E3/UL (ref 0–1.3)
MONOCYTES NFR BLD AUTO: 12 %
MUCOUS THREADS #/AREA URNS LPF: PRESENT /LPF
NEUTROPHILS # BLD AUTO: 3.6 10E3/UL (ref 1.6–8.3)
NEUTROPHILS NFR BLD AUTO: 70 %
NEUTS HYPERSEG BLD QL SMEAR: ABNORMAL
NITRATE UR QL: NEGATIVE
NRBC # BLD AUTO: 0 10E3/UL
NRBC BLD AUTO-RTO: 0 /100
PH UR STRIP: 6 [PH] (ref 5–7)
PHOSPHATE SERPL-MCNC: 4.8 MG/DL (ref 2.5–4.5)
PLAT MORPH BLD: ABNORMAL
PLATELET # BLD AUTO: 139 10E3/UL (ref 150–450)
POLYCHROMASIA BLD QL SMEAR: SLIGHT
POTASSIUM SERPL-SCNC: 3.9 MMOL/L (ref 3.4–5.3)
POTASSIUM SERPL-SCNC: 4.3 MMOL/L (ref 3.4–5.3)
PROT/CREAT 24H UR: 1.31 MG/MG CR (ref 0–0.2)
PROT/CREAT 24H UR: 1.52 MG/MG CR (ref 0–0.2)
RBC # BLD AUTO: 3.29 10E6/UL (ref 3.8–5.2)
RBC AGGLUT BLD QL: ABNORMAL
RBC MORPH BLD: ABNORMAL
RBC URINE: 0 /HPF
ROULEAUX BLD QL SMEAR: ABNORMAL
SICKLE CELLS BLD QL SMEAR: ABNORMAL
SMUDGE CELLS BLD QL SMEAR: ABNORMAL
SODIUM SERPL-SCNC: 137 MMOL/L (ref 135–145)
SODIUM SERPL-SCNC: 139 MMOL/L (ref 135–145)
SP GR UR STRIP: 1.02 (ref 1–1.03)
SPHEROCYTES BLD QL SMEAR: ABNORMAL
SQUAMOUS EPITHELIAL: 1 /HPF
STOMATOCYTES BLD QL SMEAR: ABNORMAL
TARGETS BLD QL SMEAR: ABNORMAL
TOXIC GRANULES BLD QL SMEAR: ABNORMAL
TRANSITIONAL EPI: 1 /HPF
UROBILINOGEN UR STRIP-MCNC: NORMAL MG/DL
VARIANT LYMPHS BLD QL SMEAR: ABNORMAL
VIT D+METAB SERPL-MCNC: 22 NG/ML (ref 20–50)
WBC # BLD AUTO: 5.2 10E3/UL (ref 4–11)
WBC URINE: 2 /HPF

## 2024-07-25 PROCEDURE — 999N000142 HC STATISTIC PROCEDURE PREP ONLY

## 2024-07-25 PROCEDURE — 88305 TISSUE EXAM BY PATHOLOGIST: CPT | Mod: 26 | Performed by: PATHOLOGY

## 2024-07-25 PROCEDURE — 80048 BASIC METABOLIC PNL TOTAL CA: CPT | Performed by: INTERNAL MEDICINE

## 2024-07-25 PROCEDURE — C1889 IMPLANT/INSERT DEVICE, NOC: HCPCS

## 2024-07-25 PROCEDURE — 50200 RENAL BIOPSY PERQ: CPT | Mod: RT | Performed by: RADIOLOGY

## 2024-07-25 PROCEDURE — 84156 ASSAY OF PROTEIN URINE: CPT | Performed by: INTERNAL MEDICINE

## 2024-07-25 PROCEDURE — 88313 SPECIAL STAINS GROUP 2: CPT | Mod: TC | Performed by: INTERNAL MEDICINE

## 2024-07-25 PROCEDURE — 88350 IMFLUOR EA ADDL 1ANTB STN PX: CPT | Mod: 26 | Performed by: PATHOLOGY

## 2024-07-25 PROCEDURE — 999N000133 HC STATISTIC PP CARE STAGE 2

## 2024-07-25 PROCEDURE — 258N000003 HC RX IP 258 OP 636: Performed by: NURSE PRACTITIONER

## 2024-07-25 PROCEDURE — 86833 HLA CLASS II HIGH DEFIN QUAL: CPT | Performed by: INTERNAL MEDICINE

## 2024-07-25 PROCEDURE — 88346 IMFLUOR 1ST 1ANTB STAIN PX: CPT | Mod: 26 | Performed by: PATHOLOGY

## 2024-07-25 PROCEDURE — 88346 IMFLUOR 1ST 1ANTB STAIN PX: CPT | Mod: TC | Performed by: INTERNAL MEDICINE

## 2024-07-25 PROCEDURE — 85025 COMPLETE CBC W/AUTO DIFF WBC: CPT | Performed by: INTERNAL MEDICINE

## 2024-07-25 PROCEDURE — 81001 URINALYSIS AUTO W/SCOPE: CPT | Performed by: INTERNAL MEDICINE

## 2024-07-25 PROCEDURE — 81378 HLA I & II TYPING HR: CPT

## 2024-07-25 PROCEDURE — 76942 ECHO GUIDE FOR BIOPSY: CPT | Mod: 26 | Performed by: RADIOLOGY

## 2024-07-25 PROCEDURE — 87799 DETECT AGENT NOS DNA QUANT: CPT | Performed by: INTERNAL MEDICINE

## 2024-07-25 PROCEDURE — 250N000009 HC RX 250: Performed by: NURSE PRACTITIONER

## 2024-07-25 PROCEDURE — 36415 COLL VENOUS BLD VENIPUNCTURE: CPT | Performed by: INTERNAL MEDICINE

## 2024-07-25 PROCEDURE — 86832 HLA CLASS I HIGH DEFIN QUAL: CPT | Performed by: INTERNAL MEDICINE

## 2024-07-25 PROCEDURE — 87086 URINE CULTURE/COLONY COUNT: CPT | Performed by: INTERNAL MEDICINE

## 2024-07-25 PROCEDURE — 85610 PROTHROMBIN TIME: CPT | Performed by: NURSE PRACTITIONER

## 2024-07-25 PROCEDURE — 88313 SPECIAL STAINS GROUP 2: CPT | Mod: 26 | Performed by: PATHOLOGY

## 2024-07-25 RX ORDER — SODIUM CHLORIDE 9 MG/ML
INJECTION, SOLUTION INTRAVENOUS CONTINUOUS
Status: DISCONTINUED | OUTPATIENT
Start: 2024-07-25 | End: 2024-07-25 | Stop reason: HOSPADM

## 2024-07-25 RX ORDER — LIDOCAINE 40 MG/G
CREAM TOPICAL
Status: DISCONTINUED | OUTPATIENT
Start: 2024-07-25 | End: 2024-07-25 | Stop reason: HOSPADM

## 2024-07-25 RX ADMIN — LIDOCAINE HYDROCHLORIDE 1 ML: 10 INJECTION, SOLUTION EPIDURAL; INFILTRATION; INTRACAUDAL; PERINEURAL at 11:08

## 2024-07-25 RX ADMIN — SODIUM CHLORIDE: 9 INJECTION, SOLUTION INTRAVENOUS at 10:21

## 2024-07-25 ASSESSMENT — ACTIVITIES OF DAILY LIVING (ADL)
ADLS_ACUITY_SCORE: 35

## 2024-07-25 NOTE — DISCHARGE INSTRUCTIONS
[facility]    Interventional Radiology  Patient Instructions Following Transplanted Kidney Biopsy    AFTER YOU GO HOME  If you were given sedation, for the first 24 hours: we recommend that an adult stay with you, DO NOT drive or operate machinery at home or at work, DO NOT smoke, DO NOT make any important or legal decisions.  DO NOT drink alcoholic beverages the day of your procedure  Drink plenty of fluids   Resume your regular diet, unless otherwise instructed by your Primary Physician  Relax and take it easy for 48 hours  DO NOT do any strenuous exercise or lifting (> 10 lbs) for at least 7 days following your procedure  There should be minimal drainage from the biopsy site  Keep the dressing dry and in place for 24 hours. After 24 hours, remove the dressing. You may shower at that time. Do not scrub the procedure site vigorously for 5 days. Re apply a band aid to the site for the next 2 days. Change daily or when needed. Never leave a wet or dirty dressing in place.   No lotion or powder to the puncture site for 5 days.   No tub bath, hot tub, or swimming for 5 days.    CALL THE PHYSICIAN IF:  You start bleeding from the procedure site.  If you do start to bleed from that site, lie down flat and hold pressure on the site for a minimum of 10 minutes.  Your physician will tell you if you need to return to the hospital  You develop nausea or vomiting  You have excessive swelling, redness, or tenderness at the site  You have drainage that looks like it is infected.  You experience severe pain  You develop hives or a rash or unexplained itching  You develop shortness of breath  You develop a temperature of 101 degrees F or greater  You develop bloody clots or red urine after you are discharged    [facility] INTERVENTIONAL RADIOLOGY DEPARTMENT  Procedure Physician:    Dr. Beltre                                  Date of procedure: July 25, 2024    Telephone Numbers: [phone]      Monday-Friday 7:30  am to 4:00 pm  [phone] After 4:00 pm Monday-Friday, Weekends & Holidays.   Ask the  to contact the Interventional Radiologist on call.  Someone is on call 24 hrs/day    [facility] toll free number: [phone]  Monday-Friday 8:00 am to 4:30 pm  [facility] Emergency Dept: [phone]

## 2024-07-25 NOTE — PROGRESS NOTES
Virtual Visit Details    Type of service:  Video Visit   Start time: 11:31 am  End time: 11:52 am  Originating Location (pt. Location): Home    Distant Location (provider location):  Off-site  Platform used for Video Visit: Karen    NEPHROLOGY Progress Note  7/26/2024    Assessment & Plan   # LDKT:  # CKD   Stable   - Baseline Creatinine:  ~ 1.5-1.8   - Proteinuria: Mild (0.5-1.0 grams)   - Date DSA Last Checked: Feb/2015      Latest DSA: No   - BK Viremia: Not checked recently due to time from transplant  - Kidney Tx Biopsy: Yes, completed yesterday 7/25/24-   results pending  - referred to CKD [facility]  - schedule kidney smart class, interested in home dialysis but may lean towards home hemo rather than PD due to restlessness and difficulty with sleeping  - avoid NSAIDS  - drink to thirst, follow low sodium diet        # Immunosuppression: Sirolimus (goal 4-6)   - Continue with intensive monitoring of immunosuppression for efficacy and toxicity.   - Changes: No   - management per transplant team    # Infection Prophylaxis:   - PJP: None  - management per transplant team    # Hypertension: Not checked recently;  Goal BP: < 130/80   - current BP regimen: amlodipine 5 mg daily   - BP's at home  was 123/79 last clinic visit   - Changes: No - check BP at home, keep log   - previously on lisinopril, may need to resume this.     # Elevated Blood Glucose: Glucose generally running ~ 110s-130s   - Management as per primary care.    # Anemia in Chronic Renal Disease: Hgb: Stable      BEVERLY: No   - Iron studies: Not checked recently    # Mineral Bone Disorder:   - Secondary renal hyperparathyroidism vs primary hyperparathyroidism  - PTH level: Normal (18-80 pg/ml)        On treatment: None  - Vitamin D; level: Low        On supplement: No  - Calcium; level: High normal        On supplement: No  - Phosphorus; level: Normal        On supplement: No   - Ca was up to 11.0 on labs 3/4/21 with low Vit D, PTH of 66, and PTHrP of 4.4  (nl 0.0-3.4). Ca returned to normal range at 9.7 but back up to low 10's. Recommended pt complete age-appropriate cancer screening. She is scheduled for colonoscopy.    # Electrolytes:   - Potassium; level: Normal        On supplement: No  - Magnesium; level: Normal        On supplement: No  - Bicarbonate; level: Low        On supplement: start sodium bicarb     # Depression: on Paxil. Stable.    # Skin Cancer: New lesions: several. Following with Dermatology.   - Discussed sun protection and recommend regular follow up with Dermatology.    # COVID-19 Virus Review: Discussed COVID-19 virus and the potential medical risks.  Reviewed preventative health recommendations, which includes washing hands for 20 seconds, avoid touching your face, and social distancing.  Asked patient to inform the transplant center if they are exposed or diagnosed with this virus.    # COVID Vaccine Completed: No but she states that she will get the vaccine as well as a booster      # Medical Compliance: Yes    # Transplant History:  Etiology of Kidney Failure: Focal segmental glomerulosclerosis (FSGS)  Tx: LDKT  Transplant: 6/2/1992 (Kidney)  Significant changes in immunosuppression: None  Significant transplant-related complications: None    Assessment and plan was discussed with the patient and she voiced her understanding and agreement.      #Disposition: labs and follow up in 1 month.     Chief Complaint   Ms. Quan is a 59 year old here for kidney transplant and immunosuppression management.    History of Present Illness   Ms. 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     No changes in her health since last telemedicine visit in 4/2020. She feels well other than ongoing fatigue, but this is unchanged from baseline and has been present for years. No fevers or chills. She has noticed some occasional nausea and vomiting. No diarrhea. She has some LE edema that improves overnight. She does not  check her BP often at home.       Home BP: Not checked    Problem List   Patient Active Problem List   Diagnosis    Ulcerative colitis (H)    Migraine    Allergic rhinitis    Hearing loss    Kidney replaced by transplant    Lichen planus    Giant Platelet syndrome    Nephrotic syndrome in diseases classified elsewhere    Major depression in partial remission (H24)    Actinic keratosis    Stage 3b chronic kidney disease (H)    Vitiligo    History of SCC (squamous cell carcinoma) of skin    Lichen planopilaris    Hyperlipidemia with target LDL less than 130    AK (actinic keratosis)    History of nonmelanoma skin cancer    HTN, kidney transplant related    Immunosuppressed status (H24)    Neoplasm of unspecified behavior of bone, soft tissue, and skin    Porokeratosis    Aftercare following organ transplant    Dermatitis    Squamous cell carcinoma of right thigh    Hypercalcemia    Elevated serum creatinine       Allergies   Allergies   Allergen Reactions    Ampicillin Swelling     Swelling of mouth and tongue.     Seasonal Allergies Other (See Comments)     Itchy eyes and rhinitis.       Medications   Current Outpatient Medications   Medication Sig Dispense Refill    amLODIPine (NORVASC) 5 MG tablet Take 5 mg by mouth daily      amLODIPine (NORVASC) 5 MG tablet Take 1 tablet (5 mg) by mouth at bedtime for 90 days 90 tablet 3    atorvastatin (LIPITOR) 40 MG tablet Take 1 tablet (40 mg) by mouth at bedtime 90 tablet 3    cinacalcet (SENSIPAR) 30 MG tablet Take 30 mg by mouth daily      cinacalcet (SENSIPAR) 30 MG tablet Take 1 tablet (30 mg) by mouth daily for 90 days 90 tablet 2    clobetasol (TEMOVATE) 0.05 % external ointment Apply topically 2 times daily Use sparingly at active areas of dermatitis for no more than 14 days or until resolved, whichever is sooner. 60 g 3    clobetasol (TEMOVATE) 0.05 % external solution Apply topically daily as needed (itchyy scalp) Apply to scalp daily as needed. 50 mL 4     clobetasol propionate (CLOBEX) 0.05 % external shampoo APPLY TO SCALP IN SHOWER TWICE WEEKLY 118 mL 1    ketoconazole (NIZORAL) 2 % external shampoo APPLY TOPICALLY DAILY AS NEEDED FOR ITCHING, IRRITATION OR OTHER( SCALP SCALE) USE 2-3 TIMES A WEEK. APPLY AND LATHER THEN RINSE OUT 5 MINUTES 120 mL 9    mycophenolate (GENERIC EQUIVALENT) 250 MG capsule Take 2 capsules (500 mg) by mouth 2 times daily 360 capsule 3    niacinamide 500 MG tablet Take 1 tablet (500 mg) by mouth 2 times daily (with meals) 60 tablet 3    PARoxetine (PAXIL) 20 MG tablet TAKE 1 TABLET BY MOUTH EVERY MORNING 90 tablet 2    triamcinolone (KENALOG) 0.1 % external cream APPLY SPARINGLY TOPICALLY TO THE AFFECTED AREA TWICE DAILY FOR 14 DAYS 30 g 0     Current Facility-Administered Medications   Medication Dose Route Frequency Provider Last Rate Last Admin    triamcinolone acetonide (KENALOG-10) injection 10 mg  10 mg Intra-Lesional Once Brad Bauman MD         Facility-Administered Medications Ordered in Other Visits   Medication Dose Route Frequency Provider Last Rate Last Admin    lidocaine (LMX4) cream   Topical Q1H PRN Rukhsana-Alyssia Brown APRN CNP        lidocaine 1 % 0.1-1 mL  0.1-1 mL Other Q1H PRN Rukhsana-Alyssia Brown APRN CNP   1 mL at 07/25/24 1108    sodium chloride (PF) 0.9% PF flush 3 mL  3 mL Intracatheter Q8H Rukhsana-Alyssia Brown APRN CNP   3 mL at 07/25/24 1021    sodium chloride (PF) 0.9% PF flush 3 mL  3 mL Intracatheter q1 min prn Rukhsana-Alyssia Brown APRN CNP        sodium chloride 0.9 % infusion   Intravenous Continuous Rukhsana-Alyssia Brown APRN CNP 75 mL/hr at 07/25/24 1021 New Bag at 07/25/24 1021     There are no discontinued medications.    Physical Exam   Vital Signs: LMP 05/14/2014 (Approximate)     Vital signs were deferred for this telemedicine visit.    GENERAL APPEARANCE: alert and no distress        Data         Latest Ref Rng & Units 7/25/2024    10:14 AM 7/24/2024    11:49 AM 7/15/2024     3:52 PM   Renal    Sodium 135 - 145 mmol/L 139  137  139    K 3.4 - 5.3 mmol/L 3.9  4.3  3.9    Cl 98 - 107 mmol/L 107  104  104    Cl (external) 98 - 107 mmol/L 107  104  104    CO2 22 - 29 mmol/L 19  18  23    Urea Nitrogen 8.0 - 23.0 mg/dL 58.4  57.3  47.4    Creatinine 0.51 - 0.95 mg/dL 3.54  3.52  3.64    Glucose 70 - 99 mg/dL 116  119  93    Calcium 8.8 - 10.4 mg/dL 10.5  10.6  10.4          Latest Ref Rng & Units 7/24/2024    11:49 AM 7/15/2024     3:52 PM 1/9/2024    10:50 AM   Bone Health   Phosphorus 2.5 - 4.5 mg/dL 4.8  5.0  3.2    Parathyroid Hormone Intact 15 - 65 pg/mL  22  34    Vit D Def 20 - 50 ng/mL 22  23           Latest Ref Rng & Units 7/25/2024    10:14 AM 7/24/2024    11:49 AM 7/15/2024     3:52 PM   Heme   WBC 4.0 - 11.0 10e3/uL 5.2  5.8  5.4    Hgb 11.7 - 15.7 g/dL 10.1  10.4  10.8    Plt 150 - 450 10e3/uL 139  135  132          Latest Ref Rng & Units 7/24/2024    11:49 AM 7/15/2024     3:52 PM 4/9/2024    10:37 AM   Liver   ALT 0 - 50 U/L   26    Albumin 3.5 - 5.2 g/dL 4.6  4.8           Latest Ref Rng & Units 1/17/2020     2:58 PM 3/13/2015    12:30 PM 6/20/2012     7:40 AM   Pancreas   A1C 0 - 5.6 % 5.7      Lipase 73 - 393 U/L  169  79          Latest Ref Rng & Units 5/30/2023    11:41 AM 1/17/2020     2:58 PM 3/5/2015    10:11 AM   Iron studies   Iron 37 - 145 ug/dL  37 - 145 ug/dL 84     84  61  83    Iron Saturation Index 15 - 46 %  21  29    Iron Sat Index 15 - 46 % 32      Ferritin 11 - 328 ng/mL 232  60  79          Latest Ref Rng & Units 10/17/2018     1:21 PM 2/19/2015     7:08 AM 2/17/2015     5:02 AM   [facility]   CVM DNA Quant  Plasma  EDTA PLASMA     CMV QUANT IU/ML CMVND^CMV DNA Not Detected [IU]/mL CMV DNA Not Detected  Canceled, Test credited   Quantity not sufficient  Notified Shanel LEMA @ [id number] on 2.19.15. JG  The MARIA ANTONIA AmpliPrep/MARIA ANTONIA TaqMan CMV Test is an FDA-approved in vitro nucleic   acid amplification test for the quantitation of cytomegalovirus DNA in human   plasma (EDTA  plasma) using the MARIA ANTONIA AmpliPrep Instrument for automated viral   nucleic acid extraction and the MARIA ANTONIA TaqMan Analyzer or MARIA ANTONIA TaqMan for   automated Real Time amplification and detection of the viral nucleic acid   target.   Titer results are reported in International Units/mL (IU/mL using 1st WHO   International standard for Human Cytomegalovirus for Nucleic Acid Amplification   based assays. The conversion factor between CMV DNA copis/mL (as defined by the   Roche MARIA ANTONIA TaqMan CMV test) and International Units is the CMV DNA   concentration in IU/mL x 1.1 copies/IU = CMV DNA in copies/mL.   This assay has received FDA approval for the testing of human plasma only. The   [facility] at the [facility], [place], has validated the performance characteristics of the Roche   CMV assay for plasma, bronchial alveolar lavage/wash and urine.       LOG IU/ML OF CMVQNT <2.1 [Log_IU]/mL Not Calculated  Not Calculated     BK Spec    Plasma    BK Res BKNEG copies/mL   BK Virus DNA Not Detected    BK Log <2.7 Log copies/mL   Not Calculated   The Real-Time quantitative BK Virus assay was developed and its performance   characteristics determined by the [facility] Diagnostic Laboratory at   the [facility] in [place], [place]. The   primers and probes for each analyte are Analyte Specific Reagents (ASRs)   manufactured by [employer].   ASRs are used in many laboratory tests necessary for standard medical care and   generally do not require U.S. Food and Drug Administration approval. The FDA   has determined that such clearance or approval is not necessary.   This test is used for clinical purposes. It should not be regarded as   investigational or for research. This laboratory is certified under the   Clinical Laboratory Improvement Amendments of 1988 (CLIA-88) as qualified to   perform high complexity clinical laboratory  testing.      EBV DNA COPIES/ML EBVNEG^EBV DNA Not Detected [Copies]/mL EBV DNA Not Detected      EBV DNA LOG OF COPIES <2.7 [Log_copies]/mL Not Calculated        Failed to redirect to the Timeline version of the [facility] SmartLink.      Recent Labs   Lab Test 06/13/23  1152 09/22/23  1431 10/23/23  1444 04/09/24  1037   DOSMPA 6/12/2023  10:00 PM  --   --  4/8/2024  10:30 PM   MPACID 1.47 1.22 6.10* 2.79   MPAG 53.5 60.6 118.3* 58.9     Latricia Henson PA-C    Visit length 21 minutes. An additional 15 minutes were spent on date of service in chart review, documentation, and other activities as noted.

## 2024-07-25 NOTE — PROCEDURES
[facility]    Procedure: RLQ transplant kidney biopsy    Date/Time: 7/25/2024 12:05 PM    Performed by: Nelson Beltre MD  Authorized by: Nelson Beltre MD      UNIVERSAL PROTOCOL   Site Marked: NA  Prior Images Obtained and Reviewed:  Yes  Required items: Required blood products, implants, devices and special equipment available    Patient identity confirmed:  Verbally with patient, arm band, provided demographic data and hospital-assigned identification number  Patient was reevaluated immediately before administering moderate or deep sedation or anesthesia  Confirmation Checklist:  Patient's identity using two indicators, relevant allergies, procedure was appropriate and matched the consent or emergent situation and correct equipment/implants were available  Time out: Immediately prior to the procedure a time out was called    Universal Protocol: the Joint Commission Universal Protocol was followed    Preparation: Patient was prepped and draped in usual sterile fashion    ESBL (mL):  0     ANESTHESIA    Anesthesia:  Local infiltration  Local Anesthetic:  Lidocaine 1% without epinephrine  Anesthetic Total (mL):  10      SEDATION    Patient Sedated: No    See dictated procedure note for full details.  Findings: none    Specimens: none    Complications: None    Condition: Stable    Plan: 2 hours bedrest and then may discharge      PROCEDURE  Describe Procedure: RLQ transplant kidney biopsy. 4 passes taken with 3 good cores.  Patient Tolerance:  Patient tolerated the procedure well with no immediate complications  Length of time physician/provider present for 1:1 monitoring during sedation: 0

## 2024-07-25 NOTE — PROGRESS NOTES
"Pt arrived to Unit 2a for tx kidney biopsy in IR. AFVSS. Denies pain. Labs processing. Consent done. Pt has bilateral hearing aids currently in; states is \"deaf,\" and benefits with lip reading. Pt's daughter, Sadie, off site to drive pt home post procedure. Pt stable.   "

## 2024-07-25 NOTE — PROGRESS NOTES
Pt arrived via cart with RN from IR s/p transplanted kidney biopsy. VSS. Right abdomen site CDI, no hematoma. Patient denies pain.  Daughter will  post procedure. Flat bedrest until 1350 per MD orders

## 2024-07-25 NOTE — PROGRESS NOTES
Bedrest recovery time completed without complications. Pt tolerated ambulation to BR. Able to void without complications. UO clear yellow. R abd procedure site remained unchanged; stable.

## 2024-07-25 NOTE — IR NOTE
Patient Name: Reina Quan  Medical Record Number: [id number]  Today's Date: 7/25/2024    Procedure: Right Tx Kidney Biopsy  Proceduralist: MD Patt  Pathology present: yes    Procedure Start: 1135  Procedure end: 1150  Sedation medications administered: Local     Bedrest 2 hours from [id number]    Report given to: 2A, RN      Other Notes: Pt arrived to IR room 6 from . Consent reviewed. Pt denies any questions or concerns regarding procedure. Pt positioned supine and monitored per protocol. Pt tolerated procedure without any noted complications. Pt transferred back to .

## 2024-07-25 NOTE — PROGRESS NOTES
Discharge instructions reviewed with pt. Questions & concerns addressed. No sedation given today. Pt discharged to meet daughter at main entrance to go home. Pt stable.

## 2024-07-26 ENCOUNTER — TELEPHONE (OUTPATIENT)
Dept: TRANSPLANT | Facility: CLINIC | Age: 59
End: 2024-07-26

## 2024-07-26 ENCOUNTER — VIRTUAL VISIT (OUTPATIENT)
Dept: NEPHROLOGY | Facility: CLINIC | Age: 59
End: 2024-07-26
Attending: PHYSICIAN ASSISTANT
Payer: COMMERCIAL

## 2024-07-26 ENCOUNTER — PATIENT OUTREACH (OUTPATIENT)
Dept: NEPHROLOGY | Facility: CLINIC | Age: 59
End: 2024-07-26

## 2024-07-26 DIAGNOSIS — R60.9 EDEMA, UNSPECIFIED TYPE: ICD-10-CM

## 2024-07-26 DIAGNOSIS — I10 HYPERTENSION, ESSENTIAL: ICD-10-CM

## 2024-07-26 DIAGNOSIS — E87.20 METABOLIC ACIDOSIS: ICD-10-CM

## 2024-07-26 DIAGNOSIS — D63.1 ANEMIA IN STAGE 4 CHRONIC KIDNEY DISEASE (H): ICD-10-CM

## 2024-07-26 DIAGNOSIS — T86.10 COMPLICATIONS, KIDNEY TRANSPLANT: Primary | ICD-10-CM

## 2024-07-26 DIAGNOSIS — Z94.0 KIDNEY REPLACED BY TRANSPLANT: ICD-10-CM

## 2024-07-26 DIAGNOSIS — Z01.818 ENCOUNTER FOR OTHER PREPROCEDURAL EXAMINATION: Primary | ICD-10-CM

## 2024-07-26 DIAGNOSIS — N18.4 ANEMIA IN STAGE 4 CHRONIC KIDNEY DISEASE (H): ICD-10-CM

## 2024-07-26 DIAGNOSIS — R79.89 ELEVATED SERUM CREATININE: ICD-10-CM

## 2024-07-26 DIAGNOSIS — N18.4 CKD (CHRONIC KIDNEY DISEASE) STAGE 4, GFR 15-29 ML/MIN (H): Primary | ICD-10-CM

## 2024-07-26 DIAGNOSIS — Z48.298 AFTERCARE FOLLOWING ORGAN TRANSPLANT: ICD-10-CM

## 2024-07-26 DIAGNOSIS — T86.11 KIDNEY TRANSPLANT REJECTION: ICD-10-CM

## 2024-07-26 DIAGNOSIS — N18.5 CKD (CHRONIC KIDNEY DISEASE) STAGE 5, GFR LESS THAN 15 ML/MIN (H): ICD-10-CM

## 2024-07-26 PROCEDURE — 99214 OFFICE O/P EST MOD 30 MIN: CPT | Mod: 95 | Performed by: PHYSICIAN ASSISTANT

## 2024-07-26 RX ORDER — EPINEPHRINE 1 MG/ML
0.3 INJECTION, SOLUTION, CONCENTRATE INTRAVENOUS EVERY 5 MIN PRN
Status: CANCELLED | OUTPATIENT
Start: 2024-07-26

## 2024-07-26 RX ORDER — METHYLPREDNISOLONE SODIUM SUCCINATE 125 MG/2ML
125 INJECTION, POWDER, LYOPHILIZED, FOR SOLUTION INTRAMUSCULAR; INTRAVENOUS
Status: CANCELLED
Start: 2024-07-26

## 2024-07-26 RX ORDER — DIPHENHYDRAMINE HYDROCHLORIDE 50 MG/ML
50 INJECTION INTRAMUSCULAR; INTRAVENOUS
Status: CANCELLED
Start: 2024-07-26

## 2024-07-26 RX ORDER — ALBUTEROL SULFATE 90 UG/1
1-2 AEROSOL, METERED RESPIRATORY (INHALATION)
Status: CANCELLED
Start: 2024-07-26

## 2024-07-26 RX ORDER — ALBUTEROL SULFATE 0.83 MG/ML
2.5 SOLUTION RESPIRATORY (INHALATION)
Status: CANCELLED | OUTPATIENT
Start: 2024-07-26

## 2024-07-26 RX ORDER — HEPARIN SODIUM (PORCINE) LOCK FLUSH IV SOLN 100 UNIT/ML 100 UNIT/ML
5 SOLUTION INTRAVENOUS
Status: CANCELLED | OUTPATIENT
Start: 2024-07-26

## 2024-07-26 RX ORDER — HEPARIN SODIUM,PORCINE 10 UNIT/ML
5-20 VIAL (ML) INTRAVENOUS DAILY PRN
Status: CANCELLED | OUTPATIENT
Start: 2024-07-26

## 2024-07-26 RX ORDER — SODIUM BICARBONATE 650 MG/1
650 TABLET ORAL 2 TIMES DAILY
Qty: 180 TABLET | Refills: 3 | Status: SHIPPED | OUTPATIENT
Start: 2024-07-26 | End: 2024-08-02

## 2024-07-26 NOTE — TELEPHONE ENCOUNTER
Senait, MD Rolando Urias Yanli, MD; Lori Wu MD; Jaymie Clements MD; Selena Lee RN  Thanks.    Freddy,    With mixed rejection, but patient being so far out from transplant, would just recommend treating with Solumedrol 500 mg IV daily x 3 days.    We will also have to discuss likely adding back another immunosuppressant, which we can do the beginning of next week.    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 Thanks.        PLAN:  Give Solumedrol 500 mg IV daily x 3 days.      OUTCOME:  Called Reina Quan and discussed recommendations; answered questions.  Requested to get infusion at [facility].  Therapy plan placed.  Message sent to [facility] scheduling.

## 2024-07-26 NOTE — LETTER
7/26/2024       RE: Reina Quan  [address] [address]     Dear Colleague,    Thank you for referring your patient, Reina Quan, to the [facility] [place] at [facility]. Please see a copy of my visit note below.    Virtual Visit Details    Type of service:  Video Visit   Start time: 11:31 am  End time: 11:52 am  Originating Location (pt. Location): Home    Distant Location (provider location):  Off-site  Platform used for Video Visit: Karen    NEPHROLOGY Progress Note  7/26/2024    Assessment & Plan  # LDKT:  # CKD   Stable   - Baseline Creatinine:  ~ 1.5-1.8   - Proteinuria: Mild (0.5-1.0 grams)   - Date DSA Last Checked: Feb/2015      Latest DSA: No   - BK Viremia: Not checked recently due to time from transplant  - Kidney Tx Biopsy: Yes, completed yesterday 7/25/24-   results pending  - referred to CKD journey  - schedule kidney smart class, interested in home dialysis but may lean towards home hemo rather than PD due to restlessness and difficulty with sleeping  - avoid NSAIDS  - drink to thirst, follow low sodium diet        # Immunosuppression: Sirolimus (goal 4-6)   - Continue with intensive monitoring of immunosuppression for efficacy and toxicity.   - Changes: No   - management per transplant team    # Infection Prophylaxis:   - PJP: None  - management per transplant team    # Hypertension: Not checked recently;  Goal BP: < 130/80   - current BP regimen: amlodipine 5 mg daily   - BP's at home  was 123/79 last clinic visit   - Changes: No - check BP at home, keep log   - previously on lisinopril, may need to resume this.     # Elevated Blood Glucose: Glucose generally running ~ 110s-130s   - Management as per primary care.    # Anemia in Chronic Renal Disease: Hgb: Stable      BEVERLY: No   - Iron studies: Not checked recently    # Mineral Bone Disorder:   - Secondary renal hyperparathyroidism vs primary  hyperparathyroidism  - PTH level: Normal (18-80 pg/ml)        On treatment: None  - Vitamin D; level: Low        On supplement: No  - Calcium; level: High normal        On supplement: No  - Phosphorus; level: Normal        On supplement: No   - Ca was up to 11.0 on labs 3/4/21 with low Vit D, PTH of 66, and PTHrP of 4.4 (nl 0.0-3.4). Ca returned to normal range at 9.7 but back up to low 10's. Recommended pt complete age-appropriate cancer screening. She is scheduled for colonoscopy.    # Electrolytes:   - Potassium; level: Normal        On supplement: No  - Magnesium; level: Normal        On supplement: No  - Bicarbonate; level: Low        On supplement: start sodium bicarb     # Depression: on Paxil. Stable.    # Skin Cancer: New lesions: several. Following with Dermatology.   - Discussed sun protection and recommend regular follow up with Dermatology.    # COVID-19 Virus Review: Discussed COVID-19 virus and the potential medical risks.  Reviewed preventative health recommendations, which includes washing hands for 20 seconds, avoid touching your face, and social distancing.  Asked patient to inform the transplant center if they are exposed or diagnosed with this virus.    # COVID Vaccine Completed: No but she states that she will get the vaccine as well as a booster      # Medical Compliance: Yes    # Transplant History:  Etiology of Kidney Failure: Focal segmental glomerulosclerosis (FSGS)  Tx: LDKT  Transplant: 6/2/1992 (Kidney)  Significant changes in immunosuppression: None  Significant transplant-related complications: None    Assessment and plan was discussed with the patient and she voiced her understanding and agreement.      #Disposition: labs and follow up in 1 month.     Chief Complaint  Ms. Quan is a 59 year old here for kidney transplant and immunosuppression management.    History of Present Illness  Ms. 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     No  changes in her health since last telemedicine visit in 4/2020. She feels well other than ongoing fatigue, but this is unchanged from baseline and has been present for years. No fevers or chills. She has noticed some occasional nausea and vomiting. No diarrhea. She has some LE edema that improves overnight. She does not check her BP often at home.       Home BP: Not checked    Problem List  Patient Active Problem List   Diagnosis     Ulcerative colitis (H)     Migraine     Allergic rhinitis     Hearing loss     Kidney replaced by transplant     Lichen planus     Giant Platelet syndrome     Nephrotic syndrome in diseases classified elsewhere     Major depression in partial remission (H24)     Actinic keratosis     Stage 3b chronic kidney disease (H)     Vitiligo     History of SCC (squamous cell carcinoma) of skin     Lichen planopilaris     Hyperlipidemia with target LDL less than 130     AK (actinic keratosis)     History of nonmelanoma skin cancer     HTN, kidney transplant related     Immunosuppressed status (H24)     Neoplasm of unspecified behavior of bone, soft tissue, and skin     Porokeratosis     Aftercare following organ transplant     Dermatitis     Squamous cell carcinoma of right thigh     Hypercalcemia     Elevated serum creatinine       Allergies  Allergies   Allergen Reactions     Ampicillin Swelling     Swelling of mouth and tongue.      Seasonal Allergies Other (See Comments)     Itchy eyes and rhinitis.       Medications  Current Outpatient Medications   Medication Sig Dispense Refill     amLODIPine (NORVASC) 5 MG tablet Take 5 mg by mouth daily       amLODIPine (NORVASC) 5 MG tablet Take 1 tablet (5 mg) by mouth at bedtime for 90 days 90 tablet 3     atorvastatin (LIPITOR) 40 MG tablet Take 1 tablet (40 mg) by mouth at bedtime 90 tablet 3     cinacalcet (SENSIPAR) 30 MG tablet Take 30 mg by mouth daily       cinacalcet (SENSIPAR) 30 MG tablet Take 1 tablet (30 mg) by mouth daily for 90 days 90  tablet 2     clobetasol (TEMOVATE) 0.05 % external ointment Apply topically 2 times daily Use sparingly at active areas of dermatitis for no more than 14 days or until resolved, whichever is sooner. 60 g 3     clobetasol (TEMOVATE) 0.05 % external solution Apply topically daily as needed (itchyy scalp) Apply to scalp daily as needed. 50 mL 4     clobetasol propionate (CLOBEX) 0.05 % external shampoo APPLY TO SCALP IN SHOWER TWICE WEEKLY 118 mL 1     ketoconazole (NIZORAL) 2 % external shampoo APPLY TOPICALLY DAILY AS NEEDED FOR ITCHING, IRRITATION OR OTHER( SCALP SCALE) USE 2-3 TIMES A WEEK. APPLY AND LATHER THEN RINSE OUT 5 MINUTES 120 mL 9     mycophenolate (GENERIC EQUIVALENT) 250 MG capsule Take 2 capsules (500 mg) by mouth 2 times daily 360 capsule 3     niacinamide 500 MG tablet Take 1 tablet (500 mg) by mouth 2 times daily (with meals) 60 tablet 3     PARoxetine (PAXIL) 20 MG tablet TAKE 1 TABLET BY MOUTH EVERY MORNING 90 tablet 2     triamcinolone (KENALOG) 0.1 % external cream APPLY SPARINGLY TOPICALLY TO THE AFFECTED AREA TWICE DAILY FOR 14 DAYS 30 g 0     Current Facility-Administered Medications   Medication Dose Route Frequency Provider Last Rate Last Admin     triamcinolone acetonide (KENALOG-10) injection 10 mg  10 mg Intra-Lesional Once Brad Bauman MD         Facility-Administered Medications Ordered in Other Visits   Medication Dose Route Frequency Provider Last Rate Last Admin     lidocaine (LMX4) cream   Topical Q1H PRN Alyssia Ramirez APRN CNP         lidocaine 1 % 0.1-1 mL  0.1-1 mL Other Q1H PRN Alyssia Ramirez APRN CNP   1 mL at 07/25/24 1108     sodium chloride (PF) 0.9% PF flush 3 mL  3 mL Intracatheter Q8H Alyssia Ramirez APRN CNP   3 mL at 07/25/24 1021     sodium chloride (PF) 0.9% PF flush 3 mL  3 mL Intracatheter q1 min prn Alyssia Ramirez APRN CNP         sodium chloride 0.9 % infusion   Intravenous Continuous Alyssia Ramirez APRN CNP 75 mL/hr at 07/25/24  1021 New Bag at 07/25/24 1021     There are no discontinued medications.    Physical Exam  Vital Signs: LMP 05/14/2014 (Approximate)     Vital signs were deferred for this telemedicine visit.    GENERAL APPEARANCE: alert and no distress        Data        Latest Ref Rng & Units 7/25/2024    10:14 AM 7/24/2024    11:49 AM 7/15/2024     3:52 PM   Renal   Sodium 135 - 145 mmol/L 139  137  139    K 3.4 - 5.3 mmol/L 3.9  4.3  3.9    Cl 98 - 107 mmol/L 107  104  104    Cl (external) 98 - 107 mmol/L 107  104  104    CO2 22 - 29 mmol/L 19  18  23    Urea Nitrogen 8.0 - 23.0 mg/dL 58.4  57.3  47.4    Creatinine 0.51 - 0.95 mg/dL 3.54  3.52  3.64    Glucose 70 - 99 mg/dL 116  119  93    Calcium 8.8 - 10.4 mg/dL 10.5  10.6  10.4          Latest Ref Rng & Units 7/24/2024    11:49 AM 7/15/2024     3:52 PM 1/9/2024    10:50 AM   Bone Health   Phosphorus 2.5 - 4.5 mg/dL 4.8  5.0  3.2    Parathyroid Hormone Intact 15 - 65 pg/mL  22  34    Vit D Def 20 - 50 ng/mL 22  23           Latest Ref Rng & Units 7/25/2024    10:14 AM 7/24/2024    11:49 AM 7/15/2024     3:52 PM   Heme   WBC 4.0 - 11.0 10e3/uL 5.2  5.8  5.4    Hgb 11.7 - 15.7 g/dL 10.1  10.4  10.8    Plt 150 - 450 10e3/uL 139  135  132          Latest Ref Rng & Units 7/24/2024    11:49 AM 7/15/2024     3:52 PM 4/9/2024    10:37 AM   Liver   ALT 0 - 50 U/L   26    Albumin 3.5 - 5.2 g/dL 4.6  4.8           Latest Ref Rng & Units 1/17/2020     2:58 PM 3/13/2015    12:30 PM 6/20/2012     7:40 AM   Pancreas   A1C 0 - 5.6 % 5.7      Lipase 73 - 393 U/L  169  79          Latest Ref Rng & Units 5/30/2023    11:41 AM 1/17/2020     2:58 PM 3/5/2015    10:11 AM   Iron studies   Iron 37 - 145 ug/dL  37 - 145 ug/dL 84     84  61  83    Iron Saturation Index 15 - 46 %  21  29    Iron Sat Index 15 - 46 % 32      Ferritin 11 - 328 ng/mL 232  60  79          Latest Ref Rng & Units 10/17/2018     1:21 PM 2/19/2015     7:08 AM 2/17/2015     5:02 AM   [facility] Virology   CVM DNA Quant  Plasma  EDTA  PLASMA     CMV QUANT IU/ML CMVND^CMV DNA Not Detected [IU]/mL CMV DNA Not Detected  Canceled, Test credited   Quantity not sufficient  Notified Shanel LEMA @ 8408 on 2.19.15. JG  The MARIA ANTONIA AmpliPrep/MARIA ANTONIA TaqMan CMV Test is an FDA-approved in vitro nucleic   acid amplification test for the quantitation of cytomegalovirus DNA in human   plasma (EDTA plasma) using the MARIA ANTONIA AmpliPrep Instrument for automated viral   nucleic acid extraction and the MARIA ANTONIA TaqMan Analyzer or MARIA ANTONIA TaqMan for   automated Real Time amplification and detection of the viral nucleic acid   target.   Titer results are reported in International Units/mL (IU/mL using 1st WHO   International standard for Human Cytomegalovirus for Nucleic Acid Amplification   based assays. The conversion factor between CMV DNA copis/mL (as defined by the   Roche MARIA ANTONIA TaqMan CMV test) and International Units is the CMV DNA   concentration in IU/mL x 1.1 copies/IU = CMV DNA in copies/mL.   This assay has received FDA approval for the testing of human plasma only. The   Infectious Disease Diagnostic Laboratory at the [facility], [place], has validated the performance characteristics of the Roche   CMV assay for plasma, bronchial alveolar lavage/wash and urine.       LOG IU/ML OF CMVQNT <2.1 [Log_IU]/mL Not Calculated  Not Calculated     BK Spec    Plasma    BK Res BKNEG copies/mL   BK Virus DNA Not Detected    BK Log <2.7 Log copies/mL   Not Calculated   The Real-Time quantitative BK Virus assay was developed and its performance   characteristics determined by the Infectious Diseases Diagnostic Laboratory at   the [facility] in [place], [place]. The   primers and probes for each analyte are Analyte Specific Reagents (ASRs)   manufactured by [employer].   ASRs are used in many laboratory tests necessary for standard medical care and   generally do not require U.S. Food and Drug Administration approval. The  FDA   has determined that such clearance or approval is not necessary.   This test is used for clinical purposes. It should not be regarded as   investigational or for research. This laboratory is certified under the   Clinical Laboratory Improvement Amendments of 1988 (CLIA-88) as qualified to   perform high complexity clinical laboratory testing.      EBV DNA COPIES/ML EBVNEG^EBV DNA Not Detected [Copies]/mL EBV DNA Not Detected      EBV DNA LOG OF COPIES <2.7 [Log_copies]/mL Not Calculated        Failed to redirect to the Timeline version of the REVFS SmartLink.      Recent Labs   Lab Test 06/13/23  1152 09/22/23  1431 10/23/23  1444 04/09/24  1037   DOSMPA 6/12/2023  10:00 PM  --   --  4/8/2024  10:30 PM   MPACID 1.47 1.22 6.10* 2.79   MPAG 53.5 60.6 118.3* 58.9     Vniicio Henson PA-C    Visit length 21 minutes. An additional 15 minutes were spent on date of service in chart review, documentation, and other activities as noted.       Again, thank you for allowing me to participate in the care of your patient.      Sincerely,    VINICIO CAVAZOS PA-C

## 2024-07-26 NOTE — NURSING NOTE
Current patient location: [place] at [facility]    Is the patient currently in the state of [place]? YES    Visit mode:VIDEO    If the visit is dropped, the patient can be reconnected by: VIDEO VISIT: Send to e-mail at: [email or web address]    Will anyone else be joining the visit? NO  (If patient encounters technical issues they should call [phone]    How would you like to obtain your AVS? MyChart    Are changes needed to the allergy or medication list? No    Are refills needed on medications prescribed by this physician? NO    Reason for visit: RECHNANCY BAL

## 2024-07-26 NOTE — TELEPHONE ENCOUNTER
Dialysis Access Care Coordination: Initial Outreach    REASON FOR CALL:     REASON FOR CALL: Clinic Care Coordination - Initial (Dialysis Access - Referral)                                  SITUATION/BACKROUND:   Patient is being referred for dialysis access consult by SAM Raya requesting a fistula vs PD catheter consult. Patient is CKD Stage 5 secondary to Chronic Glomerulonephritis .    Patient had a kidney transplant 6/2/1992 and has been referred for another transplant.      All Transplant Episodes    Kidney Transplant Referral - 6/28/2024    Status: Active, Scheduled for Evaluation on 7/9/2024   Coordinator: Lanette Ortiz RN     Kidney Transplant - 6/2/1992  (#1)    Status: Active Follow-up on 6/2/1992   Coordinator: Selena Lee RN       Neph Tracking Flowsheet Last Filled Values       Patient's Referral Dates Auto Populate Patient's Referral Dates    Specialty Care Coordination Referral - Dialysis 7/26/2024    Journey Referral 7/18/24    Vein Mapping/US Order 7/26/24    Access Surgeon Referral Status  Referred    Transplant Evaluation Referral 6/21/24          Dialysis History    No dialysis history on file.         ASSESSMENT:       Past Surgical History:   Procedure Laterality Date    BIOPSY OF SKIN LESION      CHOLECYSTECTOMY      DILATION AND CURETTAGE, OPERATIVE HYSTEROSCOPY WITH MORCELLATOR, COMBINED N/A 11/10/2015    Procedure: COMBINED DILATION AND CURETTAGE, OPERATIVE HYSTEROSCOPY WITH MORCELLATOR;  Surgeon: Ghada Meelndez MD;  Location: [facility] OR    ESOPHAGOSCOPY, GASTROSCOPY, DUODENOSCOPY (EGD), COMBINED  11/20/2012    Procedure: COMBINED ESOPHAGOSCOPY, GASTROSCOPY, DUODENOSCOPY (EGD), BIOPSY SINGLE OR MULTIPLE;;  Surgeon: Valentin Hahn MD;  Location: [facility] GI    IR RENAL BIOPSY RIGHT  7/25/2024    MOHS MICROGRAPHIC PROCEDURE      TRANSPLANT      kidney    ZZC NONSPECIFIC PROCEDURE      Renal transplant right side    ZZC NONSPECIFIC PROCEDURE      cholecystectomy     Other  dx:  Giant platelet syndrome  Ulcerative colitis    Immunosuppression:  Mycophenolate    Anticoagulation:  none     Antiplatelet:  none     Recent Labs:  Lab Results   Component Value Date    CR 3.54 07/25/2024    CR 3.52 07/24/2024    CR 3.64 07/15/2024    CR 1.79 06/28/2021    CR 1.70 03/04/2021    CR 2.01 02/15/2021    Lab Results   Component Value Date    GFRESTIMATED 14 07/25/2024    GFRESTIMATED 14 07/24/2024    GFRESTIMATED 14 07/15/2024    GFRESTIMATED 31 06/28/2021    GFRESTIMATED 33 03/04/2021    GFRESTIMATED 27 02/15/2021        Lab Results   Component Value Date    BUN 58.4 07/25/2024    BUN 57.3 07/24/2024    BUN 47.4 07/15/2024    BUN 27 12/01/2022    BUN 29 07/04/2022    BUN 33 05/23/2022    BUN 25 06/28/2021    BUN 42 03/04/2021    BUN 42 02/15/2021    Lab Results   Component Value Date    ALBUMIN 4.6 07/24/2024    ALBUMIN 4.8 07/15/2024    ALBUMIN 4.5 05/09/2023    ALBUMIN 4.1 01/31/2022    ALBUMIN 3.7 10/18/2021    ALBUMIN 3.7 02/15/2021    ALBUMIN 3.9 06/05/2020    ALBUMIN 3.8 06/14/2019        Lab Results   Component Value Date    A1C 5.7 01/17/2020         Dialysis Access Assessments:  No assessment indicated    PLAN:     Future Appts Next 180 days       Visit Type Date Time Department    RETURN NEPHROLOGY 7/26/2024 11:30 AM [facility]    K/P TXP CLASS 7/29/2024  8:00 AM [facility] SOT    CT CHEST ABDOMEN PELVIS WO 7/29/2024  5:30 PM [facility] CT    SOT EVAL NURSE ONLY 10/7/2024  7:30 AM [facility]    SOT EVAL NUTRITION 10/7/2024  8:45 AM [facility]    SOT EVAL K/P SHAE 10/7/2024  9:15 AM [facility]    SOT EVAL K/P SURG 10/7/2024 10:00 AM [facility] SOT    SOT EVAL SOCIAL WORK 10/7/2024 10:30 AM [facility] S[facility]    SOT EVAL CARE COORDINATOR 10/7/2024 11:00 AM [facility] SOT    XR CHEST 2 VIEWS 10/7/2024 12:55 PM [facility] XRAY            Follow Up:  Ultrasound ordered: bilateral vein mapping  Request sent to schedule fistula vs PD catheter consult with Dr. Walton.    Patricia Haddad, RN  Dialysis Access Care Coordinator  Phone:  [phone]  Pool: P_Dialysis_Access_Nurse

## 2024-07-26 NOTE — PATIENT INSTRUCTIONS
Start sodium bicarb 650 mg BID  Refer to dialysis access surgeon for consult.   Schedule kidney smart class.   Start monthly labs.   Hydrate to thirst, follow low sodium diet.   Avoid ibuprofen/aleve/advil  Check blood pressure daily, keep log  Follow up in 4 weeks.

## 2024-07-27 LAB — BACTERIA UR CULT: NORMAL

## 2024-07-29 ENCOUNTER — ANCILLARY PROCEDURE (OUTPATIENT)
Dept: CT IMAGING | Facility: CLINIC | Age: 59
End: 2024-07-29
Attending: INTERNAL MEDICINE
Payer: COMMERCIAL

## 2024-07-29 ENCOUNTER — INFUSION THERAPY VISIT (OUTPATIENT)
Dept: INFUSION THERAPY | Facility: CLINIC | Age: 59
End: 2024-07-29
Attending: INTERNAL MEDICINE
Payer: COMMERCIAL

## 2024-07-29 ENCOUNTER — VIRTUAL VISIT (OUTPATIENT)
Dept: TRANSPLANT | Facility: CLINIC | Age: 59
End: 2024-07-29
Payer: COMMERCIAL

## 2024-07-29 VITALS
OXYGEN SATURATION: 94 % | HEART RATE: 65 BPM | DIASTOLIC BLOOD PRESSURE: 72 MMHG | TEMPERATURE: 97.9 F | RESPIRATION RATE: 16 BRPM | SYSTOLIC BLOOD PRESSURE: 110 MMHG

## 2024-07-29 DIAGNOSIS — Z94.0 IMMUNOSUPPRESSIVE MANAGEMENT ENCOUNTER FOLLOWING KIDNEY TRANSPLANT: ICD-10-CM

## 2024-07-29 DIAGNOSIS — Z76.82 ORGAN TRANSPLANT CANDIDATE: ICD-10-CM

## 2024-07-29 DIAGNOSIS — N18.4 CHRONIC KIDNEY DISEASE, STAGE IV (SEVERE) (H): ICD-10-CM

## 2024-07-29 DIAGNOSIS — Z79.899 IMMUNOSUPPRESSIVE MANAGEMENT ENCOUNTER FOLLOWING KIDNEY TRANSPLANT: ICD-10-CM

## 2024-07-29 DIAGNOSIS — J92.9 PLEURAL THICKENING: ICD-10-CM

## 2024-07-29 DIAGNOSIS — T86.91 TRANSPLANT FAILURE DUE TO REJECTION: ICD-10-CM

## 2024-07-29 DIAGNOSIS — Z94.0 KIDNEY REPLACED BY TRANSPLANT: ICD-10-CM

## 2024-07-29 DIAGNOSIS — T86.11 KIDNEY TRANSPLANT REJECTION: Primary | ICD-10-CM

## 2024-07-29 DIAGNOSIS — R91.1 LUNG NODULE: ICD-10-CM

## 2024-07-29 DIAGNOSIS — Z94.0 KIDNEY TRANSPLANTED: Primary | ICD-10-CM

## 2024-07-29 DIAGNOSIS — N18.5 CHRONIC KIDNEY DISEASE, STAGE V (H): ICD-10-CM

## 2024-07-29 DIAGNOSIS — Z48.298 AFTERCARE FOLLOWING ORGAN TRANSPLANT: ICD-10-CM

## 2024-07-29 DIAGNOSIS — Z85.828 HISTORY OF SKIN CANCER: ICD-10-CM

## 2024-07-29 DIAGNOSIS — R79.89 ELEVATED SERUM CREATININE: ICD-10-CM

## 2024-07-29 DIAGNOSIS — Z76.82 ORGAN TRANSPLANT CANDIDATE: Primary | ICD-10-CM

## 2024-07-29 DIAGNOSIS — D84.9 IMMUNOSUPPRESSED STATUS (H): ICD-10-CM

## 2024-07-29 DIAGNOSIS — T86.10 COMPLICATIONS, KIDNEY TRANSPLANT: ICD-10-CM

## 2024-07-29 DIAGNOSIS — T86.12 KIDNEY TRANSPLANT FAILURE: ICD-10-CM

## 2024-07-29 DIAGNOSIS — R05.3 CHRONIC COUGH: ICD-10-CM

## 2024-07-29 LAB
ALBUMIN SERPL BCG-MCNC: 4.5 G/DL (ref 3.5–5.2)
ALP SERPL-CCNC: 86 U/L (ref 40–150)
ALT SERPL W P-5'-P-CCNC: 27 U/L (ref 0–50)
ANION GAP SERPL CALCULATED.3IONS-SCNC: 15 MMOL/L (ref 7–15)
AST SERPL W P-5'-P-CCNC: 28 U/L (ref 0–45)
BASOPHILS # BLD AUTO: 0 10E3/UL (ref 0–0.2)
BASOPHILS NFR BLD AUTO: 1 %
BILIRUB DIRECT SERPL-MCNC: <0.2 MG/DL (ref 0–0.3)
BILIRUB SERPL-MCNC: 0.3 MG/DL
BUN SERPL-MCNC: 57.1 MG/DL (ref 8–23)
CALCIUM SERPL-MCNC: 10.3 MG/DL (ref 8.8–10.4)
CHLORIDE SERPL-SCNC: 106 MMOL/L (ref 98–107)
CREAT SERPL-MCNC: 3.93 MG/DL (ref 0.51–0.95)
EGFRCR SERPLBLD CKD-EPI 2021: 12 ML/MIN/1.73M2
EOSINOPHIL # BLD AUTO: 0.3 10E3/UL (ref 0–0.7)
EOSINOPHIL NFR BLD AUTO: 5 %
ERYTHROCYTE [DISTWIDTH] IN BLOOD BY AUTOMATED COUNT: 13.8 % (ref 10–15)
GLUCOSE SERPL-MCNC: 190 MG/DL (ref 70–99)
HCO3 SERPL-SCNC: 16 MMOL/L (ref 22–29)
HCT VFR BLD AUTO: 30.7 % (ref 35–47)
HGB BLD-MCNC: 10 G/DL (ref 11.7–15.7)
IMM GRANULOCYTES # BLD: 0 10E3/UL
IMM GRANULOCYTES NFR BLD: 0 %
LYMPHOCYTES # BLD AUTO: 0.7 10E3/UL (ref 0.8–5.3)
LYMPHOCYTES NFR BLD AUTO: 10 %
MCH RBC QN AUTO: 30.8 PG (ref 26.5–33)
MCHC RBC AUTO-ENTMCNC: 32.6 G/DL (ref 31.5–36.5)
MCV RBC AUTO: 95 FL (ref 78–100)
MONOCYTES # BLD AUTO: 0.4 10E3/UL (ref 0–1.3)
MONOCYTES NFR BLD AUTO: 6 %
NEUTROPHILS # BLD AUTO: 4.8 10E3/UL (ref 1.6–8.3)
NEUTROPHILS NFR BLD AUTO: 78 %
NRBC # BLD AUTO: 0 10E3/UL
NRBC BLD AUTO-RTO: 0 /100
PLATELET # BLD AUTO: 146 10E3/UL (ref 150–450)
POTASSIUM SERPL-SCNC: 3.6 MMOL/L (ref 3.4–5.3)
PROT SERPL-MCNC: 7.5 G/DL (ref 6.4–8.3)
RBC # BLD AUTO: 3.25 10E6/UL (ref 3.8–5.2)
SODIUM SERPL-SCNC: 137 MMOL/L (ref 135–145)
WBC # BLD AUTO: 6.2 10E3/UL (ref 4–11)

## 2024-07-29 PROCEDURE — 71250 CT THORAX DX C-: CPT | Performed by: RADIOLOGY

## 2024-07-29 PROCEDURE — 74176 CT ABD & PELVIS W/O CONTRAST: CPT | Performed by: RADIOLOGY

## 2024-07-29 PROCEDURE — 80053 COMPREHEN METABOLIC PANEL: CPT | Performed by: INTERNAL MEDICINE

## 2024-07-29 PROCEDURE — 82248 BILIRUBIN DIRECT: CPT | Performed by: INTERNAL MEDICINE

## 2024-07-29 PROCEDURE — 258N000003 HC RX IP 258 OP 636: Performed by: INTERNAL MEDICINE

## 2024-07-29 PROCEDURE — 36415 COLL VENOUS BLD VENIPUNCTURE: CPT | Performed by: INTERNAL MEDICINE

## 2024-07-29 PROCEDURE — 250N000011 HC RX IP 250 OP 636: Performed by: INTERNAL MEDICINE

## 2024-07-29 PROCEDURE — 85025 COMPLETE CBC W/AUTO DIFF WBC: CPT | Performed by: INTERNAL MEDICINE

## 2024-07-29 PROCEDURE — 96365 THER/PROPH/DIAG IV INF INIT: CPT

## 2024-07-29 RX ORDER — ALBUTEROL SULFATE 0.83 MG/ML
2.5 SOLUTION RESPIRATORY (INHALATION)
Status: CANCELLED | OUTPATIENT
Start: 2024-07-30

## 2024-07-29 RX ORDER — HEPARIN SODIUM,PORCINE 10 UNIT/ML
5-20 VIAL (ML) INTRAVENOUS DAILY PRN
Status: CANCELLED | OUTPATIENT
Start: 2024-07-30

## 2024-07-29 RX ORDER — EPINEPHRINE 1 MG/ML
0.3 INJECTION, SOLUTION INTRAMUSCULAR; SUBCUTANEOUS EVERY 5 MIN PRN
Status: CANCELLED | OUTPATIENT
Start: 2024-07-30

## 2024-07-29 RX ORDER — HEPARIN SODIUM (PORCINE) LOCK FLUSH IV SOLN 100 UNIT/ML 100 UNIT/ML
5 SOLUTION INTRAVENOUS
Status: CANCELLED | OUTPATIENT
Start: 2024-07-30

## 2024-07-29 RX ORDER — METHYLPREDNISOLONE SODIUM SUCCINATE 125 MG/2ML
125 INJECTION, POWDER, LYOPHILIZED, FOR SOLUTION INTRAMUSCULAR; INTRAVENOUS
Status: CANCELLED
Start: 2024-07-30

## 2024-07-29 RX ORDER — DIPHENHYDRAMINE HYDROCHLORIDE 50 MG/ML
50 INJECTION INTRAMUSCULAR; INTRAVENOUS
Status: CANCELLED
Start: 2024-07-30

## 2024-07-29 RX ORDER — ALBUTEROL SULFATE 90 UG/1
1-2 AEROSOL, METERED RESPIRATORY (INHALATION)
Status: CANCELLED
Start: 2024-07-30

## 2024-07-29 RX ADMIN — SODIUM CHLORIDE 500 MG: 9 INJECTION, SOLUTION INTRAVENOUS at 11:33

## 2024-07-29 NOTE — PATIENT INSTRUCTIONS
Dear Reina Quan    Thank you for choosing [facility] ([facility]) for your infusion.  The following information is a summary of our appointment as well as important reminders.      If you are a transplant patient and require transplant education, please click on this link: [email or web address].    We look forward in seeing you on your next appointment here at [facility] ([facility]).  Please don t hesitate to call us at [phone] to reschedule any of your appointments or to speak with one of the [facility] registered nurses.  It was a pleasure taking care of you today.    Sincerely,    [facility]  [address] [place] [address]  [address]  Phone:  [phone]

## 2024-07-29 NOTE — PROGRESS NOTES
Infusion Nursing Note:  Reina Quan presents today for Solu-medrol, first dose, dose 1 of 3.    Patient seen by provider today: No   present during visit today: Not Applicable.    Note:   Infusion over 30 minutes.    Intravenous Access:  Labs drawn without difficulty.  Peripheral IV placed.    Treatment Conditions:  Not Applicable.    Post Infusion Assessment:  Patient tolerated infusion without incident.  Patient observed for 20 minutes post infusion per first dose protocol.  Blood return noted pre and post infusion.  Site patent and intact, free from redness, edema or discomfort.  No evidence of extravasations.  Access discontinued per protocol.     Discharge Plan:   Patient and/or family verbalized understanding of discharge instructions and all questions answered.  AVS to patient via TradyoHART.  Patient will return tomorrow for next appointment.   Patient discharged in stable condition accompanied by: self.  Departure Mode: Ambulatory.    Administrations This Visit       methylPREDNISolone sodium succinate (solu-MEDROL) 500 mg in sodium chloride 0.9 % 114 mL intermittent infusion       Admin Date  07/29/2024 Action  $New Bag Dose  500 mg Rate  228 mL/hr Route  Intravenous Documented By  Sadie De La Cruz RN                  /72   Temp 97.9  F (36.6  C) (Oral)   Resp 16   LMP 05/14/2014 (Approximate)   SpO2 94%     Sadie De La Cruz RN

## 2024-07-29 NOTE — GROUP NOTE
Date: 7/29/2024    Scheduled Start Time:  8:00 AM   Scheduled End Time:  9:20 AM    Department: [facility]    Facilitator(s): Lanette Ortiz RN    Documentation:  Solid Organ Transplant EducationSolid Organ Transplant Education    Patient attended the pre-transplant patient education class today. The [facility] website pre-transplant modules were viewed:     Content reviewed:  Living Donation and how to access that program  Paired exchange  Kidney Donor Profile Index (KDPI)  Waiting list issues (right to decline without penalty, high PHS risk donors, what to expect when called with an offer)  Hospital experience, length of stay, need to stay locally post-discharge (2-4 weeks)    Surgical complications with video                      Post-surgery lifting and driving restrictions  Post-transplant routines, frequency of lab work and clinic visits  Role of Transplant Coordinator    Participants were informed of the benefits of transplant as well as potential risks such as infection, cancer, and death. The need for total adherence with immunosuppression medications and following transplant regimens was stressed.  The overall evaluation/approval/listing process was reviewed.    Patient was very attentive and engaged in class today.      Patient:   Reina Quan    Transplant Episode(s):  Kidney Transplant Referral - 6/28/2024

## 2024-07-30 ENCOUNTER — INFUSION THERAPY VISIT (OUTPATIENT)
Dept: INFUSION THERAPY | Facility: CLINIC | Age: 59
End: 2024-07-30
Attending: INTERNAL MEDICINE
Payer: COMMERCIAL

## 2024-07-30 ENCOUNTER — TELEPHONE (OUTPATIENT)
Dept: TRANSPLANT | Facility: CLINIC | Age: 59
End: 2024-07-30

## 2024-07-30 VITALS
SYSTOLIC BLOOD PRESSURE: 125 MMHG | RESPIRATION RATE: 16 BRPM | TEMPERATURE: 97.3 F | HEART RATE: 71 BPM | DIASTOLIC BLOOD PRESSURE: 77 MMHG | OXYGEN SATURATION: 97 %

## 2024-07-30 DIAGNOSIS — Z94.0 KIDNEY REPLACED BY TRANSPLANT: ICD-10-CM

## 2024-07-30 DIAGNOSIS — Z94.0 IMMUNOSUPPRESSIVE MANAGEMENT ENCOUNTER FOLLOWING KIDNEY TRANSPLANT: ICD-10-CM

## 2024-07-30 DIAGNOSIS — R79.89 ELEVATED SERUM CREATININE: ICD-10-CM

## 2024-07-30 DIAGNOSIS — T86.10 COMPLICATIONS, KIDNEY TRANSPLANT: ICD-10-CM

## 2024-07-30 DIAGNOSIS — T86.11 KIDNEY TRANSPLANT REJECTION: Primary | ICD-10-CM

## 2024-07-30 DIAGNOSIS — Z94.0 KIDNEY TRANSPLANTED: ICD-10-CM

## 2024-07-30 DIAGNOSIS — Z48.298 AFTERCARE FOLLOWING ORGAN TRANSPLANT: ICD-10-CM

## 2024-07-30 DIAGNOSIS — Z79.899 IMMUNOSUPPRESSIVE MANAGEMENT ENCOUNTER FOLLOWING KIDNEY TRANSPLANT: ICD-10-CM

## 2024-07-30 DIAGNOSIS — R80.9 PROTEINURIA: ICD-10-CM

## 2024-07-30 LAB — GLUCOSE BLDC GLUCOMTR-MCNC: 170 MG/DL (ref 70–99)

## 2024-07-30 PROCEDURE — 250N000011 HC RX IP 250 OP 636: Performed by: INTERNAL MEDICINE

## 2024-07-30 PROCEDURE — 258N000003 HC RX IP 258 OP 636: Performed by: INTERNAL MEDICINE

## 2024-07-30 PROCEDURE — 82962 GLUCOSE BLOOD TEST: CPT

## 2024-07-30 PROCEDURE — 96365 THER/PROPH/DIAG IV INF INIT: CPT

## 2024-07-30 RX ORDER — HEPARIN SODIUM,PORCINE 10 UNIT/ML
5-20 VIAL (ML) INTRAVENOUS DAILY PRN
Status: CANCELLED | OUTPATIENT
Start: 2024-07-31

## 2024-07-30 RX ORDER — DIPHENHYDRAMINE HYDROCHLORIDE 50 MG/ML
50 INJECTION INTRAMUSCULAR; INTRAVENOUS
Status: CANCELLED
Start: 2024-07-31

## 2024-07-30 RX ORDER — PREDNISONE 5 MG/1
5 TABLET ORAL DAILY
Qty: 90 TABLET | Refills: 3 | Status: SHIPPED | OUTPATIENT
Start: 2024-08-07

## 2024-07-30 RX ORDER — PREDNISONE 20 MG/1
TABLET ORAL
Qty: 14 TABLET | Refills: 0 | Status: SHIPPED | OUTPATIENT
Start: 2024-08-01 | End: 2024-09-27

## 2024-07-30 RX ORDER — METHYLPREDNISOLONE SODIUM SUCCINATE 125 MG/2ML
125 INJECTION, POWDER, LYOPHILIZED, FOR SOLUTION INTRAMUSCULAR; INTRAVENOUS
Status: CANCELLED
Start: 2024-07-31

## 2024-07-30 RX ORDER — ALBUTEROL SULFATE 0.83 MG/ML
2.5 SOLUTION RESPIRATORY (INHALATION)
Status: CANCELLED | OUTPATIENT
Start: 2024-07-31

## 2024-07-30 RX ORDER — HEPARIN SODIUM (PORCINE) LOCK FLUSH IV SOLN 100 UNIT/ML 100 UNIT/ML
5 SOLUTION INTRAVENOUS
Status: CANCELLED | OUTPATIENT
Start: 2024-07-31

## 2024-07-30 RX ORDER — MYCOPHENOLATE MOFETIL 250 MG/1
750 CAPSULE ORAL 2 TIMES DAILY
Qty: 540 CAPSULE | Refills: 3 | Status: SHIPPED | OUTPATIENT
Start: 2024-07-30

## 2024-07-30 RX ORDER — EPINEPHRINE 1 MG/ML
0.3 INJECTION, SOLUTION INTRAMUSCULAR; SUBCUTANEOUS EVERY 5 MIN PRN
Status: CANCELLED | OUTPATIENT
Start: 2024-07-31

## 2024-07-30 RX ORDER — ALBUTEROL SULFATE 90 UG/1
1-2 AEROSOL, METERED RESPIRATORY (INHALATION)
Status: CANCELLED
Start: 2024-07-31

## 2024-07-30 RX ADMIN — SODIUM CHLORIDE 500 MG: 9 INJECTION, SOLUTION INTRAVENOUS at 08:38

## 2024-07-30 NOTE — PROGRESS NOTES
"Chief Complaint   Patient presents with    Infusion     IV Solu-Medrol     Infusion Nursing Note:  Reina Quan presents today for IV Solu-Medrol dose 2/3.    Patient seen by provider today: No   present during visit today: Not Applicable.    Note: Patient reports Solu-Medrol is greatly affecting her sleep, unable to sleep at all last night. Staff message sent to ordering provider for recommendations.     Solu-Medrol infused over 30 minutes.    Intravenous Access:  Peripheral IV placed.    Treatment Conditions:  Glucose 170.      Post Infusion Assessment:  Patient tolerated infusion without incident.  Blood return noted pre and post infusion.  Site patent and intact, free from redness, edema or discomfort.  No evidence of extravasations.  Access discontinued per protocol.       Discharge Plan:   AVS to patient via MYCHART.  Patient will return 7/31 for next appointment.   Patient discharged in stable condition accompanied by: self.  Departure Mode: Ambulatory.    Administrations This Visit       methylPREDNISolone sodium succinate (solu-MEDROL) 500 mg in sodium chloride 0.9 % 114 mL intermittent infusion       Admin Date  07/30/2024 Action  $New Bag Dose  500 mg Rate  228 mL/hr Route  Intravenous Documented By  Khris Davis, RN                  Vital signs:  Temp: 97.3  F (36.3  C) Temp src: Oral BP: 125/77 Pulse: 71   Resp: 16 SpO2: 97 % O2 Device: None (Room air)        Estimated body mass index is 26.52 kg/m  as calculated from the following:    Height as of 7/2/24: 1.575 m (5' 2\").    Weight as of 7/25/24: 65.8 kg (145 lb).          "

## 2024-07-30 NOTE — TELEPHONE ENCOUNTER
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    Clayton Elmore MD Barnes, Nikayla, RK; Selena Lee RN  Not for short term since steroids are done in a day.  She could try over-the-counter benadryl.      ----- Message -----  From: Khris Davis, RK  Sent: 7/30/2024   8:30 AM CDT  To: Clayton Sapp MD; Selena Lee RN  Subject: No sleep :(                                      Hi team,    I have patient here for day 2 solu-medrol. She didn't sleep a wink last night from the steroids. Any recommendations?        PLAN:  Increase MMF to 750 mg BID.  Start taking Prednisone.    Pred Taper as follows:  Prednisone 20 mg (Total: 14 tablets)  2 tabs (40 mg) BID x 2 days  2 tabs (40 mg) daily x 2 days  1 tab (20 mg) daily x 2 days  Then,   Maintenance dose: Pred 5 mg daily    States she did take prednisone 10 years ago 5 mg every other day.    Requested prescription sent to:  [id number] [place], [place] - [address] AT [facility] [address]      Discussed taking Benadryl short term for insomnia.  She usually takes melatonin and that usually works for her.  Rechecking transplant labs in 1-2 weeks.  Verbalized understanding and agreement to plan.    States she also needs to complete labs for Pre-Transplant Provider every 2 weeks.  No order in Epic. States plan was discussed by Latricia Henson. --- staff message sent.

## 2024-07-31 ENCOUNTER — INFUSION THERAPY VISIT (OUTPATIENT)
Dept: INFUSION THERAPY | Facility: CLINIC | Age: 59
End: 2024-07-31
Attending: INTERNAL MEDICINE
Payer: COMMERCIAL

## 2024-07-31 VITALS
HEART RATE: 66 BPM | OXYGEN SATURATION: 97 % | DIASTOLIC BLOOD PRESSURE: 67 MMHG | SYSTOLIC BLOOD PRESSURE: 118 MMHG | TEMPERATURE: 98.2 F | RESPIRATION RATE: 16 BRPM

## 2024-07-31 DIAGNOSIS — R79.89 ELEVATED SERUM CREATININE: ICD-10-CM

## 2024-07-31 DIAGNOSIS — T86.10 COMPLICATIONS, KIDNEY TRANSPLANT: ICD-10-CM

## 2024-07-31 DIAGNOSIS — T86.11 KIDNEY TRANSPLANT REJECTION: ICD-10-CM

## 2024-07-31 DIAGNOSIS — Z94.0 KIDNEY TRANSPLANTED: Primary | ICD-10-CM

## 2024-07-31 DIAGNOSIS — Z94.0 KIDNEY REPLACED BY TRANSPLANT: ICD-10-CM

## 2024-07-31 DIAGNOSIS — T86.10 COMPLICATION OF TRANSPLANTED KIDNEY, UNSPECIFIED COMPLICATION: Primary | ICD-10-CM

## 2024-07-31 DIAGNOSIS — Z48.298 AFTERCARE FOLLOWING ORGAN TRANSPLANT: ICD-10-CM

## 2024-07-31 LAB
ANION GAP SERPL CALCULATED.3IONS-SCNC: 16 MMOL/L (ref 7–15)
BASOPHILS # BLD AUTO: 0 10E3/UL (ref 0–0.2)
BASOPHILS NFR BLD AUTO: 0 %
BUN SERPL-MCNC: 68.4 MG/DL (ref 8–23)
CALCIUM SERPL-MCNC: 9.3 MG/DL (ref 8.8–10.4)
CHLORIDE SERPL-SCNC: 106 MMOL/L (ref 98–107)
CREAT SERPL-MCNC: 3.82 MG/DL (ref 0.51–0.95)
EGFRCR SERPLBLD CKD-EPI 2021: 13 ML/MIN/1.73M2
EOSINOPHIL # BLD AUTO: 0 10E3/UL (ref 0–0.7)
EOSINOPHIL NFR BLD AUTO: 0 %
ERYTHROCYTE [DISTWIDTH] IN BLOOD BY AUTOMATED COUNT: 14.2 % (ref 10–15)
GLUCOSE SERPL-MCNC: 233 MG/DL (ref 70–99)
HCO3 SERPL-SCNC: 14 MMOL/L (ref 22–29)
HCT VFR BLD AUTO: 27.9 % (ref 35–47)
HGB BLD-MCNC: 9.1 G/DL (ref 11.7–15.7)
IMM GRANULOCYTES # BLD: 0.1 10E3/UL
IMM GRANULOCYTES NFR BLD: 1 %
LYMPHOCYTES # BLD AUTO: 0.3 10E3/UL (ref 0.8–5.3)
LYMPHOCYTES NFR BLD AUTO: 3 %
MCH RBC QN AUTO: 30.8 PG (ref 26.5–33)
MCHC RBC AUTO-ENTMCNC: 32.6 G/DL (ref 31.5–36.5)
MCV RBC AUTO: 95 FL (ref 78–100)
MONOCYTES # BLD AUTO: 0.5 10E3/UL (ref 0–1.3)
MONOCYTES NFR BLD AUTO: 4 %
NEUTROPHILS # BLD AUTO: 9.8 10E3/UL (ref 1.6–8.3)
NEUTROPHILS NFR BLD AUTO: 92 %
NRBC # BLD AUTO: 0 10E3/UL
NRBC BLD AUTO-RTO: 0 /100
PLATELET # BLD AUTO: 147 10E3/UL (ref 150–450)
POTASSIUM SERPL-SCNC: 3.5 MMOL/L (ref 3.4–5.3)
RBC # BLD AUTO: 2.95 10E6/UL (ref 3.8–5.2)
SODIUM SERPL-SCNC: 136 MMOL/L (ref 135–145)
WBC # BLD AUTO: 10.7 10E3/UL (ref 4–11)

## 2024-07-31 PROCEDURE — 258N000003 HC RX IP 258 OP 636: Performed by: INTERNAL MEDICINE

## 2024-07-31 PROCEDURE — 96365 THER/PROPH/DIAG IV INF INIT: CPT

## 2024-07-31 PROCEDURE — 36415 COLL VENOUS BLD VENIPUNCTURE: CPT | Performed by: INTERNAL MEDICINE

## 2024-07-31 PROCEDURE — 85025 COMPLETE CBC W/AUTO DIFF WBC: CPT | Performed by: INTERNAL MEDICINE

## 2024-07-31 PROCEDURE — 250N000011 HC RX IP 250 OP 636: Performed by: INTERNAL MEDICINE

## 2024-07-31 PROCEDURE — 82374 ASSAY BLOOD CARBON DIOXIDE: CPT | Performed by: INTERNAL MEDICINE

## 2024-07-31 RX ORDER — METHYLPREDNISOLONE SODIUM SUCCINATE 125 MG/2ML
125 INJECTION, POWDER, LYOPHILIZED, FOR SOLUTION INTRAMUSCULAR; INTRAVENOUS
Start: 2024-07-31

## 2024-07-31 RX ORDER — HEPARIN SODIUM (PORCINE) LOCK FLUSH IV SOLN 100 UNIT/ML 100 UNIT/ML
5 SOLUTION INTRAVENOUS
OUTPATIENT
Start: 2024-07-31

## 2024-07-31 RX ORDER — ALBUTEROL SULFATE 90 UG/1
1-2 AEROSOL, METERED RESPIRATORY (INHALATION)
Start: 2024-07-31

## 2024-07-31 RX ORDER — HEPARIN SODIUM,PORCINE 10 UNIT/ML
5-20 VIAL (ML) INTRAVENOUS DAILY PRN
OUTPATIENT
Start: 2024-07-31

## 2024-07-31 RX ORDER — EPINEPHRINE 1 MG/ML
0.3 INJECTION, SOLUTION INTRAMUSCULAR; SUBCUTANEOUS EVERY 5 MIN PRN
OUTPATIENT
Start: 2024-07-31

## 2024-07-31 RX ORDER — DIPHENHYDRAMINE HYDROCHLORIDE 50 MG/ML
50 INJECTION INTRAMUSCULAR; INTRAVENOUS
Start: 2024-07-31

## 2024-07-31 RX ORDER — ALBUTEROL SULFATE 0.83 MG/ML
2.5 SOLUTION RESPIRATORY (INHALATION)
OUTPATIENT
Start: 2024-07-31

## 2024-07-31 RX ADMIN — SODIUM CHLORIDE 500 MG: 9 INJECTION, SOLUTION INTRAVENOUS at 16:27

## 2024-07-31 NOTE — PROGRESS NOTES
Infusion Nursing Note:  Reina Quan presents today for Solumedrol.    Patient seen by provider today: No   present during visit today: Not Applicable.    Note: Pt received IV Solumedrol over an hour without incident.    Glucose in basic metabolic panel noted to be 233.  Dr. Sapp notified per order.    Intravenous Access:  Labs drawn without difficulty.  Peripheral IV placed.    Treatment Conditions:  Not Applicable.      Post Infusion Assessment:  Patient tolerated infusion without incident.  Blood return noted pre and post infusion.  Site patent and intact, free from redness, edema or discomfort.  No evidence of extravasations.  Access discontinued per protocol.       Discharge Plan:   Discharge instructions reviewed with: Patient.  Patient and/or family verbalized understanding of discharge instructions and all questions answered.  AVS to patient via [employer].  Patient will return per MD (therapy plan complete) for next appointment.   Patient discharged in stable condition accompanied by: self.  Departure Mode: Ambulatory.      Patti Medrano RN

## 2024-07-31 NOTE — PATIENT INSTRUCTIONS
Dear Reina Quan    Thank you for choosing [facility] ([facility] for your infusion.  The following information is a summary of our appointment as well as important reminders.        If you are a transplant patient and require transplant education, please click on this link: [email or web address].    If you have any questions on your upcoming Specialty Infusion appointments, please call scheduling at [phone].  It was a pleasure taking care of you today.    Sincerely,    [facility]  [facility]  [address] [place] [address]  [address]  Phone:  [phone]

## 2024-08-02 ENCOUNTER — TELEPHONE (OUTPATIENT)
Dept: TRANSPLANT | Facility: CLINIC | Age: 59
End: 2024-08-02
Payer: COMMERCIAL

## 2024-08-02 DIAGNOSIS — E87.20 METABOLIC ACIDOSIS: ICD-10-CM

## 2024-08-02 RX ORDER — SODIUM BICARBONATE 650 MG/1
1300 TABLET ORAL 2 TIMES DAILY
Qty: 360 TABLET | Refills: 3 | Status: SHIPPED | OUTPATIENT
Start: 2024-08-02

## 2024-08-02 RX ORDER — POTASSIUM CHLORIDE 750 MG/1
10 TABLET, EXTENDED RELEASE ORAL DAILY
Qty: 7 TABLET | Refills: 0 | Status: SHIPPED | OUTPATIENT
Start: 2024-08-02 | End: 2024-08-09

## 2024-08-02 NOTE — TELEPHONE ENCOUNTER
7/31/24 labs:  K = 3.5  CO2 = 14,  7/29/24 = 16    Clayton Sapp MD Ututalum, Teresa, RN  Stable to slight improvement in kidney function.  Low normal serum potassium level and low bicarbonate level.  Recommend starting potassium chloride 10 meq daily x 7 days and increasing sodium bicarbonate to 1300 mg twice daily.      7/26/24 - Started sodium bicarbonate 650 mg BID      PLAN:  Discuss Dr Sapp's recommendations:  Potassium chloride 10 mEq daily x 7 days  Confirm taking supplement regularly then, increase sodium bicarbonate to 1300 mg BID      OUTCOME:  Left detailed message.  Prescription sent to [facility].

## 2024-08-05 LAB
A*: NORMAL
A*LOCUS SEROLOGIC EQUIVALENT: 2
A*LOCUS: NORMAL
A*SEROLOGIC EQUIVALENT: 29
ABTEST METHOD: NORMAL
B*: NORMAL
B*LOCUS SEROLOGIC EQUIVALENT: 44
B*LOCUS: NORMAL
B*SEROLOGIC EQUIVALENT: 56
BW-1: NORMAL
BW-2: NORMAL
C*: NORMAL
C*LOCUS SEROLOGIC EQUIVALENT: 1
C*LOCUS: NORMAL
C*SEROLOGIC EQUIVALENT: 16
DONOR IDENTIFICATION: NORMAL
DPA1*: NORMAL
DPB1*: NORMAL
DPB1*LOCUS: NORMAL
DQA1*: NORMAL
DQA1*LOCUS: NORMAL
DQB1*: NORMAL
DQB1*LOCUS SEROLOGIC EQUIVALENT: 2
DQB1*LOCUS: NORMAL
DQB1*SEROLOGIC EQUIVALENT: 7
DRB1*: NORMAL
DRB1*LOCUS SEROLOGIC EQUIVALENT: 7
DRB1*LOCUS: NORMAL
DRB1*SEROLOGIC EQUIVALENT: 11
DRB3*LOCUS SEROLOGIC EQUIVALENT: 52
DRB3*LOCUS: NORMAL
DRB4*: NORMAL
DRB4*SEROLOGIC EQUIVALENT: 53
DRSSO TEST METHOD: NORMAL
DSA COMMENTS: NORMAL
DSA PRESENT: NORMAL
DSA TEST METHOD: NORMAL
ORGAN: NORMAL
SA 1  COMMENTS: NORMAL
SA 1 CELL: NORMAL
SA 1 TEST METHOD: NORMAL
SA 2 CELL: NORMAL
SA 2 COMMENTS: NORMAL
SA 2 TEST METHOD: NORMAL
SA1 HI RISK ABY: NORMAL
SA1 MOD RISK ABY: NORMAL
SA2 HI RISK ABY: NORMAL
SA2 MOD RISK ABY: NORMAL
UNACCEPTABLE ANTIGENS: NORMAL
UNOS CPRA: 7

## 2024-08-06 ENCOUNTER — TELEPHONE (OUTPATIENT)
Dept: DERMATOLOGY | Facility: CLINIC | Age: 59
End: 2024-08-06

## 2024-08-06 NOTE — TELEPHONE ENCOUNTER
"----- Message from Kristina CARY sent at 8/6/2024  8:38 AM CDT -----  Regarding: FW: sooner appointment    ----- Message -----  From: Latricia Bernal  Sent: 7/10/2024   1:43 PM CDT  To: [facility]  Subject: FW: sooner appointment                           Please see below   Sooner Appt request  Pt scheduled with Dr Bauman on 12/10/2024  Pt is return Pt    Only openings I see sooner are Nov with PA and was not sure if could see her or not    Please review and reach out to Pt or to coordinator below if can arrange something sooner for Pt please    Thank you,  Latricia  Referral Specialist     If responding to this message please allow 24-48 business hours for a reply. If you need sooner assistance please call [facility] at [phone], or [facility] at [phone] between the hours of 7:00am to 5:00pm Monday-Friday.  ----- Message -----  From: Selena Lee RN  Sent: 7/10/2024  12:19 PM CDT  To: Dermatology Scheduling Access Center  Subject: sooner appointment                               Hi Team,    Patient states she has areas that are concerning and Derm can only get her in again in Dec 2024.  Is there a way to have her get seen sooner with these concerns?    Thanks,  Selena \"CLAUDETTE\" Rosa  Post Kidney / Pancreas Transplant Coordinator  [facility]  T: [phone]  "

## 2024-08-06 NOTE — TELEPHONE ENCOUNTER
I called and left Reina JONES asking her to give us a call back to discuss an appointment. Offering an appointment on Sept 10th @ 12:45.    ESTEFANIA Srivastava - Dermatology

## 2024-08-08 ENCOUNTER — LAB (OUTPATIENT)
Dept: LAB | Facility: CLINIC | Age: 59
End: 2024-08-08
Payer: COMMERCIAL

## 2024-08-08 DIAGNOSIS — R79.89 ELEVATED SERUM CREATININE: ICD-10-CM

## 2024-08-08 DIAGNOSIS — Z48.298 AFTERCARE FOLLOWING ORGAN TRANSPLANT: ICD-10-CM

## 2024-08-08 DIAGNOSIS — Z94.0 KIDNEY TRANSPLANTED: ICD-10-CM

## 2024-08-08 LAB
ANION GAP SERPL CALCULATED.3IONS-SCNC: 13 MMOL/L (ref 7–15)
BASOPHILS # BLD AUTO: 0 10E3/UL (ref 0–0.2)
BASOPHILS NFR BLD AUTO: 0 %
BUN SERPL-MCNC: 57.3 MG/DL (ref 8–23)
CALCIUM SERPL-MCNC: 8.3 MG/DL (ref 8.8–10.4)
CHLORIDE SERPL-SCNC: 106 MMOL/L (ref 98–107)
CREAT SERPL-MCNC: 3.06 MG/DL (ref 0.51–0.95)
EGFRCR SERPLBLD CKD-EPI 2021: 17 ML/MIN/1.73M2
EOSINOPHIL # BLD AUTO: 0.2 10E3/UL (ref 0–0.7)
EOSINOPHIL NFR BLD AUTO: 2 %
ERYTHROCYTE [DISTWIDTH] IN BLOOD BY AUTOMATED COUNT: 14.1 % (ref 10–15)
GLUCOSE SERPL-MCNC: 134 MG/DL (ref 70–99)
HCO3 SERPL-SCNC: 20 MMOL/L (ref 22–29)
HCT VFR BLD AUTO: 32.1 % (ref 35–47)
HGB BLD-MCNC: 10.5 G/DL (ref 11.7–15.7)
IMM GRANULOCYTES # BLD: 0 10E3/UL
IMM GRANULOCYTES NFR BLD: 0 %
LYMPHOCYTES # BLD AUTO: 1 10E3/UL (ref 0.8–5.3)
LYMPHOCYTES NFR BLD AUTO: 13 %
MCH RBC QN AUTO: 30.8 PG (ref 26.5–33)
MCHC RBC AUTO-ENTMCNC: 32.7 G/DL (ref 31.5–36.5)
MCV RBC AUTO: 94 FL (ref 78–100)
MONOCYTES # BLD AUTO: 0.7 10E3/UL (ref 0–1.3)
MONOCYTES NFR BLD AUTO: 10 %
NEUTROPHILS # BLD AUTO: 5.3 10E3/UL (ref 1.6–8.3)
NEUTROPHILS NFR BLD AUTO: 74 %
PLATELET # BLD AUTO: 195 10E3/UL (ref 150–450)
POTASSIUM SERPL-SCNC: 3.4 MMOL/L (ref 3.4–5.3)
RBC # BLD AUTO: 3.41 10E6/UL (ref 3.8–5.2)
SODIUM SERPL-SCNC: 139 MMOL/L (ref 135–145)
WBC # BLD AUTO: 7.2 10E3/UL (ref 4–11)

## 2024-08-08 PROCEDURE — 36415 COLL VENOUS BLD VENIPUNCTURE: CPT

## 2024-08-08 PROCEDURE — 85025 COMPLETE CBC W/AUTO DIFF WBC: CPT

## 2024-08-08 PROCEDURE — 80048 BASIC METABOLIC PNL TOTAL CA: CPT

## 2024-08-09 ENCOUNTER — TELEPHONE (OUTPATIENT)
Dept: TRANSPLANT | Facility: CLINIC | Age: 59
End: 2024-08-09
Payer: COMMERCIAL

## 2024-08-09 DIAGNOSIS — T86.10 COMPLICATIONS, KIDNEY TRANSPLANT: ICD-10-CM

## 2024-08-09 DIAGNOSIS — Z48.298 AFTERCARE FOLLOWING ORGAN TRANSPLANT: ICD-10-CM

## 2024-08-09 DIAGNOSIS — Z94.0 KIDNEY TRANSPLANTED: Primary | ICD-10-CM

## 2024-08-09 RX ORDER — CINACALCET 30 MG/1
30 TABLET, FILM COATED ORAL EVERY OTHER DAY
Qty: 45 TABLET | Refills: 3 | Status: SHIPPED | OUTPATIENT
Start: 2024-08-09

## 2024-08-09 NOTE — TELEPHONE ENCOUNTER
"Calcium low at  8.3  (8/8/24)  On cinacalcet 30 mg daily    Clayton Sapp MD Ututalum, Teresa RN  Yes.     ----- Message -----  From: Selena Lee RN  Sent: 8/9/2024  10:52 AM CDT  To: Clayton Sapp MD    Cr continues to improve.  Low calcium - On cinacalcet 30 mg daily  Decrease to every other day?        PLAN:  Decrease cinacalcet to 30 mg every other day      OUTCOME:  Discussed recommendations.  Will restart taking Cinacalcet on Monday 8/12/24.  Report has been feeling unwell this week.  She had a visit to the dentist and after that reports sore throat, felt lymph nodes \"harden\"  Recommended reaching out to PCP and if worsens before PCP visit to go to the ER / Urgent Care. Verbalized understanding and agreement to plan.  Prescription sent to:  [id number], [place] - [address] AT [facility] [address]   "

## 2024-08-15 ENCOUNTER — TELEPHONE (OUTPATIENT)
Dept: TRANSPLANT | Facility: CLINIC | Age: 59
End: 2024-08-15
Payer: COMMERCIAL

## 2024-08-15 DIAGNOSIS — D84.9 IMMUNOSUPPRESSED STATUS (H): ICD-10-CM

## 2024-08-15 DIAGNOSIS — Z48.298 AFTERCARE FOLLOWING ORGAN TRANSPLANT: ICD-10-CM

## 2024-08-15 DIAGNOSIS — R39.9 UTI SYMPTOMS: Primary | ICD-10-CM

## 2024-08-15 DIAGNOSIS — Z94.0 KIDNEY TRANSPLANTED: ICD-10-CM

## 2024-08-15 NOTE — TELEPHONE ENCOUNTER
Kathy called to report she's been having UTI symptoms today.  Urgency  Frequency  Pain with urination  Lower back pain  Denies fever or chills.    Message sent to Dr Sapp.    OUTCOME:  Clayton Sapp MD Ututalum, Teresa, RN  Would like to see a UA first.  I've never even met this patient.    Srikanth      --- called Kathy and left a detailed message that we are checking UA results first.

## 2024-08-16 ENCOUNTER — LAB (OUTPATIENT)
Dept: LAB | Facility: CLINIC | Age: 59
End: 2024-08-16
Payer: COMMERCIAL

## 2024-08-16 DIAGNOSIS — Z94.0 KIDNEY REPLACED BY TRANSPLANT: ICD-10-CM

## 2024-08-16 DIAGNOSIS — Z79.899 ENCOUNTER FOR LONG-TERM CURRENT USE OF MEDICATION: ICD-10-CM

## 2024-08-16 DIAGNOSIS — R39.9 UTI SYMPTOMS: ICD-10-CM

## 2024-08-16 DIAGNOSIS — D84.9 IMMUNOSUPPRESSED STATUS (H): ICD-10-CM

## 2024-08-16 DIAGNOSIS — Z98.890 OTHER SPECIFIED POSTPROCEDURAL STATES: ICD-10-CM

## 2024-08-16 DIAGNOSIS — Z94.0 KIDNEY TRANSPLANTED: ICD-10-CM

## 2024-08-16 DIAGNOSIS — T86.10 COMPLICATIONS, KIDNEY TRANSPLANT: ICD-10-CM

## 2024-08-16 DIAGNOSIS — Z48.298 AFTERCARE FOLLOWING ORGAN TRANSPLANT: ICD-10-CM

## 2024-08-16 LAB
ALBUMIN MFR UR ELPH: 199 MG/DL
ALBUMIN UR-MCNC: 100 MG/DL
ANION GAP SERPL CALCULATED.3IONS-SCNC: 15 MMOL/L (ref 7–15)
APPEARANCE UR: ABNORMAL
BACTERIA #/AREA URNS HPF: ABNORMAL /HPF
BILIRUB UR QL STRIP: NEGATIVE
BUN SERPL-MCNC: 37 MG/DL (ref 8–23)
CALCIUM SERPL-MCNC: 9.7 MG/DL (ref 8.8–10.4)
CHLORIDE SERPL-SCNC: 102 MMOL/L (ref 98–107)
COLOR UR AUTO: YELLOW
CREAT SERPL-MCNC: 4.08 MG/DL (ref 0.51–0.95)
CREAT UR-MCNC: 115 MG/DL
EGFRCR SERPLBLD CKD-EPI 2021: 12 ML/MIN/1.73M2
GLUCOSE SERPL-MCNC: 144 MG/DL (ref 70–99)
GLUCOSE UR STRIP-MCNC: NEGATIVE MG/DL
HCO3 SERPL-SCNC: 20 MMOL/L (ref 22–29)
HGB UR QL STRIP: ABNORMAL
KETONES UR STRIP-MCNC: NEGATIVE MG/DL
LEUKOCYTE ESTERASE UR QL STRIP: ABNORMAL
NITRATE UR QL: POSITIVE
PH UR STRIP: 5.5 [PH] (ref 5–8)
POTASSIUM SERPL-SCNC: 4.1 MMOL/L (ref 3.4–5.3)
PROT/CREAT 24H UR: 1.73 MG/MG CR (ref 0–0.2)
RBC #/AREA URNS AUTO: ABNORMAL /HPF
SODIUM SERPL-SCNC: 137 MMOL/L (ref 135–145)
SP GR UR STRIP: 1.02 (ref 1–1.03)
SQUAMOUS #/AREA URNS AUTO: ABNORMAL /LPF
UROBILINOGEN UR STRIP-ACNC: 0.2 E.U./DL
WBC #/AREA URNS AUTO: >100 /HPF
WBC CLUMPS #/AREA URNS HPF: PRESENT /HPF

## 2024-08-16 PROCEDURE — 80048 BASIC METABOLIC PNL TOTAL CA: CPT

## 2024-08-16 PROCEDURE — 36415 COLL VENOUS BLD VENIPUNCTURE: CPT

## 2024-08-16 PROCEDURE — 87186 SC STD MICRODIL/AGAR DIL: CPT

## 2024-08-16 PROCEDURE — 87086 URINE CULTURE/COLONY COUNT: CPT

## 2024-08-16 PROCEDURE — 84156 ASSAY OF PROTEIN URINE: CPT

## 2024-08-16 PROCEDURE — 81001 URINALYSIS AUTO W/SCOPE: CPT

## 2024-08-16 RX ORDER — CIPROFLOXACIN 500 MG/1
500 TABLET, FILM COATED ORAL DAILY
Qty: 7 TABLET | Refills: 0 | Status: SHIPPED | OUTPATIENT
Start: 2024-08-16 | End: 2024-08-23

## 2024-08-16 NOTE — TELEPHONE ENCOUNTER
Clayton Sapp MD Ututalum, Teresa, RN  Urinary tract infection and recommend starting ciprofloxacin 500 mg daily x 7 days.    OUTCOME:  Prescription sent to:   [facility] #[id number] [facility] [address] AT [facility] [address] Phone: [phone]   Fax: [phone]        Left a detailed VM.  Odojot message sent as well.

## 2024-08-17 LAB — BACTERIA UR CULT: ABNORMAL

## 2024-08-19 ENCOUNTER — ANCILLARY PROCEDURE (OUTPATIENT)
Dept: ULTRASOUND IMAGING | Facility: CLINIC | Age: 59
End: 2024-08-19
Attending: PHYSICIAN ASSISTANT
Payer: COMMERCIAL

## 2024-08-19 ENCOUNTER — OFFICE VISIT (OUTPATIENT)
Dept: TRANSPLANT | Facility: CLINIC | Age: 59
End: 2024-08-19
Attending: SURGERY
Payer: COMMERCIAL

## 2024-08-19 ENCOUNTER — PATIENT OUTREACH (OUTPATIENT)
Dept: NEPHROLOGY | Facility: CLINIC | Age: 59
End: 2024-08-19

## 2024-08-19 VITALS
HEART RATE: 72 BPM | WEIGHT: 139.5 LBS | BODY MASS INDEX: 25.67 KG/M2 | OXYGEN SATURATION: 98 % | DIASTOLIC BLOOD PRESSURE: 62 MMHG | RESPIRATION RATE: 18 BRPM | SYSTOLIC BLOOD PRESSURE: 101 MMHG | HEIGHT: 62 IN

## 2024-08-19 DIAGNOSIS — Z99.2 ENCOUNTER REGARDING VASCULAR ACCESS FOR DIALYSIS FOR END-STAGE RENAL DISEASE (H): Primary | ICD-10-CM

## 2024-08-19 DIAGNOSIS — Z01.818 ENCOUNTER FOR OTHER PREPROCEDURAL EXAMINATION: ICD-10-CM

## 2024-08-19 DIAGNOSIS — N18.6 ENCOUNTER REGARDING VASCULAR ACCESS FOR DIALYSIS FOR END-STAGE RENAL DISEASE (H): Primary | ICD-10-CM

## 2024-08-19 DIAGNOSIS — N18.5 CKD (CHRONIC KIDNEY DISEASE) STAGE 5, GFR LESS THAN 15 ML/MIN (H): ICD-10-CM

## 2024-08-19 LAB
PATH REPORT.ADDENDUM SPEC: NORMAL
PATH REPORT.COMMENTS IMP SPEC: NORMAL
PATH REPORT.FINAL DX SPEC: NORMAL
PATH REPORT.GROSS SPEC: NORMAL
PATH REPORT.MICROSCOPIC SPEC OTHER STN: NORMAL
PATH REPORT.RELEVANT HX SPEC: NORMAL
PHOTO IMAGE: NORMAL

## 2024-08-19 PROCEDURE — 93970 EXTREMITY STUDY: CPT | Mod: GC | Performed by: RADIOLOGY

## 2024-08-19 PROCEDURE — 99213 OFFICE O/P EST LOW 20 MIN: CPT | Performed by: SURGERY

## 2024-08-19 PROCEDURE — 99204 OFFICE O/P NEW MOD 45 MIN: CPT | Performed by: SURGERY

## 2024-08-19 ASSESSMENT — PAIN SCALES - GENERAL: PAINLEVEL: NO PAIN (0)

## 2024-08-19 NOTE — Clinical Note
8/19/2024      Reina Quan  [address] [address]      Dear Colleague,    Thank you for referring your patient, Reina Quan, to the [facility]. Please see a copy of my visit note below.    No notes on file    Again, thank you for allowing me to participate in the care of your patient.        Sincerely,        Jesus Alberto Walton MD

## 2024-08-19 NOTE — PROGRESS NOTES
Dialysis Access Care Coordination: Consult Outreach    REASON FOR VISIT:   Patient presents to clinic for fistula consult with Dr. Walton. Patient is CKD Stage 5 secondary to chronic glomerulonephritis.    Patient was scheduled for a PD vs fistula consult, however, pt is no longer interested in PD as she doesn't sleep well at night and often wakes up wrapped in her blankets.    The consult was spent discussing AV fistula vs AV graft.    Nephrology provider: SAM Raya and Dr. Sapp.  Nephrology RN Care Coordinator:  Daniele Ovalles RN.    Patient accompanied by self.    SITUATION/BACKROUND:     Neph Tracking Flowsheet Last Filled Values       Preferred Modality Home Hemodialysis    Patient's Referral Dates Auto Populate Patient's Referral Dates    Specialty Care Coordination Referral - Dialysis 7/26/2024    Journey Referral 7/18/24    Vein Mapping/US Order 8/19/24    Vein Mapping/US  08/19/24    Access Surgeon Referral Status  Referred    Dialysis Access Referral 07/26/24  Fistula vs PD consult with Dr. Walton    Access Surgical Consult 08/19/24  PLAN: LUE brachiobasilic (2 stage) AVF vs LUE AVG underger general anesthesia with nerve block.    Transplant Evaluation Referral 6/21/24    Transplant Status  Referred           Patient is followed by Solid Organ Transplant 2/2 Kidney Transplant Referral - 6/28/2024.  Coordinator: Selena Nuno    ASSESSMENT:   Planned dialysis support people: friend and adult children who live close by    Lives with: friend(s)    Dialysis access screening:   Diabetic? No  Physical limitations? No  Cognitive limitations? No  Storage space limitations? No  Dominant hand? right    Access Type Placement Date Removal Date Surgeon Outcome   LIJ CVC 1992 1992  Short HD via CVC pre txp                   Immunosuppression:  Mycophenolate  Prednisone     Anticoagulation:  none     Antiplatelet:  none   **Has giant platelet syndrome    Recent Labs:      Latest Ref  Rng & Units 8/16/2024 8/8/2024 7/31/2024   Neph Labs   Sodium 135 - 145 mmol/L 137  139  136    GFR Estimate >60 mL/min/1.73m2 12  17  13    Potassium 3.4 - 5.3 mmol/L 4.1  3.4  3.5    Creatinine 0.51 - 0.95 mg/dL 4.08  3.06  3.82    Urea Nitrogen 8.0 - 23.0 mg/dL 37.0  57.3  68.4    Hemoglobin 11.7 - 15.7 g/dL  10.5  9.1         Relevant Surgical & Medical Hx:  Giant platelet syndrome    EDUCATION:     Dialysis access surgery education provided:   - Pre-Op: pre-op H&P or PAC within 30 days prior to surgery; may need to hold certain medications (e.g. blood thinners) for a few days prior to surgery.   - Surgery: done as an outpatient procedure; patient will go home the same day.   - Post-Op: need someone to drive them home and stay with them for the first night after surgery; post-op appointment with the surgeon.       Hemodialysis access education provided:   - Basic process of hemodialysis; ICHD vs HHD and frequency of treatments.   - Fistula vs graft vs CVC and risks and benefits of each access.   - Surgical creation of a fistula/graft.  - Importance of assessing thrill and bruit daily.   - Maturation timeline; a second surgery or surgical revision may be necessary, delaying maturation.  - No blood pressures, blood draws, or IVs in the arm before AND after surgery.   - Monthly visits with the nephrologist; treatments are monitored by dialysis nurse.   - Future maintenance fistulograms should be expected; dialysis nurse will help identify need for fistulograms.   - Signs and symptoms of possible complications including infection, non-maturation, steal syndrome, and thrombosis.       PLAN:     Future Appts Next 180 days       Visit Type Date Time Department    US UPR EXT VENOUS MAPPING KEDAR 8/19/2024 10:00 AM [facility] ULTRASOUND    SOT FISTULA CONSULT 8/19/2024  2:30 PM [facility] SURGERY    SOT EVAL NURSE ONLY 10/7/2024  7:30 AM [facility]    SOT EVAL NUTRITION 10/7/2024  8:45 AM [facility]    SOT EVAL K/P SHAE 10/7/2024  9:15 AM   SOT    SOT EVAL K/P SURG 10/7/2024 10:00 AM UC SOT    SOT EVAL SOCIAL WORK 10/7/2024 10:30 AM [facility] SOT    SOT EVAL CARE COORDINATOR 10/7/2024 11:00 AM [facility] SOT    XR CHEST 2 VIEWS 10/7/2024 12:55 PM [facility] XRAY             PLAN:  LUE brachiobasilic (2 stage) AVF vs LUE AVG surgery under general anesthesia with LUE nerve block.  Pt needs PAC for pre-op appointment.    Pt is very interested in HHD and no longer interested in PD.    Follow Up:  Patient to follow up as scheduled.   RN CC updated nephrology team.  Nephrology to let Dialysis Access Care Coordinator know when it is time to move forward with scheduling surgery.     Patient verbalized understanding and will follow up as recommended.    ANNABEL SPRING RN  Dialysis Access Care Coordinator  Phone: [phone]  Pool: P_Dialysis_Access_Nurse

## 2024-08-19 NOTE — PATIENT INSTRUCTIONS
Thank you so much for choosing us for your care. It was a pleasure to see you at clinic today.     Plan/ follow-up recommendations: We will plan for left arm brachiobasilic (2 stage) AV fistula creation surgery.  If that doesn't look like it is the best option at the time of surgery, we will transition to placing a left arm AV graft in the upper arm.  You will get a nerve block to numb your arm.  The nerve block will make your arm numb for 4-7 days after surgery.    You and your nephrology team will determine when it is time for surgery based on how you are feeling and your lab trends.  Continue to follow up with nephrology.  Your nephrology team will reach out to us when it has been determined it is time to schedule your surgery.    Additional testing/imaging ordered today: n/a    Topics discussed at today's visit:    Hemodialysis Access Education:  - Basic process of hemodialysis  Blood is removed and cleaned through a filter, then returned to your body.  This is done with a dialysis machine that is a high flow circuit to clean the blood multiple times very quickly.  In-Center HD (hemodialysis) is done at a dialysis clinic, 3 times a week for 3 1/2- 4 hours each treatment.  A dialysis nurse is there monitoring your treatment as well as dialysis technicians.  Home HD is done at your home, 5-6 days a week, for 2-3 hours each treatment.  You need a trained partner to be with you when you are doing your treatments.  Your dialysis nephrologist will see you monthly at the dialysis clinic.  -Types of hemodialysis accesses with pros and cons of each:  An AV fistula (AVF) is an artery connected to a vein that causes the vein to grow in size to be able to support the high flow of the dialysis machine.  A fistula can take longer to be ready to use for dialysis, but often lasts longer and requires less interventions to keep patent compared to a graft.  An AV graft  (AVG) is a piece of tubing that connects the vein to the  artery when your natural vessels are not large enough to be able to create a fistula.  A graft is typically ready to use sooner after surgery, but tends to not last as long as a fistula and require more frequent interventions to keep patent compared to a fistula.  If a graft gets infected, it must be removed.  A Central Venous Catheter (CVC) is  a tube in your chest that goes to your heart so that it is big enough to support the high flow of the dialysis machine.  - Surgical creation of a fistula/graft.  Fistula:  An artery is connected to a vein.  The high flow from the artery causes the vein to grow in size to support the high flow circuit of a dialysis machine.  Graft:  A piece of gortex tubing is used to connect the artery to the vein, and tunneled under the skin.   - Importance of assessing thrill (rumbling or buzzing feeling) and bruit (buzzing sound) daily.? If the thrill and/or bruit are gone or diminished, contact your dialysis clinic and/or Dialysis Access Care Coordinator immediately.  - Maturation timeline is approx. 6+ weeks to be able to use the access for dialysis.  A second surgery or surgical revision may be necessary (E.G. to bring the fistula to the surface and/or to tie off any side vessels that grow) which would prolong the maturation time.  (Each surgery adds approximately 4 weeks of healing time before the access can be used for dialysis).  - DO NOT ALLOW blood pressures, blood draws, or IVs in the arm we plan to use for a fistula or graft before AND after surgery.?   - Future maintenance such as fistulograms (to balloon open, or remove clotting) should be expected to keep your access functioning well; your dialysis nurse will help identify need for fistulograms.?   - Possible complications include:  Infection (if a graft becomes infected, it needs to be removed)  Failure for access to mature or grow to adequate size (requiring a different/ new access site)  Steal syndrome (numbness, tingling,  coolness and/or decreased strength to the affected hand/arm)  Thrombosis (a clot that forms and blocks blood flow to the access, making it not viable to use for dialysis access).?      Dialysis access surgery instructions:  - Pre-Op:   []  Schedule your pre-op H&P   Within 30 days of surgery, you will need to have a pre-op history and physical with your primary care provider.  If you have been told you need PAC (pre anesthesia consult) for a pre-op, please call [phone] to schedule.  **If you do not complete your pre-op appointment, your surgery will be cancelled.  You will get a call 1-2 days prior to surgery from a pre-procedure nurse.  During this phone call, the nurse will review your medications and tell you what you should take the morning of surgery.  The nurse will also instruct you when to stop eating.  Typically you need to stop eating 8 hours before your scheduled surgery time.  During this phone call you will be told what time to arrive at the hospital and where to check in.  If you do not follow the instructions provided by the pre-procedure nurse, your surgery may be cancelled and rescheduled.  -Do not allow any blood pressures, lab draws or IV's in the arm you are planning to use for dialysis access.  - Surgery is done as an outpatient procedure; you will go home the same day.?  Expect to be at the hospital for 6+ hours.    - Post-Op:   []  You will need a responsible adult to drive you home and stay with you for 24 hours after surgery.  []  You will have a post-op appointment with the surgeon or nurse practitioner approximately 2 weeks after surgery.?  If this does not get scheduled, please contact the Dialysis Access Care Coordinator, [phone].  []  Assess for a thrill (buzzing) of your new fistula at least daily.  If the buzzing disappears or becomes less, contact the Dialysis Access Care Coordinator immediately, [phone].     Dialysis Access Care Coordinators  RK Brownlee  HEBER RN  Direct: [phone]    Our scheduling team will get in touch with you to set up any follow-up testing/imaging and/or appointments. Please be aware that any testing/imaging recommended today will need to completed prior to your next visit with the provider. If testing/imaging is not completed prior to your next visit, your visit may be rescheduled.        If you have any questions, please call the Dialysis Access Care Coordinator or contact our clinic at [phone].  We also encourage the use of [employer] to communicate with your HealthCare Provider.      If you have an urgent need after business hours (8:00 am to 4:30 pm) please call [phone], option 4, and ask to page [phone] (the Transplant Surgery Fellow on call). For non-urgent after hours needs, please call the dialysis access nurse at [phone] and leave a detailed voicemail. For scheduling needs, please call our clinic directly at [phone].

## 2024-08-19 NOTE — NURSING NOTE
"Chief Complaint   Patient presents with    Consult     Fistula vs PD consult      Vital signs:      BP: 101/62 Pulse: 72   Resp: 18 SpO2: 98 %     Height: 157.5 cm (5' 2\") (pt reported) Weight: 63.3 kg (139 lb 8 oz)  Estimated body mass index is 25.51 kg/m  as calculated from the following:    Height as of this encounter: 1.575 m (5' 2\").    Weight as of this encounter: 63.3 kg (139 lb 8 oz).      Mary Núñez, [facility]  8/19/2024 2:29 PM    "

## 2024-08-29 ENCOUNTER — APPOINTMENT (OUTPATIENT)
Dept: CT IMAGING | Facility: CLINIC | Age: 59
End: 2024-08-29
Attending: EMERGENCY MEDICINE
Payer: COMMERCIAL

## 2024-08-29 ENCOUNTER — HOSPITAL ENCOUNTER (EMERGENCY)
Facility: CLINIC | Age: 59
Discharge: HOME | End: 2024-08-29
Attending: EMERGENCY MEDICINE | Admitting: EMERGENCY MEDICINE
Payer: COMMERCIAL

## 2024-08-29 VITALS
WEIGHT: 135 LBS | TEMPERATURE: 99.1 F | RESPIRATION RATE: 20 BRPM | OXYGEN SATURATION: 96 % | BODY MASS INDEX: 24.69 KG/M2 | DIASTOLIC BLOOD PRESSURE: 70 MMHG | SYSTOLIC BLOOD PRESSURE: 150 MMHG | HEART RATE: 64 BPM

## 2024-08-29 DIAGNOSIS — R07.9 CHEST PAIN, UNSPECIFIED TYPE: ICD-10-CM

## 2024-08-29 LAB
ANION GAP SERPL CALCULATED.3IONS-SCNC: 15 MMOL/L (ref 7–15)
BUN SERPL-MCNC: 47 MG/DL (ref 8–23)
CALCIUM SERPL-MCNC: 9.8 MG/DL (ref 8.8–10.4)
CHLORIDE SERPL-SCNC: 103 MMOL/L (ref 98–107)
CREAT SERPL-MCNC: 3.87 MG/DL (ref 0.51–0.95)
EGFRCR SERPLBLD CKD-EPI 2021: 13 ML/MIN/1.73M2
ERYTHROCYTE [DISTWIDTH] IN BLOOD BY AUTOMATED COUNT: 13.8 % (ref 10–15)
FLUAV RNA SPEC QL NAA+PROBE: NEGATIVE
FLUBV RNA RESP QL NAA+PROBE: NEGATIVE
GLUCOSE SERPL-MCNC: 119 MG/DL (ref 70–99)
HCO3 SERPL-SCNC: 17 MMOL/L (ref 22–29)
HCT VFR BLD AUTO: 32.9 % (ref 35–47)
HGB BLD-MCNC: 10.6 G/DL (ref 11.7–15.7)
HOLD SPECIMEN: NORMAL
MCH RBC QN AUTO: 30.5 PG (ref 26.5–33)
MCHC RBC AUTO-ENTMCNC: 32.2 G/DL (ref 31.5–36.5)
MCV RBC AUTO: 95 FL (ref 78–100)
PLATELET # BLD AUTO: 271 10E3/UL (ref 150–450)
POTASSIUM SERPL-SCNC: 4.3 MMOL/L (ref 3.4–5.3)
RBC # BLD AUTO: 3.48 10E6/UL (ref 3.8–5.2)
RSV RNA SPEC NAA+PROBE: NEGATIVE
SARS-COV-2 RNA RESP QL NAA+PROBE: NEGATIVE
SODIUM SERPL-SCNC: 135 MMOL/L (ref 135–145)
TROPONIN T SERPL HS-MCNC: 15 NG/L
TROPONIN T SERPL HS-MCNC: 16 NG/L
WBC # BLD AUTO: 6.5 10E3/UL (ref 4–11)

## 2024-08-29 PROCEDURE — 84484 ASSAY OF TROPONIN QUANT: CPT | Performed by: EMERGENCY MEDICINE

## 2024-08-29 PROCEDURE — 80048 BASIC METABOLIC PNL TOTAL CA: CPT | Performed by: EMERGENCY MEDICINE

## 2024-08-29 PROCEDURE — 93005 ELECTROCARDIOGRAM TRACING: CPT | Performed by: EMERGENCY MEDICINE

## 2024-08-29 PROCEDURE — 99285 EMERGENCY DEPT VISIT HI MDM: CPT | Mod: 25

## 2024-08-29 PROCEDURE — 87637 SARSCOV2&INF A&B&RSV AMP PRB: CPT | Performed by: EMERGENCY MEDICINE

## 2024-08-29 PROCEDURE — 71250 CT THORAX DX C-: CPT

## 2024-08-29 PROCEDURE — 85041 AUTOMATED RBC COUNT: CPT | Performed by: EMERGENCY MEDICINE

## 2024-08-29 PROCEDURE — 36415 COLL VENOUS BLD VENIPUNCTURE: CPT | Performed by: EMERGENCY MEDICINE

## 2024-08-29 ASSESSMENT — COLUMBIA-SUICIDE SEVERITY RATING SCALE - C-SSRS
1. IN THE PAST MONTH, HAVE YOU WISHED YOU WERE DEAD OR WISHED YOU COULD GO TO SLEEP AND NOT WAKE UP?: NO
6. HAVE YOU EVER DONE ANYTHING, STARTED TO DO ANYTHING, OR PREPARED TO DO ANYTHING TO END YOUR LIFE?: NO
2. HAVE YOU ACTUALLY HAD ANY THOUGHTS OF KILLING YOURSELF IN THE PAST MONTH?: NO

## 2024-08-29 NOTE — ED PROVIDER NOTES
EMERGENCY DEPARTMENT ENCOUnter      NAME: Reina Quan  AGE: 59 year old female  YOB: 1965  MRN: [id number]  EVALUATION DATE & TIME: No admission date for patient encounter.    PCP: Lizbeth Andrews    ED PROVIDER: Brad Salgado DO      Chief Complaint   Patient presents with    Chest Pain    Back Pain         FINAL IMPRESSION:  1. Chest pain, unspecified type          ED COURSE & MEDICAL DECISION MAKIN:37 AM I met with the patient, obtained history, performed an initial exam, and discussed options and plan for diagnostics and treatment here in the ED.    The patient presented to the emergency department today with complaints of left-sided chest discomfort.  Pain is worsened with movement of the chest wall.  Laboratory testing today including 2-hour delta troponin has been unremarkable.  A noncontrast CT of the chest was performed which is normal as well.  Her symptoms appear most consistent with a musculoskeletal etiology.  We discussed the possibility of a pulmonary embolism.  I feel that this is quite unlikely at this time.  We are not able to obtain a contrasted CT given her renal function.  She has been advised to return right away for any worsening symptoms or other concerns.    Medical Decision Making  Obtained supplemental history:Supplemental history obtained?: N/A  Reviewed external records: External records reviewed?: No  Care impacted by chronic illness:Chronic Kidney Disease and Hypertension  Care significantly affected by social determinants of health:N/A  Did you consider but not order tests?: Work up considered but not performed and documented in chart, if applicable  Did you interpret images independently?: Independent interpretation of ECG and images noted in documentation, when applicable.  Consultation discussion with other provider:Did you involve another provider (consultant, , pharmacy, etc.)?: No  Discharge. No recommendations on prescription strength  "medication(s). I considered admission, but discharged the patient after share decision making conversation.  No MIPS measures identified.      At the conclusion of the encounter I discussed the results of all of the tests and the disposition. The questions were answered. The patient or family acknowledged understanding and was agreeable with the care plan.         =================================================================    HPI        Reina Quan is a 59 year old female with a pertinent history of Stage 3B chronic kidney disease, hypertension, hyperlipidemia, immunosuppressed status (status post right renal transplant) who presents to this ED via private vehicle for evaluation of chest and back pain.     Patient woke up at 4:00 am this morning with back pain that \"felt like a pulled muscle\". Patient took Tylenol at 4:00 am for pain relief which was ineffective. On a Zoom meeting at 8:00 am today, patient had to turn off her camera because she was nauseated and experiencing back pain. Patient attempted to stretch her back, but this did not relieve symptoms, and said that nothing aided in symptom relief. Her pain began radiating from her back to her upper left chest, and she took Advil at 9:30 am today. She further notes dizziness and lightheadedness, and recalls that she thought she was going to fall over getting out of her car when she arrived at the ED. She has never experienced chest pain of this intensity before. Patient describes her breath as being \"short\" and \"irregular\". She also notes she feels like she has chills, and when she moves her head, she feels nauseous.    Patient denies nausea currently when sitting in bed and not moving head in ED.     Patient denies any other complaints at this time.           PAST MEDICAL HISTORY:  Past Medical History:   Diagnosis Date    Actinic keratosis     Allergic rhinitis, cause unspecified     Anemia     anxiety/depression     PAXIL    Arthritis     " congenital hearing loss     uses hearing aids    Depressive disorder     Dry eyes     Giant Platelet syndrome     Glomerular Focal Sclerosis--transplant 1985     Hypertension     Kidney replaced by transplant 1992    RAPAMUNE/ SIROLIMUS     Lichen planopilaris     LPP followed by derm on doxycycline/ clobetasol    Lichen planus     Menorrhagia     migraine     Squamous cell carcinoma     8 x     Thrombocytopenia (H24)     Ulcerative colitis, unspecified     biposy neg 1996    Unsatisfactory cervical Papanicolaou smear 02/15/2021    UTI (lower urinary tract infection)     recurrent Dr Burns [facility]       PAST SURGICAL HISTORY:  Past Surgical History:   Procedure Laterality Date    BIOPSY OF SKIN LESION      CHOLECYSTECTOMY      DILATION AND CURETTAGE, OPERATIVE HYSTEROSCOPY WITH MORCELLATOR, COMBINED N/A 11/10/2015    Procedure: COMBINED DILATION AND CURETTAGE, OPERATIVE HYSTEROSCOPY WITH MORCELLATOR;  Surgeon: Ghada Melendez MD;  Location: [facility] OR    ESOPHAGOSCOPY, GASTROSCOPY, DUODENOSCOPY (EGD), COMBINED  11/20/2012    Procedure: COMBINED ESOPHAGOSCOPY, GASTROSCOPY, DUODENOSCOPY (EGD), BIOPSY SINGLE OR MULTIPLE;;  Surgeon: Valentin Hahn MD;  Location: [facility]    IR RENAL BIOPSY RIGHT  7/25/2024    MOHS MICROGRAPHIC PROCEDURE      TRANSPLANT      kidney    ZZC NONSPECIFIC PROCEDURE      Renal transplant right side    ZZC NONSPECIFIC PROCEDURE      cholecystectomy           CURRENT MEDICATIONS:    amLODIPine (NORVASC) 5 MG tablet  amLODIPine (NORVASC) 5 MG tablet  atorvastatin (LIPITOR) 40 MG tablet  cinacalcet (SENSIPAR) 30 MG tablet  cinacalcet (SENSIPAR) 30 MG tablet  clobetasol (TEMOVATE) 0.05 % external ointment  clobetasol (TEMOVATE) 0.05 % external solution  clobetasol propionate (CLOBEX) 0.05 % external shampoo  ketoconazole (NIZORAL) 2 % external shampoo  mycophenolate (GENERIC EQUIVALENT) 250 MG capsule  niacinamide 500 MG tablet  PARoxetine (PAXIL) 20 MG tablet  predniSONE (DELTASONE) 20 MG  tablet  predniSONE (DELTASONE) 5 MG tablet  sodium bicarbonate 650 MG tablet  triamcinolone (KENALOG) 0.1 % external cream        ALLERGIES:  Allergies   Allergen Reactions    Ampicillin Swelling     Swelling of mouth and tongue.     Seasonal Allergies Other (See Comments)     Itchy eyes and rhinitis.       FAMILY HISTORY:  Family History   Problem Relation Age of Onset    Diabetes Father     Kidney Disease Father         nephrolithiasis    Asthma Father     Hypertension Mother     Depression Mother     Anxiety Disorder Mother     C.A.D. Paternal Grandfather     Blood Disease Maternal Grandmother     Diabetes Maternal Grandmother     Hypertension Maternal Grandmother     Hyperlipidemia Maternal Grandmother     Depression Maternal Grandmother     No Known Problems Maternal Grandfather     Cancer Other         no family hx of skin cancer    Asthma Sister     No Known Problems Paternal Grandmother     Glaucoma No family hx of     Macular Degeneration No family hx of     Melanoma No family hx of     Skin Cancer No family hx of        SOCIAL HISTORY:   Social History     Socioeconomic History    Marital status: Single     Spouse name: None    Number of children: 3    Years of education: None    Highest education level: None   Occupational History    Occupation: [employer] store   Tobacco Use    Smoking status: Never     Passive exposure: Never    Smokeless tobacco: Never   Vaping Use    Vaping status: Never Used   Substance and Sexual Activity    Alcohol use: Yes     Alcohol/week: 0.0 standard drinks of alcohol     Comment: 2x /week    Drug use: No    Sexual activity: Not Currently     Partners: Male     Birth control/protection: Post-menopausal   Other Topics Concern    Parent/sibling w/ CABG, MI or angioplasty before 65F 55M? 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 Doesn't have a job lined up afterward but looking forward to some time off. Has three children, one  moved to college this fall. Living in [place], has a significant other x 7 months. Previously . In a monogamous relationship. Never smoker, social EtOH use.      Social Determinants of Health     Interpersonal Safety: Low Risk  (4/9/2024)    Interpersonal Safety     Do you feel physically and emotionally safe where you currently live?: Yes     Within the past 12 months, have you been hit, slapped, kicked or otherwise physically hurt by someone?: No     Within the past 12 months, have you been humiliated or emotionally abused in other ways by your partner or ex-partner?: No       VITALS:  Patient Vitals for the past 24 hrs:   BP Temp Temp src Pulse Resp SpO2 Weight   08/29/24 1429 (!) 150/70 -- -- 64 20 96 % --   08/29/24 1119 -- 99.1  F (37.3  C) Oral -- -- -- --   08/29/24 1117 (!) 169/77 -- -- 64 20 95 % 61.2 kg (135 lb)       PHYSICAL EXAM    Constitutional:  Well developed, Well nourished,  HENT:  Normocephalic, Atraumatic, Oropharynx moist, Nose normal.   Eyes:  EOMI, Conjunctiva normal, No discharge.   Respiratory:  Normal breath sounds, No respiratory distress, No wheezing, No chest tenderness.   Cardiovascular:  Normal heart rate, Normal rhythm, No murmurs  GI:  Soft, No tenderness, No guarding, No CVA tenderness.   Musculoskeletal:  No tenderness to palpation or major deformities noted.   Extremities: No lower extremity edema.  Neurologic:  Alert & oriented x 3, No focal deficits noted.   Psychiatric:  Affect normal, Judgment normal, Mood normal.        LAB:  All pertinent labs reviewed and interpreted.  Results for orders placed or performed during the hospital encounter of 08/29/24              Extra Blue Top Tube   Result Value Ref Range    Hold Specimen JIC    Extra Red Top Tube   Result Value Ref Range    Hold Specimen JIC    Extra Green Top (Lithium Heparin) Tube   Result Value Ref Range    Hold Specimen JIC    Extra Purple Top Tube   Result Value Ref Range    Hold Specimen JIC    CBC with  platelets   Result Value Ref Range    WBC Count 6.5 4.0 - 11.0 10e3/uL    RBC Count 3.48 (L) 3.80 - 5.20 10e6/uL    Hemoglobin 10.6 (L) 11.7 - 15.7 g/dL    Hematocrit 32.9 (L) 35.0 - 47.0 %    MCV 95 78 - 100 fL    MCH 30.5 26.5 - 33.0 pg    MCHC 32.2 31.5 - 36.5 g/dL    RDW 13.8 10.0 - 15.0 %    Platelet Count 271 150 - 450 10e3/uL   Basic metabolic panel   Result Value Ref Range    Sodium 135 135 - 145 mmol/L    Potassium 4.3 3.4 - 5.3 mmol/L    Chloride 103 98 - 107 mmol/L    Carbon Dioxide (CO2) 17 (L) 22 - 29 mmol/L    Anion Gap 15 7 - 15 mmol/L    Urea Nitrogen 47.0 (H) 8.0 - 23.0 mg/dL    Creatinine 3.87 (H) 0.51 - 0.95 mg/dL    GFR Estimate 13 (L) >60 mL/min/1.73m2    Calcium 9.8 8.8 - 10.4 mg/dL    Glucose 119 (H) 70 - 99 mg/dL   Result Value Ref Range    Troponin T, High Sensitivity 15 (H) <=14 ng/L   Symptomatic Influenza A/B, RSV, & SARS-CoV2 PCR (COVID-19) Nasopharyngeal    Specimen: Nasopharyngeal; Swab   Result Value Ref Range    Influenza A PCR Negative Negative    Influenza B PCR Negative Negative    RSV PCR Negative Negative    SARS CoV2 PCR Negative Negative   Result Value Ref Range    Troponin T, High Sensitivity 16 (H) <=14 ng/L       RADIOLOGY:  I have independently reviewed and interpreted the above imaging, pending the final radiology read.  Chest CT w/o contrast   Final Result   IMPRESSION:    1.  No acute lung, airway, or pleural inflammatory process.   2.  No imaging findings that would explain acute chest pain/nausea.    3.  Mild atheromatous coronary calcifications.             EKG:    Normal sinus rhythm at 60 bpm.  Normal axis.  No signs of acute ischemia.  QRS 90 ms, QTc 436 ms.    I have independently reviewed and interpreted this EKG          I, Duane Joseph, am serving as a scribe to document services personally performed by Dr. Salgado based on my observation and the provider's statements to me. I, Brad Salgado, DO attest that Duane Joseph is acting in a scribe capacity,  has observed my performance of the services and has documented them in accordance with my direction.    Brad Salgado DO  Emergency Medicine  [facility] EMERGENCY ROOM  [address] [id number] [phone]  Dept: [phone]     Brad Salgado DO  08/29/24 1435

## 2024-08-29 NOTE — ED TRIAGE NOTES
Woke up with left sided CP and back pain. Sharp pain started in back and moved to chest. Pain increases with deep breath.  Hx of renal failure with transplant 32 years ago.

## 2024-08-29 NOTE — ED NOTES
Discharged from the lobby, see providers notes for further assessment. Will follow up with PCP. Pt stated understanding of when to return to the ED.

## 2024-09-04 LAB
ATRIAL RATE - MUSE: 60 BPM
DIASTOLIC BLOOD PRESSURE - MUSE: 77 MMHG
INTERPRETATION ECG - MUSE: NORMAL
P AXIS - MUSE: 33 DEGREES
PR INTERVAL - MUSE: 182 MS
QRS DURATION - MUSE: 90 MS
QT - MUSE: 436 MS
QTC - MUSE: 436 MS
R AXIS - MUSE: 26 DEGREES
SYSTOLIC BLOOD PRESSURE - MUSE: 169 MMHG
T AXIS - MUSE: 32 DEGREES
VENTRICULAR RATE- MUSE: 60 BPM

## 2024-09-06 ENCOUNTER — PATIENT OUTREACH (OUTPATIENT)
Dept: NEPHROLOGY | Facility: CLINIC | Age: 59
End: 2024-09-06
Payer: COMMERCIAL

## 2024-09-06 NOTE — PROGRESS NOTES
9/6- I spoke to Lexi today and she is still quite fatigued at the end of her day. She will go to sleep as early as 6pm some day's. She continues to have labs done every 3 weeks per Latricia. Lab is showing KF declining. Her bp is well controlled and she checks it daily. She is wondering when to start dialysis and asking if it will help her feel better. Latricia and transplant team updated.   Daniele FRIEND RN  Nephrology care coordinator  [facility]

## 2024-09-06 NOTE — PROGRESS NOTES
Received message from patient stating she was confused on who manages her transition to ESRD/ timing to start dialysis.  Pt stated she messaged her transplant coordinator, Lanette, and was told they do not manage CKD/ ESRD, but [facility] , does.  Pt expressed confusion and requested clarification on who to discuss the timing and transition to dialysis with.    Per chart review, pt nephrology specialty care coordinator is Daniele Ovalles RN.  Will ask Daniele to reach out to pt.    Called pt to clarify roles.  Informed pt that if she has any general nephrology questions, or questions about the transition to dialysis, she can message or call SAM Raya.  Those messages will first go to her nurse, Daniele.    Pt expressed understanding and was grateful for the clarification.    Transferred pt call to RNDaniele GOLD, per pt request after confirming Daniele was available to take the call.    ANNABEL SPRING RN on 9/6/2024 at 3:05 PM  Dialysis Access Care Coordinator  Phone: [phone]  Pool: P_Dialysis_Access_Nurse

## 2024-09-09 ENCOUNTER — PATIENT OUTREACH (OUTPATIENT)
Dept: NEPHROLOGY | Facility: CLINIC | Age: 59
End: 2024-09-09
Payer: COMMERCIAL

## 2024-09-09 DIAGNOSIS — N18.5 CKD (CHRONIC KIDNEY DISEASE) STAGE 5, GFR LESS THAN 15 ML/MIN (H): Primary | ICD-10-CM

## 2024-09-09 NOTE — PROGRESS NOTES
Dialysis Access Care Coordination: General Outreach    REASON FOR CALL:     REASON FOR CALL: Clinic Care Coordination - Follow-up (Schedule AV fistula surgery)                                   SITUATION/BACKROUND:   Message received from Latricia Henson requesting to move forward with scheduling dialysis access surgery.    Nephrology provider: [facility] and Dr. Sapp.  Nephrology RN Care Coordinator:  Daniele Ovalles, RN.    Neph Tracking Flowsheet Last Filled Values       Preferred Modality Home Hemodialysis    Patient's Referral Dates Auto Populate Patient's Referral Dates    Specialty Care Coordination Referral - Dialysis 7/26/2024    Journey Referral 7/18/24    Vein Mapping/US Order 8/19/24    Vein Mapping/US  08/19/24    Access Surgeon Referral Status  Referred    Dialysis Access Referral 07/26/24  Fistula vs PD consult with Dr. Walton    Access Surgical Consult 08/19/24  PLAN: LUE brachiobasilic (2 stage) AVF vs LUE AVG underger general anesthesia with nerve block.    Dialysis Access Surgery --  9/9/24 request to schedule surgery per Latricia Henson.  Will need PAC prior.    Transplant Evaluation Referral 6/21/24    Transplant Status  Referred          Patient is followed by Solid Organ Transplant 2/2 Kidney Transplant Referral - 6/28/2024.  Coordinator: Selena Nuno    ASSESSMENT:     Recent Labs      Latest Ref Rng & Units 8/29/2024 8/16/2024 8/8/2024   Neph Labs   Sodium 135 - 145 mmol/L 135  137  139    GFR Estimate >60 mL/min/1.73m2 13  12  17    Potassium 3.4 - 5.3 mmol/L 4.3  4.1  3.4    Creatinine 0.51 - 0.95 mg/dL 3.87  4.08  3.06    Urea Nitrogen 8.0 - 23.0 mg/dL 47.0  37.0  57.3    Hemoglobin 11.7 - 15.7 g/dL 10.6   10.5           PLAN:     Future Appts Next 180 days       Visit Type Date Time Department    SOT EVAL NURSE ONLY 10/7/2024  7:30 AM [facility]    SOT EVAL NUTRITION 10/7/2024  8:45 AM [facility]    SOT EVAL K/P SHAE 10/7/2024  9:15 AM [facility]    SOT EVAL K/P SURG  10/7/2024 10:00 AM [facility]    SOT EVAL SOCIAL WORK 10/7/2024 10:30 AM [facility] S[facility]    SOT EVAL CARE COORDINATOR 10/7/2024 11:00 AM [facility] SOT    XR CHEST 2 VIEWS 10/7/2024 12:55 PM [facility] XRAY            Follow Up:     Request sent to schedule LUE AV fistula vs LUE AV graft surgery under general anesthesia with block by Dr. Walton.  Pt will need to see PAC for pre op.    Medisset message sent to pt with update, pt to expect call to schedule.    ANNABEL SPRING RN  Dialysis Access Care Coordinator  Phone: [phone]  Pool: P_Dialysis_Access_Nurse

## 2024-09-09 NOTE — PROGRESS NOTES
9/6- Called pt to offer an in person appt w/gen neph per Dr. Sapp. No answer. LVM to return my call.  Daniele FRIEND RN  Nephrology care coordinator  [facility]

## 2024-09-10 DIAGNOSIS — N18.5 CKD (CHRONIC KIDNEY DISEASE) STAGE 5, GFR LESS THAN 15 ML/MIN (H): Primary | ICD-10-CM

## 2024-09-11 ENCOUNTER — PATIENT OUTREACH (OUTPATIENT)
Dept: NEPHROLOGY | Facility: CLINIC | Age: 59
End: 2024-09-11
Payer: COMMERCIAL

## 2024-09-11 NOTE — PROGRESS NOTES
9/11- Called Kathy to check in and see how she's feeling and her thoughts on getting a HD line placed now. I spoke to Kathy and she is okay waiting until after her neph appt on 9/26 donald/Latricia before HDCVC placement. I will contact IR to see how far out they are scheduling at this time.   Daniele FRIEND RN  Nephrology care coordinator  [facility]

## 2024-09-12 ENCOUNTER — PREP FOR PROCEDURE (OUTPATIENT)
Dept: TRANSPLANT | Facility: CLINIC | Age: 59
End: 2024-09-12
Payer: COMMERCIAL

## 2024-09-12 DIAGNOSIS — N18.9 CHRONIC KIDNEY DISEASE: Primary | ICD-10-CM

## 2024-09-12 NOTE — TELEPHONE ENCOUNTER
Fistula creation is scheduled for 10/15/24. Request sent to schedule PAC and post-op appointments.    Patricia Haddad RN on 9/12/2024 at 4:47 PM

## 2024-09-13 ENCOUNTER — TELEPHONE (OUTPATIENT)
Dept: TRANSPLANT | Facility: CLINIC | Age: 59
End: 2024-09-13
Payer: COMMERCIAL

## 2024-09-13 NOTE — TELEPHONE ENCOUNTER
Patient Contacted for the patient to call back and schedule the following:    Appointment type: Fistula follow up  Provider: Dr. Walton   Return date: 10/28/24  Specialty phone number: [phone]  Additional appointment(s) needed: pre op   Additonal Notes: Pt contacted writer to schedule 2 wk fistula f/u (DOS 10/15/24), writer scheduled pt w/Dr. Walton on 10/28 @2:30p. Pt agreed to call PAC clinic at [phone] to schedule pre op within 30 days of procedure.     AL 9/13/24

## 2024-09-13 NOTE — TELEPHONE ENCOUNTER
Left Voicemail (1st Attempt) and Sent Mychart (1st Attempt) for the patient to call back and schedule the following:    Appointment type: Fistula consult   Provider: Dr. Walton, JOSE MANUEL Jaime, DANG or Patricia Morse NP on a Monday   Return date: 2 wks post (DOS 10/15/24)  Specialty phone number: [phone]  Additional appointment(s) needed: pre op within 30 days of procedure/PAC clinic [phone]  Additonal Notes: BEREKETM & Jaison, writer contacted pt to schedule 2 wk fistula consult (DOS 10/15/24)   AL 09/13/24

## 2024-09-16 ENCOUNTER — TELEPHONE (OUTPATIENT)
Dept: FAMILY MEDICINE | Facility: CLINIC | Age: 59
End: 2024-09-16

## 2024-09-16 NOTE — LETTER
[facility]  [address] [id number] [phone]       December 30, 2024    Reina Quan  [address] [place] [id number]    Dear Reina,    We care about your health and have reviewed your health plan and are making recommendations based on this review, to optimize your health.    You are in particular need of attention regarding:  -Colon Cancer Screening    We are recommending that you:  -schedule a COLONOSCOPY to look for colon cancer (due every 10 years or 5 years in higher risk situations.)        Colon cancer is now the second leading cause of cancer-related deaths in the [place] for both men and women and there are over 130,000 new cases and 50,000 deaths per year from colon cancer.  Colonoscopies can prevent 90-95% of these deaths.  Problem lesions can be removed before they ever become cancer.  This test is not only looking for cancer, but also getting rid of precancerious lesions.    If you are under/uninsured, we recommend you contact the [employer] program. [employer] is a free colorectal cancer screening program that provides colonoscopies for eligible under/uninsured [place] men and women. If you are interested in receiving a free colonoscopy, please call [employer] at [phone] (mention code Sc[employer]Web) to see if you re eligible.      If you do not wish to do a colonoscopy or cannot afford to do one, at this time, there is another option. It is called a FIT test or Fecal Immunochemical Occult Blood Test (take home stool sample kit).  It does not replace the colonoscopy for colorectal cancer screening, but it can detect hidden bleeding in the lower colon.  It does need to be repeated every year and if a positive result is obtained, you would be referred for a colonoscopy.          If you have completed either one of these tests at another facility, please call with the details of when and where the tests were done and if they  were normal or not. Or have the records sent to our clinic so that we can best coordinate your care.    In addition, here is a list of due or overdue Health Maintenance reminders.    Health Maintenance Due   Topic Date Due    Hepatitis B Vaccine (1 of 3 - Risk Dialysis 4-dose series) Never done    Colorectal Cancer Screening  11/20/2017    COVID-19 Vaccine (3 - Moderna risk series) 02/11/2022    Yearly Preventive Visit  02/13/2024    ANNUAL REVIEW OF HM ORDERS  02/13/2024    Flu Vaccine (1) 09/01/2024    Depression Assessment  10/09/2024    Basic Metabolic Panel  01/15/2025       To address the above recommendations, we encourage you to contact us at [phone], via Revuze or by contacting Central Scheduling toll free at [phone] 24 hours a day. They will assist you with finding the most convenient time and location.    Thank you for trusting [facility] and we appreciate the opportunity to serve you.  We look forward to supporting your healthcare needs in the future.    Healthy Regards,    Your [facility] Team

## 2024-09-16 NOTE — LETTER
[facility]  [address] [facility] [id number]  [phone]       October 1, 2024    Reina Quan  [address] [place] [id number]    Dear Reina,    We care about your health and have reviewed your health plan and are making recommendations based on this review, to optimize your health.    You are in particular need of attention regarding:  -Wellness (Physical) Visit     We are recommending that you:  -schedule a WELLNESS (Physical) APPOINTMENT with me.   I will check fasting labs the same day - nothing to eat except water and meds for 8-10 hours prior.    In addition, here is a list of due or overdue Health Maintenance reminders.    Health Maintenance Due   Topic Date Due    Pneumococcal Vaccine (1 of 2 - PCV) Never done    HIV Screening  Never done    Hepatitis B Vaccine (1 of 3 - 19+ 3-dose series) Never done    Colorectal Cancer Screening  11/20/2017    COVID-19 Vaccine (3 - Moderna risk series) 02/11/2022    Yearly Preventive Visit  02/13/2024    ANNUAL REVIEW OF HM ORDERS  02/13/2024    Flu Vaccine (1) 09/01/2024    Depression Assessment  10/09/2024    Mammogram  10/16/2024       To address the above recommendations, we encourage you to contact us at [phone], via [employer] or by contacting Central Scheduling toll free at [phone] 24 hours a day. They will assist you with finding the most convenient time and location.    Thank you for trusting [facility] and we appreciate the opportunity to serve you.  We look forward to supporting your healthcare needs in the future.    Healthy Regards,    Your [facility] Team

## 2024-09-16 NOTE — CONFIDENTIAL NOTE
Patient Quality Outreach    Patient is due for the following:   Colon Cancer Screening  Depression  -  PHQ-9 needed  Physical Preventive Adult Physical    Next Steps:   Schedule a Adult Preventative    Type of outreach:    Sent FaceBuzz message.      Questions for provider review:    None           Gurmeet French MA

## 2024-09-16 NOTE — TELEPHONE ENCOUNTER
FUTURE VISIT INFORMATION      SURGERY INFORMATION:  Date: 10/9/24  Location:  gi  Surgeon:  Valentin Bull MD   Anesthesia Type:  Moderate Sedation   Procedure: Colonoscopy     RECORDS REQUESTED FROM:       [facility]    Pertinent Medical History: hypertension    Most recent EKG+ Tracin24    Most recent ECHO: 7/15/24

## 2024-09-18 RX ORDER — BISACODYL 5 MG/1
TABLET, DELAYED RELEASE ORAL
Qty: 4 TABLET | Refills: 0 | Status: SHIPPED | OUTPATIENT
Start: 2024-09-18

## 2024-09-19 DIAGNOSIS — N18.5 CKD (CHRONIC KIDNEY DISEASE) STAGE 5, GFR LESS THAN 15 ML/MIN (H): Primary | ICD-10-CM

## 2024-09-24 ENCOUNTER — LAB (OUTPATIENT)
Dept: LAB | Facility: CLINIC | Age: 59
End: 2024-09-24
Payer: COMMERCIAL

## 2024-09-24 DIAGNOSIS — N18.5 CKD (CHRONIC KIDNEY DISEASE) STAGE 5, GFR LESS THAN 15 ML/MIN (H): ICD-10-CM

## 2024-09-24 LAB
ALBUMIN SERPL BCG-MCNC: 4.6 G/DL (ref 3.5–5.2)
ALBUMIN UR-MCNC: >=300 MG/DL
ANION GAP SERPL CALCULATED.3IONS-SCNC: 14 MMOL/L (ref 7–15)
APPEARANCE UR: CLEAR
BACTERIA #/AREA URNS HPF: ABNORMAL /HPF
BILIRUB UR QL STRIP: NEGATIVE
BUN SERPL-MCNC: 60.9 MG/DL (ref 8–23)
CALCIUM SERPL-MCNC: 10.9 MG/DL (ref 8.8–10.4)
CHLORIDE SERPL-SCNC: 106 MMOL/L (ref 98–107)
COLOR UR AUTO: YELLOW
CREAT SERPL-MCNC: 4.4 MG/DL (ref 0.51–0.95)
EGFRCR SERPLBLD CKD-EPI 2021: 11 ML/MIN/1.73M2
ERYTHROCYTE [DISTWIDTH] IN BLOOD BY AUTOMATED COUNT: 14.9 % (ref 10–15)
GLUCOSE SERPL-MCNC: 107 MG/DL (ref 70–99)
GLUCOSE UR STRIP-MCNC: NEGATIVE MG/DL
HCO3 SERPL-SCNC: 16 MMOL/L (ref 22–29)
HCT VFR BLD AUTO: 33.2 % (ref 35–47)
HGB BLD-MCNC: 10.5 G/DL (ref 11.7–15.7)
HGB UR QL STRIP: ABNORMAL
KETONES UR STRIP-MCNC: NEGATIVE MG/DL
LEUKOCYTE ESTERASE UR QL STRIP: NEGATIVE
MCH RBC QN AUTO: 30.2 PG (ref 26.5–33)
MCHC RBC AUTO-ENTMCNC: 31.6 G/DL (ref 31.5–36.5)
MCV RBC AUTO: 95 FL (ref 78–100)
NITRATE UR QL: NEGATIVE
PH UR STRIP: 5.5 [PH] (ref 5–8)
PHOSPHATE SERPL-MCNC: 4.8 MG/DL (ref 2.5–4.5)
PLATELET # BLD AUTO: 205 10E3/UL (ref 150–450)
POTASSIUM SERPL-SCNC: 4.5 MMOL/L (ref 3.4–5.3)
RBC # BLD AUTO: 3.48 10E6/UL (ref 3.8–5.2)
RBC #/AREA URNS AUTO: ABNORMAL /HPF
SODIUM SERPL-SCNC: 136 MMOL/L (ref 135–145)
SP GR UR STRIP: >=1.03 (ref 1–1.03)
SQUAMOUS #/AREA URNS AUTO: ABNORMAL /LPF
UROBILINOGEN UR STRIP-ACNC: 0.2 E.U./DL
WBC # BLD AUTO: 4.6 10E3/UL (ref 4–11)
WBC #/AREA URNS AUTO: ABNORMAL /HPF

## 2024-09-24 PROCEDURE — 81001 URINALYSIS AUTO W/SCOPE: CPT

## 2024-09-24 PROCEDURE — 80069 RENAL FUNCTION PANEL: CPT

## 2024-09-24 PROCEDURE — 82570 ASSAY OF URINE CREATININE: CPT

## 2024-09-24 PROCEDURE — 87340 HEPATITIS B SURFACE AG IA: CPT

## 2024-09-24 PROCEDURE — 82043 UR ALBUMIN QUANTITATIVE: CPT

## 2024-09-24 PROCEDURE — 85027 COMPLETE CBC AUTOMATED: CPT

## 2024-09-24 PROCEDURE — 84156 ASSAY OF PROTEIN URINE: CPT

## 2024-09-24 PROCEDURE — 36415 COLL VENOUS BLD VENIPUNCTURE: CPT

## 2024-09-25 ENCOUNTER — TELEPHONE (OUTPATIENT)
Dept: GASTROENTEROLOGY | Facility: CLINIC | Age: 59
End: 2024-09-25

## 2024-09-25 DIAGNOSIS — Z12.11 SPECIAL SCREENING FOR MALIGNANT NEOPLASMS, COLON: ICD-10-CM

## 2024-09-25 LAB
ALBUMIN MFR UR ELPH: 335 MG/DL
CREAT UR-MCNC: 121 MG/DL
CREAT UR-MCNC: 121 MG/DL
MICROALBUMIN UR-MCNC: 2139 MG/L
MICROALBUMIN/CREAT UR: 1767.77 MG/G CR (ref 0–25)
PROT/CREAT 24H UR: 2.77 MG/MG CR (ref 0–0.2)

## 2024-09-25 NOTE — TELEPHONE ENCOUNTER
Attempted to contact patient in order to complete pre assessment questions.     Patient answered the phone however unable to talk at this time. Provided number to return call to [phone] option 2.    Callback required communication sent via [employer].        Gretta Abernathy RN  Endoscopy Procedure Pre Assessment

## 2024-09-25 NOTE — TELEPHONE ENCOUNTER
Pre visit planning completed.      Procedure details:    Patient scheduled for Colonoscopy on 10.9.24.     Arrival time: 0830. Procedure time 0930    Facility location: [facility]; [address], [address], [place] [address]. Check in location: Main entrance at registration desk.    Sedation type: Conscious sedation     Pre op exam needed? No.    Indication for procedure: screening      Chart review:     Electronic implanted devices? No    Recent diagnosis of diverticulitis within the last 6 weeks? No      Medication review:    Diabetic? No    Anticoagulants? No    Weight loss medication/injectable? No.    Other medication HOLDING recommendations:  N/A      Prep for procedure:     Bowel prep recommendation: Standard Golytely. Bowel prep prescription sent to    [id number] [place], [place] - [address] AT [facility] [address]    Due to: CKD noted.     Prep instructions sent via [employer]         Gretta Abernathy RN  Endoscopy Procedure Pre Assessment RN  [phone] option 2

## 2024-09-25 NOTE — PROGRESS NOTES
NEPHROLOGY Progress Note  9/26/2024    Assessment & Plan   # LDKT:  # CKD   Stable   - Baseline Creatinine:  ~ 1.5-1.8, now up to 3.8-4, today Scr 4.4, eGFR 11   - Proteinuria: Moderate (1-3 grams) urine prot/cr 2.77 mg/mg from 1.73 mg/mg and albumin/cr 1,767 mg/g from 760 mg/g   - Date DSA Last Checked: Feb/2015      Latest DSA: No   - BK Viremia: Not checked recently due to time from transplant  - Kidney Tx Biopsy: Yes, completed yesterday 7/25/24-   results pending  - referred to CKD journey  - completed kidney smart class  - surgical access consult 8/19/24, now preferring home hemodialysis,  may not be a good candidate for PD due to restlessness and difficulty with sleeping, surgery for AVF creation scheduled on 10/15/24.  - avoid NSAIDS  - drink to thirst, follow low sodium diet      # Immunosuppression: Sirolimus (goal 4-6)   - Continue with intensive monitoring of immunosuppression for efficacy and toxicity.   - Changes: No   - management per transplant team    # Infection Prophylaxis:   - PJP: None  - management per transplant team    # Hypertension: ;  Goal BP: < 130/80   - current BP regimen: none - off amlodipine 5 mg daily   - BP's at home  was 123/79 last clinic visit   - Changes: No - check BP at home, keep log   - previously on lisinopril, may need to resume this.     ECHO 7/15/24  Interpretation Summary  Global and regional left ventricular function is normal with an EF of 60-65%.  Global right ventricular function is normal.  No significant valvular abnormalities present.  Estimated mean right atrial pressure is normal.  No pericardial effusion is present.    # Elevated Blood Glucose: Glucose generally running ~ 110s-130s   - Management as per primary care.    # Anemia in Chronic Renal Disease: Hgb: 10.5 Stable      BEVERLY: No   - Iron studies: Not checked recently   - check iron studies next labs    # Mineral Bone Disorder:   - Secondary renal hyperparathyroidism vs primary hyperparathyroidism  -  PTH level: 22 Normal (18-80 pg/ml)        On treatment: None  - Vitamin D; level:23  Normal        On supplement: No  - Calcium; level: 10.9 High        On supplement: No  - Phosphorus; level: 4.8 High        On supplement: No   - Ca was up to 11.0 on labs 3/4/21 with low Vit D, PTH of 66, and PTHrP of 4.4 (nl 0.0-3.4). Ca returned to normal range at 9.7 but back up to low 10's. Recommended pt complete age-appropriate cancer screening. She is scheduled for colonoscopy.    # Electrolytes:   - Potassium; level: 4.5 Normal        On supplement: No  - Magnesium; level: Normal        On supplement: No  - Bicarbonate; level: 16 Low        On supplement: yes, sodium bicarb 1300 mg BID    # Depression: on Paxil. Stable.    # Skin Cancer: New lesions: several. Following with Dermatology.   - Discussed sun protection and recommend regular follow up with Dermatology.    # COVID-19 Virus Review: Discussed COVID-19 virus and the potential medical risks.  Reviewed preventative health recommendations, which includes washing hands for 20 seconds, avoid touching your face, and social distancing.  Asked patient to inform the transplant center if they are exposed or diagnosed with this virus.    # COVID Vaccine Completed: No but she states that she will get the vaccine as well as a booster      # Medical Compliance: Yes    # Transplant History:  Etiology of Kidney Failure: Focal segmental glomerulosclerosis (FSGS)  Tx: LDKT  Transplant: 6/2/1992 (Kidney)  Significant changes in immunosuppression: None  Significant transplant-related complications: None    Assessment and plan was discussed with the patient and she voiced her understanding and agreement.      #Disposition: Schedule for CVC placement and initiate dialysis.     Chief Complaint   Ms. Quan is a 59 year old here for kidney transplant and immunosuppression management.    History of Present Illness   Ms. 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     She is feeling poorly. She has worsening fatigue, loss of appetite, no n/v but is constipated. She feels short of breath, no cough or orthopnea. She is scheduled for fistula creation on 10/15. She also is planning for routine colonoscopy on 10/9 but is going to reschedule this. She is not checking her BP regularly. She has minimal LE edema. No change in UOP. She also notes brain fog.     Home BP: Not checked    Problem List   Patient Active Problem List   Diagnosis    Ulcerative colitis (H)    Migraine    Allergic rhinitis    Hearing loss    Kidney replaced by transplant    Lichen planus    Giant Platelet syndrome    Nephrotic syndrome in diseases classified elsewhere    Major depression in partial remission (H24)    Actinic keratosis    Stage 3b chronic kidney disease (H)    Vitiligo    History of SCC (squamous cell carcinoma) of skin    Lichen planopilaris    Hyperlipidemia with target LDL less than 130    AK (actinic keratosis)    History of nonmelanoma skin cancer    HTN, kidney transplant related    Immunosuppressed status (H24)    Neoplasm of unspecified behavior of bone, soft tissue, and skin    Porokeratosis    Aftercare following organ transplant    Dermatitis    Squamous cell carcinoma of right thigh    Hypercalcemia    Elevated serum creatinine    Complications, kidney transplant    Kidney transplant rejection       Allergies   Allergies   Allergen Reactions    Ampicillin Swelling     Swelling of mouth and tongue.     Seasonal Allergies Other (See Comments)     Itchy eyes and rhinitis.       Medications   Current Outpatient Medications   Medication Sig Dispense Refill    amLODIPine (NORVASC) 5 MG tablet Take 5 mg by mouth daily      amLODIPine (NORVASC) 5 MG tablet Take 1 tablet (5 mg) by mouth at bedtime for 90 days 90 tablet 3    atorvastatin (LIPITOR) 40 MG tablet Take 1 tablet (40 mg) by mouth at bedtime 90 tablet 3    bisacodyl (DULCOLAX) 5 MG EC tablet Take 2 tablets at 3 pm  the day before your procedure. If your procedure is before 11 am, take 2 additional tablets at 11 pm. If your procedure is after 11 am, take 2 additional tablets at 6 am. For additional instructions refer to your colonoscopy prep instructions. 4 tablet 0    cinacalcet (SENSIPAR) 30 MG tablet Take 1 tablet (30 mg) by mouth every other day 45 tablet 3    cinacalcet (SENSIPAR) 30 MG tablet Take 1 tablet (30 mg) by mouth daily for 90 days 90 tablet 2    clobetasol (TEMOVATE) 0.05 % external ointment Apply topically 2 times daily Use sparingly at active areas of dermatitis for no more than 14 days or until resolved, whichever is sooner. 60 g 3    clobetasol (TEMOVATE) 0.05 % external solution Apply topically daily as needed (itchyy scalp) Apply to scalp daily as needed. 50 mL 4    clobetasol propionate (CLOBEX) 0.05 % external shampoo APPLY TO SCALP IN SHOWER TWICE WEEKLY 118 mL 1    ketoconazole (NIZORAL) 2 % external shampoo APPLY TOPICALLY DAILY AS NEEDED FOR ITCHING, IRRITATION OR OTHER( SCALP SCALE) USE 2-3 TIMES A WEEK. APPLY AND LATHER THEN RINSE OUT 5 MINUTES 120 mL 9    mycophenolate (GENERIC EQUIVALENT) 250 MG capsule Take 3 capsules (750 mg) by mouth 2 times daily 540 capsule 3    niacinamide 500 MG tablet Take 1 tablet (500 mg) by mouth 2 times daily (with meals) 60 tablet 3    PARoxetine (PAXIL) 20 MG tablet TAKE 1 TABLET BY MOUTH EVERY MORNING 90 tablet 2    polyethylene glycol (GOLYTELY) 236 g suspension The night before the exam at 6 pm drink an 8-ounce glass every 15 minutes until the jug is half empty. If you arrive before 11 AM: Drink the other half of the Golytely jug at 11 PM night before procedure. If you arrive after 11 AM: Drink the other half of the Golytely jug at 6 AM day of procedure. For additional instructions refer to your colonoscopy prep instructions. 4000 mL 0    predniSONE (DELTASONE) 20 MG tablet 2 tabs (40 mg) BID x 2 days, 2 tabs (40 mg) daily x 2 days, 1 tab (20 mg) daily x 2 days  (Patient not taking: Reported on 8/19/2024) 14 tablet 0    predniSONE (DELTASONE) 5 MG tablet Take 1 tablet (5 mg) by mouth daily 90 tablet 3    sodium bicarbonate 650 MG tablet Take 2 tablets (1,300 mg) by mouth 2 times daily 360 tablet 3    triamcinolone (KENALOG) 0.1 % external cream APPLY SPARINGLY TOPICALLY TO THE AFFECTED AREA TWICE DAILY FOR 14 DAYS 30 g 0     Current Facility-Administered Medications   Medication Dose Route Frequency Provider Last Rate Last Admin    triamcinolone acetonide (KENALOG-10) injection 10 mg  10 mg Intra-Lesional Once Brad Bauman MD         There are no discontinued medications.    Physical Exam   Vital Signs: /75   Pulse 81   Temp 98.7  F (37.1  C) (Oral)   Wt 60.9 kg (134 lb 4.8 oz)   LMP 05/14/2014 (Approximate)   SpO2 97%   BMI 24.56 kg/m    GENERAL APPEARANCE: alert and no distress  Skin: warm and dry, no visible rash  Heart: RRR  Lungs: CTA  Extremities: no LE edema        Data         Latest Ref Rng & Units 9/24/2024    11:14 AM 8/29/2024    11:27 AM 8/16/2024    10:22 AM   Renal   Sodium 135 - 145 mmol/L 136  135  137    K 3.4 - 5.3 mmol/L 4.5  4.3  4.1    Cl 98 - 107 mmol/L 106  103  102    Cl (external) 98 - 107 mmol/L 106  103  102    CO2 22 - 29 mmol/L 16  17  20    Urea Nitrogen 8.0 - 23.0 mg/dL 60.9  47.0  37.0    Creatinine 0.51 - 0.95 mg/dL 4.40  3.87  4.08    Glucose 70 - 99 mg/dL 107  119  144    Calcium 8.8 - 10.4 mg/dL 10.9  9.8  9.7          Latest Ref Rng & Units 9/24/2024    11:14 AM 7/24/2024    11:49 AM 7/15/2024     3:52 PM   Bone Health   Phosphorus 2.5 - 4.5 mg/dL 4.8  4.8  5.0    Parathyroid Hormone Intact 15 - 65 pg/mL   22    Vit D Def 20 - 50 ng/mL  22  23          Latest Ref Rng & Units 9/24/2024    11:14 AM 8/29/2024    11:27 AM 8/8/2024    11:08 AM   Heme   WBC 4.0 - 11.0 10e3/uL 4.6  6.5  7.2    Hgb 11.7 - 15.7 g/dL 10.5  10.6  10.5    Plt 150 - 450 10e3/uL 205  271  195          Latest Ref Rng & Units 9/24/2024    11:14 AM  7/29/2024    11:23 AM 7/24/2024    11:49 AM   Liver   AP 40 - 150 U/L  86     TBili <=1.2 mg/dL  0.3     Bilirubin Direct 0.00 - 0.30 mg/dL  <0.20     ALT 0 - 50 U/L  27     AST 0 - 45 U/L  28     Tot Protein 6.4 - 8.3 g/dL  7.5     Albumin 3.5 - 5.2 g/dL 4.6  4.5  4.6          Latest Ref Rng & Units 1/17/2020     2:58 PM 3/13/2015    12:30 PM 6/20/2012     7:40 AM   Pancreas   A1C 0 - 5.6 % 5.7      Lipase 73 - 393 U/L  169  79          Latest Ref Rng & Units 5/30/2023    11:41 AM 1/17/2020     2:58 PM 3/5/2015    10:11 AM   Iron studies   Iron 37 - 145 ug/dL  37 - 145 ug/dL 84     84  61  83    Iron Saturation Index 15 - 46 %  21  29    Iron Sat Index 15 - 46 % 32      Ferritin 11 - 328 ng/mL 232  60  79          Latest Ref Rng & Units 10/17/2018     1:21 PM 2/19/2015     7:08 AM 2/17/2015     5:02 AM   [facility]   CVM DNA Quant  Plasma  EDTA PLASMA     CMV QUANT IU/ML CMVND^CMV DNA Not Detected [IU]/mL CMV DNA Not Detected  Canceled, Test credited   Quantity not sufficient  Notified Shanel LEMA @ [id number] on 2.19.15. JG  The MARIA ANTONIA AmpliPrep/MARIA ANTONIA TaqMan CMV Test is an FDA-approved in vitro nucleic   acid amplification test for the quantitation of cytomegalovirus DNA in human   plasma (EDTA plasma) using the MARIA ANTONIA AmpliPrep Instrument for automated viral   nucleic acid extraction and the MARIA ANTONIA TaqMan Analyzer or Sproom TaqMan for   automated Real Time amplification and detection of the viral nucleic acid   target.   Titer results are reported in International Units/mL (IU/mL using 1st WHO   International standard for Human Cytomegalovirus for Nucleic Acid Amplification   based assays. The conversion factor between CMV DNA copis/mL (as defined by the   Roche MARIA ANTONIA TaqMan CMV test) and International Units is the CMV DNA   concentration in IU/mL x 1.1 copies/IU = CMV DNA in copies/mL.   This assay has received FDA approval for the testing of human plasma only. The   [facility] at the  [facility] [place], [place], has validated the performance characteristics of the Roche   CMV assay for plasma, bronchial alveolar lavage/wash and urine.       LOG IU/ML OF CMVQNT <2.1 [Log_IU]/mL Not Calculated  Not Calculated     BK Spec    Plasma    BK Res BKNEG copies/mL   BK Virus DNA Not Detected    BK Log <2.7 Log copies/mL   Not Calculated   The Real-Time quantitative BK Virus assay was developed and its performance   characteristics determined by the [facility] Laboratory at   the [facility] [place] in [place], [place]. The   primers and probes for each analyte are Analyte Specific Reagents (ASRs)   manufactured by [employer].   ASRs are used in many laboratory tests necessary for standard medical care and   generally do not require U.S. Food and Drug Administration approval. The FDA   has determined that such clearance or approval is not necessary.   This test is used for clinical purposes. It should not be regarded as   investigational or for research. This laboratory is certified under the   Clinical Laboratory Improvement Amendments of 1988 (CLIA-88) as qualified to   perform high complexity clinical laboratory testing.      EBV DNA COPIES/ML EBVNEG^EBV DNA Not Detected [Copies]/mL EBV DNA Not Detected      EBV DNA LOG OF COPIES <2.7 [Log_copies]/mL Not Calculated        Failed to redirect to the Timeline version of the REVFS SmartLink.      Recent Labs   Lab Test 06/13/23  1152 09/22/23  1431 10/23/23  1444 04/09/24  1037   DOSMPA 6/12/2023  10:00 PM  --   --  4/8/2024  10:30 PM   MPACID 1.47 1.22 6.10* 2.79   MPAG 53.5 60.6 118.3* 58.9     Latricia Henson PA-C    Visit length 15 minutes. An additional 15 minutes were spent on date of service in chart review, documentation, and other activities as noted.

## 2024-09-26 ENCOUNTER — OFFICE VISIT (OUTPATIENT)
Dept: NEPHROLOGY | Facility: CLINIC | Age: 59
End: 2024-09-26
Attending: PHYSICIAN ASSISTANT
Payer: COMMERCIAL

## 2024-09-26 ENCOUNTER — PATIENT OUTREACH (OUTPATIENT)
Dept: NEPHROLOGY | Facility: CLINIC | Age: 59
End: 2024-09-26

## 2024-09-26 VITALS
SYSTOLIC BLOOD PRESSURE: 120 MMHG | OXYGEN SATURATION: 97 % | BODY MASS INDEX: 24.56 KG/M2 | DIASTOLIC BLOOD PRESSURE: 75 MMHG | WEIGHT: 134.3 LBS | HEART RATE: 81 BPM | TEMPERATURE: 98.7 F

## 2024-09-26 DIAGNOSIS — N18.5 ANEMIA IN STAGE 5 CHRONIC KIDNEY DISEASE, NOT ON CHRONIC DIALYSIS (H): ICD-10-CM

## 2024-09-26 DIAGNOSIS — I10 HYPERTENSION, ESSENTIAL: ICD-10-CM

## 2024-09-26 DIAGNOSIS — N18.5 CKD (CHRONIC KIDNEY DISEASE) STAGE 5, GFR LESS THAN 15 ML/MIN (H): Primary | ICD-10-CM

## 2024-09-26 DIAGNOSIS — D63.1 ANEMIA IN STAGE 5 CHRONIC KIDNEY DISEASE, NOT ON CHRONIC DIALYSIS (H): ICD-10-CM

## 2024-09-26 DIAGNOSIS — E87.20 METABOLIC ACIDOSIS: ICD-10-CM

## 2024-09-26 LAB — HBV SURFACE AG SERPL QL IA: NONREACTIVE

## 2024-09-26 PROCEDURE — 99214 OFFICE O/P EST MOD 30 MIN: CPT | Performed by: PHYSICIAN ASSISTANT

## 2024-09-26 PROCEDURE — 99213 OFFICE O/P EST LOW 20 MIN: CPT | Performed by: PHYSICIAN ASSISTANT

## 2024-09-26 ASSESSMENT — PAIN SCALES - GENERAL: PAINLEVEL: NO PAIN (0)

## 2024-09-26 NOTE — PROGRESS NOTES
9/26- Called Lexi to let her know that I have started the process to initiate dialysis. I have referred to IR for tunneled line. I will send in her referral and supporting paperwork to [facility]. I will also place lab orders needed for HD admissions. I spoke to Kathy today and she didn't have any questions from her appt with Latricia today. She will have labs done tomorrow. She will call me if she needs anything and will let me know when she has scheduled her tunneled line placement. cProvider updated.  Daniele FRIEND RN  Nephrology care coordinator  [facility]

## 2024-09-26 NOTE — NURSING NOTE
Chief Complaint   Patient presents with    RECHECK       /75   Pulse 81   Temp 98.7  F (37.1  C) (Oral)   Wt 60.9 kg (134 lb 4.8 oz)   LMP 05/14/2014 (Approximate)   SpO2 97%   BMI 24.56 kg/m      Alexandr Alonso on 9/26/2024 at 1:22 PM

## 2024-09-26 NOTE — PATIENT INSTRUCTIONS
Will refer for dialysis access placement to prepare for dialysis.   Increase sodium bicarb to 3 tabs twice a day.   Avoid ibuprofen/aleve.   Check blood pressure at home, keep log.   Call with any questions or concerns.

## 2024-09-26 NOTE — LETTER
9/26/2024       RE: Reina Quan  [address] [id number]     Dear Colleague,    Thank you for referring your patient, Reina Quan, to the [facility] [place] at [facility]. Please see a copy of my visit note below.      NEPHROLOGY Progress Note  9/26/2024    Assessment & Plan  # LDKT:  # CKD   Stable   - Baseline Creatinine:  ~ 1.5-1.8, now up to 3.8-4, today Scr 4.4, eGFR 11   - Proteinuria: Moderate (1-3 grams) urine prot/cr 2.77 mg/mg from 1.73 mg/mg and albumin/cr 1,767 mg/g from 760 mg/g   - Date DSA Last Checked: Feb/2015      Latest DSA: No   - BK Viremia: Not checked recently due to time from transplant  - Kidney Tx Biopsy: Yes, completed yesterday 7/25/24-   results pending  - referred to CKD journey  - completed kidney smart class  - surgical access consult 8/19/24, now preferring home hemodialysis,  may not be a good candidate for PD due to restlessness and difficulty with sleeping, surgery for AVF creation scheduled on 10/15/24.  - avoid NSAIDS  - drink to thirst, follow low sodium diet      # Immunosuppression: Sirolimus (goal 4-6)   - Continue with intensive monitoring of immunosuppression for efficacy and toxicity.   - Changes: No   - management per transplant team    # Infection Prophylaxis:   - PJP: None  - management per transplant team    # Hypertension: ;  Goal BP: < 130/80   - current BP regimen: none - off amlodipine 5 mg daily   - BP's at home  was 123/79 last clinic visit   - Changes: No - check BP at home, keep log   - previously on lisinopril, may need to resume this.     ECHO 7/15/24  Interpretation Summary  Global and regional left ventricular function is normal with an EF of 60-65%.  Global right ventricular function is normal.  No significant valvular abnormalities present.  Estimated mean right atrial pressure is normal.  No pericardial effusion is present.    # Elevated Blood  Glucose: Glucose generally running ~ 110s-130s   - Management as per primary care.    # Anemia in Chronic Renal Disease: Hgb: 10.5 Stable      BEVERLY: No   - Iron studies: Not checked recently   - check iron studies next labs    # Mineral Bone Disorder:   - Secondary renal hyperparathyroidism vs primary hyperparathyroidism  - PTH level: 22 Normal (18-80 pg/ml)        On treatment: None  - Vitamin D; level:23  Normal        On supplement: No  - Calcium; level: 10.9 High        On supplement: No  - Phosphorus; level: 4.8 High        On supplement: No   - Ca was up to 11.0 on labs 3/4/21 with low Vit D, PTH of 66, and PTHrP of 4.4 (nl 0.0-3.4). Ca returned to normal range at 9.7 but back up to low 10's. Recommended pt complete age-appropriate cancer screening. She is scheduled for colonoscopy.    # Electrolytes:   - Potassium; level: 4.5 Normal        On supplement: No  - Magnesium; level: Normal        On supplement: No  - Bicarbonate; level: 16 Low        On supplement: yes, sodium bicarb 1300 mg BID    # Depression: on Paxil. Stable.    # Skin Cancer: New lesions: several. Following with Dermatology.   - Discussed sun protection and recommend regular follow up with Dermatology.    # COVID-19 Virus Review: Discussed COVID-19 virus and the potential medical risks.  Reviewed preventative health recommendations, which includes washing hands for 20 seconds, avoid touching your face, and social distancing.  Asked patient to inform the transplant center if they are exposed or diagnosed with this virus.    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      #Disposition: Schedule for CVC placement and initiate dialysis.     Chief Complaint  Ms. Quan is a 59 year old here for kidney transplant and immunosuppression management.    History of Present Illness  Ms. 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     She is feeling poorly. She has worsening fatigue, loss of appetite, no n/v but is constipated. She feels short of breath, no cough or orthopnea. She is scheduled for fistula creation on 10/15. She also is planning for routine colonoscopy on 10/9 but is going to reschedule this. She is not checking her BP regularly. She has minimal LE edema. No change in UOP. She also notes brain fog.     Home BP: Not checked    Problem List  Patient Active Problem List   Diagnosis     Ulcerative colitis (H)     Migraine     Allergic rhinitis     Hearing loss     Kidney replaced by transplant     Lichen planus     Giant Platelet syndrome     Nephrotic syndrome in diseases classified elsewhere     Major depression in partial remission (H24)     Actinic keratosis     Stage 3b chronic kidney disease (H)     Vitiligo     History of SCC (squamous cell carcinoma) of skin     Lichen planopilaris     Hyperlipidemia with target LDL less than 130     AK (actinic keratosis)     History of nonmelanoma skin cancer     HTN, kidney transplant related     Immunosuppressed status (H24)     Neoplasm of unspecified behavior of bone, soft tissue, and skin     Porokeratosis     Aftercare following organ transplant     Dermatitis     Squamous cell carcinoma of right thigh     Hypercalcemia     Elevated serum creatinine     Complications, kidney transplant     Kidney transplant rejection       Allergies  Allergies   Allergen Reactions     Ampicillin Swelling     Swelling of mouth and tongue.      Seasonal Allergies Other (See Comments)     Itchy eyes and rhinitis.       Medications  Current Outpatient Medications    Medication Sig Dispense Refill     amLODIPine (NORVASC) 5 MG tablet Take 5 mg by mouth daily       amLODIPine (NORVASC) 5 MG tablet Take 1 tablet (5 mg) by mouth at bedtime for 90 days 90 tablet 3     atorvastatin (LIPITOR) 40 MG tablet Take 1 tablet (40 mg) by mouth at bedtime 90 tablet 3     bisacodyl (DULCOLAX) 5 MG EC tablet Take 2 tablets at 3 pm the day before your procedure. If your procedure is before 11 am, take 2 additional tablets at 11 pm. If your procedure is after 11 am, take 2 additional tablets at 6 am. For additional instructions refer to your colonoscopy prep instructions. 4 tablet 0     cinacalcet (SENSIPAR) 30 MG tablet Take 1 tablet (30 mg) by mouth every other day 45 tablet 3     cinacalcet (SENSIPAR) 30 MG tablet Take 1 tablet (30 mg) by mouth daily for 90 days 90 tablet 2     clobetasol (TEMOVATE) 0.05 % external ointment Apply topically 2 times daily Use sparingly at active areas of dermatitis for no more than 14 days or until resolved, whichever is sooner. 60 g 3     clobetasol (TEMOVATE) 0.05 % external solution Apply topically daily as needed (itchyy scalp) Apply to scalp daily as needed. 50 mL 4     clobetasol propionate (CLOBEX) 0.05 % external shampoo APPLY TO SCALP IN SHOWER TWICE WEEKLY 118 mL 1     ketoconazole (NIZORAL) 2 % external shampoo APPLY TOPICALLY DAILY AS NEEDED FOR ITCHING, IRRITATION OR OTHER( SCALP SCALE) USE 2-3 TIMES A WEEK. APPLY AND LATHER THEN RINSE OUT 5 MINUTES 120 mL 9     mycophenolate (GENERIC EQUIVALENT) 250 MG capsule Take 3 capsules (750 mg) by mouth 2 times daily 540 capsule 3     niacinamide 500 MG tablet Take 1 tablet (500 mg) by mouth 2 times daily (with meals) 60 tablet 3     PARoxetine (PAXIL) 20 MG tablet TAKE 1 TABLET BY MOUTH EVERY MORNING 90 tablet 2     polyethylene glycol (GOLYTELY) 236 g suspension The night before the exam at 6 pm drink an 8-ounce glass every 15 minutes until the jug is half empty. If you arrive before 11 AM: Drink the  other half of the evocatally jug at 11 PM night before procedure. If you arrive after 11 AM: Drink the other half of the [employer] jug at 6 AM day of procedure. For additional instructions refer to your colonoscopy prep instructions. 4000 mL 0     predniSONE (DELTASONE) 20 MG tablet 2 tabs (40 mg) BID x 2 days, 2 tabs (40 mg) daily x 2 days, 1 tab (20 mg) daily x 2 days (Patient not taking: Reported on 8/19/2024) 14 tablet 0     predniSONE (DELTASONE) 5 MG tablet Take 1 tablet (5 mg) by mouth daily 90 tablet 3     sodium bicarbonate 650 MG tablet Take 2 tablets (1,300 mg) by mouth 2 times daily 360 tablet 3     triamcinolone (KENALOG) 0.1 % external cream APPLY SPARINGLY TOPICALLY TO THE AFFECTED AREA TWICE DAILY FOR 14 DAYS 30 g 0     Current Facility-Administered Medications   Medication Dose Route Frequency Provider Last Rate Last Admin     triamcinolone acetonide (KENALOG-10) injection 10 mg  10 mg Intra-Lesional Once Brad Bauman MD         There are no discontinued medications.    Physical Exam  Vital Signs: /75   Pulse 81   Temp 98.7  F (37.1  C) (Oral)   Wt 60.9 kg (134 lb 4.8 oz)   LMP 05/14/2014 (Approximate)   SpO2 97%   BMI 24.56 kg/m    GENERAL APPEARANCE: alert and no distress  Skin: warm and dry, no visible rash  Heart: RRR  Lungs: CTA  Extremities: no LE edema        Data        Latest Ref Rng & Units 9/24/2024    11:14 AM 8/29/2024    11:27 AM 8/16/2024    10:22 AM   Renal   Sodium 135 - 145 mmol/L 136  135  137    K 3.4 - 5.3 mmol/L 4.5  4.3  4.1    Cl 98 - 107 mmol/L 106  103  102    Cl (external) 98 - 107 mmol/L 106  103  102    CO2 22 - 29 mmol/L 16  17  20    Urea Nitrogen 8.0 - 23.0 mg/dL 60.9  47.0  37.0    Creatinine 0.51 - 0.95 mg/dL 4.40  3.87  4.08    Glucose 70 - 99 mg/dL 107  119  144    Calcium 8.8 - 10.4 mg/dL 10.9  9.8  9.7          Latest Ref Rng & Units 9/24/2024    11:14 AM 7/24/2024    11:49 AM 7/15/2024     3:52 PM   Bone Health   Phosphorus 2.5 - 4.5 mg/dL 4.8   4.8  5.0    Parathyroid Hormone Intact 15 - 65 pg/mL   22    Vit D Def 20 - 50 ng/mL  22  23          Latest Ref Rng & Units 9/24/2024    11:14 AM 8/29/2024    11:27 AM 8/8/2024    11:08 AM   Heme   WBC 4.0 - 11.0 10e3/uL 4.6  6.5  7.2    Hgb 11.7 - 15.7 g/dL 10.5  10.6  10.5    Plt 150 - 450 10e3/uL 205  271  195          Latest Ref Rng & Units 9/24/2024    11:14 AM 7/29/2024    11:23 AM 7/24/2024    11:49 AM   Liver   AP 40 - 150 U/L  86     TBili <=1.2 mg/dL  0.3     Bilirubin Direct 0.00 - 0.30 mg/dL  <0.20     ALT 0 - 50 U/L  27     AST 0 - 45 U/L  28     Tot Protein 6.4 - 8.3 g/dL  7.5     Albumin 3.5 - 5.2 g/dL 4.6  4.5  4.6          Latest Ref Rng & Units 1/17/2020     2:58 PM 3/13/2015    12:30 PM 6/20/2012     7:40 AM   Pancreas   A1C 0 - 5.6 % 5.7      Lipase 73 - 393 U/L  169  79          Latest Ref Rng & Units 5/30/2023    11:41 AM 1/17/2020     2:58 PM 3/5/2015    10:11 AM   Iron studies   Iron 37 - 145 ug/dL  37 - 145 ug/dL 84     84  61  83    Iron Saturation Index 15 - 46 %  21  29    Iron Sat Index 15 - 46 % 32      Ferritin 11 - 328 ng/mL 232  60  79          Latest Ref Rng & Units 10/17/2018     1:21 PM 2/19/2015     7:08 AM 2/17/2015     5:02 AM   [facility] Virology   CVM DNA Quant  Plasma  EDTA PLASMA     CMV QUANT IU/ML CMVND^CMV DNA Not Detected [IU]/mL CMV DNA Not Detected  Canceled, Test credited   Quantity not sufficient  Notified Shanel A @ 0095 on 2.19.15. JG  The MARIA ANTONIA AmpliPrep/MARIA ANTONIA TaqMan CMV Test is an FDA-approved in vitro nucleic   acid amplification test for the quantitation of cytomegalovirus DNA in human   plasma (EDTA plasma) using the MARIA ANTONIA AmpliPrep Instrument for automated viral   nucleic acid extraction and the MARIA ANTONIA TaqMan Analyzer or MARIA ANTONIA TaqMan for   automated Real Time amplification and detection of the viral nucleic acid   target.   Titer results are reported in International Units/mL (IU/mL using 1st WHO   International standard for Human Cytomegalovirus for Nucleic  Acid Amplification   based assays. The conversion factor between CMV DNA copis/mL (as defined by the   Roche MARIA ANTONIA TaqMan CMV test) and International Units is the CMV DNA   concentration in IU/mL x 1.1 copies/IU = CMV DNA in copies/mL.   This assay has received FDA approval for the testing of human plasma only. The   [facility] at the [facility], [place], has validated the performance characteristics of the Roche   CMV assay for plasma, bronchial alveolar lavage/wash and urine.       LOG IU/ML OF CMVQNT <2.1 [Log_IU]/mL Not Calculated  Not Calculated     BK Spec    Plasma    BK Res BKNEG copies/mL   BK Virus DNA Not Detected    BK Log <2.7 Log copies/mL   Not Calculated   The Real-Time quantitative BK Virus assay was developed and its performance   characteristics determined by the [facility] at   the [facility] in [place], [place]. The   primers and probes for each analyte are Analyte Specific Reagents (ASRs)   manufactured by [employer].   ASRs are used in many laboratory tests necessary for standard medical care and   generally do not require U.S. Food and Drug Administration approval. The FDA   has determined that such clearance or approval is not necessary.   This test is used for clinical purposes. It should not be regarded as   investigational or for research. This laboratory is certified under the   Clinical Laboratory Improvement Amendments of 1988 (CLIA-88) as qualified to   perform high complexity clinical laboratory testing.      EBV DNA COPIES/ML EBVNEG^EBV DNA Not Detected [Copies]/mL EBV DNA Not Detected      EBV DNA LOG OF COPIES <2.7 [Log_copies]/mL Not Calculated        Failed to redirect to the Timeline version of the REVFS SmartLink.      Recent Labs   Lab Test 06/13/23  1152 09/22/23  1431 10/23/23  1444 04/09/24  1037   DOSMPA 6/12/2023  10:00 PM  --   --  4/8/2024  10:30  PM   MPACID 1.47 1.22 6.10* 2.79   MPAG 53.5 60.6 118.3* 58.9     Vinicio Henson PA-C    Visit length 15 minutes. An additional 15 minutes were spent on date of service in chart review, documentation, and other activities as noted.       Again, thank you for allowing me to participate in the care of your patient.      Sincerely,    VINICIO CAVAZOS PA-C

## 2024-09-27 ENCOUNTER — PRE VISIT (OUTPATIENT)
Dept: SURGERY | Facility: CLINIC | Age: 59
End: 2024-09-27

## 2024-09-27 ENCOUNTER — OFFICE VISIT (OUTPATIENT)
Dept: SURGERY | Facility: CLINIC | Age: 59
End: 2024-09-27
Payer: COMMERCIAL

## 2024-09-27 ENCOUNTER — ANESTHESIA EVENT (OUTPATIENT)
Dept: SURGERY | Facility: CLINIC | Age: 59
End: 2024-09-27
Payer: COMMERCIAL

## 2024-09-27 ENCOUNTER — LAB (OUTPATIENT)
Dept: LAB | Facility: CLINIC | Age: 59
End: 2024-09-27
Payer: COMMERCIAL

## 2024-09-27 VITALS
HEIGHT: 61 IN | HEART RATE: 83 BPM | BODY MASS INDEX: 25.11 KG/M2 | DIASTOLIC BLOOD PRESSURE: 69 MMHG | RESPIRATION RATE: 16 BRPM | OXYGEN SATURATION: 98 % | SYSTOLIC BLOOD PRESSURE: 102 MMHG | WEIGHT: 133 LBS | TEMPERATURE: 98.4 F

## 2024-09-27 DIAGNOSIS — N05.1 FSGS (FOCAL SEGMENTAL GLOMERULOSCLEROSIS): ICD-10-CM

## 2024-09-27 DIAGNOSIS — R06.09 EXERTIONAL DYSPNEA: ICD-10-CM

## 2024-09-27 DIAGNOSIS — N18.4 ANEMIA IN STAGE 4 CHRONIC KIDNEY DISEASE (H): ICD-10-CM

## 2024-09-27 DIAGNOSIS — N18.4 CKD (CHRONIC KIDNEY DISEASE) STAGE 4, GFR 15-29 ML/MIN (H): ICD-10-CM

## 2024-09-27 DIAGNOSIS — Z01.818 PREOP EXAMINATION: Primary | ICD-10-CM

## 2024-09-27 DIAGNOSIS — N18.9 CHRONIC KIDNEY DISEASE, UNSPECIFIED CKD STAGE: ICD-10-CM

## 2024-09-27 DIAGNOSIS — D63.1 ANEMIA IN STAGE 4 CHRONIC KIDNEY DISEASE (H): ICD-10-CM

## 2024-09-27 DIAGNOSIS — N18.5 CKD (CHRONIC KIDNEY DISEASE) STAGE 5, GFR LESS THAN 15 ML/MIN (H): ICD-10-CM

## 2024-09-27 LAB
ALBUMIN MFR UR ELPH: 273 MG/DL
ALBUMIN SERPL BCG-MCNC: 4.5 G/DL (ref 3.5–5.2)
ALBUMIN UR-MCNC: 300 MG/DL
ANION GAP SERPL CALCULATED.3IONS-SCNC: 15 MMOL/L (ref 7–15)
APPEARANCE UR: CLEAR
BILIRUB UR QL STRIP: NEGATIVE
BUN SERPL-MCNC: 62.3 MG/DL (ref 8–23)
CALCIUM SERPL-MCNC: 10.8 MG/DL (ref 8.8–10.4)
CHLORIDE SERPL-SCNC: 104 MMOL/L (ref 98–107)
COLOR UR AUTO: ABNORMAL
CREAT SERPL-MCNC: 5.05 MG/DL (ref 0.51–0.95)
CREAT UR-MCNC: 119 MG/DL
CREAT UR-MCNC: 119 MG/DL
EGFRCR SERPLBLD CKD-EPI 2021: 9 ML/MIN/1.73M2
ERYTHROCYTE [DISTWIDTH] IN BLOOD BY AUTOMATED COUNT: 14.9 % (ref 10–15)
GLUCOSE SERPL-MCNC: 112 MG/DL (ref 70–99)
GLUCOSE UR STRIP-MCNC: NEGATIVE MG/DL
HBV CORE AB SERPL QL IA: NONREACTIVE
HBV SURFACE AB SERPL IA-ACNC: <3.5 M[IU]/ML
HBV SURFACE AB SERPL IA-ACNC: NONREACTIVE M[IU]/ML
HBV SURFACE AG SERPL QL IA: NONREACTIVE
HCO3 SERPL-SCNC: 17 MMOL/L (ref 22–29)
HCT VFR BLD AUTO: 30.2 % (ref 35–47)
HGB BLD-MCNC: 9.7 G/DL (ref 11.7–15.7)
HGB UR QL STRIP: ABNORMAL
KETONES UR STRIP-MCNC: NEGATIVE MG/DL
LEUKOCYTE ESTERASE UR QL STRIP: ABNORMAL
MCH RBC QN AUTO: 30 PG (ref 26.5–33)
MCHC RBC AUTO-ENTMCNC: 32.1 G/DL (ref 31.5–36.5)
MCV RBC AUTO: 94 FL (ref 78–100)
MICROALBUMIN UR-MCNC: 1808 MG/L
MICROALBUMIN/CREAT UR: 1519.33 MG/G CR (ref 0–25)
NITRATE UR QL: NEGATIVE
PH UR STRIP: 6 [PH] (ref 5–7)
PHOSPHATE SERPL-MCNC: 5 MG/DL (ref 2.5–4.5)
PLATELET # BLD AUTO: 187 10E3/UL (ref 150–450)
POTASSIUM SERPL-SCNC: 4.3 MMOL/L (ref 3.4–5.3)
PROT/CREAT 24H UR: 2.29 MG/MG CR (ref 0–0.2)
RBC # BLD AUTO: 3.23 10E6/UL (ref 3.8–5.2)
RBC URINE: 3 /HPF
SODIUM SERPL-SCNC: 136 MMOL/L (ref 135–145)
SP GR UR STRIP: 1.01 (ref 1–1.03)
SQUAMOUS EPITHELIAL: 1 /HPF
UROBILINOGEN UR STRIP-MCNC: NORMAL MG/DL
WBC # BLD AUTO: 4.9 10E3/UL (ref 4–11)
WBC URINE: 3 /HPF

## 2024-09-27 PROCEDURE — 84156 ASSAY OF PROTEIN URINE: CPT | Performed by: PATHOLOGY

## 2024-09-27 PROCEDURE — 81001 URINALYSIS AUTO W/SCOPE: CPT | Performed by: PATHOLOGY

## 2024-09-27 PROCEDURE — 82043 UR ALBUMIN QUANTITATIVE: CPT | Performed by: PHYSICIAN ASSISTANT

## 2024-09-27 PROCEDURE — 85027 COMPLETE CBC AUTOMATED: CPT | Performed by: PATHOLOGY

## 2024-09-27 PROCEDURE — 87340 HEPATITIS B SURFACE AG IA: CPT | Performed by: PHYSICIAN ASSISTANT

## 2024-09-27 PROCEDURE — 99203 OFFICE O/P NEW LOW 30 MIN: CPT | Performed by: NURSE PRACTITIONER

## 2024-09-27 PROCEDURE — 36415 COLL VENOUS BLD VENIPUNCTURE: CPT | Performed by: PATHOLOGY

## 2024-09-27 PROCEDURE — 86706 HEP B SURFACE ANTIBODY: CPT | Performed by: PHYSICIAN ASSISTANT

## 2024-09-27 PROCEDURE — 86481 TB AG RESPONSE T-CELL SUSP: CPT | Performed by: PHYSICIAN ASSISTANT

## 2024-09-27 PROCEDURE — 86704 HEP B CORE ANTIBODY TOTAL: CPT | Performed by: PHYSICIAN ASSISTANT

## 2024-09-27 PROCEDURE — 80069 RENAL FUNCTION PANEL: CPT | Performed by: PATHOLOGY

## 2024-09-27 PROCEDURE — 99000 SPECIMEN HANDLING OFFICE-LAB: CPT | Performed by: PATHOLOGY

## 2024-09-27 ASSESSMENT — ENCOUNTER SYMPTOMS: ORTHOPNEA: 0

## 2024-09-27 ASSESSMENT — PAIN SCALES - GENERAL: PAINLEVEL: NO PAIN (0)

## 2024-09-27 NOTE — H&P
Preop    Pre-Operative H & P     CC:  Preoperative exam to assess for increased cardiopulmonary risk while undergoing surgery and anesthesia.    Date of Encounter: 9/27/2024  Primary Care Physician:  Lizbeth Andrews     Reason for visit:   Encounter Diagnoses   Name Primary?    Preop examination Yes    FSGS (focal segmental glomerulosclerosis)     Chronic kidney disease, unspecified CKD stage     Exertional dyspnea        HPI  Reina Quan is a 59 year old female who presents for pre-operative H & P in preparation for  Procedure Information       Case: [id number] Date/Time: 10/15/24 0800    Procedure: Left Upper Extremity ARTERIOVENOUS FISTULA Creation versus Graft (Left: Arm)    Anesthesia type: General with Block    Diagnosis: Chronic kidney disease [N18.9]    Pre-op diagnosis: Chronic kidney disease [N18.9]    Location:  OR [address] [place] [address] OR    Providers: Jesus Alberto Walton MD            Reina Quan is a 59 year old female with hypertension, hyperlipidemia, hearing loss, anemia, anxiety, depression, history of skin cancer, and lichen planoplanis that is s/p kidney transplant in 1992 for FSGS.  Over the past few months her kidney function has been noted to be gradually worsening.  The above listed procedure has been recommended to get her prepared for likely dialysis before new transplant.    History is obtained from the patient and chart review    Hx of abnormal bleeding or anti-platelet use: none    Menstrual history: Patient's last menstrual period was 05/14/2014 (approximate).:      Past Medical History  Past Medical History:   Diagnosis Date    Actinic keratosis     Allergic rhinitis, cause unspecified     Anemia     anxiety/depression     PAXIL    Arthritis     congenital hearing loss     uses hearing aids    Depressive disorder     Dry eyes     Giant Platelet syndrome     Glomerular Focal Sclerosis--transplant 1985     Hypertension     Kidney replaced by transplant 1992    RAPAMUNE/  SIROLIMUS     Lichen planopilaris     LPP followed by derm on doxycycline/ clobetasol    Lichen planus     Menorrhagia     migraine     Squamous cell carcinoma     8 x     Thrombocytopenia (H)     Ulcerative colitis, unspecified     biposy neg 1996    Unsatisfactory cervical Papanicolaou smear 02/15/2021    UTI (lower urinary tract infection)     recurrent Dr Burns [facility]       Past Surgical History  Past Surgical History:   Procedure Laterality Date    BIOPSY OF SKIN LESION      CHOLECYSTECTOMY      DILATION AND CURETTAGE, OPERATIVE HYSTEROSCOPY WITH MORCELLATOR, COMBINED N/A 11/10/2015    Procedure: COMBINED DILATION AND CURETTAGE, OPERATIVE HYSTEROSCOPY WITH MORCELLATOR;  Surgeon: Ghada Melendez MD;  Location: [facility] OR    ESOPHAGOSCOPY, GASTROSCOPY, DUODENOSCOPY (EGD), COMBINED  11/20/2012    Procedure: COMBINED ESOPHAGOSCOPY, GASTROSCOPY, DUODENOSCOPY (EGD), BIOPSY SINGLE OR MULTIPLE;;  Surgeon: Valentin Hahn MD;  Location: [facility] GI    IR RENAL BIOPSY RIGHT  07/25/2024    MOHS MICROGRAPHIC PROCEDURE      TRANSPLANT  1992    kidney    [facility] NONSPECIFIC PROCEDURE      Renal transplant right side    [facility] NONSPECIFIC PROCEDURE      cholecystectomy       Prior to Admission Medications  Current Outpatient Medications   Medication Sig Dispense Refill    atorvastatin (LIPITOR) 40 MG tablet Take 1 tablet (40 mg) by mouth at bedtime 90 tablet 3    clobetasol (TEMOVATE) 0.05 % external ointment Apply topically 2 times daily Use sparingly at active areas of dermatitis for no more than 14 days or until resolved, whichever is sooner. 60 g 3    clobetasol (TEMOVATE) 0.05 % external solution Apply topically daily as needed (itchyy scalp) Apply to scalp daily as needed. 50 mL 4    clobetasol propionate (CLOBEX) 0.05 % external shampoo APPLY TO SCALP IN SHOWER TWICE WEEKLY 118 mL 1    ketoconazole (NIZORAL) 2 % external shampoo APPLY TOPICALLY DAILY AS NEEDED FOR ITCHING, IRRITATION OR OTHER( SCALP SCALE) USE 2-3 TIMES A WEEK.  APPLY AND LATHER THEN RINSE OUT 5 MINUTES 120 mL 9    mycophenolate (GENERIC EQUIVALENT) 250 MG capsule Take 3 capsules (750 mg) by mouth 2 times daily 540 capsule 3    niacinamide 500 MG tablet Take 1 tablet (500 mg) by mouth 2 times daily (with meals) 60 tablet 3    PARoxetine (PAXIL) 20 MG tablet TAKE 1 TABLET BY MOUTH EVERY MORNING 90 tablet 2    predniSONE (DELTASONE) 5 MG tablet Take 1 tablet (5 mg) by mouth daily (Patient taking differently: Take 5 mg by mouth every morning.) 90 tablet 3    sodium bicarbonate 650 MG tablet Take 2 tablets (1,300 mg) by mouth 2 times daily 360 tablet 3    triamcinolone (KENALOG) 0.1 % external cream APPLY SPARINGLY TOPICALLY TO THE AFFECTED AREA TWICE DAILY FOR 14 DAYS 30 g 0    bisacodyl (DULCOLAX) 5 MG EC tablet Take 2 tablets at 3 pm the day before your procedure. If your procedure is before 11 am, take 2 additional tablets at 11 pm. If your procedure is after 11 am, take 2 additional tablets at 6 am. For additional instructions refer to your colonoscopy prep instructions. 4 tablet 0    cinacalcet (SENSIPAR) 30 MG tablet Take 1 tablet (30 mg) by mouth every other day (Patient not taking: Reported on 9/27/2024) 45 tablet 3    polyethylene glycol (GOLYTELY) 236 g suspension The night before the exam at 6 pm drink an 8-ounce glass every 15 minutes until the jug is half empty. If you arrive before 11 AM: Drink the other half of the Golytely jug at 11 PM night before procedure. If you arrive after 11 AM: Drink the other half of the Golytely jug at 6 AM day of procedure. For additional instructions refer to your colonoscopy prep instructions. 4000 mL 0       Allergies  Allergies   Allergen Reactions    Ampicillin Swelling     Swelling of mouth and tongue.     Seasonal Allergies Other (See Comments)     Itchy eyes and rhinitis.       Social History  Social History     Socioeconomic History    Marital status:      Spouse name: Not on file    Number of children: 3    Years of  education: Not on file    Highest education level: Not on file   Occupational History    Occupation:  - window treatments   Tobacco Use    Smoking status: Never     Passive exposure: Never    Smokeless tobacco: Never   Vaping Use    Vaping status: Never Used   Substance and Sexual Activity    Alcohol use: Not Currently    Drug use: No    Sexual activity: Not Currently     Partners: Male     Birth control/protection: Post-menopausal   Other Topics Concern    Parent/sibling w/ CABG, MI or angioplasty before 65F 55M? 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 Doesn't have a job lined up afterward but looking forward to some time off. Has three children, one moved to college this fall. Living in [place], has a significant other x 7 months. Previously . In a monogamous relationship. Never smoker, social EtOH use.      Social Determinants of Health     Financial Resource Strain: Not on file   Food Insecurity: Not on file   Transportation Needs: Not on file   Physical Activity: Not on file   Stress: Not on file   Social Connections: Not on file   Interpersonal Safety: Low Risk  (4/9/2024)    Interpersonal Safety     Do you feel physically and emotionally safe where you currently live?: Yes     Within the past 12 months, have you been hit, slapped, kicked or otherwise physically hurt by someone?: No     Within the past 12 months, have you been humiliated or emotionally abused in other ways by your partner or ex-partner?: No   Housing Stability: Not on file       Family History  Family History   Problem Relation Age of Onset    Hypertension Mother     Depression Mother     Anxiety Disorder Mother     Diabetes Father     Kidney Disease Father         nephrolithiasis    Asthma Father     Asthma Sister     Blood Disease Maternal Grandmother     Diabetes Maternal Grandmother     Hypertension Maternal Grandmother     Hyperlipidemia Maternal  Grandmother     Depression Maternal Grandmother     No Known Problems Maternal Grandfather     No Known Problems Paternal Grandmother     C.A.D. Paternal Grandfather     Cancer Other         no family hx of skin cancer    Glaucoma No family hx of     Macular Degeneration No family hx of     Melanoma No family hx of     Skin Cancer No family hx of     Anesthesia Reaction No family hx of     Thrombosis No family hx of        Review of Systems  The complete review of systems is negative other than noted in the HPI or here.   Anesthesia Evaluation   Pt has had prior anesthetic.     No history of anesthetic complications       ROS/MED HX  ENT/Pulmonary: Comment: Hearing loss    (+)     MARGO risk factors,  hypertension,                                 Neurologic:  - neg neurologic ROS     Cardiovascular:     (+) Dyslipidemia hypertension- -   -  - -           KAUR.                      Previous cardiac testing   Echo: Date: 7/2024 Results:  Echo  7/2024  Interpretation Summary  Global and regional left ventricular function is normal with an EF of 60-65%.  Global right ventricular function is normal.  No significant valvular abnormalities present.  Estimated mean right atrial pressure is normal.  No pericardial effusion is present.  Stress Test:  Date: 10/4/24 Results:  Addenda        The nuclear stress test is negative for inducible myocardial ischemia or infarction.     The left ventricular ejection fraction at rest is greater than 70%.     There is no prior study for comparison.    ECG Reviewed:  Date: 8/2024 Results:  Sinus rhythm  Low voltage QRS  Borderline ECG  Cath:  Date: Results:   (-) orthopnea/PND   METS/Exercise Tolerance: 3 - Able to walk 1-2 blocks without stopping Comment: Up until June was biking, walking, and going to the gym for exercise.  Didn't typically have exertional dyspnea or angina.  Since June started having exertional dyspnea that is thought to be related to her rapidly decreasing kidney function  "and changes in her CO2 level.     Hematologic:     (+)      anemia, history of blood transfusion, no previous transfusion reaction,        Musculoskeletal:   (+)  arthritis,             GI/Hepatic:  - neg GI/hepatic ROS     Renal/Genitourinary: Comment: Glomerular focal sclerosis  S/p kidney transplant x 2    Nocturia, urinary retention    (+) renal disease, type: CRI, Pt does not require dialysis,  Pt has history of transplant, date: 1992,        Endo:     (+)            Chronic steroid usage for Post Transplant Immunosuppression.         Psychiatric/Substance Use:     (+) psychiatric history anxiety and depression       Infectious Disease:  - neg infectious disease ROS     Malignancy:   (+) Malignancy, History of Skin.Skin CA Remission status post Surgery.      Other: Comment: Lichen planoplanis - following with dermatology           /69 (BP Location: Right arm, Patient Position: Sitting, Cuff Size: Adult Large)   Pulse 83   Temp 98.4  F (36.9  C) (Oral)   Resp 16   Ht 1.549 m (5' 1\")   Wt 60.3 kg (133 lb)   LMP 05/14/2014 (Approximate)   SpO2 98%   Breastfeeding No   BMI 25.13 kg/m      Physical Exam   Constitutional: Awake, alert, cooperative, no apparent distress, and appears stated age.  Eyes: Pupils equal, round and reactive to light, extra ocular muscles intact, sclera clear, conjunctiva normal.  HENT: Normocephalic, oral pharynx with moist mucus membranes, good dentition. No goiter appreciated.   Respiratory: Clear to auscultation bilaterally, no crackles or wheezing.  Cardiovascular: Regular rate and rhythm, normal S1 and S2, and no murmur noted.  Carotids +2, no bruits. No edema. Palpable pulses to radial  DP and PT arteries.   GI: Normal bowel sounds, soft, non-distended, non-tender, no masses palpated, no hepatosplenomegaly.    Lymph/Hematologic: No cervical lymphadenopathy and no supraclavicular lymphadenopathy.  Genitourinary:  deferred  Skin: Warm and dry.  No rashes at anticipated " surgical site.   Musculoskeletal: Full ROM of neck. There is no redness, warmth, or swelling of the exposed joints. Gross motor strength is normal.    Neurologic: Awake, alert, oriented to name, place and time. Cranial nerves II-XII are grossly intact. Gait is normal.   Neuropsychiatric: Calm, cooperative. Normal affect.     Prior Labs/Diagnostic Studies   All labs and imaging personally reviewed     EKG/ stress test - if available please see in ROS above     Component      Latest Ref Rng 8/29/2024  11:27 AM 9/24/2024  11:14 AM   Sodium      135 - 145 mmol/L 135  136    Potassium      3.4 - 5.3 mmol/L 4.3  4.5    Chloride      98 - 107 mmol/L 103  106    Carbon Dioxide (CO2)      22 - 29 mmol/L 17 (L)  16 (L)    Anion Gap      7 - 15 mmol/L 15  14    Glucose      70 - 99 mg/dL 119 (H)  107 (H)    Urea Nitrogen      8.0 - 23.0 mg/dL 47.0 (H)  60.9 (H)    Creatinine      0.51 - 0.95 mg/dL 3.87 (H)  4.40 (H)    GFR Estimate      >60 mL/min/1.73m2 13 (L)  11 (L)    Calcium      8.8 - 10.4 mg/dL 9.8  10.9 (H)    Albumin      3.5 - 5.2 g/dL  4.6    Phosphorus      2.5 - 4.5 mg/dL  4.8 (H)    WBC      4.0 - 11.0 10e3/uL  4.6    RBC Count      3.80 - 5.20 10e6/uL  3.48 (L)    Hemoglobin      11.7 - 15.7 g/dL  10.5 (L)    Hematocrit      35.0 - 47.0 %  33.2 (L)    MCV      78 - 100 fL  95    MCH      26.5 - 33.0 pg  30.2    MCHC      31.5 - 36.5 g/dL  31.6    RDW      10.0 - 15.0 %  14.9    Platelet Count      150 - 450 10e3/uL  205       Legend:  (L) Low  (H) High        The patient's records and results personally reviewed by this provider.     Outside records reviewed from: Care Everywhere    LAB/DIAGNOSTIC STUDIES TODAY:  none    Assessment    Reina Quan is a 59 year old female seen as a PAC referral for risk assessment and optimization for anesthesia.    Plan/Recommendations  Pt will be optimized for the proposed procedure.  See below for details on the assessment, risk, and preoperative  "recommendations    NEUROLOGY  - No history of TIA, CVA or seizure    -Post Op delirium risk factors:  High co-morbid index    ENT  - No current airway concerns.  Will need to be reassessed day of surgery.  Mallampati: I  TM: > 3    CARDIAC  - No history of CAD and Afib  - was on amlodipine for for hypertension, but that was recently put on hold.  - reports significant increase in exertional dyspnea over this past summer as her kidney function and CO2 has worsened.  Echocardiogram done in July - report above in ROS.  Will order stress test for additional evaluation.    - METS (Metabolic Equivalents)  Patient CANNOT perform 4 METS exercise without symptoms             Total Score: 1    Functional Capacity: Unable to complete 4 METS      RCRI-Low risk: Class 2 0.9% complication rate             Total Score: 1    RCRI: Elevated Creatinine        PULMONARY    MARGO Low Risk             Total Score: 2    MARGO: Hypertension    MARGO: Over 50 ys old      - Denies asthma or inhaler use  - Tobacco History    History   Smoking Status    Never   Smokeless Tobacco    Never       GI  - denies GERD  PONV High Risk  Total Score: 3           1 AN PONV: Pt is Female    1 AN PONV: Patient is not a current smoker    1 AN PONV: Intended Post Op Opioids        /RENAL  - s/p kidney transplant in 1992 for FSGS, now with worsening CKD.  Procedure planned as above.  - being worked up for kidney transplant list.    ENDOCRINE    - BMI: Estimated body mass index is 25.13 kg/m  as calculated from the following:    Height as of this encounter: 1.549 m (5' 1\").    Weight as of this encounter: 60.3 kg (133 lb).  Overweight (BMI 25.0-29.9)  - No history of Diabetes Mellitus    - on 5mg daily prednisone.  Stress dosing as per anesthesia DOS.    HEME  VTE Low Risk 0.26%             Total Score: 0      - No history of abnormal bleeding or antiplatelet use.  - Chronic anemia  Recommend perioperative use of blood conservation techniques intraoperatively and " close monitoring for postoperative bleeding.      MSK  Patient is NOT Frail             Total Score: 0        PSYCH  - continue paxil without interruption.         Different anesthesia methods/types have been discussed with the patient, but they are aware that the final plan will be decided by the assigned anesthesia provider on the date of service.  Patient was discussed with Dr Whitmore    The patient is not yet optimized for their procedure.   Stress test pending.     **addendum (10/8/24) - stress test has been completed and is acceptable for surgery.  See report above.  Okay to proceed as planned. **    AVS with information on surgery time/arrival time, meds and NPO status given by nursing staff. No further diagnostic testing indicated.      On the day of service:     Prep time: 10 minutes  Visit time: 14 minutes  Documentation/communication time: 18 minutes  ------------------------------------------  Total time: 42 minutes      JOSE MANUEL Angelo CNP  Preoperative Assessment Center  [facility]  Clinic and Surgery Center  Phone: [phone]  Fax: [phone]

## 2024-09-27 NOTE — TELEPHONE ENCOUNTER
Second call attempt to complete pre assessment.     No answer.  Left message to return call to [phone] #2 by next business day prior to 4PM or procedure will be sent to cancel. Upon leaving voicemail it cut out before able to leave call back number. Will send We[employer] message to return call.     Bri Simon RN  Endoscopy Procedure Pre Assessment

## 2024-09-27 NOTE — PATIENT INSTRUCTIONS
Preparing for Your Surgery      Name:  Reina Quan   MRN:  [id number]   :  1965   Today's Date:  2024       Arriving for surgery:  Surgery date:  10/15/24  Arrival time:  06:00 am        Please come to:      [facility]    [address] [place]  [address]     The [facility] Patient/Visitor Ramp is at [address]. Patients and visitors who self-park will receive the reduced hospital parking rate. If the Patient /Visitor Ramp is full, please follow the signs to the [employer] car park located at the main hospital entrance.       parking is available (24 hours/ 7 days a week)      Discounted parking pass options are available for patients and visitors. They can be purchased at the JUN[employer]E desk at the [facility] hospital entrance.     -    Stop at the security desk and they will direct surgery patients to the Surgery Check in and Family Lounge. [phone]        - If you need directions, a wheelchair or an escort please stop at the Information/security desk in the lobby.     What can I eat or drink?  -  You may eat and drink normally up to 8 hours prior to arrival time.   -  You may have clear liquids until 2 hours prior to arrival time.     Examples of clear liquids:  Water  Clear broth  Juices (apple, white grape, white cranberry  and cider) without pulp  Noncarbonated, powder based beverages  (lemonade and Sigifredo-Aid)  Sodas (Sprite, 7-Up, ginger ale and seltzer)  Coffee or tea (without milk or cream)  Gatorade    -  No Alcohol or cannabis products for at least 24 hours before surgery.     Which medicines can I take?    Hold Aspirin for 7 days before surgery.   Hold Multivitamins for 7 days before surgery.  Hold Supplements for 7 days before surgery.  Hold Ibuprofen (Advil, Motrin) for 1 day(s) before surgery--unless otherwise directed by surgeon.  Hold Naproxen  (Aleve) for 4 days before surgery.    -  DO NOT take these medications the day of surgery:  Niacinamide, sodium bicarb.    -  PLEASE TAKE these medications the day of surgery:  Tylenol if needed; take mycophenolate, paxil, deltasone.    How do I prepare myself?  - Please take 2 showers (one the night prior to surgery and one the morning of surgery) using Scrubcare or Hibiclens soap.    Use this soap only from the neck to your toes.     Leave the soap on your skin for one minute--then rinse thoroughly.      You may use your own shampoo and conditioner. No other hair products.   - Please remove all jewelry and body piercings.  - No lotions, deodorants or fragrance.  - No makeup or fingernail polish.   - Bring your ID and insurance card.    -If you use a CPAP machine, please bring the CPAP machine, tubing, and mask to hospital.    -If you have a Deep Brain Stimulator, Spinal Cord Stimulator, or any Neuro Stimulator device---you must bring the remote control to the hospital.      ALL PATIENTS GOING HOME THE SAME DAY OF SURGERY ARE REQUIRED TO HAVE A RESPONSIBLE ADULT TO DRIVE AND BE IN ATTENDANCE WITH THEM FOR 24 HOURS FOLLOWING SURGERY.    Covid testing policy as of 12/06/2022  Your surgeon will notify and schedule you for a COVID test if one is needed before surgery--please direct any questions or COVID symptoms to your surgeon      Questions or Concerns:    - For any questions regarding the day of surgery or your hospital stay, please contact the Pre Admission Nursing Office at [phone].       - If you have health changes between today and your surgery, please call your surgeon.       - For questions after surgery, please call your surgeons office.           Current Visitor Guidelines    You may have 2 visitors in the pre op area.    Visiting hours: 8 a.m. to 8:30 p.m.    Patients confirmed or suspected to have symptoms of COVID 19 or flu:     No visitors allowed for adult patients.   Children (under age 18) can  have 1 named visitor.     People who are sick or showing symptoms of COVID 19 or flu:    Are not allowed to visit patients--we can only make exceptions in special situations.       Please follow these guidelines for your visit:          Please maintain social distance          Masking is optional--however at times you may be asked to wear a mask for the safety of yourself and others     Clean your hands with alcohol hand . Do this when you arrive at and leave the building and patient room,    And again after you touch your mask or anything in the room.     Go directly to and from the room you are visiting.     Stay in the patient s room during your visit. Limit going to other places in the hospital as much as possible     Leave bags and jackets at home or in the car.     For everyone s health, please don t come and go during your visit. That includes for smoking   during your visit.

## 2024-09-28 LAB
GAMMA INTERFERON BACKGROUND BLD IA-ACNC: 0.05 IU/ML
M TB IFN-G BLD-IMP: NEGATIVE
M TB IFN-G CD4+ BCKGRND COR BLD-ACNC: 9.95 IU/ML
MITOGEN IGNF BCKGRD COR BLD-ACNC: 0.01 IU/ML
MITOGEN IGNF BCKGRD COR BLD-ACNC: 0.03 IU/ML
QUANTIFERON MITOGEN: 10 IU/ML
QUANTIFERON NIL TUBE: 0.05 IU/ML
QUANTIFERON TB1 TUBE: 0.06 IU/ML
QUANTIFERON TB2 TUBE: 0.08

## 2024-09-30 ENCOUNTER — TELEPHONE (OUTPATIENT)
Dept: TRANSPLANT | Facility: CLINIC | Age: 59
End: 2024-09-30
Payer: COMMERCIAL

## 2024-09-30 ENCOUNTER — TELEPHONE (OUTPATIENT)
Dept: GASTROENTEROLOGY | Facility: CLINIC | Age: 59
End: 2024-09-30
Payer: COMMERCIAL

## 2024-09-30 NOTE — TELEPHONE ENCOUNTER
Called pt to confirm upcoming PKE appointment 10/7/24. Confirmed clinic address, check in time, no need to fast, take all medications, and to bring family member- she plans to bring her daughter along. She had no further questions.

## 2024-09-30 NOTE — TELEPHONE ENCOUNTER
Caller: Reina    Reason for Reschedule/Cancellation   (please be detailed, any staff messages or encounters to note?): starting dialysis and wants to delay      Prior to reschedule please review:  Ordering Provider: SAHRA CORNELIUS   Sedation Determined: CS  Does patient have any ASC Exclusions, please identify?: yes--starting dialysis      Notes on Cancelled Procedure:  Procedure: Lower Endoscopy [Colonoscopy]   Date: 10/9  Location: [facility]; [address] [place] [address]   Surgeon: Jorgito      Rescheduled: Yes,   Procedure: Lower Endoscopy [Colonoscopy]    Date: 1/8/25   Location: [facility]; [address] [place] [address]    Surgeon: Jorgito   Sedation Level Scheduled  CS ,  Reason for Sedation Level order   Instructions updated and sent: y     Does patient need PAC or Pre -Op Rescheduled? : n       Did you cancel or rescheduled an EUS procedure? No.

## 2024-10-01 NOTE — CONFIDENTIAL NOTE
Patient Quality Outreach    Patient is due for the following:   Physical Preventive Adult Physical    Next Steps:   Schedule a Adult Preventative    Type of outreach:    Sent letter.    Next Steps:  Reach out within 90 days via Letter.    Max number of attempts reached: No. Will try again in 90 days if patient still on fail list.    Questions for provider review:    None           Gurmeet French MA

## 2024-10-04 ENCOUNTER — HOSPITAL ENCOUNTER (OUTPATIENT)
Dept: CARDIOLOGY | Facility: CLINIC | Age: 59
Setting detail: NUCLEAR MEDICINE
Discharge: HOME | End: 2024-10-04
Attending: NURSE PRACTITIONER
Payer: COMMERCIAL

## 2024-10-04 ENCOUNTER — HOSPITAL ENCOUNTER (OUTPATIENT)
Dept: NUCLEAR MEDICINE | Facility: CLINIC | Age: 59
Setting detail: NUCLEAR MEDICINE
Discharge: HOME | End: 2024-10-04
Attending: NURSE PRACTITIONER
Payer: COMMERCIAL

## 2024-10-04 ENCOUNTER — PATIENT OUTREACH (OUTPATIENT)
Dept: NEPHROLOGY | Facility: CLINIC | Age: 59
End: 2024-10-04

## 2024-10-04 DIAGNOSIS — N05.1 FSGS (FOCAL SEGMENTAL GLOMERULOSCLEROSIS): ICD-10-CM

## 2024-10-04 DIAGNOSIS — R06.09 EXERTIONAL DYSPNEA: ICD-10-CM

## 2024-10-04 DIAGNOSIS — N18.9 CHRONIC KIDNEY DISEASE, UNSPECIFIED CKD STAGE: ICD-10-CM

## 2024-10-04 DIAGNOSIS — Z01.818 PREOP EXAMINATION: ICD-10-CM

## 2024-10-04 LAB
CV STRESS MAX HR HE: 103
RATE PRESSURE PRODUCT: NORMAL
STRESS ECHO BASELINE DIASTOLIC HE: 85
STRESS ECHO BASELINE HR: 80 BPM
STRESS ECHO BASELINE SYSTOLIC BP: 133
STRESS ECHO CALCULATED PERCENT HR: 64 %
STRESS ECHO LAST STRESS DIASTOLIC BP: 74
STRESS ECHO LAST STRESS SYSTOLIC BP: 126
STRESS ECHO TARGET HR: 161

## 2024-10-04 PROCEDURE — 93018 CV STRESS TEST I&R ONLY: CPT | Performed by: INTERNAL MEDICINE

## 2024-10-04 PROCEDURE — 343N000001 HC RX 343 MED OP 636: Performed by: NURSE PRACTITIONER

## 2024-10-04 PROCEDURE — A9502 TC99M TETROFOSMIN: HCPCS | Performed by: NURSE PRACTITIONER

## 2024-10-04 PROCEDURE — 93017 CV STRESS TEST TRACING ONLY: CPT

## 2024-10-04 PROCEDURE — 250N000011 HC RX IP 250 OP 636: Performed by: INTERNAL MEDICINE

## 2024-10-04 PROCEDURE — 78452 HT MUSCLE IMAGE SPECT MULT: CPT

## 2024-10-04 PROCEDURE — 93016 CV STRESS TEST SUPVJ ONLY: CPT | Performed by: INTERNAL MEDICINE

## 2024-10-04 PROCEDURE — 78452 HT MUSCLE IMAGE SPECT MULT: CPT | Mod: 26 | Performed by: RADIOLOGY

## 2024-10-04 RX ORDER — LIDOCAINE 40 MG/G
CREAM TOPICAL
Status: DISCONTINUED | OUTPATIENT
Start: 2024-10-04 | End: 2024-10-05 | Stop reason: HOSPADM

## 2024-10-04 RX ORDER — ALBUTEROL SULFATE 90 UG/1
2 INHALANT RESPIRATORY (INHALATION) EVERY 5 MIN PRN
Status: DISCONTINUED | OUTPATIENT
Start: 2024-10-04 | End: 2024-10-05 | Stop reason: HOSPADM

## 2024-10-04 RX ORDER — REGADENOSON 0.08 MG/ML
0.4 INJECTION, SOLUTION INTRAVENOUS ONCE
Status: COMPLETED | OUTPATIENT
Start: 2024-10-04 | End: 2024-10-04

## 2024-10-04 RX ORDER — AMINOPHYLLINE 25 MG/ML
50-100 INJECTION, SOLUTION INTRAVENOUS
Status: DISCONTINUED | OUTPATIENT
Start: 2024-10-04 | End: 2024-10-05 | Stop reason: HOSPADM

## 2024-10-04 RX ADMIN — TETROFOSMIN 41 MILLICURIE: 1.38 INJECTION, POWDER, LYOPHILIZED, FOR SOLUTION INTRAVENOUS at 11:01

## 2024-10-04 RX ADMIN — REGADENOSON 0.4 MG: 0.4 INJECTION INTRAVENOUS at 10:59

## 2024-10-04 RX ADMIN — TETROFOSMIN 11.1 MILLICURIE: 1.38 INJECTION, POWDER, LYOPHILIZED, FOR SOLUTION INTRAVENOUS at 10:01

## 2024-10-04 NOTE — PROGRESS NOTES
10/4- Called Angela to let her know that she has been accepted to [facility]. She is scheduled to get her tunneled line on 10/9 and initiate 10/11 at 11 am. They would like her to arrive at 10 am for her first treatment. No answer. LVM to return my call and will go over logistics prior to her first tx.  Daniele FRIEND RN  Nephrology care coordinator  [facility]

## 2024-10-06 LAB
ABO/RH(D): NORMAL
ANTIBODY SCREEN: NEGATIVE
SPECIMEN EXPIRATION DATE: NORMAL

## 2024-10-06 NOTE — PROGRESS NOTES
TRANSPLANT NEPHROLOGY RECIPIENT EVALUATION NOTE    Assessment and Plan:  # Kidney Transplant Evaluation: Patient is a good candidate overall. Benefits of a living donor transplant were discussed.    Recommendations:  -Cardiac risk assessment, ?cath with CT findings of possible previous infarct  - Colonoscopy  - Urology consult for hematuria, graft tenderness, and recurrent UTIs  - US [place] for  surgery to review  - Needs vaccinations: Hepatitis B    # CKD stage V, history of focal segmental glomerulosclerosis (FSGS) biopsy proven s/p LDKTx 1992:   Baseline Creatinine had been 1.5-1.8 until 5/2023 then increased to 2-4 range starting in 4/2024. In May 2024 she developed symptoms of sob, nausea,vomiting, and lack of appetite in the setting of rising creatinine. Kidney biopsy was obtained 7/2024 consistent with mixed rejection (ACR Banff type 1A, Active AMR with diffused +C4D, and Moderate chronic changes).  She was treated with high dose steroids 500mg x3 days and tapered to 5mg daily along with MMF 750mg BID. Her most recent creatinine 5.05. She has a qualifying GFR of 15 since 6/2024. She plans to undergo staged fistula placement, with the first stage planned for next week. When ready, will likely benefit from a re-transplant.    #Immunosuppression history/medication non-adherence:  Initially started on cyclosporine, imuran, and prednisone after kidney transplant. Imuran discontinued due to skin cancer and changed to sirolimus. Sirolimus discontinued 3/2023 due to high cholesterol. Currently on prednisone 5 mg (restarted in July 2024 for rejection) and mycophenolate 750 mg bid. Endorsed compliance but reported to the surgeon history of missed doses and taking only MMF 750mg daily at her discretion.     #UTI/pyelonephritis:  She reports frequent  UTIs (every 3-6 months) after kidney transplant with some infections requiring hospitalization. Symptoms included nausea, fevers, vomiting, and diarrhea. Two UTIs within  the last year with last being in Aug 2024. Prior to these it has been 7 years without a UTI. Has not been seen by Urology or ID in the past.     # Cardiac Risk:   # CAD, mild coronary artery calcification on CT chest 8/2024. ECHO 7/2024 EF 60-65% with no valvular abnormalities, RA pressures were normal.  ECG 8/29/24: SR with low voltage QRS; non specific t wave abnormality. Presented to the ED 8/2024 with chest pain. Nuclear stress test 10/4/24 negative, with EF >70%  CT A/P with Fatty metaplasia of the LV apex suggestive of prior infarct. Will need cardiac risk assessment.    # Hx Gestational Hypertension:  diagnosed prior to first pregnancy and during pregnancy. No hypertension after delivering or with additional pregnancies. Currently not on antihypertensives. Systolics in the 120s.     # PAD Screening: Will obtain updated imaging for Transplant Surgery to review    # Depression: Asymptomatic on Paxil.     # Ulcerative Colitis: Questionable diagnosis. Patient reports possible UC noted during colonoscopy in 1995 but was never treated. Never referred to GI. Repeat colonoscopy in 2012 with pathology consistent with focal acute cryptitis, and acute colitis 2/2 infectious etiology. Slightly increased mucosa vascular pattern in the entire examined colon, either related to bowel preparation vs. underlying mild/inactive IBD.     # Hyperlipidemia: On atorvastatin. History of hypercholesteremia since teenage years.     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 She has seen Oncology in the past. Platelets now WNL.    # Prediabetes: Hgb A1c 6. Glucose has ranged from 110s - 200s since 2022. UA negative for glucose.     #Chondromalacia of patella, bilateral with chronic pain:x-ray without evidence of high-grade osteoarthritis with no bone-on-bone findings.     # Hypercalcemia:  PTHi in low normal range, low VitD, non significant change in SPEP UPEP and immunofixation. No  medication nor supplements related to hypercalcemia. Seen by Endocrinology, felt likely due to primary hyperparathyroidismIn in setting of decreasing renal function.    # Health Maintenance: Colonoscopy: Not up to date, Mammogram: Up to date, PAP: Up to date, Dermatology: Not up to date, and Dental: Up to date    - Discussed the risks and benefits of a transplant, including the risk of surgery and immunosuppression medications.  Patient's overall evaluation will be discussed in the Transplant Program's regular meeting with a final recommendation on the patients suitability for transplant to be made at that time.    Evaluation:  Reina Quan was seen in consultation at the request of Dr. Nicole Lund for evaluation as a potential kidney transplant recipient.    Reason for Visit:  Reina Quan is a 59 year old female with ESKD from focal segmental glomerulosclerosis (FSGS), who presents for kidney transplant evaluation.    History of Present Illness:  Reina Quan is a 59 year old female with CKD stage V s/p kidney transplant in 1992 for FSGS who presents to the clinic for kidney transplant evaluation for failed transplant. She was diagnosed with genetic FSGS in 1992 with kidney biopsy. She is s/p LDKT (related) in 1992.She was doing well post transplant until 3/2023 at which time she endorsed worsening fatigue and shortness of breath with exertion. In may 2024 her creatinine increased to the 3-4 range (baseline 1.5-1.8). Kidney biopsy consistent with ACR Banff 1A, AMR, and chronic changes. She was started on prednisone and referred for transplant evaluation. She has an appointment this week to have a dialysis line placed in anticipation of starting HD soon. Qualifying GFR in 6/2024.          Kidney Disease Hx:       Kidney Disease Dx: Focal segmental glomerulosclerosis (FSGS)       Biopsy Proven: Yes; Native kidney in 1992 and transplant bx 7/25/24.  -Acute cell-mediated rejection, Banff  type IA, with moderate tubulitis, moderate interstitial inflammation, and no arteritis (t2 i2 v0)  -Active antibody-mediated rejection with severe peritubular capillaritis, severe glomerulitis, diffuse positive C4d staining in peritubular capillaries; and no transplant glomerulopathy (g3 ptc3 C4d3; cg0)  -Moderate chronic changes of the parenchyma, including:   - focal global glomerulosclerosis (18% of glomeruli)   - focal tubular atrophy and interstitial fibrosis (40% of cortex)   - severe arterial sclerosis and no significant arteriolar hyalinosis      (ct2 ci2 cv3 ah0 mm0)         On Dialysis: No; scheduled for tunneled line placement to start dialysis 10/9/24. Surgery for fistula next week.        Primary Nephrologist: SAM Raya       H/o Kidney Stones: No       H/o Recurrent/Frequent UTI: Yes          Cardiac/Vascular Disease Risk Factors:        Cardiac Risk Factors: CKD       Known CAD: Yes; mild CAD on CT 8/2024       Known PAD/Caludication Symptoms: No       Known Heart Failure: No       Arrhythmia: No       Pulmonary Hypertension: No       Valvular Disease: No       Other: None         Viral Serology Status       CMV IgG Antibody: Positive       EBV IgG Antibody: Positive         Volume Status/Weight:        Volume status: Euvolemic       Weight:  Acceptable BMI       BMI: 24.07         Functional Capacity/Frailty:        [employer] for work. Very active for her job but tires easily. Can walk one block without fatigue or shortness of breath. Can climb a few flights of stairs without SOB. Rare alcohol use. No illicit drugs or smoking history.      Fatigue/Decreased Energy: [] No [x] Yes Within the last year.    Chest Pain or SOB with Exertion: [] No [x] Yes SOB; no CP   Significant Weight Change: [] No [x] Yes 15 pound weight loss over 4 months. Poor appetite recently.    Nausea, Vomiting or Diarrhea: [x] No [] Yes    Fever, Sweats or Chills:  [x] No [] Yes    Leg Swelling [x] No []  Yes        History of Cancer:   # History of squamous cell carcinoma: First episode after her transplant. Fourteen areas total. Follows with Dermatology (Dr. Bauman). Last SCC 11/23. Next appointment planned for 12/2024.     Allergy Testing Questions:   Medication that caused a reaction Other antibiotics:  ampicillin   Antibiotics used that didn't give an allergic reaction?  Patient doesn't know    COVID Vaccination Up To Date: Not asked    Potential Living Kidney Donors: Yes- nephew; son may be open to paired exchange.     Review of Systems:  A comprehensive review of systems was obtained and negative, except as noted in the HPI or PMH.    Past Medical History:   Medical record was reviewed and PMH was discussed with patient and noted below.  Past Medical History:   Diagnosis Date    Actinic keratosis     Allergic rhinitis, cause unspecified     Anemia     anxiety/depression     PAXIL    Arthritis     congenital hearing loss     uses hearing aids    Depressive disorder     Dry eyes     Giant Platelet syndrome     Glomerular Focal Sclerosis--transplant 1985     Hypertension     Kidney replaced by transplant 1992    RAPAMUNE/ SIROLIMUS     Lichen planopilaris     LPP followed by derm on doxycycline/ clobetasol    Lichen planus     Menorrhagia     migraine     Squamous cell carcinoma     8 x     Thrombocytopenia (H)     Ulcerative colitis, unspecified     biposy neg 1996    Unsatisfactory cervical Papanicolaou smear 02/15/2021    UTI (lower urinary tract infection)     recurrent Dr Burns [facility]       Past Social History:   Past Surgical History:   Procedure Laterality Date    BIOPSY OF SKIN LESION      CHOLECYSTECTOMY      DILATION AND CURETTAGE, OPERATIVE HYSTEROSCOPY WITH MORCELLATOR, COMBINED N/A 11/10/2015    Procedure: COMBINED DILATION AND CURETTAGE, OPERATIVE HYSTEROSCOPY WITH MORCELLATOR;  Surgeon: Ghada Melendez MD;  Location: [facility]    ESOPHAGOSCOPY, GASTROSCOPY, DUODENOSCOPY (EGD), COMBINED  11/20/2012     Procedure: COMBINED ESOPHAGOSCOPY, GASTROSCOPY, DUODENOSCOPY (EGD), BIOPSY SINGLE OR MULTIPLE;;  Surgeon: Valentin Hahn MD;  Location: [facility]    IR RENAL BIOPSY RIGHT  07/25/2024    MOHS MICROGRAPHIC PROCEDURE      TRANSPLANT  1992    kidney    [facility] NONSPECIFIC PROCEDURE      Renal transplant right side    [facility] NONSPECIFIC PROCEDURE      cholecystectomy     Personal history of bleeding or anesthesia problems: Yes; History of thrombocytopenia noted during work up for initial kidney transplant and diagnosed with giant platelet syndrome following lumbar puncture. History of heavy menstrual cycles. No bleeding issues.     Family History:  Family History   Problem Relation Age of Onset    Hypertension Mother     Depression Mother     Anxiety Disorder Mother     Diabetes Father     Kidney Disease Father         nephrolithiasis    Asthma Father     Asthma Sister     Blood Disease Maternal Grandmother     Diabetes Maternal Grandmother     Hypertension Maternal Grandmother     Hyperlipidemia Maternal Grandmother     Depression Maternal Grandmother     No Known Problems Maternal Grandfather     No Known Problems Paternal Grandmother     C.A.D. Paternal Grandfather     Cancer Other         no family hx of skin cancer    Glaucoma No family hx of     Macular Degeneration No family hx of     Melanoma No family hx of     Skin Cancer No family hx of     Anesthesia Reaction No family hx of     Thrombosis No family hx of        Personal History:   Social History     Socioeconomic History    Marital status:      Spouse name: Not on file    Number of children: 3    Years of education: Not on file    Highest education level: Not on file   Occupational History    Occupation:  - window treatments   Tobacco Use    Smoking status: Never     Passive exposure: Never    Smokeless tobacco: Never   Vaping Use    Vaping status: Never Used   Substance and Sexual Activity    Alcohol use: Not Currently    Drug use: No     Sexual activity: Not Currently     Partners: Male     Birth control/protection: Post-menopausal   Other Topics Concern    Parent/sibling w/ CABG, MI or angioplasty before 65F 55M? 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 Doesn't have a job lined up afterward but looking forward to some time off. Has three children, one moved to college this fall. Living in [place], has a significant other x 7 months. Previously . In a monogamous relationship. Never smoker, social EtOH use.      Social Determinants of Health     Financial Resource Strain: Not on file   Food Insecurity: Not on file   Transportation Needs: Not on file   Physical Activity: Not on file   Stress: Not on file   Social Connections: Not on file   Interpersonal Safety: Low Risk  (4/9/2024)    Interpersonal Safety     Do you feel physically and emotionally safe where you currently live?: Yes     Within the past 12 months, have you been hit, slapped, kicked or otherwise physically hurt by someone?: No     Within the past 12 months, have you been humiliated or emotionally abused in other ways by your partner or ex-partner?: No   Housing Stability: Not on file       Allergies:  Allergies   Allergen Reactions    Ampicillin Swelling     Swelling of mouth and tongue.     Seasonal Allergies Other (See Comments)     Itchy eyes and rhinitis.       Medications:  Current Outpatient Medications   Medication Sig Dispense Refill    atorvastatin (LIPITOR) 40 MG tablet Take 1 tablet (40 mg) by mouth at bedtime 90 tablet 3    bisacodyl (DULCOLAX) 5 MG EC tablet Take 2 tablets at 3 pm the day before your procedure. If your procedure is before 11 am, take 2 additional tablets at 11 pm. If your procedure is after 11 am, take 2 additional tablets at 6 am. For additional instructions refer to your colonoscopy prep instructions. 4 tablet 0    cinacalcet (SENSIPAR) 30 MG tablet Take 1 tablet (30 mg) by mouth every  other day (Patient not taking: Reported on 9/27/2024) 45 tablet 3    clobetasol (TEMOVATE) 0.05 % external ointment Apply topically 2 times daily Use sparingly at active areas of dermatitis for no more than 14 days or until resolved, whichever is sooner. 60 g 3    clobetasol (TEMOVATE) 0.05 % external solution Apply topically daily as needed (itchyy scalp) Apply to scalp daily as needed. 50 mL 4    clobetasol propionate (CLOBEX) 0.05 % external shampoo APPLY TO SCALP IN SHOWER TWICE WEEKLY 118 mL 1    ketoconazole (NIZORAL) 2 % external shampoo APPLY TOPICALLY DAILY AS NEEDED FOR ITCHING, IRRITATION OR OTHER( SCALP SCALE) USE 2-3 TIMES A WEEK. APPLY AND LATHER THEN RINSE OUT 5 MINUTES 120 mL 9    mycophenolate (GENERIC EQUIVALENT) 250 MG capsule Take 3 capsules (750 mg) by mouth 2 times daily 540 capsule 3    niacinamide 500 MG tablet Take 1 tablet (500 mg) by mouth 2 times daily (with meals) 60 tablet 3    PARoxetine (PAXIL) 20 MG tablet TAKE 1 TABLET BY MOUTH EVERY MORNING 90 tablet 2    polyethylene glycol (GOLYTELY) 236 g suspension The night before the exam at 6 pm drink an 8-ounce glass every 15 minutes until the jug is half empty. If you arrive before 11 AM: Drink the other half of the Golytely jug at 11 PM night before procedure. If you arrive after 11 AM: Drink the other half of the Golytely jug at 6 AM day of procedure. For additional instructions refer to your colonoscopy prep instructions. 4000 mL 0    predniSONE (DELTASONE) 5 MG tablet Take 1 tablet (5 mg) by mouth daily (Patient taking differently: Take 5 mg by mouth every morning.) 90 tablet 3    sodium bicarbonate 650 MG tablet Take 2 tablets (1,300 mg) by mouth 2 times daily 360 tablet 3    triamcinolone (KENALOG) 0.1 % external cream APPLY SPARINGLY TOPICALLY TO THE AFFECTED AREA TWICE DAILY FOR 14 DAYS 30 g 0     Current Facility-Administered Medications   Medication Dose Route Frequency Provider Last Rate Last Admin    triamcinolone acetonide  "(KENALOG-10) injection 10 mg  10 mg Intra-Lesional Once Brad Bauman MD           Vitals:  LMP 05/14/2014 (Approximate)      10/7/24   /82   Pulse 79   Resp --   Temp --   Temp src --   SpO2 99 %   Weight 59.7 kg (131 lb 9.6 oz)   Height 1.575 m (5' 2\")   Pain Score --   BMI (Calculated) 24.07       Exam:  GENERAL APPEARANCE: alert and no distress  HENT: mouth without ulcers or lesions  RESP: lungs clear to auscultation - no rales, rhonchi or wheezes  CV: regular rhythm, normal rate, no rub, no murmur  FEMORAL PULSES: normal  EDEMA: no LE edema bilaterally  ABDOMEN: soft, nondistended, nontender, bowel sounds normal  MS: extremities normal - no gross deformities noted, no evidence of inflammation in joints, no muscle tenderness  SKIN: no rash  TX KIDNEY: mild tenderness    Results:   Recent Results (from the past 336 hour(s))   Renal panel    Collection Time: 09/24/24 11:14 AM   Result Value Ref Range    Sodium 136 135 - 145 mmol/L    Potassium 4.5 3.4 - 5.3 mmol/L    Chloride 106 98 - 107 mmol/L    Carbon Dioxide (CO2) 16 (L) 22 - 29 mmol/L    Anion Gap 14 7 - 15 mmol/L    Glucose 107 (H) 70 - 99 mg/dL    Urea Nitrogen 60.9 (H) 8.0 - 23.0 mg/dL    Creatinine 4.40 (H) 0.51 - 0.95 mg/dL    GFR Estimate 11 (L) >60 mL/min/1.73m2    Calcium 10.9 (H) 8.8 - 10.4 mg/dL    Albumin 4.6 3.5 - 5.2 g/dL    Phosphorus 4.8 (H) 2.5 - 4.5 mg/dL   CBC with platelets    Collection Time: 09/24/24 11:14 AM   Result Value Ref Range    WBC Count 4.6 4.0 - 11.0 10e3/uL    RBC Count 3.48 (L) 3.80 - 5.20 10e6/uL    Hemoglobin 10.5 (L) 11.7 - 15.7 g/dL    Hematocrit 33.2 (L) 35.0 - 47.0 %    MCV 95 78 - 100 fL    MCH 30.2 26.5 - 33.0 pg    MCHC 31.6 31.5 - 36.5 g/dL    RDW 14.9 10.0 - 15.0 %    Platelet Count 205 150 - 450 10e3/uL   Hepatitis B surface antigen    Collection Time: 09/24/24 11:14 AM   Result Value Ref Range    Hepatitis B Surface Antigen Nonreactive Nonreactive   UA with Microscopic    Collection Time: 09/24/24 " 11:30 AM   Result Value Ref Range    Color Urine Yellow Colorless, Straw, Light Yellow, Yellow    Appearance Urine Clear Clear    Glucose Urine Negative Negative mg/dL    Bilirubin Urine Negative Negative    Ketones Urine Negative Negative mg/dL    Specific Gravity Urine >=1.030 1.005 - 1.030    Blood Urine Small (A) Negative    pH Urine 5.5 5.0 - 8.0    Protein Albumin Urine >=300 (A) Negative mg/dL    Urobilinogen Urine 0.2 0.2, 1.0 E.U./dL    Nitrite Urine Negative Negative    Leukocyte Esterase Urine Negative Negative   Protein  random urine    Collection Time: 09/24/24 11:30 AM   Result Value Ref Range    Total Protein Urine mg/dL 335.0   mg/dL    Total Protein Urine mg/mg Creat 2.77 (H) 0.00 - 0.20 mg/mg Cr    Creatinine Urine mg/dL 121.0 mg/dL   Albumin Random Urine Quantitative with Creat Ratio    Collection Time: 09/24/24 11:30 AM   Result Value Ref Range    Creatinine Urine mg/dL 121.0 mg/dL    Albumin Urine mg/L 2,139.0 mg/L    Albumin Urine mg/g Cr 1,767.77 (H) 0.00 - 25.00 mg/g Cr   Urine Microscopic Exam    Collection Time: 09/24/24 11:30 AM   Result Value Ref Range    Bacteria Urine Few (A) None Seen /HPF    RBC Urine 2-5 (A) 0-2 /HPF /HPF    WBC Urine 0-5 0-5 /HPF /HPF    Squamous Epithelials Urine Few (A) None Seen /LPF   Renal panel    Collection Time: 09/27/24 11:02 AM   Result Value Ref Range    Sodium 136 135 - 145 mmol/L    Potassium 4.3 3.4 - 5.3 mmol/L    Chloride 104 98 - 107 mmol/L    Carbon Dioxide (CO2) 17 (L) 22 - 29 mmol/L    Anion Gap 15 7 - 15 mmol/L    Glucose 112 (H) 70 - 99 mg/dL    Urea Nitrogen 62.3 (H) 8.0 - 23.0 mg/dL    Creatinine 5.05 (H) 0.51 - 0.95 mg/dL    GFR Estimate 9 (L) >60 mL/min/1.73m2    Calcium 10.8 (H) 8.8 - 10.4 mg/dL    Albumin 4.5 3.5 - 5.2 g/dL    Phosphorus 5.0 (H) 2.5 - 4.5 mg/dL   CBC with platelets    Collection Time: 09/27/24 11:02 AM   Result Value Ref Range    WBC Count 4.9 4.0 - 11.0 10e3/uL    RBC Count 3.23 (L) 3.80 - 5.20 10e6/uL    Hemoglobin 9.7  (L) 11.7 - 15.7 g/dL    Hematocrit 30.2 (L) 35.0 - 47.0 %    MCV 94 78 - 100 fL    MCH 30.0 26.5 - 33.0 pg    MCHC 32.1 31.5 - 36.5 g/dL    RDW 14.9 10.0 - 15.0 %    Platelet Count 187 150 - 450 10e3/uL   Hepatitis B core antibody    Collection Time: 09/27/24 11:02 AM   Result Value Ref Range    Hepatitis B Core Antibody Total Nonreactive Nonreactive   Hepatitis B Surface Antibody    Collection Time: 09/27/24 11:02 AM   Result Value Ref Range    Hepatitis B Surface Antibody Nonreactive     Hepatitis B Surface Antibody Instrument Value <3.50 <8.5 m[IU]/mL   Hepatitis B surface antigen    Collection Time: 09/27/24 11:02 AM   Result Value Ref Range    Hepatitis B Surface Antigen Nonreactive Nonreactive   Quantiferon TB Gold Plus Grey Tube    Collection Time: 09/27/24 11:02 AM    Specimen: Arm, Right; Blood   Result Value Ref Range    Quantiferon Nil Tube 0.05 IU/mL   Quantiferon TB Gold Plus Green Tube    Collection Time: 09/27/24 11:02 AM    Specimen: Arm, Right; Blood   Result Value Ref Range    Quantiferon TB1 Tube 0.06 IU/mL   Quantiferon TB Gold Plus Yellow Tube    Collection Time: 09/27/24 11:02 AM    Specimen: Arm, Right; Blood   Result Value Ref Range    Quantiferon TB2 Tube 0.08    Quantiferon TB Gold Plus Purple Tube    Collection Time: 09/27/24 11:02 AM    Specimen: Arm, Right; Blood   Result Value Ref Range    Quantiferon Mitogen 10.00 IU/mL   Quantiferon TB Gold Plus    Collection Time: 09/27/24 11:02 AM    Specimen: Arm, Right; Blood   Result Value Ref Range    Quantiferon-TB Gold Plus Negative Negative    TB1 Ag minus Nil Value 0.01 IU/mL    TB2 Ag minus Nil Value 0.03 IU/mL    Mitogen minus Nil Result 9.95 IU/mL    Nil Result 0.05 IU/mL   UA with Microscopic    Collection Time: 09/27/24 11:03 AM   Result Value Ref Range    Color Urine Light Yellow Colorless, Straw, Light Yellow, Yellow    Appearance Urine Clear Clear    Glucose Urine Negative Negative mg/dL    Bilirubin Urine Negative Negative     Ketones Urine Negative Negative mg/dL    Specific Gravity Urine 1.015 1.003 - 1.035    Blood Urine Small (A) Negative    pH Urine 6.0 5.0 - 7.0    Protein Albumin Urine 300 (A) Negative mg/dL    Urobilinogen Urine Normal Normal, 2.0 mg/dL    Nitrite Urine Negative Negative    Leukocyte Esterase Urine Small (A) Negative    RBC Urine 3 (H) <=2 /HPF    WBC Urine 3 <=5 /HPF    Squamous Epithelials Urine 1 <=1 /HPF   Protein  random urine    Collection Time: 09/27/24 11:03 AM   Result Value Ref Range    Total Protein Urine mg/dL 273.0   mg/dL    Total Protein Urine mg/mg Creat 2.29 (H) 0.00 - 0.20 mg/mg Cr    Creatinine Urine mg/dL 119.0 mg/dL   Albumin Random Urine Quantitative with Creat Ratio    Collection Time: 09/27/24 11:03 AM   Result Value Ref Range    Creatinine Urine mg/dL 119.0 mg/dL    Albumin Urine mg/L 1,808.0 mg/L    Albumin Urine mg/g Cr 1,519.33 (H) 0.00 - 25.00 mg/g Cr   NM Lexiscan stress test    Collection Time: 10/04/24 12:14 PM   Result Value Ref Range    Target      Baseline Systolic      Baseline Diastolic BP 85     Last Stress Systolic      Last Stress Diastolic BP 74     Baseline HR 80 bpm    Max HR  103     Max Predicted HR  64 %    Rate Pressure Product 12,978.0      I participated in this visit while shadowing JOSE MANUEL Bobo as part of my ongoing training process.        I spent a total of 60 minutes on the date of service with Reina Quan This time was spent counseling the patient and/or coordinating care regarding evaluation for kidney and pancreas transplant.     JOSE MANUEL Mcclelland CNP/ Diamond Arguello NP

## 2024-10-07 ENCOUNTER — LAB (OUTPATIENT)
Dept: LAB | Facility: CLINIC | Age: 59
End: 2024-10-07
Attending: NURSE PRACTITIONER
Payer: COMMERCIAL

## 2024-10-07 ENCOUNTER — ALLIED HEALTH/NURSE VISIT (OUTPATIENT)
Dept: TRANSPLANT | Facility: CLINIC | Age: 59
End: 2024-10-07
Attending: NURSE PRACTITIONER
Payer: COMMERCIAL

## 2024-10-07 ENCOUNTER — ANCILLARY PROCEDURE (OUTPATIENT)
Dept: GENERAL RADIOLOGY | Facility: CLINIC | Age: 59
End: 2024-10-07
Attending: NURSE PRACTITIONER
Payer: COMMERCIAL

## 2024-10-07 ENCOUNTER — ALLIED HEALTH/NURSE VISIT (OUTPATIENT)
Dept: TRANSPLANT | Facility: CLINIC | Age: 59
End: 2024-10-07

## 2024-10-07 VITALS
DIASTOLIC BLOOD PRESSURE: 82 MMHG | WEIGHT: 131.6 LBS | OXYGEN SATURATION: 99 % | HEIGHT: 62 IN | BODY MASS INDEX: 24.22 KG/M2 | HEART RATE: 79 BPM | SYSTOLIC BLOOD PRESSURE: 124 MMHG

## 2024-10-07 DIAGNOSIS — D84.9 IMMUNOSUPPRESSED STATUS (H): ICD-10-CM

## 2024-10-07 DIAGNOSIS — T86.12 KIDNEY TRANSPLANT FAILURE: ICD-10-CM

## 2024-10-07 DIAGNOSIS — N05.1 FSGS (FOCAL SEGMENTAL GLOMERULOSCLEROSIS): ICD-10-CM

## 2024-10-07 DIAGNOSIS — R79.89 ELEVATED SERUM CREATININE: ICD-10-CM

## 2024-10-07 DIAGNOSIS — Z76.82 ORGAN TRANSPLANT CANDIDATE: Primary | ICD-10-CM

## 2024-10-07 DIAGNOSIS — Z85.828 HISTORY OF SKIN CANCER: ICD-10-CM

## 2024-10-07 DIAGNOSIS — N18.5 CHRONIC KIDNEY DISEASE, STAGE V (H): ICD-10-CM

## 2024-10-07 DIAGNOSIS — Z76.82 ORGAN TRANSPLANT CANDIDATE: ICD-10-CM

## 2024-10-07 DIAGNOSIS — N18.4 CHRONIC KIDNEY DISEASE, STAGE IV (SEVERE) (H): ICD-10-CM

## 2024-10-07 DIAGNOSIS — Z94.0 IMMUNOSUPPRESSIVE MANAGEMENT ENCOUNTER FOLLOWING KIDNEY TRANSPLANT: ICD-10-CM

## 2024-10-07 DIAGNOSIS — Z79.899 IMMUNOSUPPRESSIVE MANAGEMENT ENCOUNTER FOLLOWING KIDNEY TRANSPLANT: ICD-10-CM

## 2024-10-07 DIAGNOSIS — T86.91 TRANSPLANT FAILURE DUE TO REJECTION: ICD-10-CM

## 2024-10-07 DIAGNOSIS — T86.11 KIDNEY TRANSPLANT REJECTION: ICD-10-CM

## 2024-10-07 DIAGNOSIS — N18.5 CKD (CHRONIC KIDNEY DISEASE) STAGE 5, GFR LESS THAN 15 ML/MIN (H): ICD-10-CM

## 2024-10-07 DIAGNOSIS — C44.92 SCC (SQUAMOUS CELL CARCINOMA): ICD-10-CM

## 2024-10-07 LAB
A1 AB TITR SERPL: >256 {TITER}
A1 AB TITR SERPL: >256 {TITER}
ABO/RH(D): NORMAL
ALBUMIN MFR UR ELPH: 283 MG/DL
ALBUMIN SERPL BCG-MCNC: 4.7 G/DL (ref 3.5–5.2)
ALBUMIN UR-MCNC: 300 MG/DL
ALP SERPL-CCNC: 65 U/L (ref 40–150)
ALT SERPL W P-5'-P-CCNC: 33 U/L (ref 0–50)
ANION GAP SERPL CALCULATED.3IONS-SCNC: 14 MMOL/L (ref 7–15)
ANTIBODY TITER IGM SCREEN: NEGATIVE
APPEARANCE UR: CLEAR
APTT PPP: 28 SECONDS (ref 22–38)
AST SERPL W P-5'-P-CCNC: 25 U/L (ref 0–45)
B IGG TITR SERPL: >256 {TITER}
BASOPHILS # BLD AUTO: 0 10E3/UL (ref 0–0.2)
BASOPHILS NFR BLD AUTO: 1 %
BILIRUB SERPL-MCNC: 0.3 MG/DL
BILIRUB UR QL STRIP: NEGATIVE
BUN SERPL-MCNC: 49 MG/DL (ref 8–23)
CALCIUM SERPL-MCNC: 11.6 MG/DL (ref 8.8–10.4)
CHLORIDE SERPL-SCNC: 103 MMOL/L (ref 98–107)
COLOR UR AUTO: ABNORMAL
CREAT SERPL-MCNC: 4.4 MG/DL (ref 0.51–0.95)
CREAT UR-MCNC: 99.4 MG/DL
EGFRCR SERPLBLD CKD-EPI 2021: 11 ML/MIN/1.73M2
EOSINOPHIL # BLD AUTO: 0.3 10E3/UL (ref 0–0.7)
EOSINOPHIL NFR BLD AUTO: 6 %
ERYTHROCYTE [DISTWIDTH] IN BLOOD BY AUTOMATED COUNT: 15.1 % (ref 10–15)
EST. AVERAGE GLUCOSE BLD GHB EST-MCNC: 126 MG/DL
FACTOR 2 INTERPRETATION: NORMAL
FACTOR V INTERPRETATION: NORMAL
GLUCOSE SERPL-MCNC: 97 MG/DL (ref 70–99)
GLUCOSE UR STRIP-MCNC: NEGATIVE MG/DL
HBA1C MFR BLD: 6 %
HBV CORE AB SERPL QL IA: NONREACTIVE
HBV SURFACE AB SERPL IA-ACNC: <3.5 M[IU]/ML
HBV SURFACE AB SERPL IA-ACNC: NONREACTIVE M[IU]/ML
HBV SURFACE AG SERPL QL IA: NONREACTIVE
HCO3 SERPL-SCNC: 20 MMOL/L (ref 22–29)
HCT VFR BLD AUTO: 31.2 % (ref 35–47)
HCV AB SERPL QL IA: NONREACTIVE
HGB BLD-MCNC: 9.6 G/DL (ref 11.7–15.7)
HGB UR QL STRIP: ABNORMAL
HIV 1+2 AB+HIV1 P24 AG SERPL QL IA: NONREACTIVE
IMM GRANULOCYTES # BLD: 0 10E3/UL
IMM GRANULOCYTES NFR BLD: 0 %
INR PPP: 0.97 (ref 0.85–1.15)
KETONES UR STRIP-MCNC: NEGATIVE MG/DL
LAB DIRECTOR COMMENTS: NORMAL
LAB DIRECTOR DISCLAIMER: NORMAL
LAB DIRECTOR INTERPRETATION: NORMAL
LAB DIRECTOR METHODOLOGY: NORMAL
LAB DIRECTOR RESULTS: NORMAL
LEUKOCYTE ESTERASE UR QL STRIP: NEGATIVE
LYMPHOCYTES # BLD AUTO: 0.7 10E3/UL (ref 0.8–5.3)
LYMPHOCYTES NFR BLD AUTO: 14 %
MCH RBC QN AUTO: 29.3 PG (ref 26.5–33)
MCHC RBC AUTO-ENTMCNC: 30.8 G/DL (ref 31.5–36.5)
MCV RBC AUTO: 95 FL (ref 78–100)
MONOCYTES # BLD AUTO: 0.4 10E3/UL (ref 0–1.3)
MONOCYTES NFR BLD AUTO: 7 %
NEUTROPHILS # BLD AUTO: 3.9 10E3/UL (ref 1.6–8.3)
NEUTROPHILS NFR BLD AUTO: 73 %
NITRATE UR QL: NEGATIVE
NRBC # BLD AUTO: 0 10E3/UL
NRBC BLD AUTO-RTO: 0 /100
PH UR STRIP: 6 [PH] (ref 5–7)
PHOSPHATE SERPL-MCNC: 4.7 MG/DL (ref 2.5–4.5)
PLATELET # BLD AUTO: 163 10E3/UL (ref 150–450)
POTASSIUM SERPL-SCNC: 4 MMOL/L (ref 3.4–5.3)
PROT SERPL-MCNC: 7.6 G/DL (ref 6.4–8.3)
PROT/CREAT 24H UR: 2.85 MG/MG CR (ref 0–0.2)
RBC # BLD AUTO: 3.28 10E6/UL (ref 3.8–5.2)
RBC URINE: 3 /HPF
SODIUM SERPL-SCNC: 137 MMOL/L (ref 135–145)
SP GR UR STRIP: 1.01 (ref 1–1.03)
SPECIMEN DESCRIPTION: NORMAL
SPECIMEN EXPIRATION DATE: NORMAL
SPECIMEN EXPIRATION DATE: NORMAL
SQUAMOUS EPITHELIAL: 1 /HPF
T PALLIDUM AB SER QL: NONREACTIVE
UROBILINOGEN UR STRIP-MCNC: NORMAL MG/DL
WBC # BLD AUTO: 5.4 10E3/UL (ref 4–11)
WBC URINE: 2 /HPF

## 2024-10-07 PROCEDURE — 87340 HEPATITIS B SURFACE AG IA: CPT | Performed by: NURSE PRACTITIONER

## 2024-10-07 PROCEDURE — 99214 OFFICE O/P EST MOD 30 MIN: CPT | Performed by: SURGERY

## 2024-10-07 PROCEDURE — 84156 ASSAY OF PROTEIN URINE: CPT | Performed by: PATHOLOGY

## 2024-10-07 PROCEDURE — G0452 MOLECULAR PATHOLOGY INTERPR: HCPCS | Mod: 26 | Performed by: PATHOLOGY

## 2024-10-07 PROCEDURE — 84681 ASSAY OF C-PEPTIDE: CPT | Performed by: NURSE PRACTITIONER

## 2024-10-07 PROCEDURE — 86832 HLA CLASS I HIGH DEFIN QUAL: CPT | Performed by: NURSE PRACTITIONER

## 2024-10-07 PROCEDURE — 86704 HEP B CORE ANTIBODY TOTAL: CPT | Performed by: NURSE PRACTITIONER

## 2024-10-07 PROCEDURE — 86665 EPSTEIN-BARR CAPSID VCA: CPT | Performed by: NURSE PRACTITIONER

## 2024-10-07 PROCEDURE — 85670 THROMBIN TIME PLASMA: CPT | Performed by: NURSE PRACTITIONER

## 2024-10-07 PROCEDURE — 86780 TREPONEMA PALLIDUM: CPT | Performed by: NURSE PRACTITIONER

## 2024-10-07 PROCEDURE — 86833 HLA CLASS II HIGH DEFIN QUAL: CPT | Performed by: NURSE PRACTITIONER

## 2024-10-07 PROCEDURE — 99207 PR NO CHARGE COORDINATED CARE PS: CPT

## 2024-10-07 PROCEDURE — 81240 F2 GENE: CPT | Performed by: NURSE PRACTITIONER

## 2024-10-07 PROCEDURE — 86644 CMV ANTIBODY: CPT | Performed by: NURSE PRACTITIONER

## 2024-10-07 PROCEDURE — 99205 OFFICE O/P NEW HI 60 MIN: CPT | Performed by: NURSE PRACTITIONER

## 2024-10-07 PROCEDURE — 99213 OFFICE O/P EST LOW 20 MIN: CPT | Performed by: SURGERY

## 2024-10-07 PROCEDURE — 82043 UR ALBUMIN QUANTITATIVE: CPT | Performed by: STUDENT IN AN ORGANIZED HEALTH CARE EDUCATION/TRAINING PROGRAM

## 2024-10-07 PROCEDURE — 86886 COOMBS TEST INDIRECT TITER: CPT

## 2024-10-07 PROCEDURE — 80053 COMPREHEN METABOLIC PANEL: CPT | Performed by: PATHOLOGY

## 2024-10-07 PROCEDURE — 71046 X-RAY EXAM CHEST 2 VIEWS: CPT | Mod: GC | Performed by: RADIOLOGY

## 2024-10-07 PROCEDURE — 81001 URINALYSIS AUTO W/SCOPE: CPT | Performed by: PATHOLOGY

## 2024-10-07 PROCEDURE — 99000 SPECIMEN HANDLING OFFICE-LAB: CPT | Performed by: PATHOLOGY

## 2024-10-07 PROCEDURE — 36415 COLL VENOUS BLD VENIPUNCTURE: CPT | Performed by: PATHOLOGY

## 2024-10-07 PROCEDURE — 86147 CARDIOLIPIN ANTIBODY EA IG: CPT | Performed by: NURSE PRACTITIONER

## 2024-10-07 PROCEDURE — 86900 BLOOD TYPING SEROLOGIC ABO: CPT

## 2024-10-07 PROCEDURE — 85730 THROMBOPLASTIN TIME PARTIAL: CPT | Performed by: NURSE PRACTITIONER

## 2024-10-07 PROCEDURE — 85730 THROMBOPLASTIN TIME PARTIAL: CPT | Performed by: PATHOLOGY

## 2024-10-07 PROCEDURE — 85610 PROTHROMBIN TIME: CPT | Performed by: PATHOLOGY

## 2024-10-07 PROCEDURE — 86803 HEPATITIS C AB TEST: CPT | Performed by: NURSE PRACTITIONER

## 2024-10-07 PROCEDURE — 85025 COMPLETE CBC W/AUTO DIFF WBC: CPT | Performed by: PATHOLOGY

## 2024-10-07 PROCEDURE — 86901 BLOOD TYPING SEROLOGIC RH(D): CPT

## 2024-10-07 PROCEDURE — 86481 TB AG RESPONSE T-CELL SUSP: CPT | Performed by: NURSE PRACTITIONER

## 2024-10-07 PROCEDURE — 83036 HEMOGLOBIN GLYCOSYLATED A1C: CPT | Performed by: NURSE PRACTITIONER

## 2024-10-07 PROCEDURE — 85390 FIBRINOLYSINS SCREEN I&R: CPT | Mod: 26 | Performed by: PATHOLOGY

## 2024-10-07 PROCEDURE — 86787 VARICELLA-ZOSTER ANTIBODY: CPT | Performed by: NURSE PRACTITIONER

## 2024-10-07 PROCEDURE — 84100 ASSAY OF PHOSPHORUS: CPT | Performed by: STUDENT IN AN ORGANIZED HEALTH CARE EDUCATION/TRAINING PROGRAM

## 2024-10-07 PROCEDURE — 86706 HEP B SURFACE ANTIBODY: CPT | Performed by: NURSE PRACTITIONER

## 2024-10-07 NOTE — PROGRESS NOTES
Transplant Surgery Consult Note     Medical record number: [id number]  YOB: 1965,   Consult requested  for evaluation of kidney transplant candidacy.    Assessment and Recommendations:Ms. Quan appears to be a good candidate for kidney transplantation and has a good understanding of the risks and benefits of this approach to the management of renal failure. The following issues should be addressed prior to finalizing her transplant candidacy:     Transplant order: Ms. 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  Recommend he be considered as a candidate for kidney transplant.  Cardiology consult for cardiac risk stratification to be ordered: Yes  CT abdomen and pelvis without contrast to be ordered for assessment of vascular targets: No  Transplant listing labs ordered to include HLA, ABOx2, Cr, etc.  Dietician consult ordered: Yes  Social work consult ordered: Yes  Imaging reports reviewed: CT scan from July 2024  IMPRESSION: Right lower quadrant renal transplant with atrophy of the  lower pole.  Radiology images reviewed: CT scan from July 2024 reveals right lower quadrant kidney transplant.  Left iliac system suitable for transplant.  Recipient suitable to move forward with work up of living donors:  Yes  On MMF and pred.  Does admit to not taking her medications as prescribed at this point.  She is supposed to be taking 750 mg twice a day of mycophenolate however she has not been taking it twice a day she has only been taking it once a day.  We discussed the importance of keeping up with her immunosuppression to ensure she does not become sensitized from the current kidney she has.  She does have a PRA from the end of July which shows she is not very sensitized however since then she is self decreased her  immunosuppression and she is tender over the kidney which makes me concerned for active inflammation and rejection of the current kidney.  She is supposed to be starting dialysis this week.  I will contact nephrology about mycophenolate dosing given her increased risk of infection on dialysis with a hemodialysis catheter.  Her son is willing to donate however he is not a compatible blood type.  He is interested in paired donation per the patient.  Her nephew is interested but would only like to donate directly.  She would prefer to have him tested from an immunologic standpoint prior to him going through the full workup if possible.  This was discussed with the transplant coordinator.  Derm q6m due to history of skin cancer.  She is up-to-date.  We discussed the importance of compliance.  We should review her updated PRA.    The majority of our visit was spent in counselling, discussing the medical and surgical risks of kidney transplantation. We discussed approximate wait time and how that is influenced by issues such as blood type and sensitization (PRA) and access to a living donor. I contrasted potential waiting time for living vs  donor kidneys from  normal (0-85%) or higher (%) kidney donor profile index (KDPI) donors and their associated outcomes. I would not recommend this individual to consider kidneys from high KDPI donors. The reason for this decision is best summarized as: not on dialysis yet. Potential surgical complications of kidney transplantation include bleeding, superficial or deep wound complications (infection, hernia, lymphocele), ureteral anastomotic failure (leak or stenosis), graft thrombosis, need for reoperation and other issues such as cardiac complications, pneumonia, deep venous thrombosis, pulmonary embolism, post transplant diabetes and death. The potential for recurrent disease or need for retransplantation was also addressed. We discussed the possible need for ureteral  stent (and subsequent removal), and the utility of protocol biopsy and laboratory studies to evaluate for rejection or recurrent disease. We discussed the risk of graft rejection, our center's average graft and patient survival rates, immunosuppression protocols, as well as the potential opportunity to participate in clinical trials.  We also discussed the average length of stay, recovery process, and posttransplant lab and monitoring protocol.  I emphasized the need for strict immunosuppression medication adherence and the potential for complications of immunosuppression such as skin cancer or lymphoma, as well as a very low but not zero risk of donor-derived disease transmission risks (infection, cancer). Ms. Quan asked good questions and her candidacy will be reviewed at our Multidisciplinary Selection Committee. Thank you for the opportunity to participate in Ms. Quan's care.      Total time: 60 minutes        Nicole Lund MD FACS  Associate Professor of Surgery  Director, Living Kidney Donor Program.    ---------------------------------------------------------------------------------------------------    HPI: Ms. Quan has End stage renal failure due to primary focal segmental glomerulosclerosis (FSGS). The patient is non-diabetic.       The patient is on dialysis.    Has potential kidney donors:  Doesn't know .  Interested in participation in paired exchange if a donor is willing: Doesn't know     The patient has the following pertinent history:       No    Yes  Dialysis:    [x]      [] via:       Blood Transfusion                  []      [x]  Number of units:   Most recently: 1996  Pregnancy:    []      [x] Number:     5  Previous Transplant:  []      [x] Details:  1992  Cancer    []      [x] Comment:   Kidney stones   [x]      [] Comment:      Recurrent infections  [x]      []  Type:                  Bladder dysfunction  [x]      [] Cause:    Claudication   [x]      [] Distance:     Previous Amputation  [x]      [] Cause:     Chronic anticoagulation  [x]      [] Indication:   Restorationism  [x]      []      Past Medical History:   Diagnosis Date    Actinic keratosis     Allergic rhinitis, cause unspecified     Anemia     anxiety/depression     PAXIL    Arthritis     congenital hearing loss     uses hearing aids    Depressive disorder     Dry eyes     Giant Platelet syndrome     Glomerular Focal Sclerosis--transplant 1985     Hypertension     Kidney replaced by transplant 1992    RAPAMUNE/ SIROLIMUS     Lichen planopilaris     LPP followed by derm on doxycycline/ clobetasol    Lichen planus     Menorrhagia     migraine     Squamous cell carcinoma     8 x     Thrombocytopenia (H)     Ulcerative colitis, unspecified     biposy neg 1996    Unsatisfactory cervical Papanicolaou smear 02/15/2021    UTI (lower urinary tract infection)     recurrent Dr Burns [facility]     Past Surgical History:   Procedure Laterality Date    BIOPSY OF SKIN LESION      CHOLECYSTECTOMY      DILATION AND CURETTAGE, OPERATIVE HYSTEROSCOPY WITH MORCELLATOR, COMBINED N/A 11/10/2015    Procedure: COMBINED DILATION AND CURETTAGE, OPERATIVE HYSTEROSCOPY WITH MORCELLATOR;  Surgeon: Ghada Melendez MD;  Location: [facility]    ESOPHAGOSCOPY, GASTROSCOPY, DUODENOSCOPY (EGD), COMBINED  11/20/2012    Procedure: COMBINED ESOPHAGOSCOPY, GASTROSCOPY, DUODENOSCOPY (EGD), BIOPSY SINGLE OR MULTIPLE;;  Surgeon: Valentin Hhan MD;  Location: [facility]    IR RENAL BIOPSY RIGHT  07/25/2024    MOHS MICROGRAPHIC PROCEDURE      TRANSPLANT  1992    kidney    ZZC NONSPECIFIC PROCEDURE      Renal transplant right side    ZZC NONSPECIFIC PROCEDURE      cholecystectomy     Family History   Problem Relation Age of Onset    Hypertension Mother     Depression Mother     Anxiety Disorder Mother     Diabetes Father     Kidney Disease Father         nephrolithiasis    Asthma Father     Asthma Sister     Blood Disease Maternal Grandmother     Diabetes  Maternal Grandmother     Hypertension Maternal Grandmother     Hyperlipidemia Maternal Grandmother     Depression Maternal Grandmother     No Known Problems Maternal Grandfather     No Known Problems Paternal Grandmother     C.A.D. Paternal Grandfather     Cancer Other         no family hx of skin cancer    Glaucoma No family hx of     Macular Degeneration No family hx of     Melanoma No family hx of     Skin Cancer No family hx of     Anesthesia Reaction No family hx of     Thrombosis No family hx of      Social History     Socioeconomic History    Marital status:      Spouse name: Not on file    Number of children: 3    Years of education: Not on file    Highest education level: Not on file   Occupational History    Occupation:  - window treatments   Tobacco Use    Smoking status: Never     Passive exposure: Never    Smokeless tobacco: Never   Vaping Use    Vaping status: Never Used   Substance and Sexual Activity    Alcohol use: Not Currently    Drug use: No    Sexual activity: Not Currently     Partners: Male     Birth control/protection: Post-menopausal   Other Topics Concern    Parent/sibling w/ CABG, MI or angioplasty before 65F 55M? 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 Doesn't have a job lined up afterward but looking forward to some time off. Has three children, one moved to college this fall. Living in [place], has a significant other x 7 months. Previously . In a monogamous relationship. Never smoker, social EtOH use.      Social Determinants of Health     Financial Resource Strain: Not on file   Food Insecurity: Not on file   Transportation Needs: Not on file   Physical Activity: Not on file   Stress: Not on file   Social Connections: Not on file   Interpersonal Safety: Low Risk  (4/9/2024)    Interpersonal Safety     Do you feel physically and emotionally safe where you currently live?: Yes     Within the past  12 months, have you been hit, slapped, kicked or otherwise physically hurt by someone?: No     Within the past 12 months, have you been humiliated or emotionally abused in other ways by your partner or ex-partner?: No   Housing Stability: Not on file       ROS:   CONSTITUTIONAL:  No fevers or chills  EYES: negative for icterus  ENT:  negative for hearing loss, tinnitus and sore throat  RESPIRATORY:  negative for cough, sputum, dyspnea  CARDIOVASCULAR:  negative for chest pain Fatigue  GASTROINTESTINAL:  negative for nausea, vomiting, diarrhea or constipation  GENITOURINARY:  negative for incontinence, dysuria, bladder emptying problems  HEME:  No easy bruising  INTEGUMENT:  negative for rash and pruritus  NEURO:  Negative for headache, seizure disorder  Allergies:   Allergies   Allergen Reactions    Ampicillin Swelling     Swelling of mouth and tongue.     Seasonal Allergies Other (See Comments)     Itchy eyes and rhinitis.     Medications:  Prescription Medications as of 10/7/2024         Rx Number Disp Refills Start End Last Dispensed Date Next Fill Date Owning Pharmacy    atorvastatin (LIPITOR) 40 MG tablet  90 tablet 3 4/10/2024 --   [facility] [address] [place] [address] [address] AT [facility] OF [facility] [address]    Sig: Take 1 tablet (40 mg) by mouth at bedtime    Class: E-Prescribe    Notes to Pharmacy: Profile Rx: patient will contact pharmacy when needed    Route: Oral    bisacodyl (DULCOLAX) 5 MG EC tablet  4 tablet 0 9/18/2024 --   [facility] [address] [place] [address] [address] AT [facility] [address]    Sig: Take 2 tablets at 3 pm the day before your procedure. If your procedure is before 11 am, take 2 additional tablets at 11 pm. If your procedure is after 11 am, take 2 additional tablets at 6 am. For additional instructions refer to your colonoscopy prep instructions.    Class: E-Prescribe    cinacalcet (SENSIPAR) 30 MG tablet  45 tablet 3  8/9/2024 --   [facility] STORE #[id number] [place] [address] [address] AT [facility] OF [facility] & [address]    Sig: Take 1 tablet (30 mg) by mouth every other day    Class: E-Prescribe    Notes to Pharmacy: Decreased.    Route: Oral    clobetasol (TEMOVATE) 0.05 % external ointment  60 g 3 10/12/2023 --   [facility] STORE [address] [place] [address] [address] AT [facility] [address]    Sig: Apply topically 2 times daily Use sparingly at active areas of dermatitis for no more than 14 days or until resolved, whichever is sooner.    Class: E-Prescribe    Route: Topical    clobetasol (TEMOVATE) 0.05 % external solution  50 mL 4 10/12/2023 --   [facility] STORE [address] [place] [address] [address] AT [facility] [address]    Sig: Apply topically daily as needed (itchyy scalp) Apply to scalp daily as needed.    Class: E-Prescribe    Route: Topical    clobetasol propionate (CLOBEX) 0.05 % external shampoo  118 mL 1 6/27/2024 --   [facility] STORE [address] [place] [address] [address] AT [facility] [address]    Sig: APPLY TO SCALP IN SHOWER TWICE WEEKLY    Class: E-Prescribe    No prior authorization was found for this prescription.    Found prior authorization for another prescription for the same medication: Approved    ketoconazole (NIZORAL) 2 % external shampoo  120 mL 9 10/12/2023 --   [facility] STORE [id number] [place] [address] [address] AT [facility] OF [facility] & [address]    Sig: APPLY TOPICALLY DAILY AS NEEDED FOR ITCHING, IRRITATION OR OTHER( SCALP SCALE) USE 2-3 TIMES A WEEK. APPLY AND LATHER THEN RINSE OUT 5 MINUTES    Class: E-Prescribe    mycophenolate (GENERIC EQUIVALENT) 250 MG capsule  540 capsule 3 7/30/2024 --   [address]    Sig: Take 3 capsules (750 mg) by mouth 2 times daily    Class: E-Prescribe    Notes to Pharmacy: Profile Rx: patient will contact  pharmacy when needed. Just received MMF delivery.    Route: Oral    niacinamide 500 MG tablet  60 tablet 3 10/12/2023 --   [facility] [address] [place] [address] [address] AT [facility] [address]    Sig: Take 1 tablet (500 mg) by mouth 2 times daily (with meals)    Class: E-Prescribe    Route: Oral    PARoxetine (PAXIL) 20 MG tablet  90 tablet 2 4/9/2024 --   [facility] STORE [address] [place] [address] [address] AT [facility] OF [facility] & [address]    Sig: TAKE 1 TABLET BY MOUTH EVERY MORNING    Class: E-Prescribe    Notes to Pharmacy: Profile Rx: patient will contact pharmacy when needed    polyethylene glycol (GOLYTELY) 236 g suspension  4000 mL 0 9/18/2024 --   [facility] STORE [address] [place] [address] [address] AT [facility] & [address]    Sig: The night before the exam at 6 pm drink an 8-ounce glass every 15 minutes until the jug is half empty. If you arrive before 11 AM: Drink the other half of the Golytely jug at 11 PM night before procedure. If you arrive after 11 AM: Drink the other half of the Golytely jug at 6 AM day of procedure. For additional instructions refer to your colonoscopy prep instructions.    Class: E-Prescribe    Notes to Pharmacy: Pharmacy may substitute for equivalent.    predniSONE (DELTASONE) 5 MG tablet  90 tablet 3 8/7/2024 --   [facility] STORE [address] [place] [address] [address] AT [facility] & [address]    Sig: Take 1 tablet (5 mg) by mouth daily    Class: E-Prescribe    Notes to Pharmacy: Start after pred taper    Route: Oral    sodium bicarbonate 650 MG tablet  360 tablet 3 8/2/2024 --   [facility] [address] [place] [address] [address] AT [facility] & [address]    Sig: Take 2 tablets (1,300 mg) by mouth 2 times daily    Class: E-Prescribe    Route: Oral    triamcinolone (KENALOG) 0.1 % external cream  30 g 0 6/27/2024 --   [facility]  "#[id number] - [place], [place] - [address] AT [facility] OF [address]    Sig: APPLY SPARINGLY TOPICALLY TO THE AFFECTED AREA TWICE DAILY FOR 14 DAYS    Class: E-Prescribe          Clinic-Administered Medications as of 10/7/2024         Dose Frequency Start End    triamcinolone acetonide (KENALOG-10) injection 10 mg 10 mg ONCE 5/9/2021 --    Route: Intra-Lesional          Exam:     /82   Pulse 79   Ht 1.575 m (5' 2\")   Wt 59.7 kg (131 lb 9.6 oz)   LMP 05/14/2014 (Approximate)   SpO2 99%   BMI 24.07 kg/m    Appearance: in no apparent distress.   Skin: normal  Eyes:  no redness or discharge.  Sclera anicteric  Head and Neck: Normal, no rashes or jaundice  Respiratory: easy respirations, no audible wheezing.  Abdomen: flat, Surgical scars consistent with history and RLQ stone incision   Extremities: femoral 3+/3+, Edema, none  Neuro: without deficit   Psychiatric: Normal mood and affect    Diagnostics:   Recent Results (from the past 672 hour(s))   Renal panel    Collection Time: 09/24/24 11:14 AM   Result Value Ref Range    Sodium 136 135 - 145 mmol/L    Potassium 4.5 3.4 - 5.3 mmol/L    Chloride 106 98 - 107 mmol/L    Carbon Dioxide (CO2) 16 (L) 22 - 29 mmol/L    Anion Gap 14 7 - 15 mmol/L    Glucose 107 (H) 70 - 99 mg/dL    Urea Nitrogen 60.9 (H) 8.0 - 23.0 mg/dL    Creatinine 4.40 (H) 0.51 - 0.95 mg/dL    GFR Estimate 11 (L) >60 mL/min/1.73m2    Calcium 10.9 (H) 8.8 - 10.4 mg/dL    Albumin 4.6 3.5 - 5.2 g/dL    Phosphorus 4.8 (H) 2.5 - 4.5 mg/dL   CBC with platelets    Collection Time: 09/24/24 11:14 AM   Result Value Ref Range    WBC Count 4.6 4.0 - 11.0 10e3/uL    RBC Count 3.48 (L) 3.80 - 5.20 10e6/uL    Hemoglobin 10.5 (L) 11.7 - 15.7 g/dL    Hematocrit 33.2 (L) 35.0 - 47.0 %    MCV 95 78 - 100 fL    MCH 30.2 26.5 - 33.0 pg    MCHC 31.6 31.5 - 36.5 g/dL    RDW 14.9 10.0 - 15.0 %    Platelet Count 205 150 - 450 10e3/uL   Hepatitis B surface antigen    Collection Time: 09/24/24 11:14 " AM   Result Value Ref Range    Hepatitis B Surface Antigen Nonreactive Nonreactive   UA with Microscopic    Collection Time: 09/24/24 11:30 AM   Result Value Ref Range    Color Urine Yellow Colorless, Straw, Light Yellow, Yellow    Appearance Urine Clear Clear    Glucose Urine Negative Negative mg/dL    Bilirubin Urine Negative Negative    Ketones Urine Negative Negative mg/dL    Specific Gravity Urine >=1.030 1.005 - 1.030    Blood Urine Small (A) Negative    pH Urine 5.5 5.0 - 8.0    Protein Albumin Urine >=300 (A) Negative mg/dL    Urobilinogen Urine 0.2 0.2, 1.0 E.U./dL    Nitrite Urine Negative Negative    Leukocyte Esterase Urine Negative Negative   Protein  random urine    Collection Time: 09/24/24 11:30 AM   Result Value Ref Range    Total Protein Urine mg/dL 335.0   mg/dL    Total Protein Urine mg/mg Creat 2.77 (H) 0.00 - 0.20 mg/mg Cr    Creatinine Urine mg/dL 121.0 mg/dL   Albumin Random Urine Quantitative with Creat Ratio    Collection Time: 09/24/24 11:30 AM   Result Value Ref Range    Creatinine Urine mg/dL 121.0 mg/dL    Albumin Urine mg/L 2,139.0 mg/L    Albumin Urine mg/g Cr 1,767.77 (H) 0.00 - 25.00 mg/g Cr   Urine Microscopic Exam    Collection Time: 09/24/24 11:30 AM   Result Value Ref Range    Bacteria Urine Few (A) None Seen /HPF    RBC Urine 2-5 (A) 0-2 /HPF /HPF    WBC Urine 0-5 0-5 /HPF /HPF    Squamous Epithelials Urine Few (A) None Seen /LPF   Renal panel    Collection Time: 09/27/24 11:02 AM   Result Value Ref Range    Sodium 136 135 - 145 mmol/L    Potassium 4.3 3.4 - 5.3 mmol/L    Chloride 104 98 - 107 mmol/L    Carbon Dioxide (CO2) 17 (L) 22 - 29 mmol/L    Anion Gap 15 7 - 15 mmol/L    Glucose 112 (H) 70 - 99 mg/dL    Urea Nitrogen 62.3 (H) 8.0 - 23.0 mg/dL    Creatinine 5.05 (H) 0.51 - 0.95 mg/dL    GFR Estimate 9 (L) >60 mL/min/1.73m2    Calcium 10.8 (H) 8.8 - 10.4 mg/dL    Albumin 4.5 3.5 - 5.2 g/dL    Phosphorus 5.0 (H) 2.5 - 4.5 mg/dL   CBC with platelets    Collection Time:  09/27/24 11:02 AM   Result Value Ref Range    WBC Count 4.9 4.0 - 11.0 10e3/uL    RBC Count 3.23 (L) 3.80 - 5.20 10e6/uL    Hemoglobin 9.7 (L) 11.7 - 15.7 g/dL    Hematocrit 30.2 (L) 35.0 - 47.0 %    MCV 94 78 - 100 fL    MCH 30.0 26.5 - 33.0 pg    MCHC 32.1 31.5 - 36.5 g/dL    RDW 14.9 10.0 - 15.0 %    Platelet Count 187 150 - 450 10e3/uL   Hepatitis B core antibody    Collection Time: 09/27/24 11:02 AM   Result Value Ref Range    Hepatitis B Core Antibody Total Nonreactive Nonreactive   Hepatitis B Surface Antibody    Collection Time: 09/27/24 11:02 AM   Result Value Ref Range    Hepatitis B Surface Antibody Nonreactive     Hepatitis B Surface Antibody Instrument Value <3.50 <8.5 m[IU]/mL   Hepatitis B surface antigen    Collection Time: 09/27/24 11:02 AM   Result Value Ref Range    Hepatitis B Surface Antigen Nonreactive Nonreactive   Quantiferon TB Gold Plus Grey Tube    Collection Time: 09/27/24 11:02 AM    Specimen: Arm, Right; Blood   Result Value Ref Range    Quantiferon Nil Tube 0.05 IU/mL   Quantiferon TB Gold Plus Green Tube    Collection Time: 09/27/24 11:02 AM    Specimen: Arm, Right; Blood   Result Value Ref Range    Quantiferon TB1 Tube 0.06 IU/mL   Quantiferon TB Gold Plus Yellow Tube    Collection Time: 09/27/24 11:02 AM    Specimen: Arm, Right; Blood   Result Value Ref Range    Quantiferon TB2 Tube 0.08    Quantiferon TB Gold Plus Purple Tube    Collection Time: 09/27/24 11:02 AM    Specimen: Arm, Right; Blood   Result Value Ref Range    Quantiferon Mitogen 10.00 IU/mL   Quantiferon TB Gold Plus    Collection Time: 09/27/24 11:02 AM    Specimen: Arm, Right; Blood   Result Value Ref Range    Quantiferon-TB Gold Plus Negative Negative    TB1 Ag minus Nil Value 0.01 IU/mL    TB2 Ag minus Nil Value 0.03 IU/mL    Mitogen minus Nil Result 9.95 IU/mL    Nil Result 0.05 IU/mL   UA with Microscopic    Collection Time: 09/27/24 11:03 AM   Result Value Ref Range    Color Urine Light Yellow Colorless, Straw,  Light Yellow, Yellow    Appearance Urine Clear Clear    Glucose Urine Negative Negative mg/dL    Bilirubin Urine Negative Negative    Ketones Urine Negative Negative mg/dL    Specific Gravity Urine 1.015 1.003 - 1.035    Blood Urine Small (A) Negative    pH Urine 6.0 5.0 - 7.0    Protein Albumin Urine 300 (A) Negative mg/dL    Urobilinogen Urine Normal Normal, 2.0 mg/dL    Nitrite Urine Negative Negative    Leukocyte Esterase Urine Small (A) Negative    RBC Urine 3 (H) <=2 /HPF    WBC Urine 3 <=5 /HPF    Squamous Epithelials Urine 1 <=1 /HPF   Protein  random urine    Collection Time: 09/27/24 11:03 AM   Result Value Ref Range    Total Protein Urine mg/dL 273.0   mg/dL    Total Protein Urine mg/mg Creat 2.29 (H) 0.00 - 0.20 mg/mg Cr    Creatinine Urine mg/dL 119.0 mg/dL   Albumin Random Urine Quantitative with Creat Ratio    Collection Time: 09/27/24 11:03 AM   Result Value Ref Range    Creatinine Urine mg/dL 119.0 mg/dL    Albumin Urine mg/L 1,808.0 mg/L    Albumin Urine mg/g Cr 1,519.33 (H) 0.00 - 25.00 mg/g Cr   NM Lexiscan stress test    Collection Time: 10/04/24 12:14 PM   Result Value Ref Range    Target      Baseline Systolic      Baseline Diastolic BP 85     Last Stress Systolic      Last Stress Diastolic BP 74     Baseline HR 80 bpm    Max HR  103     Max Predicted HR  64 %    Rate Pressure Product 12,978.0      UNOS CPRA   Date Value Ref Range Status   07/25/2024 7  Final

## 2024-10-07 NOTE — PROGRESS NOTES
Psychosocial Assessment For Kidney Transplantation  Patient Name/ Age: Reina Quan 59 year old   Medical Record #: [id number]  Duration of Interview:     30min  Process:   Face-to-Face Interview                (counseling < 50%)   Present at Appointment: Reina (Patient) & Martha (Daughter)        : DEV Porter [facility] Date:  October 7, 2024        Type of transplant: Kidney    Donor type:      Reported that her nephew (sister and KEDAR's son) is interested in being a potential donor.    Prior Transplants:    Yes  - 6/2/1992 (Kidney)  Status of Transplant:            Current Living Situation    Location:   [address] [place] [id number]  With Whom:  Reported that she currently lives in a home with her ex-partner/roommate, Herman. No pets.        Family/ Social Support:    Supports:  Available, Helpful - Patient reported that she has 3 adult children (Martha, 30 yrs old, Sadie, 28 yrs old, and Prince, 25 yrs old). Identified that her ex-partner/roommate, Herman is also a support.    Committed Relationship:     . Reported that she currently lives with an ex-partner, Herman.    Other Supports:    Available, Occasional.  - Parents: Mom lives in [place] and dad lives in [place], however, just suffered from a stroke. Identified them as emotional supports/support as needed.   - Sister/mom of potential donor (Velia), has a cabin nearby and available as needed.   - Close friends.        Activities/ Functional Ability    Current Level: Ambulatory and Independent with ADL's. Denied use of equipment at this time.      Transportation Drives Self.        Vocational/Employment/Financial     Employment   Full Time.    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    Income   Salary/wages and Savings. Will be on short-term/long-term disability with her employer.    Insurance      At this time, patient can afford medication costs:   Yes .  [facility].        Medical Status    Current Mode of Treatment for ESRD None. Shared that she will be starting dialysis treatment soon.    Complications None - Not a diabetic.        Behavioral    Tobacco Use: No. Denied current/history of use. Chemical Dependency: No. Reported an occasional glass of wine every few months. Denied marijuana use.          Psychiatric Impairment: No. Indicated some history of depression/anxiety symptoms that she is currently managing with medication.     Denied thoughts of SI today or within the past two weeks. Denied history of hospitalizations, self-injurious behaviors, or prior suicide attempts. Shared that she has attended therapy in the past and is open to returning as needed through this process.     Reading Ability: Good.  Education Level: Associates Degree.  Recent Legal History: No.       Coping Style/Strategies: Medications, sleeping, walking, biking, talking to friends/family.        Ability to Adhere to Complex Medical Regime: Yes .     Adherence History: Patient feels confident in their ability to manage their appointments and follow up with the doctor when needed. They report no concerns in maintaining this moving forward.           Education  _X_ Medicare  _X_ Rehabilitation  _X_ Donor issues  _X_ Community resources  _X_ Post discharge housing  _X_ Financial resources  _X_ Medical insurance options  _X_ Psych adjustment  _X_ Family adjustment  _X_ Health Care Directive - Provided education and handout to complete.    Psychosocial Risks of Transplant Reviewed and Discussed:  _X_ Increased stress related to emotional,            family, social, employment or financial           situation  _X_ Effect on work and/or disability benefits  _X_ Effect on future health and life           insurance  _X_ Transplant outcome expectations may           not be met  _X_ Mental Health Risks: anxiety,           depression, PTSD, guilt, grief and           chronic  fatigue     Notable Items:   None noted.       Final Evaluation/Assessment   Reviewed transplant education (Medicare, rehabilitation, donor issues, community/financial resources, and psych/family adjustment) as well as psychosocial risks of transplant. Provided patient with a copy of post-transplant informational sheet that includes information on potential costs of medications, Medicare ESRD, post-transplant lodging, etc. Patient seemed to process information well. Appeared well informed, motivated and able to follow post transplant requirements. Patient has a prior history of transplant, so is familiar with the process. Behavior was appropriate during interview. Has adequate income and insurance coverage. Adequate social support. No major contraindications noted for transplant. At this time patient appears to understand the risks and benefits of transplant.      Recommendation  Acceptable.    Selection Criteria Met:  Plan for support: Yes   Chemical Dependence: Yes .   Smoking: Yes .  Mental Health: Yes .  Adequate Finances Yes .   Signature: DEV Porter   Title: Clinical

## 2024-10-07 NOTE — LETTER
10/7/2024      Reina Quan  [address] [facility] [id number]      Dear Colleague,    Thank you for referring your patient, Reina Quan, to the [facility]PL[facility]. Please see a copy of my visit note below.    TRANSPLANT NEPHROLOGY RECIPIENT EVALUATION NOTE    Assessment and Plan:  # Kidney Transplant Evaluation: Patient is a good candidate overall. Benefits of a living donor transplant were discussed.    Recommendations:  - Colonoscopy  - Urology consult for hematuria, graft tenderness, and recurrent UTIs  - US [place] for  surgery to review  - Needs vaccinations: Hepatitis B    # CKD stage V, history of focal segmental glomerulosclerosis (FSGS) biopsy proven s/p LDKTx 1992:   Baseline Creatinine had been 1.5-1.8 until 5/2023 then increased to 2-4 range starting in 4/2024. In May 2024 she developed symptoms of sob, nausea,vomiting, and lack of appetite in the setting of rising creatinine. Kidney biopsy was obtained 7/2024 consistent with mixed rejection (ACR Banff type 1A, Active AMR with diffused +C4D, and Moderate chronic changes).  She was treated with high dose steroids 500mg x3 days and tapered to 5mg daily along with MMF 750mg BID. Her most recent creatinine 5.05. She has a qualifying GFR of 15 since 6/2024. She plans to undergo staged fistula placement, with the first stage planned for next week. When ready, will likely benefit from a re-transplant.    #Immunosuppression history/medication non-adherence:  Initially started on cyclosporine, imuran, and prednisone after kidney transplant. Imuran discontinued due to skin cancer and changed to sirolimus. Sirolimus discontinued 3/2023 due to high cholesterol. Currently on prednisone 5 mg (restarted in July 2024 for rejection) and mycophenolate 750 mg bid. Endorsed compliance but reported to the surgeon history of missed doses and taking only MMF 750mg daily at her discretion.     #UTI/pyelonephritis:  She reports frequent  UTIs  (every 3-6 months) after kidney transplant with some infections requiring hospitalization. Symptoms included nausea, fevers, vomiting, and diarrhea. Two UTIs within the last year with last being in Aug 2024. Prior to these it has been 7 years without a UTI. Has not been seen by Urology or ID in the past.     # Cardiac Risk:   # CAD, mild coronary artery calcification on CT chest 8/2024. ECHO 7/2024 EF 60-65% with no valvular abnormalities, RA pressures were normal.  ECG 8/29/24: SR with low voltage QRS; non specific t wave abnormality. Presented to the ED 8/2024 with chest pain. Nuclear stress test 10/4/24 negative, with EF >70%  Will need cardiac risk assessment.    # Hx Gestational Hypertension:  diagnosed prior to first pregnancy and during pregnancy. No hypertension after delivering or with additional pregnancies. Currently not on antihypertensives. Systolics in the 120s.     # PAD Screening: Will obtain updated imaging for Transplant Surgery to review    # Depression: Asymptomatic on Paxil.     # Ulcerative Colitis: Questionable diagnosis. Patient reports possible UC noted during colonoscopy in 1995 but was never treated. Never referred to GI. Repeat colonoscopy in 2012 with pathology consistent with focal acute cryptitis, and acute colitis 2/2 infectious etiology. Slightly increased mucosa vascular pattern in the entire examined colon, either related to bowel preparation vs. underlying mild/inactive IBD.     # Hyperlipidemia: On atorvastatin. History of hypercholesteremia since teenage years.     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 She has seen Oncology in the past. Platelets now WNL.    # Prediabetes: Hgb A1c 6. Glucose has ranged from 110s - 200s since 2022. UA negative for glucose.     #Chondromalacia of patella, bilateral with chronic pain:x-ray without evidence of high-grade osteoarthritis with no bone-on-bone findings.     # Hypercalcemia:   PTHi in low normal range, low VitD, non significant change in SPEP UPEP and immunofixation. No medication nor supplements related to hypercalcemia. Seen by Endocrinology, felt likely due to primary hyperparathyroidismIn in setting of decreasing renal function.    # Health Maintenance: Colonoscopy: Not up to date, Mammogram: Up to date, PAP: Up to date, Dermatology: Not up to date, and Dental: Up to date    - Discussed the risks and benefits of a transplant, including the risk of surgery and immunosuppression medications.  Patient's overall evaluation will be discussed in the Transplant Program's regular meeting with a final recommendation on the patients suitability for transplant to be made at that time.    Evaluation:  Reina Quan was seen in consultation at the request of Dr. Nicole Lund for evaluation as a potential kidney transplant recipient.    Reason for Visit:  Reina Quan is a 59 year old female with ESKD from focal segmental glomerulosclerosis (FSGS), who presents for kidney transplant evaluation.    History of Present Illness:  Reina Quan is a 59 year old female with CKD stage V s/p kidney transplant in 1992 for FSGS who presents to the clinic for kidney transplant evaluation for failed transplant. She was diagnosed with genetic FSGS in 1992 with kidney biopsy. She is s/p LDKT (related) in 1992.She was doing well post transplant until 3/2023 at which time she endorsed worsening fatigue and shortness of breath with exertion. In may 2024 her creatinine increased to the 3-4 range (baseline 1.5-1.8). Kidney biopsy consistent with ACR Banff 1A, AMR, and chronic changes. She was started on prednisone and referred for transplant evaluation. She has an appointment this week to have a dialysis line placed in anticipation of starting HD soon. Qualifying GFR in 6/2024.          Kidney Disease Hx:       Kidney Disease Dx: Focal segmental glomerulosclerosis (FSGS)       Biopsy  Proven: Yes; Native kidney in 1992 and transplant bx 7/25/24.  -Acute cell-mediated rejection, Banff type IA, with moderate tubulitis, moderate interstitial inflammation, and no arteritis (t2 i2 v0)  -Active antibody-mediated rejection with severe peritubular capillaritis, severe glomerulitis, diffuse positive C4d staining in peritubular capillaries; and no transplant glomerulopathy (g3 ptc3 C4d3; cg0)  -Moderate chronic changes of the parenchyma, including:   - focal global glomerulosclerosis (18% of glomeruli)   - focal tubular atrophy and interstitial fibrosis (40% of cortex)   - severe arterial sclerosis and no significant arteriolar hyalinosis      (ct2 ci2 cv3 ah0 mm0)         On Dialysis: No; scheduled for tunneled line placement to start dialysis 10/9/24. Surgery for fistula next week.        Primary Nephrologist: SAM Raya       H/o Kidney Stones: No       H/o Recurrent/Frequent UTI: Yes          Cardiac/Vascular Disease Risk Factors:        Cardiac Risk Factors: CKD       Known CAD: Yes; mild CAD on CT 8/2024       Known PAD/Caludication Symptoms: No       Known Heart Failure: No       Arrhythmia: No       Pulmonary Hypertension: No       Valvular Disease: No       Other: None         Viral Serology Status       CMV IgG Antibody: Positive       EBV IgG Antibody: Positive         Volume Status/Weight:        Volume status: Euvolemic       Weight:  Acceptable BMI       BMI: 24.07         Functional Capacity/Frailty:        [place] [employer] for work. Very active for her job but tires easily. Can walk one block without fatigue or shortness of breath. Can climb a few flights of stairs without SOB. Rare alcohol use. No illicit drugs or smoking history.      Fatigue/Decreased Energy: [] No [x] Yes Within the last year.    Chest Pain or SOB with Exertion: [] No [x] Yes SOB; no CP   Significant Weight Change: [] No [x] Yes 15 pound weight loss over 4 months. Poor appetite recently.    Nausea,  Vomiting or Diarrhea: [x] No [] Yes    Fever, Sweats or Chills:  [x] No [] Yes    Leg Swelling [x] No [] Yes        History of Cancer:   # History of squamous cell carcinoma: First episode after her transplant. Fourteen areas total. Follows with Dermatology (Dr. Bauman). Last SCC 11/23. Next appointment planned for 12/2024.     Allergy Testing Questions:   Medication that caused a reaction Other antibiotics:  ampicillin   Antibiotics used that didn't give an allergic reaction?  Patient doesn't know    COVID Vaccination Up To Date: Not asked    Potential Living Kidney Donors: Yes- nephew; son may be open to paired exchange.     Review of Systems:  A comprehensive review of systems was obtained and negative, except as noted in the HPI or PMH.    Past Medical History:   Medical record was reviewed and PMH was discussed with patient and noted below.  Past Medical History:   Diagnosis Date     Actinic keratosis      Allergic rhinitis, cause unspecified      Anemia      anxiety/depression     PAXIL     Arthritis      congenital hearing loss     uses hearing aids     Depressive disorder      Dry eyes      Giant Platelet syndrome      Glomerular Focal Sclerosis--transplant 1985      Hypertension      Kidney replaced by transplant 1992    RAPAMUNE/ SIROLIMUS      Lichen planopilaris     LPP followed by derm on doxycycline/ clobetasol     Lichen planus      Menorrhagia      migraine      Squamous cell carcinoma     8 x      Thrombocytopenia (H)      Ulcerative colitis, unspecified     biposy neg 1996     Unsatisfactory cervical Papanicolaou smear 02/15/2021     UTI (lower urinary tract infection)     recurrent Dr Burns [facility]       Past Social History:   Past Surgical History:   Procedure Laterality Date     BIOPSY OF SKIN LESION       CHOLECYSTECTOMY       DILATION AND CURETTAGE, OPERATIVE HYSTEROSCOPY WITH MORCELLATOR, COMBINED N/A 11/10/2015    Procedure: COMBINED DILATION AND CURETTAGE, OPERATIVE HYSTEROSCOPY WITH  MORCELLATOR;  Surgeon: Ghada Melendez MD;  Location: [facility] OR     ESOPHAGOSCOPY, GASTROSCOPY, DUODENOSCOPY (EGD), COMBINED  11/20/2012    Procedure: COMBINED ESOPHAGOSCOPY, GASTROSCOPY, DUODENOSCOPY (EGD), BIOPSY SINGLE OR MULTIPLE;;  Surgeon: Valentin Hahn MD;  Location: [facility] GI     IR RENAL BIOPSY RIGHT  07/25/2024     MOHS MICROGRAPHIC PROCEDURE       TRANSPLANT  1992    kidney     Z NONSPECIFIC PROCEDURE      Renal transplant right side     Z NONSPECIFIC PROCEDURE      cholecystectomy     Personal history of bleeding or anesthesia problems: Yes; History of thrombocytopenia noted during work up for initial kidney transplant and diagnosed with giant platelet syndrome following lumbar puncture. History of heavy menstrual cycles. No bleeding issues.     Family History:  Family History   Problem Relation Age of Onset     Hypertension Mother      Depression Mother      Anxiety Disorder Mother      Diabetes Father      Kidney Disease Father         nephrolithiasis     Asthma Father      Asthma Sister      Blood Disease Maternal Grandmother      Diabetes Maternal Grandmother      Hypertension Maternal Grandmother      Hyperlipidemia Maternal Grandmother      Depression Maternal Grandmother      No Known Problems Maternal Grandfather      No Known Problems Paternal Grandmother      C.A.D. Paternal Grandfather      Cancer Other         no family hx of skin cancer     Glaucoma No family hx of      Macular Degeneration No family hx of      Melanoma No family hx of      Skin Cancer No family hx of      Anesthesia Reaction No family hx of      Thrombosis No family hx of        Personal History:   Social History     Socioeconomic History     Marital status:      Spouse name: Not on file     Number of children: 3     Years of education: Not on file     Highest education level: Not on file   Occupational History     Occupation:  - window treatments   Tobacco Use     Smoking status: Never     Passive  exposure: Never     Smokeless tobacco: Never   Vaping Use     Vaping status: Never Used   Substance and Sexual Activity     Alcohol use: Not Currently     Drug use: No     Sexual activity: Not Currently     Partners: Male     Birth control/protection: Post-menopausal   Other Topics Concern     Parent/sibling w/ CABG, MI or angioplasty before 65F 55M? 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 Doesn't have a job lined up afterward but looking forward to some time off. Has three children, one moved to college this fall. Living in [place], has a significant other x 7 months. Previously . In a monogamous relationship. Never smoker, social EtOH use.      Social Determinants of Health     Financial Resource Strain: Not on file   Food Insecurity: Not on file   Transportation Needs: Not on file   Physical Activity: Not on file   Stress: Not on file   Social Connections: Not on file   Interpersonal Safety: Low Risk  (4/9/2024)    Interpersonal Safety      Do you feel physically and emotionally safe where you currently live?: Yes      Within the past 12 months, have you been hit, slapped, kicked or otherwise physically hurt by someone?: No      Within the past 12 months, have you been humiliated or emotionally abused in other ways by your partner or ex-partner?: No   Housing Stability: Not on file       Allergies:  Allergies   Allergen Reactions     Ampicillin Swelling     Swelling of mouth and tongue.      Seasonal Allergies Other (See Comments)     Itchy eyes and rhinitis.       Medications:  Current Outpatient Medications   Medication Sig Dispense Refill     atorvastatin (LIPITOR) 40 MG tablet Take 1 tablet (40 mg) by mouth at bedtime 90 tablet 3     bisacodyl (DULCOLAX) 5 MG EC tablet Take 2 tablets at 3 pm the day before your procedure. If your procedure is before 11 am, take 2 additional tablets at 11 pm. If your procedure is after 11 am, take 2 additional  tablets at 6 am. For additional instructions refer to your colonoscopy prep instructions. 4 tablet 0     cinacalcet (SENSIPAR) 30 MG tablet Take 1 tablet (30 mg) by mouth every other day (Patient not taking: Reported on 9/27/2024) 45 tablet 3     clobetasol (TEMOVATE) 0.05 % external ointment Apply topically 2 times daily Use sparingly at active areas of dermatitis for no more than 14 days or until resolved, whichever is sooner. 60 g 3     clobetasol (TEMOVATE) 0.05 % external solution Apply topically daily as needed (itchyy scalp) Apply to scalp daily as needed. 50 mL 4     clobetasol propionate (CLOBEX) 0.05 % external shampoo APPLY TO SCALP IN SHOWER TWICE WEEKLY 118 mL 1     ketoconazole (NIZORAL) 2 % external shampoo APPLY TOPICALLY DAILY AS NEEDED FOR ITCHING, IRRITATION OR OTHER( SCALP SCALE) USE 2-3 TIMES A WEEK. APPLY AND LATHER THEN RINSE OUT 5 MINUTES 120 mL 9     mycophenolate (GENERIC EQUIVALENT) 250 MG capsule Take 3 capsules (750 mg) by mouth 2 times daily 540 capsule 3     niacinamide 500 MG tablet Take 1 tablet (500 mg) by mouth 2 times daily (with meals) 60 tablet 3     PARoxetine (PAXIL) 20 MG tablet TAKE 1 TABLET BY MOUTH EVERY MORNING 90 tablet 2     polyethylene glycol (GOLYTELY) 236 g suspension The night before the exam at 6 pm drink an 8-ounce glass every 15 minutes until the jug is half empty. If you arrive before 11 AM: Drink the other half of the [employer] jug at 11 PM night before procedure. If you arrive after 11 AM: Drink the other half of the Golytely jug at 6 AM day of procedure. For additional instructions refer to your colonoscopy prep instructions. 4000 mL 0     predniSONE (DELTASONE) 5 MG tablet Take 1 tablet (5 mg) by mouth daily (Patient taking differently: Take 5 mg by mouth every morning.) 90 tablet 3     sodium bicarbonate 650 MG tablet Take 2 tablets (1,300 mg) by mouth 2 times daily 360 tablet 3     triamcinolone (KENALOG) 0.1 % external cream APPLY SPARINGLY TOPICALLY TO  "THE AFFECTED AREA TWICE DAILY FOR 14 DAYS 30 g 0     Current Facility-Administered Medications   Medication Dose Route Frequency Provider Last Rate Last Admin     triamcinolone acetonide (KENALOG-10) injection 10 mg  10 mg Intra-Lesional Once Brad Bauman MD           Vitals:  LMP 05/14/2014 (Approximate)      10/7/24   /82   Pulse 79   Resp --   Temp --   Temp src --   SpO2 99 %   Weight 59.7 kg (131 lb 9.6 oz)   Height 1.575 m (5' 2\")   Pain Score --   BMI (Calculated) 24.07       Exam:  GENERAL APPEARANCE: alert and no distress  HENT: mouth without ulcers or lesions  RESP: lungs clear to auscultation - no rales, rhonchi or wheezes  CV: regular rhythm, normal rate, no rub, no murmur  FEMORAL PULSES: normal  EDEMA: no LE edema bilaterally  ABDOMEN: soft, nondistended, nontender, bowel sounds normal  MS: extremities normal - no gross deformities noted, no evidence of inflammation in joints, no muscle tenderness  SKIN: no rash  TX KIDNEY: mild tenderness    Results:   Recent Results (from the past 336 hour(s))   Renal panel    Collection Time: 09/24/24 11:14 AM   Result Value Ref Range    Sodium 136 135 - 145 mmol/L    Potassium 4.5 3.4 - 5.3 mmol/L    Chloride 106 98 - 107 mmol/L    Carbon Dioxide (CO2) 16 (L) 22 - 29 mmol/L    Anion Gap 14 7 - 15 mmol/L    Glucose 107 (H) 70 - 99 mg/dL    Urea Nitrogen 60.9 (H) 8.0 - 23.0 mg/dL    Creatinine 4.40 (H) 0.51 - 0.95 mg/dL    GFR Estimate 11 (L) >60 mL/min/1.73m2    Calcium 10.9 (H) 8.8 - 10.4 mg/dL    Albumin 4.6 3.5 - 5.2 g/dL    Phosphorus 4.8 (H) 2.5 - 4.5 mg/dL   CBC with platelets    Collection Time: 09/24/24 11:14 AM   Result Value Ref Range    WBC Count 4.6 4.0 - 11.0 10e3/uL    RBC Count 3.48 (L) 3.80 - 5.20 10e6/uL    Hemoglobin 10.5 (L) 11.7 - 15.7 g/dL    Hematocrit 33.2 (L) 35.0 - 47.0 %    MCV 95 78 - 100 fL    MCH 30.2 26.5 - 33.0 pg    MCHC 31.6 31.5 - 36.5 g/dL    RDW 14.9 10.0 - 15.0 %    Platelet Count 205 150 - 450 10e3/uL   Hepatitis B " surface antigen    Collection Time: 09/24/24 11:14 AM   Result Value Ref Range    Hepatitis B Surface Antigen Nonreactive Nonreactive   UA with Microscopic    Collection Time: 09/24/24 11:30 AM   Result Value Ref Range    Color Urine Yellow Colorless, Straw, Light Yellow, Yellow    Appearance Urine Clear Clear    Glucose Urine Negative Negative mg/dL    Bilirubin Urine Negative Negative    Ketones Urine Negative Negative mg/dL    Specific Gravity Urine >=1.030 1.005 - 1.030    Blood Urine Small (A) Negative    pH Urine 5.5 5.0 - 8.0    Protein Albumin Urine >=300 (A) Negative mg/dL    Urobilinogen Urine 0.2 0.2, 1.0 E.U./dL    Nitrite Urine Negative Negative    Leukocyte Esterase Urine Negative Negative   Protein  random urine    Collection Time: 09/24/24 11:30 AM   Result Value Ref Range    Total Protein Urine mg/dL 335.0   mg/dL    Total Protein Urine mg/mg Creat 2.77 (H) 0.00 - 0.20 mg/mg Cr    Creatinine Urine mg/dL 121.0 mg/dL   Albumin Random Urine Quantitative with Creat Ratio    Collection Time: 09/24/24 11:30 AM   Result Value Ref Range    Creatinine Urine mg/dL 121.0 mg/dL    Albumin Urine mg/L 2,139.0 mg/L    Albumin Urine mg/g Cr 1,767.77 (H) 0.00 - 25.00 mg/g Cr   Urine Microscopic Exam    Collection Time: 09/24/24 11:30 AM   Result Value Ref Range    Bacteria Urine Few (A) None Seen /HPF    RBC Urine 2-5 (A) 0-2 /HPF /HPF    WBC Urine 0-5 0-5 /HPF /HPF    Squamous Epithelials Urine Few (A) None Seen /LPF   Renal panel    Collection Time: 09/27/24 11:02 AM   Result Value Ref Range    Sodium 136 135 - 145 mmol/L    Potassium 4.3 3.4 - 5.3 mmol/L    Chloride 104 98 - 107 mmol/L    Carbon Dioxide (CO2) 17 (L) 22 - 29 mmol/L    Anion Gap 15 7 - 15 mmol/L    Glucose 112 (H) 70 - 99 mg/dL    Urea Nitrogen 62.3 (H) 8.0 - 23.0 mg/dL    Creatinine 5.05 (H) 0.51 - 0.95 mg/dL    GFR Estimate 9 (L) >60 mL/min/1.73m2    Calcium 10.8 (H) 8.8 - 10.4 mg/dL    Albumin 4.5 3.5 - 5.2 g/dL    Phosphorus 5.0 (H) 2.5 - 4.5  mg/dL   CBC with platelets    Collection Time: 09/27/24 11:02 AM   Result Value Ref Range    WBC Count 4.9 4.0 - 11.0 10e3/uL    RBC Count 3.23 (L) 3.80 - 5.20 10e6/uL    Hemoglobin 9.7 (L) 11.7 - 15.7 g/dL    Hematocrit 30.2 (L) 35.0 - 47.0 %    MCV 94 78 - 100 fL    MCH 30.0 26.5 - 33.0 pg    MCHC 32.1 31.5 - 36.5 g/dL    RDW 14.9 10.0 - 15.0 %    Platelet Count 187 150 - 450 10e3/uL   Hepatitis B core antibody    Collection Time: 09/27/24 11:02 AM   Result Value Ref Range    Hepatitis B Core Antibody Total Nonreactive Nonreactive   Hepatitis B Surface Antibody    Collection Time: 09/27/24 11:02 AM   Result Value Ref Range    Hepatitis B Surface Antibody Nonreactive     Hepatitis B Surface Antibody Instrument Value <3.50 <8.5 m[IU]/mL   Hepatitis B surface antigen    Collection Time: 09/27/24 11:02 AM   Result Value Ref Range    Hepatitis B Surface Antigen Nonreactive Nonreactive   Quantiferon TB Gold Plus Grey Tube    Collection Time: 09/27/24 11:02 AM    Specimen: Arm, Right; Blood   Result Value Ref Range    Quantiferon Nil Tube 0.05 IU/mL   Quantiferon TB Gold Plus Green Tube    Collection Time: 09/27/24 11:02 AM    Specimen: Arm, Right; Blood   Result Value Ref Range    Quantiferon TB1 Tube 0.06 IU/mL   Quantiferon TB Gold Plus Yellow Tube    Collection Time: 09/27/24 11:02 AM    Specimen: Arm, Right; Blood   Result Value Ref Range    Quantiferon TB2 Tube 0.08    Quantiferon TB Gold Plus Purple Tube    Collection Time: 09/27/24 11:02 AM    Specimen: Arm, Right; Blood   Result Value Ref Range    Quantiferon Mitogen 10.00 IU/mL   Quantiferon TB Gold Plus    Collection Time: 09/27/24 11:02 AM    Specimen: Arm, Right; Blood   Result Value Ref Range    Quantiferon-TB Gold Plus Negative Negative    TB1 Ag minus Nil Value 0.01 IU/mL    TB2 Ag minus Nil Value 0.03 IU/mL    Mitogen minus Nil Result 9.95 IU/mL    Nil Result 0.05 IU/mL   UA with Microscopic    Collection Time: 09/27/24 11:03 AM   Result Value Ref Range     Color Urine Light Yellow Colorless, Straw, Light Yellow, Yellow    Appearance Urine Clear Clear    Glucose Urine Negative Negative mg/dL    Bilirubin Urine Negative Negative    Ketones Urine Negative Negative mg/dL    Specific Gravity Urine 1.015 1.003 - 1.035    Blood Urine Small (A) Negative    pH Urine 6.0 5.0 - 7.0    Protein Albumin Urine 300 (A) Negative mg/dL    Urobilinogen Urine Normal Normal, 2.0 mg/dL    Nitrite Urine Negative Negative    Leukocyte Esterase Urine Small (A) Negative    RBC Urine 3 (H) <=2 /HPF    WBC Urine 3 <=5 /HPF    Squamous Epithelials Urine 1 <=1 /HPF   Protein  random urine    Collection Time: 09/27/24 11:03 AM   Result Value Ref Range    Total Protein Urine mg/dL 273.0   mg/dL    Total Protein Urine mg/mg Creat 2.29 (H) 0.00 - 0.20 mg/mg Cr    Creatinine Urine mg/dL 119.0 mg/dL   Albumin Random Urine Quantitative with Creat Ratio    Collection Time: 09/27/24 11:03 AM   Result Value Ref Range    Creatinine Urine mg/dL 119.0 mg/dL    Albumin Urine mg/L 1,808.0 mg/L    Albumin Urine mg/g Cr 1,519.33 (H) 0.00 - 25.00 mg/g Cr   NM Lexiscan stress test    Collection Time: 10/04/24 12:14 PM   Result Value Ref Range    Target      Baseline Systolic      Baseline Diastolic BP 85     Last Stress Systolic      Last Stress Diastolic BP 74     Baseline HR 80 bpm    Max HR  103     Max Predicted HR  64 %    Rate Pressure Product 12,978.0      I participated in this visit while shadowing JOSE MANUEL Bobo as part of my ongoing training process.        I spent a total of 60 minutes on the date of service with Reina Quan This time was spent counseling the patient and/or coordinating care regarding evaluation for kidney and pancreas transplant.     JOSE MANUEL Mcclelland CNP/ Diamond Arguello NP        Again, thank you for allowing me to participate in the care of your patient.        Sincerely,        Diamond Arguello NP

## 2024-10-07 NOTE — PROGRESS NOTES
[facility] Summary:     Team's Concerns/Comments: Colonoscopy, Needs vaccinations, updated CT abd/pelvis. Per Dr. Lund, patient should have a follow-up visit with social work to discuss medication compliance as pt stopped taking her morning dose of mycophenolate on her own.     Did not need to complete EKG and Echo today as she has done both recently.     Candidacy Category: Yellow     Action/Plan: Discuss at Committee.     Expected Selection Committee Date: 10/16/24

## 2024-10-07 NOTE — PROGRESS NOTES
[facility] SOLID ORGAN TRANSPLANT  OUTPATIENT MNT: KIDNEY TRANSPLANT EVALUATION    Current BMI: 24 (HT 62 in,  lbs/60 kg)  BMI guideline for kidney transplant up to a BMI of 40 / per surgeon discretion     Frailty Assessment-- Not Frail (2/5 points)- unintentional wt loss, exhaustion  Handgrip Strength: 20    Reference:  Score of 0-2 = Not Frail  Score of 3-5 = Frail      TIME SPENT: 15 minutes  VISIT TYPE: Initial   REFERRING PHYSICIAN: Ashely   PT ACCOMPANIED BY: her daughter    History of previous txp: kidney 1992  Dialysis: not yet, line placement planned for this week     NUTRITION ASSESSMENT  Pt reports likely uremia sx, lack of appetite x few months, progressively getting worse. She grazes throughout the day. A lot of food is not as appealing as it once was, not as interested in cooking/ meal prep and opts for easier to grab items, and eats mostly to prevent stomach from getting too queasy.   No N/V/D/C, no food allergies, no issues chewing/swallowing.     Vitamins, Supplements, Pertinent Meds: none   Herbal Medicines/Supplements: none   Protein Supplements: used to make one daily- whey protein + banana + avocado - has not done this for a while due to not sounding good; does have an occasional protein bar     Weight hx // fluid retention:   - no SARIAH  - lost 15 lbs x 6 months with reduced appetite/oral intake  Wt Readings from Last 10 Encounters:   10/07/24 59.7 kg (131 lb 9.6 oz)   09/27/24 60.3 kg (133 lb)   09/26/24 60.9 kg (134 lb 4.8 oz)   08/29/24 61.2 kg (135 lb)   08/19/24 63.3 kg (139 lb 8 oz)   07/25/24 65.8 kg (145 lb)   07/02/24 63.5 kg (140 lb)   04/25/24 66.7 kg (147 lb)   04/09/24 67 kg (147 lb 11.2 oz)   03/19/24 68.4 kg (150 lb 11.2 oz)     PHYSICAL ACTIVITY   Typically was a big walker until 6 months ago; was walking 60 minutes/day   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, up/down ladders, carrying heavy items, etc  Reports most things (ADLs,  work) are tiring and it is 'hard to get going'  Could walk 1 block now, but would get winded & tired      DIET RECALL   Denise sounds good, but is too tired to make one so  will have toast   Often snacks on apples, cheese/crackers, soup    LABS  9/27 K 4.3- h/o wnl levels   Phos 5.0 - slightly high levels since July    NUTRITION DIAGNOSIS   No nutrition diagnosis identified at this time     NUTRITION INTERVENTION   Nutrition education provided:  Discussed sodium intake (low sodium foods and drinks, seasoning food without salt and tips for low sodium diet).  Reviewed K/Phos levels. Encouraged pt to consider doing a protein drink or bar more frequent and to consider a ready-to-drink option to cut down on feeling that she needs to prepare an item. Focus on quick/easy meals/snacks, such as nuts/peanut butter, Greek yogurt, even Lean Cuisine or Healthy Choice. Reviewed that hopefully appetite/intake will improve when she starts dialysis, yet reviewed higher protein / caloric needs to help maintain energy levels, etc.     Reviewed post txp diet guidelines in brief (will review in further detail post txp):  (1) Review of proper food safety measures d/t immunosuppressant therapy post-op and increased risk for food-borne illness    (2) Avoid the following post txp d/t risk for rejection, unknown effects on the organs, and/or potential interactions with immunosuppressants:  - Herbal, Chinese, holistic, chiropractic, natural, alternative medicines and supplements  - Detoxes and cleanses  - Weight loss pills  - Protein powders or other products with extracts or herbs (ie green tea extract)    (3) Med regimen and possible side effects    Patient Understanding: Pt verbalized understanding of education provided.  Expected Engagement: Good  Follow-Up Plans: PRN     NUTRITION GOALS   No nutrition goals identified at this time     Kathy Brown, RD, LD, CCTD

## 2024-10-07 NOTE — LETTER
10/7/2024      Reina Quan  [address] [facility] [address]      Dear Colleague,    Thank you for referring your patient, Reina Quan, to the [facility]. Please see a copy of my visit note below.    Transplant Surgery Consult Note     Medical record number: [id number]  YOB: 1965,   Consult requested  for evaluation of kidney transplant candidacy.    Assessment and Recommendations:Ms. Quan appears to be a good candidate for kidney transplantation and has a good understanding of the risks and benefits of this approach to the management of renal failure. The following issues should be addressed prior to finalizing her transplant candidacy:     Transplant order: Ms. 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  Recommend he be considered as a candidate for kidney transplant.  Cardiology consult for cardiac risk stratification to be ordered: Yes  CT abdomen and pelvis without contrast to be ordered for assessment of vascular targets: No  Transplant listing labs ordered to include HLA, ABOx2, Cr, etc.  Dietician consult ordered: Yes  Social work consult ordered: Yes  Imaging reports reviewed: CT scan from July 2024  IMPRESSION: Right lower quadrant renal transplant with atrophy of the  lower pole.  Radiology images reviewed: CT scan from July 2024 reveals right lower quadrant kidney transplant.  Left iliac system suitable for transplant.  Recipient suitable to move forward with work up of living donors:  Yes  On MMF and pred.  Does admit to not taking her medications as prescribed at this point.  She is supposed to be taking 750 mg twice a day of mycophenolate however she has not been taking it twice a day she has only been taking it once a day.  We discussed the importance  of keeping up with her immunosuppression to ensure she does not become sensitized from the current kidney she has.  She does have a PRA from the end of July which shows she is not very sensitized however since then she is self decreased her immunosuppression and she is tender over the kidney which makes me concerned for active inflammation and rejection of the current kidney.  She is supposed to be starting dialysis this week.  I will contact nephrology about mycophenolate dosing given her increased risk of infection on dialysis with a hemodialysis catheter.  Her son is willing to donate however he is not a compatible blood type.  He is interested in paired donation per the patient.  Her nephew is interested but would only like to donate directly.  She would prefer to have him tested from an immunologic standpoint prior to him going through the full workup if possible.  This was discussed with the transplant coordinator.  Derm q6m due to history of skin cancer.  She is up-to-date.  We discussed the importance of compliance.  We should review her updated PRA.    The majority of our visit was spent in counselling, discussing the medical and surgical risks of kidney transplantation. We discussed approximate wait time and how that is influenced by issues such as blood type and sensitization (PRA) and access to a living donor. I contrasted potential waiting time for living vs  donor kidneys from  normal (0-85%) or higher (%) kidney donor profile index (KDPI) donors and their associated outcomes. I would not recommend this individual to consider kidneys from high KDPI donors. The reason for this decision is best summarized as: not on dialysis yet. Potential surgical complications of kidney transplantation include bleeding, superficial or deep wound complications (infection, hernia, lymphocele), ureteral anastomotic failure (leak or stenosis), graft thrombosis, need for reoperation and other issues such as  cardiac complications, pneumonia, deep venous thrombosis, pulmonary embolism, post transplant diabetes and death. The potential for recurrent disease or need for retransplantation was also addressed. We discussed the possible need for ureteral stent (and subsequent removal), and the utility of protocol biopsy and laboratory studies to evaluate for rejection or recurrent disease. We discussed the risk of graft rejection, our center's average graft and patient survival rates, immunosuppression protocols, as well as the potential opportunity to participate in clinical trials.  We also discussed the average length of stay, recovery process, and posttransplant lab and monitoring protocol.  I emphasized the need for strict immunosuppression medication adherence and the potential for complications of immunosuppression such as skin cancer or lymphoma, as well as a very low but not zero risk of donor-derived disease transmission risks (infection, cancer). Ms. Quan asked good questions and her candidacy will be reviewed at our Multidisciplinary Selection Committee. Thank you for the opportunity to participate in Ms. Quan's care.      Total time: 60 minutes        Nicole Lund MD FACS  Associate Professor of Surgery  Director, Living Kidney Donor Program.    ---------------------------------------------------------------------------------------------------    HPI: Ms. Quan has End stage renal failure due to primary focal segmental glomerulosclerosis (FSGS). The patient is non-diabetic.       The patient is on dialysis.    Has potential kidney donors:  Doesn't know .  Interested in participation in paired exchange if a donor is willing: Doesn't know     The patient has the following pertinent history:       No    Yes  Dialysis:    [x]      [] via:       Blood Transfusion                  []      [x]  Number of units:   Most recently: 1996  Pregnancy:    []      [x] Number:     5  Previous  Transplant:  []      [x] Details:  1992  Cancer    []      [x] Comment:   Kidney stones   [x]      [] Comment:      Recurrent infections  [x]      []  Type:                  Bladder dysfunction  [x]      [] Cause:    Claudication   [x]      [] Distance:    Previous Amputation  [x]      [] Cause:     Chronic anticoagulation  [x]      [] Indication:   Confucianist  [x]      []      Past Medical History:   Diagnosis Date     Actinic keratosis      Allergic rhinitis, cause unspecified      Anemia      anxiety/depression     PAXIL     Arthritis      congenital hearing loss     uses hearing aids     Depressive disorder      Dry eyes      Giant Platelet syndrome      Glomerular Focal Sclerosis--transplant 1985      Hypertension      Kidney replaced by transplant 1992    RAPAMUNE/ SIROLIMUS      Lichen planopilaris     LPP followed by derm on doxycycline/ clobetasol     Lichen planus      Menorrhagia      migraine      Squamous cell carcinoma     8 x      Thrombocytopenia (H)      Ulcerative colitis, unspecified     biposy neg 1996     Unsatisfactory cervical Papanicolaou smear 02/15/2021     UTI (lower urinary tract infection)     recurrent Dr Burns [facility]     Past Surgical History:   Procedure Laterality Date     BIOPSY OF SKIN LESION       CHOLECYSTECTOMY       DILATION AND CURETTAGE, OPERATIVE HYSTEROSCOPY WITH MORCELLATOR, COMBINED N/A 11/10/2015    Procedure: COMBINED DILATION AND CURETTAGE, OPERATIVE HYSTEROSCOPY WITH MORCELLATOR;  Surgeon: Ghada Melendez MD;  Location: [facility] OR     ESOPHAGOSCOPY, GASTROSCOPY, DUODENOSCOPY (EGD), COMBINED  11/20/2012    Procedure: COMBINED ESOPHAGOSCOPY, GASTROSCOPY, DUODENOSCOPY (EGD), BIOPSY SINGLE OR MULTIPLE;;  Surgeon: Valentin Hahn MD;  Location: [facility]     IR RENAL BIOPSY RIGHT  07/25/2024     MOHS MICROGRAPHIC PROCEDURE       TRANSPLANT  1992    kidney     Z NONSPECIFIC PROCEDURE      Renal transplant right side     ZZC NONSPECIFIC PROCEDURE      cholecystectomy      Family History   Problem Relation Age of Onset     Hypertension Mother      Depression Mother      Anxiety Disorder Mother      Diabetes Father      Kidney Disease Father         nephrolithiasis     Asthma Father      Asthma Sister      Blood Disease Maternal Grandmother      Diabetes Maternal Grandmother      Hypertension Maternal Grandmother      Hyperlipidemia Maternal Grandmother      Depression Maternal Grandmother      No Known Problems Maternal Grandfather      No Known Problems Paternal Grandmother      C.A.D. Paternal Grandfather      Cancer Other         no family hx of skin cancer     Glaucoma No family hx of      Macular Degeneration No family hx of      Melanoma No family hx of      Skin Cancer No family hx of      Anesthesia Reaction No family hx of      Thrombosis No family hx of      Social History     Socioeconomic History     Marital status:      Spouse name: Not on file     Number of children: 3     Years of education: Not on file     Highest education level: Not on file   Occupational History     Occupation:  - window treatments   Tobacco Use     Smoking status: Never     Passive exposure: Never     Smokeless tobacco: Never   Vaping Use     Vaping status: Never Used   Substance and Sexual Activity     Alcohol use: Not Currently     Drug use: No     Sexual activity: Not Currently     Partners: Male     Birth control/protection: Post-menopausal   Other Topics Concern     Parent/sibling w/ CABG, MI or angioplasty before 65F 55M? 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 Doesn't have a job lined up afterward but looking forward to some time off. Has three children, one moved to college this fall. Living in [place], has a significant other x 7 months. Previously . In a monogamous relationship. Never smoker, social EtOH use.      Social Determinants of Health     Financial Resource Strain: Not on file   Food  Insecurity: Not on file   Transportation Needs: Not on file   Physical Activity: Not on file   Stress: Not on file   Social Connections: Not on file   Interpersonal Safety: Low Risk  (4/9/2024)    Interpersonal Safety      Do you feel physically and emotionally safe where you currently live?: Yes      Within the past 12 months, have you been hit, slapped, kicked or otherwise physically hurt by someone?: No      Within the past 12 months, have you been humiliated or emotionally abused in other ways by your partner or ex-partner?: No   Housing Stability: Not on file       ROS:   CONSTITUTIONAL:  No fevers or chills  EYES: negative for icterus  ENT:  negative for hearing loss, tinnitus and sore throat  RESPIRATORY:  negative for cough, sputum, dyspnea  CARDIOVASCULAR:  negative for chest pain Fatigue  GASTROINTESTINAL:  negative for nausea, vomiting, diarrhea or constipation  GENITOURINARY:  negative for incontinence, dysuria, bladder emptying problems  HEME:  No easy bruising  INTEGUMENT:  negative for rash and pruritus  NEURO:  Negative for headache, seizure disorder  Allergies:   Allergies   Allergen Reactions     Ampicillin Swelling     Swelling of mouth and tongue.      Seasonal Allergies Other (See Comments)     Itchy eyes and rhinitis.     Medications:  Prescription Medications as of 10/7/2024         Rx Number Disp Refills Start End Last Dispensed Date Next Fill Date Owning Pharmacy    atorvastatin (LIPITOR) 40 MG tablet  90 tablet 3 4/10/2024 --   [facility] [address] [place] [address] [address] AT [facility] [address]    Sig: Take 1 tablet (40 mg) by mouth at bedtime    Class: E-Prescribe    Notes to Pharmacy: Profile Rx: patient will contact pharmacy when needed    Route: Oral    bisacodyl (DULCOLAX) 5 MG EC tablet  4 tablet 0 9/18/2024 --   [facility] [id number] [place] [address] [address] AT [facility] [address]    Sig: Take 2 tablets at 3  pm the day before your procedure. If your procedure is before 11 am, take 2 additional tablets at 11 pm. If your procedure is after 11 am, take 2 additional tablets at 6 am. For additional instructions refer to your colonoscopy prep instructions.    Class: E-Prescribe    cinacalcet (SENSIPAR) 30 MG tablet  45 tablet 3 8/9/2024 --   [facility] [address] [place] [address] [address] AT [facility] [address]    Sig: Take 1 tablet (30 mg) by mouth every other day    Class: E-Prescribe    Notes to Pharmacy: Decreased.    Route: Oral    clobetasol (TEMOVATE) 0.05 % external ointment  60 g 3 10/12/2023 --   [facility] [address] [place] [address] [address] AT [address]    Sig: Apply topically 2 times daily Use sparingly at active areas of dermatitis for no more than 14 days or until resolved, whichever is sooner.    Class: E-Prescribe    Route: Topical    clobetasol (TEMOVATE) 0.05 % external solution  50 mL 4 10/12/2023 --   [facility] [address] [place] [address] [address] AT [facility] OF [address]    Sig: Apply topically daily as needed (itchyy scalp) Apply to scalp daily as needed.    Class: E-Prescribe    Route: Topical    clobetasol propionate (CLOBEX) 0.05 % external shampoo  118 mL 1 6/27/2024 --   [facility] [address] [place] [address] [address] AT [address]    Sig: APPLY TO SCALP IN SHOWER TWICE WEEKLY    Class: E-Prescribe    No prior authorization was found for this prescription.    Found prior authorization for another prescription for the same medication: Approved    ketoconazole (NIZORAL) 2 % external shampoo  120 mL 9 10/12/2023 --   [facility] [address] [place] [address] [address] AT [address]    Sig: APPLY TOPICALLY DAILY AS NEEDED FOR ITCHING, IRRITATION OR OTHER( SCALP SCALE) USE 2-3 TIMES A WEEK. APPLY AND  LATHER THEN RINSE OUT 5 MINUTES    Class: E-Prescribe    mycophenolate (GENERIC EQUIVALENT) 250 MG capsule  540 capsule 3 7/30/2024 --   [facility] [place] [facility] [address] [facility]    Sig: Take 3 capsules (750 mg) by mouth 2 times daily    Class: E-Prescribe    Notes to Pharmacy: Profile Rx: patient will contact pharmacy when needed. Just received MMF delivery.    Route: Oral    niacinamide 500 MG tablet  60 tablet 3 10/12/2023 --   [facility] [address] [place] [address] [address] AT [facility] [address]    Sig: Take 1 tablet (500 mg) by mouth 2 times daily (with meals)    Class: E-Prescribe    Route: Oral    PARoxetine (PAXIL) 20 MG tablet  90 tablet 2 4/9/2024 --   [facility] [address] [place] [address] [address] AT [facility] [address]    Sig: TAKE 1 TABLET BY MOUTH EVERY MORNING    Class: E-Prescribe    Notes to Pharmacy: Profile Rx: patient will contact pharmacy when needed    polyethylene glycol (GOLYTELY) 236 g suspension  4000 mL 0 9/18/2024 --   [facility] [address] [place] [address] [address] AT [facility] [address]    Sig: The night before the exam at 6 pm drink an 8-ounce glass every 15 minutes until the jug is half empty. If you arrive before 11 AM: Drink the other half of the Golytely jug at 11 PM night before procedure. If you arrive after 11 AM: Drink the other half of the Golytely jug at 6 AM day of procedure. For additional instructions refer to your colonoscopy prep instructions.    Class: E-Prescribe    Notes to Pharmacy: Pharmacy may substitute for equivalent.    predniSONE (DELTASONE) 5 MG tablet  90 tablet 3 8/7/2024 --   [facility] [address] [place] [address] [address] AT [facility] [address]    Sig: Take 1 tablet (5 mg) by mouth daily    Class: E-Prescribe    Notes to Pharmacy: Start after pred taper    Route: Oral    sodium bicarbonate 650 MG tablet  " 360 tablet 3 8/2/2024 --   [facility][id number] - [facility] [address] AT [facility] [address]    Sig: Take 2 tablets (1,300 mg) by mouth 2 times daily    Class: E-Prescribe    Route: Oral    triamcinolone (KENALOG) 0.1 % external cream  30 g 0 6/27/2024 --   [facility] #[id number] - [facility] [address] AT [facility] [address]    Sig: APPLY SPARINGLY TOPICALLY TO THE AFFECTED AREA TWICE DAILY FOR 14 DAYS    Class: E-Prescribe          Clinic-Administered Medications as of 10/7/2024         Dose Frequency Start End    triamcinolone acetonide (KENALOG-10) injection 10 mg 10 mg ONCE 5/9/2021 --    Route: Intra-Lesional          Exam:     /82   Pulse 79   Ht 1.575 m (5' 2\")   Wt 59.7 kg (131 lb 9.6 oz)   LMP 05/14/2014 (Approximate)   SpO2 99%   BMI 24.07 kg/m    Appearance: in no apparent distress.   Skin: normal  Eyes:  no redness or discharge.  Sclera anicteric  Head and Neck: Normal, no rashes or jaundice  Respiratory: easy respirations, no audible wheezing.  Abdomen: flat, Surgical scars consistent with history and RLQ stone incision   Extremities: femoral 3+/3+, Edema, none  Neuro: without deficit   Psychiatric: Normal mood and affect    Diagnostics:   Recent Results (from the past 672 hour(s))   Renal panel    Collection Time: 09/24/24 11:14 AM   Result Value Ref Range    Sodium 136 135 - 145 mmol/L    Potassium 4.5 3.4 - 5.3 mmol/L    Chloride 106 98 - 107 mmol/L    Carbon Dioxide (CO2) 16 (L) 22 - 29 mmol/L    Anion Gap 14 7 - 15 mmol/L    Glucose 107 (H) 70 - 99 mg/dL    Urea Nitrogen 60.9 (H) 8.0 - 23.0 mg/dL    Creatinine 4.40 (H) 0.51 - 0.95 mg/dL    GFR Estimate 11 (L) >60 mL/min/1.73m2    Calcium 10.9 (H) 8.8 - 10.4 mg/dL    Albumin 4.6 3.5 - 5.2 g/dL    Phosphorus 4.8 (H) 2.5 - 4.5 mg/dL   CBC with platelets    Collection Time: 09/24/24 11:14 AM   Result Value Ref Range    WBC Count 4.6 4.0 - 11.0 10e3/uL    RBC " Count 3.48 (L) 3.80 - 5.20 10e6/uL    Hemoglobin 10.5 (L) 11.7 - 15.7 g/dL    Hematocrit 33.2 (L) 35.0 - 47.0 %    MCV 95 78 - 100 fL    MCH 30.2 26.5 - 33.0 pg    MCHC 31.6 31.5 - 36.5 g/dL    RDW 14.9 10.0 - 15.0 %    Platelet Count 205 150 - 450 10e3/uL   Hepatitis B surface antigen    Collection Time: 09/24/24 11:14 AM   Result Value Ref Range    Hepatitis B Surface Antigen Nonreactive Nonreactive   UA with Microscopic    Collection Time: 09/24/24 11:30 AM   Result Value Ref Range    Color Urine Yellow Colorless, Straw, Light Yellow, Yellow    Appearance Urine Clear Clear    Glucose Urine Negative Negative mg/dL    Bilirubin Urine Negative Negative    Ketones Urine Negative Negative mg/dL    Specific Gravity Urine >=1.030 1.005 - 1.030    Blood Urine Small (A) Negative    pH Urine 5.5 5.0 - 8.0    Protein Albumin Urine >=300 (A) Negative mg/dL    Urobilinogen Urine 0.2 0.2, 1.0 E.U./dL    Nitrite Urine Negative Negative    Leukocyte Esterase Urine Negative Negative   Protein  random urine    Collection Time: 09/24/24 11:30 AM   Result Value Ref Range    Total Protein Urine mg/dL 335.0   mg/dL    Total Protein Urine mg/mg Creat 2.77 (H) 0.00 - 0.20 mg/mg Cr    Creatinine Urine mg/dL 121.0 mg/dL   Albumin Random Urine Quantitative with Creat Ratio    Collection Time: 09/24/24 11:30 AM   Result Value Ref Range    Creatinine Urine mg/dL 121.0 mg/dL    Albumin Urine mg/L 2,139.0 mg/L    Albumin Urine mg/g Cr 1,767.77 (H) 0.00 - 25.00 mg/g Cr   Urine Microscopic Exam    Collection Time: 09/24/24 11:30 AM   Result Value Ref Range    Bacteria Urine Few (A) None Seen /HPF    RBC Urine 2-5 (A) 0-2 /HPF /HPF    WBC Urine 0-5 0-5 /HPF /HPF    Squamous Epithelials Urine Few (A) None Seen /LPF   Renal panel    Collection Time: 09/27/24 11:02 AM   Result Value Ref Range    Sodium 136 135 - 145 mmol/L    Potassium 4.3 3.4 - 5.3 mmol/L    Chloride 104 98 - 107 mmol/L    Carbon Dioxide (CO2) 17 (L) 22 - 29 mmol/L    Anion Gap 15  7 - 15 mmol/L    Glucose 112 (H) 70 - 99 mg/dL    Urea Nitrogen 62.3 (H) 8.0 - 23.0 mg/dL    Creatinine 5.05 (H) 0.51 - 0.95 mg/dL    GFR Estimate 9 (L) >60 mL/min/1.73m2    Calcium 10.8 (H) 8.8 - 10.4 mg/dL    Albumin 4.5 3.5 - 5.2 g/dL    Phosphorus 5.0 (H) 2.5 - 4.5 mg/dL   CBC with platelets    Collection Time: 09/27/24 11:02 AM   Result Value Ref Range    WBC Count 4.9 4.0 - 11.0 10e3/uL    RBC Count 3.23 (L) 3.80 - 5.20 10e6/uL    Hemoglobin 9.7 (L) 11.7 - 15.7 g/dL    Hematocrit 30.2 (L) 35.0 - 47.0 %    MCV 94 78 - 100 fL    MCH 30.0 26.5 - 33.0 pg    MCHC 32.1 31.5 - 36.5 g/dL    RDW 14.9 10.0 - 15.0 %    Platelet Count 187 150 - 450 10e3/uL   Hepatitis B core antibody    Collection Time: 09/27/24 11:02 AM   Result Value Ref Range    Hepatitis B Core Antibody Total Nonreactive Nonreactive   Hepatitis B Surface Antibody    Collection Time: 09/27/24 11:02 AM   Result Value Ref Range    Hepatitis B Surface Antibody Nonreactive     Hepatitis B Surface Antibody Instrument Value <3.50 <8.5 m[IU]/mL   Hepatitis B surface antigen    Collection Time: 09/27/24 11:02 AM   Result Value Ref Range    Hepatitis B Surface Antigen Nonreactive Nonreactive   Quantiferon TB Gold Plus Grey Tube    Collection Time: 09/27/24 11:02 AM    Specimen: Arm, Right; Blood   Result Value Ref Range    Quantiferon Nil Tube 0.05 IU/mL   Quantiferon TB Gold Plus Green Tube    Collection Time: 09/27/24 11:02 AM    Specimen: Arm, Right; Blood   Result Value Ref Range    Quantiferon TB1 Tube 0.06 IU/mL   Quantiferon TB Gold Plus Yellow Tube    Collection Time: 09/27/24 11:02 AM    Specimen: Arm, Right; Blood   Result Value Ref Range    Quantiferon TB2 Tube 0.08    Quantiferon TB Gold Plus Purple Tube    Collection Time: 09/27/24 11:02 AM    Specimen: Arm, Right; Blood   Result Value Ref Range    Quantiferon Mitogen 10.00 IU/mL   Quantiferon TB Gold Plus    Collection Time: 09/27/24 11:02 AM    Specimen: Arm, Right; Blood   Result Value Ref Range     Quantiferon-TB Gold Plus Negative Negative    TB1 Ag minus Nil Value 0.01 IU/mL    TB2 Ag minus Nil Value 0.03 IU/mL    Mitogen minus Nil Result 9.95 IU/mL    Nil Result 0.05 IU/mL   UA with Microscopic    Collection Time: 09/27/24 11:03 AM   Result Value Ref Range    Color Urine Light Yellow Colorless, Straw, Light Yellow, Yellow    Appearance Urine Clear Clear    Glucose Urine Negative Negative mg/dL    Bilirubin Urine Negative Negative    Ketones Urine Negative Negative mg/dL    Specific Gravity Urine 1.015 1.003 - 1.035    Blood Urine Small (A) Negative    pH Urine 6.0 5.0 - 7.0    Protein Albumin Urine 300 (A) Negative mg/dL    Urobilinogen Urine Normal Normal, 2.0 mg/dL    Nitrite Urine Negative Negative    Leukocyte Esterase Urine Small (A) Negative    RBC Urine 3 (H) <=2 /HPF    WBC Urine 3 <=5 /HPF    Squamous Epithelials Urine 1 <=1 /HPF   Protein  random urine    Collection Time: 09/27/24 11:03 AM   Result Value Ref Range    Total Protein Urine mg/dL 273.0   mg/dL    Total Protein Urine mg/mg Creat 2.29 (H) 0.00 - 0.20 mg/mg Cr    Creatinine Urine mg/dL 119.0 mg/dL   Albumin Random Urine Quantitative with Creat Ratio    Collection Time: 09/27/24 11:03 AM   Result Value Ref Range    Creatinine Urine mg/dL 119.0 mg/dL    Albumin Urine mg/L 1,808.0 mg/L    Albumin Urine mg/g Cr 1,519.33 (H) 0.00 - 25.00 mg/g Cr   NM Lexiscan stress test    Collection Time: 10/04/24 12:14 PM   Result Value Ref Range    Target      Baseline Systolic      Baseline Diastolic BP 85     Last Stress Systolic      Last Stress Diastolic BP 74     Baseline HR 80 bpm    Max HR  103     Max Predicted HR  64 %    Rate Pressure Product 12,978.0      UNOS CPRA   Date Value Ref Range Status   07/25/2024 7  Final          Again, thank you for allowing me to participate in the care of your patient.        Sincerely,        Nicole Lund MD, MD

## 2024-10-08 ENCOUNTER — TELEPHONE (OUTPATIENT)
Dept: INTERVENTIONAL RADIOLOGY/VASCULAR | Facility: CLINIC | Age: 59
End: 2024-10-08
Payer: COMMERCIAL

## 2024-10-08 ENCOUNTER — PATIENT OUTREACH (OUTPATIENT)
Dept: NEPHROLOGY | Facility: CLINIC | Age: 59
End: 2024-10-08
Payer: COMMERCIAL

## 2024-10-08 LAB
C PEPTIDE SERPL-MCNC: 5.1 NG/ML (ref 0.9–6.9)
CMV IGG SERPL IA-ACNC: >10 U/ML
CMV IGG SERPL IA-ACNC: ABNORMAL
CREAT UR-MCNC: 99.4 MG/DL
DRVVT SCREEN RATIO: 1.02
EBV VCA IGG SER IA-ACNC: 150 U/ML
EBV VCA IGG SER IA-ACNC: POSITIVE
FASTING STATUS PATIENT QL REPORTED: NORMAL
GAMMA INTERFERON BACKGROUND BLD IA-ACNC: 0.06 IU/ML
LA PPP-IMP: NEGATIVE
LUPUS INTERPRETATION: NORMAL
M TB IFN-G BLD-IMP: NEGATIVE
M TB IFN-G CD4+ BCKGRND COR BLD-ACNC: 9.94 IU/ML
MICROALBUMIN UR-MCNC: 1927 MG/L
MICROALBUMIN/CREAT UR: 1938.63 MG/G CR (ref 0–25)
MITOGEN IGNF BCKGRD COR BLD-ACNC: -0.01 IU/ML
MITOGEN IGNF BCKGRD COR BLD-ACNC: -0.01 IU/ML
PTT RATIO: 1.09
QUANTIFERON MITOGEN: 10 IU/ML
QUANTIFERON NIL TUBE: 0.06 IU/ML
QUANTIFERON TB1 TUBE: 0.05 IU/ML
QUANTIFERON TB2 TUBE: 0.05
THROMBIN TIME: 17.4 SECONDS (ref 13–19)
VZV IGG SER QL IA: 882.6 INDEX
VZV IGG SER QL IA: POSITIVE

## 2024-10-08 NOTE — RESULT ENCOUNTER NOTE
Koby Huang,    Your test results are attached.  Your stress test is acceptable for surgery with no concerning findings.         Katlyn Alonso DNP, RN, ANP-C

## 2024-10-08 NOTE — PROGRESS NOTES
10/8- Called Kathy today to go over HD initiation logistics.No answer. LVM to return my call.  Daniele FRIEND RN  Nephrology care coordinator  [facility]

## 2024-10-09 ENCOUNTER — HOSPITAL ENCOUNTER (OUTPATIENT)
Dept: INTERVENTIONAL RADIOLOGY/VASCULAR | Facility: CLINIC | Age: 59
Discharge: HOME | End: 2024-10-09
Attending: PHYSICIAN ASSISTANT | Admitting: STUDENT IN AN ORGANIZED HEALTH CARE EDUCATION/TRAINING PROGRAM
Payer: COMMERCIAL

## 2024-10-09 ENCOUNTER — TELEPHONE (OUTPATIENT)
Dept: TRANSPLANT | Facility: CLINIC | Age: 59
End: 2024-10-09
Payer: COMMERCIAL

## 2024-10-09 VITALS
HEART RATE: 61 BPM | RESPIRATION RATE: 20 BRPM | DIASTOLIC BLOOD PRESSURE: 76 MMHG | TEMPERATURE: 98.1 F | OXYGEN SATURATION: 100 % | SYSTOLIC BLOOD PRESSURE: 137 MMHG

## 2024-10-09 DIAGNOSIS — N18.5 CKD (CHRONIC KIDNEY DISEASE) STAGE 5, GFR LESS THAN 15 ML/MIN (H): ICD-10-CM

## 2024-10-09 LAB
CARDIOLIPIN IGG SER IA-ACNC: <2 GPL-U/ML
CARDIOLIPIN IGG SER IA-ACNC: NEGATIVE
CARDIOLIPIN IGM SER IA-ACNC: 2.8 MPL-U/ML
CARDIOLIPIN IGM SER IA-ACNC: NEGATIVE

## 2024-10-09 PROCEDURE — C1769 GUIDE WIRE: HCPCS

## 2024-10-09 PROCEDURE — 99152 MOD SED SAME PHYS/QHP 5/>YRS: CPT

## 2024-10-09 PROCEDURE — 272N000500 HC NEEDLE CR2

## 2024-10-09 PROCEDURE — 250N000009 HC RX 250: Performed by: STUDENT IN AN ORGANIZED HEALTH CARE EDUCATION/TRAINING PROGRAM

## 2024-10-09 PROCEDURE — 272N000119 HC CATH CR4

## 2024-10-09 PROCEDURE — 250N000011 HC RX IP 250 OP 636: Performed by: STUDENT IN AN ORGANIZED HEALTH CARE EDUCATION/TRAINING PROGRAM

## 2024-10-09 PROCEDURE — C1750 CATH, HEMODIALYSIS,LONG-TERM: HCPCS

## 2024-10-09 PROCEDURE — 250N000011 HC RX IP 250 OP 636: Performed by: NURSE PRACTITIONER

## 2024-10-09 RX ORDER — NALOXONE HYDROCHLORIDE 0.4 MG/ML
0.2 INJECTION, SOLUTION INTRAMUSCULAR; INTRAVENOUS; SUBCUTANEOUS
Status: DISCONTINUED | OUTPATIENT
Start: 2024-10-09 | End: 2024-10-10 | Stop reason: HOSPADM

## 2024-10-09 RX ORDER — CLINDAMYCIN PHOSPHATE 900 MG/50ML
900 INJECTION, SOLUTION INTRAVENOUS
Status: COMPLETED | OUTPATIENT
Start: 2024-10-09 | End: 2024-10-09

## 2024-10-09 RX ORDER — FLUMAZENIL 0.1 MG/ML
0.2 INJECTION, SOLUTION INTRAVENOUS
Status: DISCONTINUED | OUTPATIENT
Start: 2024-10-09 | End: 2024-10-10 | Stop reason: HOSPADM

## 2024-10-09 RX ORDER — NALOXONE HYDROCHLORIDE 0.4 MG/ML
0.4 INJECTION, SOLUTION INTRAMUSCULAR; INTRAVENOUS; SUBCUTANEOUS
Status: DISCONTINUED | OUTPATIENT
Start: 2024-10-09 | End: 2024-10-10 | Stop reason: HOSPADM

## 2024-10-09 RX ORDER — LIDOCAINE 40 MG/G
CREAM TOPICAL
Status: DISCONTINUED | OUTPATIENT
Start: 2024-10-09 | End: 2024-10-10 | Stop reason: HOSPADM

## 2024-10-09 RX ORDER — HEPARIN SODIUM 200 [USP'U]/100ML
1 INJECTION, SOLUTION INTRAVENOUS EVERY 5 MIN PRN
Status: DISCONTINUED | OUTPATIENT
Start: 2024-10-09 | End: 2024-10-10 | Stop reason: HOSPADM

## 2024-10-09 RX ORDER — HEPARIN SODIUM 1000 [USP'U]/ML
4100 INJECTION, SOLUTION INTRAVENOUS; SUBCUTANEOUS ONCE
Status: COMPLETED | OUTPATIENT
Start: 2024-10-09 | End: 2024-10-09

## 2024-10-09 RX ORDER — ONDANSETRON 2 MG/ML
4 INJECTION INTRAMUSCULAR; INTRAVENOUS
Status: DISCONTINUED | OUTPATIENT
Start: 2024-10-09 | End: 2024-10-10 | Stop reason: HOSPADM

## 2024-10-09 RX ORDER — FENTANYL CITRATE 50 UG/ML
25-50 INJECTION, SOLUTION INTRAMUSCULAR; INTRAVENOUS EVERY 5 MIN PRN
Status: DISCONTINUED | OUTPATIENT
Start: 2024-10-09 | End: 2024-10-10 | Stop reason: HOSPADM

## 2024-10-09 RX ADMIN — HEPARIN SODIUM 4100 UNITS: 1000 INJECTION INTRAVENOUS; SUBCUTANEOUS at 14:56

## 2024-10-09 RX ADMIN — MIDAZOLAM 1 MG: 1 INJECTION INTRAMUSCULAR; INTRAVENOUS at 14:49

## 2024-10-09 RX ADMIN — LIDOCAINE HYDROCHLORIDE 20 ML: 10 INJECTION, SOLUTION INFILTRATION; PERINEURAL at 14:49

## 2024-10-09 RX ADMIN — CLINDAMYCIN PHOSPHATE 900 MG: 900 INJECTION, SOLUTION INTRAVENOUS at 12:54

## 2024-10-09 RX ADMIN — HEPARIN SODIUM 1 BAG: 200 INJECTION, SOLUTION INTRAVENOUS at 14:46

## 2024-10-09 RX ADMIN — FENTANYL CITRATE 50 MCG: 50 INJECTION INTRAMUSCULAR; INTRAVENOUS at 14:49

## 2024-10-09 NOTE — PROCEDURES
[facility]    Procedure: IR Procedure Note    Date/Time: 10/9/2024 3:11 PM    Performed by: Howard Stanton MD  Authorized by: Howard Stanton MD  IR Fellow Physician:    Pre Procedure Diagnosis: CKD  Post Procedure Diagnosis: CKD    UNIVERSAL PROTOCOL   Site Marked: NA  Prior Images Obtained and Reviewed:  Yes  Required items: Required blood products, implants, devices and special equipment available    Patient identity confirmed:  Verbally with patient  Patient was reevaluated immediately before administering moderate or deep sedation or anesthesia  Confirmation Checklist:  Patient's identity using two indicators  Time out: Immediately prior to the procedure a time out was called    Universal Protocol: the Joint Commission Universal Protocol was followed      SEDATION  Patient Sedated: Yes    Vital signs: Vital signs monitored during sedation    Findings: Right internal jugular TDC placement, 19 cm    Plan: Ok to use catheter      PROCEDURE    Length of time physician/provider present for 1:1 monitoring during sedation:  0-22 min

## 2024-10-09 NOTE — PRE-PROCEDURE
GENERAL PRE-PROCEDURE:     Verbal consent obtained?: Yes    Risks and benefits: Risks, benefits and alternatives were discussed    Consent given by:  Patient  Patient states understanding of procedure being performed: Yes    Patient's understanding of procedure matches consent: Yes    Procedure consent matches procedure scheduled: Yes    Expected level of sedation:  Moderate  Appropriately NPO:  Yes  ASA Class:  3  Mallampati  :  Grade 2- soft palate, base of uvula, tonsillar pillars, and portion of posterior pharyngeal wall visible  Lungs:  Lungs clear with good breath sounds bilaterally  Heart:  Normal heart sounds and rate  History & Physical reviewed:  History and physical reviewed and no updates needed  Statement of review:  I have reviewed the lab findings, diagnostic data, medications, and the plan for sedation

## 2024-10-09 NOTE — TELEPHONE ENCOUNTER
Nicole Lund MD Ututalum, Teresa, RN  Can you please communicate this to her asap and let her know I spoke with Dr. Sapp about it? She should be expecting this      ----- Message -----  From: Clayton Sapp MD  Sent: 10/9/2024   6:59 AM CDT  To: Nicole Lund MD  Subject: RE: mmf                                          Let's increase mycophenolate up to 750 mg bid x a month, then decrease to 500 mg bid and stay they to prevent sensitization.    Srikanth  ----- Message -----  From: Nicole Lund MD  Sent: 10/8/2024   5:02 PM CDT  To: Clayton Sapp MD; Selena Lee RN  Subject: mmf                                              I saw this patient in clinic for evaluation of retransplant.  She had her first transplant in 1992.  She self decreased the mycophenolate to once a day 750 mg.  She is tender over the kidney today.  She is starting dialysis this week.  She is only otherwise on prednisone.  She is not on CNI.  What would you like her to do with the MMF dosing?  I am concerned that she may have some ongoing rejection given she self decreased her MMF.  PRA should have been drawn today.  Thanks  V    OUTCOME:  Called Reina Quan. No answer, unable to leave a message. Voice mailbox is full.  Called candelaria Noel and requested she ask patient to access [employer].  MyChart message sent.

## 2024-10-09 NOTE — H&P
Interventional Radiology - Pre Procedure Chart Review  Outpatient - [facility]  Oct 9, 2024    Procedure Requested: TUNNELED HEMODIALYSIS catheter PLACEMENT  Requested by: Sixto MORFIN    History and Physical Reviewed: H&P documented within 30 days (by CAROLA RICHARD-CNP on 9/27/2024).  I have personally reviewed the patient's medical history and have updated the medical record as necessary.    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  Course C/B gradually worsening renal function, increasing from 1.5 to 3.8.  Follows nephrology.  Will be starting HD (not a candidate for PD 2/2 difficulty/restless sleeping).      Reina has been seen by vascular surgery in anticipation of pending LUE AVF creation 10/15/2024 by Dr. Walton.      Clinical notes reviewed    Requesting TUNNELED HEMODIALYSIS catheter PLACEMENT.  Per order review, no laterality specified.      Pertinent medications reviewed:   - Anticoagulation: None per chart review  - Antibiotic: Clindamycin per MAR    Allergies   Allergen Reactions    Ampicillin Swelling     Swelling of mouth and tongue.     Seasonal Allergies Other (See Comments)     Itchy eyes and rhinitis.       IMAGING:  Narrative & Impression   EXAM: CT CHEST W/O CONTRAST  LOCATION: [facility]  DATE: 8/29/2024     INDICATION: chest pain  COMPARISON: CT of the chest, abdomen, and pelvis 12/1/2022  TECHNIQUE: CT chest without IV contrast. Multiplanar reformats were obtained. Dose reduction techniques were used.  CONTRAST: None.     FINDINGS:   LUNGS AND PLEURA: Small fat-containing right posterior Bochdalek hernia. Adjacent passive atelectasis in the right posterior costophrenic sulcus. No airspace opacities. No interstitial or peribronchial thickening. Intrapulmonary lymph nodes are present   along the right minor fissure. No actionable  "lung nodules. Trachea and central airways are patent and normal caliber. No bronchial wall thickening or bronchiectasis. No pleural effusions are present.     MEDIASTINUM: Cardiac chambers are normal in size. No pericardial effusion. Normal caliber thoracic aorta. 2 vessel arch anatomy. Thyroid gland is normal. No enlarged mediastinal or hilar lymph nodes. Esophagus is decompressed.     CORONARY ARTERY CALCIFICATION: Mild.     UPPER ABDOMEN: Prior cholecystectomy. Severe atrophy of the native kidneys.     MUSCULOSKELETAL: Marginal osteophytes of the mid and lower thoracic spine and related disc space narrowing. No aggressive or destructive bone lesions. No acute fractures. The chest wall soft tissues are symmetric.                                                                      IMPRESSION:   1.  No acute lung, airway, or pleural inflammatory process.  2.  No imaging findings that would explain acute chest pain/nausea.   3.  Mild atheromatous coronary calcifications.          EXAM:  LMP 05/14/2014 (Approximate)   Not assessed    LABS:  INR (no units)   Date Value   10/07/2024 0.97   02/20/2015 0.99       Lab Results   Component Value Date    WBC 5.4 10/07/2024    HGB 9.6 (L) 10/07/2024     10/07/2024       No results found for: \"CREATININE\"    No components found for: \"K\"      PLAN:  Planning for TUNNELED HEMODIALYSIS catheter PLACEMENT, with sedation- laterality at proceduralist discretion in IR.     - Radiologist to further review procedure with patient.  - No apparent contraindications for right sided line placement based upon chart review and review of initial order  - Clindamycin per MAR  - HD per nephrology  - Anticipate AVG/AVF surgical creation as planned, per above      EMR reviewed. No formal assessment completed.     Total time: 15 minutes       LEROY PORTILLO NP  Interventional Radiology    "

## 2024-10-09 NOTE — DISCHARGE INSTRUCTIONS
Tunneled Central Catheter Discharge Instructions:  You had a tunneled central access device placed. Part of the device is outside of your body and part of the device runs under the skin into a vein. Your nurses and doctors will use the tunneled catheter for your future treatments.    Care Instructions:   - If you received sedation for your procedure, do not drive or operate heavy machinery for the rest of the day.  - Keep dialysis catheter site dry. Do not get wet.  - Never immerse your catheter in water; no swimming, hot tubs, or tub baths.  - If you experience significant bleeding at site, apply pressure with hands above the clavicle bone, sit upright.     If the catheter (tubing) breaks or leaks:  - Place the blue emergency clamp between the break and your insertion site on your skin  - If you don't have a clamp, fold or pinch off the tubing above the hole or break in the catheter (closest to your skin)    Seek medical assistance for any of the following:   - Uncontrolled bleeding from catheter site.  - You have a fever (greater than 101 F (38.3C)).  - Purulent (yellow/green/foul smelling) drainage from catheter insertion site.  - New or increasing redness at catheter site.  - New or increasing pain at catheter site.  - New or increasing swelling at catheter site.  - If you have chest pain, shortness of breath, or feel faint:  -Make sure end caps are securely tightened  -Look for a hole or leaking in the catheter  -Put the blue emergency clamp on the catheter (tubing) above the hole or break in the catheter as close to the skin as you can  -Remain calm and call 911. Explain your symptoms and say that you have a central line tunnel catheter in place  -Lie on your left side

## 2024-10-11 DIAGNOSIS — L66.10 LICHEN PLANOPILARIS: ICD-10-CM

## 2024-10-13 PROBLEM — N05.1 FSGS (FOCAL SEGMENTAL GLOMERULOSCLEROSIS): Status: ACTIVE | Noted: 2024-10-13

## 2024-10-13 PROBLEM — N18.5 CHRONIC KIDNEY DISEASE, STAGE V (H): Status: ACTIVE | Noted: 2024-10-13

## 2024-10-14 ASSESSMENT — ENCOUNTER SYMPTOMS: ORTHOPNEA: 0

## 2024-10-14 NOTE — ANESTHESIA PREPROCEDURE EVALUATION
Preop    Pre-Operative H & P     CC:  Preoperative exam to assess for increased cardiopulmonary risk while undergoing surgery and anesthesia.    Date of Encounter: 9/27/2024  Primary Care Physician:  Lizbeth Andrews     Reason for visit:   No diagnosis found.      HPI  Reina Quan is a 59 year old female who presents for pre-operative H & P in preparation for  Procedure Information       Case: [id number] Date/Time: 10/15/24 0800    Procedure: Left Upper Extremity ARTERIOVENOUS FISTULA Creation versus Graft (Left: Arm)    Anesthesia type: General with Block    Diagnosis: Chronic kidney disease [N18.9]    Pre-op diagnosis: Chronic kidney disease [N18.9]    Location:  OR [address] [place] [address] OR    Providers: Jesus Alberto Walton MD            Reina Quan is a 59 year old female with hypertension, hyperlipidemia, hearing loss, anemia, anxiety, depression, history of skin cancer, and lichen planoplanis that is s/p kidney transplant in 1992 for FSGS.  Over the past few months her kidney function has been noted to be gradually worsening.  The above listed procedure has been recommended to get her prepared for likely dialysis before new transplant.    History is obtained from the patient and chart review    Hx of abnormal bleeding or anti-platelet use: none    Menstrual history: Patient's last menstrual period was 05/14/2014 (approximate).:      Past Medical History  Past Medical History:   Diagnosis Date    Actinic keratosis     Allergic rhinitis, cause unspecified     Anemia     anxiety/depression     PAXIL    Arthritis     congenital hearing loss     uses hearing aids    Depressive disorder     Dry eyes     Giant Platelet syndrome     Glomerular Focal Sclerosis--transplant 1985     Hypertension     Kidney replaced by transplant 1992    RAPAMUNE/ SIROLIMUS     Lichen planopilaris     LPP followed by derm on doxycycline/ clobetasol    Lichen planus     Menorrhagia     migraine     Squamous cell carcinoma      8 x     Thrombocytopenia (H)     Ulcerative colitis, unspecified     biposy neg 1996    Unsatisfactory cervical Papanicolaou smear 02/15/2021    UTI (lower urinary tract infection)     recurrent Dr Burns [facility]       Past Surgical History  Past Surgical History:   Procedure Laterality Date    BIOPSY OF SKIN LESION      CHOLECYSTECTOMY      DILATION AND CURETTAGE, OPERATIVE HYSTEROSCOPY WITH MORCELLATOR, COMBINED N/A 11/10/2015    Procedure: COMBINED DILATION AND CURETTAGE, OPERATIVE HYSTEROSCOPY WITH MORCELLATOR;  Surgeon: Ghada Melendez MD;  Location: [facility] OR    ESOPHAGOSCOPY, GASTROSCOPY, DUODENOSCOPY (EGD), COMBINED  11/20/2012    Procedure: COMBINED ESOPHAGOSCOPY, GASTROSCOPY, DUODENOSCOPY (EGD), BIOPSY SINGLE OR MULTIPLE;;  Surgeon: Valentin Hahn MD;  Location: [facility] GI    IR CVC TUNNEL PLACEMENT > 5 YRS OF AGE  10/9/2024    IR RENAL BIOPSY RIGHT  07/25/2024    MOHS MICROGRAPHIC PROCEDURE      TRANSPLANT  1992    kidney    ZZC NONSPECIFIC PROCEDURE      Renal transplant right side    ZZC NONSPECIFIC PROCEDURE      cholecystectomy       Prior to Admission Medications  Current Outpatient Medications   Medication Sig Dispense Refill    atorvastatin (LIPITOR) 40 MG tablet Take 1 tablet (40 mg) by mouth at bedtime 90 tablet 3    bisacodyl (DULCOLAX) 5 MG EC tablet Take 2 tablets at 3 pm the day before your procedure. If your procedure is before 11 am, take 2 additional tablets at 11 pm. If your procedure is after 11 am, take 2 additional tablets at 6 am. For additional instructions refer to your colonoscopy prep instructions. 4 tablet 0    cinacalcet (SENSIPAR) 30 MG tablet Take 1 tablet (30 mg) by mouth every other day (Patient not taking: Reported on 9/27/2024) 45 tablet 3    clobetasol (TEMOVATE) 0.05 % external ointment Apply topically 2 times daily Use sparingly at active areas of dermatitis for no more than 14 days or until resolved, whichever is sooner. 60 g 3    clobetasol (TEMOVATE) 0.05 % external  solution Apply topically daily as needed (itchyy scalp) Apply to scalp daily as needed. 50 mL 4    clobetasol propionate (CLOBEX) 0.05 % external shampoo APPLY TO SCALP IN SHOWER TWICE WEEKLY 118 mL 1    ketoconazole (NIZORAL) 2 % external shampoo APPLY TOPICALLY DAILY AS NEEDED FOR ITCHING, IRRITATION OR OTHER( SCALP SCALE) USE 2-3 TIMES A WEEK. APPLY AND LATHER THEN RINSE OUT 5 MINUTES 120 mL 9    mycophenolate (GENERIC EQUIVALENT) 250 MG capsule Take 3 capsules (750 mg) by mouth 2 times daily 540 capsule 3    niacinamide 500 MG tablet Take 1 tablet (500 mg) by mouth 2 times daily (with meals) 60 tablet 3    PARoxetine (PAXIL) 20 MG tablet TAKE 1 TABLET BY MOUTH EVERY MORNING 90 tablet 2    polyethylene glycol (GOLYTELY) 236 g suspension The night before the exam at 6 pm drink an 8-ounce glass every 15 minutes until the jug is half empty. If you arrive before 11 AM: Drink the other half of the Golytely jug at 11 PM night before procedure. If you arrive after 11 AM: Drink the other half of the Golytely jug at 6 AM day of procedure. For additional instructions refer to your colonoscopy prep instructions. 4000 mL 0    predniSONE (DELTASONE) 5 MG tablet Take 1 tablet (5 mg) by mouth daily (Patient taking differently: Take 5 mg by mouth every morning.) 90 tablet 3    sodium bicarbonate 650 MG tablet Take 2 tablets (1,300 mg) by mouth 2 times daily 360 tablet 3    triamcinolone (KENALOG) 0.1 % external cream APPLY SPARINGLY TOPICALLY TO THE AFFECTED AREA TWICE DAILY FOR 14 DAYS 30 g 0       Allergies  Allergies   Allergen Reactions    Ampicillin Swelling     Swelling of mouth and tongue.     Seasonal Allergies Other (See Comments)     Itchy eyes and rhinitis.       Social History  Social History     Socioeconomic History    Marital status:      Spouse name: Not on file    Number of children: 3    Years of education: Not on file    Highest education level: Not on file   Occupational History    Occupation:   - window treatments   Tobacco Use    Smoking status: Never     Passive exposure: Never    Smokeless tobacco: Never   Vaping Use    Vaping status: Never Used   Substance and Sexual Activity    Alcohol use: Not Currently    Drug use: No    Sexual activity: Not Currently     Partners: Male     Birth control/protection: Post-menopausal   Other Topics Concern    Parent/sibling w/ CABG, MI or angioplasty before 65F 55M? 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 Doesn't have a job lined up afterward but looking forward to some time off. Has three children, one moved to college this fall. Living in [place], has a significant other x 7 months. Previously . In a monogamous relationship. Never smoker, social EtOH use.      Social Determinants of Health     Financial Resource Strain: Not on file   Food Insecurity: Not on file   Transportation Needs: Not on file   Physical Activity: Not on file   Stress: Not on file   Social Connections: Not on file   Interpersonal Safety: Low Risk  (10/9/2024)    Interpersonal Safety     Do you feel physically and emotionally safe where you currently live?: Yes     Within the past 12 months, have you been hit, slapped, kicked or otherwise physically hurt by someone?: No     Within the past 12 months, have you been humiliated or emotionally abused in other ways by your partner or ex-partner?: No   Housing Stability: Not on file       Family History  Family History   Problem Relation Age of Onset    Hypertension Mother     Depression Mother     Anxiety Disorder Mother     Diabetes Father     Kidney Disease Father         nephrolithiasis    Asthma Father     Asthma Sister     Blood Disease Maternal Grandmother     Diabetes Maternal Grandmother     Hypertension Maternal Grandmother     Hyperlipidemia Maternal Grandmother     Depression Maternal Grandmother     No Known Problems Maternal Grandfather     No Known  Problems Paternal Grandmother     C.A.D. Paternal Grandfather     Cancer Other         no family hx of skin cancer    Glaucoma No family hx of     Macular Degeneration No family hx of     Melanoma No family hx of     Skin Cancer No family hx of     Anesthesia Reaction No family hx of     Thrombosis No family hx of        Review of Systems  The complete review of systems is negative other than noted in the HPI or here.   Anesthesia Evaluation   Pt has had prior anesthetic.     No history of anesthetic complications       ROS/MED HX  ENT/Pulmonary: Comment: Hearing loss    (+)     MARGO risk factors,  hypertension,                                 Neurologic:  - neg neurologic ROS  (-) no CVA and no TIA   Cardiovascular:     (+) Dyslipidemia hypertension- -   -  - -           [facility].                      Previous cardiac testing   Echo: Date: 7/2024 Results:  Echo  7/2024  Interpretation Summary  Global and regional left ventricular function is normal with an EF of 60-65%.  Global right ventricular function is normal.  No significant valvular abnormalities present.  Estimated mean right atrial pressure is normal.  No pericardial effusion is present.  Stress Test:  Date: 10/4/24 Results:  Addenda        The nuclear stress test is negative for inducible myocardial ischemia or infarction.     The left ventricular ejection fraction at rest is greater than 70%.     There is no prior study for comparison.    ECG Reviewed:  Date: 8/2024 Results:  Sinus rhythm  Low voltage QRS  Borderline ECG  Cath:  Date: Results:   (-) orthopnea/PND   METS/Exercise Tolerance: 3 - Able to walk 1-2 blocks without stopping Comment: Up until June was biking, walking, and going to the gym for exercise.  Didn't typically have exertional dyspnea or angina.  Since June started having exertional dyspnea that is thought to be related to her rapidly decreasing kidney function and changes in her CO2 level.     Hematologic:     (+)      anemia, history of  blood transfusion, no previous transfusion reaction,        Musculoskeletal:   (+)  arthritis,             GI/Hepatic:  - neg GI/hepatic ROS  (-) liver disease   Renal/Genitourinary: Comment: Glomerular focal sclerosis  S/p kidney transplant x 2    Nocturia, urinary retention    (+) renal disease, type: CRI, Pt does not require dialysis,  Pt has history of transplant, date: 1992,        Endo:     (+)            Chronic steroid usage for Post Transplant Immunosuppression.      (-) Type II DM and thyroid disease   Psychiatric/Substance Use:     (+) psychiatric history anxiety and depression       Infectious Disease:  - neg infectious disease ROS     Malignancy:   (+) Malignancy, History of Skin.Skin CA Remission status post Surgery.      Other: Comment: Lichen planoplanis - following with dermatology           LMP 05/14/2014 (Approximate)     Physical Exam   Constitutional: Awake, alert, cooperative, no apparent distress, and appears stated age.  Eyes: Pupils equal, round and reactive to light, extra ocular muscles intact, sclera clear, conjunctiva normal.  HENT: Normocephalic, oral pharynx with moist mucus membranes, good dentition. No goiter appreciated.   Respiratory: Clear to auscultation bilaterally, no crackles or wheezing.  Cardiovascular: Regular rate and rhythm, normal S1 and S2, and no murmur noted.  Carotids +2, no bruits. No edema. Palpable pulses to radial  DP and PT arteries.   GI: Normal bowel sounds, soft, non-distended, non-tender, no masses palpated, no hepatosplenomegaly.    Lymph/Hematologic: No cervical lymphadenopathy and no supraclavicular lymphadenopathy.  Genitourinary:  deferred  Skin: Warm and dry.  No rashes at anticipated surgical site.   Musculoskeletal: Full ROM of neck. There is no redness, warmth, or swelling of the exposed joints. Gross motor strength is normal.    Neurologic: Awake, alert, oriented to name, place and time. Cranial nerves II-XII are grossly intact. Gait is normal.    Neuropsychiatric: Calm, cooperative. Normal affect.     Prior Labs/Diagnostic Studies   All labs and imaging personally reviewed     EKG/ stress test - if available please see in ROS above     Component      Latest Ref Rng 8/29/2024  11:27 AM 9/24/2024  11:14 AM   Sodium      135 - 145 mmol/L 135  136    Potassium      3.4 - 5.3 mmol/L 4.3  4.5    Chloride      98 - 107 mmol/L 103  106    Carbon Dioxide (CO2)      22 - 29 mmol/L 17 (L)  16 (L)    Anion Gap      7 - 15 mmol/L 15  14    Glucose      70 - 99 mg/dL 119 (H)  107 (H)    Urea Nitrogen      8.0 - 23.0 mg/dL 47.0 (H)  60.9 (H)    Creatinine      0.51 - 0.95 mg/dL 3.87 (H)  4.40 (H)    GFR Estimate      >60 mL/min/1.73m2 13 (L)  11 (L)    Calcium      8.8 - 10.4 mg/dL 9.8  10.9 (H)    Albumin      3.5 - 5.2 g/dL  4.6    Phosphorus      2.5 - 4.5 mg/dL  4.8 (H)    WBC      4.0 - 11.0 10e3/uL  4.6    RBC Count      3.80 - 5.20 10e6/uL  3.48 (L)    Hemoglobin      11.7 - 15.7 g/dL  10.5 (L)    Hematocrit      35.0 - 47.0 %  33.2 (L)    MCV      78 - 100 fL  95    MCH      26.5 - 33.0 pg  30.2    MCHC      31.5 - 36.5 g/dL  31.6    RDW      10.0 - 15.0 %  14.9    Platelet Count      150 - 450 10e3/uL  205       Legend:  (L) Low  (H) High        The patient's records and results personally reviewed by this provider.     Outside records reviewed from: Care Everywhere    LAB/DIAGNOSTIC STUDIES TODAY:  none    Assessment    Reina Quan is a 59 year old female seen as a PAC referral for risk assessment and optimization for anesthesia.    Plan/Recommendations  Pt will be optimized for the proposed procedure.  See below for details on the assessment, risk, and preoperative recommendations    NEUROLOGY  - No history of TIA, CVA or seizure    -Post Op delirium risk factors:  High co-morbid index    ENT  - No current airway concerns.  Will need to be reassessed day of surgery.  Mallampati: I  TM: > 3    CARDIAC  - No history of CAD and Afib  - was on amlodipine  "for for hypertension, but that was recently put on hold.  - reports significant increase in exertional dyspnea over this past summer as her kidney function and CO2 has worsened.  Echocardiogram done in July - report above in ROS.  Will order stress test for additional evaluation.    - METS (Metabolic Equivalents)  Patient CANNOT perform 4 METS exercise without symptoms             Total Score: 1    Functional Capacity: Unable to complete 4 METS      RCRI-Low risk: Class 2 0.9% complication rate             Total Score: 1    RCRI: Elevated Creatinine        PULMONARY    MARGO Low Risk             Total Score: 2    MARGO: Hypertension    MARGO: Over 50 ys old      - Denies asthma or inhaler use  - Tobacco History    History   Smoking Status    Never   Smokeless Tobacco    Never       GI  - denies GERD  PONV High Risk  Total Score: 3           1 AN PONV: Pt is Female    1 AN PONV: Patient is not a current smoker    1 AN PONV: Intended Post Op Opioids        /RENAL  - s/p kidney transplant in 1992 for FSGS, now with worsening CKD.  Procedure planned as above.  - being worked up for kidney transplant list.    ENDOCRINE    - BMI: Estimated body mass index is 24.07 kg/m  as calculated from the following:    Height as of 10/7/24: 1.575 m (5' 2\").    Weight as of 10/7/24: 59.7 kg (131 lb 9.6 oz).  Overweight (BMI 25.0-29.9)  - No history of Diabetes Mellitus    - on 5mg daily prednisone.  Stress dosing as per anesthesia DOS.    HEME  VTE Low Risk 0.26%             Total Score: 0      - No history of abnormal bleeding or antiplatelet use.  - Chronic anemia  Recommend perioperative use of blood conservation techniques intraoperatively and close monitoring for postoperative bleeding.      MSK  Patient is NOT Frail             Total Score: 0        PSYCH  - continue paxil without interruption.         Different anesthesia methods/types have been discussed with the patient, but they are aware that the final plan will be decided by " the assigned anesthesia provider on the date of service.  Patient was discussed with Dr Whitmore    The patient is not yet optimized for their procedure.   Stress test pending.     **addendum (10/8/24) - stress test has been completed and is acceptable for surgery.  See report above.  Okay to proceed as planned. **    AVS with information on surgery time/arrival time, meds and NPO status given by nursing staff. No further diagnostic testing indicated.      On the day of service:     Prep time: 10 minutes  Visit time: 14 minutes  Documentation/communication time: 18 minutes  ------------------------------------------  Total time: 42 minutes      JOSE MANUEL Angelo CNP  Preoperative Assessment Center  [facility]  Phone: [phone]  Fax: [phone]    Physical Exam    Airway        Mallampati: II   TM distance: > 3 FB   Neck ROM: full   Mouth opening: > 3 cm    Respiratory Devices and Support         Dental       (+) Completely normal teeth      Cardiovascular   cardiovascular exam normal       Rhythm and rate: regular and normal     Pulmonary   pulmonary exam normal        breath sounds clear to auscultation           Anesthesia Plan    ASA Status:  3    NPO Status:  NPO Appropriate    Anesthesia Type: General.   Induction: Intravenous.   Maintenance: Balanced.   Techniques and Equipment:     - Lines/Monitors: BIS     Consents    Anesthesia Plan(s) and associated risks, benefits, and realistic alternatives discussed. Questions answered and patient/representative(s) expressed understanding.     - Discussed:     - Discussed with:  Patient       - Patient is DNR/DNI Status: No          Postoperative Care    Pain management: Multi-modal analgesia, Peripheral nerve block (Single Shot).   PONV prophylaxis: Ondansetron (or other 5HT-3), Dexamethasone or Solumedrol     Comments:

## 2024-10-15 ENCOUNTER — ANESTHESIA (OUTPATIENT)
Dept: SURGERY | Facility: CLINIC | Age: 59
End: 2024-10-15
Payer: COMMERCIAL

## 2024-10-15 ENCOUNTER — PATIENT OUTREACH (OUTPATIENT)
Dept: NEPHROLOGY | Facility: CLINIC | Age: 59
End: 2024-10-15

## 2024-10-15 ENCOUNTER — HOSPITAL ENCOUNTER (OUTPATIENT)
Facility: CLINIC | Age: 59
Discharge: HOME | End: 2024-10-15
Attending: SURGERY | Admitting: SURGERY
Payer: COMMERCIAL

## 2024-10-15 VITALS
HEART RATE: 82 BPM | BODY MASS INDEX: 24.18 KG/M2 | SYSTOLIC BLOOD PRESSURE: 144 MMHG | DIASTOLIC BLOOD PRESSURE: 94 MMHG | OXYGEN SATURATION: 96 % | TEMPERATURE: 98.1 F | HEIGHT: 62 IN | WEIGHT: 131.39 LBS | RESPIRATION RATE: 14 BRPM

## 2024-10-15 DIAGNOSIS — Z98.890 S/P ARTERIOVENOUS (AV) FISTULA CREATION: Primary | ICD-10-CM

## 2024-10-15 LAB
ANION GAP SERPL CALCULATED.3IONS-SCNC: 15 MMOL/L (ref 7–15)
BUN SERPL-MCNC: 28.1 MG/DL (ref 8–23)
CALCIUM SERPL-MCNC: 9.9 MG/DL (ref 8.8–10.4)
CHLORIDE SERPL-SCNC: 100 MMOL/L (ref 98–107)
CREAT SERPL-MCNC: 3.55 MG/DL (ref 0.51–0.95)
EGFRCR SERPLBLD CKD-EPI 2021: 14 ML/MIN/1.73M2
GLUCOSE SERPL-MCNC: 100 MG/DL (ref 70–99)
HCO3 SERPL-SCNC: 25 MMOL/L (ref 22–29)
HGB BLD-MCNC: 9.3 G/DL (ref 11.7–15.7)
POTASSIUM SERPL-SCNC: 4.1 MMOL/L (ref 3.4–5.3)
SODIUM SERPL-SCNC: 140 MMOL/L (ref 135–145)

## 2024-10-15 PROCEDURE — 85018 HEMOGLOBIN: CPT | Performed by: NURSE PRACTITIONER

## 2024-10-15 PROCEDURE — 250N000011 HC RX IP 250 OP 636: Performed by: SURGERY

## 2024-10-15 PROCEDURE — 250N000011 HC RX IP 250 OP 636

## 2024-10-15 PROCEDURE — 360N000076 HC SURGERY LEVEL 3, PER MIN: Performed by: SURGERY

## 2024-10-15 PROCEDURE — 250N000011 HC RX IP 250 OP 636: Mod: JZ | Performed by: NURSE ANESTHETIST, CERTIFIED REGISTERED

## 2024-10-15 PROCEDURE — 710N000010 HC RECOVERY PHASE 1, LEVEL 2, PER MIN: Performed by: SURGERY

## 2024-10-15 PROCEDURE — 250N000011 HC RX IP 250 OP 636: Mod: JZ

## 2024-10-15 PROCEDURE — 250N000009 HC RX 250

## 2024-10-15 PROCEDURE — 250N000012 HC RX MED GY IP 250 OP 636 PS 637: Performed by: SURGERY

## 2024-10-15 PROCEDURE — 250N000009 HC RX 250: Performed by: NURSE ANESTHETIST, CERTIFIED REGISTERED

## 2024-10-15 PROCEDURE — 250N000025 HC SEVOFLURANE, PER MIN: Performed by: SURGERY

## 2024-10-15 PROCEDURE — 76998 US GUIDE INTRAOP: CPT | Mod: 26 | Performed by: SURGERY

## 2024-10-15 PROCEDURE — 258N000003 HC RX IP 258 OP 636: Performed by: NURSE ANESTHETIST, CERTIFIED REGISTERED

## 2024-10-15 PROCEDURE — 999N000141 HC STATISTIC PRE-PROCEDURE NURSING ASSESSMENT: Performed by: SURGERY

## 2024-10-15 PROCEDURE — 258N000003 HC RX IP 258 OP 636: Performed by: SURGERY

## 2024-10-15 PROCEDURE — 36821 AV FUSION DIRECT ANY SITE: CPT | Mod: LT | Performed by: SURGERY

## 2024-10-15 PROCEDURE — 370N000017 HC ANESTHESIA TECHNICAL FEE, PER MIN: Performed by: SURGERY

## 2024-10-15 PROCEDURE — 710N000012 HC RECOVERY PHASE 2, PER MINUTE: Performed by: SURGERY

## 2024-10-15 PROCEDURE — 80048 BASIC METABOLIC PNL TOTAL CA: CPT | Performed by: NURSE PRACTITIONER

## 2024-10-15 PROCEDURE — 258N000003 HC RX IP 258 OP 636

## 2024-10-15 PROCEDURE — 272N000001 HC OR GENERAL SUPPLY STERILE: Performed by: SURGERY

## 2024-10-15 RX ORDER — EPHEDRINE SULFATE 50 MG/ML
INJECTION, SOLUTION INTRAMUSCULAR; INTRAVENOUS; SUBCUTANEOUS PRN
Status: DISCONTINUED | OUTPATIENT
Start: 2024-10-15 | End: 2024-10-15

## 2024-10-15 RX ORDER — FLUMAZENIL 0.1 MG/ML
0.2 INJECTION, SOLUTION INTRAVENOUS
Status: DISCONTINUED | OUTPATIENT
Start: 2024-10-15 | End: 2024-10-15 | Stop reason: HOSPADM

## 2024-10-15 RX ORDER — FENTANYL CITRATE 50 UG/ML
50 INJECTION, SOLUTION INTRAMUSCULAR; INTRAVENOUS EVERY 5 MIN PRN
Status: DISCONTINUED | OUTPATIENT
Start: 2024-10-15 | End: 2024-10-15 | Stop reason: HOSPADM

## 2024-10-15 RX ORDER — BUPIVACAINE HYDROCHLORIDE 5 MG/ML
INJECTION, SOLUTION EPIDURAL; INTRACAUDAL
Status: COMPLETED | OUTPATIENT
Start: 2024-10-15 | End: 2024-10-15

## 2024-10-15 RX ORDER — FENTANYL CITRATE 50 UG/ML
25-50 INJECTION, SOLUTION INTRAMUSCULAR; INTRAVENOUS
Status: DISCONTINUED | OUTPATIENT
Start: 2024-10-15 | End: 2024-10-15 | Stop reason: HOSPADM

## 2024-10-15 RX ORDER — HYDROMORPHONE HCL IN WATER/PF 6 MG/30 ML
0.4 PATIENT CONTROLLED ANALGESIA SYRINGE INTRAVENOUS EVERY 5 MIN PRN
Status: DISCONTINUED | OUTPATIENT
Start: 2024-10-15 | End: 2024-10-15 | Stop reason: HOSPADM

## 2024-10-15 RX ORDER — LIDOCAINE 40 MG/G
CREAM TOPICAL
Status: DISCONTINUED | OUTPATIENT
Start: 2024-10-15 | End: 2024-10-15 | Stop reason: HOSPADM

## 2024-10-15 RX ORDER — ONDANSETRON 4 MG/1
4 TABLET, ORALLY DISINTEGRATING ORAL EVERY 30 MIN PRN
Status: CANCELLED | OUTPATIENT
Start: 2024-10-15

## 2024-10-15 RX ORDER — GLYCOPYRROLATE 0.2 MG/ML
INJECTION, SOLUTION INTRAMUSCULAR; INTRAVENOUS PRN
Status: DISCONTINUED | OUTPATIENT
Start: 2024-10-15 | End: 2024-10-15

## 2024-10-15 RX ORDER — DEXAMETHASONE SODIUM PHOSPHATE 4 MG/ML
4 INJECTION, SOLUTION INTRA-ARTICULAR; INTRALESIONAL; INTRAMUSCULAR; INTRAVENOUS; SOFT TISSUE
Status: CANCELLED | OUTPATIENT
Start: 2024-10-15

## 2024-10-15 RX ORDER — ONDANSETRON 2 MG/ML
INJECTION INTRAMUSCULAR; INTRAVENOUS PRN
Status: DISCONTINUED | OUTPATIENT
Start: 2024-10-15 | End: 2024-10-15

## 2024-10-15 RX ORDER — HYDROMORPHONE HCL IN WATER/PF 6 MG/30 ML
0.2 PATIENT CONTROLLED ANALGESIA SYRINGE INTRAVENOUS EVERY 5 MIN PRN
Status: DISCONTINUED | OUTPATIENT
Start: 2024-10-15 | End: 2024-10-15 | Stop reason: HOSPADM

## 2024-10-15 RX ORDER — LIDOCAINE HYDROCHLORIDE 20 MG/ML
INJECTION, SOLUTION INFILTRATION; PERINEURAL PRN
Status: DISCONTINUED | OUTPATIENT
Start: 2024-10-15 | End: 2024-10-15

## 2024-10-15 RX ORDER — PROPOFOL 10 MG/ML
INJECTION, EMULSION INTRAVENOUS PRN
Status: DISCONTINUED | OUTPATIENT
Start: 2024-10-15 | End: 2024-10-15

## 2024-10-15 RX ORDER — FENTANYL CITRATE 50 UG/ML
25 INJECTION, SOLUTION INTRAMUSCULAR; INTRAVENOUS EVERY 5 MIN PRN
Status: DISCONTINUED | OUTPATIENT
Start: 2024-10-15 | End: 2024-10-15 | Stop reason: HOSPADM

## 2024-10-15 RX ORDER — ONDANSETRON 2 MG/ML
4 INJECTION INTRAMUSCULAR; INTRAVENOUS EVERY 30 MIN PRN
Status: CANCELLED | OUTPATIENT
Start: 2024-10-15

## 2024-10-15 RX ORDER — NALOXONE HYDROCHLORIDE 0.4 MG/ML
0.2 INJECTION, SOLUTION INTRAMUSCULAR; INTRAVENOUS; SUBCUTANEOUS
Status: DISCONTINUED | OUTPATIENT
Start: 2024-10-15 | End: 2024-10-15 | Stop reason: HOSPADM

## 2024-10-15 RX ORDER — ONDANSETRON 4 MG/1
4 TABLET, ORALLY DISINTEGRATING ORAL EVERY 30 MIN PRN
Status: DISCONTINUED | OUTPATIENT
Start: 2024-10-15 | End: 2024-10-15 | Stop reason: HOSPADM

## 2024-10-15 RX ORDER — DEXAMETHASONE SODIUM PHOSPHATE 4 MG/ML
INJECTION, SOLUTION INTRA-ARTICULAR; INTRALESIONAL; INTRAMUSCULAR; INTRAVENOUS; SOFT TISSUE PRN
Status: DISCONTINUED | OUTPATIENT
Start: 2024-10-15 | End: 2024-10-15

## 2024-10-15 RX ORDER — ONDANSETRON 2 MG/ML
4 INJECTION INTRAMUSCULAR; INTRAVENOUS EVERY 30 MIN PRN
Status: DISCONTINUED | OUTPATIENT
Start: 2024-10-15 | End: 2024-10-15 | Stop reason: HOSPADM

## 2024-10-15 RX ORDER — DEXAMETHASONE SODIUM PHOSPHATE 4 MG/ML
4 INJECTION, SOLUTION INTRA-ARTICULAR; INTRALESIONAL; INTRAMUSCULAR; INTRAVENOUS; SOFT TISSUE
Status: DISCONTINUED | OUTPATIENT
Start: 2024-10-15 | End: 2024-10-15 | Stop reason: HOSPADM

## 2024-10-15 RX ORDER — HEPARIN SODIUM 1000 [USP'U]/ML
INJECTION, SOLUTION INTRAVENOUS; SUBCUTANEOUS PRN
Status: DISCONTINUED | OUTPATIENT
Start: 2024-10-15 | End: 2024-10-15

## 2024-10-15 RX ORDER — FENTANYL CITRATE 50 UG/ML
INJECTION, SOLUTION INTRAMUSCULAR; INTRAVENOUS PRN
Status: DISCONTINUED | OUTPATIENT
Start: 2024-10-15 | End: 2024-10-15

## 2024-10-15 RX ORDER — PAPAVERINE HYDROCHLORIDE 30 MG/ML
INJECTION INTRAMUSCULAR; INTRAVENOUS PRN
Status: DISCONTINUED | OUTPATIENT
Start: 2024-10-15 | End: 2024-10-15 | Stop reason: HOSPADM

## 2024-10-15 RX ORDER — OXYCODONE HYDROCHLORIDE 5 MG/1
5 TABLET ORAL
Status: CANCELLED | OUTPATIENT
Start: 2024-10-15

## 2024-10-15 RX ORDER — SODIUM CHLORIDE, SODIUM GLUCONATE, SODIUM ACETATE, POTASSIUM CHLORIDE AND MAGNESIUM CHLORIDE 526; 502; 368; 37; 30 MG/100ML; MG/100ML; MG/100ML; MG/100ML; MG/100ML
INJECTION, SOLUTION INTRAVENOUS CONTINUOUS PRN
Status: DISCONTINUED | OUTPATIENT
Start: 2024-10-15 | End: 2024-10-15

## 2024-10-15 RX ORDER — NALOXONE HYDROCHLORIDE 0.4 MG/ML
0.1 INJECTION, SOLUTION INTRAMUSCULAR; INTRAVENOUS; SUBCUTANEOUS
Status: CANCELLED | OUTPATIENT
Start: 2024-10-15

## 2024-10-15 RX ORDER — NALOXONE HYDROCHLORIDE 0.4 MG/ML
0.4 INJECTION, SOLUTION INTRAMUSCULAR; INTRAVENOUS; SUBCUTANEOUS
Status: DISCONTINUED | OUTPATIENT
Start: 2024-10-15 | End: 2024-10-15 | Stop reason: HOSPADM

## 2024-10-15 RX ORDER — POLYETHYLENE GLYCOL 3350 17 G/17G
1 POWDER, FOR SOLUTION ORAL DAILY
Qty: 510 G | Refills: 3 | Status: SHIPPED | OUTPATIENT
Start: 2024-10-15

## 2024-10-15 RX ORDER — OXYCODONE HYDROCHLORIDE 5 MG/1
5 TABLET ORAL EVERY 6 HOURS PRN
Qty: 12 TABLET | Refills: 0 | Status: SHIPPED | OUTPATIENT
Start: 2024-10-15 | End: 2024-10-18

## 2024-10-15 RX ORDER — OXYCODONE HYDROCHLORIDE 10 MG/1
10 TABLET ORAL
Status: CANCELLED | OUTPATIENT
Start: 2024-10-15

## 2024-10-15 RX ORDER — NALOXONE HYDROCHLORIDE 0.4 MG/ML
0.1 INJECTION, SOLUTION INTRAMUSCULAR; INTRAVENOUS; SUBCUTANEOUS
Status: DISCONTINUED | OUTPATIENT
Start: 2024-10-15 | End: 2024-10-15 | Stop reason: HOSPADM

## 2024-10-15 RX ORDER — CLINDAMYCIN PHOSPHATE 900 MG/50ML
INJECTION, SOLUTION INTRAVENOUS PRN
Status: DISCONTINUED | OUTPATIENT
Start: 2024-10-15 | End: 2024-10-15

## 2024-10-15 RX ADMIN — SODIUM CHLORIDE, SODIUM GLUCONATE, SODIUM ACETATE, POTASSIUM CHLORIDE AND MAGNESIUM CHLORIDE: 526; 502; 368; 37; 30 INJECTION, SOLUTION INTRAVENOUS at 08:10

## 2024-10-15 RX ADMIN — HEPARIN SODIUM 2000 UNITS: 1000 INJECTION INTRAVENOUS; SUBCUTANEOUS at 09:15

## 2024-10-15 RX ADMIN — ONDANSETRON 4 MG: 2 INJECTION INTRAMUSCULAR; INTRAVENOUS at 10:24

## 2024-10-15 RX ADMIN — MIDAZOLAM 1 MG: 1 INJECTION INTRAMUSCULAR; INTRAVENOUS at 07:38

## 2024-10-15 RX ADMIN — PHENYLEPHRINE HYDROCHLORIDE 200 MCG: 10 INJECTION INTRAVENOUS at 08:27

## 2024-10-15 RX ADMIN — PROPOFOL 100 MG: 10 INJECTION, EMULSION INTRAVENOUS at 08:10

## 2024-10-15 RX ADMIN — GLYCOPYRROLATE 0.2 MG: 0.2 INJECTION, SOLUTION INTRAMUSCULAR; INTRAVENOUS at 08:47

## 2024-10-15 RX ADMIN — Medication 50 MG: at 08:11

## 2024-10-15 RX ADMIN — DEXAMETHASONE SODIUM PHOSPHATE 8 MG: 4 INJECTION, SOLUTION INTRA-ARTICULAR; INTRALESIONAL; INTRAMUSCULAR; INTRAVENOUS; SOFT TISSUE at 08:32

## 2024-10-15 RX ADMIN — Medication 100 MG: at 10:38

## 2024-10-15 RX ADMIN — PHENYLEPHRINE HYDROCHLORIDE 100 MCG: 10 INJECTION INTRAVENOUS at 09:02

## 2024-10-15 RX ADMIN — FENTANYL CITRATE 50 MCG: 50 INJECTION, SOLUTION INTRAMUSCULAR; INTRAVENOUS at 07:38

## 2024-10-15 RX ADMIN — FENTANYL CITRATE 100 MCG: 50 INJECTION INTRAMUSCULAR; INTRAVENOUS at 08:10

## 2024-10-15 RX ADMIN — EPHEDRINE SULFATE 10 MG: 5 INJECTION INTRAVENOUS at 08:29

## 2024-10-15 RX ADMIN — EPHEDRINE SULFATE 15 MG: 5 INJECTION INTRAVENOUS at 08:28

## 2024-10-15 RX ADMIN — CLINDAMYCIN PHOSPHATE 900 MG: 900 INJECTION, SOLUTION INTRAVENOUS at 08:13

## 2024-10-15 RX ADMIN — PHENYLEPHRINE HYDROCHLORIDE 100 MCG: 10 INJECTION INTRAVENOUS at 10:30

## 2024-10-15 RX ADMIN — LIDOCAINE HYDROCHLORIDE 100 MG: 20 INJECTION, SOLUTION INFILTRATION; PERINEURAL at 08:10

## 2024-10-15 RX ADMIN — PHENYLEPHRINE HYDROCHLORIDE 100 MCG: 10 INJECTION INTRAVENOUS at 08:48

## 2024-10-15 RX ADMIN — BUPIVACAINE HYDROCHLORIDE 30 ML: 5 INJECTION, SOLUTION EPIDURAL; INTRACAUDAL at 07:39

## 2024-10-15 RX ADMIN — PHENYLEPHRINE HYDROCHLORIDE 0.3 MCG/KG/MIN: 10 INJECTION INTRAVENOUS at 09:03

## 2024-10-15 ASSESSMENT — ACTIVITIES OF DAILY LIVING (ADL)
ADLS_ACUITY_SCORE: 29
ADLS_ACUITY_SCORE: 27

## 2024-10-15 NOTE — ANESTHESIA CARE TRANSFER NOTE
Patient: Reina Quan    Procedure: Procedure(s):  Left Upper Extremity Brachiobasilic ARTERIOVENOUS FISTULA Creation       Diagnosis: Chronic kidney disease [N18.9]  Diagnosis Additional Information: No value filed.    Anesthesia Type:   General     Note:    Oropharynx: oropharynx clear of all foreign objects and spontaneously breathing  Level of Consciousness: drowsy  Oxygen Supplementation: face mask  Level of Supplemental Oxygen (L/min / FiO2): 6  Independent Airway: airway patency satisfactory and stable  Dentition: dentition unchanged  Vital Signs Stable: post-procedure vital signs reviewed and stable  Report to RN Given: handoff report given  Patient transferred to: PACU    Handoff Report: Identifed the Patient, Identified the Reponsible Provider, Reviewed the pertinent medical history, Discussed the surgical course, Reviewed Intra-OP anesthesia mangement and issues during anesthesia, Set expectations for post-procedure period and Allowed opportunity for questions and acknowledgement of understanding      Vitals:  Vitals Value Taken Time   BP 77/69 10/15/24 1051   Temp     Pulse 100 10/15/24 1052   Resp     SpO2 100 % 10/15/24 1052   Vitals shown include unfiled device data.    Electronically Signed By: JOSE MANUEL Banda CRNA  October 15, 2024  10:52 AM

## 2024-10-15 NOTE — ANESTHESIA PROCEDURE NOTES
Airway         Procedure Start/Stop Times: 10/15/2024 8:12 AM  Staff -        CRNA: Sam Lane APRN CRNA       Performed By: CRNA  Consent for Airway        Urgency: elective  Indications and Patient Condition       Indications for airway management: triston-procedural       Induction type:intravenous       Mask difficulty assessment: 2 - vent by mask + OA or adjuvant +/- NMBA    Final Airway Details       Final airway type: endotracheal airway       Successful airway: Oral  Endotracheal Airway Details        ETT size (mm): 7.0       Cuffed: yes       Successful intubation technique: direct laryngoscopy       DL Blade Type: MAC 3       Grade View of Cords: 2       Adjucts: stylet       Measured from: gums/teeth       Secured at (cm): 22       Bite block used: None    Post intubation assessment        Placement verified by: capnometry, equal breath sounds and chest rise        Number of attempts at approach: 1       Number of other approaches attempted: 0       Secured with: tape       Ease of procedure: easy       Dentition: Intact    Medication(s) Administered   Medication Administration Time: 10/15/2024 8:12 AM    Additional Comments       Atraumatic intubation. Anterior airway. BURP. + ETCO2 and bilateral breath sounds.

## 2024-10-15 NOTE — ANESTHESIA PROCEDURE NOTES
Brachial plexus Procedure Note    Pre-Procedure   Staff -        Anesthesiologist:  Earnestine Shook DO       Resident/Fellow: Willy Dunne MD       Performed By: resident and anesthesiologist       Location: pre-op       Procedure Start/Stop Times: 10/15/2024 7:39 AM and 10/15/2024 7:52 AM       Pre-Anesthestic Checklist: patient identified, IV checked, site marked, risks and benefits discussed, informed consent, monitors and equipment checked, pre-op evaluation, at physician/surgeon's request and post-op pain management  Timeout:       Correct Patient: Yes        Correct Procedure: Yes        Correct Site: Yes        Correct Position: Yes        Correct Laterality: Yes        Site Marked: Yes  Procedure Documentation  Procedure: Brachial plexus       Diagnosis: INTRAOPERATIVE AND POSTOPERATIVE PAIN MANAGEMENT       Laterality: left       Patient Position: sitting       Skin prep: Chloraprep (supraclavicular approach).       Needle Type: short bevel       Needle Gauge: 21.        Needle Length (millimeters): 100        Ultrasound guided       1. Ultrasound was used to identify targeted nerve, plexus, vascular marker, or fascial plane and place a needle adjacent to it in real-time.       2. Ultrasound was used to visualize the spread of anesthetic in close proximity to the above referenced structure.       3. A permanent image is entered into the patient's record.    Assessment/Narrative         The placement was negative for: blood aspirated, painful injection and site bleeding       Paresthesias: No.       Bolus given via needle. no blood aspirated via catheter.        Secured via.        Insertion/Infusion Method: Single Shot       Complications: none       Injection made incrementally with aspirations every 5 mL.    Medication(s) Administered   Bupivacaine 0.5% PF (Infiltration) - Infiltration   30 mL - 10/15/2024 7:39:00 AM  Medication Administration Time: 10/15/2024 7:39 AM      FOR [facility] ([facility] [place] [facility])  "ONLY:   Pain Team Contact information: please page the Pain Team Via [facility]. Search \"Pain\". During daytime hours, please page the attending first. At night please page the resident first.      "

## 2024-10-15 NOTE — PROGRESS NOTES
Dialysis Access Care Coordination: General Outreach    REASON FOR CALL:     REASON FOR CALL: Care Coordination - Transition Hand-in Communi (AV fistula surgery hand off)                                   SITUATION/BACKROUND:   Received hand off from surgeon s/p OCTAVIO brachiobasilic AV fistula creation (1st stage) surgery this morning:  Jesus Alberto Walton MD P Dialysis Access Nurse  Hi all-    Did a first stage BB- outflow vein was pretty good caliber and she has a good thrill in PACU. Intra-op flow probe wasn't stellar (150ish) but I don't know how much to believe that. I'd have her come back at two weeks to see how things are going- it can be a nurse visit. If any concerns get US then. If it feels good, have her come back at 4 weeks or something with US then. We can plan for second stage around 6 weeks.    Jesus Alberto    Nephrology provider: SAM Raya.  Nephrology RN Care Coordinator:  Daniele Ovalles RN.    Neph Tracking Flowsheet Last Filled Values       Preferred Modality Home Hemodialysis    Patient's Referral Dates Auto Populate Patient's Referral Dates    Specialty Care Coordination Referral - Dialysis 7/26/2024    Journey Referral 7/18/24    Vein Mapping/US Order 8/19/24    Vein Mapping/US  08/19/24    Access Surgeon Referral Status  Referred    Dialysis Access Referral 07/26/24  Fistula vs PD consult with Dr. Walton    Access Surgical Consult 08/19/24  PLAN: LUE brachiobasilic (2 stage) AVF vs LUE AVG underger general anesthesia with nerve block.    Diaylsis Access Type AV Fistula  Brachiobasilic AVF    Dialysis Access Site TRINY    Dialysis Access Surgery 10/15/24  1st stage LUE brachiobasilic AVF, if ok at 2wk and 4 wk post op and US, ok for 2nd stage after 6 wks per Dr. Walton    Dialysis Access Surgeon Dr. Walton    Transplant Evaluation Referral 6/21/24    Transplant Status  Referred          Dialysis History    No dialysis history on file.       Patient is followed by  Solid Organ Transplant 2/2 Kidney Transplant Evaluation - 10/7/2024.  Coordinator: Selena Nuno    ASSESSMENT:     Recent Labs      Latest Ref Rng & Units 10/15/2024 10/7/2024 9/27/2024   Neph Labs   Sodium 135 - 145 mmol/L 140  137  136    GFR Estimate >60 mL/min/1.73m2 14  11  9    Potassium 3.4 - 5.3 mmol/L 4.1  4.0  4.3    Creatinine 0.51 - 0.95 mg/dL 3.55  4.40  5.05    Urea Nitrogen 8.0 - 23.0 mg/dL 28.1  49.0  62.3    Hemoglobin 11.7 - 15.7 g/dL 9.3  9.6  9.7    Hemoglobin A1C <5.7 %  6.0            PLAN:     Future Appts Next 180 days       Visit Type Date Time Department    POCUS                 10/15/2024  6:35 AM [facility] POC ULTRASOUND    SOT FISTULA FOLLOW UP 10/28/2024  2:30 PM [facility] SOT SURGERY            Follow Up:     Response sent to Dr. Walton inquiring if he would like the follow up visit on 10/28 cahnged to a nurse visit (currently scheduled with Dr. Walton).  Also inquired if we should schedule 2nd stage surgery for after 6 weeks or wait to schedule until we assess it at the 2 week follow up.     Will follow up upon provider response.    ANNABEL SPRING RN  Dialysis Access Care Coordinator  Phone: [phone]  Pool: P_Dialysis_Access_Nurse

## 2024-10-15 NOTE — BRIEF OP NOTE
[facility]    Brief Operative Note    Pre-operative diagnosis: Chronic kidney disease [N18.9]  Post-operative diagnosis Same as pre-operative diagnosis    Procedure: Left Upper Extremity Brachiobasilic ARTERIOVENOUS FISTULA Creation, Left - Arm    Ligation of veinous branches  Flow 150 ml/s via flow probe    Surgeon: Surgeons and Role:     * Jesus Alberto Walton MD - Primary     * Evelia Riggs MD - Resident - Assisting     * Tuan Henson MD - Fellow - Assisting  Anesthesia: General with Block   Estimated Blood Loss: Minimal    Drains: None  Specimens: * No specimens in log *  Findings:   None.  Complications: None.  Implants: * No implants in log *

## 2024-10-15 NOTE — DISCHARGE INSTRUCTIONS
"Hemodialysis Access Surgery: What to Expect at Home  Your Recovery  Hemodialysis is a way to remove wastes from the blood when your kidneys can no longer do the job. It's not a cure, but it can help you live longer and feel better. It's a lifesaving treatment when you have kidney failure. Hemodialysis is often called dialysis. Your doctor created a place (called an access) in your arm for your blood to flow in and out of your body during your dialysis sessions.  Your arm will probably be bruised and swollen. It may hurt. The cut (incision) may bleed. The pain and bleeding will get better over several days. You will probably need only over-the-counter pain medicine. You can reduce swelling by propping up your arm on 1 or 2 pillows and keeping your elbow straight.  Be sure to use your hand and arm as much as tolerated, do not \"baby\" the arm.  You will have stitches. These may dissolve on their own, or your doctor will tell you when to come in to have them removed. You should also be able to return to work in a few days.  You may feel some coolness or numbness in your hand. These feelings usually go away in a few weeks. Your doctor may suggest squeezing a soft object, or using your hand (opening and closing the fist) as much as possible. This will strengthen your access and increase the blood flow to your hand/ the rest of your arm.  You should always be able to feel blood rushing through the fistula or graft. It feels like a slight vibration or buzzing when you put your fingers on the skin over the fistula or graft. This feeling is called a thrill.  You should also be able to hear the blood flowing through the access, especially as it matures and gets stronger/ bigger.  It should sound like a \"whooshing\" sound.  This is called a bruit.  This care sheet gives you a general idea about how long it will take for you to recover. But each person recovers at a different pace. Follow the steps below to get better as quickly " as possible.    How can you care for yourself at home?  Activity    Rest when you feel tired. Getting enough sleep will help you recover. Do not lie on or sleep on the arm with the access.     Avoid strenuous activities with your affected arm until your incisions are healed, approximately 2 weeks.     You may use your arm, but do not lift anything that weighs more than about 15 pounds for 2 weeks. This may include a child, heavy grocery bags, a heavy briefcase or backpack, cat litter or dog food bags, or a vacuum .     You can shower, but keep the access dry for the first 2 days. Cover the area with a plastic bag to keep it dry.     Do not soak or scrub the incision until it has healed.     You may drive when you feel ready, as long as you are no longer taking narcotic pain medication, and are able to use your arm as needed. This is usually in 1 to 2 days.     Most people are able to return to work about 1 or 2 days after surgery.    If you were given a nerve block, to numb the arm, use caution to prevent injury to the arm while it is still numb.  Wear the sling provided when doing activities, until you regain feeling/ function of your arm again (typically 1-2 days).  Use your arm as much as possible as the nerve block wears off.  It can take approximately 4+ days for the nerve block to fully wear off.  As it wears off, you may experience numbness, tingling and/or decreased function of your arm, hand and fingers.   Diet    Follow an eating plan that is good for your kidneys. Your nephrologist will tell you if you need to have diet modifications or restrictions. You may need to limit protein, salt, fluids, and certain foods.   Medicines    Your doctor will tell you if and when you can restart your medicines. You will also be given instructions about taking any new medicines.     If you stopped taking aspirin or some other blood thinner, your doctor will tell you when to start taking it again.     Take pain  medicines exactly as directed.  If the doctor gave you a prescription medicine for pain, take it as prescribed.  Do not drive if you are taking prescription pain medication.  If you are not taking a prescription pain medicine, ask your doctor if you can take acetaminophen (Tylenol). Do not take ibuprofen (Advil, Motrin) or naproxen (Aleve), or similar medicines, unless your doctor tells you to. They may make chronic kidney disease worse.  Do not take two or more pain medicines at the same time unless the doctor told you to. Many pain medicines have acetaminophen, which is Tylenol. Too much acetaminophen (Tylenol) can be harmful.     If you think your pain medicine is making you sick to your stomach:  Take your medicine after meals (unless your doctor has told you not to).  Ask your doctor for a different pain medicine.     If your doctor prescribed antibiotics, take them as directed. Do not stop taking them just because you feel better. You need to take the full course of antibiotics.   Incision care    Keep the area dry for 2 days. After 2 days, wash the area with soap and water every day, and always before dialysis.     Do not soak or scrub the incision until it has healed.     If you have a bandage, change it every day or as your doctor recommends. Your doctor will tell you when you can remove it.   Exercise    Squeeze a soft ball or other object as your doctor tells you. This will help blood flow through the access and help prevent blood clots.  (Suggested to use 4-6 times per day, for 10+ repetitions each)    Once your incisions have healed (after approximately 2 weeks), you can start to do hammer curls with a 5lb weight to increase the blood flow to your fistula and help it mature faster.    Use your arm and hand as much as tolerated.   Elevation    Prop up the sore arm on a pillow anytime you sit or lie down during the next 3 days. Try to keep it above the level of your heart. This will help reduce swelling.    Other instructions    Every day, check your access for a pulse or thrill in the fistula or graft area. A thrill is a vibration. To feel a pulse or thrill, place the first two fingers of your hand lightly over the access.     Do not bump your arm.     Do not wear tight clothing, jewelry, or anything else that may squeeze the access.     Use your other arm to have blood drawn or blood pressure taken.     Do not put cream or lotion on or near the access.     Make sure all doctors you deal with know that you have a vascular access.   Follow-up care is a key part of your treatment and safety. Be sure to make and go to all appointments, and call your doctor if you are having problems. It's also a good idea to know your test results and keep a list of the medicines you take.  When should you call for help?   Call 911 anytime you think you may need emergency care. For example, call if:    You passed out (lost consciousness).     You have chest pain, are short of breath, or cough up blood.   Call your doctor now or seek immediate medical care if:    Your hand or arm is cold or dark-colored.     You have no pulse in your access.     You have nausea or you vomit for more than four hours.     You have pain that does not get better after you take pain medicine.     You have loose stitches, or your incision comes open.     You are bleeding from the incision.     You have signs of infection, such as:  Increased pain, swelling, warmth, or redness.  Red streaks leading from the area.  Pus draining from the area.  A fever.     You have signs of a blood clot in your leg (called a deep vein thrombosis), such as:  Pain in your calf, back of the knee, thigh, or groin.  Redness or swelling in your leg.   Watch closely for changes in your health, and be sure to contact your doctor if you have any problems.    Please call the dialysis access care coordinator (Zeina or Patricia) with any questions or concerns, Monday-Friday:  "[phone]      For any urgent after hours concerns, please contact the Transplant Surgery Fellow [phone], option 4, ask to page [phone]       Where can you learn more?  Go to https://[email or web address].net/patiented  Enter P616 in the search box to learn more about \"Hemodialysis Access Surgery: What to Expect at Home.\"  Current as of: February 28, 2023               Content Version: 13.8    8004-9069 [employer].   Care instructions adapted under license by your healthcare professional. If you have questions about a medical condition or this instruction, always ask your healthcare professional. [employer] disclaims any warranty or liability for your use of this information.    "

## 2024-10-15 NOTE — H&P
[facility], [place]    Transplant Surgery  History and Physical    Reina Quan  : 1965  MRN # [id number]    ADMIT DATE: 10/15/2024    PCP: Lizbeth Andrews    CHIEF COMPLAINT: pre-op for fistula creation OCTAVIO    HPI: Reina Quan is a 59 year old female with PMH of HTN, HLD, FSGS 2/2 LDKT s/p renal tx 1992, DEBRA, MDD, SCC of skin (RIGHT thigh), ulcerative colitis, who presents for AV fistula vs graft creation to OCTAVIO with Dr Walton.  Had port placement on 10/9 with IR, and started dialysis on Friday 10/11.  Last dialysis was yesterday 10/14.  No complaints or concerns today in preop.  Notes some fatigue after dialysis.    ROS:   CONSTITUTIONAL: Denies fever, chills, no fatigue except for postdialysis  HEAD: Denies headache.  EENT: Denies vision changes, or sore throat.   NECK: Denies lymphadenopathy.   CV:Denies chest pain, palpitations, or shortness of breath.   PULMONARY:Denies shortness of breath, cough (except for chronic cough for which she has seen a provider and is at baseline)  GI:Denies , diarrhea, and abdominal pain. Bowel movements are regular.  Denies melena or hematochezia  :Denies urinary alterations, dysuria, hematuria,   EXT:Denies joint pains  SKIN:Denies abnormal rashes or lesions.   MUSCULOSKELETAL:Denies upper or lower extremity weakness and pain.   NEUROLOGIC:Denies history of  seizures  HEMATOLOGICAL:No abnormal bruising or bleeding.   PSYCHIATRIC:Denies any mood alterations.     PMH:  Past Medical History:   Diagnosis Date    Actinic keratosis     Allergic rhinitis, cause unspecified     Anemia     anxiety/depression     PAXIL    Arthritis     congenital hearing loss     uses hearing aids    Depressive disorder     Dry eyes     Giant Platelet syndrome     Glomerular Focal Sclerosis--transplant      Hypertension     Kidney replaced by transplant     RAPAMUNE/ SIROLIMUS     Lichen planopilaris     LPP followed by derm on  doxycycline/ clobetasol    Lichen planus     Menorrhagia     migraine     Squamous cell carcinoma     8 x     Thrombocytopenia (H)     Ulcerative colitis, unspecified     biposy neg 1996    Unsatisfactory cervical Papanicolaou smear 02/15/2021    UTI (lower urinary tract infection)     recurrent Dr Burns [facility]       PSH:  Past Surgical History:   Procedure Laterality Date    BIOPSY OF SKIN LESION      CHOLECYSTECTOMY      DILATION AND CURETTAGE, OPERATIVE HYSTEROSCOPY WITH MORCELLATOR, COMBINED N/A 11/10/2015    Procedure: COMBINED DILATION AND CURETTAGE, OPERATIVE HYSTEROSCOPY WITH MORCELLATOR;  Surgeon: Ghada Melendez MD;  Location: [facility] OR    ESOPHAGOSCOPY, GASTROSCOPY, DUODENOSCOPY (EGD), COMBINED  11/20/2012    Procedure: COMBINED ESOPHAGOSCOPY, GASTROSCOPY, DUODENOSCOPY (EGD), BIOPSY SINGLE OR MULTIPLE;;  Surgeon: Valentin Hahn MD;  Location: [facility] GI    IR CVC TUNNEL PLACEMENT > 5 YRS OF AGE  10/9/2024    IR RENAL BIOPSY RIGHT  07/25/2024    MOHS MICROGRAPHIC PROCEDURE      TRANSPLANT  1992    kidney    ZZC NONSPECIFIC PROCEDURE      Renal transplant right side    ZZC NONSPECIFIC PROCEDURE      cholecystectomy       MEDICATIONS:  Prior to Admission Medications   Prescriptions Last Dose Informant Patient Reported? Taking?   PARoxetine (PAXIL) 20 MG tablet 10/14/2024  No Yes   Sig: TAKE 1 TABLET BY MOUTH EVERY MORNING   atorvastatin (LIPITOR) 40 MG tablet 10/14/2024  No Yes   Sig: Take 1 tablet (40 mg) by mouth at bedtime   bisacodyl (DULCOLAX) 5 MG EC tablet 10/14/2024  No Yes   Sig: Take 2 tablets at 3 pm the day before your procedure. If your procedure is before 11 am, take 2 additional tablets at 11 pm. If your procedure is after 11 am, take 2 additional tablets at 6 am. For additional instructions refer to your colonoscopy prep instructions.   cinacalcet (SENSIPAR) 30 MG tablet 10/14/2024  No Yes   Sig: Take 1 tablet (30 mg) by mouth every other day   clobetasol (TEMOVATE) 0.05 % external ointment  10/14/2024  No Yes   Sig: Apply topically 2 times daily Use sparingly at active areas of dermatitis for no more than 14 days or until resolved, whichever is sooner.   clobetasol (TEMOVATE) 0.05 % external solution 10/14/2024  No Yes   Sig: Apply topically daily as needed (itchyy scalp) Apply to scalp daily as needed.   clobetasol propionate (CLOBEX) 0.05 % external shampoo 10/14/2024  No Yes   Sig: APPLY TO SCALP IN SHOWER TWICE WEEKLY   ketoconazole (NIZORAL) 2 % external shampoo 10/14/2024  No Yes   Sig: APPLY TOPICALLY DAILY AS NEEDED FOR ITCHING, IRRITATION OR OTHER( SCALP SCALE) USE 2-3 TIMES A WEEK. APPLY AND LATHER THEN RINSE OUT 5 MINUTES   mycophenolate (GENERIC EQUIVALENT) 250 MG capsule 10/14/2024  No Yes   Sig: Take 3 capsules (750 mg) by mouth 2 times daily   niacinamide 500 MG tablet 10/14/2024  No Yes   Sig: Take 1 tablet (500 mg) by mouth 2 times daily (with meals)   polyethylene glycol (GOLYTELY) 236 g suspension   No No   Sig: The night before the exam at 6 pm drink an 8-ounce glass every 15 minutes until the jug is half empty. If you arrive before 11 AM: Drink the other half of the Golytely jug at 11 PM night before procedure. If you arrive after 11 AM: Drink the other half of the Golytely jug at 6 AM day of procedure. For additional instructions refer to your colonoscopy prep instructions.   predniSONE (DELTASONE) 5 MG tablet 10/14/2024  No Yes   Sig: Take 1 tablet (5 mg) by mouth daily   Patient taking differently: Take 5 mg by mouth every morning.   sodium bicarbonate 650 MG tablet 10/14/2024  No Yes   Sig: Take 2 tablets (1,300 mg) by mouth 2 times daily   triamcinolone (KENALOG) 0.1 % external cream 10/14/2024  No Yes   Sig: APPLY SPARINGLY TOPICALLY TO THE AFFECTED AREA TWICE DAILY FOR 14 DAYS      Facility-Administered Medications Last Administration Doses Remaining   triamcinolone acetonide (KENALOG-10) injection 10 mg None recorded 1           ALLERGIES:     Allergies   Allergen Reactions     Ampicillin Swelling     Swelling of mouth and tongue.     Seasonal Allergies Other (See Comments)     Itchy eyes and rhinitis.       FAMILY HISTORY:  Family History   Problem Relation Age of Onset    Hypertension Mother     Depression Mother     Anxiety Disorder Mother     Diabetes Father     Kidney Disease Father         nephrolithiasis    Asthma Father     Asthma Sister     Blood Disease Maternal Grandmother     Diabetes Maternal Grandmother     Hypertension Maternal Grandmother     Hyperlipidemia Maternal Grandmother     Depression Maternal Grandmother     No Known Problems Maternal Grandfather     No Known Problems Paternal Grandmother     C.A.D. Paternal Grandfather     Cancer Other         no family hx of skin cancer    Glaucoma No family hx of     Macular Degeneration No family hx of     Melanoma No family hx of     Skin Cancer No family hx of     Anesthesia Reaction No family hx of     Thrombosis No family hx of        SOCIAL HISTORY:  Social History     Socioeconomic History    Marital status:      Spouse name: Not on file    Number of children: 3    Years of education: Not on file    Highest education level: Not on file   Occupational History    Occupation:  - window treatments   Tobacco Use    Smoking status: Never     Passive exposure: Never    Smokeless tobacco: Never   Vaping Use    Vaping status: Never Used   Substance and Sexual Activity    Alcohol use: Not Currently    Drug use: No    Sexual activity: Not Currently     Partners: Male     Birth control/protection: Post-menopausal   Other Topics Concern    Parent/sibling w/ CABG, MI or angioplasty before 65F 55M? 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 Doesn't have a job lined up afterward but looking forward to some time off. Has three children, one moved to college this fall. Living in [place], has a significant other x 7 months. Previously . In a  "monogamous relationship. Never smoker, social EtOH use.      Social Determinants of Health     Financial Resource Strain: Not on file   Food Insecurity: Not on file   Transportation Needs: Not on file   Physical Activity: Not on file   Stress: Not on file   Social Connections: Not on file   Interpersonal Safety: Low Risk  (10/9/2024)    Interpersonal Safety     Do you feel physically and emotionally safe where you currently live?: Yes     Within the past 12 months, have you been hit, slapped, kicked or otherwise physically hurt by someone?: No     Within the past 12 months, have you been humiliated or emotionally abused in other ways by your partner or ex-partner?: No   Housing Stability: Not on file       PHYSICAL EXAM:  Blood pressure 118/76, pulse 69, temperature 98.9  F (37.2  C), temperature source Oral, resp. rate 16, height 1.575 m (5' 2\"), weight 59.6 kg (131 lb 6.3 oz), last menstrual period 05/14/2014, SpO2 100%, not currently breastfeeding.  GENERAL: Appears alert and oriented times three.   HEENT: Eye symmetrical and free of discharge bilaterally. Mucous membranes moist   NECK: Supple   CV: Nontachycardic   RESPIRATORY: NLB on RA  GI: Soft and non distended. No tenderness, rebound, guarding.   EXTREMITIES: No peripheral edema. Strong 2+ L radial pulse. 2+ L brachial pulse.  NEUROLOGIC: Alert and orientated. CN II-XII grossly intact. No focal deficits.   MUSCULOSKELETAL: No joint swelling or tenderness grossly observed  SKIN: No jaundice. No rashes or lesions.     LABS:  CBC:  Recent Labs   Lab Test 10/15/24  0637 10/07/24  1326   WBC  --  5.4   RBC  --  3.28*   HGB 9.3* 9.6*   HCT  --  31.2*   MCV  --  95   MCH  --  29.3   MCHC  --  30.8*   RDW  --  15.1*   PLT  --  163       CMP:  Recent Labs   Lab Test 10/07/24  1326      POTASSIUM 4.0   CHLORIDE 103   HARSHA 11.6*   CO2 20*   BUN 49.0*   CR 4.40*   GLC 97   AST 25   ALT 33   BILITOTAL 0.3   ALBUMIN 4.7   PROTTOTAL 7.6   ALKPHOS 65 "       IMAGIN2024 BUE vein mapping US:  FINDINGS:  RIGHT:       Internal jugular vein: 32 cm/s, phasic, fully compressible       Innominate vein: 43 cm/s, phasic       Subclavian vein, central: 46 cm/s, phasic       Subclavian vein, mid: 62 cm/s, phasic, fully compressible       Subclavian vein, lateral: 55 cm/s, phasic, fully compressible       Axillary vein: 40 cm/s, phasic, fully compressible          Cephalic vein: Fully compressible.            Proximal arm: 2.2 mm, 0.9 cm deep to skin            Mid arm: 1.9 mm, 0.6 cm deep to skin            Distal arm: 2.3 mm, 0.5 cm deep to skin            Antecubital fossa: 2.6 mm, 0.6 cm deep to skin            Proximal forearm: 1.6 mm, 0.6 cm deep to skin            Mid forearm: 1.7 mm, 0.5 cm deep to skin            Wrist: 1.6 mm, 0.4 cm deep to skin          Basilic vein: Fully compressible             Proximal arm: 3.7 mm, 1.1 cm deep to skin             Mid arm: 2.8 mm, 1.3 cm deep to skin             Distal arm: 2.1 mm, 1.3 cm deep to skin              Antecubital fossa: 1.4 mm, 1.1 cm deep to skin          Brachial veins (lateral / medial): Fully compressible             Proximal arm: 2.9 mm / 3.9 mm             Mid arm: 1.4 mm / 2.3 mm             Distal arm: 1.7 mm / 1.5 mm             Antecubital fossa: 1.0 mm / 1.4 mm          Innominate artery: 87/19 cm/s, triphasic       Subclavian artery: 68/0 cm/s, triphasic       Brachial artery, antecubital fossa: 81/0 cm/s, triphasic, 4.8 mm       Radial artery, wrist: 41/0 cm/s, triphasic, 2.0 mm       Ulnar artery, wrist: 59/0 cm/s, triphasic     LEFT:       Internal jugular vein: 61 cm/s, phasic, fully compressible       Innominate vein: 34 cm/s, phasic       Subclavian vein, central: 84 cm/s, phasic       Subclavian vein, mid: 62 cm/s, phasic, fully compressible       Subclavian vein, lateral: 65 cm/s, phasic, fully compressible       Axillary vein: 51 cm/s, phasic, fully compressible          Cephalic vein:  Fully compressible.            Proximal arm: 1.1 mm, 0.4 cm deep to skin            Mid arm: 0.9 mm, 0.8 cm deep to skin            Distal arm: 1.2 mm, 0.5 cm deep to skin            Antecubital fossa: 1.5 mm, 0.5 cm deep to skin            Proximal forearm: 1.7 mm, 0.5 cm deep to skin            Mid forearm: 1.3 mm, 0.6 cm deep to skin            Wrist: 1.2 mm, 0.4 cm deep to skin          Basilic vein: Fully compressible             Proximal arm: 2.9 mm, 1.2 cm deep to skin             Mid arm: 2.2 mm, 1.2 cm deep to skin             Distal arm: 2.8 mm, 1.1 cm deep to skin              Antecubital fossa: 2.4 mm, 1.1 cm deep to skin          Brachial veins (lateral / medial): Fully compressible             Proximal arm: 2.9 mm / 2.4 mm             Mid arm: 2.6 mm / 1.7 mm             Distal arm: 2.9 mm / 2.0 mm             Antecubital fossa: 1.8 mm / 1.7 mm          Subclavian artery: 72/0 cm/s, triphasic       Brachial artery, antecubital fossa: 37/0 cm/s, triphasic, 4.1  mm       Radial artery, wrist: 49/0 cm/s, triphasic, 2.2 mm       Ulnar artery, wrist: 62/0 cm/s, triphasic                                                                      IMPRESSION:  1. Right cephalic vein measures less than 2 mm in diameter from the  wrist through proximal forearm and in the mid arm.     2. Left cephalic vein measures less than 2 mm in diameter through its  course.     3. Otherwise patent arteries and veins with measurements as in the  report.    ASSESSMENT & PLAN:  Reina Quan is a 59 year old female with PMH of HTN, HLD, FSGS 2/2 LDKT s/p renal tx 6/2/1992, DEBRA, MDD, SCC of skin (RIGHT thigh), ulcerative colitis who presents for AV fistula versus graft creation to OCTAVIO with Dr Walton.  Plan to proceed with procedure as scheduled.  Consent obtained and documented in chart.    -A.m. labs, history, imaging reviewed and discussed with patient  - preop w/up complete, site marked, orders placed  -Plan for discharge  "from PACU        - - - - - - - - - - - - - - - - - -  Evelia Riggs MD  10/15/2024    See [facility] for on-call pager information: [facility] Paging/Directory   \"SURGERY TRANSPLANT ABDOMINAL KIDNEY/PANCREAS/LIVER/LIVING DONOR/[facility]/ \"            "

## 2024-10-15 NOTE — OP NOTE
Transplant Surgery  Operative Note  PREOP DIAGNOSIS: Chronic Renal Failure   POSTOP DIAGNOSIS: Same  OPERATION: Arteriovenous Fistula creation, left brachial artery to basilic vein. Intraoperative ultrasound  FACULTY: Jesus Alberto Walton MD  FELLOW: Tuan Henson MD- Fellow. Dr. Henson was the primary assistant for the procedure and was necessary due to lack of suitable level surgical . He participated in the anastomosis, debranching, and closure.   ASSISTANT: Evelia Riggs MD- resident. Dr. Riggs was a secondary assistant for the operation and participated in exposure, closure, and anastomosis under my supervision..    ANESTHESIA: General and Scalene Block  EBL: 10 ml.  SPECIMEN: none  FLUIDS: crystalloid   DRAIN: None    INDICATION: The patient was referred by Latricia Henson PA-C for permanent dialysis access creation due to renal failure. The indications, benefits, and risks of permanent vascular access creation were discussed, questions answered and informed consent was provided.   FINDINGS:   Artery quality was: Normal, with diameter of 5mm.  Anastomosis diameter: 6 mm.  Vein quality was: Normal with diameter of 5mm. She had a patent median antecubital branch leading down to a network of two larger basilic drainage veins, that themselves had a bridging vein branch. The anastomosis was performed using the antecubital vein, and the bridge between systems was divided to increase flow through the more superficial basilic vein system. Flow was ~150mL/min using the VeriQ probe. There was a strongly palpable thrill and radial pulse at case completion  COMPLICATIONS: None.    PROCEDURE: The patient was positioned supine, and the left upper extremity was positioned, prepped and draped in the usual sterile fashion. An ultrasound was performed to assess the size, quality, and position of the artery and vein. The patient received preoperative IV antibiotics. An incision was made and extended  through the subcutaneous tissues above the and antecubital crease. The brachial artery and basilic vein were circumferentially dissected. The patient was heparinized. Arterial clamps were placed and arteriotomy was made.  The distal aspect of the vein was then transected, the proximal vein dilated with heparinized saline and secured with a Heifitz clip. The vein was tailored and anastomosed with 7-0 Prolene. The clamps were removed sequentially. Hemostasis was obtained.  Fistula flow was good. The distal radial artery pulse was excellent and capillary refill excellent. The wound was closed in layers with absorbable suture and dressed with Dermabond. Counts were correct. Faculty was present for the key portions of the procedure. The patient was then awakened and transferred to PACU in good condition.

## 2024-10-15 NOTE — ANESTHESIA POSTPROCEDURE EVALUATION
Patient: Reina Quan    Procedure: Procedure(s):  Left Upper Extremity Brachiobasilic ARTERIOVENOUS FISTULA Creation       Anesthesia Type:  General    Note:  Disposition: Outpatient   Postop Pain Control: Uneventful            Sign Out: Well controlled pain   PONV: No   Neuro/Psych: Uneventful            Sign Out: Acceptable/Baseline neuro status   Airway/Respiratory: Uneventful            Sign Out: Acceptable/Baseline resp. status   CV/Hemodynamics: Uneventful            Sign Out: Acceptable CV status; No obvious hypovolemia; No obvious fluid overload   Other NRE: NONE   DID A NON-ROUTINE EVENT OCCUR? No           Last vitals:  Vitals Value Taken Time   /59 10/15/24 1115   Temp 37.1  C (98.8  F) 10/15/24 1048   Pulse 91 10/15/24 1116   Resp 13 10/15/24 1116   SpO2 95 % 10/15/24 1116   Vitals shown include unfiled device data.    Electronically Signed By: Dexter Bowie MD  October 15, 2024  11:17 AM

## 2024-10-16 LAB
DONOR IDENTIFICATION: NORMAL
DSA COMMENTS: NORMAL
DSA PRESENT: NORMAL
DSA TEST METHOD: NORMAL
ORGAN: NORMAL
SA 1  COMMENTS: NORMAL
SA 1 CELL: NORMAL
SA 1 TEST METHOD: NORMAL
SA 2 CELL: NORMAL
SA 2 COMMENTS: NORMAL
SA 2 TEST METHOD: NORMAL
SA1 HI RISK ABY: NORMAL
SA1 MOD RISK ABY: NORMAL
SA2 HI RISK ABY: NORMAL
SA2 MOD RISK ABY: NORMAL
UNACCEPTABLE ANTIGENS: NORMAL
UNOS CPRA: 7

## 2024-10-17 ENCOUNTER — TELEPHONE (OUTPATIENT)
Dept: TRANSPLANT | Facility: CLINIC | Age: 59
End: 2024-10-17
Payer: COMMERCIAL

## 2024-10-17 DIAGNOSIS — Z94.0 KIDNEY REPLACED BY TRANSPLANT: ICD-10-CM

## 2024-10-17 DIAGNOSIS — N18.6 ESRD (END STAGE RENAL DISEASE) (H): Primary | ICD-10-CM

## 2024-10-17 DIAGNOSIS — Z76.82 ORGAN TRANSPLANT CANDIDATE: ICD-10-CM

## 2024-10-17 DIAGNOSIS — I10 HYPERTENSION: ICD-10-CM

## 2024-10-17 DIAGNOSIS — Z01.810 PRE-OPERATIVE CARDIOVASCULAR EXAMINATION: ICD-10-CM

## 2024-10-17 RX ORDER — KETOCONAZOLE 20 MG/ML
SHAMPOO, SUSPENSION TOPICAL
Qty: 120 ML | Refills: 0 | Status: SHIPPED | OUTPATIENT
Start: 2024-10-17 | End: 2024-10-24

## 2024-10-17 NOTE — TELEPHONE ENCOUNTER
ketoconazole (NIZORAL) 2 % external shampoo   120 mL 9 10/12/2023       Last Office Visit : 12-  Future Office visit:  12-      No warfarin noted on med list  Process 1

## 2024-10-17 NOTE — TELEPHONE ENCOUNTER
Called Reina to discuss the outcome of the Selection Committee from 10/16/2024. She is approved as a kidney transplant candidate but was not allowed to list her as inactive to start her wait time. There was a concern about compliance in her visit with the surgeon. Committee wanted this addressed and represented before listing her. She said she had been feeling so fatigued she would come home from work at 3-4 pm and take her hearing aids out so she could nap. She would fall asleep and not hear the medication alarm on her phone and sleep through the night until the next morning. This happened for about a week. She realized she was missing her medications. This was not intentional. She has since started dialysis and is feeling somewhat better and is back on track with her medications. Her PRA is 7. She may continue her evaluation as follows: cardiology for risk assessment, urology for hematuria, iliacs,mammogram and dental. The dialysis unit will give her the Hepatitis B vaccine series once they have the vaccine back in stock. She has a colonoscopy scheduled here for 1/8/2025 and dermatology is scheduled for 12/10/2024. Will discuss compliance with committee on 10/23/2024. Transplant summary letter sent Orders routed to scheduling. Dialysis added to the snapshot.

## 2024-10-21 ENCOUNTER — MYC MEDICAL ADVICE (OUTPATIENT)
Dept: FAMILY MEDICINE | Facility: CLINIC | Age: 59
End: 2024-10-21
Payer: COMMERCIAL

## 2024-10-21 NOTE — TELEPHONE ENCOUNTER
She has been working with nephrology for [facility] paperwork and [facility] 10[facility]/24 that indicates nephrology (Latricia Henson PA-C) was going to complete paperwork. Can we call over to nephrology to see if they have completed paperwork for her? It seems urgent since she said her insurance has been suspended since she didn't have the paperwork submitted.    Thanks!  Lizbeth Andrews PA-C

## 2024-10-21 NOTE — TELEPHONE ENCOUNTER
Forms/Letter Request    Type of form/letter: FMLA - Unknown      Is Release of Information needed?: Yes  Was an EMILY obtained?  No    Do we have the form/letter: Yes: FMLA    Who is the form from? Patient    Where did/will the form come from? form was sent via [employer]    When is form/letter needed by: ASAP    How would you like the form/letter returned: [employer]    Patient Notified form requests are processed in 5-7 business days:Yes    Could we send this information to you in [employer] or would you prefer to receive a phone call?:   Patient would like to be contacted via [employer]    **Printed form and placed in provider's basket for review and signature**    Genny Grififn  Lead   [facility]

## 2024-10-21 NOTE — TELEPHONE ENCOUNTER
Called Nephrology at [phone] and spoke to Don.  He was going to transfer me to another line in nephrology.  Had to call back.  Spoke to Carrie.  She transferred me to internal line [phone] - This number is for employees only.  Left a message to all us back.    Also sent a teams message to Daniele Ovalles - see mychart of 10/8/24.

## 2024-10-21 NOTE — TELEPHONE ENCOUNTER
GAYATRI Nieves -     See elizabethhart from 10/21/24 to nephrology.  Daniele reached back out to me and advised the same.  I do have it signed by Latricia:) I just need to go through it and make sure the rest is filled out. I'm actually not in clinic till Wednesday. Is that too late?    The pt has not responded back to Daniele yet.      Also asked Daniele if someone else could help the pt if she needs it before Wednesday.  He said he could ask one of the other nurses that will be in clinic if needed before Wednesday.

## 2024-10-23 ENCOUNTER — PATIENT OUTREACH (OUTPATIENT)
Dept: NEPHROLOGY | Facility: CLINIC | Age: 59
End: 2024-10-23
Payer: COMMERCIAL

## 2024-10-23 NOTE — PROGRESS NOTES
10/23- Called Kathy to let her know I have her FMLA paperwork ready for . Resolved the journey and removed self from care team.  Daniele FRIEND RN  Nephrology care coordinator  [place]

## 2024-10-24 ENCOUNTER — OFFICE VISIT (OUTPATIENT)
Dept: DERMATOLOGY | Facility: CLINIC | Age: 59
End: 2024-10-24
Attending: DERMATOLOGY
Payer: COMMERCIAL

## 2024-10-24 DIAGNOSIS — L57.0 ACTINIC KERATOSIS: ICD-10-CM

## 2024-10-24 DIAGNOSIS — D22.9 MULTIPLE BENIGN NEVI: ICD-10-CM

## 2024-10-24 DIAGNOSIS — L81.4 LENTIGINES: ICD-10-CM

## 2024-10-24 DIAGNOSIS — Z85.828 HISTORY OF SCC (SQUAMOUS CELL CARCINOMA) OF SKIN: ICD-10-CM

## 2024-10-24 DIAGNOSIS — L82.1 SEBORRHEIC KERATOSES: ICD-10-CM

## 2024-10-24 DIAGNOSIS — D48.5 NEOPLASM OF UNCERTAIN BEHAVIOR OF SKIN: Primary | ICD-10-CM

## 2024-10-24 DIAGNOSIS — L66.10 LICHEN PLANOPILARIS: ICD-10-CM

## 2024-10-24 PROCEDURE — 17003 DESTRUCT PREMALG LES 2-14: CPT | Mod: GC | Performed by: DERMATOLOGY

## 2024-10-24 PROCEDURE — 99214 OFFICE O/P EST MOD 30 MIN: CPT | Mod: 25 | Performed by: DERMATOLOGY

## 2024-10-24 PROCEDURE — 88305 TISSUE EXAM BY PATHOLOGIST: CPT | Mod: TC | Performed by: DERMATOLOGY

## 2024-10-24 PROCEDURE — 11102 TANGNTL BX SKIN SINGLE LES: CPT | Mod: GC | Performed by: DERMATOLOGY

## 2024-10-24 PROCEDURE — 17000 DESTRUCT PREMALG LESION: CPT | Mod: GC | Performed by: DERMATOLOGY

## 2024-10-24 RX ORDER — CLOBETASOL PROPIONATE 0.05 G/100ML
SHAMPOO TOPICAL
Qty: 118 ML | Refills: 1 | Status: SHIPPED | OUTPATIENT
Start: 2024-10-24

## 2024-10-24 RX ORDER — NIACINAMIDE 500 MG
500 TABLET ORAL 2 TIMES DAILY WITH MEALS
Qty: 60 TABLET | Refills: 3 | Status: SHIPPED | OUTPATIENT
Start: 2024-10-24

## 2024-10-24 RX ORDER — KETOCONAZOLE 20 MG/ML
SHAMPOO, SUSPENSION TOPICAL
Qty: 120 ML | Refills: 0 | Status: SHIPPED | OUTPATIENT
Start: 2024-10-24

## 2024-10-24 ASSESSMENT — PAIN SCALES - GENERAL: PAINLEVEL_OUTOF10: NO PAIN (0)

## 2024-10-24 NOTE — PROGRESS NOTES
[facility] Dermatology Note  Encounter Date: Oct 24, 2024  Office Visit     Dermatology Problem List:  FBSE 10/24/24  0. Neoplasm of uncertain behavior  - L dorsal hand, r/o AK v SCC, bx 10/24/24  1. History of kidney transplant 1992, on chronic immunosuppression  2. History of multiple SCC's - on niacinamide  - SCC, R lateral thigh, s/p MMS 12/18/23  - SCC, R leg proximal, s/p MMS 12/18/23  - SCC R dorsal hand, s/p MMS 10/22/19  - SCC superior R yousif, s/p MMS 6/6/19  3. Hx of multiple AKs  4. Hx benign lesion biopsies  5. Vitiligo  6. LPP  - Continue clobetasol ext solution daily PRN  - Continue ketoconazole shampoo  - Continue clobetasol 0.05% shampoo BIW (patient does not want to use more frequently)   - S/p ILK 10 4/15/21, 11/16/21  7. Benign bx  - R leg distal, ruptured folliculitis, bx 10/2023.   8. Prior lesions to monitor:    - R arm hypopigmented plaque s/p LN 11/16/21 (ddx: AK vs NMSC vs other)  - L upper chest excoriation vs other (noted 11/16/21)    ____________________________________________    Assessment & Plan:   # NUB  - L dorsal hand, r/o SCC. Shave today, procedure note below.    # Actinic keratosis, R proximal medial thumb  - Cryo today, procedure note below.    # R calf pink slightly scaly papule with dot vessels and peripheral faint brown pigment  - Differential includes benign lichenoid keratosis, irriated seb keratosis, actinic keratosis.  - Treated with cryo today, procedure note below.     # LPP  - Chronic, well-controlled, no changes to tx today.  - Cont ketoconazole shampoo, clobetasol shampoo. Renewed today.    # Hx numerous SCC s/p kidney transplant  - Cont niacinamide for chemoprevention, renewed today.     # Benign skin lesions: seborrheic keratoses, multiple benign nevi, lentigines  - Reassured benign etiology.  - No treatment required today.   - Recommended diligent sun protection with SPF 30 or higher. Handout provided.  - Discussed signs and symptoms of skin  cancers and ABCDEs of melanoma.    Procedures Performed:   - Shave biopsy procedure note, location(s): L dorsal hand. After discussion of benefits and risks including but not limited to bleeding, infection, scar, incomplete removal, recurrence, and non-diagnostic biopsy, written consent and photographs were obtained. The area was cleaned with isopropyl alcohol. 0.5mL of 1% lidocaine with epinephrine was injected to obtain adequate anesthesia of lesion(s). Shave biopsy at site(s) performed. Hemostasis was achieved with aluminium chloride. Petrolatum ointment and a sterile dressing were applied. The patient tolerated the procedure and no complications were noted. The patient was provided with verbal and written post care instructions.     - Cryotherapy procedure note, location(s): R proximal medial thumb and R calf. After verbal consent and discussion of risks and benefits including, but not limited to, dyspigmentation/scar, blister, and pain, 2 lesion(s) was(were) treated with 1-2 mm freeze border for 1-2 cycles with liquid nitrogen. Post cryotherapy instructions were provided.    Follow-up: 6mo for skin check    Staff and Resident:     Resident:  I saw and discussed the patient with the attending physician, Dr. Mitul Ramos MD, PhD  PGY-4 Dermatology Resident     I have seen and examined this patient and agree with the assessment and plan as documented in the resident's note, and was present for all procedures.    Brad Bauman MD  Dermatology Attending    ____________________________________________    CC: Skin Check (Patient is here today for a skin exam. )      HPI:  Ms. Reina Quan is a(n) 59 year old female who presents today as a return patient for skin check, hx kidney transplant and numerous SCCs.    - Unfortunately transplanted kidney is failing, back on dialysis and transplant list after 32yrs.  - Has a few scaly spots on the hand. Nothing particularly tender. Nothing bleeding.      Patient is otherwise feeling well, without additional skin concerns.    Labs Reviewed:  N/A    Physical Exam:  Vitals: LMP 05/14/2014 (Approximate)   SKIN: Total skin excluding the undergarment areas was performed. The exam included the head/face, neck, both arms, chest, back, abdomen, both legs, digits and/or nails.   - L dorsal hand with 3mm pink indurated tender scaly papule.   - R medial proximal thumb with 4mm slightly indurated scaly, somewhat ill-defined papule  - Waxy stuck on tan to brown papules on the trunk and extremities.   - Diffusely there are uniformly pigmented light brown macules, accentuated on sun exposed areas.  - Light to dark brown symmetric macules and papules with normal pigment networks on dermoscopy on trunk and extremiteis  - No other lesions of concern on areas examined.       Medications:  Current Outpatient Medications   Medication Sig Dispense Refill    atorvastatin (LIPITOR) 40 MG tablet Take 1 tablet (40 mg) by mouth at bedtime 90 tablet 3    bisacodyl (DULCOLAX) 5 MG EC tablet Take 2 tablets at 3 pm the day before your procedure. If your procedure is before 11 am, take 2 additional tablets at 11 pm. If your procedure is after 11 am, take 2 additional tablets at 6 am. For additional instructions refer to your colonoscopy prep instructions. 4 tablet 0    cinacalcet (SENSIPAR) 30 MG tablet Take 1 tablet (30 mg) by mouth every other day 45 tablet 3    clobetasol (TEMOVATE) 0.05 % external ointment Apply topically 2 times daily Use sparingly at active areas of dermatitis for no more than 14 days or until resolved, whichever is sooner. 60 g 3    clobetasol (TEMOVATE) 0.05 % external solution Apply topically daily as needed (itchyy scalp) Apply to scalp daily as needed. 50 mL 4    clobetasol propionate (CLOBEX) 0.05 % external shampoo APPLY TO SCALP IN SHOWER TWICE WEEKLY 118 mL 1    ketoconazole (NIZORAL) 2 % external shampoo APPLY TOPICALLY DAILY AS NEEDED FOR ITCHING, IRRITATION OR  OTHER( SCALP SCALE) USE 2-3 TIMES A WEEK. APPLY AND LATHER THEN RINSE OUT 5 MINUTES 120 mL 0    mycophenolate (GENERIC EQUIVALENT) 250 MG capsule Take 3 capsules (750 mg) by mouth 2 times daily 540 capsule 3    niacinamide 500 MG tablet Take 1 tablet (500 mg) by mouth 2 times daily (with meals) 60 tablet 3    PARoxetine (PAXIL) 20 MG tablet TAKE 1 TABLET BY MOUTH EVERY MORNING 90 tablet 2    polyethylene glycol (GOLYTELY) 236 g suspension The night before the exam at 6 pm drink an 8-ounce glass every 15 minutes until the jug is half empty. If you arrive before 11 AM: Drink the other half of the Golytely jug at 11 PM night before procedure. If you arrive after 11 AM: Drink the other half of the Golytely jug at 6 AM day of procedure. For additional instructions refer to your colonoscopy prep instructions. 4000 mL 0    polyethylene glycol (MIRALAX) 17 GM/Dose powder Take 17 g (1 Capful) by mouth daily. 510 g 3    predniSONE (DELTASONE) 5 MG tablet Take 1 tablet (5 mg) by mouth daily (Patient taking differently: Take 5 mg by mouth every morning.) 90 tablet 3    sodium bicarbonate 650 MG tablet Take 2 tablets (1,300 mg) by mouth 2 times daily 360 tablet 3    triamcinolone (KENALOG) 0.1 % external cream APPLY SPARINGLY TOPICALLY TO THE AFFECTED AREA TWICE DAILY FOR 14 DAYS 30 g 0     Current Facility-Administered Medications   Medication Dose Route Frequency Provider Last Rate Last Admin    triamcinolone acetonide (KENALOG-10) injection 10 mg  10 mg Intra-Lesional Once Brad Bauman MD            Past Medical History:   Patient Active Problem List   Diagnosis    Ulcerative colitis (H)    Migraine    Allergic rhinitis    Hearing loss    Kidney replaced by transplant    Lichen planus    Giant Platelet syndrome    Nephrotic syndrome in diseases classified elsewhere    Major depression in partial remission (H)    Actinic keratosis    Stage 3b chronic kidney disease (H)    Vitiligo    History of SCC (squamous cell  carcinoma) of skin    Lichen planopilaris    Hyperlipidemia with target LDL less than 130    AK (actinic keratosis)    History of nonmelanoma skin cancer    HTN, kidney transplant related    Immunosuppressed status (H)    Neoplasm of unspecified behavior of bone, soft tissue, and skin    Porokeratosis    Aftercare following organ transplant    Dermatitis    Squamous cell carcinoma of right thigh    Hypercalcemia    Elevated serum creatinine    Complications, kidney transplant    Kidney transplant rejection    Organ transplant candidate    FSGS (focal segmental glomerulosclerosis)    Chronic kidney disease, stage V (H)     Past Medical History:   Diagnosis Date    Actinic keratosis     Allergic rhinitis, cause unspecified     Anemia     anxiety/depression     PAXIL    Arthritis     congenital hearing loss     uses hearing aids    Depressive disorder     Dry eyes     Giant Platelet syndrome     Glomerular Focal Sclerosis--transplant 1985     Hypertension     Kidney replaced by transplant 1992    RAPAMUNE/ SIROLIMUS     Lichen planopilaris     LPP followed by derm on doxycycline/ clobetasol    Lichen planus     Menorrhagia     migraine     Squamous cell carcinoma     8 x     Thrombocytopenia (H)     Ulcerative colitis, unspecified     biposy neg 1996    Unsatisfactory cervical Papanicolaou smear 02/15/2021    UTI (lower urinary tract infection)     recurrent Dr [facility]        Brad Bauman MD  [address] [place] [phone] on close of this encounter.

## 2024-10-24 NOTE — LETTER
10/24/2024       RE: Reina Quan  [address] [facility] [facility] [id number]     Dear Colleague,    Thank you for referring your patient, Reina Quan, to the [facility] [place] at [facility]. Please see a copy of my visit note below.    [facility] Dermatology Note  Encounter Date: Oct 24, 2024  Office Visit     Dermatology Problem List:  FBSE 10/24/24  0. Neoplasm of uncertain behavior  - L dorsal hand, r/o AK v SCC, bx 10/24/24  1. History of kidney transplant 1992, on chronic immunosuppression  2. History of multiple SCC's - on niacinamide  - SCC, R lateral thigh, s/p MMS 12/18/23  - SCC, R leg proximal, s/p MMS 12/18/23  - SCC R dorsal hand, s/p MMS 10/22/19  - SCC superior R yousif, s/p MMS 6/6/19  3. Hx of multiple AKs  4. Hx benign lesion biopsies  5. Vitiligo  6. LPP  - Continue clobetasol ext solution daily PRN  - Continue ketoconazole shampoo  - Continue clobetasol 0.05% shampoo BIW (patient does not want to use more frequently)   - S/p ILK 10 4/15/21, 11/16/21  7. Benign bx  - R leg distal, ruptured folliculitis, bx 10/2023.   8. Prior lesions to monitor:    - R arm hypopigmented plaque s/p LN 11/16/21 (ddx: AK vs NMSC vs other)  - L upper chest excoriation vs other (noted 11/16/21)    ____________________________________________    Assessment & Plan:   # NUB  - L dorsal hand, r/o SCC. Shave today, procedure note below.    # Actinic keratosis, R proximal medial thumb  - Cryo today, procedure note below.    # R calf pink slightly scaly papule with dot vessels and peripheral faint brown pigment  - Differential includes benign lichenoid keratosis, irriated seb keratosis, actinic keratosis.  - Treated with cryo today, procedure note below.     # LPP  - Chronic, well-controlled, no changes to tx today.  - Cont ketoconazole shampoo, clobetasol shampoo. Renewed today.    # Hx numerous SCC s/p kidney  transplant  - Cont niacinamide for chemoprevention, renewed today.     # Benign skin lesions: seborrheic keratoses, multiple benign nevi, lentigines  - Reassured benign etiology.  - No treatment required today.   - Recommended diligent sun protection with SPF 30 or higher. Handout provided.  - Discussed signs and symptoms of skin cancers and ABCDEs of melanoma.    Procedures Performed:   - Shave biopsy procedure note, location(s): L dorsal hand. After discussion of benefits and risks including but not limited to bleeding, infection, scar, incomplete removal, recurrence, and non-diagnostic biopsy, written consent and photographs were obtained. The area was cleaned with isopropyl alcohol. 0.5mL of 1% lidocaine with epinephrine was injected to obtain adequate anesthesia of lesion(s). Shave biopsy at site(s) performed. Hemostasis was achieved with aluminium chloride. Petrolatum ointment and a sterile dressing were applied. The patient tolerated the procedure and no complications were noted. The patient was provided with verbal and written post care instructions.     - Cryotherapy procedure note, location(s): R proximal medial thumb and R calf. After verbal consent and discussion of risks and benefits including, but not limited to, dyspigmentation/scar, blister, and pain, 2 lesion(s) was(were) treated with 1-2 mm freeze border for 1-2 cycles with liquid nitrogen. Post cryotherapy instructions were provided.    Follow-up: 6mo for skin check    Staff and Resident:     Resident:  I saw and discussed the patient with the attending physician, Dr. Mitul Ramos MD, PhD  PGY-4 Dermatology Resident     I have seen and examined this patient and agree with the assessment and plan as documented in the resident's note, and was present for all procedures.    Brad Bauman MD  Dermatology Attending    ____________________________________________    CC: Skin Check (Patient is here today for a skin exam.  )      HPI:  Ms. Reina Quan is a(n) 59 year old female who presents today as a return patient for skin check, hx kidney transplant and numerous SCCs.    - Unfortunately transplanted kidney is failing, back on dialysis and transplant list after 32yrs.  - Has a few scaly spots on the hand. Nothing particularly tender. Nothing bleeding.     Patient is otherwise feeling well, without additional skin concerns.    Labs Reviewed:  N/A    Physical Exam:  Vitals: LMP 05/14/2014 (Approximate)   SKIN: Total skin excluding the undergarment areas was performed. The exam included the head/face, neck, both arms, chest, back, abdomen, both legs, digits and/or nails.   - L dorsal hand with 3mm pink indurated tender scaly papule.   - R medial proximal thumb with 4mm slightly indurated scaly, somewhat ill-defined papule  - Waxy stuck on tan to brown papules on the trunk and extremities.   - Diffusely there are uniformly pigmented light brown macules, accentuated on sun exposed areas.  - Light to dark brown symmetric macules and papules with normal pigment networks on dermoscopy on trunk and extremiteis  - No other lesions of concern on areas examined.       Medications:  Current Outpatient Medications   Medication Sig Dispense Refill     atorvastatin (LIPITOR) 40 MG tablet Take 1 tablet (40 mg) by mouth at bedtime 90 tablet 3     bisacodyl (DULCOLAX) 5 MG EC tablet Take 2 tablets at 3 pm the day before your procedure. If your procedure is before 11 am, take 2 additional tablets at 11 pm. If your procedure is after 11 am, take 2 additional tablets at 6 am. For additional instructions refer to your colonoscopy prep instructions. 4 tablet 0     cinacalcet (SENSIPAR) 30 MG tablet Take 1 tablet (30 mg) by mouth every other day 45 tablet 3     clobetasol (TEMOVATE) 0.05 % external ointment Apply topically 2 times daily Use sparingly at active areas of dermatitis for no more than 14 days or until resolved, whichever is sooner. 60 g  3     clobetasol (TEMOVATE) 0.05 % external solution Apply topically daily as needed (itchyy scalp) Apply to scalp daily as needed. 50 mL 4     clobetasol propionate (CLOBEX) 0.05 % external shampoo APPLY TO SCALP IN SHOWER TWICE WEEKLY 118 mL 1     ketoconazole (NIZORAL) 2 % external shampoo APPLY TOPICALLY DAILY AS NEEDED FOR ITCHING, IRRITATION OR OTHER( SCALP SCALE) USE 2-3 TIMES A WEEK. APPLY AND LATHER THEN RINSE OUT 5 MINUTES 120 mL 0     mycophenolate (GENERIC EQUIVALENT) 250 MG capsule Take 3 capsules (750 mg) by mouth 2 times daily 540 capsule 3     niacinamide 500 MG tablet Take 1 tablet (500 mg) by mouth 2 times daily (with meals) 60 tablet 3     PARoxetine (PAXIL) 20 MG tablet TAKE 1 TABLET BY MOUTH EVERY MORNING 90 tablet 2     polyethylene glycol (GOLYTELY) 236 g suspension The night before the exam at 6 pm drink an 8-ounce glass every 15 minutes until the jug is half empty. If you arrive before 11 AM: Drink the other half of the Golytely jug at 11 PM night before procedure. If you arrive after 11 AM: Drink the other half of the Golytely jug at 6 AM day of procedure. For additional instructions refer to your colonoscopy prep instructions. 4000 mL 0     polyethylene glycol (MIRALAX) 17 GM/Dose powder Take 17 g (1 Capful) by mouth daily. 510 g 3     predniSONE (DELTASONE) 5 MG tablet Take 1 tablet (5 mg) by mouth daily (Patient taking differently: Take 5 mg by mouth every morning.) 90 tablet 3     sodium bicarbonate 650 MG tablet Take 2 tablets (1,300 mg) by mouth 2 times daily 360 tablet 3     triamcinolone (KENALOG) 0.1 % external cream APPLY SPARINGLY TOPICALLY TO THE AFFECTED AREA TWICE DAILY FOR 14 DAYS 30 g 0     Current Facility-Administered Medications   Medication Dose Route Frequency Provider Last Rate Last Admin     triamcinolone acetonide (KENALOG-10) injection 10 mg  10 mg Intra-Lesional Once Brad Bauman MD            Past Medical History:   Patient Active Problem List   Diagnosis      Ulcerative colitis (H)     Migraine     Allergic rhinitis     Hearing loss     Kidney replaced by transplant     Lichen planus     Giant Platelet syndrome     Nephrotic syndrome in diseases classified elsewhere     Major depression in partial remission (H)     Actinic keratosis     Stage 3b chronic kidney disease (H)     Vitiligo     History of SCC (squamous cell carcinoma) of skin     Lichen planopilaris     Hyperlipidemia with target LDL less than 130     AK (actinic keratosis)     History of nonmelanoma skin cancer     HTN, kidney transplant related     Immunosuppressed status (H)     Neoplasm of unspecified behavior of bone, soft tissue, and skin     Porokeratosis     Aftercare following organ transplant     Dermatitis     Squamous cell carcinoma of right thigh     Hypercalcemia     Elevated serum creatinine     Complications, kidney transplant     Kidney transplant rejection     Organ transplant candidate     FSGS (focal segmental glomerulosclerosis)     Chronic kidney disease, stage V (H)     Past Medical History:   Diagnosis Date     Actinic keratosis      Allergic rhinitis, cause unspecified      Anemia      anxiety/depression     PAXIL     Arthritis      congenital hearing loss     uses hearing aids     Depressive disorder      Dry eyes      Giant Platelet syndrome      Glomerular Focal Sclerosis--transplant 1985      Hypertension      Kidney replaced by transplant 1992    RAPAMUNE/ SIROLIMUS      Lichen planopilaris     LPP followed by derm on doxycycline/ clobetasol     Lichen planus      Menorrhagia      migraine      Squamous cell carcinoma     8 x      Thrombocytopenia (H)      Ulcerative colitis, unspecified     biposy neg 1996     Unsatisfactory cervical Papanicolaou smear 02/15/2021     UTI (lower urinary tract infection)     recurrent Dr [facility]       CC Brad Bauman MD  [address] [place] [phone] on close of this encounter.        Again, thank you for allowing me  to participate in the care of your patient.      Sincerely,    Brad Bauman MD

## 2024-10-24 NOTE — NURSING NOTE
Chief Complaint   Patient presents with    Skin Check     Patient is here today for a skin exam.      Katlyn GARZA CMA

## 2024-10-24 NOTE — NURSING NOTE
Lidocaine-epinephrine 1-1:094474 % injection   1 mL once for one use, starting 10/24/2024 ending 10/24/2024,  2mL disp, R-0, injection  Injected by Bernard Miguel EMT    Bernard Miguel, EMT  Clinic Support  [facility]      [phone]    Employed by [facility] [employer]

## 2024-10-24 NOTE — PROGRESS NOTES
Lidocaine-epinephrine 1-1:441688 % injection   1 mL once for one use, starting 10/24/2024 ending 10/24/2024,  2mL disp, R-0, injection  Injected by Bernard Miguel EMT    Bernard Miguel, EMT  Clinic Support  [facility]     [phone]    Employed by [facility] [employer]

## 2024-10-25 LAB
PATH REPORT.COMMENTS IMP SPEC: NORMAL
PATH REPORT.FINAL DX SPEC: NORMAL
PATH REPORT.GROSS SPEC: NORMAL
PATH REPORT.MICROSCOPIC SPEC OTHER STN: NORMAL
PATH REPORT.RELEVANT HX SPEC: NORMAL

## 2024-10-27 PROBLEM — L82.1 SEBORRHEIC KERATOSES: Status: ACTIVE | Noted: 2024-10-27

## 2024-10-28 ENCOUNTER — PATIENT OUTREACH (OUTPATIENT)
Dept: NEPHROLOGY | Facility: CLINIC | Age: 59
End: 2024-10-28

## 2024-10-28 ENCOUNTER — OFFICE VISIT (OUTPATIENT)
Dept: TRANSPLANT | Facility: CLINIC | Age: 59
End: 2024-10-28
Attending: SURGERY
Payer: COMMERCIAL

## 2024-10-28 VITALS
OXYGEN SATURATION: 98 % | WEIGHT: 129.7 LBS | DIASTOLIC BLOOD PRESSURE: 63 MMHG | BODY MASS INDEX: 23.72 KG/M2 | TEMPERATURE: 98.4 F | RESPIRATION RATE: 18 BRPM | SYSTOLIC BLOOD PRESSURE: 97 MMHG | HEART RATE: 96 BPM

## 2024-10-28 DIAGNOSIS — T82.9XXA COMPLICATION OF VASCULAR ACCESS FOR DIALYSIS, INITIAL ENCOUNTER: Primary | ICD-10-CM

## 2024-10-28 DIAGNOSIS — T86.11 KIDNEY TRANSPLANT REJECTION: ICD-10-CM

## 2024-10-28 PROCEDURE — 99024 POSTOP FOLLOW-UP VISIT: CPT | Performed by: SURGERY

## 2024-10-28 PROCEDURE — 99213 OFFICE O/P EST LOW 20 MIN: CPT | Performed by: SURGERY

## 2024-10-28 NOTE — PROGRESS NOTES
[facility]   Cardiology Service  History and Physical      Reina Quan MRN# [id number]   YOB: 1965 Age: 59 year old       HPI:   Reina Quan is a 59 year old year old female with a medical history significant for ESRD 2/2 focal segmental glomerulosclerosis (FSGS), s/p LDKT (1992) she is not on dialysis, UTI, CAD, HLD, and HTN. She presents today for cardiovascular risk assessment as part of pre-kidney transplant evaluation.     She is a never smoker/non-smoker. She occasionally uses alcohol; has not for 8 months though and does not drugs. Her activity level is sedentary. She is in dialysis 3 x weekly and is exhausted after. Prior to getting sick she was adamant about working out and going to the gym. Frustrated that she does not have to energy to do so. Tries to walk when she has the energy. She denies symptoms of chest pain, dizziness, lightheadedness, palpitations, shortness of breath, orthopnea, PND, or edema.  She does not have family history of pre-mature heart disease.     She had a recent visit to the emergency department on 8/29/24 for chest pain per chart review. Patient states it was due to shortness of breath and not chest pain. She has never had chest pain. EKG at that time was normal; no ischemic changes.     Cardiovascular Risk Factor Profile:  coronary artery disease , hypertension , female age >55 , and sedentary lifestyle     Current Cardiovascular Symptoms:  - None     ACC HF Stage:  Stage B    NYHA Class:  Class I    Functional Capacity:  Performs 4 METS exercise without symptoms (e.g., light housework, stairs, 4 mph walk, 7 mph bike, slow step dance)        Past Medical History:   Diagnosis Date    Actinic keratosis     Allergic rhinitis, cause unspecified     Anemia     anxiety/depression     PAXIL    Arthritis     congenital hearing loss     uses hearing aids    Depressive disorder     Dry eyes     Giant Platelet syndrome      Glomerular Focal Sclerosis--transplant 1985     Hypertension     Kidney replaced by transplant 1992    RAPAMUNE/ SIROLIMUS     Lichen planopilaris     LPP followed by derm on doxycycline/ clobetasol    Lichen planus     Menorrhagia     migraine     Squamous cell carcinoma     8 x     Thrombocytopenia (H)     Ulcerative colitis, unspecified     biposy neg 1996    Unsatisfactory cervical Papanicolaou smear 02/15/2021    UTI (lower urinary tract infection)     recurrent Dr Burns [facility]       Past Surgical History:   Procedure Laterality Date    BIOPSY OF SKIN LESION      CHOLECYSTECTOMY      CREATE FISTULA ARTERIOVENOUS UPPER EXTREMITY Left 10/15/2024    Procedure: Left Upper Extremity Brachiobasilic ARTERIOVENOUS FISTULA Creation;  Surgeon: Jesus Alberto Walton MD;  Location: [facility] OR    DILATION AND CURETTAGE, OPERATIVE HYSTEROSCOPY WITH MORCELLATOR, COMBINED N/A 11/10/2015    Procedure: COMBINED DILATION AND CURETTAGE, OPERATIVE HYSTEROSCOPY WITH MORCELLATOR;  Surgeon: Ghada Melendez MD;  Location: [facility] OR    ESOPHAGOSCOPY, GASTROSCOPY, DUODENOSCOPY (EGD), COMBINED  11/20/2012    Procedure: COMBINED ESOPHAGOSCOPY, GASTROSCOPY, DUODENOSCOPY (EGD), BIOPSY SINGLE OR MULTIPLE;;  Surgeon: Valentin Hahn MD;  Location: [facility] GI    IR CVC TUNNEL PLACEMENT > 5 YRS OF AGE  10/9/2024    IR RENAL BIOPSY RIGHT  07/25/2024    MOHS MICROGRAPHIC PROCEDURE      TRANSPLANT  1992    kidney    [facility] NONSPECIFIC PROCEDURE      Renal transplant right side    [facility] NONSPECIFIC PROCEDURE      cholecystectomy       Current Outpatient Medications   Medication Sig Dispense Refill    atorvastatin (LIPITOR) 40 MG tablet Take 1 tablet (40 mg) by mouth at bedtime 90 tablet 3    bisacodyl (DULCOLAX) 5 MG EC tablet Take 2 tablets at 3 pm the day before your procedure. If your procedure is before 11 am, take 2 additional tablets at 11 pm. If your procedure is after 11 am, take 2 additional tablets at 6 am. For additional instructions refer  to your colonoscopy prep instructions. 4 tablet 0    cinacalcet (SENSIPAR) 30 MG tablet Take 1 tablet (30 mg) by mouth every other day 45 tablet 3    clobetasol (TEMOVATE) 0.05 % external ointment Apply topically 2 times daily Use sparingly at active areas of dermatitis for no more than 14 days or until resolved, whichever is sooner. 60 g 3    clobetasol (TEMOVATE) 0.05 % external solution Apply topically daily as needed (itchyy scalp) Apply to scalp daily as needed. 50 mL 4    clobetasol propionate (CLOBEX) 0.05 % external shampoo APPLY TO SCALP IN SHOWER TWICE WEEKLY 118 mL 1    ketoconazole (NIZORAL) 2 % external shampoo APPLY TOPICALLY DAILY AS NEEDED FOR ITCHING, IRRITATION OR OTHER( SCALP SCALE) USE 2-3 TIMES A WEEK. APPLY AND LATHER THEN RINSE OUT 5 MINUTES 120 mL 0    mycophenolate (GENERIC EQUIVALENT) 250 MG capsule Take 3 capsules (750 mg) by mouth 2 times daily 540 capsule 3    niacinamide 500 MG tablet Take 1 tablet (500 mg) by mouth 2 times daily (with meals). 60 tablet 3    PARoxetine (PAXIL) 20 MG tablet TAKE 1 TABLET BY MOUTH EVERY MORNING 90 tablet 2    polyethylene glycol (GOLYTELY) 236 g suspension The night before the exam at 6 pm drink an 8-ounce glass every 15 minutes until the jug is half empty. If you arrive before 11 AM: Drink the other half of the Golytely jug at 11 PM night before procedure. If you arrive after 11 AM: Drink the other half of the Golytely jug at 6 AM day of procedure. For additional instructions refer to your colonoscopy prep instructions. 4000 mL 0    polyethylene glycol (MIRALAX) 17 GM/Dose powder Take 17 g (1 Capful) by mouth daily. 510 g 3    predniSONE (DELTASONE) 5 MG tablet Take 1 tablet (5 mg) by mouth daily (Patient taking differently: Take 5 mg by mouth every morning.) 90 tablet 3    sodium bicarbonate 650 MG tablet Take 2 tablets (1,300 mg) by mouth 2 times daily 360 tablet 3    triamcinolone (KENALOG) 0.1 % external cream APPLY SPARINGLY TOPICALLY TO THE  AFFECTED AREA TWICE DAILY FOR 14 DAYS 30 g 0     Current Facility-Administered Medications   Medication Dose Route Frequency Provider Last Rate Last Admin    triamcinolone acetonide (KENALOG-10) injection 10 mg  10 mg Intra-Lesional Once Brad Bauman MD           Family History   Problem Relation Age of Onset    Hypertension Mother     Depression Mother     Anxiety Disorder Mother     Diabetes Father     Kidney Disease Father         nephrolithiasis    Asthma Father     Asthma Sister     Blood Disease Maternal Grandmother     Diabetes Maternal Grandmother     Hypertension Maternal Grandmother     Hyperlipidemia Maternal Grandmother     Depression Maternal Grandmother     No Known Problems Maternal Grandfather     No Known Problems Paternal Grandmother     C.A.D. Paternal Grandfather     Cancer Other         no family hx of skin cancer    Glaucoma No family hx of     Macular Degeneration No family hx of     Melanoma No family hx of     Skin Cancer No family hx of     Anesthesia Reaction No family hx of     Thrombosis No family hx of        Social History     Tobacco Use    Smoking status: Never     Passive exposure: Never    Smokeless tobacco: Never   Substance Use Topics    Alcohol use: Not Currently       Allergies   Allergen Reactions    Ampicillin Swelling     Swelling of mouth and tongue.     Seasonal Allergies Other (See Comments)     Itchy eyes and rhinitis.         Review Of Systems:   CONSTITUTIONAL: No report of fevers or chills  RESPIRATORY: No cough, wheezing, SOB, or hemoptysis  CARDIOVASCULAR: see HPI  MUSCULO-SKELETAL: No joint pain or swelling, no muscle pain  NEURO: No paresthesias, syncope, pre-syncope, lightheadness, dizziness or vertigo  ENDOCRINE: No temperature intolerance, no skin/hair changes  PSYCHIATRIC: No change in mood, feeling down/anxious, no change in sleep or appetite  GI: No melena or hematochezia, no change in bowel habits  : No hematuria or dysuria, no hesitancy, dribbling  or incontinence. Still making urine  HEME: No easy bruising or bleeding, no history of anemia, no history of blood clots  SKIN: No rashes or sores, no unusual itching      Physical Examination:  Vitals: /73   Pulse 79   Wt 59.1 kg (130 lb 3.2 oz)   LMP 2014 (Approximate)   SpO2 98%   BMI 23.81 kg/m    BMI= Body mass index is 23.81 kg/m .    GENERAL APPEARANCE: healthy, alert and no distress  HEENT: no icterus, no xanthelasmas, normal pupil size and reaction, normal palate, mucosa moist  NECK: JVP not elevated , brisk carotid upstroke bilaterally  CHEST: lungs clear to auscultation without rales, rhonchi or wheezes, no use of accessory muscles, no retractions  CARDIOVASCULAR: regular rhythm, normal S1 and S2, no S3 or S4 and no murmur, click or rub.  ABDOMEN: soft, non tender, without hepatosplenomegaly, no masses palpable, bowel sounds normal  EXTREMITIES: warm, no LE edema, DP/PT pulses 2+ bilaterally, no clubbing or cyanosis   NEURO: alert and oriented to person/place/time, normal speech, gait and affect  SKIN: no ecchymoses, no rashes      Laboratory:  Last Comprehensive Metabolic Panel:  Lab Results   Component Value Date     10/15/2024    POTASSIUM 4.1 10/15/2024    CHLORIDE 100 10/15/2024    CO2 25 10/15/2024    ANIONGAP 15 10/15/2024     (H) 10/15/2024    BUN 28.1 (H) 10/15/2024    CR 3.55 (H) 10/15/2024    GFRESTIMATED 14 (L) 10/15/2024    HARSHA 9.9 10/15/2024       Last CBC:  CBC RESULTS:   Recent Labs   Lab Test 10/15/24  0637 10/07/24  1326   WBC  --  5.4   RBC  --  3.28*   HGB 9.3* 9.6*   HCT  --  31.2*   MCV  --  95   MCH  --  29.3   MCHC  --  30.8*   RDW  --  15.1*   PLT  --  163       24 EK/15/24 Echocardiogram:  Global and regional left ventricular function is normal with an EF of 60-65%.  Global right ventricular function is normal.  No significant valvular abnormalities present.  Estimated mean right atrial pressure is normal.  No pericardial effusion is  present.    10/4/24 Lexiscan:    The nuclear stress test is negative for inducible myocardial ischemia or infarction.    The left ventricular ejection fraction at rest is greater than 70%.    There is no prior study for comparison.      Assessment and Plan:     # Coronary Artery Disease   # Hyperlipidemia   Mild coronary calcification on chest CT from 8/2024. Most recent echocardiogram w/ EF 60-65%. Had complaints of CP on 10/8/24 and went to the ED. Lexiscan performed and negative for ischemia. Most recent lipid panel from 4/9/24 with total 233, HDL 81, and . She is on statin therapy. She denies chest pain, dizziness, lightheadedness, shortness of breath, orthopnea, edema, or PND.  Risk factors include age, HLD, known CAD.   - Start aspirin 81mg daily   - Increase Lipitor 80mg q HS  - Since she has had no recurrence of chest pain and her Lexiscan was normal no additional cardiac testing needed at this time prior to transplant   - Follow-up with cardiology in 18-24 months     # Hypertension  Blood pressure in office today 117/73. Blood pressures on home average 110's-120's/70-80's.   - Not currently on anti-hypertensive's  - Monitor BP's at home. Keep a log of readings and bring to follow up appointments  - BP goal < 130/80    # Pre-Diabetes  Most recent A1c per chart review 6.0% on 10/7/24. She is not checking blood sugars. Bother her grandma and her dad became diabetic in life; she reports they ate a lot of sugar and she does not.   - Educated on importance of diabetic control in preventing progression of heart and kidney disease  - Encourages healthy diet and lifestyle    # ESRD  on HD   She is currently on dialysis; started a month ago on 10/11. She admits to being compliant with all medications. Denies uremic symptoms; pruritus, nausea, vomiting.   - Additional labs / follow-up / testing per SOT team      CAD Risk Level:  Moderate Risk: 2 or 3 risk factors, no diabetic kidney disease or DM > 10 years, no  known CAD, OR on dialysis 5 or less years          RCRI Score:   - High risk surgery (>5% cardiac complication risk) = 1 point   - Serum Creatinine >2.0 mg/dl = 1 point        I have reviewed and updated the patient's Past Medical History, Social History, Family History and Medication List.       JOSE MANUEL Gallardo, CNP  [facility] Cardiology      Review of prior external note(s) from - care everywhere, SOT, nephrology  Review of the result(s) of each unique test - echocardiogram, EKG, CT  Ordering of each unique test  Prescription drug management- medication reconciliation     30 minutes spent by me on the date of the encounter doing chart review, review of outside records, review of test results, patient visit, and documentation       JOSE MANUEL Nina CNP on 11/6/2024 at 9:20 AM

## 2024-10-28 NOTE — LETTER
10/28/2024      Reina Quan  [address] [id number]      Dear Colleague,    Thank you for referring your patient, Reina Quan, to the [facility]. Please see a copy of my visit note below.    Dialysis Access Service  Consult Note    Referred by Latricia Henson for placement of permanent dialysis access.    HPI: Ms. Quan is being seen today for revision of permanent dialysis access due to Chronic renal failure from graft failure (chronic glomerulonephritis sp prior transplant with failing graft).  She is right handed. Ms. Quan is not dialyzing at (Not currently on dialysis).      Risk factors for vascular access:         Yes No  Hx of CVC    []    []   Comment:   Hx of PICC line         []     []  Comment:   Hx of Pacemaker    []     []  Comment:   History of failed access:  []         []  Comment:  SVC syndrome   []      []  Comment:  Heart Failure    []     []  EF:    Periph arterial disease  []     []  Comment:  Prior Fracture/Surgery  []     []  Location:   DVT    []    []   Location:  Diabetes    []        []  Comment:  Neuropathy   []     []  Comment:   Anticoagulation:   []    []  Agent:      Anticoagulation contraindication:[]  []     Details:                   Pediatric    []         []  Age:                  Hx of transplant   []    []  Comment:     Current immunosuppression []    []  Comment:            ROS: 10 point ROS neg other than the symptoms noted above in the HPI.        Past Medical History:   Diagnosis Date     Actinic keratosis      Allergic rhinitis, cause unspecified      Anemia      anxiety/depression     PAXIL     Arthritis      congenital hearing loss     uses hearing aids     Depressive disorder      Dry eyes      Giant Platelet syndrome      Glomerular Focal Sclerosis--transplant 1985      Hypertension      Kidney replaced by transplant 1992    RAPAMUNE/ SIROLIMUS      Lichen planopilaris     LPP followed by derm on  doxycycline/ clobetasol     Lichen planus      Menorrhagia      migraine      Squamous cell carcinoma     8 x      Thrombocytopenia (H)      Ulcerative colitis, unspecified     biposy neg 1996     Unsatisfactory cervical Papanicolaou smear 02/15/2021     UTI (lower urinary tract infection)     recurrent Dr [facility]       Past Surgical History:   Procedure Laterality Date     BIOPSY OF SKIN LESION       CHOLECYSTECTOMY       CREATE FISTULA ARTERIOVENOUS UPPER EXTREMITY Left 10/15/2024    Procedure: Left Upper Extremity Brachiobasilic ARTERIOVENOUS FISTULA Creation;  Surgeon: Jesus Alberto Walton MD;  Location: [facility] OR     DILATION AND CURETTAGE, OPERATIVE HYSTEROSCOPY WITH MORCELLATOR, COMBINED N/A 11/10/2015    Procedure: COMBINED DILATION AND CURETTAGE, OPERATIVE HYSTEROSCOPY WITH MORCELLATOR;  Surgeon: Ghada Melendez MD;  Location: [facility] OR     ESOPHAGOSCOPY, GASTROSCOPY, DUODENOSCOPY (EGD), COMBINED  11/20/2012    Procedure: COMBINED ESOPHAGOSCOPY, GASTROSCOPY, DUODENOSCOPY (EGD), BIOPSY SINGLE OR MULTIPLE;;  Surgeon: Valentin Hahn MD;  Location: [facility] GI     IR CVC TUNNEL PLACEMENT > 5 YRS OF AGE  10/9/2024     IR RENAL BIOPSY RIGHT  07/25/2024     MOHS MICROGRAPHIC PROCEDURE       TRANSPLANT  1992    kidney     [facility] NONSPECIFIC PROCEDURE      Renal transplant right side     [facility] NONSPECIFIC PROCEDURE      cholecystectomy       Family History   Problem Relation Age of Onset     Hypertension Mother      Depression Mother      Anxiety Disorder Mother      Diabetes Father      Kidney Disease Father         nephrolithiasis     Asthma Father      Asthma Sister      Blood Disease Maternal Grandmother      Diabetes Maternal Grandmother      Hypertension Maternal Grandmother      Hyperlipidemia Maternal Grandmother      Depression Maternal Grandmother      No Known Problems Maternal Grandfather      No Known Problems Paternal Grandmother      C.A.D. Paternal Grandfather      Cancer Other         no family  hx of skin cancer     Glaucoma No family hx of      Macular Degeneration No family hx of      Melanoma No family hx of      Skin Cancer No family hx of      Anesthesia Reaction No family hx of      Thrombosis No family hx of        Social History     Tobacco Use     Smoking status: Never     Passive exposure: Never     Smokeless tobacco: Never   Substance Use Topics     Alcohol use: Not Currently         Current Outpatient Medications:      atorvastatin (LIPITOR) 40 MG tablet, Take 1 tablet (40 mg) by mouth at bedtime, Disp: 90 tablet, Rfl: 3     clobetasol (TEMOVATE) 0.05 % external ointment, Apply topically 2 times daily Use sparingly at active areas of dermatitis for no more than 14 days or until resolved, whichever is sooner., Disp: 60 g, Rfl: 3     clobetasol (TEMOVATE) 0.05 % external solution, Apply topically daily as needed (itchyy scalp) Apply to scalp daily as needed., Disp: 50 mL, Rfl: 4     clobetasol propionate (CLOBEX) 0.05 % external shampoo, APPLY TO SCALP IN SHOWER TWICE WEEKLY, Disp: 118 mL, Rfl: 1     ketoconazole (NIZORAL) 2 % external shampoo, APPLY TOPICALLY DAILY AS NEEDED FOR ITCHING, IRRITATION OR OTHER( SCALP SCALE) USE 2-3 TIMES A WEEK. APPLY AND LATHER THEN RINSE OUT 5 MINUTES, Disp: 120 mL, Rfl: 0     mycophenolate (GENERIC EQUIVALENT) 250 MG capsule, Take 3 capsules (750 mg) by mouth 2 times daily, Disp: 540 capsule, Rfl: 3     niacinamide 500 MG tablet, Take 1 tablet (500 mg) by mouth 2 times daily (with meals)., Disp: 60 tablet, Rfl: 3     PARoxetine (PAXIL) 20 MG tablet, TAKE 1 TABLET BY MOUTH EVERY MORNING, Disp: 90 tablet, Rfl: 2     polyethylene glycol (MIRALAX) 17 GM/Dose powder, Take 17 g (1 Capful) by mouth daily., Disp: 510 g, Rfl: 3     predniSONE (DELTASONE) 5 MG tablet, Take 1 tablet (5 mg) by mouth daily, Disp: 90 tablet, Rfl: 3     sodium bicarbonate 650 MG tablet, Take 2 tablets (1,300 mg) by mouth 2 times daily, Disp: 360 tablet, Rfl: 3     triamcinolone (KENALOG) 0.1 %  external cream, APPLY SPARINGLY TOPICALLY TO THE AFFECTED AREA TWICE DAILY FOR 14 DAYS, Disp: 30 g, Rfl: 0     bisacodyl (DULCOLAX) 5 MG EC tablet, Take 2 tablets at 3 pm the day before your procedure. If your procedure is before 11 am, take 2 additional tablets at 11 pm. If your procedure is after 11 am, take 2 additional tablets at 6 am. For additional instructions refer to your colonoscopy prep instructions., Disp: 4 tablet, Rfl: 0     cinacalcet (SENSIPAR) 30 MG tablet, Take 1 tablet (30 mg) by mouth every other day (Patient not taking: Reported on 10/28/2024), Disp: 45 tablet, Rfl: 3     polyethylene glycol (GOLYTELY) 236 g suspension, The night before the exam at 6 pm drink an 8-ounce glass every 15 minutes until the jug is half empty. If you arrive before 11 AM: Drink the other half of the Golytely jug at 11 PM night before procedure. If you arrive after 11 AM: Drink the other half of the Golytely jug at 6 AM day of procedure. For additional instructions refer to your colonoscopy prep instructions., Disp: 4000 mL, Rfl: 0    Current Facility-Administered Medications:      triamcinolone acetonide (KENALOG-10) injection 10 mg, 10 mg, Intra-Lesional, Once, Brad Bauman MD                        PHYSICAL EXAM:  Temp:  [98.4  F (36.9  C)] 98.4  F (36.9  C)  Pulse:  [96] 96  Resp:  [18] 18  BP: (97)/(63) 97/63  SpO2:  [98 %] 98 %  Constitutional: healthy, alert, and cooperative  HEENT: sclera anicteric, MMM, conjunctiva pink  Chest: HD catheter absent.  CV:  NSR  : No CVA tenderness  Abdomen: prior kidney transplant incision well-healed   Skin:  No rashes or jaundice  Neuro: normal gait  Psych: normal mood and affect  EXTREMITY EXAM:   Left arm examined in detail. Prior ac fossa incision healing well, palpable and auscultable thrill. Palpable left radial pulse. Normal motor/sensory function                    Assessment & Plan: Ms. Quan is a excellent candidate for placement of permanent dialysis  access. She previously underwent left arm first stage brachiobasilic AV fistula with me. Recommend proceeding with second stage- general anesthesia and scalene block when time available.     The surgical risks and benefits were reviewed and questions were answered. We discussed the day of surgery plan, anesthesia, postop care, risk of infection, numbness, injury to surrounding structures, bleeding, thrombosis, steal syndrome, possible need for future angioplasty or surgical revision, as well as nonmaturation or need for site abandonment. This was contrasted with morbidity and mortality risk of long-term catheter based hemodialysis access. The patient does wish to proceed with surgery for permanent access creation at this time.  The patient was counselled to contact one of the nurse coordinators, Zeina Biggs RN or Patricia Haddad RN at [phone] with any questions or concerns.  Thank you for the opportunity to participate in Ms. Quan's care.        Jesus Alberto Walton MD      Again, thank you for allowing me to participate in the care of your patient.        Sincerely,        Jesus Alberto Walton MD

## 2024-10-28 NOTE — PROGRESS NOTES
Dialysis Access Service  Consult Note    Referred by Latricia Henson for placement of permanent dialysis access.    HPI: Ms. Quan is being seen today for revision of permanent dialysis access due to Chronic renal failure from graft failure (chronic glomerulonephritis sp prior transplant with failing graft).  She is right handed. Ms. Quan is not dialyzing at (Not currently on dialysis).      Risk factors for vascular access:         Yes No  Hx of CVC    []    []   Comment:   Hx of PICC line         []     []  Comment:   Hx of Pacemaker    []     []  Comment:   History of failed access:  []         []  Comment:  SVC syndrome   []      []  Comment:  Heart Failure    []     []  EF:    Periph arterial disease  []     []  Comment:  Prior Fracture/Surgery  []     []  Location:   DVT    []    []   Location:  Diabetes    []        []  Comment:  Neuropathy   []     []  Comment:   Anticoagulation:   []    []  Agent:      Anticoagulation contraindication:[]  []     Details:                   Pediatric    []         []  Age:                  Hx of transplant   []    []  Comment:     Current immunosuppression []    []  Comment:            ROS: 10 point ROS neg other than the symptoms noted above in the HPI.        Past Medical History:   Diagnosis Date    Actinic keratosis     Allergic rhinitis, cause unspecified     Anemia     anxiety/depression     PAXIL    Arthritis     congenital hearing loss     uses hearing aids    Depressive disorder     Dry eyes     Giant Platelet syndrome     Glomerular Focal Sclerosis--transplant 1985     Hypertension     Kidney replaced by transplant 1992    RAPAMUNE/ SIROLIMUS     Lichen planopilaris     LPP followed by derm on doxycycline/ clobetasol    Lichen planus     Menorrhagia     migraine     Squamous cell carcinoma     8 x     Thrombocytopenia (H)     Ulcerative colitis, unspecified     biposy neg 1996    Unsatisfactory cervical Papanicolaou smear 02/15/2021    UTI (lower urinary  tract infection)     recurrent Dr Burns [facility]       Past Surgical History:   Procedure Laterality Date    BIOPSY OF SKIN LESION      CHOLECYSTECTOMY      CREATE FISTULA ARTERIOVENOUS UPPER EXTREMITY Left 10/15/2024    Procedure: Left Upper Extremity Brachiobasilic ARTERIOVENOUS FISTULA Creation;  Surgeon: Jesus Alberto Walton MD;  [facility]    DILATION AND CURETTAGE, OPERATIVE HYSTEROSCOPY WITH MORCELLATOR, COMBINED N/A 11/10/2015    Procedure: COMBINED DILATION AND CURETTAGE, OPERATIVE HYSTEROSCOPY WITH MORCELLATOR;  Surgeon: Ghada Melendez MD;  Location[facility]    ESOPHAGOSCOPY, GASTROSCOPY, DUODENOSCOPY (EGD), COMBINED  11/20/2012    Procedure: COMBINED ESOPHAGOSCOPY, GASTROSCOPY, DUODENOSCOPY (EGD), BIOPSY SINGLE OR MULTIPLE;;  Surgeon: Valentin Hahn MD;  Location: [facility] GI    IR CVC TUNNEL PLACEMENT > 5 YRS OF AGE  10/9/2024    IR RENAL BIOPSY RIGHT  07/25/2024    MOHS MICROGRAPHIC PROCEDURE      TRANSPLANT  1992    kidney    ZZC NONSPECIFIC PROCEDURE      Renal transplant right side    ZZC NONSPECIFIC PROCEDURE      cholecystectomy       Family History   Problem Relation Age of Onset    Hypertension Mother     Depression Mother     Anxiety Disorder Mother     Diabetes Father     Kidney Disease Father         nephrolithiasis    Asthma Father     Asthma Sister     Blood Disease Maternal Grandmother     Diabetes Maternal Grandmother     Hypertension Maternal Grandmother     Hyperlipidemia Maternal Grandmother     Depression Maternal Grandmother     No Known Problems Maternal Grandfather     No Known Problems Paternal Grandmother     C.A.D. Paternal Grandfather     Cancer Other         no family hx of skin cancer    Glaucoma No family hx of     Macular Degeneration No family hx of     Melanoma No family hx of     Skin Cancer No family hx of     Anesthesia Reaction No family hx of     Thrombosis No family hx of        Social History     Tobacco Use    Smoking status: Never     Passive  exposure: Never    Smokeless tobacco: Never   Substance Use Topics    Alcohol use: Not Currently         Current Outpatient Medications:     atorvastatin (LIPITOR) 40 MG tablet, Take 1 tablet (40 mg) by mouth at bedtime, Disp: 90 tablet, Rfl: 3    clobetasol (TEMOVATE) 0.05 % external ointment, Apply topically 2 times daily Use sparingly at active areas of dermatitis for no more than 14 days or until resolved, whichever is sooner., Disp: 60 g, Rfl: 3    clobetasol (TEMOVATE) 0.05 % external solution, Apply topically daily as needed (itchyy scalp) Apply to scalp daily as needed., Disp: 50 mL, Rfl: 4    clobetasol propionate (CLOBEX) 0.05 % external shampoo, APPLY TO SCALP IN SHOWER TWICE WEEKLY, Disp: 118 mL, Rfl: 1    ketoconazole (NIZORAL) 2 % external shampoo, APPLY TOPICALLY DAILY AS NEEDED FOR ITCHING, IRRITATION OR OTHER( SCALP SCALE) USE 2-3 TIMES A WEEK. APPLY AND LATHER THEN RINSE OUT 5 MINUTES, Disp: 120 mL, Rfl: 0    mycophenolate (GENERIC EQUIVALENT) 250 MG capsule, Take 3 capsules (750 mg) by mouth 2 times daily, Disp: 540 capsule, Rfl: 3    niacinamide 500 MG tablet, Take 1 tablet (500 mg) by mouth 2 times daily (with meals)., Disp: 60 tablet, Rfl: 3    PARoxetine (PAXIL) 20 MG tablet, TAKE 1 TABLET BY MOUTH EVERY MORNING, Disp: 90 tablet, Rfl: 2    polyethylene glycol (MIRALAX) 17 GM/Dose powder, Take 17 g (1 Capful) by mouth daily., Disp: 510 g, Rfl: 3    predniSONE (DELTASONE) 5 MG tablet, Take 1 tablet (5 mg) by mouth daily, Disp: 90 tablet, Rfl: 3    sodium bicarbonate 650 MG tablet, Take 2 tablets (1,300 mg) by mouth 2 times daily, Disp: 360 tablet, Rfl: 3    triamcinolone (KENALOG) 0.1 % external cream, APPLY SPARINGLY TOPICALLY TO THE AFFECTED AREA TWICE DAILY FOR 14 DAYS, Disp: 30 g, Rfl: 0    bisacodyl (DULCOLAX) 5 MG EC tablet, Take 2 tablets at 3 pm the day before your procedure. If your procedure is before 11 am, take 2 additional tablets at 11 pm. If your procedure is after 11 am, take 2  additional tablets at 6 am. For additional instructions refer to your colonoscopy prep instructions., Disp: 4 tablet, Rfl: 0    cinacalcet (SENSIPAR) 30 MG tablet, Take 1 tablet (30 mg) by mouth every other day (Patient not taking: Reported on 10/28/2024), Disp: 45 tablet, Rfl: 3    polyethylene glycol (GOLYTELY) 236 g suspension, The night before the exam at 6 pm drink an 8-ounce glass every 15 minutes until the jug is half empty. If you arrive before 11 AM: Drink the other half of the Golytely jug at 11 PM night before procedure. If you arrive after 11 AM: Drink the other half of the Golytely jug at 6 AM day of procedure. For additional instructions refer to your colonoscopy prep instructions., Disp: 4000 mL, Rfl: 0    Current Facility-Administered Medications:     triamcinolone acetonide (KENALOG-10) injection 10 mg, 10 mg, Intra-Lesional, Once, Brad Bauman MD                        PHYSICAL EXAM:  Temp:  [98.4  F (36.9  C)] 98.4  F (36.9  C)  Pulse:  [96] 96  Resp:  [18] 18  BP: (97)/(63) 97/63  SpO2:  [98 %] 98 %  Constitutional: healthy, alert, and cooperative  HEENT: sclera anicteric, MMM, conjunctiva pink  Chest: HD catheter absent.  CV:  NSR  : No CVA tenderness  Abdomen: prior kidney transplant incision well-healed   Skin:  No rashes or jaundice  Neuro: normal gait  Psych: normal mood and affect  EXTREMITY EXAM:   Left arm examined in detail. Prior ac fossa incision healing well, palpable and auscultable thrill. Palpable left radial pulse. Normal motor/sensory function                    Assessment & Plan: Ms. Quan is a excellent candidate for placement of permanent dialysis access. She previously underwent left arm first stage brachiobasilic AV fistula with me. Recommend proceeding with second stage- general anesthesia and scalene block when time available.     The surgical risks and benefits were reviewed and questions were answered. We discussed the day of surgery plan, anesthesia, postop  care, risk of infection, numbness, injury to surrounding structures, bleeding, thrombosis, steal syndrome, possible need for future angioplasty or surgical revision, as well as nonmaturation or need for site abandonment. This was contrasted with morbidity and mortality risk of long-term catheter based hemodialysis access. The patient does wish to proceed with surgery for permanent access creation at this time.  The patient was counselled to contact one of the nurse coordinators, Zeina Biggs RN or Patricia Haddad RN at [phone] with any questions or concerns.  Thank you for the opportunity to participate in Ms. Quan's care.        Jesus Alberto Walton MD

## 2024-10-28 NOTE — PROGRESS NOTES
Dialysis Access Care Coordination: General Outreach    REASON FOR CALL:     REASON FOR CALL: Clinic Care Coordination - Face To Face (Fistula follow up with Dr. Walton)    S/p LUE brachiobasilic (1st stage) AV fistula creation surgery with Dr. Walton on 10/15/24                                SITUATION/BACKROUND:   Nephrology provider: SAM Raya.  Nephrology RN Care Coordinator:  Daniele Ovalles, RN.    Neph Tracking Flowsheet Last Filled Values       Preferred Modality Home Hemodialysis    Patient's Referral Dates Auto Populate Patient's Referral Dates    Specialty Care Coordination Referral - Dialysis 7/26/2024    Journey Referral 7/18/24    Vein Mapping/US Order 8/19/24    Vein Mapping/US  08/19/24    Access Surgeon Referral Status  Referred    Dialysis Access Referral 07/26/24  Fistula vs PD consult with Dr. Walton    Access Surgical Consult 08/19/24  PLAN: LUE brachiobasilic (2 stage) AVF vs LUE AVG underger general anesthesia with nerve block.    Diaylsis Access Type AV Fistula  Brachiobasilic AVF    Dialysis Access Site TRINY    Dialysis Access Surgery 10/15/24  1st stage LUE brachiobasilic AVF, if ok at 2wk and 4 wk post op and US, ok for 2nd stage after 6 wks per Dr. Walton    Dialysis Access Surgeon Dr. Walton    Transplant Evaluation Referral 6/21/24    Transplant Status  Referred          Dialysis History        Start End Type Center Comments    11/11/2024  Hemo-In Center [facility] ([facility]) MWF                  Patient is followed by Solid Organ Transplant 2/2 Kidney Transplant Evaluation - 10/7/2024.  Coordinator: Selena Nuno    ASSESSMENT:     Recent Labs      Latest Ref Rng & Units 10/15/2024 10/7/2024 9/27/2024   Neph Labs   Sodium 135 - 145 mmol/L 140  137  136    GFR Estimate >60 mL/min/1.73m2 14  11  9    Potassium 3.4 - 5.3 mmol/L 4.1  4.0  4.3    Creatinine 0.51 - 0.95 mg/dL 3.55  4.40  5.05    Urea Nitrogen 8.0 - 23.0 mg/dL 28.1  49.0  62.3   "  Hemoglobin 11.7 - 15.7 g/dL 9.3  9.6  9.7    Hemoglobin A1C <5.7 %  6.0              Dialysis Access Assessments:  Fistula / Graft  Fever/Chills: no  Redness/warmth: no  Swelling: no  Pain: mild  Pain site: other (add comment) (underside of forearm, describes as \"tingling, it just feels different, not really pain\")  Drainage: none  Current infection: no  Steal Symptoms: none  Thrill: positive  Bruit: positive  Dysfunction: none  Interventions: other (add comment) (Request sent to schedule second stage transposition surgery per Dr. Walton)    PLAN:     Future Appts Next 180 days       Visit Type Date Time Department    [facility]OT FISTULA FOLLOW UP 10/28/2024  2:30 PM  [facility] SURGERY    US AORTA/IVC/ILIAC DUPLEX COMP 10/31/2024 10:30 AM [facility]    K/P EVAL RETURN NEPH 11/6/2024  9:00 AM  [facility]    MYC 3D SCREENING MAMMOGRAM 11/7/2024  2:30 PM [facility]            Follow Up:     Request sent to schedule LUE brachiobasilic AV fistula second stage transposition surgery under general anesthesia w/ nerve block per Dr. Walton.  PAC prior  Use CVC for dialysis access until AVF has healed post second surgery.  Pt to contact Dialysis Access Care Coordinator if sensation on underside of forearm worsens or does not continue to improve.  Patient to follow up as scheduled.     Patient verbalized understanding and will follow up as recommended.    ANNABEL SPRING RN  Dialysis Access Care Coordinator  Phone: [phone]  Pool: P_Dialysis_Access_Nurse  "

## 2024-10-28 NOTE — NURSING NOTE
"Chief Complaint   Patient presents with    Follow Up     Fistula 2 week follow-up     Vital signs:  Temp: 98.4  F (36.9  C) Temp src: Oral BP: 97/63 Pulse: 96   Resp: 18 SpO2: 98 %       Weight: 58.8 kg (129 lb 11.2 oz)  Estimated body mass index is 23.72 kg/m  as calculated from the following:    Height as of 10/15/24: 1.575 m (5' 2\").    Weight as of this encounter: 58.8 kg (129 lb 11.2 oz).      Oscar Rdz RN on 10/28/2024 at 2:53 PM    "

## 2024-10-29 NOTE — PROGRESS NOTES
Dialysis Access Service  Consult Note    Referred by Dr. Sapp for placement of permanent dialysis access.    HPI: Ms. Quan is being seen today for placement of permanent dialysis access due to Chronic renal failure from prior allograft failure.  She is right handed. Ms. Quan is not dialyzing at (Not currently on dialysis).      Risk factors for vascular access:         Yes No  Hx of CVC    []    []   Comment:   Hx of PICC line         []     []  Comment:   Hx of Pacemaker    []     []  Comment:   History of failed access:  []         []  Comment:  SVC syndrome   []      []  Comment:  Heart Failure    []     []  EF:    Periph arterial disease  []     []  Comment:  Prior Fracture/Surgery  []     []  Location:   DVT    []    []   Location:  Diabetes    []        []  Comment:  Neuropathy   []     []  Comment:   Anticoagulation:   []    []  Agent:      Anticoagulation contraindication:[]  []     Details:                   Pediatric    []         []  Age:                  Hx of transplant   []    []  Comment:     Current immunosuppression []    []  Comment:            ROS: 10 point ROS neg other than the symptoms noted above in the HPI.        Past Medical History:   Diagnosis Date    Actinic keratosis     Allergic rhinitis, cause unspecified     Anemia     anxiety/depression     PAXIL    Arthritis     congenital hearing loss     uses hearing aids    Depressive disorder     Dry eyes     Giant Platelet syndrome     Glomerular Focal Sclerosis--transplant 1985     Hypertension     Kidney replaced by transplant 1992    RAPAMUNE/ SIROLIMUS     Lichen planopilaris     LPP followed by derm on doxycycline/ clobetasol    Lichen planus     Menorrhagia     migraine     Squamous cell carcinoma     8 x     Thrombocytopenia (H)     Ulcerative colitis, unspecified     biposy neg 1996    Unsatisfactory cervical Papanicolaou smear 02/15/2021    UTI (lower urinary tract infection)     recurrent Dr [facility]       Past  Surgical History:   Procedure Laterality Date    BIOPSY OF SKIN LESION      CHOLECYSTECTOMY      CREATE FISTULA ARTERIOVENOUS UPPER EXTREMITY Left 10/15/2024    Procedure: Left Upper Extremity Brachiobasilic ARTERIOVENOUS FISTULA Creation;  Surgeon: Jesus Alberto Walton MD;  [facility]    DILATION AND CURETTAGE, OPERATIVE HYSTEROSCOPY WITH MORCELLATOR, COMBINED N/A 11/10/2015    Procedure: COMBINED DILATION AND CURETTAGE, OPERATIVE HYSTEROSCOPY WITH MORCELLATOR;  Surgeon: Ghada Melendez MD;  [facility]    ESOPHAGOSCOPY, GASTROSCOPY, DUODENOSCOPY (EGD), COMBINED  11/20/2012    Procedure: COMBINED ESOPHAGOSCOPY, GASTROSCOPY, DUODENOSCOPY (EGD), BIOPSY SINGLE OR MULTIPLE;;  Surgeon: Valentin Hahn MD;  Location: [facility]    IR CVC TUNNEL PLACEMENT > 5 YRS OF AGE  10/9/2024    IR RENAL BIOPSY RIGHT  07/25/2024    MOHS MICROGRAPHIC PROCEDURE      TRANSPLANT  1992    kidney    ZZC NONSPECIFIC PROCEDURE      Renal transplant right side    ZZC NONSPECIFIC PROCEDURE      cholecystectomy       Family History   Problem Relation Age of Onset    Hypertension Mother     Depression Mother     Anxiety Disorder Mother     Diabetes Father     Kidney Disease Father         nephrolithiasis    Asthma Father     Asthma Sister     Blood Disease Maternal Grandmother     Diabetes Maternal Grandmother     Hypertension Maternal Grandmother     Hyperlipidemia Maternal Grandmother     Depression Maternal Grandmother     No Known Problems Maternal Grandfather     No Known Problems Paternal Grandmother     C.A.D. Paternal Grandfather     Cancer Other         no family hx of skin cancer    Glaucoma No family hx of     Macular Degeneration No family hx of     Melanoma No family hx of     Skin Cancer No family hx of     Anesthesia Reaction No family hx of     Thrombosis No family hx of        Social History     Tobacco Use    Smoking status: Never     Passive exposure: Never    Smokeless tobacco: Never   Substance Use Topics     Alcohol use: Not Currently         Current Outpatient Medications:     atorvastatin (LIPITOR) 40 MG tablet, Take 1 tablet (40 mg) by mouth at bedtime, Disp: 90 tablet, Rfl: 3    cinacalcet (SENSIPAR) 30 MG tablet, Take 1 tablet (30 mg) by mouth every other day (Patient not taking: Reported on 10/28/2024), Disp: 45 tablet, Rfl: 3    clobetasol (TEMOVATE) 0.05 % external ointment, Apply topically 2 times daily Use sparingly at active areas of dermatitis for no more than 14 days or until resolved, whichever is sooner., Disp: 60 g, Rfl: 3    clobetasol (TEMOVATE) 0.05 % external solution, Apply topically daily as needed (itchyy scalp) Apply to scalp daily as needed., Disp: 50 mL, Rfl: 4    mycophenolate (GENERIC EQUIVALENT) 250 MG capsule, Take 3 capsules (750 mg) by mouth 2 times daily, Disp: 540 capsule, Rfl: 3    PARoxetine (PAXIL) 20 MG tablet, TAKE 1 TABLET BY MOUTH EVERY MORNING, Disp: 90 tablet, Rfl: 2    predniSONE (DELTASONE) 5 MG tablet, Take 1 tablet (5 mg) by mouth daily, Disp: 90 tablet, Rfl: 3    sodium bicarbonate 650 MG tablet, Take 2 tablets (1,300 mg) by mouth 2 times daily, Disp: 360 tablet, Rfl: 3    triamcinolone (KENALOG) 0.1 % external cream, APPLY SPARINGLY TOPICALLY TO THE AFFECTED AREA TWICE DAILY FOR 14 DAYS, Disp: 30 g, Rfl: 0    bisacodyl (DULCOLAX) 5 MG EC tablet, Take 2 tablets at 3 pm the day before your procedure. If your procedure is before 11 am, take 2 additional tablets at 11 pm. If your procedure is after 11 am, take 2 additional tablets at 6 am. For additional instructions refer to your colonoscopy prep instructions., Disp: 4 tablet, Rfl: 0    clobetasol propionate (CLOBEX) 0.05 % external shampoo, APPLY TO SCALP IN SHOWER TWICE WEEKLY, Disp: 118 mL, Rfl: 1    ketoconazole (NIZORAL) 2 % external shampoo, APPLY TOPICALLY DAILY AS NEEDED FOR ITCHING, IRRITATION OR OTHER( SCALP SCALE) USE 2-3 TIMES A WEEK. APPLY AND LATHER THEN RINSE OUT 5 MINUTES, Disp: 120 mL, Rfl: 0     niacinamide 500 MG tablet, Take 1 tablet (500 mg) by mouth 2 times daily (with meals)., Disp: 60 tablet, Rfl: 3    polyethylene glycol (GOLYTELY) 236 g suspension, The night before the exam at 6 pm drink an 8-ounce glass every 15 minutes until the jug is half empty. If you arrive before 11 AM: Drink the other half of the Golytely jug at 11 PM night before procedure. If you arrive after 11 AM: Drink the other half of the Golytely jug at 6 AM day of procedure. For additional instructions refer to your colonoscopy prep instructions., Disp: 4000 mL, Rfl: 0    polyethylene glycol (MIRALAX) 17 GM/Dose powder, Take 17 g (1 Capful) by mouth daily., Disp: 510 g, Rfl: 3    Current Facility-Administered Medications:     triamcinolone acetonide (KENALOG-10) injection 10 mg, 10 mg, Intra-Lesional, Once, Brad Bauman MD                        PHYSICAL EXAM:     Constitutional: healthy, alert, and cooperative  HEENT: sclera anicteric, MMM, conjunctiva pink  Chest: HD catheter absent.  CV:  NSR  : No CVA tenderness  Abdomen: prior kidney transplant incision healing well   Skin:  No rashes or jaundice  Neuro: normal gait  Psych: normal mood and affect  EXTREMITY EXAM:   Vein Exam: Obvious suitable veins?    Left arm: YES   []           NO  []       Comment:    Right arm: YES   []           NO  []       Comment:   Arterial Exam:   Radial   L: 2+ R: 2+   Ulnar   L: Dopplerable but not palpable;R: Dopplerable but not palpable  Patent Palmar arch  L: YES   []  NO   []       R: YES   []   NO   []        Capillary refill:  L: <3sec, R:<3sec    Sensory exam:   Left hand: Normal          Abnormal   []     Comment:    Right hand: Normal   [x][x]       Abnormal   []     Comment:     Motor exam normal:   Left hand: Normal   [x]       Abnormal   []     Comment:     Right hand: Normal   [x]       Abnormal   []     Comment:                       Mapping Reviewed:  Target vein: 3mm left basilic vein  Target artery brachial artery at the  distal upper arm    Assessment & Plan: Ms. Quan is a good candidate for placement of permanent dialysis access.  I would recommend left brachial artery to basilic vein AVfistula first stage creation under General.     The surgical risks and benefits were reviewed and questions were answered. We discussed the day of surgery plan, anesthesia, postop care, risk of infection, numbness, injury to surrounding structures, bleeding, thrombosis, steal syndrome, possible need for future angioplasty or surgical revision, as well as nonmaturation or need for site abandonment. This was contrasted with morbidity and mortality risk of long-term catheter based hemodialysis access. The patient does not wish to proceed with surgery for permanent access creation at this time.  The patient was counselled to contact one of the nurse coordinators, Zeina Biggs RN or Patricia Haddad RN at [phone] with any questions or concerns.  Thank you for the opportunity to participate in Ms. Quan's care.        Jesus Alberto Walton MD

## 2024-10-31 ENCOUNTER — ANCILLARY PROCEDURE (OUTPATIENT)
Dept: ULTRASOUND IMAGING | Facility: CLINIC | Age: 59
End: 2024-10-31
Attending: NURSE PRACTITIONER
Payer: COMMERCIAL

## 2024-10-31 DIAGNOSIS — N18.6 ESRD (END STAGE RENAL DISEASE) (H): ICD-10-CM

## 2024-10-31 DIAGNOSIS — Z01.810 PRE-OPERATIVE CARDIOVASCULAR EXAMINATION: ICD-10-CM

## 2024-10-31 DIAGNOSIS — I10 HYPERTENSION: ICD-10-CM

## 2024-10-31 DIAGNOSIS — Z76.82 ORGAN TRANSPLANT CANDIDATE: ICD-10-CM

## 2024-10-31 DIAGNOSIS — Z94.0 KIDNEY REPLACED BY TRANSPLANT: ICD-10-CM

## 2024-10-31 PROCEDURE — 93978 VASCULAR STUDY: CPT | Performed by: RADIOLOGY

## 2024-11-05 ENCOUNTER — PREP FOR PROCEDURE (OUTPATIENT)
Dept: TRANSPLANT | Facility: CLINIC | Age: 59
End: 2024-11-05
Payer: COMMERCIAL

## 2024-11-05 ENCOUNTER — PATIENT OUTREACH (OUTPATIENT)
Dept: NEPHROLOGY | Facility: CLINIC | Age: 59
End: 2024-11-05
Payer: COMMERCIAL

## 2024-11-05 ENCOUNTER — TELEPHONE (OUTPATIENT)
Dept: TRANSPLANT | Facility: CLINIC | Age: 59
End: 2024-11-05
Payer: COMMERCIAL

## 2024-11-05 DIAGNOSIS — T82.9XXA COMPLICATION OF DIALYSIS ACCESS INSERTION: Primary | ICD-10-CM

## 2024-11-05 DIAGNOSIS — T86.11 KIDNEY TRANSPLANT REJECTION: ICD-10-CM

## 2024-11-05 NOTE — TELEPHONE ENCOUNTER
Patient Contacted for the patient to call back and schedule the following:    Appointment type: fistula follow up   Provider: Patricia Morse NP   Return date: 12/3/24  Specialty phone number: [phone]  Additional appointment(s) needed: pre op, pt agreed to call PAC clinic at [phone] to schedule within 30 days of procedure.   Additonal Notes: writer contacted pt to schedule 2 wk fistula follow up with Patricia Morse NP on 12/3/24 @1:30p, (pt dialyzes MWF) DOS 11/20/24.   AL ([place]oor) 11/5/24

## 2024-11-06 ENCOUNTER — OFFICE VISIT (OUTPATIENT)
Dept: TRANSPLANT | Facility: CLINIC | Age: 59
End: 2024-11-06
Attending: NURSE PRACTITIONER
Payer: COMMERCIAL

## 2024-11-06 VITALS
HEART RATE: 79 BPM | BODY MASS INDEX: 23.81 KG/M2 | DIASTOLIC BLOOD PRESSURE: 73 MMHG | SYSTOLIC BLOOD PRESSURE: 117 MMHG | WEIGHT: 130.2 LBS | OXYGEN SATURATION: 98 %

## 2024-11-06 DIAGNOSIS — Z94.0 KIDNEY REPLACED BY TRANSPLANT: ICD-10-CM

## 2024-11-06 DIAGNOSIS — Z76.82 ORGAN TRANSPLANT CANDIDATE: ICD-10-CM

## 2024-11-06 DIAGNOSIS — Z01.810 PRE-OPERATIVE CARDIOVASCULAR EXAMINATION: ICD-10-CM

## 2024-11-06 DIAGNOSIS — E78.5 HYPERLIPIDEMIA WITH TARGET LDL LESS THAN 130: ICD-10-CM

## 2024-11-06 DIAGNOSIS — N18.6 ESRD (END STAGE RENAL DISEASE) (H): ICD-10-CM

## 2024-11-06 PROCEDURE — 99213 OFFICE O/P EST LOW 20 MIN: CPT

## 2024-11-06 RX ORDER — ATORVASTATIN CALCIUM 80 MG/1
80 TABLET, FILM COATED ORAL AT BEDTIME
Qty: 90 TABLET | Refills: 3 | Status: SHIPPED | OUTPATIENT
Start: 2024-11-06

## 2024-11-06 NOTE — TELEPHONE ENCOUNTER
FUTURE VISIT INFORMATION      SURGERY INFORMATION:  Date: 11/20/24  [facility]  Surgeon:  Jesus Alberto Walton MD   Anesthesia Type:  general with block  Procedure: REVISION, ARTERIOVENOUS FISTULA, UPPER EXTREMITY   Consult: ov 10/28/24    RECORDS REQUESTED FROM:       [facility]    Pertinent Medical History: hypertension    Most recent EKG+ Tracing: 10/7/24    Most recent ECHO: 7/15/24    Most recent Cardiac Stress Test: 10/4/24

## 2024-11-06 NOTE — LETTER
11/6/2024      Reina Quan  [address] [facility] [id number]      Dear Colleague,    Thank you for referring your patient, Reina Quan, to the [facility]. Please see a copy of my visit note below.          [facility]   Cardiology Service  History and Physical      Reina Quan MRN# [id number]   YOB: 1965 Age: 59 year old       HPI:   Reina Quan is a 59 year old year old female with a medical history significant for ESRD 2/2 focal segmental glomerulosclerosis (FSGS), s/p LDKT (1992) she is not on dialysis, UTI, CAD, HLD, and HTN. She presents today for cardiovascular risk assessment as part of pre-kidney transplant evaluation.     She is a never smoker/non-smoker. She occasionally uses alcohol; has not for 8 months though and does not drugs. Her activity level is sedentary. She is in dialysis 3 x weekly and is exhausted after. Prior to getting sick she was adamant about working out and going to the gym. Frustrated that she does not have to energy to do so. Tries to walk when she has the energy. She denies symptoms of chest pain, dizziness, lightheadedness, palpitations, shortness of breath, orthopnea, PND, or edema.  She does not have family history of pre-mature heart disease.     She had a recent visit to the emergency department on 8/29/24 for chest pain per chart review. Patient states it was due to shortness of breath and not chest pain. She has never had chest pain. EKG at that time was normal; no ischemic changes.     Cardiovascular Risk Factor Profile:  coronary artery disease , hypertension , female age >55 , and sedentary lifestyle     Current Cardiovascular Symptoms:  - None     ACC HF Stage:  Stage B    NYHA Class:  Class I    Functional Capacity:  Performs 4 METS exercise without symptoms (e.g., light housework, stairs, 4 mph walk, 7 mph bike, slow step dance)        Past Medical History:   Diagnosis  Date     Actinic keratosis      Allergic rhinitis, cause unspecified      Anemia      anxiety/depression     PAXIL     Arthritis      congenital hearing loss     uses hearing aids     Depressive disorder      Dry eyes      Giant Platelet syndrome      Glomerular Focal Sclerosis--transplant 1985      Hypertension      Kidney replaced by transplant 1992    RAPAMUNE/ SIROLIMUS      Lichen planopilaris     LPP followed by derm on doxycycline/ clobetasol     Lichen planus      Menorrhagia      migraine      Squamous cell carcinoma     8 x      Thrombocytopenia (H)      Ulcerative colitis, unspecified     biposy neg 1996     Unsatisfactory cervical Papanicolaou smear 02/15/2021     UTI (lower urinary tract infection)     recurrent Dr Burns [facility]       Past Surgical History:   Procedure Laterality Date     BIOPSY OF SKIN LESION       CHOLECYSTECTOMY       CREATE FISTULA ARTERIOVENOUS UPPER EXTREMITY Left 10/15/2024    Procedure: Left Upper Extremity Brachiobasilic ARTERIOVENOUS FISTULA Creation;  Surgeon: Jesus Alberto Walton MD;  Location: [facility] OR     DILATION AND CURETTAGE, OPERATIVE HYSTEROSCOPY WITH MORCELLATOR, COMBINED N/A 11/10/2015    Procedure: COMBINED DILATION AND CURETTAGE, OPERATIVE HYSTEROSCOPY WITH MORCELLATOR;  Surgeon: Ghada Melendez MD;  Location: [facility] OR     ESOPHAGOSCOPY, GASTROSCOPY, DUODENOSCOPY (EGD), COMBINED  11/20/2012    Procedure: COMBINED ESOPHAGOSCOPY, GASTROSCOPY, DUODENOSCOPY (EGD), BIOPSY SINGLE OR MULTIPLE;;  Surgeon: Valentin Hahn MD;  Location: [facility] GI     IR CVC TUNNEL PLACEMENT > 5 YRS OF AGE  10/9/2024     IR RENAL BIOPSY RIGHT  07/25/2024     MOHS MICROGRAPHIC PROCEDURE       TRANSPLANT  1992    kidney     ZZC NONSPECIFIC PROCEDURE      Renal transplant right side     ZZ NONSPECIFIC PROCEDURE      cholecystectomy       Current Outpatient Medications   Medication Sig Dispense Refill     atorvastatin (LIPITOR) 40 MG tablet Take 1 tablet (40 mg) by mouth at bedtime 90  tablet 3     bisacodyl (DULCOLAX) 5 MG EC tablet Take 2 tablets at 3 pm the day before your procedure. If your procedure is before 11 am, take 2 additional tablets at 11 pm. If your procedure is after 11 am, take 2 additional tablets at 6 am. For additional instructions refer to your colonoscopy prep instructions. 4 tablet 0     cinacalcet (SENSIPAR) 30 MG tablet Take 1 tablet (30 mg) by mouth every other day 45 tablet 3     clobetasol (TEMOVATE) 0.05 % external ointment Apply topically 2 times daily Use sparingly at active areas of dermatitis for no more than 14 days or until resolved, whichever is sooner. 60 g 3     clobetasol (TEMOVATE) 0.05 % external solution Apply topically daily as needed (itchyy scalp) Apply to scalp daily as needed. 50 mL 4     clobetasol propionate (CLOBEX) 0.05 % external shampoo APPLY TO SCALP IN SHOWER TWICE WEEKLY 118 mL 1     ketoconazole (NIZORAL) 2 % external shampoo APPLY TOPICALLY DAILY AS NEEDED FOR ITCHING, IRRITATION OR OTHER( SCALP SCALE) USE 2-3 TIMES A WEEK. APPLY AND LATHER THEN RINSE OUT 5 MINUTES 120 mL 0     mycophenolate (GENERIC EQUIVALENT) 250 MG capsule Take 3 capsules (750 mg) by mouth 2 times daily 540 capsule 3     niacinamide 500 MG tablet Take 1 tablet (500 mg) by mouth 2 times daily (with meals). 60 tablet 3     PARoxetine (PAXIL) 20 MG tablet TAKE 1 TABLET BY MOUTH EVERY MORNING 90 tablet 2     polyethylene glycol (GOLYTELY) 236 g suspension The night before the exam at 6 pm drink an 8-ounce glass every 15 minutes until the jug is half empty. If you arrive before 11 AM: Drink the other half of the Golytely jug at 11 PM night before procedure. If you arrive after 11 AM: Drink the other half of the Golytely jug at 6 AM day of procedure. For additional instructions refer to your colonoscopy prep instructions. 4000 mL 0     polyethylene glycol (MIRALAX) 17 GM/Dose powder Take 17 g (1 Capful) by mouth daily. 510 g 3     predniSONE (DELTASONE) 5 MG tablet Take 1  tablet (5 mg) by mouth daily (Patient taking differently: Take 5 mg by mouth every morning.) 90 tablet 3     sodium bicarbonate 650 MG tablet Take 2 tablets (1,300 mg) by mouth 2 times daily 360 tablet 3     triamcinolone (KENALOG) 0.1 % external cream APPLY SPARINGLY TOPICALLY TO THE AFFECTED AREA TWICE DAILY FOR 14 DAYS 30 g 0     Current Facility-Administered Medications   Medication Dose Route Frequency Provider Last Rate Last Admin     triamcinolone acetonide (KENALOG-10) injection 10 mg  10 mg Intra-Lesional Once Brad Bauman MD           Family History   Problem Relation Age of Onset     Hypertension Mother      Depression Mother      Anxiety Disorder Mother      Diabetes Father      Kidney Disease Father         nephrolithiasis     Asthma Father      Asthma Sister      Blood Disease Maternal Grandmother      Diabetes Maternal Grandmother      Hypertension Maternal Grandmother      Hyperlipidemia Maternal Grandmother      Depression Maternal Grandmother      No Known Problems Maternal Grandfather      No Known Problems Paternal Grandmother      C.A.D. Paternal Grandfather      Cancer Other         no family hx of skin cancer     Glaucoma No family hx of      Macular Degeneration No family hx of      Melanoma No family hx of      Skin Cancer No family hx of      Anesthesia Reaction No family hx of      Thrombosis No family hx of        Social History     Tobacco Use     Smoking status: Never     Passive exposure: Never     Smokeless tobacco: Never   Substance Use Topics     Alcohol use: Not Currently       Allergies   Allergen Reactions     Ampicillin Swelling     Swelling of mouth and tongue.      Seasonal Allergies Other (See Comments)     Itchy eyes and rhinitis.         Review Of Systems:   CONSTITUTIONAL: No report of fevers or chills  RESPIRATORY: No cough, wheezing, SOB, or hemoptysis  CARDIOVASCULAR: see HPI  MUSCULO-SKELETAL: No joint pain or swelling, no muscle pain  NEURO: No paresthesias,  syncope, pre-syncope, lightheadness, dizziness or vertigo  ENDOCRINE: No temperature intolerance, no skin/hair changes  PSYCHIATRIC: No change in mood, feeling down/anxious, no change in sleep or appetite  GI: No melena or hematochezia, no change in bowel habits  : No hematuria or dysuria, no hesitancy, dribbling or incontinence. Still making urine  HEME: No easy bruising or bleeding, no history of anemia, no history of blood clots  SKIN: No rashes or sores, no unusual itching      Physical Examination:  Vitals: /73   Pulse 79   Wt 59.1 kg (130 lb 3.2 oz)   LMP 05/14/2014 (Approximate)   SpO2 98%   BMI 23.81 kg/m    BMI= Body mass index is 23.81 kg/m .    GENERAL APPEARANCE: healthy, alert and no distress  HEENT: no icterus, no xanthelasmas, normal pupil size and reaction, normal palate, mucosa moist  NECK: JVP not elevated , brisk carotid upstroke bilaterally  CHEST: lungs clear to auscultation without rales, rhonchi or wheezes, no use of accessory muscles, no retractions  CARDIOVASCULAR: regular rhythm, normal S1 and S2, no S3 or S4 and no murmur, click or rub.  ABDOMEN: soft, non tender, without hepatosplenomegaly, no masses palpable, bowel sounds normal  EXTREMITIES: warm, no LE edema, DP/PT pulses 2+ bilaterally, no clubbing or cyanosis   NEURO: alert and oriented to person/place/time, normal speech, gait and affect  SKIN: no ecchymoses, no rashes      Laboratory:  Last Comprehensive Metabolic Panel:  Lab Results   Component Value Date     10/15/2024    POTASSIUM 4.1 10/15/2024    CHLORIDE 100 10/15/2024    CO2 25 10/15/2024    ANIONGAP 15 10/15/2024     (H) 10/15/2024    BUN 28.1 (H) 10/15/2024    CR 3.55 (H) 10/15/2024    GFRESTIMATED 14 (L) 10/15/2024    HARSHA 9.9 10/15/2024       Last CBC:  CBC RESULTS:   Recent Labs   Lab Test 10/15/24  0637 10/07/24  1326   WBC  --  5.4   RBC  --  3.28*   HGB 9.3* 9.6*   HCT  --  31.2*   MCV  --  95   MCH  --  29.3   MCHC  --  30.8*   RDW  --   15.1*   PLT  --  163       24 EK/15/24 Echocardiogram:  Global and regional left ventricular function is normal with an EF of 60-65%.  Global right ventricular function is normal.  No significant valvular abnormalities present.  Estimated mean right atrial pressure is normal.  No pericardial effusion is present.    10/4/24 Lexiscan:     The nuclear stress test is negative for inducible myocardial ischemia or infarction.     The left ventricular ejection fraction at rest is greater than 70%.     There is no prior study for comparison.      Assessment and Plan:     # Coronary Artery Disease   # Hyperlipidemia   Mild coronary calcification on chest CT from 2024. Most recent echocardiogram w/ EF 60-65%. Had complaints of CP on 10/8/24 and went to the ED. Lexiscan performed and negative for ischemia. Most recent lipid panel from 24 with total 233, HDL 81, and . She is on statin therapy. She denies chest pain, dizziness, lightheadedness, shortness of breath, orthopnea, edema, or PND.  Risk factors include age, HLD, known CAD.   - Start aspirin 81mg daily   - Increase Lipitor 80mg q HS  - Since she has had no recurrence of chest pain and her Lexiscan was normal no additional cardiac testing needed at this time prior to transplant   - Follow-up with cardiology in 18-24 months     # Hypertension  Blood pressure in office today 117/73. Blood pressures on home average 110's-120's/70-80's.   - Not currently on anti-hypertensive's  - Monitor BP's at home. Keep a log of readings and bring to follow up appointments  - BP goal < 130/80    # Pre-Diabetes  Most recent A1c per chart review 6.0% on 10/7/24. She is not checking blood sugars. Bother her grandma and her dad became diabetic in life; she reports they ate a lot of sugar and she does not.   - Educated on importance of diabetic control in preventing progression of heart and kidney disease  - Encourages healthy diet and lifestyle    # ESRD  on HD    She is currently on dialysis; started a month ago on 10/11. She admits to being compliant with all medications. Denies uremic symptoms; pruritus, nausea, vomiting.   - Additional labs / follow-up / testing per SOT team      CAD Risk Level:  Moderate Risk: 2 or 3 risk factors, no diabetic kidney disease or DM > 10 years, no known CAD, OR on dialysis 5 or less years          RCRI Score:   - High risk surgery (>5% cardiac complication risk) = 1 point   - Serum Creatinine >2.0 mg/dl = 1 point        I have reviewed and updated the patient's Past Medical History, Social History, Family History and Medication List.       JOSE MANUEL Gallardo, CNP  [facility] Cardiology      Review of prior external note(s) from - care everywhere, SOT, nephrology  Review of the result(s) of each unique test - echocardiogram, EKG, CT  Ordering of each unique test  Prescription drug management- medication reconciliation     30 minutes spent by me on the date of the encounter doing chart review, review of outside records, review of test results, patient visit, and documentation       JOSE MANUEL Nina CNP on 11/6/2024 at 9:20 AM      Again, thank you for allowing me to participate in the care of your patient.        Sincerely,        JOSE MANUEL Nina CNP

## 2024-11-06 NOTE — PATIENT INSTRUCTIONS
You were seen in the cardiology clinic by: Zulma Wong CNP    Plan:     Medication Changes:   - If your nephrologist is OK with it, start Aspirin 81mg daily.  - INCREASE Lipitor (atorvastatin) to 80mg at bedtime.     Labs/Tests Needed:   - None    Follow Up Visit:   - Follow-up with cardiology in 18-24 months    General Recommendations/Guidelines:  - Monitor your blood pressures at home. Please keep a log of the readings and bring to your future nephrology or cardiology appointments. Your blood pressure goal is < 130/80. It is important to achieve adequate blood pressure control before transplant.   - Focus on a low sodium diet. You should aim to consume <2,000mg of sodium daily.   - I encourage you to achieve 150 minutes/week of moderate intensity exercise if able. If this is not achievable right away, you can gradually work yourself up to this starting with low intensity exercises (i.e. walking or swimming) a couple days a week. No swimming if on peritoneal dialysis.**      Important Numbers:     Cardiology   Telephone Number     To schedule an appointment or leave a message for your care team:   [phone]      Press #1   To reach the billing department: [phone]      Press # 3     To obtain copies of your medical records: [phone]      Press # 4   To reach the on-call cardiologist for after hours or on weekends: [phone]     Option #4, and ask to speak to the      Cardiovascular Clinic:   [address] [facility] [facility] [place] [address] [phone]      Thank you for trusting us with your health care needs!

## 2024-11-07 ENCOUNTER — HOSPITAL ENCOUNTER (OUTPATIENT)
Dept: MAMMOGRAPHY | Facility: CLINIC | Age: 59
Discharge: HOME | End: 2024-11-07
Attending: PHYSICIAN ASSISTANT | Admitting: PHYSICIAN ASSISTANT
Payer: COMMERCIAL

## 2024-11-07 DIAGNOSIS — Z12.31 VISIT FOR SCREENING MAMMOGRAM: ICD-10-CM

## 2024-11-07 PROCEDURE — 77063 BREAST TOMOSYNTHESIS BI: CPT

## 2024-11-12 ENCOUNTER — VIRTUAL VISIT (OUTPATIENT)
Dept: SURGERY | Facility: CLINIC | Age: 59
End: 2024-11-12
Payer: COMMERCIAL

## 2024-11-12 ENCOUNTER — ANESTHESIA EVENT (OUTPATIENT)
Dept: SURGERY | Facility: CLINIC | Age: 59
End: 2024-11-12
Payer: COMMERCIAL

## 2024-11-12 ENCOUNTER — PRE VISIT (OUTPATIENT)
Dept: SURGERY | Facility: CLINIC | Age: 59
End: 2024-11-12

## 2024-11-12 VITALS — WEIGHT: 130 LBS | HEIGHT: 62 IN | BODY MASS INDEX: 23.92 KG/M2

## 2024-11-12 DIAGNOSIS — Z01.818 PRE-OPERATIVE EXAMINATION: Primary | ICD-10-CM

## 2024-11-12 DIAGNOSIS — T82.9XXD COMPLICATION OF DIALYSIS ACCESS INSERTION, SUBSEQUENT ENCOUNTER: ICD-10-CM

## 2024-11-12 PROCEDURE — 99214 OFFICE O/P EST MOD 30 MIN: CPT | Mod: 95 | Performed by: PHYSICIAN ASSISTANT

## 2024-11-12 ASSESSMENT — ENCOUNTER SYMPTOMS
ORTHOPNEA: 0
SEIZURES: 0

## 2024-11-12 NOTE — PATIENT INSTRUCTIONS
Preparing for Your Surgery      Name:  Reina Quan   MRN:  [id number]   :  1965   Today's Date:  2024       Arriving for surgery:  Surgery date:  24  Arrival time:  9 am    Please come to:     [facility]    [address] [place]  [address]     The [facility] Patient/Visitor Ramp is at [address]. Patients and visitors who self-park will receive the reduced hospital parking rate. If the Patient /Visitor Ramp is full, please follow the signs to the [employer] car park located at the main hospital entrance.       parking is available (24 hours/ 7 days a week)      Discounted parking pass options are available for patients and visitors. They can be purchased at the [employer] desk at the [facility] hospital entrance.     -  Stop at the security desk and they will direct surgery patients to Registration and the Surgery Check in and Family Lounge. [phone]        - If you need directions, a wheelchair or an escort please stop at the Information/security desk in the lobby.     What can I eat or drink?  -  You may eat and drink normally up to 8 hours prior to arrival time. (Until 1 am)  -  You may have clear liquids until 2 hours prior to arrival time. (Until 7 am)    Examples of clear liquids:  Water  Clear broth  Juices (apple, white grape, white cranberry  and cider) without pulp  Noncarbonated, powder based beverages  (lemonade and Sigifredo-Aid)  Sodas (Sprite, 7-Up, ginger ale and seltzer)  Coffee or tea (without milk or cream)  Gatorade    -  No Alcohol or cannabis products for at least 24 hours before surgery.     Which medicines can I take?  Hold Aspirin for 7 days before surgery.   Hold Multivitamins for 7 days before surgery.  Hold Supplements for 7 days before surgery.  Hold Ibuprofen (Advil, Motrin) for 1 day before surgery--unless otherwise directed by  surgeon.  Hold Naproxen (Aleve) for 4 days before surgery.  Acetaminophen (Tylenol) is okay to take if needed.    -  DO NOT take these medications the day of surgery:  Cinacalcet (Sensipar)  Niacinamide  Sodium Bicarbonate  Polyethylene Glycol (Miralax)      -  PLEASE TAKE these medications the day of surgery:  Mycophenolate  Paroxetine (Paxil)  Prednisone (Deltasone)  Acetaminophen (Tylenol) if needed      How do I prepare myself?  - Please take 2 showers (one the night prior to surgery and one the morning of surgery) using Scrubcare or Hibiclens soap.    You may use your own shampoo and conditioner. No other hair products.     Use this soap only from the neck to your toes. Avoid genital area      Leave the soap on your skin for one minute--then rinse thoroughly.    - Please remove all jewelry and body piercings.  - No lotions, deodorants or fragrance.  - No makeup or fingernail polish.   - Bring your ID and insurance card.    -If you use a CPAP machine, please bring the CPAP machine, tubing, and mask to hospital.    -If you have a Deep Brain Stimulator, Spinal Cord Stimulator, or any Neuro Stimulator device---you must bring the remote control to the hospital.      ALL PATIENTS GOING HOME THE SAME DAY OF SURGERY ARE REQUIRED TO HAVE A RESPONSIBLE ADULT TO DRIVE AND BE IN ATTENDANCE WITH THEM FOR 24 HOURS FOLLOWING SURGERY.    Covid testing policy as of 12/06/2022  Your surgeon will notify and schedule you for a COVID test if one is needed before surgery--please direct any questions or COVID symptoms to your surgeon      Questions or Concerns:    - For any questions regarding the day of surgery or your hospital stay, please contact the Pre Admission Nursing Office at [phone].       - If you have health changes between today and your surgery, please call your surgeon.       - For questions after surgery, please call your surgeons office.           Current Visitor Guidelines    You may have 2 visitors in the pre  op area.    Visiting hours: 8 a.m. to 8:30 p.m.    Patients confirmed or suspected to have symptoms of COVID 19 or flu:     No visitors allowed for adult patients.   Children (under age 18) can have 1 named visitor.     People who are sick or showing symptoms of COVID 19 or flu:    Are not allowed to visit patients--we can only make exceptions in special situations.       Please follow these guidelines for your visit:          Please maintain social distance          Masking is optional--however at times you may be asked to wear a mask for the safety of yourself and others     Clean your hands with alcohol hand . Do this when you arrive at and leave the building and patient room,    And again after you touch your mask or anything in the room.     Go directly to and from the room you are visiting.     Stay in the patient s room during your visit. Limit going to other places in the hospital as much as possible     Leave bags and jackets at home or in the car.     For everyone s health, please don t come and go during your visit. That includes for smoking   during your visit.

## 2024-11-12 NOTE — H&P
Pre-Operative H & P     CC:  Preoperative exam to assess for increased cardiopulmonary risk while undergoing surgery and anesthesia.    Date of Encounter: 11/12/2024  Primary Care Physician:  Lizbeth Andrews     Reason for visit:   Encounter Diagnoses   Name Primary?    Pre-operative examination Yes    Complication of dialysis access insertion, subsequent encounter        HPI  Reina Quan is a 59 year old female who presents for pre-operative H & P in preparation for  Procedure Information       Case: [id number] Date/Time: 11/20/24 1105    Procedure: Left Upper Extremity brachiobasilic arteriovenous fistula second stage transposition surgery (Left: Arm)    Anesthesia type: General with Block    Diagnosis: Complication of dialysis access insertion [T82.9XXA]    Pre-op diagnosis: Complication of dialysis access insertion [T82.9XXA]    Location:  OR [address] [place] [address] OR    Providers: Jesus Alberto Walton MD            The patient is a 59 year old woman who has a past medical history significant for history of hypertension, hyperlipidemia, hearing loss, anemia, 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  Over the past few months her kidney function has been noted to be gradually worsening.  The patient underwent left upper extremity brachiobasilic arteriovenous fistula creation on 10/15/24. She is now scheduled for left upper extremity brachiobasilic arteriovenous fistula second stage transposition.     History is obtained from the patient and chart review    Hx of abnormal bleeding or anti-platelet use: none    Menstrual history: Patient's last menstrual period was 05/14/2014 (approximate).:      Past Medical History  Past Medical History:   Diagnosis Date    Actinic keratosis     Allergic rhinitis, cause unspecified     Anemia      anxiety/depression     PAXIL    Arthritis     congenital hearing loss     uses hearing aids    Depressive disorder     Dry eyes     Giant Platelet syndrome     Glomerular Focal Sclerosis--transplant 1985     Hypertension     Kidney replaced by transplant 1992    RAPAMUNE/ SIROLIMUS     Lichen planopilaris     LPP followed by derm on doxycycline/ clobetasol    Lichen planus     Menorrhagia     migraine     Squamous cell carcinoma     8 x     Thrombocytopenia (H)     Ulcerative colitis, unspecified     biposy neg 1996    Unsatisfactory cervical Papanicolaou smear 02/15/2021    UTI (lower urinary tract infection)     recurrent Dr Burns [facility]       Past Surgical History  Past Surgical History:   Procedure Laterality Date    BIOPSY OF SKIN LESION      CHOLECYSTECTOMY      CREATE FISTULA ARTERIOVENOUS UPPER EXTREMITY Left 10/15/2024    Procedure: Left Upper Extremity Brachiobasilic ARTERIOVENOUS FISTULA Creation;  Surgeon: Jesus Alberto Walton MD;  Location: [facility] OR    DILATION AND CURETTAGE, OPERATIVE HYSTEROSCOPY WITH MORCELLATOR, COMBINED N/A 11/10/2015    Procedure: COMBINED DILATION AND CURETTAGE, OPERATIVE HYSTEROSCOPY WITH MORCELLATOR;  Surgeon: Ghada Melendez MD;  Location: [facility] OR    ESOPHAGOSCOPY, GASTROSCOPY, DUODENOSCOPY (EGD), COMBINED  11/20/2012    Procedure: COMBINED ESOPHAGOSCOPY, GASTROSCOPY, DUODENOSCOPY (EGD), BIOPSY SINGLE OR MULTIPLE;;  Surgeon: Valentin Hahn MD;  Location: [facility] GI    IR CVC TUNNEL PLACEMENT > 5 YRS OF AGE  10/9/2024    IR RENAL BIOPSY RIGHT  07/25/2024    MOHS MICROGRAPHIC PROCEDURE      TRANSPLANT  1992    kidney    [facility] NONSPECIFIC PROCEDURE      Renal transplant right side    [facility] NONSPECIFIC PROCEDURE      cholecystectomy       Prior to Admission Medications  Current Outpatient Medications   Medication Sig Dispense Refill    atorvastatin (LIPITOR) 80 MG tablet Take 1 tablet (80 mg) by mouth at bedtime. 90 tablet 3    cinacalcet (SENSIPAR) 30 MG tablet Take 1 tablet  (30 mg) by mouth every other day 45 tablet 3    clobetasol (TEMOVATE) 0.05 % external ointment Apply topically 2 times daily Use sparingly at active areas of dermatitis for no more than 14 days or until resolved, whichever is sooner. 60 g 3    clobetasol (TEMOVATE) 0.05 % external solution Apply topically daily as needed (itchyy scalp) Apply to scalp daily as needed. 50 mL 4    clobetasol propionate (CLOBEX) 0.05 % external shampoo APPLY TO SCALP IN SHOWER TWICE WEEKLY 118 mL 1    ketoconazole (NIZORAL) 2 % external shampoo APPLY TOPICALLY DAILY AS NEEDED FOR ITCHING, IRRITATION OR OTHER( SCALP SCALE) USE 2-3 TIMES A WEEK. APPLY AND LATHER THEN RINSE OUT 5 MINUTES 120 mL 0    mycophenolate (GENERIC EQUIVALENT) 250 MG capsule Take 3 capsules (750 mg) by mouth 2 times daily 540 capsule 3    niacinamide 500 MG tablet Take 1 tablet (500 mg) by mouth 2 times daily (with meals). 60 tablet 3    PARoxetine (PAXIL) 20 MG tablet TAKE 1 TABLET BY MOUTH EVERY MORNING 90 tablet 2    predniSONE (DELTASONE) 5 MG tablet Take 1 tablet (5 mg) by mouth daily (Patient taking differently: Take 5 mg by mouth every morning.) 90 tablet 3    sodium bicarbonate 650 MG tablet Take 2 tablets (1,300 mg) by mouth 2 times daily 360 tablet 3    triamcinolone (KENALOG) 0.1 % external cream APPLY SPARINGLY TOPICALLY TO THE AFFECTED AREA TWICE DAILY FOR 14 DAYS 30 g 0    bisacodyl (DULCOLAX) 5 MG EC tablet Take 2 tablets at 3 pm the day before your procedure. If your procedure is before 11 am, take 2 additional tablets at 11 pm. If your procedure is after 11 am, take 2 additional tablets at 6 am. For additional instructions refer to your colonoscopy prep instructions. (Patient not taking: Reported on 11/12/2024) 4 tablet 0    polyethylene glycol (GOLYTELY) 236 g suspension The night before the exam at 6 pm drink an 8-ounce glass every 15 minutes until the jug is half empty. If you arrive before 11 AM: Drink the other half of the [employer] jug at 11 PM  night before procedure. If you arrive after 11 AM: Drink the other half of the [employer] jug at 6 AM day of procedure. For additional instructions refer to your colonoscopy prep instructions. (Patient not taking: Reported on 11/12/2024) 4000 mL 0    polyethylene glycol (MIRALAX) 17 GM/Dose powder Take 17 g (1 Capful) by mouth daily. (Patient not taking: Reported on 11/12/2024) 510 g 3       Allergies  Allergies   Allergen Reactions    Ampicillin Swelling     Swelling of mouth and tongue.     Seasonal Allergies Other (See Comments)     Itchy eyes and rhinitis.       Social History  Social History     Socioeconomic History    Marital status:      Spouse name: Not on file    Number of children: 3    Years of education: Not on file    Highest education level: Not on file   Occupational History    Occupation:  - window treatments   Tobacco Use    Smoking status: Never     Passive exposure: Never    Smokeless tobacco: Never   Vaping Use    Vaping status: Never Used   Substance and Sexual Activity    Alcohol use: Not Currently    Drug use: Never    Sexual activity: Not Currently     Partners: Male     Birth control/protection: Post-menopausal   Other Topics Concern    Parent/sibling w/ CABG, MI or angioplasty before 65F 55M? 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 Doesn't have a job lined up afterward but looking forward to some time off. Has three children, one moved to college this fall. Living in [place], has a significant other x 7 months. Previously . In a monogamous relationship. Never smoker, social EtOH use.      Social Drivers of Health     Financial Resource Strain: Not on file   Food Insecurity: Not on file   Transportation Needs: Not on file   Physical Activity: Not on file   Stress: Not on file   Social Connections: Not on file   Interpersonal Safety: Low Risk  (10/9/2024)    Interpersonal Safety     Do you feel  physically and emotionally safe where you currently live?: Yes     Within the past 12 months, have you been hit, slapped, kicked or otherwise physically hurt by someone?: No     Within the past 12 months, have you been humiliated or emotionally abused in other ways by your partner or ex-partner?: No   Housing Stability: Not on file       Family History  Family History   Problem Relation Age of Onset    Hypertension Mother     Depression Mother     Anxiety Disorder Mother     Diabetes Father     Kidney Disease Father         nephrolithiasis    Asthma Father     Asthma Sister     Blood Disease Maternal Grandmother     Diabetes Maternal Grandmother     Hypertension Maternal Grandmother     Hyperlipidemia Maternal Grandmother     Depression Maternal Grandmother     No Known Problems Maternal Grandfather     No Known Problems Paternal Grandmother     C.A.D. Paternal Grandfather     Cancer Other         no family hx of skin cancer    Glaucoma No family hx of     Macular Degeneration No family hx of     Melanoma No family hx of     Skin Cancer No family hx of     Anesthesia Reaction No family hx of     Thrombosis No family hx of        Review of Systems  The complete review of systems is negative other than noted in the HPI or here.   Anesthesia Evaluation   Pt has had prior anesthetic. Type: General.    No history of anesthetic complications       ROS/MED HX  ENT/Pulmonary: Comment: Hearing loss    (+)     MARGO risk factors,  hypertension,    allergic rhinitis,                             Neurologic:    (-) no seizures, no CVA, no TIA and migraines   Cardiovascular:     (+) Dyslipidemia hypertension- -   -  - -                                 Previous cardiac testing   Echo: Date: 7/2024 Results:  Echo  7/2024  Interpretation Summary  Global and regional left ventricular function is normal with an EF of 60-65%.  Global right ventricular function is normal.  No significant valvular abnormalities present.  Estimated mean  right atrial pressure is normal.  No pericardial effusion is present.  Stress Test:  Date: 10/4/24 Results:  Addenda        The nuclear stress test is negative for inducible myocardial ischemia or infarction.     The left ventricular ejection fraction at rest is greater than 70%.     There is no prior study for comparison.    ECG Reviewed:  Date: 8/2024 Results:  Sinus rhythm  Low voltage QRS  Borderline ECG  Cath:  Date: Results:   (-) orthopnea/PND   METS/Exercise Tolerance: 3 - Able to walk 1-2 blocks without stopping Comment: Patient is feeling better now that she's on dialysis. She just got a new puppy who is keeping her active.    Hematologic:     (+)      anemia, history of blood transfusion, no previous transfusion reaction,        Musculoskeletal:   (+)  arthritis,             GI/Hepatic:  - neg GI/hepatic ROS  (-) GERD   Renal/Genitourinary: Comment: Glomerular focal sclerosis  S/p kidney transplant - graft failure    Nocturia, urinary retention    (+) renal disease, type: CRI, Pt does not require dialysis,  Pt has history of transplant, date: 1992,        Endo: Comment: prediabetes    (+)            Chronic steroid usage for Post Transplant Immunosuppression.         Psychiatric/Substance Use:     (+) psychiatric history anxiety and depression       Infectious Disease:  - neg infectious disease ROS     Malignancy:   (+) Malignancy, History of Skin.Skin CA Remission status post Surgery.      Other: Comment: Lichen planoplanis - following with dermatology           Virtual visit -  No vitals were obtained    Physical Exam  Constitutional: Awake, alert, cooperative, no apparent distress, and appears stated age.  Eyes: Pupils equal  HENT: Normocephalic  Respiratory: non labored breathing   Neurologic: Awake, alert, oriented to name, place and time.   Neuropsychiatric: Calm, cooperative. Normal affect.      Prior Labs/Diagnostic Studies   All labs and imaging personally reviewed    Latest Reference Range &  Units 10/15/24 06:37   Sodium 135 - 145 mmol/L 140   Potassium 3.4 - 5.3 mmol/L 4.1   Chloride 98 - 107 mmol/L 100   Carbon Dioxide (CO2) 22 - 29 mmol/L 25   Urea Nitrogen 8.0 - 23.0 mg/dL 28.1 (H)   Creatinine 0.51 - 0.95 mg/dL 3.55 (H)   GFR Estimate >60 mL/min/1.73m2 14 (L)   Calcium 8.8 - 10.4 mg/dL 9.9   Anion Gap 7 - 15 mmol/L 15   Glucose 70 - 99 mg/dL 100 (H)   Hemoglobin 11.7 - 15.7 g/dL 9.3 (L)   (H): Data is abnormally high  (L): Data is abnormally low    EKG 8/29/24  Sinus rhythm   Low voltage QRS   Borderline ECG       Echo 7/15/24  Interpretation Summary  Global and regional left ventricular function is normal with an EF of 60-65%.  Global right ventricular function is normal.  No significant valvular abnormalities present.  Estimated mean right atrial pressure is normal.  No pericardial effusion is present.      NM stress test 10/4/24  Addenda       The nuclear stress test is negative for inducible myocardial ischemia or infarction.    The left ventricular ejection fraction at rest is greater than 70%.    There is no prior study for comparison.      The patient's records and results personally reviewed by this provider.     Outside records reviewed from: Care Everywhere      Assessment    Reina Quan is a 59 year old female seen as a PAC referral for risk assessment and optimization for anesthesia.    Plan/Recommendations  Pt will be optimized for the proposed procedure.  See below for details on the assessment, risk, and preoperative recommendations    NEUROLOGY  - No history of TIA, CVA or seizure  ~ history of migraine - resolved now that she's off birth control.   -Post Op delirium risk factors:  High co-morbid index    ENT  - No current airway concerns.  Will need to be reassessed day of surgery.  Mallampati: Unable to assess  TM: Unable to assess  ~ hearing loss - bilateral hearing aids.     CARDIAC  - No history of CAD and Afib  ~ Continue lipitor  - METS (Metabolic  "Equivalents)  Patient CANNOT perform 4 METS exercise without symptoms             Total Score: 1    Functional Capacity: Unable to complete 4 METS      RCRI-Low risk: Class 2 0.9% complication rate             Total Score: 1    RCRI: Elevated Creatinine    ~The patient reports that she is feeling better now that she has started hemodialysis.  She did just get a new puppy and has been busy running around and taking care of it.  She did have a stress test on 10/4/2024 without ischemia.  No further testing indicated    PULMONARY  - Obstructive Sleep Apnea  MARGO Low Risk             Total Score: 2    MARGO: Hypertension    MARGO: Over 50 ys old      - Denies asthma or inhaler use  ~ Seasonal allergies -patient reports no current issues  - Tobacco History    History   Smoking Status    Never   Smokeless Tobacco    Never       GI  - denies GERD  ~ History of ulcerative colitis -patient reports that this was found on a colonoscopy but that she has never had any symptoms and currently not on any medications  PONV High Risk  Total Score: 3           1 AN PONV: Pt is Female    1 AN PONV: Patient is not a current smoker    1 AN PONV: Intended Post Op Opioids        /RENAL  - s/p kidney transplant in 1992 for FSGS, now with worsening CKD.  Continue transplant medication the patient is doing hemodialysis on Monday Wednesday Friday.  She does have a plan to do dialysis on the Tuesday before her procedure. Procedure planned as above.Will check potassium DOS.   - being worked up for kidney transplant list.    ENDOCRINE    - BMI: Estimated body mass index is 23.78 kg/m  as calculated from the following:    Height as of this encounter: 1.575 m (5' 2\").    Weight as of this encounter: 59 kg (130 lb).  Overweight (BMI 25.0-29.9)  - No history of Diabetes Mellitus  - On chronic steroids for transplant. Consideration for stress dose steroids.   ~ prediabetes - A1c 6.0 on 10/7/24.     HEME  VTE Low Risk 0.26%             Total Score: 0    "   - No history of abnormal bleeding or antiplatelet use.  - Chronic anemia  Recommend perioperative use of blood conservation techniques intraoperatively and close monitoring for postoperative bleeding.  A type and screen has not been done and deferred to the team day of surgery  ~ Congenital giant platelet syndrome/ thrombocytopenia. Platelets 163 on 10/7/24.     MSK  Patient is NOT Frail             Total Score: 0      ~ OA - knees and hips.     PSYCH  - DEBRA/MDD - continue paxil    DERM  ~ History of SCC and Lichen planoplanis - following with dermatology.      Different anesthesia methods/types have been discussed with the patient, but they are aware that the final plan will be decided by the assigned anesthesia provider on the date of service.    The patient is optimized for their procedure. AVS with information on surgery time/arrival time, meds and NPO status given by nursing staff. No further diagnostic testing indicated.    Please refer to the physical examination documented by the anesthesiologist in the anesthesia record on the day of surgery.    Video-Visit Details    Type of service:  Video Visit    Provider received verbal consent for a Video Visit from the patient? Yes     Originating Location (pt. Location): Home    Distant Location (provider location):  Off-site  Mode of Communication:  Video Conference via Flayr  On the day of service:     Prep time: 8 minutes  Visit time: 13 minutes  Documentation time: 13 minutes  ------------------------------------------  Total time: 34 minutes      Brittney Pate PA-C  Preoperative Assessment Center  [facility]  Phone: [phone]  Fax: [phone]

## 2024-11-20 ENCOUNTER — TELEPHONE (OUTPATIENT)
Dept: FAMILY MEDICINE | Facility: CLINIC | Age: 59
End: 2024-11-20

## 2024-11-20 ENCOUNTER — HOSPITAL ENCOUNTER (OUTPATIENT)
Facility: CLINIC | Age: 59
Discharge: HOME | End: 2024-11-20
Attending: SURGERY | Admitting: SURGERY
Payer: COMMERCIAL

## 2024-11-20 ENCOUNTER — ANESTHESIA (OUTPATIENT)
Dept: SURGERY | Facility: CLINIC | Age: 59
End: 2024-11-20
Payer: COMMERCIAL

## 2024-11-20 ENCOUNTER — MYC MEDICAL ADVICE (OUTPATIENT)
Dept: FAMILY MEDICINE | Facility: CLINIC | Age: 59
End: 2024-11-20

## 2024-11-20 VITALS
SYSTOLIC BLOOD PRESSURE: 113 MMHG | TEMPERATURE: 98.6 F | BODY MASS INDEX: 23.41 KG/M2 | DIASTOLIC BLOOD PRESSURE: 68 MMHG | WEIGHT: 127.21 LBS | HEIGHT: 62 IN | OXYGEN SATURATION: 96 % | HEART RATE: 64 BPM | RESPIRATION RATE: 16 BRPM

## 2024-11-20 DIAGNOSIS — N18.6 ENCOUNTER REGARDING VASCULAR ACCESS FOR DIALYSIS FOR END-STAGE RENAL DISEASE (H): Primary | ICD-10-CM

## 2024-11-20 DIAGNOSIS — Z99.2 ENCOUNTER REGARDING VASCULAR ACCESS FOR DIALYSIS FOR END-STAGE RENAL DISEASE (H): Primary | ICD-10-CM

## 2024-11-20 LAB
GLUCOSE BLDC GLUCOMTR-MCNC: 96 MG/DL (ref 70–99)
POTASSIUM SERPL-SCNC: 3.4 MMOL/L (ref 3.4–5.3)

## 2024-11-20 PROCEDURE — 250N000012 HC RX MED GY IP 250 OP 636 PS 637: Performed by: SURGERY

## 2024-11-20 PROCEDURE — 82962 GLUCOSE BLOOD TEST: CPT

## 2024-11-20 PROCEDURE — 250N000011 HC RX IP 250 OP 636

## 2024-11-20 PROCEDURE — 250N000009 HC RX 250: Performed by: SURGERY

## 2024-11-20 PROCEDURE — 84132 ASSAY OF SERUM POTASSIUM: CPT | Performed by: PHYSICIAN ASSISTANT

## 2024-11-20 PROCEDURE — 76998 US GUIDE INTRAOP: CPT | Mod: 26 | Performed by: SURGERY

## 2024-11-20 PROCEDURE — 360N000076 HC SURGERY LEVEL 3, PER MIN: Performed by: SURGERY

## 2024-11-20 PROCEDURE — 250N000025 HC SEVOFLURANE, PER MIN: Performed by: SURGERY

## 2024-11-20 PROCEDURE — 36832 AV FISTULA REVISION OPEN: CPT | Mod: 58 | Performed by: SURGERY

## 2024-11-20 PROCEDURE — 710N000010 HC RECOVERY PHASE 1, LEVEL 2, PER MIN: Performed by: SURGERY

## 2024-11-20 PROCEDURE — 272N000001 HC OR GENERAL SUPPLY STERILE: Performed by: SURGERY

## 2024-11-20 PROCEDURE — 710N000012 HC RECOVERY PHASE 2, PER MINUTE: Performed by: SURGERY

## 2024-11-20 PROCEDURE — 250N000011 HC RX IP 250 OP 636: Performed by: SURGERY

## 2024-11-20 PROCEDURE — 258N000003 HC RX IP 258 OP 636: Performed by: NURSE ANESTHETIST, CERTIFIED REGISTERED

## 2024-11-20 PROCEDURE — 370N000017 HC ANESTHESIA TECHNICAL FEE, PER MIN: Performed by: SURGERY

## 2024-11-20 PROCEDURE — 250N000011 HC RX IP 250 OP 636: Performed by: ANESTHESIOLOGY

## 2024-11-20 PROCEDURE — 999N000141 HC STATISTIC PRE-PROCEDURE NURSING ASSESSMENT: Performed by: SURGERY

## 2024-11-20 PROCEDURE — 250N000011 HC RX IP 250 OP 636: Mod: JZ | Performed by: NURSE ANESTHETIST, CERTIFIED REGISTERED

## 2024-11-20 RX ORDER — NALOXONE HYDROCHLORIDE 0.4 MG/ML
0.4 INJECTION, SOLUTION INTRAMUSCULAR; INTRAVENOUS; SUBCUTANEOUS
Status: DISCONTINUED | OUTPATIENT
Start: 2024-11-20 | End: 2024-11-20 | Stop reason: HOSPADM

## 2024-11-20 RX ORDER — ONDANSETRON 4 MG/1
4 TABLET, ORALLY DISINTEGRATING ORAL EVERY 30 MIN PRN
Status: DISCONTINUED | OUTPATIENT
Start: 2024-11-20 | End: 2024-11-20 | Stop reason: HOSPADM

## 2024-11-20 RX ORDER — OXYCODONE HYDROCHLORIDE 5 MG/1
5 TABLET ORAL EVERY 6 HOURS PRN
Qty: 12 TABLET | Refills: 0 | Status: SHIPPED | OUTPATIENT
Start: 2024-11-20 | End: 2024-11-23

## 2024-11-20 RX ORDER — NALOXONE HYDROCHLORIDE 0.4 MG/ML
0.2 INJECTION, SOLUTION INTRAMUSCULAR; INTRAVENOUS; SUBCUTANEOUS
Status: DISCONTINUED | OUTPATIENT
Start: 2024-11-20 | End: 2024-11-20 | Stop reason: HOSPADM

## 2024-11-20 RX ORDER — HYDROMORPHONE HCL IN WATER/PF 6 MG/30 ML
0.2 PATIENT CONTROLLED ANALGESIA SYRINGE INTRAVENOUS EVERY 5 MIN PRN
Status: DISCONTINUED | OUTPATIENT
Start: 2024-11-20 | End: 2024-11-20 | Stop reason: HOSPADM

## 2024-11-20 RX ORDER — FENTANYL CITRATE 50 UG/ML
INJECTION, SOLUTION INTRAMUSCULAR; INTRAVENOUS PRN
Status: DISCONTINUED | OUTPATIENT
Start: 2024-11-20 | End: 2024-11-20

## 2024-11-20 RX ORDER — DEXAMETHASONE SODIUM PHOSPHATE 4 MG/ML
4 INJECTION, SOLUTION INTRA-ARTICULAR; INTRALESIONAL; INTRAMUSCULAR; INTRAVENOUS; SOFT TISSUE
Status: DISCONTINUED | OUTPATIENT
Start: 2024-11-20 | End: 2024-11-20 | Stop reason: HOSPADM

## 2024-11-20 RX ORDER — ONDANSETRON 2 MG/ML
INJECTION INTRAMUSCULAR; INTRAVENOUS PRN
Status: DISCONTINUED | OUTPATIENT
Start: 2024-11-20 | End: 2024-11-20

## 2024-11-20 RX ORDER — ONDANSETRON 4 MG/1
4 TABLET, ORALLY DISINTEGRATING ORAL EVERY 30 MIN PRN
Status: CANCELLED | OUTPATIENT
Start: 2024-11-20

## 2024-11-20 RX ORDER — ONDANSETRON 2 MG/ML
4 INJECTION INTRAMUSCULAR; INTRAVENOUS EVERY 30 MIN PRN
Status: CANCELLED | OUTPATIENT
Start: 2024-11-20

## 2024-11-20 RX ORDER — OXYCODONE HYDROCHLORIDE 5 MG/1
5 TABLET ORAL
Status: CANCELLED | OUTPATIENT
Start: 2024-11-20

## 2024-11-20 RX ORDER — PROPOFOL 10 MG/ML
INJECTION, EMULSION INTRAVENOUS CONTINUOUS PRN
Status: DISCONTINUED | OUTPATIENT
Start: 2024-11-20 | End: 2024-11-20

## 2024-11-20 RX ORDER — SODIUM CHLORIDE, SODIUM LACTATE, POTASSIUM CHLORIDE, CALCIUM CHLORIDE 600; 310; 30; 20 MG/100ML; MG/100ML; MG/100ML; MG/100ML
INJECTION, SOLUTION INTRAVENOUS CONTINUOUS
Status: DISCONTINUED | OUTPATIENT
Start: 2024-11-20 | End: 2024-11-20 | Stop reason: HOSPADM

## 2024-11-20 RX ORDER — PROPOFOL 10 MG/ML
INJECTION, EMULSION INTRAVENOUS PRN
Status: DISCONTINUED | OUTPATIENT
Start: 2024-11-20 | End: 2024-11-20

## 2024-11-20 RX ORDER — FENTANYL CITRATE 50 UG/ML
50 INJECTION, SOLUTION INTRAMUSCULAR; INTRAVENOUS EVERY 5 MIN PRN
Status: DISCONTINUED | OUTPATIENT
Start: 2024-11-20 | End: 2024-11-20 | Stop reason: HOSPADM

## 2024-11-20 RX ORDER — FLUMAZENIL 0.1 MG/ML
0.2 INJECTION, SOLUTION INTRAVENOUS
Status: DISCONTINUED | OUTPATIENT
Start: 2024-11-20 | End: 2024-11-20 | Stop reason: HOSPADM

## 2024-11-20 RX ORDER — OXYCODONE HYDROCHLORIDE 10 MG/1
10 TABLET ORAL
Status: CANCELLED | OUTPATIENT
Start: 2024-11-20

## 2024-11-20 RX ORDER — PAPAVERINE HYDROCHLORIDE 30 MG/ML
INJECTION INTRAMUSCULAR; INTRAVENOUS PRN
Status: DISCONTINUED | OUTPATIENT
Start: 2024-11-20 | End: 2024-11-20 | Stop reason: HOSPADM

## 2024-11-20 RX ORDER — FENTANYL CITRATE 50 UG/ML
25 INJECTION, SOLUTION INTRAMUSCULAR; INTRAVENOUS EVERY 5 MIN PRN
Status: DISCONTINUED | OUTPATIENT
Start: 2024-11-20 | End: 2024-11-20 | Stop reason: HOSPADM

## 2024-11-20 RX ORDER — CLINDAMYCIN PHOSPHATE 600 MG/50ML
INJECTION, SOLUTION INTRAVENOUS PRN
Status: DISCONTINUED | OUTPATIENT
Start: 2024-11-20 | End: 2024-11-20

## 2024-11-20 RX ORDER — HYDROMORPHONE HCL IN WATER/PF 6 MG/30 ML
0.4 PATIENT CONTROLLED ANALGESIA SYRINGE INTRAVENOUS EVERY 5 MIN PRN
Status: DISCONTINUED | OUTPATIENT
Start: 2024-11-20 | End: 2024-11-20 | Stop reason: HOSPADM

## 2024-11-20 RX ORDER — SODIUM CHLORIDE, SODIUM LACTATE, POTASSIUM CHLORIDE, CALCIUM CHLORIDE 600; 310; 30; 20 MG/100ML; MG/100ML; MG/100ML; MG/100ML
INJECTION, SOLUTION INTRAVENOUS CONTINUOUS PRN
Status: DISCONTINUED | OUTPATIENT
Start: 2024-11-20 | End: 2024-11-20

## 2024-11-20 RX ORDER — FENTANYL CITRATE 50 UG/ML
25-50 INJECTION, SOLUTION INTRAMUSCULAR; INTRAVENOUS
Status: DISCONTINUED | OUTPATIENT
Start: 2024-11-20 | End: 2024-11-20 | Stop reason: HOSPADM

## 2024-11-20 RX ORDER — DEXAMETHASONE SODIUM PHOSPHATE 4 MG/ML
4 INJECTION, SOLUTION INTRA-ARTICULAR; INTRALESIONAL; INTRAMUSCULAR; INTRAVENOUS; SOFT TISSUE
Status: CANCELLED | OUTPATIENT
Start: 2024-11-20

## 2024-11-20 RX ORDER — ONDANSETRON 2 MG/ML
4 INJECTION INTRAMUSCULAR; INTRAVENOUS EVERY 30 MIN PRN
Status: DISCONTINUED | OUTPATIENT
Start: 2024-11-20 | End: 2024-11-20 | Stop reason: HOSPADM

## 2024-11-20 RX ORDER — NALOXONE HYDROCHLORIDE 0.4 MG/ML
0.1 INJECTION, SOLUTION INTRAMUSCULAR; INTRAVENOUS; SUBCUTANEOUS
Status: CANCELLED | OUTPATIENT
Start: 2024-11-20

## 2024-11-20 RX ORDER — DEXAMETHASONE SODIUM PHOSPHATE 4 MG/ML
INJECTION, SOLUTION INTRA-ARTICULAR; INTRALESIONAL; INTRAMUSCULAR; INTRAVENOUS; SOFT TISSUE PRN
Status: DISCONTINUED | OUTPATIENT
Start: 2024-11-20 | End: 2024-11-20

## 2024-11-20 RX ORDER — NALOXONE HYDROCHLORIDE 0.4 MG/ML
0.1 INJECTION, SOLUTION INTRAMUSCULAR; INTRAVENOUS; SUBCUTANEOUS
Status: DISCONTINUED | OUTPATIENT
Start: 2024-11-20 | End: 2024-11-20 | Stop reason: HOSPADM

## 2024-11-20 RX ADMIN — FENTANYL CITRATE 50 MCG: 50 INJECTION INTRAMUSCULAR; INTRAVENOUS at 12:19

## 2024-11-20 RX ADMIN — PROPOFOL 50 MG: 10 INJECTION, EMULSION INTRAVENOUS at 12:16

## 2024-11-20 RX ADMIN — PHENYLEPHRINE HYDROCHLORIDE 100 MCG: 10 INJECTION INTRAVENOUS at 12:29

## 2024-11-20 RX ADMIN — PHENYLEPHRINE HYDROCHLORIDE 100 MCG: 10 INJECTION INTRAVENOUS at 12:25

## 2024-11-20 RX ADMIN — PHENYLEPHRINE HYDROCHLORIDE 100 MCG: 10 INJECTION INTRAVENOUS at 14:04

## 2024-11-20 RX ADMIN — CLINDAMYCIN IN 5 PERCENT DEXTROSE 600 MG: 12 INJECTION, SOLUTION INTRAVENOUS at 12:06

## 2024-11-20 RX ADMIN — PHENYLEPHRINE HYDROCHLORIDE 100 MCG: 10 INJECTION INTRAVENOUS at 12:33

## 2024-11-20 RX ADMIN — FENTANYL CITRATE 50 MCG: 50 INJECTION INTRAMUSCULAR; INTRAVENOUS at 12:09

## 2024-11-20 RX ADMIN — PHENYLEPHRINE HYDROCHLORIDE 100 MCG: 10 INJECTION INTRAVENOUS at 13:43

## 2024-11-20 RX ADMIN — DEXAMETHASONE SODIUM PHOSPHATE 4 MG: 4 INJECTION, SOLUTION INTRA-ARTICULAR; INTRALESIONAL; INTRAMUSCULAR; INTRAVENOUS; SOFT TISSUE at 12:26

## 2024-11-20 RX ADMIN — FENTANYL CITRATE 50 MCG: 50 INJECTION, SOLUTION INTRAMUSCULAR; INTRAVENOUS at 10:48

## 2024-11-20 RX ADMIN — PROPOFOL 50 MG: 10 INJECTION, EMULSION INTRAVENOUS at 12:10

## 2024-11-20 RX ADMIN — PROPOFOL 150 MCG/KG/MIN: 10 INJECTION, EMULSION INTRAVENOUS at 12:06

## 2024-11-20 RX ADMIN — PHENYLEPHRINE HYDROCHLORIDE 100 MCG: 10 INJECTION INTRAVENOUS at 14:18

## 2024-11-20 RX ADMIN — PROPOFOL 200 MG: 10 INJECTION, EMULSION INTRAVENOUS at 12:06

## 2024-11-20 RX ADMIN — PHENYLEPHRINE HYDROCHLORIDE 100 MCG: 10 INJECTION INTRAVENOUS at 14:56

## 2024-11-20 RX ADMIN — PHENYLEPHRINE HYDROCHLORIDE 100 MCG: 10 INJECTION INTRAVENOUS at 14:12

## 2024-11-20 RX ADMIN — SODIUM CHLORIDE, POTASSIUM CHLORIDE, SODIUM LACTATE AND CALCIUM CHLORIDE: 600; 310; 30; 20 INJECTION, SOLUTION INTRAVENOUS at 11:56

## 2024-11-20 RX ADMIN — BUPIVACAINE HYDROCHLORIDE AND EPINEPHRINE BITARTRATE 30 ML: 5; .005 INJECTION, SOLUTION EPIDURAL; INTRACAUDAL; PERINEURAL at 10:55

## 2024-11-20 RX ADMIN — PHENYLEPHRINE HYDROCHLORIDE 100 MCG: 10 INJECTION INTRAVENOUS at 12:43

## 2024-11-20 RX ADMIN — ONDANSETRON 4 MG: 2 INJECTION INTRAMUSCULAR; INTRAVENOUS at 12:26

## 2024-11-20 RX ADMIN — PHENYLEPHRINE HYDROCHLORIDE 100 MCG: 10 INJECTION INTRAVENOUS at 12:19

## 2024-11-20 RX ADMIN — PHENYLEPHRINE HYDROCHLORIDE 100 MCG: 10 INJECTION INTRAVENOUS at 13:12

## 2024-11-20 RX ADMIN — FENTANYL CITRATE 50 MCG: 50 INJECTION, SOLUTION INTRAMUSCULAR; INTRAVENOUS at 16:01

## 2024-11-20 RX ADMIN — PHENYLEPHRINE HYDROCHLORIDE 100 MCG: 10 INJECTION INTRAVENOUS at 15:08

## 2024-11-20 ASSESSMENT — ACTIVITIES OF DAILY LIVING (ADL)
ADLS_ACUITY_SCORE: 0

## 2024-11-20 ASSESSMENT — ENCOUNTER SYMPTOMS
ORTHOPNEA: 0
SEIZURES: 0

## 2024-11-20 NOTE — ANESTHESIA PREPROCEDURE EVALUATION
Anesthesia Pre-Procedure Evaluation    Patient: Reina Quan   MRN: [id number] : 1965        Procedure : Procedure(s):  Left Upper Extremity brachiobasilic arteriovenous fistula second stage transposition surgery          Past Medical History:   Diagnosis Date    Actinic keratosis     Allergic rhinitis, cause unspecified     Anemia     anxiety/depression     PAXIL    Arthritis     congenital hearing loss     uses hearing aids    Depressive disorder     Dry eyes     Giant Platelet syndrome     Glomerular Focal Sclerosis--transplant      Hypertension     Kidney replaced by transplant     RAPAMUNE/ SIROLIMUS     Lichen planopilaris     LPP followed by derm on doxycycline/ clobetasol    Lichen planus     Menorrhagia     migraine     Squamous cell carcinoma     8 x     Thrombocytopenia (H)     Ulcerative colitis, unspecified     biposy neg     Unsatisfactory cervical Papanicolaou smear 02/15/2021    UTI (lower urinary tract infection)     recurrent Dr [facility]      Past Surgical History:   Procedure Laterality Date    BIOPSY OF SKIN LESION      CHOLECYSTECTOMY      CREATE FISTULA ARTERIOVENOUS UPPER EXTREMITY Left 10/15/2024    Procedure: Left Upper Extremity Brachiobasilic ARTERIOVENOUS FISTULA Creation;  Surgeon: Jesus Alberto Walton MD;  Location: [facility] OR    DILATION AND CURETTAGE, OPERATIVE HYSTEROSCOPY WITH MORCELLATOR, COMBINED N/A 11/10/2015    Procedure: COMBINED DILATION AND CURETTAGE, OPERATIVE HYSTEROSCOPY WITH MORCELLATOR;  Surgeon: Ghada Melendez MD;  Location: [facility] OR    ESOPHAGOSCOPY, GASTROSCOPY, DUODENOSCOPY (EGD), COMBINED  2012    Procedure: COMBINED ESOPHAGOSCOPY, GASTROSCOPY, DUODENOSCOPY (EGD), BIOPSY SINGLE OR MULTIPLE;;  Surgeon: Valentin aHhn MD;  Location: [facility] GI    IR CVC TUNNEL PLACEMENT > 5 YRS OF AGE  10/9/2024    IR RENAL BIOPSY RIGHT  2024    MOHS MICROGRAPHIC PROCEDURE      TRANSPLANT  1992    kidney    ZZC NONSPECIFIC PROCEDURE       Renal transplant right side    ZZC NONSPECIFIC PROCEDURE      cholecystectomy      Allergies   Allergen Reactions    Ampicillin Swelling     Swelling of mouth and tongue.     Seasonal Allergies Other (See Comments)     Itchy eyes and rhinitis.      Social History     Tobacco Use    Smoking status: Never     Passive exposure: Never    Smokeless tobacco: Never   Substance Use Topics    Alcohol use: Not Currently      Wt Readings from Last 1 Encounters:   11/20/24 57.7 kg (127 lb 3.3 oz)        Anesthesia Evaluation   Pt has had prior anesthetic. Type: General.    No history of anesthetic complications       ROS/MED HX  ENT/Pulmonary: Comment: Hearing loss    (+)     MARGO risk factors,  hypertension,    allergic rhinitis,                             Neurologic:    (-) no seizures, no CVA, no TIA and migraines   Cardiovascular:     (+) Dyslipidemia hypertension- -   -  - -                                 Previous cardiac testing   Echo: Date: 7/2024 Results:  Echo  7/2024  Interpretation Summary  Global and regional left ventricular function is normal with an EF of 60-65%.  Global right ventricular function is normal.  No significant valvular abnormalities present.  Estimated mean right atrial pressure is normal.  No pericardial effusion is present.  Stress Test:  Date: 10/4/24 Results:  Addenda        The nuclear stress test is negative for inducible myocardial ischemia or infarction.     The left ventricular ejection fraction at rest is greater than 70%.     There is no prior study for comparison.    ECG Reviewed:  Date: 8/2024 Results:  Sinus rhythm  Low voltage QRS  Borderline ECG  Cath:  Date: Results:   (-) orthopnea/PND   METS/Exercise Tolerance: 3 - Able to walk 1-2 blocks without stopping Comment: Patient is feeling better now that she's on dialysis. She just got a new puppy who is keeping her active.    Hematologic:     (+)      anemia, history of blood transfusion, no previous transfusion reaction,         Musculoskeletal:   (+)  arthritis,             GI/Hepatic:  - neg GI/hepatic ROS  (-) GERD   Renal/Genitourinary: Comment: Glomerular focal sclerosis  S/p kidney transplant - graft failure    Nocturia, urinary retention    Dialysis M, W,  F    (+) renal disease, type: CRI, Pt does not require dialysis,  Pt has history of transplant, date: 1992,        Endo: Comment: prediabetes    (+)            Chronic steroid usage for Post Transplant Immunosuppression.         Psychiatric/Substance Use:     (+) psychiatric history anxiety and depression       Infectious Disease:  - neg infectious disease ROS     Malignancy:   (+) Malignancy, History of Skin.Skin CA Remission status post Surgery.      Other: Comment: Lichen planoplanis - following with dermatology              OUTSIDE LABS:  CBC:   Lab Results   Component Value Date    WBC 5.4 10/07/2024    WBC 4.9 09/27/2024    HGB 9.3 (L) 10/15/2024    HGB 9.6 (L) 10/07/2024    HCT 31.2 (L) 10/07/2024    HCT 30.2 (L) 09/27/2024     10/07/2024     09/27/2024     BMP:   Lab Results   Component Value Date     10/15/2024     10/07/2024    POTASSIUM 4.1 10/15/2024    POTASSIUM 4.0 10/07/2024    CHLORIDE 100 10/15/2024    CHLORIDE 103 10/07/2024    CO2 25 10/15/2024    CO2 20 (L) 10/07/2024    BUN 28.1 (H) 10/15/2024    BUN 49.0 (H) 10/07/2024    CR 3.55 (H) 10/15/2024    CR 4.40 (H) 10/07/2024    GLC 96 11/20/2024     (H) 10/15/2024     COAGS:   Lab Results   Component Value Date    PTT 28 10/07/2024    INR 0.97 10/07/2024    FIBR 650 (H) 08/22/2010     POC:   Lab Results   Component Value Date     (H) 07/07/2010    HCG Negative 11/10/2015     HEPATIC:   Lab Results   Component Value Date    ALBUMIN 4.7 10/07/2024    PROTTOTAL 7.6 10/07/2024    ALT 33 10/07/2024    AST 25 10/07/2024    ALKPHOS 65 10/07/2024    BILITOTAL 0.3 10/07/2024     OTHER:   Lab Results   Component Value Date    LACT 0.5 (L) 11/17/2016    A1C 6.0 (H) 10/07/2024    HARSHA  9.9 10/15/2024    PHOS 4.7 (H) 10/07/2024    MAG 2.3 01/09/2024    LIPASE 169 03/13/2015    AMYLASE 61 02/10/2012    TSH 3.80 01/17/2020    T4 0.65 (L) 06/14/2019    CRP 0.8 (H) 07/04/2022    SED 78 (H) 11/28/2016       Anesthesia Plan    ASA Status:  3    NPO Status:  NPO Appropriate    Anesthesia Type: General.     - Airway: LMA   Induction: Intravenous.   Maintenance: Balanced.        Consents    Anesthesia Plan(s) and associated risks, benefits, and realistic alternatives discussed. Questions answered and patient/representative(s) expressed understanding.     - Discussed:     - Discussed with:  Patient      - Extended Intubation/Ventilatory Support Discussed: No.      - Patient is DNR/DNI Status: No     Use of blood products discussed: No .     Postoperative Care    Pain management: IV analgesics.   PONV prophylaxis: Ondansetron (or other 5HT-3)     Comments:               Ino Daly,     I have reviewed the pertinent notes and labs in the chart from the past 30 days and (re)examined the patient.  Any updates or changes from those notes are reflected in this note.               # Hypertension: Noted on problem list

## 2024-11-20 NOTE — ANESTHESIA CARE TRANSFER NOTE
Patient: Reina Quan    Procedure: Procedure(s):  Left Upper Extremity brachiobasilic arteriovenous fistula second stage transposition surgery       Diagnosis: Complication of dialysis access insertion [T82.9XXA]  Diagnosis Additional Information: No value filed.    Anesthesia Type:   General     Note:    Oropharynx: oropharynx clear of all foreign objects  Level of Consciousness: drowsy  Oxygen Supplementation: face mask  Level of Supplemental Oxygen (L/min / FiO2): 6  Independent Airway: airway patency satisfactory and stable    Vital Signs Stable: post-procedure vital signs reviewed and stable  Report to RN Given: handoff report given  Patient transferred to: PACU    Handoff Report: Identifed the Patient, Identified the Reponsible Provider, Reviewed the pertinent medical history, Discussed the surgical course, Reviewed Intra-OP anesthesia mangement and issues during anesthesia, Set expectations for post-procedure period and Allowed opportunity for questions and acknowledgement of understanding      Vitals:  Vitals Value Taken Time   /63 11/20/24 1530   Temp     Pulse 76 11/20/24 1534   Resp 15 11/20/24 1534   SpO2 100 % 11/20/24 1534   Vitals shown include unfiled device data.    Electronically Signed By: JOSE MANUEL Lugo CRNA  November 20, 2024  3:35 PM

## 2024-11-20 NOTE — ANESTHESIA PROCEDURE NOTES
Airway       Patient location during procedure: OR       Procedure Start/Stop Times: 11/20/2024 12:11 PM and 11/20/2024 12:15 PM  Staff -        CRNA: Erik Lopez APRN CRNA       Performed By: resident and with CRNAs       Procedure performed by resident/fellow/CRNA in presence of a teaching physician.    Consent for Airway        Urgency: elective  Indications and Patient Condition       Indications for airway management: triston-procedural       Induction type:intravenous       Mask difficulty assessment: 1 - vent by mask    Final Airway Details       Final airway type: supraglottic airway    Supraglottic Airway Details        Type: LMA       Brand: LMA Unique       LMA size: 3    Post intubation assessment        Placement verified by: capnometry, equal breath sounds and chest rise        Number of attempts at approach: 1       Number of other approaches attempted: 0       Secured with: tape       Ease of procedure: easy       Dentition: Intact and Unchanged    Medication(s) Administered   Medication Administration Time: 11/20/2024 12:11 PM

## 2024-11-20 NOTE — OR NURSING
Pt tolerated Neuraxial or Regional Block, Brachial plexus block procedure without complication. Received 50 mcg of Fentanyl. VSS on 2L NC.

## 2024-11-20 NOTE — OP NOTE
Transplant Surgery  Operative Note  PREOP DIAGNOSIS: Chronic Renal Failure   POSTOP DIAGNOSIS: Same  OPERATION: Arteriovenous Fistula superficialization- second stage, left brachial artery to basilic vein. Intraoperative ultrasound  FACULTY: Jesus Alberto Walton MD  FELLOW: Tuan Henson MD- Fellow. Dr. Henson was the primary assistant for the operation and participated in all aspects of the case under my supervision,   ASSISTANT: Omar Hernández MD- resident.    ANESTHESIA: Scalene Block and LMA  EBL: 10 ml.  SPECIMEN: none  FLUIDS: crystalloid   DRAIN: None    INDICATION: The patient was referred by Latricia Henson for permanent dialysis access creation due to renal failure. The indications, benefits, and risks of permanent vascular access creation were discussed, questions answered and informed consent was provided.   FINDINGS:   The fistula was traced. Diameter ~5mm. Flow measured at ~550mL/min. One large branch taken in upper arm. Superficialized into pocket without need for division and transposition around median nerve.   Vein quality was: Normal with diameter of 5mm.   Blood flow was 550 ml/min as measured by Transonic flow probe.  COMPLICATIONS: None.    PROCEDURE: The patient was positioned supine, and the left upper extremity was positioned, prepped and draped in the usual sterile fashion. An ultrasound was performed to assess the size, quality, and position of the artery and vein. The patient received preoperative IV antibiotics.   We traced the course of the fistula vein and marked this. We made an incision along that course and dissected the vein free of the subcutaneous tissues. We did identify the median nerve underneath the vein but not crossing over it. We traced the vein to where it dove deep into the arm and noted a larger branch coming off deep, going back toward the AC fossa. We measured flow in the deeper branch and did not appreciate much flow. The larger superficial vein had a flow  ~550mL/min. We ligated this lower branch to provide mobility to the fistula vein.  We treated the fistula vein with papaverine topical to improve the dilation and flow. We made a small subcutaneous pocket and positioned the vein within it. We measured flow again after placement into the pocket and verified that flow was preserved.   We closed the wound in layers and placed a dry dressing over the top.   Counts were correct. Faculty was present for the key portions of the procedure. The patient was then awakened and transferred to PACU in good condition.

## 2024-11-20 NOTE — ANESTHESIA POSTPROCEDURE EVALUATION
Patient: Reina Quan    Procedure: Procedure(s):  Left Upper Extremity brachiobasilic arteriovenous fistula second stage transposition surgery       Anesthesia Type:  General    Note:  Disposition: Outpatient   Postop Pain Control: Uneventful            Sign Out: Well controlled pain   PONV: No   Neuro/Psych: Uneventful            Sign Out: Acceptable/Baseline neuro status   Airway/Respiratory: Uneventful            Sign Out: Acceptable/Baseline resp. status   CV/Hemodynamics: Uneventful            Sign Out: Acceptable CV status; No obvious hypovolemia; No obvious fluid overload   Other NRE: NONE   DID A NON-ROUTINE EVENT OCCUR? No           Last vitals:  Vitals Value Taken Time   /72 11/20/24 1645   Temp 36.8  C (98.2  F) 11/20/24 1530   Pulse 64 11/20/24 1656   Resp 14 11/20/24 1656   SpO2 94 % 11/20/24 1656   Vitals shown include unfiled device data.    Electronically Signed By: Sam Cheung MD  November 20, 2024  4:57 PM

## 2024-11-20 NOTE — ANESTHESIA PROCEDURE NOTES
"Brachial plexus Procedure Note    Pre-Procedure   Staff -        Anesthesiologist:  Miguel Orr MD       Resident/Fellow: Kelsey Loyd MD       Performed By: resident       Location: pre-op       Pre-Anesthestic Checklist: patient identified, IV checked, site marked, risks and benefits discussed, informed consent, monitors and equipment checked, pre-op evaluation, at physician/surgeon's request and post-op pain management  Timeout:       Correct Patient: Yes        Correct Procedure: Yes        Correct Site: Yes        Correct Position: Yes        Correct Laterality: Yes        Site Marked: Yes  Procedure Documentation  Procedure: Brachial plexus       Laterality: left       Patient Position: supine       Skin prep: Chloraprep (supraclavicular approach).       Needle Gauge: 21.        Needle Length (millimeters): 110        Ultrasound guided       1. Ultrasound was used to identify targeted nerve, plexus, vascular marker, or fascial plane and place a needle adjacent to it in real-time.       2. Ultrasound was used to visualize the spread of anesthetic in close proximity to the above referenced structure.       3. A permanent image is entered into the patient's record.    Assessment/Narrative         The placement was negative for: blood aspirated, painful injection and site bleeding       Paresthesias: No.       Bolus given via needle. no blood aspirated via catheter.        Secured via.        Insertion/Infusion Method: Single Shot       Complications: none    Medication(s) Administered   Bupivacaine 0.5% w/ 1:400K Epi (Injection) - Injection   30 mL - 11/20/2024 10:55:00 AM    FOR [facility] ([facility] [place] ONLY:   Pain Team Contact information: please page the Pain Team Via [employer]. Search \"Pain\". During daytime hours, please page the attending first. At night please page the resident first.      "

## 2024-11-20 NOTE — TELEPHONE ENCOUNTER
Patient Quality Outreach    Patient is due for the following:   Colon Cancer Screening  Depression  -  PHQ-9 needed  Physical Preventive Adult Physical    Action(s) Taken:   Schedule a Adult Preventative    Type of outreach:    Sent [employer] message.    Questions for provider review:    None           Mari Joshua MA

## 2024-11-20 NOTE — DISCHARGE INSTRUCTIONS
"Hemodialysis Access Surgery: What to Expect at Home  Your Recovery  Hemodialysis is a way to remove wastes from the blood when your kidneys can no longer do the job. It's not a cure, but it can help you live longer and feel better. It's a lifesaving treatment when you have kidney failure. Hemodialysis is often called dialysis. Your doctor created a place (called an access) in your arm for your blood to flow in and out of your body during your dialysis sessions.  Your arm will probably be bruised and swollen. It may hurt. The cut (incision) may bleed. The pain and bleeding will get better over several days. You will probably need only over-the-counter pain medicine. You can reduce swelling by propping up your arm on 1 or 2 pillows and keeping your elbow straight.  Be sure to use your hand and arm as much as tolerated, do not \"baby\" the arm.  You will have stitches. These may dissolve on their own, or your doctor will tell you when to come in to have them removed. You should also be able to return to work in a few days.  You may feel some coolness or numbness in your hand. These feelings usually go away in a few weeks. Your doctor may suggest squeezing a soft object, or using your hand (opening and closing the fist) as much as possible. This will strengthen your access and increase the blood flow to your hand/ the rest of your arm.  You should always be able to feel blood rushing through the fistula or graft. It feels like a slight vibration or buzzing when you put your fingers on the skin over the fistula or graft. This feeling is called a thrill.  You should also be able to hear the blood flowing through the access, especially as it matures and gets stronger/ bigger.  It should sound like a \"whooshing\" sound.  This is called a bruit.  This care sheet gives you a general idea about how long it will take for you to recover. But each person recovers at a different pace. Follow the steps below to get better as quickly " as possible.    How can you care for yourself at home?  Activity    Rest when you feel tired. Getting enough sleep will help you recover. Do not lie on or sleep on the arm with the access.     Avoid strenuous activities with your affected arm until your incisions are healed, approximately 2 weeks.     You may use your arm, but do not lift anything that weighs more than about 15 pounds for 2 weeks. This may include a child, heavy grocery bags, a heavy briefcase or backpack, cat litter or dog food bags, or a vacuum .     You can shower, but keep the access dry for the first 2 days. Cover the area with a plastic bag to keep it dry.     Do not soak or scrub the incision until it has healed.     You may drive when you feel ready, as long as you are no longer taking narcotic pain medication, and are able to use your arm as needed. This is usually in 1 to 2 days.     Most people are able to return to work about 1 or 2 days after surgery.    If you were given a nerve block, to numb the arm, use caution to prevent injury to the arm while it is still numb.  Wear the sling provided when doing activities, until you regain feeling/ function of your arm again (typically 1-2 days).  Use your arm as much as possible as the nerve block wears off.  It can take approximately 4+ days for the nerve block to fully wear off.  As it wears off, you may experience numbness, tingling and/or decreased function of your arm, hand and fingers.   Diet    Follow an eating plan that is good for your kidneys. Your nephrologist will tell you if you need to have diet modifications or restrictions. You may need to limit protein, salt, fluids, and certain foods.   Medicines    Your doctor will tell you if and when you can restart your medicines. You will also be given instructions about taking any new medicines.     If you stopped taking aspirin or some other blood thinner, your doctor will tell you when to start taking it again.     Take pain  medicines exactly as directed.  If the doctor gave you a prescription medicine for pain, take it as prescribed.  Do not drive if you are taking prescription pain medication.  If you are not taking a prescription pain medicine, ask your doctor if you can take acetaminophen (Tylenol). Do not take ibuprofen (Advil, Motrin) or naproxen (Aleve), or similar medicines, unless your doctor tells you to. They may make chronic kidney disease worse.  Do not take two or more pain medicines at the same time unless the doctor told you to. Many pain medicines have acetaminophen, which is Tylenol. Too much acetaminophen (Tylenol) can be harmful.     If you think your pain medicine is making you sick to your stomach:  Take your medicine after meals (unless your doctor has told you not to).  Ask your doctor for a different pain medicine.     If your doctor prescribed antibiotics, take them as directed. Do not stop taking them just because you feel better. You need to take the full course of antibiotics.   Incision care    Keep the area dry for 2 days. After 2 days, wash the area with soap and water every day, and always before dialysis.     Do not soak or scrub the incision until it has healed.     If you have a bandage, change it every day or as your doctor recommends. Your doctor will tell you when you can remove it.   Exercise    Squeeze a soft ball or other object as your doctor tells you. This will help blood flow through the access and help prevent blood clots.  (Suggested to use 4-6 times per day, for 10+ repetitions each)    Once your incisions have healed (after approximately 2 weeks), you can start to do hammer curls with a 5lb weight to increase the blood flow to your fistula and help it mature faster.    Use your arm and hand as much as tolerated.   Elevation    Prop up the sore arm on a pillow anytime you sit or lie down during the next 3 days. Try to keep it above the level of your heart. This will help reduce swelling.    Other instructions    Every day, check your access for a pulse or thrill in the fistula or graft area. A thrill is a vibration. To feel a pulse or thrill, place the first two fingers of your hand lightly over the access.     Do not bump your arm.     Do not wear tight clothing, jewelry, or anything else that may squeeze the access.     Use your other arm to have blood drawn or blood pressure taken.     Do not put cream or lotion on or near the access.     Make sure all doctors you deal with know that you have a vascular access.   Follow-up care is a key part of your treatment and safety. Be sure to make and go to all appointments, and call your doctor if you are having problems. It's also a good idea to know your test results and keep a list of the medicines you take.  When should you call for help?   Call 911 anytime you think you may need emergency care. For example, call if:    You passed out (lost consciousness).     You have chest pain, are short of breath, or cough up blood.   Call your doctor now or seek immediate medical care if:    Your hand or arm is cold or dark-colored.     You have no pulse in your access.     You have nausea or you vomit for more than four hours.     You have pain that does not get better after you take pain medicine.     You have loose stitches, or your incision comes open.     You are bleeding from the incision.     You have signs of infection, such as:  Increased pain, swelling, warmth, or redness.  Red streaks leading from the area.  Pus draining from the area.  A fever.     You have signs of a blood clot in your leg (called a deep vein thrombosis), such as:  Pain in your calf, back of the knee, thigh, or groin.  Redness or swelling in your leg.   Watch closely for changes in your health, and be sure to contact your doctor if you have any problems.    Please call the dialysis access care coordinator (Zeina or Patricia) with any questions or concerns, Monday-Friday:  "[phone]      For any urgent after hours concerns, please contact the Transplant Surgery Fellow [phone], option 4, ask to page [phone]       Where can you learn more?  Go to https://[email or web address].net/patiented  Enter P616 in the search box to learn more about \"Hemodialysis Access Surgery: What to Expect at Home.\"  Current as of: February 28, 2023               Content Version: 13.8    [id number] [employer].   Care instructions adapted under license by your healthcare professional. If you have questions about a medical condition or this instruction, always ask your healthcare professional. [employer] disclaims any warranty or liability for your use of this information.   "

## 2024-11-21 ENCOUNTER — PATIENT OUTREACH (OUTPATIENT)
Dept: NEPHROLOGY | Facility: CLINIC | Age: 59
End: 2024-11-21
Payer: COMMERCIAL

## 2024-11-21 DIAGNOSIS — T82.898A PROBLEM WITH DIALYSIS ACCESS, INITIAL ENCOUNTER (H): Primary | ICD-10-CM

## 2024-11-21 DIAGNOSIS — T86.11 KIDNEY TRANSPLANT REJECTION: ICD-10-CM

## 2024-11-21 NOTE — PROGRESS NOTES
Dialysis Access Care Coordination: General Outreach    REASON FOR CALL:     REASON FOR CALL: Clinic Care Coordination - Follow-up (Dialysis Access)                                   SITUATION/BACKROUND:   Pt s/p LUE brachiobasilic AVF 2nd stage transposition surgery on 11/20/24 with Dr. Walton.    Hand off from surgeon:   Jesus Alberto Walton MD P Dialysis Access Nurse  Hi y'all    We superficialized her and it went fine. Flows were 450-550 and it was still a little small/not quite mature yet. She needs an US whenever we see her next (2-3 weeks) to make sure it's improving. Follow up with her tomorrow please and early next weeks and make sure she can palpate the flow.    Thank you!  Jesus Alberto    Neph Tracking Flowsheet Last Filled Values       Preferred Modality Home Hemodialysis    Patient's Referral Dates Auto Populate Patient's Referral Dates    Specialty Care Coordination Referral - Dialysis 7/26/2024    Journey Referral 7/18/24    Vein Mapping/US Order 8/19/24    Vein Mapping/US  08/19/24    Access Surgeon Referral Status  Referred    Dialysis Access Referral 07/26/24  Fistula vs PD consult with Dr. Walton    Access Surgical Consult 08/19/24  PLAN: LUE brachiobasilic (2 stage) AVF vs LUE AVG underger general anesthesia with nerve block.    Diaylsis Access Type AV Fistula  Brachiobasilic AVF    Dialysis Access Site TRINY    Dialysis Access Surgery 10/15/24  1st stage LUE brachiobasilic AVF, if ok at 2wk and 4 wk post op and US, ok for 2nd stage after 6 wks per Dr. Walton    Dialysis Access Surgeon Dr. Walton    Dialysis Access Revision Date 11/20/24  2nd stage transposition surgery with Dr. Walton    Transplant Evaluation Referral 6/21/24    Transplant Status  Referred          Dialysis History        Start End Type Center Comments    11/11/2024  Hemo-[facility] Center [facility] ([facility]) MWF                  Patient is followed by Solid Organ Transplant 2/2 Kidney Transplant Evaluation  "- 10/7/2024.  Coordinator: Selena Nuno    ASSESSMENT:     Called pt to follow up POD1.  \"I'm actually doing pretty good.  The site itself is painful, but everything else... I feel better than I thought I would. ... It's a little painful to move the arm, but I know I need to move it so I've been moving it\".  Pt says there is minimal swelling.  She can feel the thrill and has no concerns at this time.    Recent Labs      Latest Ref Rng & Units 11/20/2024 10/15/2024 10/7/2024   Neph Labs   Sodium 135 - 145 mmol/L  140  137    GFR Estimate >60 mL/min/1.73m2  14  11    Potassium 3.4 - 5.3 mmol/L 3.4  4.1  4.0    Creatinine 0.51 - 0.95 mg/dL  3.55  4.40    Urea Nitrogen 8.0 - 23.0 mg/dL  28.1  49.0    Hemoglobin 11.7 - 15.7 g/dL  9.3  9.6    Hemoglobin A1C <5.7 %   6.0             PLAN:     Future Appts Next 180 days       Visit Type Date Time Department    SOT FISTULA FOLLOW UP 12/3/2024  1:30 PM  SOT SURGERY            Follow Up:     Ultrasound ordered per Dr. Walton.  US appointment(s) scheduled prior to follow up.  Pt to assess thrill at least daily, elevate the arm to reduce swelling and call Dialysis Access Care Coordinator if there are any concerns.   Patient to follow up as scheduled.     Patient verbalized understanding and will follow up as recommended.    ANNABEL SPRING RN  Dialysis Access Care Coordinator  Phone: [phone]  Pool: P_Dialysis_Access_Nurse  "

## 2024-11-27 ENCOUNTER — PATIENT OUTREACH (OUTPATIENT)
Dept: NEPHROLOGY | Facility: CLINIC | Age: 59
End: 2024-11-27
Payer: COMMERCIAL

## 2024-11-27 DIAGNOSIS — T86.11 KIDNEY TRANSPLANT REJECTION: ICD-10-CM

## 2024-11-27 NOTE — PROGRESS NOTES
Dialysis Access Care Coordination: General Outreach    REASON FOR CALL:     REASON FOR CALL: Clinic Care Coordination - Follow-up (Dialysis Access)                              SITUATION/BACKROUND:   S/p LUE brachiobasilic AVF 2nd stage transposition surgery on 11/20/24 with Dr. Walton.  Called to follow up.         Neph Tracking Flowsheet Last Filled Values       Preferred Modality Home Hemodialysis    Patient's Referral Dates Auto Populate Patient's Referral Dates    Specialty Care Coordination Referral - Dialysis 7/26/2024    Journey Referral 7/18/24    Vein Mapping/US Order 8/19/24    Vein Mapping/US  08/19/24    Access Surgeon Referral Status  Referred    Dialysis Access Referral 07/26/24  Fistula vs PD consult with Dr. Walton    Access Surgical Consult 08/19/24  PLAN: LUE brachiobasilic (2 stage) AVF vs LUE AVG underger general anesthesia with nerve block.    Diaylsis Access Type AV Fistula  Brachiobasilic AVF    Dialysis Access Site TRINY    Dialysis Access Surgery 10/15/24  1st stage LUE brachiobasilic AVF, if ok at 2wk and 4 wk post op and US, ok for 2nd stage after 6 wks per Dr. Walton    Dialysis Access Surgeon Dr. Walton    Dialysis Access Revision Date 11/20/24  2nd stage transposition surgery with Dr. Walton    Transplant Evaluation Referral 6/21/24    Transplant Status  Referred          Dialysis History        Start End Type Center Comments    11/11/2024  [facility] Center [facility] ([facility]) MWF                  Patient is followed by Solid Organ Transplant 2/2 Kidney Transplant Evaluation - 10/7/2024.  Coordinator: Selena Nuno    ASSESSMENT:     TONEY, LVM requesting callback.    PLAN:     Future Appts Next 180 days       Visit Type Date Time Department    US EXT ART LEYDI DIAL ACC GRFT 12/3/2024 12:10 PM [facility] ULTRASOUND    SOT FISTULA FOLLOW UP 12/3/2024  1:30 PM [facility] SURGERY            Follow Up:     LVM requesting callback to assess how pt is doing 1 week post  op.  Inquired if pt could feel the blood flowing through the fistula and hear it if she puts her arm up to her ear.  Inquired about pain and swelling.  Asked pt to call back or send We[employer] message with update.     Will follow up upon pt response.    ANNABEL SPRING RN  Dialysis Access Care Coordinator  Phone: [phone]  Pool: P_Dialysis_Access_Nurse

## 2024-12-03 ENCOUNTER — PATIENT OUTREACH (OUTPATIENT)
Dept: NEPHROLOGY | Facility: CLINIC | Age: 59
End: 2024-12-03
Payer: COMMERCIAL

## 2024-12-03 ENCOUNTER — ANCILLARY PROCEDURE (OUTPATIENT)
Dept: ULTRASOUND IMAGING | Facility: CLINIC | Age: 59
End: 2024-12-03
Attending: SURGERY
Payer: COMMERCIAL

## 2024-12-03 DIAGNOSIS — T82.9XXA COMPLICATION OF VASCULAR ACCESS FOR DIALYSIS, INITIAL ENCOUNTER: Primary | ICD-10-CM

## 2024-12-03 DIAGNOSIS — T86.11 KIDNEY TRANSPLANT REJECTION: ICD-10-CM

## 2024-12-03 PROCEDURE — 93990 DOPPLER FLOW TESTING: CPT | Mod: GC | Performed by: RADIOLOGY

## 2024-12-03 NOTE — TELEPHONE ENCOUNTER
Dialysis Access Care Coordination: General Outreach    REASON FOR CALL:     REASON FOR CALL: Clinic Care Coordination - Face To Face (Dialysis Access - fistula follow up)                                   SITUATION/BACKROUND:     Patient presents to clinic for a fistula follow up.    She had her left arm AV fistula transposition on 11/20/24 with Dr. Walton.    Neph Tracking Flowsheet Last Filled Values       Preferred Modality Home Hemodialysis    Patient's Referral Dates Auto Populate Patient's Referral Dates    Specialty Care Coordination Referral - Dialysis 7/26/2024    Journey Referral 7/18/24    Vein Mapping/US Order 8/19/24    Vein Mapping/US  08/19/24    Access Surgeon Referral Status  Referred    Dialysis Access Referral 07/26/24  Fistula vs PD consult with Dr. Walton    Access Surgical Consult 08/19/24  PLAN: LUE brachiobasilic (2 stage) AVF vs LUE AVG underger general anesthesia with nerve block.    Diaylsis Access Type AV Fistula  Brachiobasilic AVF    Dialysis Access Site TRINY    Dialysis Access Surgery 10/15/24  1st stage LUE brachiobasilic AVF, if ok at 2wk and 4 wk post op and US, ok for 2nd stage after 6 wks per Dr. Walton    Dialysis Access Surgeon Dr. Walton    Dialysis Access Revision Date 11/20/24  2nd stage transposition surgery with Dr. Walton    Dialysis Access Maturation --  appt 12/31/24    Transplant Evaluation Referral 6/21/24    Transplant Status  Referred          Dialysis History        Start End Type Center Comments    10/11/2024  [facility] Center [facility] ([facility]) MWF                  Patient is followed by Solid Organ Transplant 2/2 Kidney Transplant Evaluation - 10/7/2024.  Coordinator: Selena Nuno    ASSESSMENT:     Recent Labs      Latest Ref Rng & Units 11/20/2024 10/15/2024 10/7/2024   Neph Labs   Sodium 135 - 145 mmol/L  140  137    GFR Estimate >60 mL/min/1.73m2  14  11    Potassium 3.4 - 5.3 mmol/L 3.4  4.1  4.0    Creatinine 0.51 -  0.95 mg/dL  3.55  4.40    Urea Nitrogen 8.0 - 23.0 mg/dL  28.1  49.0    Hemoglobin 11.7 - 15.7 g/dL  9.3  9.6    Hemoglobin A1C <5.7 %   6.0         Patient has recently started hemodialysis. She notices that some of her brain fog has cleared and she feels pretty good on non-dialysis days. Discussed home hemodialysis. She originally wanted to do HHD but was hopeful for a kidney transplant and didn't think it would be worth going through the training. She now thinks it will be a while before she will have a transplant. Encouraged patient to look into HHD more and advised that she could always try it out, and can go back to [facility] if she doesn't like it.     Discussed rotating needle sites each time she is cannulated, unless she pursues HHD and they recommend buttonholes.    Dialysis Access Assessments:  Fistula / Graft  Fever/Chills: no  Redness/warmth: no  Swelling: yes  Swelling site: incisional  Pain: none  Drainage: none  Current infection: no  Steal Symptoms: none  Thrill: positive  Bruit: positive  Dysfunction: none  Interventions: ultrasound ordered    PLAN:     Future Appts Next 180 days       Visit Type Date Time Department    US EXT ART LEYDI DIAL ACC [facility] 12/3/2024 12:10 PM [facility] [place]    SOT FISTULA FOLLOW UP 12/3/2024  1:30 PM  SOT SURGERY    US EXT ART LEYDI DIAL ACC GRFT 12/31/2024 12:30 PM [facility]    NURSE ONLY 12/31/2024  1:45 PM  MEDICINE RENAL          Follow Up:    Ultrasound ordered: left arm fistula  Ultrasound and nurse visit appointment scheduled.     Patient verbalized understanding and will follow up as recommended.    Patricia Haddad RN  Dialysis Access Care Coordinator  Phone: [phone]  Pool: P_Dialysis_Access_Nurse

## 2024-12-04 NOTE — TELEPHONE ENCOUNTER
MEDICAL RECORDS REQUEST   [facility]  Urology Clinic  [address] [place] [address]  PHONE: [phone]  Fax: [phone]        FUTURE VISIT INFORMATION                                                   Reina Avelina, : 1965 scheduled for future visit at [facility]    APPOINTMENT INFORMATION:  Date: 25  Provider:  SAM Martinez  Reason for Visit/Diagnosis: Hematuria    REFERRAL INFORMATION:  Referring provider:  JOSE MANUEL Urias CNP  Specialty: SOT  Referring providers clinic:  [facility] -SOT  Clinic contact number:  [phone]    RECORDS REQUESTED FOR VISIT                                                     NOTES STATUS DETAILS   Office note from referring provider     Office note from other specialist Epic 24: JOSE MANUEL Jaime CNP   Medication List Epic    LABS     Urinalysis (UA) Internal 24   Urine Cytology       PRE-VISIT CHECKLIST      Joint diagnostic appointment coordinated correctly          (ensure right order & amount of time) Yes   RECORD COLLECTION COMPLETE Yes

## 2024-12-26 NOTE — TELEPHONE ENCOUNTER
Pre visit planning completed.      Procedure details:    Patient scheduled for Colonoscopy on 01/08/2025.     Arrival time: 1200. Procedure time 1300    Facility location: [facility]; [address] [place] [address], [address] Floor, [place] [address]. Check in location: Main entrance at registration desk.    Sedation type: Conscious sedation     Pre op exam needed? No.    Indication for procedure: Screening      Chart review:     Electronic implanted devices? No    Recent diagnosis of diverticulitis within the last 6 weeks? No      Medication review:    Diabetic? No    Anticoagulants? No    Weight loss medication/injectable? No.    Other medication HOLDING recommendations:  N/A      Prep for procedure:     Bowel prep recommendation: Standard Golytely. Bowel prep sent to    [facility] #[id number] [place], [place] - [address] AT Due to: CKD noted and dialysis noted/reported    Procedure information and instructions sent via dwayne Medina RN  Endoscopy Procedure Pre Assessment   [phone] option 2

## 2024-12-26 NOTE — TELEPHONE ENCOUNTER
Attempted to contact patient in order to complete pre assessment questions.     No answer. Left message to return call to [phone] option 2.    Callback communication sent via [employer].    Sonam Medina RN

## 2024-12-30 NOTE — TELEPHONE ENCOUNTER
Second call attempt to complete pre assessment.     No answer.  Left message to return call to [phone] #2 by next business day prior to 4PM or procedure will be sent to cancel.     Callback required communication sent via [employer].      Gretta Abernathy RN  Endoscopy Procedure Pre Assessment

## 2024-12-30 NOTE — CONFIDENTIAL NOTE
Patient Quality Outreach    Patient is due for the following:   Colon Cancer Screening    Action(s) Taken:   No follow up needed at this time.    Type of outreach:    Sent letter.    Questions for provider review:    None           Gurmeet French MA

## 2024-12-31 ENCOUNTER — ANCILLARY PROCEDURE (OUTPATIENT)
Dept: ULTRASOUND IMAGING | Facility: CLINIC | Age: 59
End: 2024-12-31
Attending: NURSE PRACTITIONER
Payer: COMMERCIAL

## 2024-12-31 ENCOUNTER — ALLIED HEALTH/NURSE VISIT (OUTPATIENT)
Dept: NEPHROLOGY | Facility: CLINIC | Age: 59
End: 2024-12-31
Attending: PHYSICIAN ASSISTANT
Payer: COMMERCIAL

## 2024-12-31 DIAGNOSIS — Z99.2 ESRD ON HEMODIALYSIS (H): Primary | ICD-10-CM

## 2024-12-31 DIAGNOSIS — T82.9XXA COMPLICATION OF VASCULAR ACCESS FOR DIALYSIS, INITIAL ENCOUNTER: ICD-10-CM

## 2024-12-31 DIAGNOSIS — T86.11 KIDNEY TRANSPLANT REJECTION: ICD-10-CM

## 2024-12-31 DIAGNOSIS — N18.6 ESRD ON HEMODIALYSIS (H): Primary | ICD-10-CM

## 2024-12-31 PROCEDURE — 93990 DOPPLER FLOW TESTING: CPT | Mod: GC | Performed by: STUDENT IN AN ORGANIZED HEALTH CARE EDUCATION/TRAINING PROGRAM

## 2024-12-31 RX ORDER — BISACODYL 5 MG/1
TABLET, DELAYED RELEASE ORAL
Qty: 4 TABLET | Refills: 0 | Status: SHIPPED | OUTPATIENT
Start: 2024-12-31

## 2024-12-31 NOTE — TELEPHONE ENCOUNTER
Pre assessment completed for upcoming procedure.   (Please see previous telephone encounter notes for complete details)    Patient returned call.       Procedure details:    Arrival time and facility location reviewed.    Pre op exam needed? No.    Designated  policy reviewed. Instructed to have someone stay 6  hours post procedure.       Medication review:    Medications reviewed. Please see supporting documentation below. Holding recommendations discussed (if applicable).       Prep for procedure:     Procedure prep instructions reviewed. Patient did not  bowel prep from previously scheduled procedure. Will resent prep. Bowel prep has been sent to [facility] #[id number] - [place], [place] - [address] AT [facility] [address]        Any additional information needed:  N/A      Patient verbalized understanding and had no questions or concerns at this time.      Bri Simon RN  Endoscopy Procedure Pre Assessment   [phone] option 2

## 2025-01-02 ASSESSMENT — PATIENT HEALTH QUESTIONNAIRE - PHQ9
SUM OF ALL RESPONSES TO PHQ QUESTIONS 1-9: 18
10. IF YOU CHECKED OFF ANY PROBLEMS, HOW DIFFICULT HAVE THESE PROBLEMS MADE IT FOR YOU TO DO YOUR WORK, TAKE CARE OF THINGS AT HOME, OR GET ALONG WITH OTHER PEOPLE: SOMEWHAT DIFFICULT
SUM OF ALL RESPONSES TO PHQ QUESTIONS 1-9: 18

## 2025-01-02 NOTE — TELEPHONE ENCOUNTER
"Called patient, left voicemail, and asked patient to call back. Attempt # 1.     MyChart message also sent. PHQ-9 score of 18 with \"several days\" answered on Q9.    Kathy Gordon RN 1/2/2025  [facility]  "

## 2025-01-03 NOTE — TELEPHONE ENCOUNTER
Called patient, left voicemail, and asked patient to call back. Attempt # 2.     Kathy Gordon RN 1/3/2025  [facility]

## 2025-01-08 ENCOUNTER — HOSPITAL ENCOUNTER (OUTPATIENT)
Facility: CLINIC | Age: 60
Discharge: HOME | End: 2025-01-08
Attending: INTERNAL MEDICINE | Admitting: INTERNAL MEDICINE
Payer: COMMERCIAL

## 2025-01-08 VITALS
HEART RATE: 58 BPM | SYSTOLIC BLOOD PRESSURE: 131 MMHG | OXYGEN SATURATION: 99 % | RESPIRATION RATE: 16 BRPM | DIASTOLIC BLOOD PRESSURE: 75 MMHG

## 2025-01-08 LAB — COLONOSCOPY: NORMAL

## 2025-01-08 PROCEDURE — 45378 DIAGNOSTIC COLONOSCOPY: CPT | Performed by: INTERNAL MEDICINE

## 2025-01-08 PROCEDURE — 250N000011 HC RX IP 250 OP 636: Performed by: INTERNAL MEDICINE

## 2025-01-08 PROCEDURE — G0121 COLON CA SCRN NOT HI RSK IND: HCPCS | Performed by: INTERNAL MEDICINE

## 2025-01-08 PROCEDURE — 99153 MOD SED SAME PHYS/QHP EA: CPT | Performed by: INTERNAL MEDICINE

## 2025-01-08 PROCEDURE — G0500 MOD SEDAT ENDO SERVICE >5YRS: HCPCS | Performed by: INTERNAL MEDICINE

## 2025-01-08 RX ORDER — FENTANYL CITRATE 50 UG/ML
INJECTION, SOLUTION INTRAMUSCULAR; INTRAVENOUS PRN
Status: DISCONTINUED | OUTPATIENT
Start: 2025-01-08 | End: 2025-01-08 | Stop reason: HOSPADM

## 2025-01-08 ASSESSMENT — ACTIVITIES OF DAILY LIVING (ADL)
ADLS_ACUITY_SCORE: 43

## 2025-01-08 NOTE — H&P
Gastroenterology Pre-op History and Physical    Reina Quan MRN# [id number]   Age: 59 year old YOB: 1965      Date of Surgery: 01/08/25  [facility]      Date of Exam 1/8/2025 Facility Same Day       Primary care provider: Lizbeth Andrews         Chief Complaint and/or Reason for Procedure:   58 yo female for screening colonoscopy. No hx of polyps - last exam 12 yrs ago. No FH cancer. No anticoagulatoin.         Past Medical and Surgical History:     Past Medical History:   Diagnosis Date    Actinic keratosis     Allergic rhinitis, cause unspecified     Anemia     anxiety/depression     PAXIL    Arthritis     congenital hearing loss     uses hearing aids    Depressive disorder     Dry eyes     Giant Platelet syndrome     Glomerular Focal Sclerosis--transplant 1985     Hypertension     Kidney replaced by transplant 1992    RAPAMUNE/ SIROLIMUS     Lichen planopilaris     LPP followed by derm on doxycycline/ clobetasol    Lichen planus     Menorrhagia     migraine     Squamous cell carcinoma     8 x     Thrombocytopenia     Ulcerative colitis, unspecified     biposy neg 1996    Unsatisfactory cervical Papanicolaou smear 02/15/2021    UTI (lower urinary tract infection)     recurrent Dr Burns [facility]     Past Surgical History:   Procedure Laterality Date    BIOPSY OF SKIN LESION      CHOLECYSTECTOMY      CREATE FISTULA ARTERIOVENOUS UPPER EXTREMITY Left 10/15/2024    Procedure: Left Upper Extremity Brachiobasilic ARTERIOVENOUS FISTULA Creation;  Surgeon: Jesus Alberto Walton MD;  Location: [facility] OR    DILATION AND CURETTAGE, OPERATIVE HYSTEROSCOPY WITH MORCELLATOR, COMBINED N/A 11/10/2015    Procedure: COMBINED DILATION AND CURETTAGE, OPERATIVE HYSTEROSCOPY WITH MORCELLATOR;  Surgeon: Ghada Melendez MD;  Location: [facility] OR    ESOPHAGOSCOPY, GASTROSCOPY, DUODENOSCOPY (EGD), COMBINED  11/20/2012    Procedure: COMBINED ESOPHAGOSCOPY, GASTROSCOPY,  DUODENOSCOPY (EGD), BIOPSY SINGLE OR MULTIPLE;;  Surgeon: Valentin Hahn MD;  Location: [facility] GI    IR CVC TUNNEL PLACEMENT > 5 YRS OF AGE  10/9/2024    IR RENAL BIOPSY RIGHT  07/25/2024    MOHS MICROGRAPHIC PROCEDURE      REVISION FISTULA ARTERIOVENOUS UPPER EXTREMITY Left 11/20/2024    Procedure: Left Upper Extremity brachiobasilic arteriovenous fistula second stage transposition surgery;  Surgeon: Jesus Alberto Walton MD;  Location: [facility] OR    TRANSPLANT  1992    kidney    [facility] NONSPECIFIC PROCEDURE      Renal transplant right side    Z NONSPECIFIC PROCEDURE      cholecystectomy            Medications (include herbals and vitamins):        Facility-Administered Medications Prior to Admission   Medication Dose Route Frequency Provider Last Rate Last Admin    triamcinolone acetonide (KENALOG-10) injection 10 mg  10 mg Intra-Lesional Once Brad Bauman MD         Medications Prior to Admission   Medication Sig Dispense Refill Last Dose/Taking    atorvastatin (LIPITOR) 80 MG tablet Take 1 tablet (80 mg) by mouth at bedtime. 90 tablet 3 Past Week    bisacodyl (DULCOLAX) 5 MG EC tablet Take 2 tablets at 3 pm the day before your procedure. If your procedure is before 11 am, take 2 additional tablets at 11 pm. If your procedure is after 11 am, take 2 additional tablets at 6 am. For additional instructions refer to your colonoscopy prep instructions. 4 tablet 0 1/7/2025    cinacalcet (SENSIPAR) 30 MG tablet Take 1 tablet (30 mg) by mouth every other day 45 tablet 3 Past Week    mycophenolate (GENERIC EQUIVALENT) 250 MG capsule Take 3 capsules (750 mg) by mouth 2 times daily 540 capsule 3 Past Week    niacinamide 500 MG tablet Take 1 tablet (500 mg) by mouth 2 times daily (with meals). 60 tablet 3 Past Week    PARoxetine (PAXIL) 20 MG tablet TAKE 1 TABLET BY MOUTH EVERY MORNING 90 tablet 2 Past Week    polyethylene glycol (GOLYTELY) 236 g suspension The night before the exam at 6 pm drink an 8-ounce glass  every 15 minutes until the jug is half empty. If you arrive before 11 AM: Drink the other half of the Hatch jug at 11 PM night before procedure. If you arrive after 11 AM: Drink the other half of the Hatch jug at 6 AM day of procedure. For additional instructions refer to your colonoscopy prep instructions. 4000 mL 0 1/7/2025    polyethylene glycol (MIRALAX) 17 GM/Dose powder Take 17 g (1 Capful) by mouth daily. 510 g 3 Past Week    predniSONE (DELTASONE) 5 MG tablet Take 1 tablet (5 mg) by mouth daily (Patient taking differently: Take 5 mg by mouth every morning.) 90 tablet 3 Past Week    sodium bicarbonate 650 MG tablet Take 2 tablets (1,300 mg) by mouth 2 times daily 360 tablet 3 Past Week    clobetasol (TEMOVATE) 0.05 % external ointment Apply topically 2 times daily Use sparingly at active areas of dermatitis for no more than 14 days or until resolved, whichever is sooner. 60 g 3     clobetasol (TEMOVATE) 0.05 % external solution Apply topically daily as needed (itchyy scalp) Apply to scalp daily as needed. 50 mL 4     clobetasol propionate (CLOBEX) 0.05 % external shampoo APPLY TO SCALP IN SHOWER TWICE WEEKLY 118 mL 1     ketoconazole (NIZORAL) 2 % external shampoo APPLY TOPICALLY DAILY AS NEEDED FOR ITCHING, IRRITATION OR OTHER( SCALP SCALE) USE 2-3 TIMES A WEEK. APPLY AND LATHER THEN RINSE OUT 5 MINUTES 120 mL 0     triamcinolone (KENALOG) 0.1 % external cream APPLY SPARINGLY TOPICALLY TO THE AFFECTED AREA TWICE DAILY FOR 14 DAYS 30 g 0              Allergies:      Allergies   Allergen Reactions    Ampicillin Swelling     Swelling of mouth and tongue.     Seasonal Allergies Other (See Comments)     Itchy eyes and rhinitis.               Physical Exam:   All vitals have been reviewed  Patient Vitals for the past 8 hrs:   BP Pulse Resp SpO2   01/08/25 1405 (!) 144/92 59 13 100 %   01/08/25 1225 (!) 140/100 -- 16 100 %     No intake/output data recorded.  Airway assessment:   Patient is able to open mouth  wide  Patient is able to stick out tongue  Mallampatti classification: Class II (visualization of the soft palate, fauces, and uvula)}      Lungs:   No increased work of breathing, good air exchange, clear to auscultation bilaterally, no crackles or wheezing     Cardiovascular:   regular rate and rhythm and normal S1 and S2                 Anesthetic risk and/or ASA classification:   ASA 3    Valentin Bull MD

## 2025-01-08 NOTE — TELEPHONE ENCOUNTER
Called pt. LMTCB. Pt read MCM sent on 1/6 asking her to call us back  and pt did not call back. Final attempt to contact pt. Closing encounter.     RADHA FreyN, RN     [facility]    01/08/2025 at 12:49 PM

## 2025-01-23 ENCOUNTER — PRE VISIT (OUTPATIENT)
Dept: UROLOGY | Facility: CLINIC | Age: 60
End: 2025-01-23

## 2025-01-23 ENCOUNTER — OFFICE VISIT (OUTPATIENT)
Dept: UROLOGY | Facility: CLINIC | Age: 60
End: 2025-01-23
Attending: NURSE PRACTITIONER
Payer: COMMERCIAL

## 2025-01-23 VITALS
HEART RATE: 63 BPM | BODY MASS INDEX: 23.19 KG/M2 | OXYGEN SATURATION: 99 % | SYSTOLIC BLOOD PRESSURE: 134 MMHG | HEIGHT: 62 IN | DIASTOLIC BLOOD PRESSURE: 86 MMHG | WEIGHT: 126 LBS

## 2025-01-23 DIAGNOSIS — Z01.810 PRE-OPERATIVE CARDIOVASCULAR EXAMINATION: ICD-10-CM

## 2025-01-23 DIAGNOSIS — N18.6 ESRD (END STAGE RENAL DISEASE) (H): ICD-10-CM

## 2025-01-23 DIAGNOSIS — Z76.82 ORGAN TRANSPLANT CANDIDATE: ICD-10-CM

## 2025-01-23 DIAGNOSIS — R31.29 MICROHEMATURIA: Primary | ICD-10-CM

## 2025-01-23 DIAGNOSIS — Z94.0 KIDNEY REPLACED BY TRANSPLANT: ICD-10-CM

## 2025-01-23 PROCEDURE — 88112 CYTOPATH CELL ENHANCE TECH: CPT | Mod: TC | Performed by: PHYSICIAN ASSISTANT

## 2025-01-23 PROCEDURE — 88112 CYTOPATH CELL ENHANCE TECH: CPT | Mod: 26 | Performed by: PATHOLOGY

## 2025-01-23 ASSESSMENT — PAIN SCALES - GENERAL: PAINLEVEL_OUTOF10: NO PAIN (0)

## 2025-01-23 NOTE — TELEPHONE ENCOUNTER
"Reason for visit: Cystoscopy     Relevant information: \"Cystoscopy with next available provider who sees patients for microhematuria\" -[facility]    Records/imaging/labs/orders: Available in Epic    Pt called: No need for a call    At Rooming: Standard    Luciano Bello, EMT-P  1/23/2025  1:01 PM  "

## 2025-01-23 NOTE — NURSING NOTE
"Chief Complaint   Patient presents with    Consult For      Hematuria       Blood pressure 134/86, pulse 63, height 1.575 m (5' 2\"), weight 57.2 kg (126 lb), last menstrual period 05/14/2014, SpO2 99%, not currently breastfeeding. Body mass index is 23.05 kg/m .    Patient Active Problem List   Diagnosis    Ulcerative colitis (H)    Migraine    Allergic rhinitis    Hearing loss    Kidney replaced by transplant    Lichen planus    Giant Platelet syndrome    Nephrotic syndrome in diseases classified elsewhere    Major depression in partial remission    Actinic keratosis    Stage 3b chronic kidney disease (H)    Vitiligo    History of SCC (squamous cell carcinoma) of skin    Lichen planopilaris    Hyperlipidemia with target LDL less than 130    AK (actinic keratosis)    Neoplasm of uncertain behavior of skin    History of nonmelanoma skin cancer    HTN, kidney transplant related    Immunosuppressed status    Neoplasm of unspecified behavior of bone, soft tissue, and skin    Porokeratosis    Aftercare following organ transplant    Dermatitis    Squamous cell carcinoma of right thigh    Hypercalcemia    Elevated serum creatinine    Complications, kidney transplant    Kidney transplant rejection    Organ transplant candidate    FSGS (focal segmental glomerulosclerosis)    Chronic kidney disease, stage V (H)    Seborrheic keratoses       Allergies   Allergen Reactions    Ampicillin Swelling     Swelling of mouth and tongue.     Seasonal Allergies Other (See Comments)     Itchy eyes and rhinitis.       Current Outpatient Medications   Medication Sig Dispense Refill    atorvastatin (LIPITOR) 80 MG tablet Take 1 tablet (80 mg) by mouth at bedtime. 90 tablet 3    bisacodyl (DULCOLAX) 5 MG EC tablet Take 2 tablets at 3 pm the day before your procedure. If your procedure is before 11 am, take 2 additional tablets at 11 pm. If your procedure is after 11 am, take 2 additional tablets at 6 am. For additional instructions refer to " your colonoscopy prep instructions. 4 tablet 0    cinacalcet (SENSIPAR) 30 MG tablet Take 1 tablet (30 mg) by mouth every other day 45 tablet 3    clobetasol (TEMOVATE) 0.05 % external ointment Apply topically 2 times daily Use sparingly at active areas of dermatitis for no more than 14 days or until resolved, whichever is sooner. 60 g 3    clobetasol (TEMOVATE) 0.05 % external solution Apply topically daily as needed (itchyy scalp) Apply to scalp daily as needed. 50 mL 4    clobetasol propionate (CLOBEX) 0.05 % external shampoo APPLY TO SCALP IN SHOWER TWICE WEEKLY 118 mL 1    ketoconazole (NIZORAL) 2 % external shampoo APPLY TOPICALLY DAILY AS NEEDED FOR ITCHING, IRRITATION OR OTHER( SCALP SCALE) USE 2-3 TIMES A WEEK. APPLY AND LATHER THEN RINSE OUT 5 MINUTES 120 mL 0    mycophenolate (GENERIC EQUIVALENT) 250 MG capsule Take 3 capsules (750 mg) by mouth 2 times daily 540 capsule 3    niacinamide 500 MG tablet Take 1 tablet (500 mg) by mouth 2 times daily (with meals). 60 tablet 3    PARoxetine (PAXIL) 20 MG tablet TAKE 1 TABLET BY MOUTH EVERY MORNING 90 tablet 2    polyethylene glycol (GOLYTELY) 236 g suspension The night before the exam at 6 pm drink an 8-ounce glass every 15 minutes until the jug is half empty. If you arrive before 11 AM: Drink the other half of the Golytely jug at 11 PM night before procedure. If you arrive after 11 AM: Drink the other half of the Golytely jug at 6 AM day of procedure. For additional instructions refer to your colonoscopy prep instructions. 4000 mL 0    polyethylene glycol (MIRALAX) 17 GM/Dose powder Take 17 g (1 Capful) by mouth daily. 510 g 3    predniSONE (DELTASONE) 5 MG tablet Take 1 tablet (5 mg) by mouth daily (Patient taking differently: Take 5 mg by mouth every morning.) 90 tablet 3    sodium bicarbonate 650 MG tablet Take 2 tablets (1,300 mg) by mouth 2 times daily 360 tablet 3    triamcinolone (KENALOG) 0.1 % external cream APPLY SPARINGLY TOPICALLY TO THE AFFECTED  AREA TWICE DAILY FOR 14 DAYS 30 g 0       Social History     Tobacco Use    Smoking status: Never     Passive exposure: Never    Smokeless tobacco: Never   Vaping Use    Vaping status: Never Used   Substance Use Topics    Alcohol use: Not Currently    Drug use: Never       Dominga Bertrand MA  1/23/2025  8:30 AM

## 2025-01-23 NOTE — LETTER
1/23/2025       RE: Reina Quan  [address] [id number]     Dear Colleague,    Thank you for referring your patient, Reina Quan, to the [facility] [place] at [facility]. Please see a copy of my visit note below.    It was my pleasure to meet Ms. Reina Quan, a 59 year old year old female seen in consultation today at the request of Reji Sinclair for chief complaint: Consult For ( Hematuria)  Transplant clearance / microhematuria     Today she is unaccompanied    HPI: Ms. 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  Now with worsening renal function and s/p AV fistula on 10/15/24 for dialysis (currently on HD).  Here today for urologic eval of microhematuria in preparation for second transplant     She had a recent UTI on 8/16/24.  Prior to that, the last positive cultures were on 7/4/22 and 11/17/16.      No gross hematuria  No urinary concerns.  Still makes a lot of urine.    Never had cystoscopy.    Vaginal bleeding.  Still has uterus and ovaries.  Menopausal since age 45.  Four pregnancies (1 stillborn)   Bowels - ok.  No blood in stool.  Just had normal colonoscopy.   Did have frequent UTIs in the past. In retrospect these were associated with sexual activity.  Now is no longer sexually active and has only had two in the last 7 years, neither requiring hospitalization.  Recent symptoms, dysuria and frequency - and is able to seek early treatment.      REVIEW OF DIAGNOSTICS:  10/15/24 - SCr 3.55  10/7/24 - UA: WBC 2, RBC 3, neg nitrites, protein 300  9/27/24 - UA: WBC 3, RBC 3, neg nitrites, protein 300  8/16/24 - UA: WBC >100, RBC 2-5, positive nitrites --> UCX: Pansensitive E coli   7/24/24 - UA: WBC neg, RBC neg  6/20/24 - UA: WBC 0-5, RBC 2.5    Past Medical History:   Diagnosis Date      Actinic keratosis      Allergic rhinitis, cause unspecified      Anemia      anxiety/depression     PAXIL     Arthritis      congenital hearing loss     uses hearing aids     Depressive disorder      Dry eyes      Giant Platelet syndrome      Glomerular Focal Sclerosis--transplant 1985      Hypertension      Kidney replaced by transplant 1992    RAPAMUNE/ SIROLIMUS      Lichen planopilaris     LPP followed by derm on doxycycline/ clobetasol     Lichen planus      Menorrhagia      migraine      Squamous cell carcinoma     8 x      Thrombocytopenia      Ulcerative colitis, unspecified     biposy neg 1996     Unsatisfactory cervical Papanicolaou smear 02/15/2021     UTI (lower urinary tract infection)     recurrent Dr Burns [facility]       Past Surgical History:   Procedure Laterality Date     BIOPSY OF SKIN LESION       CHOLECYSTECTOMY       COLONOSCOPY N/A 1/8/2025    Procedure: Colonoscopy;  Surgeon: Valentin Bull MD;  Location: [facility]     CREATE FISTULA ARTERIOVENOUS UPPER EXTREMITY Left 10/15/2024    Procedure: Left Upper Extremity Brachiobasilic ARTERIOVENOUS FISTULA Creation;  Surgeon: Jesus Alberto Walton MD;  Location: [facility] OR     DILATION AND CURETTAGE, OPERATIVE HYSTEROSCOPY WITH MORCELLATOR, COMBINED N/A 11/10/2015    Procedure: COMBINED DILATION AND CURETTAGE, OPERATIVE HYSTEROSCOPY WITH MORCELLATOR;  Surgeon: hGada Melendez MD;  Location: [facility]     ESOPHAGOSCOPY, GASTROSCOPY, DUODENOSCOPY (EGD), COMBINED  11/20/2012    Procedure: COMBINED ESOPHAGOSCOPY, GASTROSCOPY, DUODENOSCOPY (EGD), BIOPSY SINGLE OR MULTIPLE;;  Surgeon: Valentin Hahn MD;  Location: [facility]     IR CVC TUNNEL PLACEMENT > 5 YRS OF AGE  10/9/2024     IR RENAL BIOPSY RIGHT  07/25/2024     MOHS MICROGRAPHIC PROCEDURE       REVISION FISTULA ARTERIOVENOUS UPPER EXTREMITY Left 11/20/2024    Procedure: Left Upper Extremity brachiobasilic arteriovenous fistula second stage transposition surgery;  Surgeon: Isabelle  Jesus Alberto Simon MD;  Location: [facility] OR     TRANSPLANT  1992    kidney     [facility] NONSPECIFIC PROCEDURE      Renal transplant right side     [facility] NONSPECIFIC PROCEDURE      cholecystectomy       FAMILY HISTORY: Denies family history of urologic cancer.     SOCIAL HISTORY: Mom.  Worked as an  - sold window treatments.  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  No occupational exposures.    reports that she has never smoked. She has never been exposed to tobacco smoke. She has never used smokeless tobacco.    Current Outpatient Medications   Medication Sig Dispense Refill     atorvastatin (LIPITOR) 80 MG tablet Take 1 tablet (80 mg) by mouth at bedtime. 90 tablet 3     bisacodyl (DULCOLAX) 5 MG EC tablet Take 2 tablets at 3 pm the day before your procedure. If your procedure is before 11 am, take 2 additional tablets at 11 pm. If your procedure is after 11 am, take 2 additional tablets at 6 am. For additional instructions refer to your colonoscopy prep instructions. 4 tablet 0     cinacalcet (SENSIPAR) 30 MG tablet Take 1 tablet (30 mg) by mouth every other day 45 tablet 3     clobetasol (TEMOVATE) 0.05 % external ointment Apply topically 2 times daily Use sparingly at active areas of dermatitis for no more than 14 days or until resolved, whichever is sooner. 60 g 3     clobetasol (TEMOVATE) 0.05 % external solution Apply topically daily as needed (itchyy scalp) Apply to scalp daily as needed. 50 mL 4     clobetasol propionate (CLOBEX) 0.05 % external shampoo APPLY TO SCALP IN SHOWER TWICE WEEKLY 118 mL 1     ketoconazole (NIZORAL) 2 % external shampoo APPLY TOPICALLY DAILY AS NEEDED FOR ITCHING, IRRITATION OR OTHER( SCALP SCALE) USE 2-3 TIMES A WEEK. APPLY AND LATHER THEN RINSE OUT 5 MINUTES 120 mL 0     mycophenolate (GENERIC EQUIVALENT) 250 MG capsule Take 3 capsules (750 mg) by mouth 2 times daily 540 capsule 3     niacinamide 500 MG tablet Take 1 tablet (500 mg) by mouth 2 times daily (with  meals). 60 tablet 3     PARoxetine (PAXIL) 20 MG tablet TAKE 1 TABLET BY MOUTH EVERY MORNING 90 tablet 2     polyethylene glycol (GOLYTELY) 236 g suspension The night before the exam at 6 pm drink an 8-ounce glass every 15 minutes until the jug is half empty. If you arrive before 11 AM: Drink the other half of the Golytely jug at 11 PM night before procedure. If you arrive after 11 AM: Drink the other half of the Golytely jug at 6 AM day of procedure. For additional instructions refer to your colonoscopy prep instructions. 4000 mL 0     polyethylene glycol (MIRALAX) 17 GM/Dose powder Take 17 g (1 Capful) by mouth daily. 510 g 3     predniSONE (DELTASONE) 5 MG tablet Take 1 tablet (5 mg) by mouth daily (Patient taking differently: Take 5 mg by mouth every morning.) 90 tablet 3     sodium bicarbonate 650 MG tablet Take 2 tablets (1,300 mg) by mouth 2 times daily 360 tablet 3     triamcinolone (KENALOG) 0.1 % external cream APPLY SPARINGLY TOPICALLY TO THE AFFECTED AREA TWICE DAILY FOR 14 DAYS 30 g 0       ALLERGIES: Ampicillin and Seasonal allergies      REVIEW OF SYSTEMS:  As above in HPI     GENERAL PHYSICAL EXAM:   Vitals: LMP 05/14/2014 (Approximate)   There is no height or weight on file to calculate BMI.    GENERAL: Well groomed, well developed, well nourished female in NAD.  NEURO: Alert and oriented x 3.  PSYCH: Normal mood and affect, pleasant and agreeable during interview and exam.    RADIOLOGY: The following tests were reviewed:   CT of the chest abdomen and pelvis without contrast     HISTORY: Elevated serum creatinine immunosuppressed status chronic  kidney disease stage V     COMPARISON STUDY: 12/1/2022     FINDINGS: Heart, main pulmonary artery and aorta are not enlarged.  Trace coronary calcification. Fatty metaplasia of the LV apex  suggestive of prior infarct. Right Bochdalek hernia containing fat.  Trivial interstitial lung abnormality. No mediastinal, or axillary  adenopathy.     Evaluation of the  abdomen is limited due to lack of IV contrast.  Cholecystectomy clips. Pancreas demonstrates air in the head  presumably from prior sphincterotomy. Atrophic kidneys. Spleen is  unremarkable. Adrenal glands are unremarkable. Right lower quadrant  renal transplant with atrophy of the lower pole. Appendix appears  normal. No dilated bowel loops.     Bones: No suspicious bony lesions.                                                               IMPRESSION: Right lower quadrant renal transplant with atrophy of the  lower pole.    EXAMINATION: US RENAL TRANSPLANT WITH DOPPLER  7/15/2024 3:41 PM       CLINICAL HISTORY: elevated creatinine; Kidney transplanted; Aftercare  following organ transplant; Elevated serum creatinine     COMPARISON: 1/17/2020       FINDINGS:   There is a right lower quadrant renal transplant which measures 10.9,  previously 10.4 cm. The transplant kidney demonstrates normal  echogenicity. There is no peritransplant fluid collection. There is no  urinary tract dilation. Tiny cyst in the inferior pole of the  transplant measuring up to 1 cm in diameter.     The urinary bladder is distended and is normal in morphology. The  bladder wall is normal.      The arcuate artery resistive indices are 0.64, 0.70, and 0.62.   The renal artery anastomosis peak systolic velocity is 127 cm/s.  Resistive index is 0.78. There are no abnormal waveforms in the renal  artery.   The renal vein is patent with normal velocity and waveform.  The artery and vein are patent above and below the anastomosis.                                                                   IMPRESSION:   Normal renal transplant ultrasound. 1 cm cyst in the inferior pole of  transplant.  Patent transplant vessels with no evidence to suggest arterial or  venous stenosis.       LABS: The last test results for Ms. Reina Quan were reviewed.   BMP -   Recent Labs   Lab Test 11/20/24  1019 11/20/24  0939 10/15/24  0637 10/07/24  1326  09/27/24  1102 09/24/24  1114 04/09/24  1037 01/09/24  1050   NA  --   --  140 137 136 136   < > 138   POTASSIUM 3.4  --  4.1 4.0 4.3 4.5   < > 4.1   CHLORIDE  --   --  100 103 104 106   < > 101   CO2  --   --  25 20* 17* 16*   < > 25   BUN  --   --  28.1* 49.0* 62.3* 60.9*   < > 43.2*   CR  --   --  3.55* 4.40* 5.05* 4.40*   < > 1.94*   GLC  --  96 100* 97 112* 107*   < > 104*   HARSHA  --   --  9.9 11.6* 10.8* 10.9*   < > 11.1*  11.1*   MAG  --   --   --   --   --   --   --  2.3   PHOS  --   --   --  4.7* 5.0* 4.8*   < > 3.2    < > = values in this interval not displayed.       CBC -   Recent Labs   Lab Test 10/15/24  0637 10/07/24  1326 09/27/24  1102 09/24/24  1114   WBC  --  5.4 4.9 4.6   HGB 9.3* 9.6* 9.7* 10.5*   PLT  --  163 187 205       ASSESSMENT:   1) Microhematuria  2) Occasional UTIs (8/2024, 7/2022 and 11/2016)  3) Post-menopausal  4) ESRD s/p failing transplant kidney, pursuing a second   5) Hx UTIs - primary associated with sexual activitiy      PLAN:   - Urine cytology today   - Schedule cystoscopy with next available provider who sees patients for microhematuria (recommend Nakul VARGAS, Dr. Joshua, Dr. Mejias, Dr. Lira, Dr. Rodriguez or could potentially see Dr. Bundy or Dr. Hernandez)   - CT AP without contrast shows no renal abnormalities.  The patient also has a transplant ultrasound that is normal with exception of a 1cm cyst in the inferior pole. Would not recommend additional imaging  - Consider vaginal estrogen cream in the future (if UTIs recur or become symptomatic).  If she develops UTIs associated with sexual activity, would also consider prophylactic antibiotics such as macrobid  - UTI prevention discussed  - Return PRN    Noris Gaming PA-C  Department of Urologic Surgery      Again, thank you for allowing me to participate in the care of your patient.      Sincerely,    Latonya Gaming PA

## 2025-01-23 NOTE — PROGRESS NOTES
It was my pleasure to meet Ms. Reina Quan, a 59 year old year old female seen in consultation today at the request of Reji Sinclair for chief complaint: Consult For ( Hematuria)  Transplant clearance / microhematuria     Today she is unaccompanied    HPI: Ms. 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  Now with worsening renal function and s/p AV fistula on 10/15/24 for dialysis (currently on HD).  Here today for urologic eval of microhematuria in preparation for second transplant     She had a recent UTI on 8/16/24.  Prior to that, the last positive cultures were on 7/4/22 and 11/17/16.      No gross hematuria  No urinary concerns.  Still makes a lot of urine.    Never had cystoscopy.    Vaginal bleeding.  Still has uterus and ovaries.  Menopausal since age 45.  Four pregnancies (1 stillborn)   Bowels - ok.  No blood in stool.  Just had normal colonoscopy.   Did have frequent UTIs in the past. In retrospect these were associated with sexual activity.  Now is no longer sexually active and has only had two in the last 7 years, neither requiring hospitalization.  Recent symptoms, dysuria and frequency - and is able to seek early treatment.      REVIEW OF DIAGNOSTICS:  10/15/24 - SCr 3.55  10/7/24 - UA: WBC 2, RBC 3, neg nitrites, protein 300  9/27/24 - UA: WBC 3, RBC 3, neg nitrites, protein 300  8/16/24 - UA: WBC >100, RBC 2-5, positive nitrites --> UCX: Pansensitive E coli   7/24/24 - UA: WBC neg, RBC neg  6/20/24 - UA: WBC 0-5, RBC 2.5    Past Medical History:   Diagnosis Date    Actinic keratosis     Allergic rhinitis, cause unspecified     Anemia     anxiety/depression     PAXIL    Arthritis     congenital hearing loss     uses hearing aids    Depressive disorder     Dry eyes     Giant Platelet syndrome     Glomerular Focal Sclerosis--transplant 1985     Hypertension     Kidney replaced  by transplant 1992    RAPAMUNE/ SIROLIMUS     Lichen planopilaris     LPP followed by derm on doxycycline/ clobetasol    Lichen planus     Menorrhagia     migraine     Squamous cell carcinoma     8 x     Thrombocytopenia     Ulcerative colitis, unspecified     biposy neg 1996    Unsatisfactory cervical Papanicolaou smear 02/15/2021    UTI (lower urinary tract infection)     recurrent Dr Burns [facility]       Past Surgical History:   Procedure Laterality Date    BIOPSY OF SKIN LESION      CHOLECYSTECTOMY      COLONOSCOPY N/A 1/8/2025    Procedure: Colonoscopy;  Surgeon: Valentin Bull MD;  Location: [facility]    CREATE FISTULA ARTERIOVENOUS UPPER EXTREMITY Left 10/15/2024    Procedure: Left Upper Extremity Brachiobasilic ARTERIOVENOUS FISTULA Creation;  Surgeon: Jesus Alberto Walton MD;  Location: [facility]    DILATION AND CURETTAGE, OPERATIVE HYSTEROSCOPY WITH MORCELLATOR, COMBINED N/A 11/10/2015    Procedure: COMBINED DILATION AND CURETTAGE, OPERATIVE HYSTEROSCOPY WITH MORCELLATOR;  Surgeon: Ghada Melendez MD;  Location: [facility]    ESOPHAGOSCOPY, GASTROSCOPY, DUODENOSCOPY (EGD), COMBINED  11/20/2012    Procedure: COMBINED ESOPHAGOSCOPY, GASTROSCOPY, DUODENOSCOPY (EGD), BIOPSY SINGLE OR MULTIPLE;;  Surgeon: Valentin Hahn MD;  Location: [facility]    IR CVC TUNNEL PLACEMENT > 5 YRS OF AGE  10/9/2024    IR RENAL BIOPSY RIGHT  07/25/2024    MOHS MICROGRAPHIC PROCEDURE      REVISION FISTULA ARTERIOVENOUS UPPER EXTREMITY Left 11/20/2024    Procedure: Left Upper Extremity brachiobasilic arteriovenous fistula second stage transposition surgery;  Surgeon: Jesus Alberto Walton MD;  Location: [facility]    TRANSPLANT  1992    kidney    ZZC NONSPECIFIC PROCEDURE      Renal transplant right side    ZZC NONSPECIFIC PROCEDURE      cholecystectomy       FAMILY HISTORY: Denies family history of urologic cancer.     SOCIAL HISTORY: Mom.  Worked as an  - sold window treatments.  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  No occupational exposures.    reports that she has never smoked. She has never been exposed to tobacco smoke. She has never used smokeless tobacco.    Current Outpatient Medications   Medication Sig Dispense Refill    atorvastatin (LIPITOR) 80 MG tablet Take 1 tablet (80 mg) by mouth at bedtime. 90 tablet 3    bisacodyl (DULCOLAX) 5 MG EC tablet Take 2 tablets at 3 pm the day before your procedure. If your procedure is before 11 am, take 2 additional tablets at 11 pm. If your procedure is after 11 am, take 2 additional tablets at 6 am. For additional instructions refer to your colonoscopy prep instructions. 4 tablet 0    cinacalcet (SENSIPAR) 30 MG tablet Take 1 tablet (30 mg) by mouth every other day 45 tablet 3    clobetasol (TEMOVATE) 0.05 % external ointment Apply topically 2 times daily Use sparingly at active areas of dermatitis for no more than 14 days or until resolved, whichever is sooner. 60 g 3    clobetasol (TEMOVATE) 0.05 % external solution Apply topically daily as needed (itchyy scalp) Apply to scalp daily as needed. 50 mL 4    clobetasol propionate (CLOBEX) 0.05 % external shampoo APPLY TO SCALP IN SHOWER TWICE WEEKLY 118 mL 1    ketoconazole (NIZORAL) 2 % external shampoo APPLY TOPICALLY DAILY AS NEEDED FOR ITCHING, IRRITATION OR OTHER( SCALP SCALE) USE 2-3 TIMES A WEEK. APPLY AND LATHER THEN RINSE OUT 5 MINUTES 120 mL 0    mycophenolate (GENERIC EQUIVALENT) 250 MG capsule Take 3 capsules (750 mg) by mouth 2 times daily 540 capsule 3    niacinamide 500 MG tablet Take 1 tablet (500 mg) by mouth 2 times daily (with meals). 60 tablet 3    PARoxetine (PAXIL) 20 MG tablet TAKE 1 TABLET BY MOUTH EVERY MORNING 90 tablet 2    polyethylene glycol (GOLYTELY) 236 g suspension The night before the exam at 6 pm drink an 8-ounce glass every 15 minutes until the jug is half empty. If you arrive before 11 AM: Drink the other half of the [employer] jug at 11 PM night before procedure. If you  arrive after 11 AM: Drink the other half of the [employer] jug at 6 AM day of procedure. For additional instructions refer to your colonoscopy prep instructions. 4000 mL 0    polyethylene glycol (MIRALAX) 17 GM/Dose powder Take 17 g (1 Capful) by mouth daily. 510 g 3    predniSONE (DELTASONE) 5 MG tablet Take 1 tablet (5 mg) by mouth daily (Patient taking differently: Take 5 mg by mouth every morning.) 90 tablet 3    sodium bicarbonate 650 MG tablet Take 2 tablets (1,300 mg) by mouth 2 times daily 360 tablet 3    triamcinolone (KENALOG) 0.1 % external cream APPLY SPARINGLY TOPICALLY TO THE AFFECTED AREA TWICE DAILY FOR 14 DAYS 30 g 0       ALLERGIES: Ampicillin and Seasonal allergies      REVIEW OF SYSTEMS:  As above in HPI     GENERAL PHYSICAL EXAM:   Vitals: LMP 05/14/2014 (Approximate)   There is no height or weight on file to calculate BMI.    GENERAL: Well groomed, well developed, well nourished female in NAD.  NEURO: Alert and oriented x 3.  PSYCH: Normal mood and affect, pleasant and agreeable during interview and exam.    RADIOLOGY: The following tests were reviewed:   CT of the chest abdomen and pelvis without contrast     HISTORY: Elevated serum creatinine immunosuppressed status chronic  kidney disease stage V     COMPARISON STUDY: 12/1/2022     FINDINGS: Heart, main pulmonary artery and aorta are not enlarged.  Trace coronary calcification. Fatty metaplasia of the LV apex  suggestive of prior infarct. Right Bochdalek hernia containing fat.  Trivial interstitial lung abnormality. No mediastinal, or axillary  adenopathy.     Evaluation of the abdomen is limited due to lack of IV contrast.  Cholecystectomy clips. Pancreas demonstrates air in the head  presumably from prior sphincterotomy. Atrophic kidneys. Spleen is  unremarkable. Adrenal glands are unremarkable. Right lower quadrant  renal transplant with atrophy of the lower pole. Appendix appears  normal. No dilated bowel loops.     Bones: No suspicious  bony lesions.                                                               IMPRESSION: Right lower quadrant renal transplant with atrophy of the  lower pole.    EXAMINATION: US RENAL TRANSPLANT WITH DOPPLER  7/15/2024 3:41 PM       CLINICAL HISTORY: elevated creatinine; Kidney transplanted; Aftercare  following organ transplant; Elevated serum creatinine     COMPARISON: 1/17/2020       FINDINGS:   There is a right lower quadrant renal transplant which measures 10.9,  previously 10.4 cm. The transplant kidney demonstrates normal  echogenicity. There is no peritransplant fluid collection. There is no  urinary tract dilation. Tiny cyst in the inferior pole of the  transplant measuring up to 1 cm in diameter.     The urinary bladder is distended and is normal in morphology. The  bladder wall is normal.      The arcuate artery resistive indices are 0.64, 0.70, and 0.62.   The renal artery anastomosis peak systolic velocity is 127 cm/s.  Resistive index is 0.78. There are no abnormal waveforms in the renal  artery.   The renal vein is patent with normal velocity and waveform.  The artery and vein are patent above and below the anastomosis.                                                                   IMPRESSION:   Normal renal transplant ultrasound. 1 cm cyst in the inferior pole of  transplant.  Patent transplant vessels with no evidence to suggest arterial or  venous stenosis.       LABS: The last test results for Ms. Reina Quan were reviewed.   BMP -   Recent Labs   Lab Test 11/20/24  1019 11/20/24  0939 10/15/24  0637 10/07/24  1326 09/27/24  1102 09/24/24  1114 04/09/24  1037 01/09/24  1050   NA  --   --  140 137 136 136   < > 138   POTASSIUM 3.4  --  4.1 4.0 4.3 4.5   < > 4.1   CHLORIDE  --   --  100 103 104 106   < > 101   CO2  --   --  25 20* 17* 16*   < > 25   BUN  --   --  28.1* 49.0* 62.3* 60.9*   < > 43.2*   CR  --   --  3.55* 4.40* 5.05* 4.40*   < > 1.94*   GLC  --  96 100* 97 112* 107*   < >  104*   HARSHA  --   --  9.9 11.6* 10.8* 10.9*   < > 11.1*  11.1*   MAG  --   --   --   --   --   --   --  2.3   PHOS  --   --   --  4.7* 5.0* 4.8*   < > 3.2    < > = values in this interval not displayed.       CBC -   Recent Labs   Lab Test 10/15/24  0637 10/07/24  1326 09/27/24  1102 09/24/24  1114   WBC  --  5.4 4.9 4.6   HGB 9.3* 9.6* 9.7* 10.5*   PLT  --  163 187 205       ASSESSMENT:   1) Microhematuria  2) Occasional UTIs (8/2024, 7/2022 and 11/2016)  3) Post-menopausal  4) ESRD s/p failing transplant kidney, pursuing a second   5) Hx UTIs - primary associated with sexual activitiy      PLAN:   - Urine cytology today   - Schedule cystoscopy with next available provider who sees patients for microhematuria (recommend Nakul VARGAS, Dr. Joshua, Dr. Mejias, Dr. Lira, Dr. Rodriguez or could potentially see Dr. Bundy or Dr. Hernandez)   - CT AP without contrast shows no renal abnormalities.  The patient also has a transplant ultrasound that is normal with exception of a 1cm cyst in the inferior pole. Would not recommend additional imaging  - Consider vaginal estrogen cream in the future (if UTIs recur or become symptomatic).  If she develops UTIs associated with sexual activity, would also consider prophylactic antibiotics such as macrobid  - UTI prevention discussed  - Return MARZENA Gaming PA-C  Department of Urologic Surgery

## 2025-01-23 NOTE — PATIENT INSTRUCTIONS
PLAN:   - Urine cytology today  - Schedule cystoscopy with next available provider who sees patients for microhematuria (recommend Nakul VARGAS, Dr. Josuha, Dr. Mejias, Dr. Lira, Dr. Rodriguez or could potentially see Dr. Bundy or Dr. Hernandez)   - CT AP without contrast shows no renal abnormalities.  Te patient also has a transplant ultrasound that is normal with exception of a 1cm cyst in the inferior pole.   - UTI prevention discussed    It was nice to meet you!    [facility]

## 2025-01-28 ENCOUNTER — APPOINTMENT (OUTPATIENT)
Dept: INTERVENTIONAL RADIOLOGY/VASCULAR | Facility: CLINIC | Age: 60
End: 2025-01-28
Attending: SURGERY
Payer: COMMERCIAL

## 2025-01-28 ENCOUNTER — APPOINTMENT (OUTPATIENT)
Dept: MEDSURG UNIT | Facility: CLINIC | Age: 60
End: 2025-01-28
Attending: RADIOLOGY
Payer: COMMERCIAL

## 2025-01-28 ENCOUNTER — HOSPITAL ENCOUNTER (OUTPATIENT)
Facility: CLINIC | Age: 60
Discharge: HOME | End: 2025-01-28
Attending: RADIOLOGY | Admitting: RADIOLOGY
Payer: COMMERCIAL

## 2025-01-28 VITALS
RESPIRATION RATE: 18 BRPM | SYSTOLIC BLOOD PRESSURE: 154 MMHG | HEART RATE: 55 BPM | OXYGEN SATURATION: 100 % | HEIGHT: 62 IN | TEMPERATURE: 98.6 F | WEIGHT: 126 LBS | DIASTOLIC BLOOD PRESSURE: 86 MMHG | BODY MASS INDEX: 23.19 KG/M2

## 2025-01-28 DIAGNOSIS — N18.6 ESRD ON HEMODIALYSIS (H): ICD-10-CM

## 2025-01-28 DIAGNOSIS — T82.9XXA COMPLICATION OF VASCULAR ACCESS FOR DIALYSIS, INITIAL ENCOUNTER: ICD-10-CM

## 2025-01-28 DIAGNOSIS — Z99.2 ESRD ON HEMODIALYSIS (H): ICD-10-CM

## 2025-01-28 LAB
ANION GAP SERPL CALCULATED.3IONS-SCNC: 15 MMOL/L (ref 7–15)
BUN SERPL-MCNC: 21.4 MG/DL (ref 8–23)
CALCIUM SERPL-MCNC: 9.4 MG/DL (ref 8.8–10.4)
CHLORIDE SERPL-SCNC: 100 MMOL/L (ref 98–107)
CREAT SERPL-MCNC: 5 MG/DL (ref 0.51–0.95)
EGFRCR SERPLBLD CKD-EPI 2021: 9 ML/MIN/1.73M2
ERYTHROCYTE [DISTWIDTH] IN BLOOD BY AUTOMATED COUNT: 14.2 % (ref 10–15)
GLUCOSE SERPL-MCNC: 87 MG/DL (ref 70–99)
HCO3 SERPL-SCNC: 26 MMOL/L (ref 22–29)
HCT VFR BLD AUTO: 42.6 % (ref 35–47)
HGB BLD-MCNC: 12.6 G/DL (ref 11.7–15.7)
INR PPP: 0.9 (ref 0.85–1.15)
MCH RBC QN AUTO: 28.6 PG (ref 26.5–33)
MCHC RBC AUTO-ENTMCNC: 29.6 G/DL (ref 31.5–36.5)
MCV RBC AUTO: 97 FL (ref 78–100)
PLATELET # BLD AUTO: 118 10E3/UL (ref 150–450)
POTASSIUM SERPL-SCNC: 4.4 MMOL/L (ref 3.4–5.3)
RBC # BLD AUTO: 4.4 10E6/UL (ref 3.8–5.2)
SODIUM SERPL-SCNC: 141 MMOL/L (ref 135–145)
WBC # BLD AUTO: 3.5 10E3/UL (ref 4–11)

## 2025-01-28 PROCEDURE — 250N000011 HC RX IP 250 OP 636: Performed by: STUDENT IN AN ORGANIZED HEALTH CARE EDUCATION/TRAINING PROGRAM

## 2025-01-28 PROCEDURE — C1725 CATH, TRANSLUMIN NON-LASER: HCPCS

## 2025-01-28 PROCEDURE — 272N000302 HC DEVICE INFLATION CR5

## 2025-01-28 PROCEDURE — 85041 AUTOMATED RBC COUNT: CPT | Performed by: NURSE PRACTITIONER

## 2025-01-28 PROCEDURE — C1887 CATHETER, GUIDING: HCPCS

## 2025-01-28 PROCEDURE — 82310 ASSAY OF CALCIUM: CPT | Performed by: NURSE PRACTITIONER

## 2025-01-28 PROCEDURE — 36415 COLL VENOUS BLD VENIPUNCTURE: CPT | Performed by: NURSE PRACTITIONER

## 2025-01-28 PROCEDURE — 36902 INTRO CATH DIALYSIS CIRCUIT: CPT

## 2025-01-28 PROCEDURE — 85027 COMPLETE CBC AUTOMATED: CPT | Performed by: NURSE PRACTITIONER

## 2025-01-28 PROCEDURE — 82374 ASSAY BLOOD CARBON DIOXIDE: CPT | Performed by: NURSE PRACTITIONER

## 2025-01-28 PROCEDURE — 99152 MOD SED SAME PHYS/QHP 5/>YRS: CPT

## 2025-01-28 PROCEDURE — 76937 US GUIDE VASCULAR ACCESS: CPT | Mod: 26 | Performed by: STUDENT IN AN ORGANIZED HEALTH CARE EDUCATION/TRAINING PROGRAM

## 2025-01-28 PROCEDURE — 99153 MOD SED SAME PHYS/QHP EA: CPT

## 2025-01-28 PROCEDURE — 85610 PROTHROMBIN TIME: CPT | Performed by: NURSE PRACTITIONER

## 2025-01-28 PROCEDURE — 272N000504 HC NEEDLE CR4

## 2025-01-28 PROCEDURE — 36902 INTRO CATH DIALYSIS CIRCUIT: CPT | Mod: GC | Performed by: STUDENT IN AN ORGANIZED HEALTH CARE EDUCATION/TRAINING PROGRAM

## 2025-01-28 PROCEDURE — 999N000134 HC STATISTIC PP CARE STAGE 3

## 2025-01-28 PROCEDURE — 99152 MOD SED SAME PHYS/QHP 5/>YRS: CPT | Mod: GC | Performed by: STUDENT IN AN ORGANIZED HEALTH CARE EDUCATION/TRAINING PROGRAM

## 2025-01-28 PROCEDURE — 272N000564 HC SHEATH CR2

## 2025-01-28 PROCEDURE — C1769 GUIDE WIRE: HCPCS

## 2025-01-28 PROCEDURE — 255N000002 HC RX 255 OP 636: Performed by: SURGERY

## 2025-01-28 PROCEDURE — 250N000009 HC RX 250

## 2025-01-28 PROCEDURE — 999N000142 HC STATISTIC PROCEDURE PREP ONLY

## 2025-01-28 PROCEDURE — 76937 US GUIDE VASCULAR ACCESS: CPT

## 2025-01-28 PROCEDURE — 80048 BASIC METABOLIC PNL TOTAL CA: CPT | Performed by: NURSE PRACTITIONER

## 2025-01-28 PROCEDURE — C2623 CATH, TRANSLUMIN, DRUG-COAT: HCPCS

## 2025-01-28 PROCEDURE — 250N000011 HC RX IP 250 OP 636

## 2025-01-28 RX ORDER — HEPARIN SODIUM 200 [USP'U]/100ML
1 INJECTION, SOLUTION INTRAVENOUS EVERY 5 MIN PRN
Status: DISCONTINUED | OUTPATIENT
Start: 2025-01-28 | End: 2025-01-28 | Stop reason: HOSPADM

## 2025-01-28 RX ORDER — IODIXANOL 320 MG/ML
100 INJECTION, SOLUTION INTRAVASCULAR ONCE
Status: COMPLETED | OUTPATIENT
Start: 2025-01-28 | End: 2025-01-28

## 2025-01-28 RX ORDER — NALOXONE HYDROCHLORIDE 0.4 MG/ML
0.4 INJECTION, SOLUTION INTRAMUSCULAR; INTRAVENOUS; SUBCUTANEOUS
Status: DISCONTINUED | OUTPATIENT
Start: 2025-01-28 | End: 2025-01-28 | Stop reason: HOSPADM

## 2025-01-28 RX ORDER — NALOXONE HYDROCHLORIDE 0.4 MG/ML
0.2 INJECTION, SOLUTION INTRAMUSCULAR; INTRAVENOUS; SUBCUTANEOUS
Status: DISCONTINUED | OUTPATIENT
Start: 2025-01-28 | End: 2025-01-28 | Stop reason: HOSPADM

## 2025-01-28 RX ORDER — LIDOCAINE 40 MG/G
CREAM TOPICAL
Status: DISCONTINUED | OUTPATIENT
Start: 2025-01-28 | End: 2025-01-28 | Stop reason: HOSPADM

## 2025-01-28 RX ORDER — FENTANYL CITRATE 50 UG/ML
25-50 INJECTION, SOLUTION INTRAMUSCULAR; INTRAVENOUS EVERY 5 MIN PRN
Status: DISCONTINUED | OUTPATIENT
Start: 2025-01-28 | End: 2025-01-28 | Stop reason: HOSPADM

## 2025-01-28 RX ORDER — FLUMAZENIL 0.1 MG/ML
0.2 INJECTION, SOLUTION INTRAVENOUS
Status: DISCONTINUED | OUTPATIENT
Start: 2025-01-28 | End: 2025-01-28 | Stop reason: HOSPADM

## 2025-01-28 RX ADMIN — MIDAZOLAM 0.5 MG: 1 INJECTION INTRAMUSCULAR; INTRAVENOUS at 13:18

## 2025-01-28 RX ADMIN — FENTANYL CITRATE 25 MCG: 50 INJECTION, SOLUTION INTRAMUSCULAR; INTRAVENOUS at 13:44

## 2025-01-28 RX ADMIN — FENTANYL CITRATE 25 MCG: 50 INJECTION, SOLUTION INTRAMUSCULAR; INTRAVENOUS at 14:42

## 2025-01-28 RX ADMIN — MIDAZOLAM 0.5 MG: 1 INJECTION INTRAMUSCULAR; INTRAVENOUS at 13:28

## 2025-01-28 RX ADMIN — MIDAZOLAM 0.5 MG: 1 INJECTION INTRAMUSCULAR; INTRAVENOUS at 13:44

## 2025-01-28 RX ADMIN — MIDAZOLAM 0.5 MG: 1 INJECTION INTRAMUSCULAR; INTRAVENOUS at 13:55

## 2025-01-28 RX ADMIN — MIDAZOLAM 0.5 MG: 1 INJECTION INTRAMUSCULAR; INTRAVENOUS at 14:24

## 2025-01-28 RX ADMIN — FENTANYL CITRATE 25 MCG: 50 INJECTION, SOLUTION INTRAMUSCULAR; INTRAVENOUS at 14:04

## 2025-01-28 RX ADMIN — FENTANYL CITRATE 25 MCG: 50 INJECTION, SOLUTION INTRAMUSCULAR; INTRAVENOUS at 13:55

## 2025-01-28 RX ADMIN — FENTANYL CITRATE 25 MCG: 50 INJECTION, SOLUTION INTRAMUSCULAR; INTRAVENOUS at 13:19

## 2025-01-28 RX ADMIN — HEPARIN SODIUM 1 BAG: 200 INJECTION, SOLUTION INTRAVENOUS at 13:35

## 2025-01-28 RX ADMIN — FENTANYL CITRATE 25 MCG: 50 INJECTION, SOLUTION INTRAMUSCULAR; INTRAVENOUS at 13:28

## 2025-01-28 RX ADMIN — IODIXANOL 60 ML: 320 INJECTION, SOLUTION INTRAVASCULAR at 14:46

## 2025-01-28 RX ADMIN — FENTANYL CITRATE 25 MCG: 50 INJECTION, SOLUTION INTRAMUSCULAR; INTRAVENOUS at 14:25

## 2025-01-28 RX ADMIN — FENTANYL CITRATE 25 MCG: 50 INJECTION, SOLUTION INTRAMUSCULAR; INTRAVENOUS at 14:14

## 2025-01-28 RX ADMIN — LIDOCAINE HYDROCHLORIDE 8 ML: 10 INJECTION, SOLUTION EPIDURAL; INFILTRATION; INTRACAUDAL; PERINEURAL at 13:46

## 2025-01-28 ASSESSMENT — ACTIVITIES OF DAILY LIVING (ADL)
ADLS_ACUITY_SCORE: 45
ADLS_ACUITY_SCORE: 43
ADLS_ACUITY_SCORE: 45

## 2025-01-28 NOTE — DISCHARGE INSTRUCTIONS
[facility]      Interventional Radiology  Discharge Instructions Following Fistulogram      You may resume normal activities as tolerated, but avoid any strenuous activity or heavy lifting (>10 lbs.) involving your access arm.    Elevate and rest your arm as much as possible for the first 24 hours after the procedure.  This will promote healing and reduce swelling.     If bleeding or oozing should occur, apply fingertip pressure to puncture site to control bleeding, but avoid excessive pressure as this may clot off the fistula.  Hold light pressure for 10 minutes, or until bleeding/oozing is controlled.  If excessive bleeding is noted or if you are having difficulty controlling the bleeding with direct pressure, call 911.     If you develop fever, chills, excessive pain/tenderness or drainage at the puncture site, or have questions call your Doctor or Dialysis Center.     If you received sedation for your procedure: An adult should stay with you for 24 hours, Do Not drive a motor vehicle, operate machinery, or drink alcoholic beverages for 24 hours.     You may resume your normal medications immediately    If you notice sudden loss of pulse or thrill (buzzing feeling) in your fistula, contact your Doctor or Dialysis unit immediately.       [facility] INTERVENTIONAL RADIOLOGY DEPARTMENT  Procedure Physician: Dr. Herman Khoury  and Dr. Oliva Hogue                 Date of procedure: January 28, 2025  Telephone Numbers: [phone] Monday-Friday 7:30 am to 4:00 pm  [phone] After 4:00 pm Monday-Friday, Weekends & Holidays.   Ask for the Interventional Radiologist on call.  Someone is on call 24 hrs/day  [facility] toll free number: [phone] Monday-Friday 8:00 am to 4:30 pm  [facility] Emergency Dept: [phone]

## 2025-01-28 NOTE — PRE-PROCEDURE
GENERAL PRE-PROCEDURE:   Procedure:  Fistulogram of the left arm  Date/Time:  1/28/2025 12:25 PM    Written consent obtained?: Yes    Risks and benefits: Risks, benefits and alternatives were discussed    Consent given by:  Patient  Patient states understanding of procedure being performed: Yes    Patient's understanding of procedure matches consent: Yes    Procedure consent matches procedure scheduled: Yes    Expected level of sedation:  Moderate  Appropriately NPO:  Yes  ASA Class:  2  Mallampati  :  Grade 2- soft palate, base of uvula, tonsillar pillars, and portion of posterior pharyngeal wall visible  Lungs:  Lungs clear with good breath sounds bilaterally  Heart:  Normal heart sounds and rate  History & Physical reviewed:  History and physical reviewed and no updates needed  Statement of review:  I have reviewed the lab findings, diagnostic data, medications, and the plan for sedation    Nena Hogue MD  PGY-3 IR/

## 2025-01-28 NOTE — PROGRESS NOTES
Pt returned from IR s/p image guided fistulogram of left upper extremity w/intervention. Pt alert and oriented. VSS. Sats > 92% on RA. Pt denies any pain. Left upper arm CDI. No bleeding or hematoma/ Gauze and transparent dressing in place. Woggle to stay on for 2 hours per . Continue to monitor pt status and notify MD with any changes or concerns.

## 2025-01-28 NOTE — PROGRESS NOTES
Pt prepped for dialysis fistulogram. Pt alert and oriented. [place]. VSS. Sats >92% on RA. Pt denies any pain. Labs collected. Consent signed. Continue to monitor pt status and notify MD with any changes or concerns.

## 2025-01-28 NOTE — IR NOTE
Patient Name: Reina Quan  Medical Record Number: [id number]  Today's Date: 1/28/2025    Procedure: Image guided fistulogram of the left upper extremity with intervention  Proceduralist: Mykel Khoury    Sedation start time: 1319  Sedation end time: 1650  Sedation medications administered: 2.5 mg versed, 200 mcg fentanyl  Total sedation time: 69 min    Procedure start time: 1346  Procedure end time: 1650    Report given to:  RK Mahajan  : n/a    Other Notes: Pt arrived to IR room 5 from . Consent reviewed, pt confirmed. Pt denies any questions or concerns regarding procedure. Pt positioned supine and monitored per protocol. Site cleansed and dressed per protocol. Pt tolerated procedure without any noted complications. Pt transferred back to .      Pt c/o itchiness to tip of tongue and right eye and difficulty swallowing. No drooling noted. -fevers/vomiting/diarrhea. +PO/UOP. Lungs clear b/l. No increased WOB. No PMHx. IUTD.

## 2025-01-28 NOTE — PROCEDURES
[facility]    Procedure: IR Procedure Note    Date/Time: 1/28/2025 3:22 PM    Performed by: Oliva Hogue MD  Authorized by: Herman Khoury MD  IR Fellow Physician:  Radiology Resident Physician: Nena Hogue MD    Pre Procedure Diagnosis: AV fistula stenosis  Post Procedure Diagnosis: same    UNIVERSAL PROTOCOL   Site Marked: Yes  Prior Images Obtained and Reviewed:  Yes  Required items: Required blood products, implants, devices and special equipment available    Patient identity confirmed:  Verbally with patient, arm band and hospital-assigned identification number  Patient was reevaluated immediately before administering moderate or deep sedation or anesthesia  Confirmation Checklist:  Patient's identity using two indicators, relevant allergies, procedure was appropriate and matched the consent or emergent situation and correct equipment/implants were available  Time out: Immediately prior to the procedure a time out was called    Universal Protocol: the Joint Commission Universal Protocol was followed    Preparation: Patient was prepped and draped in usual sterile fashion       ANESTHESIA    Local Anesthetic:  Lidocaine 1% without epinephrine  Anesthetic Total (mL):  12      SEDATION  Patient Sedated: Yes    Sedation Type:  Moderate (conscious) sedation  Sedation:  Fentanyl, midazolam and see MAR for details  Vital signs: Vital signs monitored during sedation    See dictated procedure note for full details.  Findings: Stenosis of the anastomosis was treated successfully with angioplasty. Outflow multifocal stenoses at the level of the mid humeral shaft treated successfully with angioplasty and drug coated balloon.     Specimens: none    Procedural Complications: Hematoma / Bleed, puncture site - vascular procedure    Condition: Stable    Plan: - Woggle over puncture site for two hours for hemostasis, then check for removal by IR team  -  Bedrest for 2 hours  with bathroom privileges after the first hour        PROCEDURE    Patient Tolerance:  Patient tolerated the procedure well with no immediate complications  Length of time physician/provider present for 1:1 monitoring during sedation:  68-82 min    Nena Hogue MD  PGY-3 IR/

## 2025-01-29 NOTE — PROGRESS NOTES
Pt discharged at 1815 accompanied by RN. Friend will be transporting pt. IV removed and discharge instruction provided. Questions answered. Pt to have the tip stop removed at dialysis tomorrow. Tip stop CDI upon discharge. Pt was able to eat, drink and ambulate.

## 2025-01-30 ENCOUNTER — PATIENT OUTREACH (OUTPATIENT)
Dept: NEPHROLOGY | Facility: CLINIC | Age: 60
End: 2025-01-30
Payer: COMMERCIAL

## 2025-01-30 DIAGNOSIS — T86.11 KIDNEY TRANSPLANT REJECTION: ICD-10-CM

## 2025-01-30 NOTE — LETTER
January 30, 2025    Reina Quan  [address] [address]      To whom it may concern,     Reina Quan is ready to start using her LUE AV fistula for dialysis access on Friday, 1/31/2025, per Dr. Walton.  Please contact our Dialysis Access Care Coordinators with any issues or concerns you encounter, at [phone].      Thank you,  Zeina Biggs, RN, BSN  Patricia Haddad RN  Dialysis Access Surgery Care Coordinators  [phone]    On behalf of:    Jesus Alberto Moscoso MD   Assistant Professor of Surgery   Transplantation Division  Department of Surgery   [facility]

## 2025-01-30 NOTE — TELEPHONE ENCOUNTER
Dialysis Access Care Coordination: General Outreach    REASON FOR CALL:     REASON FOR CALL: Clinic Care Coordination - Follow-up (Dialysis Access)                                   SITUATION/BACKROUND:     Patient had a fistulogram on 1/28/25 to address stenosis within her LUE AV fistula. She has not started using the fistula for dialysis yet. RIJ CVC remains in place for hemodialysis access.    Reviewed fistulogram results with Dr. Walton. Patient may begin using her fistula for dialysis.    Neph Tracking Flowsheet Last Filled Values       Preferred Modality Home Hemodialysis    Final Modality Hemodialysis    Patient's Referral Dates Auto Populate Patient's Referral Dates    Specialty Care Coordination Referral - Dialysis 7/26/2024    Journey Referral 7/18/24    Vein Mapping/US Order 8/19/24    Vein Mapping/US  08/19/24    Access Surgeon Referral Status  Referred    Dialysis Access Referral 07/26/24  Fistula vs PD consult with Dr. Walton    Access Surgical Consult 08/19/24  PLAN: LUE brachiobasilic (2 stage) AVF vs LUE AVG underger general anesthesia with nerve block.    Diaylsis Access Type AV Fistula  Brachiobasilic AVF    Dialysis Access Site TRINY    Dialysis Access Surgery 10/15/24  1st stage LUE brachiobasilic AVF, if ok at 2wk and 4 wk post op and US, ok for 2nd stage after 6 wks per Dr. Walton    Dialysis Access Surgeon Dr. Walton    Dialysis Access Revision Date 11/20/24  2nd stage transposition surgery with Dr. Walton    Dialysis Access Maturation 01/30/25    Transplant Evaluation Referral 6/21/24    Transplant Status  Referred          Dialysis History        Start End Type Center Comments    10/11/2024  [facility] Center [facility] ([facility]) MWF                  Patient is followed by Solid Organ Transplant 2/2 Kidney Transplant Evaluation - 10/7/2024.  Coordinator: Selena Nuno    ASSESSMENT:     Recent Labs      Latest Ref Rng & Units 1/28/2025 11/20/2024 10/15/2024    Neph Labs   Sodium 135 - 145 mmol/L 141   140    GFR Estimate >60 mL/min/1.73m2 9   14    Potassium 3.4 - 5.3 mmol/L 4.4  3.4  4.1    Creatinine 0.51 - 0.95 mg/dL 5.00   3.55    Urea Nitrogen 8.0 - 23.0 mg/dL 21.4   28.1    Hemoglobin 11.7 - 15.7 g/dL 12.6   9.3        Dialysis Access Assessments:  No assessment indicated    PLAN:     Future Appts Next 180 days       No appointments to display          Follow Up:    RN CC will follow up in approximately 1 month.  [employer] message sent to patient.  Notified Rey [facility] of approval to cannulate. Faxed cannulation letter.    Patricia Haddad RN  Dialysis Access Surgery Care Coordinator  Phone: [phone]  Pool: P_Dialysis_Access_Nurse

## 2025-01-31 PROCEDURE — 88120 CYTP URNE 3-5 PROBES EA SPEC: CPT | Mod: TC | Performed by: UROLOGY

## 2025-01-31 PROCEDURE — 88112 CYTOPATH CELL ENHANCE TECH: CPT | Mod: 26 | Performed by: PATHOLOGY

## 2025-01-31 PROCEDURE — 88120 CYTP URNE 3-5 PROBES EA SPEC: CPT | Mod: 26 | Performed by: PATHOLOGY

## 2025-01-31 PROCEDURE — 88112 CYTOPATH CELL ENHANCE TECH: CPT | Mod: TC | Performed by: UROLOGY

## 2025-02-03 ENCOUNTER — TELEPHONE (OUTPATIENT)
Dept: UROLOGY | Facility: CLINIC | Age: 60
End: 2025-02-03
Payer: COMMERCIAL

## 2025-02-03 NOTE — TELEPHONE ENCOUNTER
Left message for patient regarding scheduling surgery with Dr. Joshua.     Direct call back number given  Ph: [phone]      Anita Bedoya on 2/3/2025 at 10:00 AM

## 2025-02-05 NOTE — TELEPHONE ENCOUNTER
Left message to schedule surgery with Dr. Joshua.  Writer left call back number [phone] on the patients voicemail.     Alva Alonso on 2/5/2025 at 1:25 PM

## 2025-02-11 DIAGNOSIS — R39.89 SUSPECTED URINARY TRACT INFECTION: Primary | ICD-10-CM

## 2025-02-24 ENCOUNTER — TELEPHONE (OUTPATIENT)
Dept: UROLOGY | Facility: CLINIC | Age: 60
End: 2025-02-24
Payer: COMMERCIAL

## 2025-02-24 NOTE — TELEPHONE ENCOUNTER
Left vm for pt regarding pre-op and ua/uc prior to surgery.    Agnieszka Carmona RN, BSN  [facility]

## 2025-02-25 NOTE — TELEPHONE ENCOUNTER
Spoke to pt and she needs to reschedule her surgery date. When pt reschedules she would like to schedule pre-op with PAC and assistance with scheduling her ua/uc prior to new surgery date. Message sent to surgery team.    Agnieszka Carmona RN, BSN  [facility]

## 2025-02-26 ENCOUNTER — TELEPHONE (OUTPATIENT)
Dept: UROLOGY | Facility: CLINIC | Age: 60
End: 2025-02-26
Payer: COMMERCIAL

## 2025-02-26 NOTE — TELEPHONE ENCOUNTER
Left message for patient regarding rescheduling surgery on 03/03/2025 with Dr. Joshua.     Direct call back number given  Ph: [phone]      Anita Bedoya on 2/26/2025 at 12:46 PM

## 2025-02-26 NOTE — TELEPHONE ENCOUNTER
Called patient to reschedule surgery on 3/3 with Dr. Joshua    Spoke with: Patient    Date of Surgery: 03/18/2025    Approximate surgery arrival time given:  No    Location of surgery: [facility] [facility]     Pre-Op H&P: PAC 3/6 at 9:15AM    Pre-op Imaging: No     No follow-up ordered    Discussed with patient PAC RN will provide arrival time and instructions for surgery at the time of the appointment: [[facility] only]: Yes    Packet sent out: Not Applicable - already sent       Additional Comments: Scheduled UA/UC on 3/3 at 3:15PM - [facility] - per patient request.    All patients questions were answered and was instructed to review surgical packet and call back with any questions or concerns.       Anita Bedoya on 2/26/2025 at 4:09 PM

## 2025-02-27 ENCOUNTER — TELEPHONE (OUTPATIENT)
Dept: SURGERY | Facility: CLINIC | Age: 60
End: 2025-02-27
Payer: COMMERCIAL

## 2025-02-27 NOTE — PROGRESS NOTES
Called to alert patient that I would be scheduling the following appointment AFTER her PAC appointment:    Date: 3/6/25  Time: 10:15 AM  Visit type: Lab  Requested by provider: Nasreen Esteves   Location: [facility]    Additional Notes: OK to cancel [place] appt if patient wishes.

## 2025-02-27 NOTE — TELEPHONE ENCOUNTER
FUTURE VISIT INFORMATION      SURGERY INFORMATION:  Date: 3/18/25  Location: [facility] or  Surgeon:  Valentin Joshua MD   Anesthesia Type:  mac  Procedure: CYSTOSCOPY, WITH BLADDER BIOPSY, urethral dilation, bilateral retrograde pyelogram   Consult: ov 1/31/25    RECORDS REQUESTED FROM:       Primary Care Provider: [facility]    Pertinent Medical History: hypertension    Most recent EKG+ Tracing: 10/7/24    Most recent ECHO: 7/15/24    Most recent Cardiac Stress Test: 10/4/24

## 2025-03-06 ENCOUNTER — PRE VISIT (OUTPATIENT)
Dept: SURGERY | Facility: CLINIC | Age: 60
End: 2025-03-06

## 2025-03-06 ENCOUNTER — LAB (OUTPATIENT)
Dept: LAB | Facility: CLINIC | Age: 60
End: 2025-03-06
Payer: COMMERCIAL

## 2025-03-06 ENCOUNTER — ANESTHESIA EVENT (OUTPATIENT)
Dept: SURGERY | Facility: CLINIC | Age: 60
End: 2025-03-06
Payer: COMMERCIAL

## 2025-03-06 ENCOUNTER — ANCILLARY PROCEDURE (OUTPATIENT)
Dept: VASCULAR ULTRASOUND | Facility: CLINIC | Age: 60
End: 2025-03-06
Attending: SURGERY
Payer: COMMERCIAL

## 2025-03-06 ENCOUNTER — OFFICE VISIT (OUTPATIENT)
Dept: SURGERY | Facility: CLINIC | Age: 60
End: 2025-03-06
Payer: COMMERCIAL

## 2025-03-06 VITALS
OXYGEN SATURATION: 99 % | TEMPERATURE: 98 F | HEIGHT: 62 IN | BODY MASS INDEX: 23.19 KG/M2 | DIASTOLIC BLOOD PRESSURE: 94 MMHG | RESPIRATION RATE: 16 BRPM | HEART RATE: 65 BPM | SYSTOLIC BLOOD PRESSURE: 187 MMHG | WEIGHT: 126 LBS

## 2025-03-06 DIAGNOSIS — N18.4 ANEMIA IN STAGE 4 CHRONIC KIDNEY DISEASE (H): ICD-10-CM

## 2025-03-06 DIAGNOSIS — R39.89 SUSPECTED URINARY TRACT INFECTION: ICD-10-CM

## 2025-03-06 DIAGNOSIS — D63.1 ANEMIA IN STAGE 4 CHRONIC KIDNEY DISEASE (H): ICD-10-CM

## 2025-03-06 DIAGNOSIS — T82.9XXA COMPLICATION OF VASCULAR ACCESS FOR DIALYSIS, INITIAL ENCOUNTER: ICD-10-CM

## 2025-03-06 DIAGNOSIS — N18.6 ESRD ON HEMODIALYSIS (H): ICD-10-CM

## 2025-03-06 DIAGNOSIS — Z01.818 PRE-OP EVALUATION: Primary | ICD-10-CM

## 2025-03-06 DIAGNOSIS — N18.4 CKD (CHRONIC KIDNEY DISEASE) STAGE 4, GFR 15-29 ML/MIN (H): ICD-10-CM

## 2025-03-06 DIAGNOSIS — R31.0 GROSS HEMATURIA: ICD-10-CM

## 2025-03-06 DIAGNOSIS — Z99.2 ESRD ON HEMODIALYSIS (H): ICD-10-CM

## 2025-03-06 LAB
ALBUMIN SERPL BCG-MCNC: 3.9 G/DL (ref 3.5–5.2)
ALBUMIN UR-MCNC: 600 MG/DL
ANION GAP SERPL CALCULATED.3IONS-SCNC: 13 MMOL/L (ref 7–15)
APPEARANCE UR: CLEAR
BILIRUB UR QL STRIP: NEGATIVE
BUN SERPL-MCNC: 23 MG/DL (ref 8–23)
CALCIUM SERPL-MCNC: 9.4 MG/DL (ref 8.8–10.4)
CHLORIDE SERPL-SCNC: 102 MMOL/L (ref 98–107)
COLOR UR AUTO: ABNORMAL
CREAT SERPL-MCNC: 4.1 MG/DL (ref 0.51–0.95)
EGFRCR SERPLBLD CKD-EPI 2021: 12 ML/MIN/1.73M2
ERYTHROCYTE [DISTWIDTH] IN BLOOD BY AUTOMATED COUNT: 15.5 % (ref 10–15)
GLUCOSE SERPL-MCNC: 89 MG/DL (ref 70–99)
GLUCOSE UR STRIP-MCNC: 100 MG/DL
HCO3 SERPL-SCNC: 26 MMOL/L (ref 22–29)
HCT VFR BLD AUTO: 34.3 % (ref 35–47)
HGB BLD-MCNC: 10.4 G/DL (ref 11.7–15.7)
HGB UR QL STRIP: ABNORMAL
KETONES UR STRIP-MCNC: NEGATIVE MG/DL
LEUKOCYTE ESTERASE UR QL STRIP: NEGATIVE
MCH RBC QN AUTO: 27.9 PG (ref 26.5–33)
MCHC RBC AUTO-ENTMCNC: 30.3 G/DL (ref 31.5–36.5)
MCV RBC AUTO: 92 FL (ref 78–100)
NITRATE UR QL: NEGATIVE
PH UR STRIP: 8 [PH] (ref 5–7)
PHOSPHATE SERPL-MCNC: 4.7 MG/DL (ref 2.5–4.5)
PLATELET # BLD AUTO: 96 10E3/UL (ref 150–450)
POTASSIUM SERPL-SCNC: 4.5 MMOL/L (ref 3.4–5.3)
RBC # BLD AUTO: 3.73 10E6/UL (ref 3.8–5.2)
RBC URINE: 9 /HPF
SODIUM SERPL-SCNC: 141 MMOL/L (ref 135–145)
SP GR UR STRIP: 1.01 (ref 1–1.03)
UROBILINOGEN UR STRIP-MCNC: NORMAL MG/DL
WBC # BLD AUTO: 6 10E3/UL (ref 4–11)
WBC URINE: 2 /HPF

## 2025-03-06 PROCEDURE — 80069 RENAL FUNCTION PANEL: CPT | Performed by: PATHOLOGY

## 2025-03-06 PROCEDURE — 85027 COMPLETE CBC AUTOMATED: CPT | Performed by: PATHOLOGY

## 2025-03-06 PROCEDURE — 81001 URINALYSIS AUTO W/SCOPE: CPT | Performed by: PATHOLOGY

## 2025-03-06 PROCEDURE — 93990 DOPPLER FLOW TESTING: CPT | Mod: 26 | Performed by: STUDENT IN AN ORGANIZED HEALTH CARE EDUCATION/TRAINING PROGRAM

## 2025-03-06 PROCEDURE — 99000 SPECIMEN HANDLING OFFICE-LAB: CPT | Performed by: PATHOLOGY

## 2025-03-06 PROCEDURE — 93990 DOPPLER FLOW TESTING: CPT

## 2025-03-06 PROCEDURE — 36415 COLL VENOUS BLD VENIPUNCTURE: CPT | Performed by: PATHOLOGY

## 2025-03-06 PROCEDURE — 87086 URINE CULTURE/COLONY COUNT: CPT | Performed by: UROLOGY

## 2025-03-06 RX ORDER — AMLODIPINE BESYLATE 5 MG/1
1 TABLET ORAL AT BEDTIME
COMMUNITY
Start: 2025-02-19

## 2025-03-06 RX ORDER — PROCHLORPERAZINE MALEATE 10 MG
10 TABLET ORAL EVERY 6 HOURS PRN
COMMUNITY
Start: 2025-02-10

## 2025-03-06 RX ORDER — LOSARTAN POTASSIUM 25 MG/1
25 TABLET ORAL DAILY
COMMUNITY
Start: 2025-03-05

## 2025-03-06 ASSESSMENT — ENCOUNTER SYMPTOMS
SEIZURES: 0
ORTHOPNEA: 0
DYSRHYTHMIAS: 0

## 2025-03-06 ASSESSMENT — PAIN SCALES - GENERAL: PAINLEVEL_OUTOF10: NO PAIN (0)

## 2025-03-06 NOTE — H&P
Pre-Operative H & P     CC:  Preoperative exam to assess for increased cardiopulmonary risk while undergoing surgery and anesthesia.    Date of Encounter: 3/6/2025  Primary Care Physician:  Lizbeth Andrews     Reason for visit:   Encounter Diagnoses   Name Primary?    Pre-op evaluation Yes    Gross hematuria        HPI  Reina Quan is a 59 year old female who presents for pre-operative H & P in preparation for  Procedure Information       Case: [id number] Date/Time: 03/18/25 1130    Procedures:       CYSTOSCOPY, WITH BLADDER BIOPSY, urethral dilation, (Urethra)      bilateral retrograde pyelogram (Bilateral: Urethra)    Anesthesia type: MAC    Diagnosis: Gross hematuria [R31.0]    Pre-op diagnosis: Gross hematuria [R31.0]    Location: [facility] OR  /  OR    Providers: Valentin Joshua MD            Patient is being evaluated for comorbid conditions of HTN, HLD, h/o congenital giant platelet syndrome, anemia, anxiety/depression, allergic rhinitis, migraines, arthritis, and h/o of skin cancer.     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 Now with worsening renal function and s/p AV fistula on 10/15/24 for dialysis (currently on HD). She was recently evaluated by urology for evaluation of microscopic hematuria. The above procedure has been recommended for further management. She is working toward second kidney transplant.      History is obtained from the patient and chart review    Hx of abnormal bleeding or anti-platelet use: congenital giant platelet syndrome    Menstrual history: Patient's last menstrual period was 05/14/2014 (approximate).     Past Medical History  Past Medical History:   Diagnosis Date    Actinic keratosis     Allergic rhinitis, cause unspecified     Anemia     anxiety/depression     PAXIL    Arthritis     congenital hearing loss     uses hearing aids    Depressive disorder     Dry eyes     Giant Platelet syndrome     Glomerular Focal Sclerosis--transplant 1985      Hypertension     Kidney replaced by transplant 1992    RAPAMUNE/ SIROLIMUS     Lichen planopilaris     LPP followed by derm on doxycycline/ clobetasol    Lichen planus     Menorrhagia     migraine     Squamous cell carcinoma     8 x     Thrombocytopenia     Ulcerative colitis, unspecified     biposy neg 1996    Unsatisfactory cervical Papanicolaou smear 02/15/2021    UTI (lower urinary tract infection)     recurrent Dr [facility]       Past Surgical History  Past Surgical History:   Procedure Laterality Date    BIOPSY OF SKIN LESION      CHOLECYSTECTOMY      COLONOSCOPY N/A 1/8/2025    Procedure: Colonoscopy;  Surgeon: Valentin Bull MD;  [facility]    CREATE FISTULA ARTERIOVENOUS UPPER EXTREMITY Left 10/15/2024    Procedure: Left Upper Extremity Brachiobasilic ARTERIOVENOUS FISTULA Creation;  Surgeon: Jesus Alberto Walton MD;  [facility]    DILATION AND CURETTAGE, OPERATIVE HYSTEROSCOPY WITH MORCELLATOR, COMBINED N/A 11/10/2015    Procedure: COMBINED DILATION AND CURETTAGE, OPERATIVE HYSTEROSCOPY WITH MORCELLATOR;  Surgeon: Ghada Melendez MD;  Location: [facility] OR    ESOPHAGOSCOPY, GASTROSCOPY, DUODENOSCOPY (EGD), COMBINED  11/20/2012    Procedure: COMBINED ESOPHAGOSCOPY, GASTROSCOPY, DUODENOSCOPY (EGD), BIOPSY SINGLE OR MULTIPLE;;  Surgeon: Valentin Hahn MD;  Location[facility]    IR CVC TUNNEL PLACEMENT > 5 YRS OF AGE  10/9/2024    IR DIALYSIS FISTULOGRAM LEFT  1/28/2025    IR RENAL BIOPSY RIGHT  07/25/2024    MOHS MICROGRAPHIC PROCEDURE      REVISION FISTULA ARTERIOVENOUS UPPER EXTREMITY Left 11/20/2024    Procedure: Left Upper Extremity brachiobasilic arteriovenous fistula second stage transposition surgery;  Surgeon: Jesus Alberto Walton MD;  [facility]    TRANSPLANT  1992    kidney    ZZC NONSPECIFIC PROCEDURE      Renal transplant right side    ZZC NONSPECIFIC PROCEDURE      cholecystectomy       Prior to Admission Medications  Current Outpatient Medications    Medication Sig Dispense Refill    amLODIPine (NORVASC) 5 MG tablet Take 1 tablet by mouth at bedtime.      atorvastatin (LIPITOR) 80 MG tablet Take 1 tablet (80 mg) by mouth at bedtime. 90 tablet 3    cinacalcet (SENSIPAR) 30 MG tablet Take 1 tablet (30 mg) by mouth every other day (Patient taking differently: Take 30 mg by mouth every other day. Monday & Friday's) 45 tablet 3    clobetasol (TEMOVATE) 0.05 % external ointment Apply topically 2 times daily Use sparingly at active areas of dermatitis for no more than 14 days or until resolved, whichever is sooner. 60 g 3    clobetasol (TEMOVATE) 0.05 % external solution Apply topically daily as needed (itchyy scalp) Apply to scalp daily as needed. 50 mL 4    clobetasol propionate (CLOBEX) 0.05 % external shampoo APPLY TO SCALP IN SHOWER TWICE WEEKLY 118 mL 1    ketoconazole (NIZORAL) 2 % external shampoo APPLY TOPICALLY DAILY AS NEEDED FOR ITCHING, IRRITATION OR OTHER( SCALP SCALE) USE 2-3 TIMES A WEEK. APPLY AND LATHER THEN RINSE OUT 5 MINUTES 120 mL 0    losartan (COZAAR) 25 MG tablet Take 25 mg by mouth daily.      mycophenolate (GENERIC EQUIVALENT) 250 MG capsule Take 3 capsules (750 mg) by mouth 2 times daily 540 capsule 3    niacinamide 500 MG tablet Take 1 tablet (500 mg) by mouth 2 times daily (with meals). 60 tablet 3    PARoxetine (PAXIL) 20 MG tablet TAKE 1 TABLET BY MOUTH EVERY MORNING 90 tablet 2    predniSONE (DELTASONE) 5 MG tablet Take 1 tablet (5 mg) by mouth daily (Patient taking differently: Take 5 mg by mouth every morning.) 90 tablet 3    prochlorperazine (COMPAZINE) 10 MG tablet Take 10 mg by mouth every 6 hours as needed.      sodium bicarbonate 650 MG tablet Take 2 tablets (1,300 mg) by mouth 2 times daily 360 tablet 3    triamcinolone (KENALOG) 0.1 % external cream APPLY SPARINGLY TOPICALLY TO THE AFFECTED AREA TWICE DAILY FOR 14 DAYS 30 g 0       Allergies  Allergies   Allergen Reactions    Ampicillin Swelling     Swelling of mouth and  tongue.    Seasonal Allergies Other (See Comments)     Itchy eyes and rhinitis.       Social History  Social History     Socioeconomic History    Marital status:      Spouse name: Not on file    Number of children: 3    Years of education: Not on file    Highest education level: Not on file   Occupational History    Occupation:  - window treatments   Tobacco Use    Smoking status: Never     Passive exposure: Never    Smokeless tobacco: Never   Vaping Use    Vaping status: Never Used   Substance and Sexual Activity    Alcohol use: Not Currently    Drug use: Never    Sexual activity: Not Currently     Partners: Male     Birth control/protection: Post-menopausal   Other Topics Concern    Parent/sibling w/ CABG, MI or angioplasty before 65F 55M? 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 Doesn't have a job lined up afterward but looking forward to some time off. Has three children, one moved to college this fall. Living in [place], has a significant other x 7 months. Previously . In a monogamous relationship. Never smoker, social EtOH use.      Social Drivers of Health     Financial Resource Strain: Not on file   Food Insecurity: Not on file   Transportation Needs: Not on file   Physical Activity: Not on file   Stress: Not on file   Social Connections: Not on file   Interpersonal Safety: Low Risk  (1/28/2025)    Interpersonal Safety     Do you feel physically and emotionally safe where you currently live?: Yes     Within the past 12 months, have you been hit, slapped, kicked or otherwise physically hurt by someone?: No     Within the past 12 months, have you been humiliated or emotionally abused in other ways by your partner or ex-partner?: No   Housing Stability: Not on file       Family History  Family History   Problem Relation Age of Onset    Hypertension Mother     Depression Mother     Anxiety Disorder Mother     Diabetes  Father     Kidney Disease Father         nephrolithiasis    Asthma Father     Asthma Sister     Blood Disease Maternal Grandmother     Diabetes Maternal Grandmother     Hypertension Maternal Grandmother     Hyperlipidemia Maternal Grandmother     Depression Maternal Grandmother     No Known Problems Maternal Grandfather     No Known Problems Paternal Grandmother     C.A.D. Paternal Grandfather     Cancer Other         no family hx of skin cancer    Glaucoma No family hx of     Macular Degeneration No family hx of     Melanoma No family hx of     Skin Cancer No family hx of     Anesthesia Reaction No family hx of     Thrombosis No family hx of        Review of Systems  The complete review of systems is negative other than noted in the HPI or here.   Anesthesia Evaluation   Pt has had prior anesthetic. Type: General.    No history of anesthetic complications       ROS/MED HX  ENT/Pulmonary: Comment: Hearing loss    (+)     MARGO risk factors,  hypertension,    allergic rhinitis,                          (-) recent URI   Neurologic:     (+)      migraines,                       (-) no seizures, no CVA and no TIA   Cardiovascular: Comment: Denies chest pain/pressure, KAUR, orthopnea, dizziness, palpitations, edema.     (+) Dyslipidemia hypertension- -   -  - -                                 Previous cardiac testing   Echo: Date: 7/2024 Results:  Echo  7/2024  Interpretation Summary  Global and regional left ventricular function is normal with an EF of 60-65%.  Global right ventricular function is normal.  No significant valvular abnormalities present.  Estimated mean right atrial pressure is normal.  No pericardial effusion is present.  Stress Test:  Date: 10/4/24 Results:     The nuclear stress test is negative for inducible myocardial ischemia or infarction.     The left ventricular ejection fraction at rest is greater than 70%.     There is no prior study for comparison.    ECG Reviewed:  Date: 8/2024 Results:  Sinus  "rhythm  Low voltage QRS  Borderline ECG  Cath:  Date: Results:   (-) orthopnea/PND, arrhythmias and valvular problems/murmurs   METS/Exercise Tolerance: 3 - Able to walk 1-2 blocks without stopping    Hematologic: Comments: Hx giant platelet syndrome, thrombocytopenia    (+)      anemia, history of blood transfusion, no previous transfusion reaction,     (-) history of blood clots   Musculoskeletal:   (+)  arthritis,             GI/Hepatic:  - neg GI/hepatic ROS     Renal/Genitourinary: Comment: Gross hematuria     (+) renal disease, type: ESRD, Pt requires dialysis, type: Hemodialysis, Pt has history of transplant, date: 1992,        Endo:     (+)            Chronic steroid usage for Post Transplant Immunosuppression. Date most recently used: daily.        Psychiatric/Substance Use:     (+) psychiatric history anxiety and depression       Infectious Disease:  - neg infectious disease ROS     Malignancy:   (+) Malignancy, History of Skin.Skin CA Remission status post Surgery.      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  Eyes: Pupils equal, round and reactive to light, extra ocular muscles intact, sclera clear, conjunctiva normal.  HENT: Normocephalic and atraumatic, oral pharynx with moist mucus membranes, good dentition. No goiter appreciated.   Respiratory: Clear to auscultation bilaterally, no crackles or wheezing.  Cardiovascular: Regular rate and rhythm, normal S1 and S2, and no murmur noted.  Carotids +2, no bruits. No edema. Palpable pulses to radial  DP and PT arteries.   GI: Normal bowel sounds, " soft, non-distended, non-tender, no masses palpated, no hepatosplenomegaly.  Lymph/Hematologic: No cervical lymphadenopathy and no supraclavicular lymphadenopathy.  Genitourinary:  Deferred  Skin: Warm and dry.  No rashes on exposed skin   Musculoskeletal: Full ROM of neck. There is no redness, warmth, or swelling of the visible joints. Gross motor strength is normal.    Neurologic: Awake, alert, oriented to name, place and time. Cranial nerves II-XII are grossly intact. Gait is normal.   Neuropsychiatric: Calm, cooperative. Normal affect.       Prior Labs/Diagnostic Studies   All labs and imaging personally reviewed     EKG/ stress test - if available please see in ROS above   Echo result w/o MOPS: Interpretation SummaryGlobal and regional left ventricular function is normal with an EF of 60-65%.Global right ventricular function is normal.No significant valvular abnormalities present.Estimated mean right atrial pressure is normal.No pericardial effusion is present.           No data to display                  The patient's records and results personally reviewed by this provider.     Outside records reviewed from: Care Everywhere    LAB/DIAGNOSTIC STUDIES TODAY:  UA/UC per urology    Assessment  Reina Quan is a 59 year old female seen as a PAC referral for risk assessment and optimization for anesthesia.    Plan/Recommendations  Pt will be optimized for the proposed procedure.  See below for details on the assessment, risk, and preoperative recommendations    NEUROLOGY  - No history of TIA, CVA or seizure  - Migraines  Has been experiencing increased symptoms recently. Is trying to use Tylenol for management with minimal improvement. Has an appointment with PCP in April to further discuss.     -Post Op delirium risk factors:  High co-morbid index    ENT  - No current airway concerns.  Will need to be reassessed day of surgery.  Mallampati: II  TM: > 3    - Hearing Loss  Bilateral hearing  "aids    CARDIAC  - No history of CAD and Afib  - Hypertension  Blood pressure elevated at today's exam.  She was prescribed Losartan by her nephrologist yesterday, she will pick this prescription up today or tomorrow. Recommended checking over the next two weeks and if remains >160/90, instructed to follow up with nephrology or PCP for further evaluation and treatment.  - Hyperlipidemia  Continue statin    - METS (Metabolic Equivalents)  Patient CANNOT perform 4 METS exercise due to ESRD. Denies cardiac symptoms today.           Total Score: 1    Functional Capacity: Unable to complete 4 METS      RCRI-Low risk: Class 2 0.9% complication rate             Total Score: 1    RCRI: Elevated Creatinine        PULMONARY    MARGO Low Risk             Total Score: 2    MARGO: Hypertension    MARGO: Over 50 ys old      - Denies asthma or inhaler use  - Seasonal allergic rhinitis  - Tobacco History    History   Smoking Status    Never   Smokeless Tobacco    Never       GI  - Colonoscopy 1/8/25  PONV Medium Risk  Total Score: 2           1 AN PONV: Pt is Female    1 AN PONV: Patient is not a current smoker        /RENAL  - ESRD  HD M/W/F - has LUE AV fistula and right chest catheter  Baseline Creatinine  4-5  - s/p kidney transplant for FSGS in 1992  Continue post-transplant immunosuppression  Working toward future second transplant  - Hematuria  Above surgery planned for further management    ENDOCRINE    - BMI: Estimated body mass index is 23.05 kg/m  as calculated from the following:    Height as of this encounter: 1.575 m (5' 2\").    Weight as of this encounter: 57.2 kg (126 lb).  Healthy Weight (BMI 18.5-24.9)  - No history of Diabetes Mellitus  - Chronic steroid use  Prednisone 5 mg daily for post-transplant immunosuppression.  Consideration of stress dose steroids per anesthesia team day of surgery.    HEME  VTE Low Risk 0.26%             Total Score: 0      -History of congenital giant platelet syndrome  Patient states " that many years ago her platelets ranged between 30 and 58.  More recently baseline platelets have been ~100-120.  Platelet count today 96.  -Chronic anemia  Secondary to chronic kidney disease  Baseline hemoglobin: 9.3-10.5  Recommend perioperative use of blood conservation techniques intraoperatively and close monitoring for postoperative bleeding.    MSK  Patient is NOT Frail             Total Score: 0      - Osteoarthritis, knees and hips    PSYCH  -Anxiety, depression  Continue mental health medications without interruption    OTHER  - History of SCC, in remission    Different anesthesia methods/types have been discussed with the patient, but they are aware that the final plan will be decided by the assigned anesthesia provider on the date of service.      The patient is optimized for their procedure. AVS with information on surgery time/arrival time, meds and NPO status given by nursing staff. No further diagnostic testing indicated.      On the day of service:     Prep time: 15 minutes  Visit time: 20 minutes  Documentation time: 20 minutes  ------------------------------------------  Total time: 55 minutes      Nasreen Esteves PA-C  Preoperative Assessment Center  [facility]  Clinic and Surgery Center  Phone: [phone]  Fax: [phone]

## 2025-03-06 NOTE — PATIENT INSTRUCTIONS
Preparing for Your Surgery      Name:  Reina Quan   MRN:  [id number]   :  1965   Today's Date:  3/6/2025       Arriving for surgery:  Surgery date:  3/18/25  Arrival time:  9.30AM    Please come to:     Please come to:       [facility]    [address] [place]  [address]     The [facility] [place] Patient/Visitor Ramp is at [address]. Patients and visitors who self-park will receive the reduced hospital parking rate. If the Patient /Visitor Ramp is full, please follow the signs to the Handup car park located at the main hospital entrance.       parking is available (24 hours/ 7 days a week)      Discounted parking pass options are available for patients and visitors. They can be purchased at the [employer] desk at the main hospital entrance.     -    Stop at the security desk and they will direct surgery patients to the [facility] [phone]        - If you need directions, a wheelchair or an escort please stop at the Information/security desk in the lobby.     What can I eat or drink?  -  You may eat and drink normally up to 8 hours prior to arrival time. (Until 1.30AM)  -  You may have clear liquids until 2 hours prior to arrival time. (Until 7.30AM)    Examples of clear liquids:  Water  Clear broth  Juices (apple, white grape, white cranberry  and cider) without pulp  Noncarbonated, powder based beverages  (lemonade and Sigifredo-Aid)  Sodas (Sprite, 7-Up, ginger ale and seltzer)  Coffee or tea (without milk or cream)  Gatorade    -  No Alcohol or cannabis products for at least 24 hours before surgery.     Which medicines can I take?    Hold Aspirin for 7 days before surgery.   Hold Multivitamins for 7 days before surgery.  Hold Supplements for 7 days before surgery.  Hold Ibuprofen (Advil, Motrin) for 1 day(s) before surgery--unless otherwise  directed by surgeon.  Hold Naproxen (Aleve) for 4 days before surgery.    -  DO NOT take these medications the day of surgery:  Niacinamide   Sodium Bicarbonate  All external ointment, solution, cream.    -  PLEASE TAKE these medications the day of surgery:  Mycophenolate   Paroxetine (Paxil)  Prednisone  Losartan   Compazine as needed    Amlodipine and Atorvastatin can continue at bedtime per usual.     How do I prepare myself?  - Please take 2 showers (one the night prior to surgery and one the morning of surgery) using Scrubcare or Hibiclens soap.    Use this soap only from the neck to your toes. Avoid genital area      Leave the soap on your skin for one minute--then rinse thoroughly.      You may use your own shampoo and conditioner. No other hair products.   - Please remove all jewelry and body piercings.  - No lotions, deodorants or fragrance.  - No makeup or fingernail polish.   - Bring your ID and insurance card.    -For patients being admitted to the [facility]  Family members are to take the patient belongings with them and place them in the lockers provided in the Family Lounge.  Please limit the items you bring to 1 bag as the lockers are small.      -If you use a CPAP machine, please bring the CPAP machine, tubing, and mask to hospital.    -If you have a Deep Brain Stimulator, Spinal Cord Stimulator, or any Neuro Stimulator device---you must bring the remote control to the hospital.      ALL PATIENTS GOING HOME THE SAME DAY OF SURGERY ARE REQUIRED TO HAVE A RESPONSIBLE ADULT TO DRIVE AND BE IN ATTENDANCE WITH THEM FOR 24 HOURS FOLLOWING SURGERY.    Covid testing policy as of 12/06/2022  Your surgeon will notify and schedule you for a COVID test if one is needed before surgery--please direct any questions or COVID symptoms to your surgeon      Questions or Concerns:    - For any questions regarding the day of surgery or your hospital stay, please contact the Pre Admission Nursing Office at  [phone].       - If you have health changes between today and your surgery, please call your surgeon.       - For questions after surgery, please call your surgeons office.           Current Visitor Guidelines    2 adult visitors for adult patients in the pre op area    If additional visitors come (beyond a patient care attendant or a group home caregiver), the additional visitors will be asked to wait in the main lobby of the hospital    Visiting hours: 8 a.m. to 8:30 p.m.    Patients confirmed or suspected to have symptoms of COVID 19 or flu:     No visitors allowed for adult patients.   Children (under age 18) can have 1 named visitor.     People who are sick or showing symptoms of COVID 19 or flu:    Are not allowed to visit patients--we can only make exceptions in special situations.       Please follow these guidelines for your visit:          Please maintain social distance          Masking is optional--however at times you may be asked to wear a mask for the safety of yourself and others     Clean your hands with alcohol hand . Do this when you arrive at and leave the building and patient room,    And again after you touch your mask or anything in the room.     Go directly to and from the room you are visiting.     Stay in the patient s room during your visit. Limit going to other places in the hospital as much as possible     Leave bags and jackets at home or in the car.     For everyone s health, please don t come and go during your visit. That includes for smoking   during your visit.

## 2025-03-07 LAB — BACTERIA UR CULT: NORMAL

## 2025-03-07 NOTE — RESULT ENCOUNTER NOTE
Ms. Quan,   Enclosed are a copy of your recent laboratory tests.  The results are as I would expect and I don't think any further laboratory tests are necessary at this time.  There may be other results pending.  If there are, I will send them to you when they are complete.  You should plan to follow-up as discussed previously.  Please let me know if you have any questions.  Thank you for choosing the [facility] for your care.  DIA Joshua MD.

## 2025-03-10 ENCOUNTER — TELEPHONE (OUTPATIENT)
Dept: TRANSPLANT | Facility: CLINIC | Age: 60
End: 2025-03-10
Payer: COMMERCIAL

## 2025-03-10 DIAGNOSIS — T86.11 KIDNEY TRANSPLANT REJECTION: ICD-10-CM

## 2025-03-10 NOTE — TELEPHONE ENCOUNTER
Called Kathy to confirm when and where she started Dialysis.  No answer. Left detailed VM.  Sent "[employer] message as well.

## 2025-03-11 ENCOUNTER — HOSPITAL ENCOUNTER (OUTPATIENT)
Facility: CLINIC | Age: 60
End: 2025-03-11
Admitting: RADIOLOGY
Payer: COMMERCIAL

## 2025-03-11 ENCOUNTER — TELEPHONE (OUTPATIENT)
Dept: TRANSPLANT | Facility: CLINIC | Age: 60
End: 2025-03-11
Payer: COMMERCIAL

## 2025-03-11 DIAGNOSIS — T86.11 KIDNEY TRANSPLANT REJECTION: ICD-10-CM

## 2025-03-12 ENCOUNTER — MYC MEDICAL ADVICE (OUTPATIENT)
Dept: INTERVENTIONAL RADIOLOGY/VASCULAR | Facility: CLINIC | Age: 60
End: 2025-03-12
Payer: COMMERCIAL

## 2025-03-13 ENCOUNTER — TELEPHONE (OUTPATIENT)
Dept: TRANSPLANT | Facility: CLINIC | Age: 60
End: 2025-03-13
Payer: COMMERCIAL

## 2025-03-13 DIAGNOSIS — T86.11 KIDNEY TRANSPLANT REJECTION: ICD-10-CM

## 2025-03-13 DIAGNOSIS — Z76.82 ORGAN TRANSPLANT CANDIDATE: ICD-10-CM

## 2025-03-13 DIAGNOSIS — Z94.0 KIDNEY REPLACED BY TRANSPLANT: ICD-10-CM

## 2025-03-13 DIAGNOSIS — Z01.810 PRE-OPERATIVE CARDIOVASCULAR EXAMINATION: ICD-10-CM

## 2025-03-13 DIAGNOSIS — N18.6 ESRD (END STAGE RENAL DISEASE) (H): Primary | ICD-10-CM

## 2025-03-13 DIAGNOSIS — I10 HYPERTENSION: ICD-10-CM

## 2025-03-13 NOTE — TELEPHONE ENCOUNTER
Spoke to Kathy and she is wondering what else needs to be done. We will have to see what the results are from the scheduled urology procedures on 3/18/2025. Reviewing her checklist she had a negative lexiscan but was never seen by cardiology for clearance. Will place orders. She is aware scheduling will call her.    She saw cardiology 11/2024. Orders for cardiology cancelled and [employer] message sent to Kathy.

## 2025-03-18 ENCOUNTER — HOSPITAL ENCOUNTER (OUTPATIENT)
Facility: CLINIC | Age: 60
Discharge: HOME | End: 2025-03-18
Attending: UROLOGY | Admitting: UROLOGY
Payer: COMMERCIAL

## 2025-03-18 ENCOUNTER — APPOINTMENT (OUTPATIENT)
Dept: GENERAL RADIOLOGY | Facility: CLINIC | Age: 60
End: 2025-03-18
Attending: UROLOGY
Payer: COMMERCIAL

## 2025-03-18 ENCOUNTER — ANESTHESIA (OUTPATIENT)
Dept: SURGERY | Facility: CLINIC | Age: 60
End: 2025-03-18
Payer: COMMERCIAL

## 2025-03-18 VITALS
OXYGEN SATURATION: 100 % | SYSTOLIC BLOOD PRESSURE: 194 MMHG | DIASTOLIC BLOOD PRESSURE: 86 MMHG | WEIGHT: 120.37 LBS | RESPIRATION RATE: 17 BRPM | HEIGHT: 62 IN | HEART RATE: 64 BPM | BODY MASS INDEX: 22.15 KG/M2 | TEMPERATURE: 97.9 F

## 2025-03-18 DIAGNOSIS — R31.21 ASYMPTOMATIC MICROSCOPIC HEMATURIA: Primary | ICD-10-CM

## 2025-03-18 DIAGNOSIS — R31.0 GROSS HEMATURIA: Primary | ICD-10-CM

## 2025-03-18 LAB
ANION GAP SERPL CALCULATED.3IONS-SCNC: 16 MMOL/L (ref 7–15)
BUN SERPL-MCNC: 28.6 MG/DL (ref 8–23)
CALCIUM SERPL-MCNC: 9.6 MG/DL (ref 8.8–10.4)
CHLORIDE SERPL-SCNC: 103 MMOL/L (ref 98–107)
CREAT SERPL-MCNC: 4.91 MG/DL (ref 0.51–0.95)
EGFRCR SERPLBLD CKD-EPI 2021: 10 ML/MIN/1.73M2
ERYTHROCYTE [DISTWIDTH] IN BLOOD BY AUTOMATED COUNT: 15.1 % (ref 10–15)
GLUCOSE SERPL-MCNC: 88 MG/DL (ref 70–99)
HCO3 SERPL-SCNC: 23 MMOL/L (ref 22–29)
HCT VFR BLD AUTO: 36.9 % (ref 35–47)
HGB BLD-MCNC: 11.4 G/DL (ref 11.7–15.7)
MCH RBC QN AUTO: 29.4 PG (ref 26.5–33)
MCHC RBC AUTO-ENTMCNC: 30.9 G/DL (ref 31.5–36.5)
MCV RBC AUTO: 95 FL (ref 78–100)
PLATELET # BLD AUTO: 127 10E3/UL (ref 150–450)
POTASSIUM SERPL-SCNC: 4.6 MMOL/L (ref 3.4–5.3)
RBC # BLD AUTO: 3.88 10E6/UL (ref 3.8–5.2)
SODIUM SERPL-SCNC: 142 MMOL/L (ref 135–145)
WBC # BLD AUTO: 4.5 10E3/UL (ref 4–11)

## 2025-03-18 PROCEDURE — 250N000013 HC RX MED GY IP 250 OP 250 PS 637: Performed by: UROLOGY

## 2025-03-18 PROCEDURE — C1769 GUIDE WIRE: HCPCS | Performed by: UROLOGY

## 2025-03-18 PROCEDURE — 258N000003 HC RX IP 258 OP 636: Performed by: REGISTERED NURSE

## 2025-03-18 PROCEDURE — 272N000001 HC OR GENERAL SUPPLY STERILE: Performed by: UROLOGY

## 2025-03-18 PROCEDURE — 250N000011 HC RX IP 250 OP 636: Performed by: REGISTERED NURSE

## 2025-03-18 PROCEDURE — 710N000012 HC RECOVERY PHASE 2, PER MINUTE: Performed by: UROLOGY

## 2025-03-18 PROCEDURE — 250N000011 HC RX IP 250 OP 636: Performed by: UROLOGY

## 2025-03-18 PROCEDURE — 250N000009 HC RX 250: Performed by: REGISTERED NURSE

## 2025-03-18 PROCEDURE — 999N000141 HC STATISTIC PRE-PROCEDURE NURSING ASSESSMENT: Performed by: UROLOGY

## 2025-03-18 PROCEDURE — 255N000002 HC RX 255 OP 636: Performed by: UROLOGY

## 2025-03-18 PROCEDURE — 360N000082 HC SURGERY LEVEL 2 W/ FLUORO, PER MIN: Performed by: UROLOGY

## 2025-03-18 PROCEDURE — 80048 BASIC METABOLIC PNL TOTAL CA: CPT | Performed by: ANESTHESIOLOGY

## 2025-03-18 PROCEDURE — 82565 ASSAY OF CREATININE: CPT | Performed by: ANESTHESIOLOGY

## 2025-03-18 PROCEDURE — 999N000179 XR SURGERY CARM FLUORO LESS THAN 5 MIN W STILLS

## 2025-03-18 PROCEDURE — 84132 ASSAY OF SERUM POTASSIUM: CPT | Performed by: ANESTHESIOLOGY

## 2025-03-18 PROCEDURE — 85041 AUTOMATED RBC COUNT: CPT | Performed by: ANESTHESIOLOGY

## 2025-03-18 PROCEDURE — 88112 CYTOPATH CELL ENHANCE TECH: CPT | Mod: TC | Performed by: UROLOGY

## 2025-03-18 PROCEDURE — 85014 HEMATOCRIT: CPT | Performed by: ANESTHESIOLOGY

## 2025-03-18 PROCEDURE — 88112 CYTOPATH CELL ENHANCE TECH: CPT | Mod: 26 | Performed by: PATHOLOGY

## 2025-03-18 PROCEDURE — 370N000017 HC ANESTHESIA TECHNICAL FEE, PER MIN: Performed by: UROLOGY

## 2025-03-18 RX ORDER — SULFAMETHOXAZOLE AND TRIMETHOPRIM 800; 160 MG/1; MG/1
1 TABLET ORAL 2 TIMES DAILY
Qty: 6 TABLET | Refills: 0 | Status: ON HOLD | OUTPATIENT
Start: 2025-03-18 | End: 2025-03-21

## 2025-03-18 RX ORDER — NALOXONE HYDROCHLORIDE 0.4 MG/ML
0.1 INJECTION, SOLUTION INTRAMUSCULAR; INTRAVENOUS; SUBCUTANEOUS
Status: DISCONTINUED | OUTPATIENT
Start: 2025-03-18 | End: 2025-03-18 | Stop reason: HOSPADM

## 2025-03-18 RX ORDER — MEPERIDINE HYDROCHLORIDE 25 MG/ML
12.5 INJECTION INTRAMUSCULAR; INTRAVENOUS; SUBCUTANEOUS EVERY 5 MIN PRN
Status: CANCELLED | OUTPATIENT
Start: 2025-03-18

## 2025-03-18 RX ORDER — ACETAMINOPHEN 650 MG/1
650 SUPPOSITORY RECTAL ONCE
Status: COMPLETED | OUTPATIENT
Start: 2025-03-18 | End: 2025-03-18

## 2025-03-18 RX ORDER — SODIUM CHLORIDE, SODIUM LACTATE, POTASSIUM CHLORIDE, CALCIUM CHLORIDE 600; 310; 30; 20 MG/100ML; MG/100ML; MG/100ML; MG/100ML
INJECTION, SOLUTION INTRAVENOUS CONTINUOUS
Status: CANCELLED | OUTPATIENT
Start: 2025-03-18

## 2025-03-18 RX ORDER — ACETAMINOPHEN 325 MG/1
975 TABLET ORAL ONCE
Status: COMPLETED | OUTPATIENT
Start: 2025-03-18 | End: 2025-03-18

## 2025-03-18 RX ORDER — OXYCODONE HYDROCHLORIDE 5 MG/1
5 TABLET ORAL
Status: DISCONTINUED | OUTPATIENT
Start: 2025-03-18 | End: 2025-03-18 | Stop reason: HOSPADM

## 2025-03-18 RX ORDER — DEXAMETHASONE SODIUM PHOSPHATE 4 MG/ML
4 INJECTION, SOLUTION INTRA-ARTICULAR; INTRALESIONAL; INTRAMUSCULAR; INTRAVENOUS; SOFT TISSUE
Status: DISCONTINUED | OUTPATIENT
Start: 2025-03-18 | End: 2025-03-18 | Stop reason: HOSPADM

## 2025-03-18 RX ORDER — ONDANSETRON 2 MG/ML
INJECTION INTRAMUSCULAR; INTRAVENOUS PRN
Status: DISCONTINUED | OUTPATIENT
Start: 2025-03-18 | End: 2025-03-18

## 2025-03-18 RX ORDER — ONDANSETRON 2 MG/ML
4 INJECTION INTRAMUSCULAR; INTRAVENOUS EVERY 30 MIN PRN
Status: DISCONTINUED | OUTPATIENT
Start: 2025-03-18 | End: 2025-03-18 | Stop reason: HOSPADM

## 2025-03-18 RX ORDER — HYDROMORPHONE HCL IN WATER/PF 6 MG/30 ML
0.4 PATIENT CONTROLLED ANALGESIA SYRINGE INTRAVENOUS EVERY 5 MIN PRN
Status: CANCELLED | OUTPATIENT
Start: 2025-03-18

## 2025-03-18 RX ORDER — OXYCODONE HYDROCHLORIDE 10 MG/1
10 TABLET ORAL
Status: DISCONTINUED | OUTPATIENT
Start: 2025-03-18 | End: 2025-03-18 | Stop reason: HOSPADM

## 2025-03-18 RX ORDER — SODIUM CHLORIDE, SODIUM LACTATE, POTASSIUM CHLORIDE, CALCIUM CHLORIDE 600; 310; 30; 20 MG/100ML; MG/100ML; MG/100ML; MG/100ML
INJECTION, SOLUTION INTRAVENOUS CONTINUOUS PRN
Status: DISCONTINUED | OUTPATIENT
Start: 2025-03-18 | End: 2025-03-18

## 2025-03-18 RX ORDER — LIDOCAINE HYDROCHLORIDE 20 MG/ML
INJECTION, SOLUTION INFILTRATION; PERINEURAL PRN
Status: DISCONTINUED | OUTPATIENT
Start: 2025-03-18 | End: 2025-03-18

## 2025-03-18 RX ORDER — CEFAZOLIN SODIUM/WATER 2 G/20 ML
2 SYRINGE (ML) INTRAVENOUS
Status: DISCONTINUED | OUTPATIENT
Start: 2025-03-18 | End: 2025-03-18 | Stop reason: HOSPADM

## 2025-03-18 RX ORDER — DEXAMETHASONE SODIUM PHOSPHATE 4 MG/ML
4 INJECTION, SOLUTION INTRA-ARTICULAR; INTRALESIONAL; INTRAMUSCULAR; INTRAVENOUS; SOFT TISSUE
Status: CANCELLED | OUTPATIENT
Start: 2025-03-18

## 2025-03-18 RX ORDER — CEFAZOLIN SODIUM/WATER 2 G/20 ML
2 SYRINGE (ML) INTRAVENOUS SEE ADMIN INSTRUCTIONS
Status: DISCONTINUED | OUTPATIENT
Start: 2025-03-18 | End: 2025-03-18 | Stop reason: HOSPADM

## 2025-03-18 RX ORDER — NALOXONE HYDROCHLORIDE 0.4 MG/ML
0.1 INJECTION, SOLUTION INTRAMUSCULAR; INTRAVENOUS; SUBCUTANEOUS
Status: CANCELLED | OUTPATIENT
Start: 2025-03-18

## 2025-03-18 RX ORDER — ONDANSETRON 4 MG/1
4 TABLET, ORALLY DISINTEGRATING ORAL EVERY 30 MIN PRN
Status: CANCELLED | OUTPATIENT
Start: 2025-03-18

## 2025-03-18 RX ORDER — HYDROMORPHONE HCL IN WATER/PF 6 MG/30 ML
0.2 PATIENT CONTROLLED ANALGESIA SYRINGE INTRAVENOUS EVERY 5 MIN PRN
Status: CANCELLED | OUTPATIENT
Start: 2025-03-18

## 2025-03-18 RX ORDER — PROPOFOL 10 MG/ML
INJECTION, EMULSION INTRAVENOUS CONTINUOUS PRN
Status: DISCONTINUED | OUTPATIENT
Start: 2025-03-18 | End: 2025-03-18

## 2025-03-18 RX ORDER — LABETALOL HYDROCHLORIDE 5 MG/ML
10 INJECTION, SOLUTION INTRAVENOUS
Status: CANCELLED | OUTPATIENT
Start: 2025-03-18

## 2025-03-18 RX ORDER — ONDANSETRON 2 MG/ML
4 INJECTION INTRAMUSCULAR; INTRAVENOUS EVERY 30 MIN PRN
Status: CANCELLED | OUTPATIENT
Start: 2025-03-18

## 2025-03-18 RX ORDER — FENTANYL CITRATE 50 UG/ML
50 INJECTION, SOLUTION INTRAMUSCULAR; INTRAVENOUS EVERY 5 MIN PRN
Status: CANCELLED | OUTPATIENT
Start: 2025-03-18

## 2025-03-18 RX ORDER — FENTANYL CITRATE 50 UG/ML
25 INJECTION, SOLUTION INTRAMUSCULAR; INTRAVENOUS
Status: DISCONTINUED | OUTPATIENT
Start: 2025-03-18 | End: 2025-03-18 | Stop reason: HOSPADM

## 2025-03-18 RX ORDER — FENTANYL CITRATE 50 UG/ML
25 INJECTION, SOLUTION INTRAMUSCULAR; INTRAVENOUS EVERY 5 MIN PRN
Status: CANCELLED | OUTPATIENT
Start: 2025-03-18

## 2025-03-18 RX ORDER — IOPAMIDOL 510 MG/ML
INJECTION, SOLUTION INTRAVASCULAR PRN
Status: DISCONTINUED | OUTPATIENT
Start: 2025-03-18 | End: 2025-03-18 | Stop reason: HOSPADM

## 2025-03-18 RX ORDER — FENTANYL CITRATE 50 UG/ML
INJECTION, SOLUTION INTRAMUSCULAR; INTRAVENOUS PRN
Status: DISCONTINUED | OUTPATIENT
Start: 2025-03-18 | End: 2025-03-18

## 2025-03-18 RX ORDER — ONDANSETRON 4 MG/1
4 TABLET, ORALLY DISINTEGRATING ORAL EVERY 30 MIN PRN
Status: DISCONTINUED | OUTPATIENT
Start: 2025-03-18 | End: 2025-03-18 | Stop reason: HOSPADM

## 2025-03-18 RX ORDER — PROPOFOL 10 MG/ML
INJECTION, EMULSION INTRAVENOUS PRN
Status: DISCONTINUED | OUTPATIENT
Start: 2025-03-18 | End: 2025-03-18

## 2025-03-18 RX ADMIN — ACETAMINOPHEN 975 MG: 325 TABLET, FILM COATED ORAL at 08:09

## 2025-03-18 RX ADMIN — ONDANSETRON 4 MG: 2 INJECTION INTRAMUSCULAR; INTRAVENOUS at 09:46

## 2025-03-18 RX ADMIN — PROPOFOL 20 MG: 10 INJECTION, EMULSION INTRAVENOUS at 09:50

## 2025-03-18 RX ADMIN — FENTANYL CITRATE 50 MCG: 50 INJECTION INTRAMUSCULAR; INTRAVENOUS at 10:03

## 2025-03-18 RX ADMIN — LIDOCAINE HYDROCHLORIDE 40 MG: 20 INJECTION, SOLUTION INFILTRATION; PERINEURAL at 09:46

## 2025-03-18 RX ADMIN — PROPOFOL 40 MG: 10 INJECTION, EMULSION INTRAVENOUS at 10:04

## 2025-03-18 RX ADMIN — PROPOFOL 100 MCG/KG/MIN: 10 INJECTION, EMULSION INTRAVENOUS at 09:47

## 2025-03-18 RX ADMIN — PROPOFOL 20 MG: 10 INJECTION, EMULSION INTRAVENOUS at 09:47

## 2025-03-18 RX ADMIN — FENTANYL CITRATE 50 MCG: 50 INJECTION INTRAMUSCULAR; INTRAVENOUS at 09:54

## 2025-03-18 RX ADMIN — Medication 2 G: at 09:44

## 2025-03-18 RX ADMIN — SODIUM CHLORIDE, SODIUM LACTATE, POTASSIUM CHLORIDE, AND CALCIUM CHLORIDE: .6; .31; .03; .02 INJECTION, SOLUTION INTRAVENOUS at 09:45

## 2025-03-18 ASSESSMENT — ENCOUNTER SYMPTOMS
ORTHOPNEA: 0
DYSRHYTHMIAS: 0
SEIZURES: 0

## 2025-03-18 ASSESSMENT — ACTIVITIES OF DAILY LIVING (ADL)
ADLS_ACUITY_SCORE: 39

## 2025-03-18 NOTE — ANESTHESIA PREPROCEDURE EVALUATION
Anesthesia Pre-Procedure Evaluation    Patient: Reina Quan   MRN: [id number] : 1965        Procedure : Procedure(s):  CYSTOSCOPY, WITH BLADDER BIOPSY, urethral dilation,  bilateral retrograde pyelogram          Past Medical History:   Diagnosis Date     Actinic keratosis      Allergic rhinitis, cause unspecified      Anemia      anxiety/depression     PAXIL     Arthritis      congenital hearing loss     uses hearing aids     Depressive disorder      Dry eyes      Giant Platelet syndrome      Glomerular Focal Sclerosis--transplant       Hypertension      Kidney replaced by transplant     RAPAMUNE/ SIROLIMUS      Lichen planopilaris     LPP followed by derm on doxycycline/ clobetasol     Lichen planus      Menorrhagia      migraine      Squamous cell carcinoma     8 x      Thrombocytopenia      Ulcerative colitis, unspecified     biposy neg      Unsatisfactory cervical Papanicolaou smear 02/15/2021     UTI (lower urinary tract infection)     recurrent Dr Burns [facility]      Past Surgical History:   Procedure Laterality Date     BIOPSY OF SKIN LESION       CHOLECYSTECTOMY       COLONOSCOPY N/A 2025    Procedure: Colonoscopy;  Surgeon: Valentin Bull MD;  Location: [facility]     CREATE FISTULA ARTERIOVENOUS UPPER EXTREMITY Left 10/15/2024    Procedure: Left Upper Extremity Brachiobasilic ARTERIOVENOUS FISTULA Creation;  Surgeon: Jesus Alberto Walton MD;  Location: [facility]     DILATION AND CURETTAGE, OPERATIVE HYSTEROSCOPY WITH MORCELLATOR, COMBINED N/A 11/10/2015    Procedure: COMBINED DILATION AND CURETTAGE, OPERATIVE HYSTEROSCOPY WITH MORCELLATOR;  Surgeon: Ghada Melendez MD;  Location: [facility]     ESOPHAGOSCOPY, GASTROSCOPY, DUODENOSCOPY (EGD), COMBINED  2012    Procedure: COMBINED ESOPHAGOSCOPY, GASTROSCOPY, DUODENOSCOPY (EGD), BIOPSY SINGLE OR MULTIPLE;;  Surgeon: Valentin Hahn MD;  Location: [facility]     IR CVC TUNNEL PLACEMENT > 5 YRS OF AGE  10/9/2024     IR  DIALYSIS FISTULOGRAM LEFT  1/28/2025     IR RENAL BIOPSY RIGHT  07/25/2024     MOHS MICROGRAPHIC PROCEDURE       REVISION FISTULA ARTERIOVENOUS UPPER EXTREMITY Left 11/20/2024    Procedure: Left Upper Extremity brachiobasilic arteriovenous fistula second stage transposition surgery;  Surgeon: Jesus Alberto Walton MD;  Location: [facility]     TRANSPLANT  1992    kidney     [facility] NONSPECIFIC PROCEDURE      Renal transplant right side     [facility] NONSPECIFIC PROCEDURE      cholecystectomy      Allergies   Allergen Reactions     Ampicillin Swelling     Swelling of mouth and tongue.     Seasonal Allergies Other (See Comments)     Itchy eyes and rhinitis.      Social History     Tobacco Use     Smoking status: Never     Passive exposure: Never     Smokeless tobacco: Never   Substance Use Topics     Alcohol use: Not Currently      Wt Readings from Last 1 Encounters:   03/18/25 54.6 kg (120 lb 5.9 oz)        Anesthesia Evaluation   Pt has had prior anesthetic. Type: General.    No history of anesthetic complications       ROS/MED HX  ENT/Pulmonary: Comment: Hearing loss    (+)     MARGO risk factors,  hypertension,    allergic rhinitis,                          (-) recent URI   Neurologic:     (+)      migraines,                       (-) no seizures, no CVA and no TIA   Cardiovascular: Comment: Denies chest pain/pressure, KAUR, orthopnea, dizziness, palpitations, edema.     (+) Dyslipidemia hypertension- -   -  - -                                 Previous cardiac testing   Echo: Date: 7/2024 Results:  Echo  7/2024  Interpretation Summary  Global and regional left ventricular function is normal with an EF of 60-65%.  Global right ventricular function is normal.  No significant valvular abnormalities present.  Estimated mean right atrial pressure is normal.  No pericardial effusion is present.  Stress Test:  Date: 10/4/24 Results:     The nuclear stress test is negative for inducible myocardial ischemia or infarction.      The left ventricular ejection fraction at rest is greater than 70%.     There is no prior study for comparison.    ECG Reviewed:  Date: 8/2024 Results:  Sinus rhythm  Low voltage QRS  Borderline ECG  Cath:  Date: Results:   (-) orthopnea/PND, arrhythmias and valvular problems/murmurs   METS/Exercise Tolerance: 3 - Able to walk 1-2 blocks without stopping    Hematologic: Comments: Hx giant platelet syndrome, thrombocytopenia    (+)      anemia, history of blood transfusion, no previous transfusion reaction,     (-) history of blood clots   Musculoskeletal:   (+)  arthritis,             GI/Hepatic:  - neg GI/hepatic ROS     Renal/Genitourinary: Comment: Gross hematuria  Last dialysis was 3/17/25    (+) renal disease, type: ESRD, Pt requires dialysis, type: Hemodialysis, Pt has history of transplant, date: 1992,        Endo:     (+)            Chronic steroid usage for Post Transplant Immunosuppression. Date most recently used: daily.        Psychiatric/Substance Use:     (+) psychiatric history anxiety and depression       Infectious Disease:  - neg infectious disease ROS     Malignancy:   (+) Malignancy, History of Skin.Skin CA Remission status post Surgery.      Other: Comment: Lichen planoplanis - following with dermatology           Physical Exam    Airway        Mallampati: II   TM distance: > 3 FB   Neck ROM: full   Mouth opening: > 3 cm    Respiratory Devices and Support         Dental       (+) Minor Abnormalities - some fillings, tiny chips      Cardiovascular   cardiovascular exam normal       Rhythm and rate: regular and normal     Pulmonary   pulmonary exam normal        breath sounds clear to auscultation       OUTSIDE LABS:  CBC:   Lab Results   Component Value Date    WBC 6.0 03/06/2025    WBC 3.5 (L) 01/28/2025    HGB 10.4 (L) 03/06/2025    HGB 12.6 01/28/2025    HCT 34.3 (L) 03/06/2025    HCT 42.6 01/28/2025    PLT 96 (L) 03/06/2025     (L) 01/28/2025     BMP:   Lab Results   Component Value  Date     03/06/2025     01/28/2025    POTASSIUM 4.5 03/06/2025    POTASSIUM 4.4 01/28/2025    CHLORIDE 102 03/06/2025    CHLORIDE 100 01/28/2025    CO2 26 03/06/2025    CO2 26 01/28/2025    BUN 23.0 03/06/2025    BUN 21.4 01/28/2025    CR 4.10 (H) 03/06/2025    CR 5.00 (H) 01/28/2025    GLC 89 03/06/2025    GLC 87 01/28/2025     COAGS:   Lab Results   Component Value Date    PTT 28 10/07/2024    INR 0.90 01/28/2025    FIBR 650 (H) 08/22/2010     POC:   Lab Results   Component Value Date     (H) 07/07/2010    HCG Negative 11/10/2015     HEPATIC:   Lab Results   Component Value Date    ALBUMIN 3.9 03/06/2025    PROTTOTAL 7.6 10/07/2024    ALT 33 10/07/2024    AST 25 10/07/2024    ALKPHOS 65 10/07/2024    BILITOTAL 0.3 10/07/2024     OTHER:   Lab Results   Component Value Date    LACT 0.5 (L) 11/17/2016    A1C 6.0 (H) 10/07/2024    HARSHA 9.4 03/06/2025    PHOS 4.7 (H) 03/06/2025    MAG 2.3 01/09/2024    LIPASE 169 03/13/2015    AMYLASE 61 02/10/2012    TSH 3.80 01/17/2020    T4 0.65 (L) 06/14/2019    CRP 0.8 (H) 07/04/2022    SED 78 (H) 11/28/2016       Anesthesia Plan    ASA Status:  4    NPO Status:  NPO Appropriate    Anesthesia Type: MAC.     - Reason for MAC: immobility needed              Consents    Anesthesia Plan(s) and associated risks, benefits, and realistic alternatives discussed. Questions answered and patient/representative(s) expressed understanding.     - Discussed:     - Discussed with:  Patient      - Extended Intubation/Ventilatory Support Discussed: No.      - Patient is DNR/DNI Status: No     Use of blood products discussed: No .     Postoperative Care    Pain management: IV analgesics, Oral pain medications.   PONV prophylaxis: Ondansetron (or other 5HT-3), Background Propofol Infusion     Comments:    Other Comments: Discussed if she doesn't tolerate a MAC that we will do GA as a back up. Patient understands.           Karin Hancock MD    I have reviewed the pertinent notes  and labs in the chart from the past 30 days and (re)examined the patient.  Any updates or changes from those notes are reflected in this note.    Clinically Significant Risk Factors Present on Admission                   # Hypertension: Noted on problem list

## 2025-03-18 NOTE — DISCHARGE INSTRUCTIONS
FOLLOW-UP  Call the urology clinic to schedule your next appointment.  Have plan to see you back in a few weeks with a CT urogram prior to the visit.  This is to better evaluate your kidneys and make sure there is no problems that is causing the blood in the urine.  Follow-up in clinic and needed if you have problems.  Call or return sooner than your regularly scheduled visit if you develop any of the following:    Fever  Uncontrolled pain  Uncontrolled nausea or vomiting  Shortness of breath  Chest pain  Any other symptoms that are worrisome to you    ACTIVITY  No strenuous exercise for 2 days.  Do not drive until you are off of narcotic pain medication and can press the brake pedal quickly and fully without pain.   Do not operate a motor vehicle while taking narcotic pain medications.     PAIN MEDICATIONS  Take pain medication as needed for pain.    Do not take any additional Tylenol (acetaminophen) while using Percocet or Vicodin  Do not take more than 3,000mg of Tylenol (acetaminophen) in any 24 hour period, as this can cause liver damage.  Wean yourself off of narcotic pain medications as tolerated    PREVENTION OF CONSTIPATION  Narcotic pain medication can cause constipation.  To prevent this, use the following measures.  Start with the treatments at the top and progress down the list until you have relief.  Prevention is important as this problem is easier to prevent than treat.    Stool softener such as Senna or Colace.  This will typically be prescribed for your  Fiber products such as Metamucil.  This is available over the counter without a prescription.  Miralax.  This is available over the counter without a prescription.  Do not use this product if you have significant renal failure.  Enema.  This is available over the counter without a prescription.  Some enemas may not be used if you have significant renal failure.        QUESTIONS  You may call the Urology Clinic during daytime hours at [phone].   If after hours, call [phone] and ask to speak with the Urology resident on call.

## 2025-03-18 NOTE — ANESTHESIA POSTPROCEDURE EVALUATION
Patient: Reina Quan    Procedure: Procedure(s):  CYSTOSCOPY, urethral dilation,  bilateral retrograde pyelogram       Anesthesia Type:  MAC    Note:  Disposition: Outpatient   Postop Pain Control: Uneventful            Sign Out: Well controlled pain   PONV: No   Neuro/Psych: Uneventful            Sign Out: Acceptable/Baseline neuro status   Airway/Respiratory: Uneventful            Sign Out: Acceptable/Baseline resp. status   CV/Hemodynamics: Uneventful            Sign Out: Acceptable CV status; No obvious hypovolemia; No obvious fluid overload   Other NRE: NONE   DID A NON-ROUTINE EVENT OCCUR? No       Last vitals:  Vitals Value Taken Time   /94 03/18/25 1030   Temp     Pulse 64 03/18/25 1047   Resp 16 03/18/25 1047   SpO2 91 % 03/18/25 1046   Vitals shown include unfiled device data.    Electronically Signed By: Karin Hancock MD  March 18, 2025  11:18 AM

## 2025-03-18 NOTE — OP NOTE
PREOPERATIVE DIAGNOSIS:  Hematuria, transplant kidney  POSTOPERATIVE DIAGNOSIS:  Same    PROCEDURES:  Cystoscopy.   Bilateral retrograde pyelogram   Bilateral ureteral wash   Urethral dilation  Interpretation of fluoroscopic imaging  SURGEON: Valentin Joshua MD   RESIDENT: None  ANESTHESIA: MAC  EBL: 0cc  FLUIDS: See dictated anesthesia record for details  SPECIMEN:    Right ureteral wash  Left ureteral wash  DRAINS AND TUBES:  None  FINDINGS:   Left side showed no evidence of hydronephrosis or filling defect on retrograde pyelogram  Right retrograde pyelogram showed no filling defects within the ureter.  There was extravasation in the area of the right kidney and I was not able to delineate the right kidney well.  Of note this is in a nonfunctional right kidney.    INDICATIONS:   Reina Quan is a 60 year old female with a transplant kidney and hematuria.  She was unable to tolerate a cystoscopy in the clinic and is thus here to have this done today.  We also elected to do ureteral wash and bilateral retrograde pyelogram given her lack of contrast-enhanced imaging.  Of note she is dialysis dependent.  She is aware of the risks and benefits of this procedure and has given informed consent.    DESCRIPTION OF PROCEDURE:   After obtaining informed consent, the patient was brought to the operating room and placed in the supine position on the operating room table. All pressure points were adequately cushioned and pneumatic compression devices were applied to the patient's bilateral lower extremities. Appropriate preoperative antibiotics were administered. Anesthesia was induced without difficulty. The patient was repositioned into the dorsal lithotomy position. The patient was then prepped and draped in the usual sterile fashion. A timeout was performed to confirm the correct patient, positioning, procedure, and laterality.   Procedure was initiated with insertion of a 22 F rigid cystoscope into the bladder.  The urethra had to be dilated to 24 Malaysian using sounds prior to cystoscopy.  Once cystoscopy was started, the urethra was normal without stricture. At this time a full cystoscopy was performed.  Urethra was open.  A full survey of the bladder was undertaken.  There were no lesions within the bladder.  The ureteral orifice ease were orthotopic for the native kidneys.  The transplant ureter was seen in the right dome of the bladder.    I then cannulated the left ureteral orifice with a 5 Malaysian open-ended catheter.  10 cc of saline was injected for a ureteral wash this was sent for cytology.  Retrograde pyelogram was then undertaken with findings as listed above.  I then switched to the right side.  The right ureter was catheterized.  10 cc were injected for ureteral wash and a very small amount was aspirated for cytology.  Retrograde pyelogram was then undertaken using 50-50 contrast with findings as listed above.  This point there was clear E flux from both ureters nonsmelly bleeding.  The bladder was then drained and procedure terminated.   The patient was awakened from anesthesia, transferred to the PACU in stable condition.    PLAN:  Discharge to home  Given the extravasation from the right kidney I would like to proceed with CT urogram in order to get a better understanding of her renal anatomy as well as evaluate her transplant kidney.  Will plan to see her back in the clinic once this is completed..

## 2025-03-18 NOTE — ANESTHESIA CARE TRANSFER NOTE
Patient: Reina Quan    Procedure: Procedure(s):  CYSTOSCOPY, urethral dilation,  bilateral retrograde pyelogram       Diagnosis: Gross hematuria [R31.0]  Diagnosis Additional Information: No value filed.    Anesthesia Type:   MAC     Note:    Oropharynx: oropharynx clear of all foreign objects  Level of Consciousness: awake  Oxygen Supplementation: room air    Independent Airway: airway patency satisfactory and stable  Dentition: dentition unchanged  Vital Signs Stable: post-procedure vital signs reviewed and stable  Report to RN Given: handoff report given  Patient transferred to: Phase II    Handoff Report: Identifed the Patient, Identified the Reponsible Provider, Reviewed the pertinent medical history, Discussed the surgical course, Reviewed Intra-OP anesthesia mangement and issues during anesthesia, Set expectations for post-procedure period and Allowed opportunity for questions and acknowledgement of understanding      Vitals:  Vitals Value Taken Time   /94 03/18/25 1030   Temp     Pulse 68 03/18/25 1033   Resp 13 03/18/25 1033   SpO2 98 % 03/18/25 1033   Vitals shown include unfiled device data.    Electronically Signed By: JOSE MANUEL Barrios CRNA  March 18, 2025  10:33 AM

## 2025-03-19 ENCOUNTER — APPOINTMENT (OUTPATIENT)
Dept: CT IMAGING | Facility: CLINIC | Age: 60
DRG: 069 | End: 2025-03-19
Attending: EMERGENCY MEDICINE
Payer: COMMERCIAL

## 2025-03-19 ENCOUNTER — HOSPITAL ENCOUNTER (OUTPATIENT)
Facility: CLINIC | Age: 60
Setting detail: OBSERVATION
Discharge: HOME | DRG: 069 | End: 2025-03-21
Attending: EMERGENCY MEDICINE | Admitting: RADIOLOGY
Payer: COMMERCIAL

## 2025-03-19 DIAGNOSIS — I12.0 MALIGNANT HYPERTENSIVE KIDNEY DISEASE WITH CHRONIC KIDNEY DISEASE STAGE V OR END STAGE RENAL DISEASE (H): Primary | ICD-10-CM

## 2025-03-19 DIAGNOSIS — G43.819 OTHER MIGRAINE WITHOUT STATUS MIGRAINOSUS, INTRACTABLE: ICD-10-CM

## 2025-03-19 DIAGNOSIS — R11.2 NAUSEA AND VOMITING, UNSPECIFIED VOMITING TYPE: ICD-10-CM

## 2025-03-19 DIAGNOSIS — Z94.0 KIDNEY REPLACED BY TRANSPLANT: ICD-10-CM

## 2025-03-19 DIAGNOSIS — N18.6 END STAGE RENAL DISEASE (H): ICD-10-CM

## 2025-03-19 DIAGNOSIS — R51.9 HEADACHE, UNSPECIFIED HEADACHE TYPE: ICD-10-CM

## 2025-03-19 DIAGNOSIS — I10 HYPERTENSION, UNSPECIFIED TYPE: ICD-10-CM

## 2025-03-19 DIAGNOSIS — T82.9XXA COMPLICATION OF VASCULAR ACCESS FOR DIALYSIS, INITIAL ENCOUNTER: ICD-10-CM

## 2025-03-19 LAB
ALBUMIN SERPL BCG-MCNC: 4.1 G/DL (ref 3.5–5.2)
ALBUMIN UR-MCNC: 600 MG/DL
ALP SERPL-CCNC: 93 U/L (ref 40–150)
ALT SERPL W P-5'-P-CCNC: 10 U/L (ref 0–50)
ANION GAP SERPL CALCULATED.3IONS-SCNC: 17 MMOL/L (ref 7–15)
APPEARANCE UR: ABNORMAL
AST SERPL W P-5'-P-CCNC: 34 U/L (ref 0–45)
BASOPHILS # BLD AUTO: 0 10E3/UL (ref 0–0.2)
BASOPHILS NFR BLD AUTO: 1 %
BILIRUB SERPL-MCNC: 0.2 MG/DL
BILIRUB UR QL STRIP: NEGATIVE
BUN SERPL-MCNC: 11.1 MG/DL (ref 8–23)
CALCIUM SERPL-MCNC: 9.3 MG/DL (ref 8.8–10.4)
CHLORIDE SERPL-SCNC: 94 MMOL/L (ref 98–107)
COLOR UR AUTO: ABNORMAL
CREAT SERPL-MCNC: 2.93 MG/DL (ref 0.51–0.95)
EGFRCR SERPLBLD CKD-EPI 2021: 18 ML/MIN/1.73M2
EOSINOPHIL # BLD AUTO: 0 10E3/UL (ref 0–0.7)
EOSINOPHIL NFR BLD AUTO: 0 %
ERYTHROCYTE [DISTWIDTH] IN BLOOD BY AUTOMATED COUNT: 15 % (ref 10–15)
GLUCOSE SERPL-MCNC: 98 MG/DL (ref 70–99)
GLUCOSE UR STRIP-MCNC: 200 MG/DL
HCO3 SERPL-SCNC: 25 MMOL/L (ref 22–29)
HCT VFR BLD AUTO: 37.5 % (ref 35–47)
HGB BLD-MCNC: 11.8 G/DL (ref 11.7–15.7)
HGB UR QL STRIP: ABNORMAL
IMM GRANULOCYTES # BLD: 0 10E3/UL
IMM GRANULOCYTES NFR BLD: 0 %
KETONES UR STRIP-MCNC: 10 MG/DL
LEUKOCYTE ESTERASE UR QL STRIP: NEGATIVE
LYMPHOCYTES # BLD AUTO: 0.3 10E3/UL (ref 0.8–5.3)
LYMPHOCYTES NFR BLD AUTO: 4 %
MCH RBC QN AUTO: 28 PG (ref 26.5–33)
MCHC RBC AUTO-ENTMCNC: 31.5 G/DL (ref 31.5–36.5)
MCV RBC AUTO: 89 FL (ref 78–100)
MONOCYTES # BLD AUTO: 0.1 10E3/UL (ref 0–1.3)
MONOCYTES NFR BLD AUTO: 2 %
NEUTROPHILS # BLD AUTO: 6.3 10E3/UL (ref 1.6–8.3)
NEUTROPHILS NFR BLD AUTO: 93 %
NITRATE UR QL: NEGATIVE
NRBC # BLD AUTO: 0 10E3/UL
NRBC BLD AUTO-RTO: 0 /100
PATH REPORT.COMMENTS IMP SPEC: NORMAL
PATH REPORT.FINAL DX SPEC: NORMAL
PATH REPORT.GROSS SPEC: NORMAL
PATH REPORT.MICROSCOPIC SPEC OTHER STN: NORMAL
PATH REPORT.RELEVANT HX SPEC: NORMAL
PH UR STRIP: 8.5 [PH] (ref 5–7)
PLAT MORPH BLD: NORMAL
PLATELET # BLD AUTO: 128 10E3/UL (ref 150–450)
POTASSIUM SERPL-SCNC: 4.4 MMOL/L (ref 3.4–5.3)
PROT SERPL-MCNC: 6.8 G/DL (ref 6.4–8.3)
RBC # BLD AUTO: 4.22 10E6/UL (ref 3.8–5.2)
RBC MORPH BLD: NORMAL
RBC URINE: >182 /HPF
SODIUM SERPL-SCNC: 136 MMOL/L (ref 135–145)
SP GR UR STRIP: 1.01 (ref 1–1.03)
TROPONIN T SERPL HS-MCNC: 19 NG/L
TROPONIN T SERPL HS-MCNC: 20 NG/L
UROBILINOGEN UR STRIP-MCNC: NORMAL MG/DL
WBC # BLD AUTO: 6.7 10E3/UL (ref 4–11)
WBC URINE: 7 /HPF

## 2025-03-19 PROCEDURE — 70450 CT HEAD/BRAIN W/O DYE: CPT

## 2025-03-19 PROCEDURE — 99285 EMERGENCY DEPT VISIT HI MDM: CPT | Mod: 25 | Performed by: EMERGENCY MEDICINE

## 2025-03-19 PROCEDURE — 80053 COMPREHEN METABOLIC PANEL: CPT

## 2025-03-19 PROCEDURE — 36415 COLL VENOUS BLD VENIPUNCTURE: CPT

## 2025-03-19 PROCEDURE — 250N000011 HC RX IP 250 OP 636

## 2025-03-19 PROCEDURE — 250N000011 HC RX IP 250 OP 636: Performed by: EMERGENCY MEDICINE

## 2025-03-19 PROCEDURE — 96376 TX/PRO/DX INJ SAME DRUG ADON: CPT | Performed by: EMERGENCY MEDICINE

## 2025-03-19 PROCEDURE — 96374 THER/PROPH/DIAG INJ IV PUSH: CPT | Performed by: EMERGENCY MEDICINE

## 2025-03-19 PROCEDURE — 81001 URINALYSIS AUTO W/SCOPE: CPT

## 2025-03-19 PROCEDURE — 85025 COMPLETE CBC W/AUTO DIFF WBC: CPT

## 2025-03-19 PROCEDURE — 84484 ASSAY OF TROPONIN QUANT: CPT

## 2025-03-19 PROCEDURE — 93005 ELECTROCARDIOGRAM TRACING: CPT

## 2025-03-19 PROCEDURE — 96375 TX/PRO/DX INJ NEW DRUG ADDON: CPT | Performed by: EMERGENCY MEDICINE

## 2025-03-19 PROCEDURE — 250N000013 HC RX MED GY IP 250 OP 250 PS 637: Performed by: EMERGENCY MEDICINE

## 2025-03-19 PROCEDURE — 84100 ASSAY OF PHOSPHORUS: CPT | Performed by: PHYSICIAN ASSISTANT

## 2025-03-19 PROCEDURE — 70450 CT HEAD/BRAIN W/O DYE: CPT | Mod: 26 | Performed by: RADIOLOGY

## 2025-03-19 PROCEDURE — 99285 EMERGENCY DEPT VISIT HI MDM: CPT | Mod: FS | Performed by: EMERGENCY MEDICINE

## 2025-03-19 RX ORDER — HYDROCODONE BITARTRATE AND ACETAMINOPHEN 5; 325 MG/1; MG/1
1 TABLET ORAL ONCE
Status: COMPLETED | OUTPATIENT
Start: 2025-03-19 | End: 2025-03-19

## 2025-03-19 RX ORDER — ONDANSETRON 2 MG/ML
4 INJECTION INTRAMUSCULAR; INTRAVENOUS EVERY 30 MIN PRN
Status: DISCONTINUED | OUTPATIENT
Start: 2025-03-19 | End: 2025-03-20

## 2025-03-19 RX ORDER — LOSARTAN POTASSIUM 25 MG/1
50 TABLET ORAL DAILY
Qty: 60 TABLET | Refills: 0 | Status: SHIPPED | OUTPATIENT
Start: 2025-03-19 | End: 2025-03-22

## 2025-03-19 RX ORDER — MORPHINE SULFATE 4 MG/ML
4 INJECTION, SOLUTION INTRAMUSCULAR; INTRAVENOUS ONCE
Status: COMPLETED | OUTPATIENT
Start: 2025-03-19 | End: 2025-03-19

## 2025-03-19 RX ORDER — HYDRALAZINE HYDROCHLORIDE 20 MG/ML
10 INJECTION INTRAMUSCULAR; INTRAVENOUS ONCE
Status: COMPLETED | OUTPATIENT
Start: 2025-03-19 | End: 2025-03-19

## 2025-03-19 RX ORDER — LOSARTAN POTASSIUM 25 MG/1
25 TABLET ORAL ONCE
Status: DISCONTINUED | OUTPATIENT
Start: 2025-03-19 | End: 2025-03-20

## 2025-03-19 RX ORDER — AMLODIPINE BESYLATE 5 MG/1
10 TABLET ORAL AT BEDTIME
Qty: 60 TABLET | Refills: 0 | Status: SHIPPED | OUTPATIENT
Start: 2025-03-19 | End: 2025-04-18

## 2025-03-19 RX ORDER — LIDOCAINE 40 MG/G
CREAM TOPICAL
Status: CANCELLED | OUTPATIENT
Start: 2025-03-19

## 2025-03-19 RX ORDER — DIPHENHYDRAMINE HYDROCHLORIDE 50 MG/ML
25 INJECTION, SOLUTION INTRAMUSCULAR; INTRAVENOUS ONCE
Status: COMPLETED | OUTPATIENT
Start: 2025-03-19 | End: 2025-03-19

## 2025-03-19 RX ORDER — ONDANSETRON 2 MG/ML
4 INJECTION INTRAMUSCULAR; INTRAVENOUS ONCE
Status: COMPLETED | OUTPATIENT
Start: 2025-03-19 | End: 2025-03-19

## 2025-03-19 RX ORDER — AMLODIPINE BESYLATE 5 MG/1
5 TABLET ORAL ONCE
Status: DISCONTINUED | OUTPATIENT
Start: 2025-03-19 | End: 2025-03-20

## 2025-03-19 RX ORDER — LABETALOL HYDROCHLORIDE 5 MG/ML
10 INJECTION, SOLUTION INTRAVENOUS ONCE
Status: COMPLETED | OUTPATIENT
Start: 2025-03-19 | End: 2025-03-19

## 2025-03-19 RX ADMIN — HYDROCODONE BITARTRATE AND ACETAMINOPHEN 1 TABLET: 5; 325 TABLET ORAL at 21:27

## 2025-03-19 RX ADMIN — ONDANSETRON 4 MG: 2 INJECTION INTRAMUSCULAR; INTRAVENOUS at 17:06

## 2025-03-19 RX ADMIN — MORPHINE SULFATE 4 MG: 4 INJECTION INTRAVENOUS at 17:50

## 2025-03-19 RX ADMIN — LABETALOL HYDROCHLORIDE 10 MG: 5 INJECTION, SOLUTION INTRAVENOUS at 17:58

## 2025-03-19 RX ADMIN — HYDRALAZINE HYDROCHLORIDE 10 MG: 20 INJECTION INTRAMUSCULAR; INTRAVENOUS at 18:44

## 2025-03-19 RX ADMIN — DIPHENHYDRAMINE HYDROCHLORIDE 25 MG: 50 INJECTION, SOLUTION INTRAMUSCULAR; INTRAVENOUS at 21:28

## 2025-03-19 RX ADMIN — ONDANSETRON 4 MG: 2 INJECTION INTRAMUSCULAR; INTRAVENOUS at 21:28

## 2025-03-19 RX ADMIN — ONDANSETRON 4 MG: 2 INJECTION INTRAMUSCULAR; INTRAVENOUS at 23:44

## 2025-03-19 ASSESSMENT — ACTIVITIES OF DAILY LIVING (ADL)
ADLS_ACUITY_SCORE: 45

## 2025-03-19 NOTE — ED TRIAGE NOTES
Pt to triage with  for evaluation of headache and hypertension. Pt reports on and off headache for 5 weeks now with systolic high blood pressure reading ranging from 160s-200. Pt came from dialysis today, has hx of ESRD. Pt has fistula on left arm. Dialysis schedule Mon-Wed-Fri.

## 2025-03-19 NOTE — ED PROVIDER NOTES
[place] EMERGENCY DEPARTMENT ([facility])    3/19/25       ED PROVIDER NOTE    History   No chief complaint on file.    HPI  Reina Quan is a 60 year old female with a past medical history of HTN, HLD, h/o skin cancer, FSGS s/p  left kidney transplant (1992) now with ESRD on hemodialysis, and immunosuppressed status who presents to the emergency department for hypertension, vomiting, and headache.  Patient states that she has been having headaches for the last 4 or 5 weeks however in the last couple of days they have worsened.  She describes her headaches as intense pain that lasts for about a minute and occur every 10 or 20 minutes.  She has tried taking sumatriptan and Excedrin without relief of her headache.  She also states that her blood pressure has been elevated as of late.  She has been taking all of her medications as prescribed.  She last received dialysis today and had systolic blood pressures in the 200s.  She does not report any photophobia or visual aura.  She does not report any lateral weakness.      Physical Exam      Physical Exam  Constitutional:       General: She is not in acute distress.     Appearance: Normal appearance. She is not diaphoretic.   HENT:      Head: Atraumatic.      Mouth/Throat:      Mouth: Mucous membranes are moist.   Eyes:      General: No scleral icterus.     Conjunctiva/sclera: Conjunctivae normal.   Cardiovascular:      Rate and Rhythm: Normal rate.      Heart sounds: Normal heart sounds.   Pulmonary:      Effort: No respiratory distress.      Breath sounds: Normal breath sounds.   Abdominal:      General: Abdomen is flat.   Musculoskeletal:      Cervical back: Neck supple.   Skin:     General: Skin is warm.      Findings: No rash.   Neurological:      Mental Status: She is alert.           ED Course, Procedures, & Data      Procedures            EKG Interpretation:      Interpreted by Romain Nayak  Time reviewed: 7100  Symptoms at time of EKG:  HA   Rhythm: normal sinus   Rate: normal  Clinical Impression: normal EKG           No results found for any visits on 03/19/25.  Medications - No data to display  Labs Ordered and Resulted from Time of ED Arrival to Time of ED Departure - No data to display  No orders to display          Critical care was not performed.     Medical Decision Making  The patient's presentation was of moderate complexity (an undiagnosed new problem with uncertain prognosis).    The patient's evaluation involved:  ordering and/or review of 3+ test(s) in this encounter (see separate area of note for details)    The patient's management necessitated high risk (a decision regarding hospitalization).    Assessment & Plan    Patient presenting with headache. Vitals in the /166 on initial presentation. Physical exam reassuring. Initial differential diagnosis includes but not limited to hypertensive emergency/urgency, migraine, headache, other. Nursing notes reviewed.    CMP shows abnormalities consistent with dialysis patient, with improvement when compared with labs drawn yesterday. Creatinine yesterday 4.9 and today 2.9. CT head pending. Troponin slightly elevated, consistent with previous results. UA showed many red blood cells, however, patient had cytoscopy yesterday and this was expected. Non-infectious urine.     In the ED, the patient's symptoms were managed with zofran, labetolol, hydralazine with improvement of blood pressures into the 160s over upper 80s. However, despite correction of BP, patient's headaches persisted. She was then given a migraine cocktail of benadryl, zofran, norco. Pending improvement in symptoms.     At completion of my shift, patient pending CT head, symptomatic improvement of HA. Please see Dr. Collado's attestation for final ED course and disposition.           Final diagnoses:   None     New Prescriptions    No medications on file     --  Romain Nayak PA-C   Emergency Medicine   [facility]  [facility] EMERGENCY DEPARTMENT  3/19/2025      I have reviewed the nursing notes. I have reviewed the findings, diagnosis, plan and need for follow up with the patient.    New Prescriptions    No medications on file       Final diagnoses:   None         [facility] EMERGENCY DEPARTMENT  3/19/2025     Romain Nayak  03/19/25 [id number]

## 2025-03-20 ENCOUNTER — APPOINTMENT (OUTPATIENT)
Dept: INTERVENTIONAL RADIOLOGY/VASCULAR | Facility: CLINIC | Age: 60
End: 2025-03-20
Attending: SURGERY
Payer: COMMERCIAL

## 2025-03-20 ENCOUNTER — APPOINTMENT (OUTPATIENT)
Dept: MEDSURG UNIT | Facility: CLINIC | Age: 60
End: 2025-03-20
Payer: COMMERCIAL

## 2025-03-20 ENCOUNTER — APPOINTMENT (OUTPATIENT)
Dept: MRI IMAGING | Facility: CLINIC | Age: 60
DRG: 069 | End: 2025-03-20
Payer: COMMERCIAL

## 2025-03-20 PROBLEM — R51.9 HEADACHE, UNSPECIFIED HEADACHE TYPE: Status: ACTIVE | Noted: 2025-03-20

## 2025-03-20 PROBLEM — I10 HYPERTENSION, UNSPECIFIED TYPE: Status: ACTIVE | Noted: 2025-03-20

## 2025-03-20 PROBLEM — R11.2 NAUSEA AND VOMITING, UNSPECIFIED VOMITING TYPE: Status: ACTIVE | Noted: 2025-03-20

## 2025-03-20 LAB
ANION GAP SERPL CALCULATED.3IONS-SCNC: 14 MMOL/L (ref 7–15)
ATRIAL RATE - MUSE: 78 BPM
BUN SERPL-MCNC: 17.3 MG/DL (ref 8–23)
CALCIUM SERPL-MCNC: 9.6 MG/DL (ref 8.8–10.4)
CHLORIDE SERPL-SCNC: 98 MMOL/L (ref 98–107)
CREAT SERPL-MCNC: 4.27 MG/DL (ref 0.51–0.95)
DIASTOLIC BLOOD PRESSURE - MUSE: NORMAL MMHG
EGFRCR SERPLBLD CKD-EPI 2021: 11 ML/MIN/1.73M2
ERYTHROCYTE [DISTWIDTH] IN BLOOD BY AUTOMATED COUNT: 15 % (ref 10–15)
GLUCOSE SERPL-MCNC: 93 MG/DL (ref 70–99)
HCO3 SERPL-SCNC: 25 MMOL/L (ref 22–29)
HCT VFR BLD AUTO: 35.1 % (ref 35–47)
HGB BLD-MCNC: 10.8 G/DL (ref 11.7–15.7)
INR PPP: 0.93 (ref 0.85–1.15)
INTERPRETATION ECG - MUSE: NORMAL
MCH RBC QN AUTO: 28.1 PG (ref 26.5–33)
MCHC RBC AUTO-ENTMCNC: 30.8 G/DL (ref 31.5–36.5)
MCV RBC AUTO: 91 FL (ref 78–100)
P AXIS - MUSE: 77 DEGREES
PHOSPHATE SERPL-MCNC: 3.3 MG/DL (ref 2.5–4.5)
PLATELET # BLD AUTO: 126 10E3/UL (ref 150–450)
POTASSIUM SERPL-SCNC: 4.7 MMOL/L (ref 3.4–5.3)
PR INTERVAL - MUSE: 164 MS
QRS DURATION - MUSE: 82 MS
QT - MUSE: 412 MS
QTC - MUSE: 469 MS
R AXIS - MUSE: 86 DEGREES
RBC # BLD AUTO: 3.84 10E6/UL (ref 3.8–5.2)
SODIUM SERPL-SCNC: 137 MMOL/L (ref 135–145)
SYSTOLIC BLOOD PRESSURE - MUSE: NORMAL MMHG
T AXIS - MUSE: 69 DEGREES
VENTRICULAR RATE- MUSE: 78 BPM
WBC # BLD AUTO: 4.8 10E3/UL (ref 4–11)

## 2025-03-20 PROCEDURE — 250N000011 HC RX IP 250 OP 636

## 2025-03-20 PROCEDURE — G0378 HOSPITAL OBSERVATION PER HR: HCPCS

## 2025-03-20 PROCEDURE — 99152 MOD SED SAME PHYS/QHP 5/>YRS: CPT

## 2025-03-20 PROCEDURE — 36902 INTRO CATH DIALYSIS CIRCUIT: CPT

## 2025-03-20 PROCEDURE — 250N000009 HC RX 250: Performed by: STUDENT IN AN ORGANIZED HEALTH CARE EDUCATION/TRAINING PROGRAM

## 2025-03-20 PROCEDURE — 36415 COLL VENOUS BLD VENIPUNCTURE: CPT | Performed by: NURSE PRACTITIONER

## 2025-03-20 PROCEDURE — 36415 COLL VENOUS BLD VENIPUNCTURE: CPT | Performed by: PHYSICIAN ASSISTANT

## 2025-03-20 PROCEDURE — 272N000564 HC SHEATH CR2

## 2025-03-20 PROCEDURE — 70551 MRI BRAIN STEM W/O DYE: CPT | Mod: 52

## 2025-03-20 PROCEDURE — 272N000506 HC NEEDLE CR6

## 2025-03-20 PROCEDURE — 85027 COMPLETE CBC AUTOMATED: CPT

## 2025-03-20 PROCEDURE — 96375 TX/PRO/DX INJ NEW DRUG ADDON: CPT | Mod: 59 | Performed by: EMERGENCY MEDICINE

## 2025-03-20 PROCEDURE — 99222 1ST HOSP IP/OBS MODERATE 55: CPT | Mod: GC | Performed by: PSYCHIATRY & NEUROLOGY

## 2025-03-20 PROCEDURE — C1769 GUIDE WIRE: HCPCS

## 2025-03-20 PROCEDURE — 36902 INTRO CATH DIALYSIS CIRCUIT: CPT | Performed by: RADIOLOGY

## 2025-03-20 PROCEDURE — 250N000013 HC RX MED GY IP 250 OP 250 PS 637: Performed by: PHYSICIAN ASSISTANT

## 2025-03-20 PROCEDURE — 250N000011 HC RX IP 250 OP 636: Performed by: EMERGENCY MEDICINE

## 2025-03-20 PROCEDURE — 99222 1ST HOSP IP/OBS MODERATE 55: CPT | Mod: GC | Performed by: INTERNAL MEDICINE

## 2025-03-20 PROCEDURE — 99152 MOD SED SAME PHYS/QHP 5/>YRS: CPT | Performed by: RADIOLOGY

## 2025-03-20 PROCEDURE — 80048 BASIC METABOLIC PNL TOTAL CA: CPT

## 2025-03-20 PROCEDURE — 82088 ASSAY OF ALDOSTERONE: CPT | Performed by: PHYSICIAN ASSISTANT

## 2025-03-20 PROCEDURE — 83835 ASSAY OF METANEPHRINES: CPT | Performed by: PHYSICIAN ASSISTANT

## 2025-03-20 PROCEDURE — 99222 1ST HOSP IP/OBS MODERATE 55: CPT | Mod: FS | Performed by: PHYSICIAN ASSISTANT

## 2025-03-20 PROCEDURE — 99207 PR APP CREDIT; MD BILLING SHARED VISIT: CPT | Mod: FS | Performed by: PHYSICIAN ASSISTANT

## 2025-03-20 PROCEDURE — 70551 MRI BRAIN STEM W/O DYE: CPT | Mod: 26 | Performed by: RADIOLOGY

## 2025-03-20 PROCEDURE — 250N000013 HC RX MED GY IP 250 OP 250 PS 637

## 2025-03-20 PROCEDURE — 85610 PROTHROMBIN TIME: CPT | Performed by: NURSE PRACTITIONER

## 2025-03-20 PROCEDURE — 250N000012 HC RX MED GY IP 250 OP 636 PS 637

## 2025-03-20 PROCEDURE — 36415 COLL VENOUS BLD VENIPUNCTURE: CPT

## 2025-03-20 PROCEDURE — 84244 ASSAY OF RENIN: CPT | Performed by: PHYSICIAN ASSISTANT

## 2025-03-20 PROCEDURE — 96375 TX/PRO/DX INJ NEW DRUG ADDON: CPT | Mod: 59

## 2025-03-20 PROCEDURE — 99207 PR APP CREDIT; MD BILLING SHARED VISIT: CPT | Mod: FS | Performed by: INTERNAL MEDICINE

## 2025-03-20 PROCEDURE — 96376 TX/PRO/DX INJ SAME DRUG ADON: CPT

## 2025-03-20 PROCEDURE — 255N000002 HC RX 255 OP 636: Performed by: STUDENT IN AN ORGANIZED HEALTH CARE EDUCATION/TRAINING PROGRAM

## 2025-03-20 PROCEDURE — 76937 US GUIDE VASCULAR ACCESS: CPT | Mod: 26 | Performed by: RADIOLOGY

## 2025-03-20 PROCEDURE — 250N000011 HC RX IP 250 OP 636: Performed by: STUDENT IN AN ORGANIZED HEALTH CARE EDUCATION/TRAINING PROGRAM

## 2025-03-20 RX ORDER — KETOROLAC TROMETHAMINE 30 MG/ML
30 INJECTION, SOLUTION INTRAMUSCULAR; INTRAVENOUS ONCE
Status: COMPLETED | OUTPATIENT
Start: 2025-03-20 | End: 2025-03-20

## 2025-03-20 RX ORDER — SODIUM BICARBONATE 650 MG/1
1300 TABLET ORAL 2 TIMES DAILY
Status: DISCONTINUED | OUTPATIENT
Start: 2025-03-20 | End: 2025-03-21 | Stop reason: HOSPADM

## 2025-03-20 RX ORDER — ACETAMINOPHEN 325 MG/1
650 TABLET ORAL EVERY 4 HOURS PRN
Status: DISCONTINUED | OUTPATIENT
Start: 2025-03-20 | End: 2025-03-21 | Stop reason: HOSPADM

## 2025-03-20 RX ORDER — HYDRALAZINE HYDROCHLORIDE 20 MG/ML
10 INJECTION INTRAMUSCULAR; INTRAVENOUS EVERY 10 MIN PRN
Status: DISCONTINUED | OUTPATIENT
Start: 2025-03-20 | End: 2025-03-20

## 2025-03-20 RX ORDER — ATORVASTATIN CALCIUM 40 MG/1
80 TABLET, FILM COATED ORAL AT BEDTIME
Status: DISCONTINUED | OUTPATIENT
Start: 2025-03-20 | End: 2025-03-20

## 2025-03-20 RX ORDER — GADOBUTROL 604.72 MG/ML
6 INJECTION INTRAVENOUS ONCE
Status: DISCONTINUED | OUTPATIENT
Start: 2025-03-20 | End: 2025-03-21 | Stop reason: HOSPADM

## 2025-03-20 RX ORDER — ONDANSETRON 4 MG/1
4 TABLET, ORALLY DISINTEGRATING ORAL EVERY 6 HOURS PRN
Status: DISCONTINUED | OUTPATIENT
Start: 2025-03-20 | End: 2025-03-21 | Stop reason: HOSPADM

## 2025-03-20 RX ORDER — KETOROLAC TROMETHAMINE 30 MG/ML
30 INJECTION, SOLUTION INTRAMUSCULAR; INTRAVENOUS ONCE
Status: DISCONTINUED | OUTPATIENT
Start: 2025-03-20 | End: 2025-03-20

## 2025-03-20 RX ORDER — NALOXONE HYDROCHLORIDE 0.4 MG/ML
0.2 INJECTION, SOLUTION INTRAMUSCULAR; INTRAVENOUS; SUBCUTANEOUS
Status: DISCONTINUED | OUTPATIENT
Start: 2025-03-20 | End: 2025-03-20

## 2025-03-20 RX ORDER — AMLODIPINE BESYLATE 10 MG/1
10 TABLET ORAL AT BEDTIME
Status: DISCONTINUED | OUTPATIENT
Start: 2025-03-20 | End: 2025-03-20

## 2025-03-20 RX ORDER — AMLODIPINE BESYLATE 10 MG/1
10 TABLET ORAL AT BEDTIME
Status: DISCONTINUED | OUTPATIENT
Start: 2025-03-20 | End: 2025-03-21 | Stop reason: HOSPADM

## 2025-03-20 RX ORDER — ACETAMINOPHEN 650 MG/1
650 SUPPOSITORY RECTAL EVERY 4 HOURS PRN
Status: DISCONTINUED | OUTPATIENT
Start: 2025-03-20 | End: 2025-03-21 | Stop reason: HOSPADM

## 2025-03-20 RX ORDER — CINACALCET 30 MG/1
30 TABLET, FILM COATED ORAL EVERY OTHER DAY
Status: DISCONTINUED | OUTPATIENT
Start: 2025-03-20 | End: 2025-03-21 | Stop reason: HOSPADM

## 2025-03-20 RX ORDER — NIACINAMIDE 500 MG
500 TABLET ORAL 2 TIMES DAILY WITH MEALS
Status: DISCONTINUED | OUTPATIENT
Start: 2025-03-20 | End: 2025-03-21 | Stop reason: HOSPADM

## 2025-03-20 RX ORDER — IODIXANOL 320 MG/ML
100 INJECTION, SOLUTION INTRAVASCULAR ONCE
Status: COMPLETED | OUTPATIENT
Start: 2025-03-20 | End: 2025-03-20

## 2025-03-20 RX ORDER — KETOROLAC TROMETHAMINE 15 MG/ML
15 INJECTION, SOLUTION INTRAMUSCULAR; INTRAVENOUS ONCE
Status: COMPLETED | OUTPATIENT
Start: 2025-03-20 | End: 2025-03-20

## 2025-03-20 RX ORDER — MYCOPHENOLATE MOFETIL 250 MG/1
750 CAPSULE ORAL 2 TIMES DAILY
Status: DISCONTINUED | OUTPATIENT
Start: 2025-03-20 | End: 2025-03-21 | Stop reason: HOSPADM

## 2025-03-20 RX ORDER — AMOXICILLIN 250 MG
2 CAPSULE ORAL 2 TIMES DAILY PRN
Status: DISCONTINUED | OUTPATIENT
Start: 2025-03-20 | End: 2025-03-21 | Stop reason: HOSPADM

## 2025-03-20 RX ORDER — ATORVASTATIN CALCIUM 80 MG/1
80 TABLET, FILM COATED ORAL AT BEDTIME
Status: DISCONTINUED | OUTPATIENT
Start: 2025-03-20 | End: 2025-03-21 | Stop reason: HOSPADM

## 2025-03-20 RX ORDER — NALOXONE HYDROCHLORIDE 0.4 MG/ML
0.4 INJECTION, SOLUTION INTRAMUSCULAR; INTRAVENOUS; SUBCUTANEOUS
Status: DISCONTINUED | OUTPATIENT
Start: 2025-03-20 | End: 2025-03-20

## 2025-03-20 RX ORDER — LOSARTAN POTASSIUM 50 MG/1
50 TABLET ORAL DAILY
Status: DISCONTINUED | OUTPATIENT
Start: 2025-03-20 | End: 2025-03-20

## 2025-03-20 RX ORDER — FENTANYL CITRATE 50 UG/ML
25-50 INJECTION, SOLUTION INTRAMUSCULAR; INTRAVENOUS EVERY 5 MIN PRN
Status: DISCONTINUED | OUTPATIENT
Start: 2025-03-20 | End: 2025-03-20

## 2025-03-20 RX ORDER — PAROXETINE 20 MG/1
20 TABLET, FILM COATED ORAL DAILY
Status: DISCONTINUED | OUTPATIENT
Start: 2025-03-20 | End: 2025-03-21 | Stop reason: HOSPADM

## 2025-03-20 RX ORDER — LOSARTAN POTASSIUM 50 MG/1
100 TABLET ORAL EVERY EVENING
Status: DISCONTINUED | OUTPATIENT
Start: 2025-03-20 | End: 2025-03-20

## 2025-03-20 RX ORDER — LIDOCAINE 40 MG/G
CREAM TOPICAL
Status: DISCONTINUED | OUTPATIENT
Start: 2025-03-20 | End: 2025-03-20

## 2025-03-20 RX ORDER — PROCHLORPERAZINE MALEATE 10 MG
10 TABLET ORAL EVERY 6 HOURS PRN
Status: DISCONTINUED | OUTPATIENT
Start: 2025-03-20 | End: 2025-03-21 | Stop reason: HOSPADM

## 2025-03-20 RX ORDER — ONDANSETRON 2 MG/ML
4 INJECTION INTRAMUSCULAR; INTRAVENOUS EVERY 6 HOURS PRN
Status: DISCONTINUED | OUTPATIENT
Start: 2025-03-20 | End: 2025-03-21 | Stop reason: HOSPADM

## 2025-03-20 RX ORDER — AMOXICILLIN 250 MG
1 CAPSULE ORAL 2 TIMES DAILY PRN
Status: DISCONTINUED | OUTPATIENT
Start: 2025-03-20 | End: 2025-03-21 | Stop reason: HOSPADM

## 2025-03-20 RX ORDER — LOSARTAN POTASSIUM 50 MG/1
50 TABLET ORAL EVERY EVENING
Status: DISCONTINUED | OUTPATIENT
Start: 2025-03-20 | End: 2025-03-21

## 2025-03-20 RX ORDER — DIPHENHYDRAMINE HYDROCHLORIDE 50 MG/ML
25 INJECTION, SOLUTION INTRAMUSCULAR; INTRAVENOUS ONCE
Status: COMPLETED | OUTPATIENT
Start: 2025-03-20 | End: 2025-03-20

## 2025-03-20 RX ORDER — FLUMAZENIL 0.1 MG/ML
0.2 INJECTION, SOLUTION INTRAVENOUS
Status: DISCONTINUED | OUTPATIENT
Start: 2025-03-20 | End: 2025-03-20

## 2025-03-20 RX ORDER — PREDNISONE 5 MG/1
5 TABLET ORAL EVERY MORNING
Status: DISCONTINUED | OUTPATIENT
Start: 2025-03-20 | End: 2025-03-21 | Stop reason: HOSPADM

## 2025-03-20 RX ADMIN — FENTANYL CITRATE 50 MCG: 50 INJECTION, SOLUTION INTRAMUSCULAR; INTRAVENOUS at 11:08

## 2025-03-20 RX ADMIN — FENTANYL CITRATE 50 MCG: 50 INJECTION, SOLUTION INTRAMUSCULAR; INTRAVENOUS at 10:48

## 2025-03-20 RX ADMIN — MYCOPHENOLATE MOFETIL 750 MG: 250 CAPSULE ORAL at 20:15

## 2025-03-20 RX ADMIN — ONDANSETRON 4 MG: 2 INJECTION, SOLUTION INTRAMUSCULAR; INTRAVENOUS at 13:53

## 2025-03-20 RX ADMIN — MIDAZOLAM 1 MG: 1 INJECTION INTRAMUSCULAR; INTRAVENOUS at 11:08

## 2025-03-20 RX ADMIN — LIDOCAINE HYDROCHLORIDE 10 ML: 10 INJECTION, SOLUTION EPIDURAL; INFILTRATION; INTRACAUDAL; PERINEURAL at 11:00

## 2025-03-20 RX ADMIN — ACETAMINOPHEN 650 MG: 325 TABLET, FILM COATED ORAL at 22:21

## 2025-03-20 RX ADMIN — SODIUM BICARBONATE 1300 MG: 650 TABLET ORAL at 20:15

## 2025-03-20 RX ADMIN — DIPHENHYDRAMINE HYDROCHLORIDE 25 MG: 50 INJECTION, SOLUTION INTRAMUSCULAR; INTRAVENOUS at 14:59

## 2025-03-20 RX ADMIN — ATORVASTATIN CALCIUM 80 MG: 80 TABLET, FILM COATED ORAL at 22:15

## 2025-03-20 RX ADMIN — LOSARTAN POTASSIUM 50 MG: 50 TABLET, FILM COATED ORAL at 20:16

## 2025-03-20 RX ADMIN — KETOROLAC TROMETHAMINE 15 MG: 15 INJECTION, SOLUTION INTRAMUSCULAR; INTRAVENOUS at 14:57

## 2025-03-20 RX ADMIN — HYDRALAZINE HYDROCHLORIDE 10 MG: 20 INJECTION INTRAMUSCULAR; INTRAVENOUS at 10:56

## 2025-03-20 RX ADMIN — PROCHLORPERAZINE EDISYLATE 10 MG: 5 INJECTION INTRAMUSCULAR; INTRAVENOUS at 14:54

## 2025-03-20 RX ADMIN — KETOROLAC TROMETHAMINE 30 MG: 30 INJECTION, SOLUTION INTRAMUSCULAR at 00:12

## 2025-03-20 RX ADMIN — IODIXANOL 30 ML: 320 INJECTION, SOLUTION INTRAVASCULAR at 11:31

## 2025-03-20 RX ADMIN — AMLODIPINE BESYLATE 10 MG: 10 TABLET ORAL at 22:15

## 2025-03-20 RX ADMIN — MIDAZOLAM 1 MG: 1 INJECTION INTRAMUSCULAR; INTRAVENOUS at 10:48

## 2025-03-20 ASSESSMENT — ACTIVITIES OF DAILY LIVING (ADL)
ADLS_ACUITY_SCORE: 58
ADLS_ACUITY_SCORE: 45
ADLS_ACUITY_SCORE: 58
ADLS_ACUITY_SCORE: 45
ADLS_ACUITY_SCORE: 45
ADLS_ACUITY_SCORE: 58
ADLS_ACUITY_SCORE: 45
ADLS_ACUITY_SCORE: 58

## 2025-03-20 NOTE — CONSULTS
IR consult note.    Pt is on IR OP schedule for LUE fistulogram 3/20. We are updated she is admitted for unrelated reason. Pt is NPO. IR will plan to proceed with fistulogram as scheduled.    Janina Pierre DNP, APRN  Interventional Radiology   IR on-call pager: [phone]

## 2025-03-20 NOTE — PRE-PROCEDURE
GENERAL PRE-PROCEDURE:   Procedure:  Left upper extremity arteirovenous fistulogram and possible intervention    Verbal consent obtained?: Yes    Written consent obtained?: Yes    Risks and benefits: Risks, benefits and alternatives were discussed    Consent given by:  Patient  Patient states understanding of procedure being performed: Yes    Patient's understanding of procedure matches consent: Yes    Procedure consent matches procedure scheduled: Yes    Expected level of sedation:  Moderate  Appropriately NPO:  Yes  ASA Class:  2  Mallampati  :  Grade 2- soft palate, base of uvula, tonsillar pillars, and portion of posterior pharyngeal wall visible  Lungs:  Lungs clear with good breath sounds bilaterally  Heart:  Normal heart sounds and rate  History & Physical reviewed:  History and physical reviewed and no updates needed  Statement of review:  I have reviewed the lab findings, diagnostic data, medications, and the plan for sedation

## 2025-03-20 NOTE — PROCEDURES
[facility]    Procedure: Left arm fistulogram    Date/Time: 3/20/2025 11:29 AM    Performed by: Nelson Beltre MD  Authorized by: Nelson Beltre MD  IR Fellow Physician:    Pre Procedure Diagnosis: ESRD  Post Procedure Diagnosis: same    UNIVERSAL PROTOCOL   Site Marked: NA  Prior Images Obtained and Reviewed:  Yes  Required items: Required blood products, implants, devices and special equipment available    Patient identity confirmed:  Arm band, provided demographic data, hospital-assigned identification number and verbally with patient  Patient was reevaluated immediately before administering moderate or deep sedation or anesthesia  Confirmation Checklist:  Correct equipment/implants were available, procedure was appropriate and matched the consent or emergent situation, relevant allergies and patient's identity using two indicators  Time out: Immediately prior to the procedure a time out was called    Universal Protocol: the Joint Commission Universal Protocol was followed    Preparation: Patient was prepped and draped in usual sterile fashion       ANESTHESIA    Anesthesia:  Local infiltration  Local Anesthetic:  Lidocaine 1% without epinephrine      SEDATION  Patient Sedated: Yes    Sedation:  Fentanyl and midazolam  Vital signs: Vital signs monitored during sedation    See dictated procedure note for full details.  Findings: 1. Left brachiobasilic fistulogram reveals stenoses adjacent to 1-2 cm pseudoaneurysm in the mid distal left basilic vein.     No central venous stenosis. Successful venoplasty to 8 mm.     Specimens: none    Procedural Complications: None    Condition: Stable      PROCEDURE    Patient Tolerance:  Patient tolerated the procedure well with no immediate complications  Length of time physician/provider present for 1:1 monitoring during sedation:  0-22 min

## 2025-03-20 NOTE — CONSULTS
[facility]  Neurology Consultation    Patient Name:  Reina Quan  MRN:  [id number]    :  1965  Date of Service:  2025  Primary care provider:  Lizbeth Andrews      Neurology consultation service was asked to see Reina Quan by Dr. Oseguera to evaluate headache.    Chief Complaint:  Headache, Nausea, vomiting    History of Present Illness:     Reina Quan is a 60F who has a history of kidney transplant in  for FSGS now on HD since 2024, HTN, HLD, MDD, DEBRA, migraines, recent microscopic hematuria who presents with headaches, nausea, hypertension.    ED course notable for initial BP of 204/166, she reports her morning BPs at home are often 150s-160s systolic and do not decrease after dialysis. In the ED she received a dose of IV labetalol and IV hydralazine with gradual improvement of her blood pressure to 140/74 over several hours. She was also given a dose of morphine, a tablet of Norco, and later a migraine cocktail (benadryl, toradol, zofran) with eventual improvement in her headache and nausea. CT head on admission was negative for acute intracranial pathology. Labs largely unchanged from baseline.     Reina reports that her headaches started when she used her new fistula for the first time for dialysis 5 weeks ago. Headaches most frequently occur when she is going to bed or waking up, and sometimes last all day. They are worse with activity, pain is described as occurring in bilateral temples and spanning across her head. Occasionally has sinus pain. Pain is sharp, intense, pulsating and is not significantly improved with Excedrin or sumatriptan, though Advil sometimes helps. She does not feel that her blood pressure meds have helped. No known changes to headache via alcohol as she does not drink, and she does report she does not have any vision changes, sweating, or tears from the headaches. She does report some  symptoms that feel like postmenopausal hot flashes, though she remarks that she is 15 years post-menopause so it seems like odd timing. Minimal photophobia and phonophobia. These headaches do not feel similar to the migraines she used to have.    She did have some headaches/migraines after her initial kidney transplant. She attributes them to the birth control she had to be on for the procedure, as the headaches ceased when she stopped the contraceptive pills. She remarks that her eyes get tired by the end of the day or from her headaches.     Outpatient clinic visits from several months ago document normal-range blood pressures.     She currently has a headache and remarks that she is very thirsty and dehydrated from lack of fluid intake and repeated vomiting.       ROS  A comprehensive ROS was performed and pertinent findings were included in HPI.     PMH  Past Medical History:   Diagnosis Date    Actinic keratosis     Allergic rhinitis, cause unspecified     Anemia     anxiety/depression     PAXIL    Arthritis     congenital hearing loss     uses hearing aids    Depressive disorder     Dry eyes     Giant Platelet syndrome     Glomerular Focal Sclerosis--transplant 1985     Hypertension     Kidney replaced by transplant 1992    RAPAMUNE/ SIROLIMUS     Lichen planopilaris     LPP followed by derm on doxycycline/ clobetasol    Lichen planus     Menorrhagia     migraine     Squamous cell carcinoma     8 x     Thrombocytopenia     Ulcerative colitis, unspecified     biposy neg 1996    Unsatisfactory cervical Papanicolaou smear 02/15/2021    UTI (lower urinary tract infection)     recurrent Dr Burns [facility]     Past Surgical History:   Procedure Laterality Date    BIOPSY OF SKIN LESION      CHOLECYSTECTOMY      COLONOSCOPY N/A 1/8/2025    Procedure: Colonoscopy;  Surgeon: Valentin Bull MD;  Location: [facility]    CREATE FISTULA ARTERIOVENOUS UPPER EXTREMITY Left 10/15/2024    Procedure: Left Upper Extremity  "Brachiobasilic ARTERIOVENOUS FISTULA Creation;  Surgeon: Jesus Alberto Walton MD;  Location: [facility]    CYSTOSCOPY, DILATE URETER(S), COMBINED N/A 3/18/2025    Procedure: Cystoscopy with Urethral dilation;  Surgeon: Valentin Joshua MD;  Location: [facility]    CYSTOSCOPY, RETROGRADES, COMBINED Bilateral 3/18/2025    Procedure: Bilateral retrograde pyelogram .Bilateral ureteral wash;  Surgeon: Valentin Joshua MD;  Location: [facility]    DILATION AND CURETTAGE, OPERATIVE HYSTEROSCOPY WITH MORCELLATOR, COMBINED N/A 11/10/2015    Procedure: COMBINED DILATION AND CURETTAGE, OPERATIVE HYSTEROSCOPY WITH MORCELLATOR;  Surgeon: Ghada Melendez MD;  [facility]: [facility]    ESOPHAGOSCOPY, GASTROSCOPY, DUODENOSCOPY (EGD), COMBINED  11/20/2012    Procedure: COMBINED ESOPHAGOSCOPY, GASTROSCOPY, DUODENOSCOPY (EGD), BIOPSY SINGLE OR MULTIPLE;;  Surgeon: Valentin Hahn MD;  Location: [facility]    IR CVC TUNNEL PLACEMENT > 5 YRS OF AGE  10/9/2024    IR DIALYSIS FISTULOGRAM LEFT  1/28/2025    IR DIALYSIS FISTULOGRAM LEFT  3/20/2025    IR RENAL BIOPSY RIGHT  07/25/2024    MOHS MICROGRAPHIC PROCEDURE      REVISION FISTULA ARTERIOVENOUS UPPER EXTREMITY Left 11/20/2024    Procedure: Left Upper Extremity brachiobasilic arteriovenous fistula second stage transposition surgery;  Surgeon: Jesus Alberto Walton MD;  Location: [facility]    TRANSPLANT  1992    kidney    ZZ NONSPECIFIC PROCEDURE      Renal transplant right side    Z NONSPECIFIC PROCEDURE      cholecystectomy     Medications   I have personally reviewed the patient's medication list.     Allergies  I have personally reviewed the patient's allergy list.     Social History  Does not drink alcohol    Family History    Non contributory    Physical Examination   Vitals: BP (!) 148/69 (BP Location: Right arm)   Pulse 85   Temp 97.8  F (36.6  C) (Oral)   Resp 14   Ht 1.575 m (5' 2\")   Wt 55.3 kg (122 lb)   LMP 05/14/2014 (Approximate)   SpO2 98%   BMI 22.31 " kg/m    General: Lying in bed, NAD  Head: NC/AT. Non-tender to palpation along temples or sinuses.   Eyes: no icterus, op pink and moist  Cardiac: Extremities warm, no edema.   Respiratory: non-labored on RA  GI: S/NT/ND  Skin: No rash or lesion on exposed skin  Psych: Mood pleasant, affect congruent  Neuro:  Mental status: Awake, alert, attentive, oriented to self, time, place, and circumstance. Language is fluent and coherent.  Cranial nerves: PERRL, conjugate gaze, EOMI, facial sensation intact, face symmetric, shoulder shrug strong, tongue/uvula midline, no dysarthria. Slightly hard of hearing, wears hearing aids at baseline.   Motor: Normal bulk and tone. No abnormal movements. 5/5 strength bilaterally in deltoids, biceps, triceps, hand , hip flexors, hip extensors, knee flexion, knee extension, plantarflexion, dorsiflexion.   Reflexes: Normo-reflexic and symmetric biceps, brachioradialis, triceps, patellae, and achilles.   Sensory: Intact to light touch in proximal and distal aspects of all 4 extremities   Coordination: FNF and HS without ataxia or dysmetria.   Gait: Deferred    Investigations   I have personally reviewed pertinent labs, tests, and radiological imaging. Discussion of notable findings is included under Impression.     Was patient transferred from outside hospital?   No    Impression  Reina is a 60 year old female with a hx of kidney transplant (1992) on HD, chronic HTN presenting with headaches and nausea in the setting of hypertensive urgency. Patient's blood pressures had been well-controlled prior to about 5 weeks ago, when she also had onset of headaches with use of new fistula site. Based on timing and description of worsening headaches at night or with lying down, there is suspicion for dialysis related headaches perhaps with a hypertensive component. However, as headaches have not abated with now controlled blood pressure, this diagnosis is not certain. Without a clear etiology  for these sudden onset headaches 5 weeks ago, further imaging with MRI Brain W/WO is likely indicated (CTH was negative for bleed or other intracranial process). Thus we will recommend symptomatic management with migraine cocktail (can substitute depakote for toradol if toradol is not advised given kidney function), MRI imaging. We will see the patient tomorrow.     Other differential diagnoses considered:  - Giant Cell Arteritis less likely because of the band-like distribution across BL temples and forehead, no complaints of jaw claudication or proximal muscle weakness and no tenderness to palpation along temples. No report of proximal stiffness.  - Cluster headaches less likely because the chronicity does not match the quickly on and off presentation of cluster headaches. TAC and PRES syndrome less likely as history and symptoms do not align completely with those diagnoses.     Recommendations  -Optimize blood pressure control  -Migraine cocktail of toradol, compazine, benadryl (this can be done q8h while patient is inpatient, but continuous toradol may not be advised given kidney function)  -MRI Brain to rule out intracranial causes, with contrast if nephrology is amenable  -Consider low dose amitriptyline in the future for headache management    Thank you for involving Neurology in the care of Reina Quan.  Please do not hesitate to call with questions/concerns (consult pager [phone]).      Patient was seen and discussed with Dr. Hancock.    Ba Holloway MS3     Blu Sauceda MD  Neurology PGY1

## 2025-03-20 NOTE — CONSULTS
Nephrology Initial Consult  March 20, 2025      Reina Quan MRN:[id number] YOB: 1965  Date of Admission:3/19/2025  Primary care provider: Lizbeth Andrews  Requesting physician: Estiven Hernandez MD    ASSESSMENT AND RECOMMENDATIONS:   Reina Quan is a 60 year old female with PMH of kidney transplant in 1992 for FSGS now on HD, HTN, HLD, MDD, DEBRA, migraines, recent microscopic hematuria who presents with headaches, nausea, hypertension.     #ESKD: dialyzes MWF at [facility] with Dr. Mcdonnell. Access: tunneled RIJ (L AVF not using yet). Run time: 3 hrs. TW 56.5 kg  - Dialysis per MWF schedule  -s/p successful fistulogram/plasty today 3/20 as previously schedule     #HTN/Volume: TW 56.5 kg. BP's 200's on admission and has had high pressures only in the past ~ 4 weeks, recently started on amlodipine 10 mg qHS and losartan 50 mg qday  - still with some UOP  - current BP's 140's, a good goal would be 150-160's for now  - please increase losartan to 100 mg qday  - will gently challenge TW as tolerated  - workup for new severe HTN: ordered emani/renin ratio and pheo labs (pt also reports intermittent sweating which is also new)    #BMD: Ca 9's, alb 4.1. PTA sensipar 30 mg MWF  - ordered phos lab    #Anemia of CKD: on mircera/venofer protocols at OP HD  - hgb 10.8 g/dL, at goal    #HA: per pt, until about 4 weeks ago, her BP's had been very well controlled. She has only recently developed severe HTN which may well be causing her HA  - being seen by neuro as well      Recommendations were communicated to primary team via this note         SAM Edward   Division of Nephrology and Hypertension  Vocera Web Console      REASON FOR CONSULT: ESKD/dialysis/HTN    HISTORY OF PRESENT ILLNESS:  Reina Quan is a 60 year old female with PMH of kidney transplant in 1992 for FSGS now on HD, HTN, HLD, MDD, DEBRA, migraines, recent microscopic hematuria who presents with headaches, nausea,  hypertension. Pt had previously schedule fistulogram this AM. BP's currently 140-160's but were 200's on admission. Pt reports severe HTN and HA's only in the past roughly 4 weeks; prior to that BP's generally 130's. She also reports intermittent sweating which is also new. Will plan on workup for hyperaldo as well as pheo. She continues to have intermittent headaches as we talk with her. She denies n/v, CP, SOB, chills currently.    PAST MEDICAL HISTORY:  Reviewed with patient on 03/20/2025      Past Medical History:   Diagnosis Date    Actinic keratosis     Allergic rhinitis, cause unspecified     Anemia     anxiety/depression     PAXIL    Arthritis     congenital hearing loss     uses hearing aids    Depressive disorder     Dry eyes     Giant Platelet syndrome     Glomerular Focal Sclerosis--transplant 1985     Hypertension     Kidney replaced by transplant 1992    RAPAMUNE/ SIROLIMUS     Lichen planopilaris     LPP followed by derm on doxycycline/ clobetasol    Lichen planus     Menorrhagia     migraine     Squamous cell carcinoma     8 x     Thrombocytopenia     Ulcerative colitis, unspecified     biposy neg 1996    Unsatisfactory cervical Papanicolaou smear 02/15/2021    UTI (lower urinary tract infection)     recurrent Dr [facility]       Past Surgical History:   Procedure Laterality Date    BIOPSY OF SKIN LESION      CHOLECYSTECTOMY      COLONOSCOPY N/A 1/8/2025    Procedure: Colonoscopy;  Surgeon: Valentin Bull MD;  Location: [facility]    CREATE FISTULA ARTERIOVENOUS UPPER EXTREMITY Left 10/15/2024    Procedure: Left Upper Extremity Brachiobasilic ARTERIOVENOUS FISTULA Creation;  Surgeon: Jesus Alberto Walton MD;  [facility]    CYSTOSCOPY, DILATE URETER(S), COMBINED N/A 3/18/2025    Procedure: Cystoscopy with Urethral dilation;  Surgeon: Valentin Joshua MD;  [facility]    CYSTOSCOPY, RETROGRADES, COMBINED Bilateral 3/18/2025    Procedure: Bilateral retrograde pyelogram  .Bilateral ureteral wash;  Surgeon: Valentin Joshua MD;  [facility]    DILATION AND CURETTAGE, OPERATIVE HYSTEROSCOPY WITH MORCELLATOR, COMBINED N/A 11/10/2015    Procedure: COMBINED DILATION AND CURETTAGE, OPERATIVE HYSTEROSCOPY WITH MORCELLATOR;  Surgeon: Ghada Melendez MD;  [facility]    ESOPHAGOSCOPY, GASTROSCOPY, DUODENOSCOPY (EGD), COMBINED  11/20/2012    Procedure: COMBINED ESOPHAGOSCOPY, GASTROSCOPY, DUODENOSCOPY (EGD), BIOPSY SINGLE OR MULTIPLE;;  Surgeon: Valentin Hahn MD;  Location: [facility]    IR CVC TUNNEL PLACEMENT > 5 YRS OF AGE  10/9/2024    IR DIALYSIS FISTULOGRAM LEFT  1/28/2025    IR RENAL BIOPSY RIGHT  07/25/2024    MOHS MICROGRAPHIC PROCEDURE      REVISION FISTULA ARTERIOVENOUS UPPER EXTREMITY Left 11/20/2024    Procedure: Left Upper Extremity brachiobasilic arteriovenous fistula second stage transposition surgery;  Surgeon: Jesus Alberto Walton MD;  [facility]    TRANSPLANT  1992    kidney    ZZC NONSPECIFIC PROCEDURE      Renal transplant right side    ZZC NONSPECIFIC PROCEDURE      cholecystectomy        MEDICATIONS:  PTA Meds  Prior to Admission medications    Medication Sig Last Dose Taking? Auth Provider Long Term End Date   amLODIPine (NORVASC) 5 MG tablet Take 2 tablets (10 mg) by mouth at bedtime.  Yes Jillian Collado MD Yes 4/18/25   losartan (COZAAR) 25 MG tablet Take 2 tablets (50 mg) by mouth daily.  Yes Jillian Collado MD Yes 4/18/25   atorvastatin (LIPITOR) 80 MG tablet Take 1 tablet (80 mg) by mouth at bedtime.   Zulma Wong, APRN CNP Yes    cinacalcet (SENSIPAR) 30 MG tablet Take 1 tablet (30 mg) by mouth every other day  Patient taking differently: Take 30 mg by mouth every other day. Monday & Friday's   Clayton Sapp MD Yes    clobetasol (TEMOVATE) 0.05 % external ointment Apply topically 2 times daily Use sparingly at active areas of dermatitis for no more than 14 days or until resolved, whichever is  sooner.   Brad Bauman MD     clobetasol (TEMOVATE) 0.05 % external solution Apply topically daily as needed (itchyy scalp) Apply to scalp daily as needed.   Brad Bauman MD     clobetasol propionate (CLOBEX) 0.05 % external shampoo APPLY TO SCALP IN SHOWER TWICE WEEKLY   Brad Bauman MD     ketoconazole (NIZORAL) 2 % external shampoo APPLY TOPICALLY DAILY AS NEEDED FOR ITCHING, IRRITATION OR OTHER( SCALP SCALE) USE 2-3 TIMES A WEEK. APPLY AND LATHER THEN RINSE OUT 5 MINUTES   Brad Bauman MD     mycophenolate (GENERIC EQUIVALENT) 250 MG capsule Take 3 capsules (750 mg) by mouth 2 times daily   Clayton Sapp MD Yes    niacinamide 500 MG tablet Take 1 tablet (500 mg) by mouth 2 times daily (with meals).   Brad Bauman MD     PARoxetine (PAXIL) 20 MG tablet TAKE 1 TABLET BY MOUTH EVERY MORNING   Lizbeth Andrews PA-C Yes    predniSONE (DELTASONE) 5 MG tablet Take 1 tablet (5 mg) by mouth daily  Patient taking differently: Take 5 mg by mouth every morning.   Clayton Sapp MD     prochlorperazine (COMPAZINE) 10 MG tablet Take 10 mg by mouth every 6 hours as needed.   Reported, Patient     sodium bicarbonate 650 MG tablet Take 2 tablets (1,300 mg) by mouth 2 times daily   Clayton Sapp MD     sulfamethoxazole-trimethoprim (BACTRIM DS) 800-160 MG tablet Take 1 tablet by mouth 2 times daily for 3 days.   Valentin Joshua MD  3/21/25   triamcinolone (KENALOG) 0.1 % external cream APPLY SPARINGLY TOPICALLY TO THE AFFECTED AREA TWICE DAILY FOR 14 DAYS   Brad Bauman MD        Current Meds  Current Facility-Administered Medications   Medication Dose Route Frequency Provider Last Rate Last Admin    amLODIPine (NORVASC) tablet 10 mg  10 mg Oral At Bedtime Raissa Busch MD        atorvastatin (LIPITOR) tablet 80 mg  80 mg Oral At Bedtime Raissa Busch MD        cinacalcet (SENSIPAR) tablet 30 mg  30 mg Oral Every Other Day Raissa Busch MD        losartan  (COZAAR) tablet 50 mg  50 mg Oral Daily Raissa Busch MD        mycophenolate (GENERIC EQUIVALENT) capsule 750 mg  750 mg Oral BID Raissa Busch MD        niacinamide tablet 500 mg  500 mg Oral BID w/meals Raissa Busch MD        PARoxetine (PAXIL) tablet 20 mg  20 mg Oral Daily Raissa Busch MD        predniSONE (DELTASONE) tablet 5 mg  5 mg Oral QAM Raissa Busch MD        sodium bicarbonate tablet 1,300 mg  1,300 mg Oral BID Raissa Busch MD        sodium chloride (PF) 0.9% PF flush 3 mL  3 mL Intracatheter Q8H Alyssia Ramirez APRN CNP   3 mL at 03/20/25 0843     Infusion Meds  Current Facility-Administered Medications   Medication Dose Route Frequency Provider Last Rate Last Admin    Take other usual AM meds with small amount of water   Does not apply Continuous PRN Alyssia Ramirez APRN CNP           ALLERGIES:    Allergies   Allergen Reactions    Ampicillin Swelling     Swelling of mouth and tongue.    Seasonal Allergies Other (See Comments)     Itchy eyes and rhinitis.       REVIEW OF SYSTEMS:  A comprehensive of systems was negative except as noted above.    SOCIAL HISTORY:   Social History     Socioeconomic History    Marital status:      Spouse name: Not on file    Number of children: 3    Years of education: Not on file    Highest education level: Not on file   Occupational History    Occupation:  - window treatments   Tobacco Use    Smoking status: Never     Passive exposure: Never    Smokeless tobacco: Never   Vaping Use    Vaping status: Never Used   Substance and Sexual Activity    Alcohol use: Not Currently    Drug use: Never    Sexual activity: Not Currently     Partners: Male     Birth control/protection: Post-menopausal   Other Topics Concern    Parent/sibling w/ CABG, MI or angioplasty before 65F 55M? 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 Doesn't have a job lined up afterward but  looking forward to some time off. Has three children, one moved to college this fall. Living in [place], has a significant other x 7 months. Previously . In a monogamous relationship. Never smoker, social EtOH use.      Social Drivers of Health     Financial Resource Strain: Not on file   Food Insecurity: Not on file   Transportation Needs: Not on file   Physical Activity: Not on file   Stress: Not on file   Social Connections: Not on file   Interpersonal Safety: Low Risk  (3/18/2025)    Interpersonal Safety     Do you feel physically and emotionally safe where you currently live?: Yes     Within the past 12 months, have you been hit, slapped, kicked or otherwise physically hurt by someone?: No     Within the past 12 months, have you been humiliated or emotionally abused in other ways by your partner or ex-partner?: No   Housing Stability: Not on file     Reviewed with patient         FAMILY MEDICAL HISTORY:   Family History   Problem Relation Age of Onset    Hypertension Mother     Depression Mother     Anxiety Disorder Mother     Diabetes Father     Kidney Disease Father         nephrolithiasis    Asthma Father     Asthma Sister     Blood Disease Maternal Grandmother     Diabetes Maternal Grandmother     Hypertension Maternal Grandmother     Hyperlipidemia Maternal Grandmother     Depression Maternal Grandmother     No Known Problems Maternal Grandfather     No Known Problems Paternal Grandmother     C.A.D. Paternal Grandfather     Cancer Other         no family hx of skin cancer    Glaucoma No family hx of     Macular Degeneration No family hx of     Melanoma No family hx of     Skin Cancer No family hx of     Anesthesia Reaction No family hx of     Thrombosis No family hx of       Family history of kidney disease as above  Reviewed with patient      PHYSICAL EXAM:   Temp  Av  F (36.7  C)  Min: 97.8  F (36.6  C)  Max: 98.1  F (36.7  C)      Pulse  Av  Min: 62  Max: 126 Resp  Avg: 15.5  Min: 12  " Max: 18  SpO2  Av.9 %  Min: 96 %  Max: 100 %       BP (!) 163/87 (BP Location: Right arm)   Pulse 76   Temp 98  F (36.7  C) (Oral)   Resp 16   Ht 1.575 m (5' 2\")   Wt 55.3 kg (122 lb)   LMP 2014 (Approximate)   SpO2 98%   BMI 22.31 kg/m        Admit Weight: 54.9 kg (121 lb)     GENERAL APPEARANCE: NAD, intermittent HA   EYES: no scleral icterus, pupils equal  Pulmonary: CTA  CV: RRR   - Edema trace  GI: soft   MS: no evidence of inflammation in joints, no muscle tenderness  : no restrepo  SKIN: no rash, warm, dry, no cyanosis  NEURO: face symmetric, a/o3  Access: tunneled RIJ, L AVF with good thrill      LABS:   I have reviewed the following labs:  CMP  Recent Labs   Lab 25  0616 25  1655 25  0842    136 142   POTASSIUM 4.7 4.4 4.6   CHLORIDE 98 94* 103   CO2  25 23   ANIONGAP 14 17* 16*   GLC 93 98 88   BUN 17.3 11.1 28.6*   CR 4.27* 2.93* 4.91*   GFRESTIMATED 11* 18* 10*   HARSHA  --  9.3 9.6   PROTTOTAL  --  6.8  --    ALBUMIN  --  4.1  --    BILITOTAL  --  0.2  --    ALKPHOS  --  93  --    AST  --  34  --    ALT  --  10  --      CBC  Recent Labs   Lab 25  0616 25  1655 25  0842   HGB 10.8* 11.8 11.4*   WBC 4.8 6.7 4.5   RBC 3.84 4.22 3.88   HCT 35.1 37.5 36.9   MCV 91 89 95   MCH 28.1 28.0 29.4   MCHC 30.8* 31.5 30.9*   RDW 15.0 15.0 15.1*   * 128* 127*     INRNo lab results found in last 7 days.  ABGNo lab results found in last 7 days.   URINE STUDIES  Recent Labs   Lab Test 25  1647 25  1035 25  0902 10/07/24  1326 24  1103 24  1130 24  1020 24  1038 24  1149 24  1151   COLOR Orange* Straw Light Yellow Light Yellow   < > Yellow Yellow   < > Yellow Yellow   APPEARANCE Cloudy* Clear Clear Clear   < > Clear Cloudy*   < > Clear Clear   URINEGLC 200* 100* 150* Negative   < > Negative Negative   < > Negative Negative   URINEBILI Negative Negative Negative Negative   < > Negative Negative   < > " Negative Negative   URINEKETONE 10* Negative Negative Negative   < > Negative Negative   < > Negative Negative   SG 1.012 1.014 1.021 1.014   < > >=1.030 1.020   < > 1.025 >=1.030   UBLD Large* Small* Small* Small*   < > Small* Moderate*   < > Small* Small*   URINEPH 8.5* 8.0* 7.5* 6.0   < > 5.5 5.5   < > 5.5 5.5   PROTEIN 600* 600* 600* 300*   < > >=300* 100*   < > >=300* >=300*   UROBILINOGEN  --   --   --   --   --  0.2 0.2  --  0.2 0.2   NITRITE Negative Negative Negative Negative   < > Negative Positive*   < > Negative Negative   LEUKEST Negative Negative Negative Negative   < > Negative Large*   < > Negative Trace*   RBCU >182* 9* 15* 3*   < > 2-5* 2-5*   < > 0-2 2-5*   WBCU 7* 2 1 2   < > 0-5 >100*   < > 0-5 0-5    < > = values in this interval not displayed.     Recent Labs   Lab Test 10/18/21  1438 06/28/21  1036 02/15/21  1026 06/05/20  1111 01/13/20  1149 10/11/19  1549 05/07/19  1448 10/17/18  1320 09/27/17  1159   UTPG 1.11* 0.57* 0.41* 0.60* 0.94* 0.74* 0.68* 0.29* 0.34*     PTH  Recent Labs   Lab Test 07/15/24  1552 01/09/24  1050 10/09/23  1308 06/28/21  1036 01/17/20  1458   PTHI 22 34 33 66 59     IRON STUDIES  Recent Labs   Lab Test 05/30/23  1141 01/17/20  1458   IRON 84  84 61    293   IRONSAT 32 21    60       IMAGING:  Reviewed    SAM Edward

## 2025-03-20 NOTE — PROGRESS NOTES
[facility]    Medicine Progress Note - Hospitalist Service    Date of Admission:  3/19/2025    Assessment & Plan   Reina Quan is a 60 year old woman with a history of FSGS s/p renal transplant (1992) now on HD, hypertension, depression, anxiety, headaches, and recent hematuria who was admitted to Medicine Observation with elevated blood pressure, nausea, and headaches.     Hypertensive urgency: Newly elevated blood pressure over the past month, started on amlodipine and losartan with persistently high pressures. SBP up to 215 at HD yesterday and did not improve with dialysis so presented to ED for evaluation. Did have a headache and nausea at the time. BP improved with IV labetalol and hydralazine.   - Nephrology consulted    - Increase losartan to 50 mg daily (PTA was taking 25 mg)    - HTN work-up ordered: emani/renin ratio, urine/plasma metanephrine   - Continue PTA amlodipine 10 mg daily    Subacute headaches  Hx of migraine headaches  Increased headache frequency and severity over the past 1 month - having waxing and waning sharp bilateral temporal headaches. Headaches fail to improve with improved BP control. No improvement with Excedrin or sumatriptan. Possible some mild improvement with migraine cocktail in ED though notes headaches are typically a bit better in the evenings.    - Neurology consult:    - IV Toradol 15 mg x1   - Brain MRI   - Tylenol PRN    Nausea, vomiting - resolved: In setting of headache and elevated blood pressure. No recurrence today.    FSGS s/p renal transplant (1992)  ESRD on HD  LUE fistula placed several weeks ago. Dialyzes qMWF at [facility], last on 3/19.   - Nephrology consult   - IR fistulogram today as previously scheduled   - IS: prednisone 5 mg daily, mycophenolate 750 mg BID   - PTA sodium bicarbonate 1300 mg BID    Microscopic hematuria: S/p cystoscopy with bilateral retrograde pyelogram and urethral dilation on 3/18,  results unremarkable. Now having gross hematuria since her procedure, hgb at baseline.   - Discussed with Urology, would expect hematuria for ~1 week post procedure. Notify Urology if any clots noted.    MDD, DEBRA: Continue PTA paroxetine.        Observation Goals: -diagnostic tests and consults completed and resulted, -vital signs normal or at patient baseline, -tolerating oral intake to maintain hydration, Nurse to notify provider when observation goals have been met and patient is ready for discharge.  Diet: NPO for Procedure/Surgery per Anesthesia Guidelines Except for: Meds; Clear liquids before procedure/surgery: ADULT (Age GREATER than or Equal to 18 years) - Clear liquids 2 hours before procedure/surgery    DVT Prophylaxis: Pneumatic Compression Devices  Ross Catheter: Not present  Lines: PRESENT             Cardiac Monitoring: None  Code Status: Full Code      Clinically Significant Risk Factors Present on Admission          # Hypochloremia: Lowest Cl = 94 mmol/L in last 2 days, will monitor as appropriate        # Thrombocytopenia: Lowest platelets = 126 in last 2 days, will monitor for bleeding   # Hypertension: Noted on problem list      # Anemia: based on hgb <11                  Social Drivers of Health    Depression: Not at risk (3/6/2025)    PHQ-2     PHQ-2 Score: 0   Recent Concern: Depression - At risk (1/2/2025)    PHQ-2     PHQ-2 Score: 4          Disposition Plan     Medically Ready for Discharge: Anticipated Tomorrow           The patient's care was discussed with the Attending Physician, Dr. Bravo and Patient.    Maricel Oseguera PA-C  Hospitalist Service  [facility]  Securely message with [employer] (more info)  Text page via [facility] Paging/Directory   ______________________________________________________________________    Interval History   No change in headache as blood pressure improved. Having bilateral sharp temporal headaches that wax  and wane over the last ~1 month with no clear correlation to her blood pressure. Nausea has resolved.     Physical Exam   Vital Signs: Temp: 97.8  F (36.6  C) Temp src: Oral BP: (!) 148/69 Pulse: 85   Resp: 14 SpO2: 98 % O2 Device: None (Room air) Oxygen Delivery: 2 LPM  Weight: 122 lbs 0 oz    Constitutional: Awake and alert, in no apparent distress.   Eyes: Sclera clear, anicteric   Respiratory: Breathing non-labored. CTAB  Cardiovascular:  RRR, normal S1/S2. No rubs or murmurs.   GI: Soft, non-tender, non-distended. Normoactive bowel sounds.   Skin:  Good color. No jaundice. No visible rashes, lesions, or bruising of concern.   Neurologic: Alert and oriented.        Medical Decision Making       55 MINUTES SPENT BY ME on the date of service doing chart review, history, exam, documentation & further activities per the note.      Data   ------------------------- PAST 24 HR DATA REVIEWED -----------------------------------------------    I have personally reviewed the following data over the past 24 hrs:    4.8  \   10.8 (L)   / 126 (L)     137 98 17.3 /  93   4.7 25 4.27 (H) \     ALT: 10 AST: 34 AP: 93 TBILI: 0.2   ALB: 4.1 TOT PROTEIN: 6.8 LIPASE: N/A     Trop: 20 (H) BNP: N/A     INR:  0.93 PTT:  N/A   D-dimer:  N/A Fibrinogen:  N/A       Imaging results reviewed over the past 24 hrs:   Recent Results (from the past 24 hours)   CT Head w/o Contrast    Narrative    EXAM: CT HEAD W/O CONTRAST  LOCATION: [facility]  DATE: 3/19/2025    INDICATION: headache  COMPARISON: 4/17/2014  TECHNIQUE: Routine CT Head without IV contrast. Multiplanar reformats. Dose reduction techniques were used.    FINDINGS:  INTRACRANIAL CONTENTS: No intracranial hemorrhage, extraaxial collection, or mass effect.  No CT evidence of acute infarct. Normal parenchymal attenuation. Normal ventricles and sulci. Presumed prominent perivascular space in the inferior left basal   ganglia.    VISUALIZED  ORBITS/SINUSES/MASTOIDS: No intraorbital abnormality. No paranasal sinus mucosal disease. No middle ear or mastoid effusion.    BONES/SOFT TISSUES: No acute abnormality.      Impression    IMPRESSION:  1.  No acute intracranial process.     IR Dialysis Fistulogram Left    Narrative    Procedures 3/20/2025:  1. Ultrasound guidance for venous access.  2. Left brachiobasilic fistulogram  3. Venoplasty left basilic vein    History: Left upper survey brachiobasilic fistula with swelling.  Patient currently dialyzes through both the right tunneled central  venous catheter as well as the fistula. On physical exam, the fistula  has a very good thrill.    Comparison: Ultrasound 3/6/2025    Operators: Patt and Jordon    Medications:   Fentanyl and Versed Moderate sedation administered by the IR nurse at  the supervision of the attending. Vital signs and oxygenation  continuously monitored. The patient remained stable throughout the  procedure.    Face to face sedation time: 15 minutes    Fluoroscopy time: 1.3    Contrast: 30 cc Visipaque 320    Findings/procedure:    Prior to the procedure, both verbal and written informed consent  obtained from the patient.     The left arm was prepped and draped. Timeout performed.    Ultrasound of the left fistula reveals a patent perianastomotic vein.  Additionally, there is a small pseudoaneurysm which is known from the  comparisons ultrasound.    6 Dutch sheath placed. MELY 1 catheter was advanced into the basilic  vein and a central venogram performed revealing patent left central  veins.    Bentson wire placed. Venoplasty performed to 8 mm in the stenotic  segment adjacent to the pseudoaneurysm. Technically successful  venoplasty without residual 30% or greater stenosis. The sheath  removed and hemostasis achieved with manual compression.      Impression    Impression:  Left arm fistulography demonstrates 3 to 5 cm segment of stenoses  adjacent to a small likely access related  pseudoaneurysm in the  basilic vein in the mid left arm. Technically successful venoplasty to  8 mm. Pseudoaneurysm will likely not resolve spontaneously, consider  discussion for surgical revision. Stent graft in this location would  be at the area of dialysis needle access, which would not be ideal.    SUE FOX         SYSTEM ID:  [id number]

## 2025-03-20 NOTE — PLAN OF CARE
"Goal Outcome Evaluation:  Outcome Evaluation:     PRIMARY DIAGNOSIS: \"GENERIC\" NURSING- Headache/HTN  OUTPATIENT/OBSERVATION GOALS TO BE MET BEFORE DISCHARGE:  ADLs back to baseline: Yes     Activity and level of assistance: Ambulating independently.     Pain status C/o headache. IV Toradol administered.      Return to near baseline physical activity: Yes             Discharge Planner Nurse  Safe discharge environment identified: Yes  Barriers to discharge: No    Pt went down to IR for fistulogram this AM  Alert and oriented x 4. Independent with ambulation. Dry heaving and had 25 ml clear liquid emesis after drinking water and chewing ice chips. Zofran, Benadryl, Toradol and Compazine iv administered and Pt resting.Plan for Dialysis tomorrow. Refused Tylenol for headache. Using ice park to head Family visited.      Please review provider order for any additional goals.   Nurse to notify provider when observation goals have been met and patient is ready for discharge.           "

## 2025-03-20 NOTE — PLAN OF CARE
"Goal Outcome Evaluation:         PRIMARY DIAGNOSIS: \"GENERIC\" NURSING- Headache/HTN  OUTPATIENT/OBSERVATION GOALS TO BE MET BEFORE DISCHARGE:  ADLs back to baseline: Yes    Activity and level of assistance: Ambulating independently.    Pain status: Pain free.    Return to near baseline physical activity: Yes     Discharge Planner Nurse   Safe discharge environment identified: Yes  Barriers to discharge: No       Entered by: Patti Mulligan RN 03/20/2025 4:30 AM     Please review provider order for any additional goals.   Nurse to notify provider when observation goals have been met and patient is ready for discharge.                 "

## 2025-03-20 NOTE — H&P
[facility]    History and Physical - Medicine Service, MAROON TEAM        Date of Admission:  3/19/2025    Assessment & Plan      Reina Quan is a 60 year old female admitted on 3/19/2025. She has a history of kidney transplant in 1992 for FSGS now on HD, HTN, HLD, MDD, DEBRA, migraines, recent microscopic hematuria who presents with headaches, nausea, hypertension.    Hypertensive urgency  Patient was recently restarted on amlodipine and losartan. Has been taking these medications daily. She takes them at night. Has also not missed HD. Despite this, she has been experiencing higher blood pressure recently, including a BP in the 200s after HD on the day of admission. Her BP was 204/166 in the ED. She was given IV labetalol x1 and IV hydralazine x1 with eventual decrease in BP to 140/74. This is lower than our ideal goal for her within the first 24 hours, however we will monitor her closely for any new symptoms. Will hold off on any additional anti-hypertensives for now and will order her PTA amlodipine and losartan to be taken 3/20 PM as she usually does at home.   - PTA amlodipine and losartan qPM  - nephrology consult as below    Acute on chronic headaches  History of migraines  Patient reports increased frequency of headaches over the past 4-5 weeks. She does have a history of migraines but reports that these headaches feel different: intense, sharp pain that recurs every 15-20 minutes and lasts about one minute. She has tried taking Excedrin and sumatriptan without relief. CT head on admission negative. No vision changes, aura. The headaches could be related to her recently elevated blood pressures vs fluid shifts with HD vs migraines (less likely). Will involve nephrology for further management of her blood pressures as she reports these have also been high for the past several weeks. Headaches may improve with further BP management.   - s/p migraine cocktail  with improvement in headache  - PRN zofran    Nausea, vomiting  Possibly just in the setting of ongoing headache vs secondary to hypertension vs patient's chronic migraines. These symptoms have improved with blood pressure lowering as well as migraine cocktail.   - PRN Zofran    FSGS c/b kidney transplant (1992) now on HD MWF  Had a fistula placed in her left arm about seven weeks. Dialysis MWF, just completed a full run on 3/19 before admission. Creatinine within expected limits.   - PTA sodium bicarbonate 1300mg BID  - PTA prednisone 5mg daily  - PTA mycophenolate 750mg BID  - transplant nephrology consult  - fistulogram previously scheduled with IR for 3/20? Patient made NPO and IR consult placed for this    DEBRA  MDD  - PTA paroxetine    Microscopic hematuria  Patient underwent cystoscopy for microscopic hematuria on 3/18. CT urogram has been ordered by urologist and she will be seen back in urology clinic after this is completed.      Observation Goals: -diagnostic tests and consults completed and resulted, -vital signs normal or at patient baseline, -tolerating oral intake to maintain hydration, Nurse to notify provider when observation goals have been met and patient is ready for discharge.  Diet:  NPO  DVT Prophylaxis: Low Risk/Ambulatory with no VTE prophylaxis indicated  Ross Catheter: Not present  Lines: PRESENT             Cardiac Monitoring: None  Code Status: Full Code      Clinically Significant Risk Factors Present on Admission          # Hypochloremia: Lowest Cl = 94 mmol/L in last 2 days, will monitor as appropriate        # Thrombocytopenia: Lowest platelets = 127 in last 2 days, will monitor for bleeding   # Hypertension: Noted on problem list                      Patient to be formally staffed in the AM.     Raissa Busch MD  Medicine Service, [facility]  Securely message with [employer] (more info)  Text page via [facility] Paging/Directory    See signed in provider for up to date coverage information    ______________________________________________________________________    Chief Complaint   Headaches, nausea, vomiting, hypertension    History is obtained from the patient and chart review    History of Present Illness   Reina Quan is a 60 year old female who has a history of kidney transplant in 1992 for FSGS now on HD, HTN, HLD, MDD, DEBRA, migraines, recent microscopic hematuria who presents with headaches, nausea, hypertension.    History was limited as patient was tired after receiving migraine cocktail, though she did state that her headache and nausea had improved.    Patient completed dialysis as usual earlier today. She had an AV fistula placed in her left arm about seven weeks ago and since that time, she has been experiencing headaches and elevated blood pressures. She does have a history of migraines but reports that these headaches feel different. She was started on norvasc and losartan recently.     The headaches consist of sharp pain that feels very strong intensity for a brief duration and then resolves. No vision changes, aura, dizziness. She denies chest pain, palpitations, SOB. On admission to the ED, she is also experiencing nausea.     ED course notable for initial BP of 204/166. She received a dose of IV labetalol and IV hydralazine with gradual improvement of her blood pressure to 140/74 over several hours. She was also given a dose of morphine, a tablet of Norco, and later a migraine cocktail (benadryl, toradol, zofran) with eventual improvement in her headache and nausea. CT head on admission was negative for acute intracranial pathology. Labs largely unchanged from baseline.     Past Medical History    Past Medical History:   Diagnosis Date    Actinic keratosis     Allergic rhinitis, cause unspecified     Anemia     anxiety/depression     PAXIL    Arthritis     congenital hearing loss     uses hearing aids     Depressive disorder     Dry eyes     Giant Platelet syndrome     Glomerular Focal Sclerosis--transplant 1985     Hypertension     Kidney replaced by transplant 1992    RAPAMUNE/ SIROLIMUS     Lichen planopilaris     LPP followed by derm on doxycycline/ clobetasol    Lichen planus     Menorrhagia     migraine     Squamous cell carcinoma     8 x     Thrombocytopenia     Ulcerative colitis, unspecified     biposy neg 1996    Unsatisfactory cervical Papanicolaou smear 02/15/2021    UTI (lower urinary tract infection)     recurrent Dr Burns [facility]       Past Surgical History   Past Surgical History:   Procedure Laterality Date    BIOPSY OF SKIN LESION      CHOLECYSTECTOMY      COLONOSCOPY N/A 1/8/2025    Procedure: Colonoscopy;  Surgeon: Valentin Bull MD;  Location: [facility]    CREATE FISTULA ARTERIOVENOUS UPPER EXTREMITY Left 10/15/2024    Procedure: Left Upper Extremity Brachiobasilic ARTERIOVENOUS FISTULA Creation;  Surgeon: Jesus Alberto Walton MD;  Location: [facility] OR    CYSTOSCOPY, DILATE URETER(S), COMBINED N/A 3/18/2025    Procedure: Cystoscopy with Urethral dilation;  Surgeon: Valentin Joshua MD;  Location: [facility] OR    CYSTOSCOPY, RETROGRADES, COMBINED Bilateral 3/18/2025    Procedure: Bilateral retrograde pyelogram .Bilateral ureteral wash;  Surgeon: Valentin Joshua MD;  Location: [facility] OR    DILATION AND CURETTAGE, OPERATIVE HYSTEROSCOPY WITH MORCELLATOR, COMBINED N/A 11/10/2015    Procedure: COMBINED DILATION AND CURETTAGE, OPERATIVE HYSTEROSCOPY WITH MORCELLATOR;  Surgeon: Ghada Melendez MD;  Location: [facility]    ESOPHAGOSCOPY, GASTROSCOPY, DUODENOSCOPY (EGD), COMBINED  11/20/2012    Procedure: COMBINED ESOPHAGOSCOPY, GASTROSCOPY, DUODENOSCOPY (EGD), BIOPSY SINGLE OR MULTIPLE;;  Surgeon: Valentin Hahn MD;  Location: [facility]    IR CVC TUNNEL PLACEMENT > 5 YRS OF AGE  10/9/2024    IR DIALYSIS FISTULOGRAM LEFT  1/28/2025    IR RENAL BIOPSY RIGHT  07/25/2024    MOHS MICROGRAPHIC PROCEDURE       REVISION FISTULA ARTERIOVENOUS UPPER EXTREMITY Left 11/20/2024    Procedure: Left Upper Extremity brachiobasilic arteriovenous fistula second stage transposition surgery;  Surgeon: Jesus Alberto Walton MD;  Location: [facility] OR    TRANSPLANT  1992    kidney    [facility] NONSPECIFIC PROCEDURE      Renal transplant right side    [facility] NONSPECIFIC PROCEDURE      cholecystectomy       Prior to Admission Medications   Prior to Admission Medications   Prescriptions Last Dose Informant Patient Reported? Taking?   PARoxetine (PAXIL) 20 MG tablet   No No   Sig: TAKE 1 TABLET BY MOUTH EVERY MORNING   amLODIPine (NORVASC) 5 MG tablet   Yes No   Sig: Take 1 tablet by mouth at bedtime.   amLODIPine (NORVASC) 5 MG tablet   No Yes   Sig: Take 2 tablets (10 mg) by mouth at bedtime.   atorvastatin (LIPITOR) 80 MG tablet   No No   Sig: Take 1 tablet (80 mg) by mouth at bedtime.   cinacalcet (SENSIPAR) 30 MG tablet   No No   Sig: Take 1 tablet (30 mg) by mouth every other day   Patient taking differently: Take 30 mg by mouth every other day. Monday & Friday's   clobetasol (TEMOVATE) 0.05 % external ointment   No No   Sig: Apply topically 2 times daily Use sparingly at active areas of dermatitis for no more than 14 days or until resolved, whichever is sooner.   clobetasol (TEMOVATE) 0.05 % external solution   No No   Sig: Apply topically daily as needed (itchyy scalp) Apply to scalp daily as needed.   clobetasol propionate (CLOBEX) 0.05 % external shampoo   No No   Sig: APPLY TO SCALP IN SHOWER TWICE WEEKLY   ketoconazole (NIZORAL) 2 % external shampoo   No No   Sig: APPLY TOPICALLY DAILY AS NEEDED FOR ITCHING, IRRITATION OR OTHER( SCALP SCALE) USE 2-3 TIMES A WEEK. APPLY AND LATHER THEN RINSE OUT 5 MINUTES   losartan (COZAAR) 25 MG tablet   Yes No   Sig: Take 25 mg by mouth daily.   losartan (COZAAR) 25 MG tablet   No Yes   Sig: Take 2 tablets (50 mg) by mouth daily.   mycophenolate (GENERIC EQUIVALENT) 250 MG capsule   No No    Sig: Take 3 capsules (750 mg) by mouth 2 times daily   niacinamide 500 MG tablet   No No   Sig: Take 1 tablet (500 mg) by mouth 2 times daily (with meals).   predniSONE (DELTASONE) 5 MG tablet   No No   Sig: Take 1 tablet (5 mg) by mouth daily   Patient taking differently: Take 5 mg by mouth every morning.   prochlorperazine (COMPAZINE) 10 MG tablet   Yes No   Sig: Take 10 mg by mouth every 6 hours as needed.   sodium bicarbonate 650 MG tablet   No No   Sig: Take 2 tablets (1,300 mg) by mouth 2 times daily   sulfamethoxazole-trimethoprim (BACTRIM DS) 800-160 MG tablet   No No   Sig: Take 1 tablet by mouth 2 times daily for 3 days.   triamcinolone (KENALOG) 0.1 % external cream   No No   Sig: APPLY SPARINGLY TOPICALLY TO THE AFFECTED AREA TWICE DAILY FOR 14 DAYS      Facility-Administered Medications Last Administration Doses Remaining   triamcinolone acetonide (KENALOG-10) injection 10 mg None recorded 1           Physical Exam   Vital Signs: Temp: 97.8  F (36.6  C) Temp src: Oral BP: (!) 140/74 Pulse: 78   Resp: 18 SpO2: 99 % O2 Device: None (Room air)    Weight: 121 lbs 0 oz  General: patient appears fatigued but in no acute distress  HEENT: PERRL, EOMI, normocephalic  CV: RRR, no murmurs or rubs  Pulm: CTAB, no crackles or wheezing  GI: soft, nontender, nondistended  Ext: fistula in left arm  Skin: warm, dry, no apparent lesions  Neuro: AAOx3, no focal cranial nerve deficits, moving all extremities      Data     I have personally reviewed the following data over the past 24 hrs:    6.7  \   11.8   / 128 (L)     136 94 (L) 11.1 /  98   4.4 25 2.93 (H) \     ALT: 10 AST: 34 AP: 93 TBILI: 0.2   ALB: 4.1 TOT PROTEIN: 6.8 LIPASE: N/A     Trop: 20 (H) BNP: N/A       Imaging results reviewed over the past 24 hrs:   Recent Results (from the past 24 hours)   CT Head w/o Contrast    Narrative    EXAM: CT HEAD W/O CONTRAST  LOCATION: [facility]  DATE: 3/19/2025    INDICATION:  headache  COMPARISON: 4/17/2014  TECHNIQUE: Routine CT Head without IV contrast. Multiplanar reformats. Dose reduction techniques were used.    FINDINGS:  INTRACRANIAL CONTENTS: No intracranial hemorrhage, extraaxial collection, or mass effect.  No CT evidence of acute infarct. Normal parenchymal attenuation. Normal ventricles and sulci. Presumed prominent perivascular space in the inferior left basal   ganglia.    VISUALIZED ORBITS/SINUSES/MASTOIDS: No intraorbital abnormality. No paranasal sinus mucosal disease. No middle ear or mastoid effusion.    BONES/SOFT TISSUES: No acute abnormality.      Impression    IMPRESSION:  1.  No acute intracranial process.

## 2025-03-20 NOTE — PLAN OF CARE
"Goal Outcome Evaluation:  Outcome Evaluation:     PRIMARY DIAGNOSIS: \"GENERIC\" NURSING- Headache/HTN  OUTPATIENT/OBSERVATION GOALS TO BE MET BEFORE DISCHARGE:  ADLs back to baseline: Yes     Activity and level of assistance: Ambulating independently.     Pain status C/o headache. IV Toradol administered.      Return to near baseline physical activity: Yes             Discharge Planner Nurse  Safe discharge environment identified: Yes  Barriers to discharge: No    Pt went down to IR for fistulogram this AM      Please review provider order for any additional goals.   Nurse to notify provider when observation goals have been met and patient is ready for discharge.           " POSITIVE  Please inform her

## 2025-03-20 NOTE — IR NOTE
Patient Name: Reina Quan  Medical Record Number: [id number]  Today's Date: 3/20/2025    Procedure: Image Guided Left Fistulagram  Proceduralist: Dr. Beltre, Dr. Ruvalcaba    Sedation medications administered: 2 mg versed, 100 mcg fentanyl  Total sedation time: 18 min  Other Medications: Hydralazine 10 mg     Procedure start time: 1100  Procedure end time: 1118    Report given to: FEDERICO Cdae RN  : n/a    Other Notes: Pt arrived to IR room 5 from inpatient OBS. Consent reviewed, pt confirmed. Pt denies any questions or concerns regarding procedure. Pt positioned supine and monitored per protocol. Site cleansed and dressed per protocol. Pt tolerated procedure without any noted complications. Pt transferred back to OBS.

## 2025-03-20 NOTE — DISCHARGE INSTRUCTIONS
Your blood work is stable.     Your CT head is normal.     Please take the increase dose of Norvasc and Losartan as prescribed.     Follow up with your primary care doctor for further care.

## 2025-03-21 ENCOUNTER — TELEPHONE (OUTPATIENT)
Dept: UROLOGY | Facility: CLINIC | Age: 60
End: 2025-03-21
Payer: COMMERCIAL

## 2025-03-21 ENCOUNTER — APPOINTMENT (OUTPATIENT)
Dept: MRI IMAGING | Facility: CLINIC | Age: 60
DRG: 069 | End: 2025-03-21
Attending: EMERGENCY MEDICINE
Payer: COMMERCIAL

## 2025-03-21 ENCOUNTER — APPOINTMENT (OUTPATIENT)
Dept: CT IMAGING | Facility: CLINIC | Age: 60
DRG: 069 | End: 2025-03-21
Attending: EMERGENCY MEDICINE
Payer: COMMERCIAL

## 2025-03-21 ENCOUNTER — HOSPITAL ENCOUNTER (INPATIENT)
Facility: CLINIC | Age: 60
LOS: 1 days | Discharge: HOME | DRG: 069 | End: 2025-03-22
Attending: EMERGENCY MEDICINE | Admitting: STUDENT IN AN ORGANIZED HEALTH CARE EDUCATION/TRAINING PROGRAM
Payer: COMMERCIAL

## 2025-03-21 VITALS
DIASTOLIC BLOOD PRESSURE: 76 MMHG | BODY MASS INDEX: 21.38 KG/M2 | OXYGEN SATURATION: 98 % | HEIGHT: 62 IN | SYSTOLIC BLOOD PRESSURE: 138 MMHG | WEIGHT: 116.18 LBS | TEMPERATURE: 98 F | HEART RATE: 80 BPM | RESPIRATION RATE: 15 BRPM

## 2025-03-21 DIAGNOSIS — R20.0 NUMBNESS: ICD-10-CM

## 2025-03-21 DIAGNOSIS — G45.9 TIA (TRANSIENT ISCHEMIC ATTACK): Primary | ICD-10-CM

## 2025-03-21 LAB
ALDOST SERPL-MCNC: <3 NG/DL (ref 0–31)
ANION GAP SERPL CALCULATED.3IONS-SCNC: 14 MMOL/L (ref 7–15)
ANION GAP SERPL CALCULATED.3IONS-SCNC: 16 MMOL/L (ref 7–15)
APTT PPP: 26 SECONDS (ref 22–38)
BASOPHILS # BLD AUTO: 0 10E3/UL (ref 0–0.2)
BASOPHILS NFR BLD AUTO: 1 %
BUN SERPL-MCNC: 17.5 MG/DL (ref 8–23)
BUN SERPL-MCNC: 25.3 MG/DL (ref 8–23)
CALCIUM SERPL-MCNC: 9.5 MG/DL (ref 8.8–10.4)
CALCIUM SERPL-MCNC: 9.9 MG/DL (ref 8.8–10.4)
CHLORIDE SERPL-SCNC: 96 MMOL/L (ref 98–107)
CHLORIDE SERPL-SCNC: 99 MMOL/L (ref 98–107)
CREAT BLD-MCNC: 3.9 MG/DL (ref 0.5–1)
CREAT SERPL-MCNC: 3.81 MG/DL (ref 0.51–0.95)
CREAT SERPL-MCNC: 6.27 MG/DL (ref 0.51–0.95)
EGFRCR SERPLBLD CKD-EPI 2021: 13 ML/MIN/1.73M2
EGFRCR SERPLBLD CKD-EPI 2021: 13 ML/MIN/1.73M2
EGFRCR SERPLBLD CKD-EPI 2021: 7 ML/MIN/1.73M2
EOSINOPHIL # BLD AUTO: 0.1 10E3/UL (ref 0–0.7)
EOSINOPHIL NFR BLD AUTO: 2 %
ERYTHROCYTE [DISTWIDTH] IN BLOOD BY AUTOMATED COUNT: 15.1 % (ref 10–15)
GLUCOSE BLDC GLUCOMTR-MCNC: 133 MG/DL (ref 70–99)
GLUCOSE SERPL-MCNC: 131 MG/DL (ref 70–99)
GLUCOSE SERPL-MCNC: 80 MG/DL (ref 70–99)
HBV SURFACE AG SERPL QL IA: NONREACTIVE
HCO3 SERPL-SCNC: 24 MMOL/L (ref 22–29)
HCO3 SERPL-SCNC: 26 MMOL/L (ref 22–29)
HCT VFR BLD AUTO: 36.5 % (ref 35–47)
HGB BLD-MCNC: 11.8 G/DL (ref 11.7–15.7)
IMM GRANULOCYTES # BLD: 0 10E3/UL
IMM GRANULOCYTES NFR BLD: 0 %
INR BLD: 0.9
INR PPP: 0.88 (ref 0.85–1.15)
LYMPHOCYTES # BLD AUTO: 0.7 10E3/UL (ref 0.8–5.3)
LYMPHOCYTES NFR BLD AUTO: 12 %
MAGNESIUM SERPL-MCNC: 2.3 MG/DL (ref 1.7–2.3)
MCH RBC QN AUTO: 30 PG (ref 26.5–33)
MCHC RBC AUTO-ENTMCNC: 32.3 G/DL (ref 31.5–36.5)
MCV RBC AUTO: 93 FL (ref 78–100)
MONOCYTES # BLD AUTO: 0.5 10E3/UL (ref 0–1.3)
MONOCYTES NFR BLD AUTO: 9 %
NEUTROPHILS # BLD AUTO: 4.2 10E3/UL (ref 1.6–8.3)
NEUTROPHILS NFR BLD AUTO: 76 %
NRBC # BLD AUTO: 0 10E3/UL
NRBC BLD AUTO-RTO: 0 /100
PHOSPHATE SERPL-MCNC: 5.8 MG/DL (ref 2.5–4.5)
PLATELET # BLD AUTO: 128 10E3/UL (ref 150–450)
POTASSIUM SERPL-SCNC: 4.1 MMOL/L (ref 3.4–5.3)
POTASSIUM SERPL-SCNC: 4.4 MMOL/L (ref 3.4–5.3)
RBC # BLD AUTO: 3.93 10E6/UL (ref 3.8–5.2)
SODIUM SERPL-SCNC: 137 MMOL/L (ref 135–145)
SODIUM SERPL-SCNC: 138 MMOL/L (ref 135–145)
TROPONIN T SERPL HS-MCNC: 20 NG/L
TROPONIN T SERPL HS-MCNC: 21 NG/L
WBC # BLD AUTO: 5.5 10E3/UL (ref 4–11)

## 2025-03-21 PROCEDURE — 70496 CT ANGIOGRAPHY HEAD: CPT | Mod: 26 | Performed by: RADIOLOGY

## 2025-03-21 PROCEDURE — 36415 COLL VENOUS BLD VENIPUNCTURE: CPT | Performed by: PHYSICIAN ASSISTANT

## 2025-03-21 PROCEDURE — 85730 THROMBOPLASTIN TIME PARTIAL: CPT | Performed by: EMERGENCY MEDICINE

## 2025-03-21 PROCEDURE — 99291 CRITICAL CARE FIRST HOUR: CPT | Performed by: EMERGENCY MEDICINE

## 2025-03-21 PROCEDURE — 99233 SBSQ HOSP IP/OBS HIGH 50: CPT | Mod: FS | Performed by: PHYSICIAN ASSISTANT

## 2025-03-21 PROCEDURE — 36415 COLL VENOUS BLD VENIPUNCTURE: CPT | Performed by: EMERGENCY MEDICINE

## 2025-03-21 PROCEDURE — 70551 MRI BRAIN STEM W/O DYE: CPT

## 2025-03-21 PROCEDURE — 87340 HEPATITIS B SURFACE AG IA: CPT | Performed by: PHYSICIAN ASSISTANT

## 2025-03-21 PROCEDURE — G0378 HOSPITAL OBSERVATION PER HR: HCPCS

## 2025-03-21 PROCEDURE — 96374 THER/PROPH/DIAG INJ IV PUSH: CPT | Performed by: EMERGENCY MEDICINE

## 2025-03-21 PROCEDURE — 99232 SBSQ HOSP IP/OBS MODERATE 35: CPT | Mod: GC | Performed by: PSYCHIATRY & NEUROLOGY

## 2025-03-21 PROCEDURE — 70498 CT ANGIOGRAPHY NECK: CPT | Mod: 26 | Performed by: RADIOLOGY

## 2025-03-21 PROCEDURE — 84484 ASSAY OF TROPONIN QUANT: CPT | Performed by: EMERGENCY MEDICINE

## 2025-03-21 PROCEDURE — 93005 ELECTROCARDIOGRAM TRACING: CPT | Performed by: EMERGENCY MEDICINE

## 2025-03-21 PROCEDURE — 82565 ASSAY OF CREATININE: CPT

## 2025-03-21 PROCEDURE — 93010 ELECTROCARDIOGRAM REPORT: CPT | Performed by: EMERGENCY MEDICINE

## 2025-03-21 PROCEDURE — G0257 UNSCHED DIALYSIS ESRD PT HOS: HCPCS

## 2025-03-21 PROCEDURE — 85610 PROTHROMBIN TIME: CPT | Performed by: EMERGENCY MEDICINE

## 2025-03-21 PROCEDURE — 258N000003 HC RX IP 258 OP 636: Performed by: PHYSICIAN ASSISTANT

## 2025-03-21 PROCEDURE — 85025 COMPLETE CBC W/AUTO DIFF WBC: CPT | Performed by: EMERGENCY MEDICINE

## 2025-03-21 PROCEDURE — 84100 ASSAY OF PHOSPHORUS: CPT | Performed by: PHYSICIAN ASSISTANT

## 2025-03-21 PROCEDURE — 250N000011 HC RX IP 250 OP 636: Performed by: PHYSICIAN ASSISTANT

## 2025-03-21 PROCEDURE — 99285 EMERGENCY DEPT VISIT HI MDM: CPT | Mod: 25 | Performed by: EMERGENCY MEDICINE

## 2025-03-21 PROCEDURE — 80061 LIPID PANEL: CPT

## 2025-03-21 PROCEDURE — 250N000013 HC RX MED GY IP 250 OP 250 PS 637

## 2025-03-21 PROCEDURE — 90935 HEMODIALYSIS ONE EVALUATION: CPT

## 2025-03-21 PROCEDURE — 85610 PROTHROMBIN TIME: CPT

## 2025-03-21 PROCEDURE — 250N000011 HC RX IP 250 OP 636: Performed by: EMERGENCY MEDICINE

## 2025-03-21 PROCEDURE — 99239 HOSP IP/OBS DSCHRG MGMT >30: CPT | Performed by: PHYSICIAN ASSISTANT

## 2025-03-21 PROCEDURE — 80048 BASIC METABOLIC PNL TOTAL CA: CPT | Performed by: EMERGENCY MEDICINE

## 2025-03-21 PROCEDURE — 250N000009 HC RX 250: Performed by: EMERGENCY MEDICINE

## 2025-03-21 PROCEDURE — B31R1ZZ FLUOROSCOPY OF INTRACRANIAL ARTERIES USING LOW OSMOLAR CONTRAST: ICD-10-PCS | Performed by: RADIOLOGY

## 2025-03-21 PROCEDURE — 250N000013 HC RX MED GY IP 250 OP 250 PS 637: Performed by: EMERGENCY MEDICINE

## 2025-03-21 PROCEDURE — 70496 CT ANGIOGRAPHY HEAD: CPT

## 2025-03-21 PROCEDURE — 83735 ASSAY OF MAGNESIUM: CPT | Performed by: PHYSICIAN ASSISTANT

## 2025-03-21 PROCEDURE — 70551 MRI BRAIN STEM W/O DYE: CPT | Mod: 26 | Performed by: RADIOLOGY

## 2025-03-21 PROCEDURE — 83036 HEMOGLOBIN GLYCOSYLATED A1C: CPT

## 2025-03-21 PROCEDURE — 120N000002 HC R&B MED SURG/OB UMMC

## 2025-03-21 PROCEDURE — 999N000176 HC STATISTIC STROKE CODE W/O ACCESS

## 2025-03-21 PROCEDURE — 82962 GLUCOSE BLOOD TEST: CPT

## 2025-03-21 PROCEDURE — 70450 CT HEAD/BRAIN W/O DYE: CPT

## 2025-03-21 PROCEDURE — 250N000012 HC RX MED GY IP 250 OP 636 PS 637

## 2025-03-21 PROCEDURE — 80048 BASIC METABOLIC PNL TOTAL CA: CPT | Performed by: PHYSICIAN ASSISTANT

## 2025-03-21 RX ORDER — LORAZEPAM 1 MG/1
1 TABLET ORAL
Status: DISCONTINUED | OUTPATIENT
Start: 2025-03-21 | End: 2025-03-21

## 2025-03-21 RX ORDER — AMLODIPINE BESYLATE 10 MG/1
10 TABLET ORAL AT BEDTIME
Qty: 30 TABLET | Refills: 1 | Status: SHIPPED | OUTPATIENT
Start: 2025-03-21

## 2025-03-21 RX ORDER — LOSARTAN POTASSIUM 50 MG/1
100 TABLET ORAL EVERY EVENING
Status: DISCONTINUED | OUTPATIENT
Start: 2025-03-21 | End: 2025-03-21 | Stop reason: HOSPADM

## 2025-03-21 RX ORDER — ACETAMINOPHEN 500 MG
1000 TABLET ORAL ONCE
Status: COMPLETED | OUTPATIENT
Start: 2025-03-21 | End: 2025-03-21

## 2025-03-21 RX ORDER — LOSARTAN POTASSIUM 100 MG/1
100 TABLET ORAL EVERY EVENING
Qty: 30 TABLET | Refills: 1 | Status: SHIPPED | OUTPATIENT
Start: 2025-03-21

## 2025-03-21 RX ORDER — IOPAMIDOL 755 MG/ML
67 INJECTION, SOLUTION INTRAVASCULAR ONCE
Status: COMPLETED | OUTPATIENT
Start: 2025-03-21 | End: 2025-03-21

## 2025-03-21 RX ORDER — METHYLPREDNISOLONE 4 MG/1
TABLET ORAL
Qty: 21 TABLET | Refills: 0 | Status: SHIPPED | OUTPATIENT
Start: 2025-03-21

## 2025-03-21 RX ORDER — LORAZEPAM 2 MG/ML
1 INJECTION INTRAMUSCULAR
Status: COMPLETED | OUTPATIENT
Start: 2025-03-21 | End: 2025-03-21

## 2025-03-21 RX ADMIN — Medication 500 MG: at 08:37

## 2025-03-21 RX ADMIN — SODIUM BICARBONATE 1300 MG: 650 TABLET ORAL at 08:37

## 2025-03-21 RX ADMIN — HEPARIN SODIUM 2100 UNITS: 1000 INJECTION, SOLUTION INTRAVENOUS; SUBCUTANEOUS at 15:20

## 2025-03-21 RX ADMIN — MYCOPHENOLATE MOFETIL 750 MG: 250 CAPSULE ORAL at 08:37

## 2025-03-21 RX ADMIN — HEPARIN SODIUM 2000 UNITS: 1000 INJECTION, SOLUTION INTRAVENOUS; SUBCUTANEOUS at 15:20

## 2025-03-21 RX ADMIN — LORAZEPAM 1 MG: 2 INJECTION INTRAMUSCULAR; INTRAVENOUS at 22:05

## 2025-03-21 RX ADMIN — PAROXETINE HYDROCHLORIDE 20 MG: 20 TABLET, FILM COATED ORAL at 08:37

## 2025-03-21 RX ADMIN — ACETAMINOPHEN 1000 MG: 500 TABLET ORAL at 23:34

## 2025-03-21 RX ADMIN — SODIUM CHLORIDE, PRESERVATIVE FREE 100 ML: 5 INJECTION INTRAVENOUS at 20:49

## 2025-03-21 RX ADMIN — SODIUM CHLORIDE 250 ML: 9 INJECTION, SOLUTION INTRAVENOUS at 11:57

## 2025-03-21 RX ADMIN — IOPAMIDOL 67 ML: 755 INJECTION, SOLUTION INTRAVENOUS at 20:49

## 2025-03-21 RX ADMIN — Medication: at 11:57

## 2025-03-21 RX ADMIN — SODIUM CHLORIDE 200 ML: 9 INJECTION, SOLUTION INTRAVENOUS at 11:56

## 2025-03-21 RX ADMIN — PREDNISONE 5 MG: 5 TABLET ORAL at 08:37

## 2025-03-21 ASSESSMENT — ACTIVITIES OF DAILY LIVING (ADL)
ADLS_ACUITY_SCORE: 55
ADLS_ACUITY_SCORE: 58
ADLS_ACUITY_SCORE: 58
ADLS_ACUITY_SCORE: 55
ADLS_ACUITY_SCORE: 58
ADLS_ACUITY_SCORE: 55
ADLS_ACUITY_SCORE: 58
ADLS_ACUITY_SCORE: 55
ADLS_ACUITY_SCORE: 55

## 2025-03-21 ASSESSMENT — COLUMBIA-SUICIDE SEVERITY RATING SCALE - C-SSRS
6. HAVE YOU EVER DONE ANYTHING, STARTED TO DO ANYTHING, OR PREPARED TO DO ANYTHING TO END YOUR LIFE?: NO
1. IN THE PAST MONTH, HAVE YOU WISHED YOU WERE DEAD OR WISHED YOU COULD GO TO SLEEP AND NOT WAKE UP?: NO
2. HAVE YOU ACTUALLY HAD ANY THOUGHTS OF KILLING YOURSELF IN THE PAST MONTH?: NO

## 2025-03-21 NOTE — PLAN OF CARE
Goal Outcome Evaluation:       Overall Patient Progress: improvingOverall Patient Progress: improving, Currently in dialysis.

## 2025-03-21 NOTE — PLAN OF CARE
Goal Outcome Evaluation:    diagnostic tests and consults completed and resulted, in process.   -vital signs normal or at patient baseline, hypertensive.   -tolerating oral intake to maintain hydration, intermittent n/v.

## 2025-03-21 NOTE — PLAN OF CARE
"Goal Outcome Evaluation       -diagnostic tests and consults completed and resulted- Not met    -vital signs normal or at patient baseline- Elevated BP   Blood pressure (!) 162/83, pulse 100, temperature 99.6  F (37.6  C), temperature source Oral, resp. rate 18, height 1.575 m (5' 2\"), weight 55.3 kg (122 lb), last menstrual period 05/14/2014, SpO2 98%  Monitor BP. Blood pressure medication administered    -tolerating oral intake to maintain hydration - Progressing. Tolerated oral and fluid intake. No vomiting.  "

## 2025-03-21 NOTE — PROGRESS NOTES
Nephrology Progress Note  03/21/2025         Assessment & Recommendations:   Reina Quan is a 60 year old female with PMH of kidney transplant in 1992 for FSGS now on HD, HTN, HLD, MDD, DEBRA, migraines, recent microscopic hematuria who presents with headaches, nausea, hypertension.      #ESKD: dialyzes MWF at [facility] with Dr. Mcdonnell. Access: tunneled RIJ (L AVF not using yet). Run time: 3 hrs. TW 56.5 kg  - Dialysis per MWF schedule  -s/p successful fistulogram/plasty 3/20 as previously schedule      #HTN/Volume: TW 56.5 kg (but pt has been losing weight and coming in lower). BP's 200's on admission and has had high pressures only in the past ~ 4 weeks, recently started on amlodipine 10 mg qHS and losartan 50 mg qday  - still with some UOP  - a good BP goal would be 150's for now (140-170's, did not get increased losartan dose last night)  - continue PTA amlodipine 10 mg qday  - increased losartan to 100 mg qday (ordered 3/21)  - will gently challenge TW as tolerated, pre HD standing wt today is 53.5 kg, UF goal 2L (pt has facial edema)  - workup for new severe HTN: ordered emani/renin ratio and pheo labs (pt also reports intermittent sweating which is also new), labs pending     #BMD: Ca 9's, alb 4.1, phos 5.8. PTA sensipar 30 mg MWF     #Anemia of CKD: on mircera/venofer protocols at OP HD  - hgb 10.8 g/dL, at goal     #HA: per pt, until about 4 weeks ago, her BP's had been very well controlled. She has only recently developed severe HTN which may well be causing her HA. Imaging without acute findings. Low suspicion for GCA, per neuro  - being seen by neuro as well     Recommendations were communicated to primary team via this note       SAM Edward   Division of Nephrology and Hypertension  [employer] Web Console    Interval History :   Seen on dialysis, well below TW but has facial edema, will attempt 2L off. BP's 140-170's. Did not get increased losartan dose last night, I did order for  "tonight. Neuro following, brain imaging without acute findings. Pt reports so far today no headaches, n/v, CP, SOB    Review of Systems:   4 point ROS neg other than as noted above    Physical Exam:   I/O last 3 completed shifts:  In: -   Out: 200 [Urine:200]   BP (!) 146/79 (BP Location: Right arm)   Pulse 85   Temp 98.5  F (36.9  C) (Oral)   Resp 15   Ht 1.575 m (5' 2\")   Wt 53.5 kg (118 lb)   LMP 05/14/2014 (Approximate)   SpO2 96%   BMI 21.58 kg/m       GENERAL APPEARANCE: NAD   EYES: no scleral icterus, pupils equal  Pulmonary: CTA  CV: RRR   - Edema facial/periorbital  GI: soft   MS: no evidence of inflammation in joints, no muscle tenderness  : no restrepo  SKIN: no rash, warm, dry, no cyanosis  NEURO: face symmetric, a/o3  Access: tunneled RIJ, L AVF with good thrill    Labs:   All labs reviewed by me  Electrolytes/Renal -   Recent Labs   Lab Test 03/21/25  0716 03/20/25  0616 03/19/25  1850 03/19/25  1655 03/18/25  0842 03/06/25  1022 04/09/24  1037 01/09/24  1050    137  --  136 142 141   < > 138   POTASSIUM 4.1 4.7  --  4.4 4.6 4.5   < > 4.1   CHLORIDE 99 98  --  94* 103 102   < > 101   CO2 24 25  --  25 23 26   < > 25   BUN 25.3* 17.3  --  11.1 28.6* 23.0   < > 43.2*   CR 6.27* 4.27*  --  2.93* 4.91* 4.10*   < > 1.94*   GLC 80 93  --  98 88 89   < > 104*   HARSHA  --  9.6  --  9.3 9.6 9.4   < > 11.1*  11.1*   MAG 2.3  --   --   --   --   --   --  2.3   PHOS 5.8*  --  3.3  --   --  4.7*   < > 3.2    < > = values in this interval not displayed.       CBC -   Recent Labs   Lab Test 03/20/25  0616 03/19/25  1655 03/18/25  0842   WBC 4.8 6.7 4.5   HGB 10.8* 11.8 11.4*   * 128* 127*       LFTs -   Recent Labs   Lab Test 03/19/25  1655 03/06/25  1022 10/07/24  1326 09/24/24  1114 07/29/24  1123   ALKPHOS 93  --  65  --  86   BILITOTAL 0.2  --  0.3  --  0.3   ALT 10  --  33  --  27   AST 34  --  25  --  28   PROTTOTAL 6.8  --  7.6  --  7.5   ALBUMIN 4.1 3.9 4.7   < > 4.5    < > = values in this " interval not displayed.       Iron Panel -   Recent Labs   Lab Test 05/30/23  1141 01/17/20  1458   IRON 84  84 61   IRONSAT 32 21    60         Imaging:  Reviewed    Current Medications:  Current Facility-Administered Medications   Medication Dose Route Frequency Provider Last Rate Last Admin    amLODIPine (NORVASC) tablet 10 mg  10 mg Oral At Bedtime Raissa Busch MD   10 mg at 03/20/25 2215    atorvastatin (LIPITOR) tablet 80 mg  80 mg Oral At Bedtime Raissa Busch MD   80 mg at 03/20/25 2215    cinacalcet (SENSIPAR) tablet 30 mg  30 mg Oral Every Other Day Raissa Busch MD        gadobutrol (GADAVIST) injection 6 mL  6 mL Intravenous Once Blu Sauceda MD        sodium chloride 0.9% DIALYSIS Cath LOCK - RED Lumen  10 mL Intracatheter Once in dialysis/CRRT Miranda Johnson PA        Followed by    heparin 1000 unit/mL DIALYSIS Cath LOCK - RED Lumen  1.3-2.6 mL Intracatheter Once in dialysis/CRRT Miranda Johnson PA        sodium chloride 0.9% DIALYSIS Cath LOCK - BLUE Lumen  10 mL Intracatheter Once in dialysis/CRRT Miranda Johnson PA        Followed by    heparin 1000 unit/mL DIALYSIS Cath LOCK -BLUE Lumen  1.3-2.6 mL Intracatheter Once in dialysis/CRRT Miranda Johnson PA        losartan (COZAAR) tablet 50 mg  50 mg Oral QPM Maricel Oseguera PA-C   50 mg at 03/20/25 2016    mycophenolate (GENERIC EQUIVALENT) capsule 750 mg  750 mg Oral BID Raissa Busch MD   750 mg at 03/21/25 0837    niacinamide tablet 500 mg  500 mg Oral BID w/meals Raissa Busch MD   500 mg at 03/21/25 0837    No heparin via hemodialysis machine   Does not apply Once Miranda Johnson PA        PARoxetine (PAXIL) tablet 20 mg  20 mg Oral Daily Raissa Busch MD   20 mg at 03/21/25 0837    predniSONE (DELTASONE) tablet 5 mg  5 mg Oral QAM Raissa Busch MD   5 mg at 03/21/25 0837    sodium bicarbonate tablet 1,300 mg  1,300 mg Oral BID Raissa Busch MD   1,300 mg at 03/21/25 0837     sodium chloride (PF) 0.9% PF flush 3 mL  3 mL Intracatheter Q8H Rukhsana-Alyssia Brown, APRN CNP   3 mL at 03/21/25 0838    sodium chloride (PF) 0.9% PF flush 9 mL  9 mL Intracatheter During Dialysis/CRRT (from stock) Miranda Johnson PA        sodium chloride (PF) 0.9% PF flush 9 mL  9 mL Intracatheter During Dialysis/CRRT (from stock) Miranda Johnson PA        sodium chloride 0.9% BOLUS 200 mL  200 mL Hemodialysis Machine Once Miranda Johnson PA        sodium chloride 0.9% BOLUS 250 mL  250 mL Intravenous Once in dialysis/CRRT Miranda Johnson PA        sodium chloride 0.9% BOLUS 500 mL  500 mL Hemodialysis Machine Once Miranda Johnson PA         Current Facility-Administered Medications   Medication Dose Route Frequency Provider Last Rate Last Admin     SAM Edward

## 2025-03-21 NOTE — PLAN OF CARE
"Goal Outcome Evaluation:  Outcome Evaluation:     PRIMARY DIAGNOSIS: \"GENERIC\" NURSING- Headache/HTN  OUTPATIENT/OBSERVATION GOALS TO BE MET BEFORE DISCHARGE:  ADLs back to baseline: Yes     Activity and level of assistance: Ambulating independently.     Pain status Pain free     Return to near baseline physical activity: Yes  diagnostic tests and consults completed and resulted, in process.   Denies nausea/vomiting. Eating and drinking fine  Urine collection in process. Ice basin replaced with more ice.             Discharge Planner Nurse  Safe discharge environment identified: Yes  Barriers to discharge: Yes  Blood pressure (!) 146/79, pulse 85, temperature 98.5  F (36.9  C), temperature source Oral, resp. rate 15, height 1.575 m (5' 2\"), weight 53.5 kg (118 lb), last menstrual period 05/14/2014, SpO2 96%, not currently breastfeeding.       Please review provider order for any additional goals.   Nurse to notify provider when observation goals have been met and patient is ready for discharge.           "

## 2025-03-21 NOTE — PROGRESS NOTES
Pt is scheduled for dialysis tomorrow. Pt still make urine. Reportedly voided x 2 during the shift. 24 hour urine collection container in room, will start when Pt voids next. Bedside commode with hat in room. Dinner ordered.No further dry heaving and emesis . Call light within reach.

## 2025-03-21 NOTE — PLAN OF CARE
"Goal Outcome Evaluation:  Outcome Evaluation:     PRIMARY DIAGNOSIS: \"GENERIC\" NURSING- Headache/HTN  OUTPATIENT/OBSERVATION GOALS TO BE MET BEFORE DISCHARGE:  Patient still in dialysis. Off unit.  "

## 2025-03-21 NOTE — DISCHARGE SUMMARY
DISCHARGE                         Discharged to: Home  Via: Automobile  Accompanied by: Family  Discharge Instructions: diet, activity, medications, follow up appointments, when to call the MD, aftercare instructions, and what to watchout for (i.e. s/s of infection, increasing SOB, palpitations, chest pain,)  Prescriptions: To be filled by [facility] per pt's request; medication list reviewed & sent with pt  Follow Up Appointments: arranged; information given  Belongings: All sent with pt  IV: out  Telemetry: off  Pt exhibits understanding of above discharge instructions; all questions answered.    Discharge Paperwork: Signed, copied, and sent home with patient.

## 2025-03-21 NOTE — TELEPHONE ENCOUNTER
Left Voicemail (1st Attempt) for the patient to call back and schedule the following:    Appointment type: Return Patient   Provider:   Return date: Schedule CT urogram and appointment with Dr Joshua after next available  Specialty phone number: [phone]  Additional appointment(s) needed: CT  Additonal Notes:

## 2025-03-21 NOTE — DISCHARGE SUMMARY
[facility]  Hospitalist Discharge Summary      Date of Admission:  3/19/2025  Date of Discharge:  3/21/2025  Discharging Provider: Maricel Oseguera PA-C  Discharge Service: Hospitalist Service    Discharge Diagnoses   Hypertensive urgency  Subacute headaches  Hx of migraine headaches  Nausea, vomiting  FSGS s/p renal transplant (1992)  ESRD on HD  Microscopic hematuria  MDD  DEBRA    Clinically Significant Risk Factors          Follow-ups Needed After Discharge    - Follow-up with primary care provider within 2 weeks    - Follow-up with Nephrology as previously planned   - Establish care with Neurology for headache mgmt, referral placed      Unresulted Labs Ordered in the Past 30 Days of this Admission       Date and Time Order Name Status Description    3/20/2025  1:55 PM Aldosterone Renin Ratio In process     3/20/2025  1:55 PM Renin activity In process     3/20/2025  1:55 PM Aldosterone In process     3/20/2025 12:35 PM Metanephrines Plasma Free In process         These results will be followed up by hospitalist pool, Nephrology    Discharge Disposition   Discharged to home  Condition at discharge: Stable    Hospital Course   Reina Quan is a 60 year old woman with a history of FSGS s/p renal transplant (1992) now on HD, hypertension, depression, anxiety, headaches, and recent hematuria who was admitted to Medicine Observation with elevated blood pressure, nausea, and headaches.      Hypertensive urgency: Newly elevated blood pressure over the past month, started on amlodipine and losartan with persistently high pressures. SBP up to 215 at HD yesterday and did not improve with dialysis so presented to ED for evaluation. Did have a headache and nausea at the time. BP improved with IV labetalol and hydralazine. Nephrology consulted - amlodipine and losartan doses both increased with good improvement.   - Discharged on amlodipine 10 mg daily, losartan 100 mg  daily   - HTN work-up in process: emani/renin ratio, urine/plasma metanephrine   - Will have ongoing BP monitoring at HD, next on 3/24   - Follow-up with Nephrology as previously planned    Subacute headaches  Hx of migraine headaches  Increased headache frequency and severity over the past 1 month - having waxing and waning sharp bilateral temporal headaches. No improvement with Excedrin or sumatriptan. Headaches improved with Toradol and Compazine. Neurology consulted: headaches are most likely caused by elevated blood pressure.    - Discharged on Medrol dose pack   - Anticipate continued headache improvement with better BP control   - Follow-up with Neurology, referral placed    Nausea, vomiting - resolved: In setting of headache and elevated blood pressure. No recurrence today.     FSGS s/p renal transplant (1992)  ESRD on HD  LUE fistula placed several weeks ago. Dialyzes qMWF at [facility]. Completed IR fistulogram on 3/20 as previously planned. Nephrology consulted. Dialyzed in hospital on 3/21.      Microscopic hematuria: S/p cystoscopy with bilateral retrograde pyelogram and urethral dilation on 3/18, results unremarkable. Now having gross hematuria since her procedure, hgb at baseline. Discussed with Urology, would expect hematuria for ~1 week post procedure. Patient instructed to contact Urology clinic for prolonged bleeding or development of clots in urine.     MDD, DEBRA: Continue PTA paroxetine.     Consultations This Hospital Stay   NEPHROLOGY KIDNEY/PANCREAS TRANSPLANT ADULT IP CONSULT  INTERVENTIONAL RADIOLOGY ADULT/PEDS IP CONSULT  NEUROLOGY GENERAL ADULT IP CONSULT    Code Status   Full Code    Time Spent on this Encounter   IMaricel PA-C, personally saw the patient today and spent greater than 30 minutes discharging this patient.       Maricel Oseguera PA-C  [facility] [address] OBSERVATION  [address] [facility] [address]  Phone: [phone]  Fax:  [phone]  ______________________________________________________________________    Physical Exam   Vital Signs: Temp: 98.3  F (36.8  C) Temp src: Oral BP: 137/85 Pulse: 87   Resp: 15 SpO2: 96 % O2 Device: None (Room air)    Weight: 118 lbs 0 oz  Constitutional: Awake and alert, in no apparent distress.   Eyes: Sclera clear, anicteric   Respiratory: Breathing non-labored. CTAB  Cardiovascular:  RRR, normal S1/S2. No rubs or murmurs.   GI: Soft, non-tender, non-distended. Normoactive bowel sounds.   Skin:  Good color. No jaundice. No visible rashes, lesions, or bruising of concern.   Neurologic: Alert and oriented.          Primary Care Physician   Lizbeth Andrews    Discharge Orders      Adult Neurology  Referral      Reason for your hospital stay    Dear Reina Quan,    You were hospitalized at [facility] with severely elevated blood pressure and headaches. Nephrology and Neurology were both involved in your care. Neurology feels your headaches are most likely caused by your high blood pressure and your pain improved with anti-inflammatory medications. Your blood pressure medications were increased. Today you are ready to be discharged home. You should continue to improve but if you develop fever, shortness of breath, light headedness, chest pain or otherwise worsen please seek medical attention.    Take care!    Maricel Oseguera PA-C   Hospitalist Service     Activity    Your activity upon discharge: activity as tolerated     ADULT [facility] Specialty Follow-up and recommended labs and tests    Establish care with Neurology for headache management, referral placed.    Follow-up with your Nephrology team as previously planned    Appointments on [place] and/or [facility] (with [facility] or [facility] provider or service). Call [phone] if you haven't heard regarding these appointments within 7 days of discharge.     Diet    Follow this diet upon discharge:  regular diet     Hospital Follow-up with Existing Primary Care Provider (PCP)    Please see details below            Significant Results and Procedures   ROUTINE IP LABS (Last four results)  Recent Labs   Lab 03/21/25  0716 03/20/25  0616 03/19/25  1850 03/19/25  1655 03/18/25  0842    137  --  136 142   POTASSIUM 4.1 4.7  --  4.4 4.6   CHLORIDE 99 98  --  94* 103   CO2 24 25  --  25 23   ANIONGAP 14 14  --  17* 16*   GLC 80 93  --  98 88   BUN 25.3* 17.3  --  11.1 28.6*   CR 6.27* 4.27*  --  2.93* 4.91*   HARSHA 9.5 9.6  --  9.3 9.6   MAG 2.3  --   --   --   --    PHOS 5.8*  --  3.3  --   --    PROTTOTAL  --   --   --  6.8  --    ALBUMIN  --   --   --  4.1  --    BILITOTAL  --   --   --  0.2  --    ALKPHOS  --   --   --  93  --    AST  --   --   --  34  --    ALT  --   --   --  10  --      Recent Labs   Lab 03/20/25  0616 03/19/25  1655 03/18/25  0842   WBC 4.8 6.7 4.5   RBC 3.84 4.22 3.88   HGB 10.8* 11.8 11.4*   HCT 35.1 37.5 36.9   MCV 91 89 95   MCH 28.1 28.0 29.4   MCHC 30.8* 31.5 30.9*   RDW 15.0 15.0 15.1*   * 128* 127*     Recent Labs   Lab 03/20/25  0855   INR 0.93              Discharge Medications   Current Discharge Medication List        START taking these medications    Details   methylPREDNISolone (MEDROL DOSEPAK) 4 MG tablet therapy pack Follow Package Directions  Qty: 21 tablet, Refills: 0    Associated Diagnoses: Other migraine without status migrainosus, intractable           CONTINUE these medications which have CHANGED    Details   !! amLODIPine (NORVASC) 10 MG tablet Take 1 tablet (10 mg) by mouth at bedtime.  Qty: 30 tablet, Refills: 1    Associated Diagnoses: Hypertension, unspecified type      !! amLODIPine (NORVASC) 5 MG tablet Take 2 tablets (10 mg) by mouth at bedtime.  Qty: 60 tablet, Refills: 0      !! losartan (COZAAR) 100 MG tablet Take 1 tablet (100 mg) by mouth every evening.  Qty: 30 tablet, Refills: 1    Associated Diagnoses: Hypertension, unspecified type      !!  losartan (COZAAR) 25 MG tablet Take 2 tablets (50 mg) by mouth daily.  Qty: 60 tablet, Refills: 0       !! - Potential duplicate medications found. Please discuss with provider.        CONTINUE these medications which have NOT CHANGED    Details   cinacalcet (SENSIPAR) 30 MG tablet Take 1 tablet (30 mg) by mouth every other day  Qty: 45 tablet, Refills: 3    Comments: Decreased.  Associated Diagnoses: Kidney transplanted; Aftercare following organ transplant; Complications, kidney transplant      atorvastatin (LIPITOR) 80 MG tablet Take 1 tablet (80 mg) by mouth at bedtime.  Qty: 90 tablet, Refills: 3    Associated Diagnoses: Hyperlipidemia with target LDL less than 130      clobetasol (TEMOVATE) 0.05 % external ointment Apply topically 2 times daily Use sparingly at active areas of dermatitis for no more than 14 days or until resolved, whichever is sooner.  Qty: 60 g, Refills: 3    Associated Diagnoses: Dermatitis      clobetasol (TEMOVATE) 0.05 % external solution Apply topically daily as needed (itchyy scalp) Apply to scalp daily as needed.  Qty: 50 mL, Refills: 4    Associated Diagnoses: Lichen planopilaris      clobetasol propionate (CLOBEX) 0.05 % external shampoo APPLY TO SCALP IN SHOWER TWICE WEEKLY  Qty: 118 mL, Refills: 1    Associated Diagnoses: Lichen planopilaris      ketoconazole (NIZORAL) 2 % external shampoo APPLY TOPICALLY DAILY AS NEEDED FOR ITCHING, IRRITATION OR OTHER( SCALP SCALE) USE 2-3 TIMES A WEEK. APPLY AND LATHER THEN RINSE OUT 5 MINUTES  Qty: 120 mL, Refills: 0    Associated Diagnoses: Lichen planopilaris      mycophenolate (GENERIC EQUIVALENT) 250 MG capsule Take 3 capsules (750 mg) by mouth 2 times daily  Qty: 540 capsule, Refills: 3    Comments: Profile Rx: patient will contact pharmacy when needed. Just received MMF delivery.  Associated Diagnoses: Kidney transplanted; Aftercare following organ transplant; Immunosuppressive management encounter following kidney transplant; Proteinuria       niacinamide 500 MG tablet Take 1 tablet (500 mg) by mouth 2 times daily (with meals).  Qty: 60 tablet, Refills: 3    Associated Diagnoses: History of SCC (squamous cell carcinoma) of skin      PARoxetine (PAXIL) 20 MG tablet TAKE 1 TABLET BY MOUTH EVERY MORNING  Qty: 90 tablet, Refills: 2    Comments: Profile Rx: patient will contact pharmacy when needed  Associated Diagnoses: Major depressive disorder with single episode, in partial remission      predniSONE (DELTASONE) 5 MG tablet Take 1 tablet (5 mg) by mouth daily  Qty: 90 tablet, Refills: 3    Comments: Start after pred taper  Associated Diagnoses: Kidney transplanted; Aftercare following organ transplant; Immunosuppressive management encounter following kidney transplant      prochlorperazine (COMPAZINE) 10 MG tablet Take 10 mg by mouth every 6 hours as needed.      sodium bicarbonate 650 MG tablet Take 2 tablets (1,300 mg) by mouth 2 times daily  Qty: 360 tablet, Refills: 3    Associated Diagnoses: Metabolic acidosis      triamcinolone (KENALOG) 0.1 % external cream APPLY SPARINGLY TOPICALLY TO THE AFFECTED AREA TWICE DAILY FOR 14 DAYS  Qty: 30 g, Refills: 0    Associated Diagnoses: Flexural eczema           STOP taking these medications       sulfamethoxazole-trimethoprim (BACTRIM DS) 800-160 MG tablet Comments:   Reason for Stopping:             Allergies   Allergies   Allergen Reactions    Ampicillin Swelling     Swelling of mouth and tongue.    Seasonal Allergies Other (See Comments)     Itchy eyes and rhinitis.

## 2025-03-21 NOTE — PLAN OF CARE
"Goal Outcome Evaluation:  Outcome Evaluation:     PRIMARY DIAGNOSIS: \"GENERIC\" NURSING- Headache/HTN  OUTPATIENT/OBSERVATION GOALS TO BE MET BEFORE DISCHARGE:  ADLs back to baseline: Yes     Activity and level of assistance: Ambulating independently.     Pain status Pain free     Return to near baseline physical activity: Yes  diagnostic tests and consults completed and resulted,yes   Denies nausea/vomiting. Eating and drinking fine  Urine collection in process. Ice basin replaced with more ice.             Discharge Planner Nurse  Safe discharge environment identified: Yes  Barriers to discharge: No  Blood pressure 138/76, pulse 80, temperature 98  F (36.7  C), temperature source Oral, resp. rate 15, height 1.575 m (5' 2\"), weight 52.7 kg (116 lb 2.9 oz), last menstrual period 05/14/2014, SpO2 98%, not currently breastfeeding.     Please review provider order for any additional goals.   Nurse to notify provider when observation goals have been met and patient is ready for discharge.           "

## 2025-03-21 NOTE — PLAN OF CARE
"BP (!) 147/78 (BP Location: Right arm, Cuff Size: Adult Regular)   Pulse 100   Temp 98.8  F (37.1  C) (Oral)   Resp 16   Ht 1.575 m (5' 2\")   Wt 55.3 kg (122 lb)   LMP 05/14/2014 (Approximate)   SpO2 98%   BMI 22.31 kg/m      Goal Outcome Evaluation:    Goals to be met before discharge:          diagnostic tests and consults completed and resulted, in process.   -vital signs normal or at patient baseline, hypertensive.   -tolerating oral intake to maintain hydration, intermittent n/v.    Right groin CMS intact, no bleeding or hematoma.   Patient is very hard of hearing.   24 hour urine collection in process, started last night around 2200.   Independent to bedside commode. VS stable on room air and afebrile.      "

## 2025-03-21 NOTE — PROGRESS NOTES
HEMODIALYSIS TREATMENT NOTE    Date: 3/21/2025  Time: 2:51 PM    Data:  Modality:  Standing  Pre Wt:   53.5 kg  Desired Wt:  51.5 kg   Post Wt:  52.7 kg  Weight change: .8  kg  Ultrafiltration - Post Run Net Total Removed (mL):  1300  Vascular Access Site: patent   Dialyzer Rinse:  streaked, light  Total Blood Volume Processed:  67.9 Liters  Total Dialysis (Treatment) Time:  3 Hours  Dialysate Bath: K 3, Ca 2.25  Heparin: Heparin: None    Lab:   No    Interventions:  Dialysis done through: Right catheter  UF set to 2 Liters of fluid removal, accommodating priming and rinse back volumes  BFR: Blood Flow Rate (BFR): 400 mL/min  DFR: Potassium/Calcium Rate: 600 mL/min  No meds administered per MAR  UF off last 40 mins for cramping  CVC dressing changed aseptically  Catheter lumens locked with heparin, cath guards changed post HD  CritLine stable throughout the run, tolerating UF pull, see flowsheets for additional information.    Report given to PCN, sent back to room in stable condition.    Assessment:  A/O x 4, calm & cooperative, denies pain, SOB, dizziness  Complaint of intermittent nausea after eating  Complaint of cramping with fluid removal, UF off last 40 mins with improvement  Access site intact, previous dressing clean and dry     Plan:    Per Renal team

## 2025-03-21 NOTE — PROGRESS NOTES
"[facility]  Neurology Consultation - Progress Note    Patient Name:  Reina Quan  Date of Service:  March 21, 2025    Subjective:    Reina's headache resolved with the migraine cocktail yesterday and she woke up without a headache today. Slight spike in pain when we visited and started talking but it resolved. We discussed that her headaches are likely related to her blood pressure and that consistent blood pressure control across multiple days is likely necessary to improve headaches. Is tolerating PO intake. Could not tolerate MRI due to claustrophobia. Was curious about significance/prognosis of blood in urine, directed her toward Nephrology.     Objective:    Vitals: BP (!) 158/90 (BP Location: Right arm, Cuff Size: Adult Regular)   Pulse 100   Temp 98.4  F (36.9  C) (Oral)   Resp 16   Ht 1.575 m (5' 2\")   Wt 55.3 kg (122 lb)   LMP 05/14/2014 (Approximate)   SpO2 98%   BMI 22.31 kg/m    General: Lying in bed, NAD  Head: Atraumatic, normocephalic   Cardiac: no lower extremity edema  Neurologic:  Mental Status: Fully alert, attentive and oriented. Speech clear and fluent.   Cranial Nerves: EOM intact, without nystagmus. Facial movements symmetric at rest and with activation.   Motor: No abnormal movements.  Moving all 4 extremities antigravity.   Sensory: No sensory deficits appreciated on gross exam   Station/Gait: Deferred    Pertinent Investigations:    I have personally reviewed most recent and pertinent labs, tests, and radiological images.     MRI brain 03/19/25 - limited to DWI   Impression:  No evidence of acute infarction.     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 Her blood pressure was well-controlled until approximately 5 weeks ago, when she " had onset of headaches with use of new fistula site. Because her headaches worsen at night or laying down, and have generally trended with her increased blood pressure and resolve with medication, suspicion is low for an intracranial pathology and high for hypertensive headache. MRI brain was unfortunately terminated early due to claustrophobia, but DWI was obtained and did not show any evidence of diffusion restriction.  No need to attempt repeat due to rapid improvement in symptoms.      Recommendations:   - Medrol dose pack prn at discharge to prevent symptom recurrence   - Optimize blood pressure control per primary  - PCP follow up.  If headaches return or progress, consider starting migraine preventive therapy and neurology consult     We will sign off.      Thank you for involving Neurology in the care of Reina Quan.  Please do not hesitate to call with questions/concerns (consult pager [phone]).      Patient was seen and discussed with Dr. Hancock.    Ba Holloway MS3    I agree with the critical findings, assessment, and plan as documented in the note above by the medical student, otherwise edited by me.      LATRELL Rodriguez D.O.  Resident Physician of Neurology  [facility]

## 2025-03-22 ENCOUNTER — ORDERS ONLY (AUTO-RELEASED) (OUTPATIENT)
Dept: EMERGENCY MEDICINE | Facility: CLINIC | Age: 60
End: 2025-03-22
Payer: COMMERCIAL

## 2025-03-22 ENCOUNTER — APPOINTMENT (OUTPATIENT)
Dept: CARDIOLOGY | Facility: CLINIC | Age: 60
DRG: 069 | End: 2025-03-22
Payer: COMMERCIAL

## 2025-03-22 ENCOUNTER — APPOINTMENT (OUTPATIENT)
Dept: OCCUPATIONAL THERAPY | Facility: CLINIC | Age: 60
DRG: 069 | End: 2025-03-22
Payer: COMMERCIAL

## 2025-03-22 VITALS
BODY MASS INDEX: 21.57 KG/M2 | HEART RATE: 86 BPM | DIASTOLIC BLOOD PRESSURE: 80 MMHG | TEMPERATURE: 98.6 F | WEIGHT: 117.2 LBS | SYSTOLIC BLOOD PRESSURE: 153 MMHG | OXYGEN SATURATION: 97 % | RESPIRATION RATE: 18 BRPM | HEIGHT: 62 IN

## 2025-03-22 DIAGNOSIS — G45.9 TIA (TRANSIENT ISCHEMIC ATTACK): ICD-10-CM

## 2025-03-22 LAB
ANION GAP SERPL CALCULATED.3IONS-SCNC: 14 MMOL/L (ref 7–15)
ATRIAL RATE - MUSE: 87 BPM
BUN SERPL-MCNC: 20.7 MG/DL (ref 8–23)
CALCIUM SERPL-MCNC: 9.9 MG/DL (ref 8.8–10.4)
CHLORIDE SERPL-SCNC: 99 MMOL/L (ref 98–107)
CHOLEST SERPL-MCNC: 276 MG/DL
CREAT SERPL-MCNC: 4.68 MG/DL (ref 0.51–0.95)
DIASTOLIC BLOOD PRESSURE - MUSE: NORMAL MMHG
EGFRCR SERPLBLD CKD-EPI 2021: 10 ML/MIN/1.73M2
ERYTHROCYTE [DISTWIDTH] IN BLOOD BY AUTOMATED COUNT: 15 % (ref 10–15)
EST. AVERAGE GLUCOSE BLD GHB EST-MCNC: 97 MG/DL
GLUCOSE BLDC GLUCOMTR-MCNC: 133 MG/DL (ref 70–99)
GLUCOSE SERPL-MCNC: 130 MG/DL (ref 70–99)
HBA1C MFR BLD: 5 %
HCO3 SERPL-SCNC: 23 MMOL/L (ref 22–29)
HCT VFR BLD AUTO: 32.8 % (ref 35–47)
HDLC SERPL-MCNC: 96 MG/DL
HGB BLD-MCNC: 10 G/DL (ref 11.7–15.7)
INTERPRETATION ECG - MUSE: NORMAL
LDLC SERPL CALC-MCNC: 137 MG/DL
LVEF ECHO: NORMAL
MAGNESIUM SERPL-MCNC: 2.2 MG/DL (ref 1.7–2.3)
MCH RBC QN AUTO: 28.2 PG (ref 26.5–33)
MCHC RBC AUTO-ENTMCNC: 30.5 G/DL (ref 31.5–36.5)
MCV RBC AUTO: 92 FL (ref 78–100)
NONHDLC SERPL-MCNC: 180 MG/DL
P AXIS - MUSE: 55 DEGREES
PHOSPHATE SERPL-MCNC: 4.5 MG/DL (ref 2.5–4.5)
PLATELET # BLD AUTO: 112 10E3/UL (ref 150–450)
POTASSIUM SERPL-SCNC: 3.8 MMOL/L (ref 3.4–5.3)
PR INTERVAL - MUSE: 178 MS
QRS DURATION - MUSE: 88 MS
QT - MUSE: 420 MS
QTC - MUSE: 505 MS
R AXIS - MUSE: 42 DEGREES
RBC # BLD AUTO: 3.55 10E6/UL (ref 3.8–5.2)
SODIUM SERPL-SCNC: 136 MMOL/L (ref 135–145)
SYSTOLIC BLOOD PRESSURE - MUSE: NORMAL MMHG
T AXIS - MUSE: 41 DEGREES
TRIGL SERPL-MCNC: 215 MG/DL
VENTRICULAR RATE- MUSE: 87 BPM
WBC # BLD AUTO: 3.9 10E3/UL (ref 4–11)

## 2025-03-22 PROCEDURE — 258N000001 HC RX 258: Performed by: INTERNAL MEDICINE

## 2025-03-22 PROCEDURE — 85027 COMPLETE CBC AUTOMATED: CPT

## 2025-03-22 PROCEDURE — 80048 BASIC METABOLIC PNL TOTAL CA: CPT

## 2025-03-22 PROCEDURE — 97530 THERAPEUTIC ACTIVITIES: CPT | Mod: GO | Performed by: OCCUPATIONAL THERAPIST

## 2025-03-22 PROCEDURE — 82962 GLUCOSE BLOOD TEST: CPT

## 2025-03-22 PROCEDURE — 83735 ASSAY OF MAGNESIUM: CPT | Performed by: STUDENT IN AN ORGANIZED HEALTH CARE EDUCATION/TRAINING PROGRAM

## 2025-03-22 PROCEDURE — 250N000012 HC RX MED GY IP 250 OP 636 PS 637

## 2025-03-22 PROCEDURE — 93306 TTE W/DOPPLER COMPLETE: CPT | Mod: 26 | Performed by: INTERNAL MEDICINE

## 2025-03-22 PROCEDURE — 999N000226 HC STATISTIC SLP IP EVAL DEFER

## 2025-03-22 PROCEDURE — 999N000147 HC STATISTIC PT IP EVAL DEFER

## 2025-03-22 PROCEDURE — 97165 OT EVAL LOW COMPLEX 30 MIN: CPT | Mod: GO | Performed by: OCCUPATIONAL THERAPIST

## 2025-03-22 PROCEDURE — 84100 ASSAY OF PHOSPHORUS: CPT | Performed by: STUDENT IN AN ORGANIZED HEALTH CARE EDUCATION/TRAINING PROGRAM

## 2025-03-22 PROCEDURE — 36415 COLL VENOUS BLD VENIPUNCTURE: CPT

## 2025-03-22 PROCEDURE — 999N000208 ECHOCARDIOGRAM COMPLETE

## 2025-03-22 PROCEDURE — 99221 1ST HOSP IP/OBS SF/LOW 40: CPT | Mod: GC | Performed by: STUDENT IN AN ORGANIZED HEALTH CARE EDUCATION/TRAINING PROGRAM

## 2025-03-22 PROCEDURE — 250N000013 HC RX MED GY IP 250 OP 250 PS 637

## 2025-03-22 RX ORDER — AMOXICILLIN 250 MG
1-2 CAPSULE ORAL 2 TIMES DAILY
Status: DISCONTINUED | OUTPATIENT
Start: 2025-03-22 | End: 2025-03-22 | Stop reason: HOSPADM

## 2025-03-22 RX ORDER — NIACINAMIDE 500 MG
500 TABLET ORAL 2 TIMES DAILY WITH MEALS
Status: DISCONTINUED | OUTPATIENT
Start: 2025-03-22 | End: 2025-03-22 | Stop reason: HOSPADM

## 2025-03-22 RX ORDER — PAROXETINE 20 MG/1
20 TABLET, FILM COATED ORAL DAILY
Status: DISCONTINUED | OUTPATIENT
Start: 2025-03-22 | End: 2025-03-22 | Stop reason: HOSPADM

## 2025-03-22 RX ORDER — ONDANSETRON 4 MG/1
4 TABLET, ORALLY DISINTEGRATING ORAL EVERY 6 HOURS PRN
Status: DISCONTINUED | OUTPATIENT
Start: 2025-03-22 | End: 2025-03-22 | Stop reason: HOSPADM

## 2025-03-22 RX ORDER — HYDRALAZINE HYDROCHLORIDE 20 MG/ML
10 INJECTION INTRAMUSCULAR; INTRAVENOUS EVERY 30 MIN PRN
Status: DISCONTINUED | OUTPATIENT
Start: 2025-03-22 | End: 2025-03-22 | Stop reason: HOSPADM

## 2025-03-22 RX ORDER — PROCHLORPERAZINE MALEATE 10 MG
10 TABLET ORAL EVERY 6 HOURS PRN
Status: DISCONTINUED | OUTPATIENT
Start: 2025-03-22 | End: 2025-03-22 | Stop reason: HOSPADM

## 2025-03-22 RX ORDER — AMLODIPINE BESYLATE 5 MG/1
10 TABLET ORAL AT BEDTIME
Status: DISCONTINUED | OUTPATIENT
Start: 2025-03-22 | End: 2025-03-22 | Stop reason: HOSPADM

## 2025-03-22 RX ORDER — ONDANSETRON 2 MG/ML
4 INJECTION INTRAMUSCULAR; INTRAVENOUS EVERY 6 HOURS PRN
Status: DISCONTINUED | OUTPATIENT
Start: 2025-03-22 | End: 2025-03-22 | Stop reason: HOSPADM

## 2025-03-22 RX ORDER — MYCOPHENOLATE MOFETIL 250 MG/1
750 CAPSULE ORAL 2 TIMES DAILY
Status: DISCONTINUED | OUTPATIENT
Start: 2025-03-22 | End: 2025-03-22 | Stop reason: HOSPADM

## 2025-03-22 RX ORDER — ACETAMINOPHEN 325 MG/1
650 TABLET ORAL EVERY 4 HOURS PRN
Status: DISCONTINUED | OUTPATIENT
Start: 2025-03-22 | End: 2025-03-22 | Stop reason: HOSPADM

## 2025-03-22 RX ORDER — PREDNISONE 5 MG/1
5 TABLET ORAL EVERY MORNING
Status: DISCONTINUED | OUTPATIENT
Start: 2025-03-22 | End: 2025-03-22 | Stop reason: HOSPADM

## 2025-03-22 RX ORDER — LOSARTAN POTASSIUM 100 MG/1
100 TABLET ORAL EVERY EVENING
Status: DISCONTINUED | OUTPATIENT
Start: 2025-03-22 | End: 2025-03-22 | Stop reason: HOSPADM

## 2025-03-22 RX ORDER — ATORVASTATIN CALCIUM 40 MG/1
80 TABLET, FILM COATED ORAL AT BEDTIME
Status: DISCONTINUED | OUTPATIENT
Start: 2025-03-22 | End: 2025-03-22 | Stop reason: HOSPADM

## 2025-03-22 RX ORDER — POLYETHYLENE GLYCOL 3350 17 G/17G
17 POWDER, FOR SOLUTION ORAL DAILY PRN
Status: DISCONTINUED | OUTPATIENT
Start: 2025-03-22 | End: 2025-03-22 | Stop reason: HOSPADM

## 2025-03-22 RX ORDER — LIDOCAINE 40 MG/G
CREAM TOPICAL
Status: DISCONTINUED | OUTPATIENT
Start: 2025-03-22 | End: 2025-03-22 | Stop reason: HOSPADM

## 2025-03-22 RX ORDER — SODIUM BICARBONATE 650 MG/1
1300 TABLET ORAL 2 TIMES DAILY
Status: DISCONTINUED | OUTPATIENT
Start: 2025-03-22 | End: 2025-03-22 | Stop reason: HOSPADM

## 2025-03-22 RX ORDER — ACYCLOVIR 200 MG/1
10 CAPSULE ORAL ONCE
Status: COMPLETED | OUTPATIENT
Start: 2025-03-22 | End: 2025-03-22

## 2025-03-22 RX ORDER — LABETALOL HYDROCHLORIDE 5 MG/ML
10 INJECTION, SOLUTION INTRAVENOUS EVERY 10 MIN PRN
Status: DISCONTINUED | OUTPATIENT
Start: 2025-03-22 | End: 2025-03-22 | Stop reason: HOSPADM

## 2025-03-22 RX ORDER — LOSARTAN POTASSIUM 50 MG/1
50 TABLET ORAL DAILY
COMMUNITY
Start: 2025-03-21

## 2025-03-22 RX ORDER — ASPIRIN 81 MG/1
81 TABLET, CHEWABLE ORAL DAILY
Qty: 30 TABLET | Refills: 3 | Status: SHIPPED | OUTPATIENT
Start: 2025-03-22 | End: 2025-07-20

## 2025-03-22 RX ADMIN — Medication 500 MG: at 08:49

## 2025-03-22 RX ADMIN — PREDNISONE 5 MG: 5 TABLET ORAL at 08:49

## 2025-03-22 RX ADMIN — SODIUM CHLORIDE 10 ML: 9 INJECTION INTRAMUSCULAR; INTRAVENOUS; SUBCUTANEOUS at 08:25

## 2025-03-22 RX ADMIN — SODIUM BICARBONATE 1300 MG: 650 TABLET ORAL at 08:49

## 2025-03-22 RX ADMIN — MYCOPHENOLATE MOFETIL 750 MG: 250 CAPSULE ORAL at 08:49

## 2025-03-22 RX ADMIN — PAROXETINE HYDROCHLORIDE 20 MG: 20 TABLET, FILM COATED ORAL at 08:50

## 2025-03-22 ASSESSMENT — ACTIVITIES OF DAILY LIVING (ADL)
ADLS_ACUITY_SCORE: 58
ADLS_ACUITY_SCORE: 60
ADLS_ACUITY_SCORE: 58
ADLS_ACUITY_SCORE: 60
ADLS_ACUITY_SCORE: 58
ADLS_ACUITY_SCORE: 60
ADLS_ACUITY_SCORE: 58

## 2025-03-22 NOTE — PROGRESS NOTES
03/22/25 1337   Appointment Info   Signing Clinician's Name / Credentials (OT) Tam Lockwood, OTR/L   Living Environment   People in Home friend(s)   Current Living Arrangements house   Transportation Anticipated car, drives self;family or friend will provide   Living Environment Comments 2 SHIRIN, all needs met on main floor, has tub and walk-in shower   Self-Care   Usual Activity Tolerance good   Current Activity Tolerance good   Regular Exercise No   Equipment Currently Used at Home none   Fall history within last six months no   Activity/Exercise/Self-Care Comment Pt was I in all ADL/IADLs prior to admission, including driving, cooking, cleaning, and med mgmt.   General Information   Referring Physician Zoraida Najera MD   Patient/Family Therapy Goal Statement (OT) Return to PLOF   Additional Occupational Profile Info/Pertinent History of Current Problem 60 year old right-handed female with history of FSGS s/p renal transplant (1992) now on iHD, hypertension, depression, anxiety, headaches, recent admission (3/19-3/21) with hypertensive urgency who presented with transient right sided sensation changes and confusion.   Existing Precautions/Restrictions no known precautions/restrictions   Cognitive Status Examination   Cognitive Status Comments no concerns   Visual Perception   Visual Attention Wears glasses for distance and reading   Sensory   Sensory Comments R sided n/t resolving prior to admit   Range of Motion Comprehensive   Comment, General Range of Motion WNL B UEs   Strength Comprehensive (MMT)   Comment, General Manual Muscle Testing (MMT) Assessment WNL BUEs   Coordination   Upper Extremity Coordination No deficits were identified   Activities of Daily Living   BADL Assessment/Intervention bathing;lower body dressing;grooming;toileting   Bathing Assessment/Intervention   [place] Level (Bathing) supervision   Comment, (Bathing) per clinical judgement   Lower Body Dressing Assessment/Training    Comment, (Lower Body Dressing) per clinical judgement   [place] Level (Lower Body Dressing) supervision   Grooming Assessment/Training   [place] Level (Grooming) supervision   Comment, (Grooming) per clinical judgement   Toileting   Comment, (Toileting) per clinical judgement   [place] Level (Toileting) supervision   Clinical Impression   Criteria for Skilled Therapeutic Interventions Met (OT) Yes, treatment indicated   OT Diagnosis Decreased I in ADLs due to deconditioning   Assessment of Occupational Performance 1-3 Performance Deficits   Identified Performance Deficits functional endurance   Clinical Decision Making Complexity (OT) problem focused assessment/low complexity   Risk & Benefits of therapy have been explained patient   OT Total Evaluation Time   OT Eval, Low Complexity Minutes (12743) 8     Occupational Therapy Discharge Summary    Reason for therapy discharge:    All goals and outcomes met, no further needs identified.    Progress towards therapy goal(s). See goals on Care Plan in [facility] electronic health record for goal details.  Goals met    Therapy recommendation(s):    No further therapy is recommended.

## 2025-03-22 NOTE — PLAN OF CARE
Physical Therapy: Orders received. Chart reviewed and discussed with care team.? Physical Therapy not indicated per discussion with OT, pt mobilizing well with CGA-SBA and no AD, no balance or mobility concerns per OT.? Defer discharge recommendations to OT.? Will complete orders.

## 2025-03-22 NOTE — ED TRIAGE NOTES
Pt states that at 1800 she noticed numbness in her R leg, that crept up to her arms and then face.  SO noted a facial droop.  Symptoms had resolved by arrival, except some facial numbness.  No droop noted on arrival.  Tier 1 stroke called, last known well 1800

## 2025-03-22 NOTE — CONSULTS
[facility]     Stroke Code Note          History of Present Illness     Chief Complaint: Numbness (R side of facial, R knee)    Reina Quan is a 60 year old right-handed female with history of FSGS s/p renal transplant (1992) now on iHD, hypertension, depression, anxiety, headaches, recent admission (3/19-3/21) with hypertensive urgency who presented with transient right sided sensation changes and confusion.     Reina shared that she was feeling well after she discharged from the hospital, she took a shower and was doing okay and then around 5 PM, she started to have sensation changes, first in her right leg, then in her right pinky and ring finger, then in her face.  She also felt a little bit more confused than normal.  This sensation changes were transient and isolated, where it for started in her leg then the symptoms resolved then she had the symptoms in her hand, then in her face, each episode lasted about 10 minutes.  On arrival, the patient said that she had some mild word finding difficulties and felt a little confused and had more difficulty hearing than she typically does but this sensation changes had resolved.    On assessment, the patient's blood pressure was 170/94 blood glucose was 136, the patient said that she had a mild headache, no vision changes, no weakness, no trouble swallowing, no difficulty with walking.    Following imaging, the patient felt like she was back to her baseline and did not feel like she was having any trouble finding words.    She does not smoke tobacco or drink alcohol.         Past Medical History     Stroke risk factors: hypertension    Preadmission antithrombotic regimen: none    Modified Gavin Score (Pre-morbid)  0-No deficits                   Assessment and Plan       1.  Transient right sided sensation changes  2. Mild confusion with word finding difficulty, resolved  Reina Quan is a 60 year old  right-handed female with history of FSGS s/p renal transplant (1992) now on iHD, hypertension, depression, anxiety, headaches, recent admission (3/19-3/21) with hypertensive urgency who presented with transient right sided sensation changes and mild word finding difficulty.  She had a couple episodes of transient sensation changes in different portions of her right arm/leg/face, each lasting about 10 minutes. LKW 5pm. She shared initially on arrival to ED has mild word finding difficulties but that resolved as well. NIHSS 0.   CT head without evidence of hemorrhage, CTA head and neck without evidence of a large vessel occlusion. CT head did show what appeared to be prior lacunar stroke in left basal ganglia.     Differential for transient sensation changes and mild word finding difficulty includes TIA, acute ischemic stroke, recrudescence, hypertensive urgency, symptoms associated with headache. We would recommend obtaining MRI brain to evaluate further.      Intravenous Thrombolysis  Not given due to:   - minor/isolated/quickly resolving symptoms     Endovascular Treatment  Not initiated due to absence of proximal vessel occlusion     Plan:  MR brain w/o contrast     ___________________________________________________________________    The patient was discussed with Stroke Fellow, Dr. Cadena. The Stroke Staff is Dr. Yee.    AMY PUGA MD  Neurology Resident  Vocera: stroke first call resident  ___________________________________________________________________        Imaging/Labs   (personally reviewed)    CT head:   IMPRESSION:  1.  No acute intracranial hemorrhage, extra-axial fluid collection, or mass effect.  2.  Brain atrophy and presumed chronic small vessel ischemic changes, as described.    CTA head/neck:   IMPRESSION:   HEAD CTA:  1.  No high-grade proximal arterial stenosis/occlusion of the major intracranial arteries.  2.  No intracranial aneurysm or high flow vascular malformation.     NECK  CTA:  1.  No hemodynamically significant stenosis in the neck vessels.  2.  No evidence for dissection.         Physical Examination     BP: (!) 192/92   Pulse: 90   Resp: 16   Temp: 98  F (36.7  C)   Temp src: Oral   SpO2: 98 %   O2 Device: None (Room air)   Weight: 53.2 kg (117 lb 3.2 oz)    Wt Readings from Last 2 Encounters:   03/21/25 53.2 kg (117 lb 3.2 oz)   03/21/25 52.7 kg (116 lb 2.9 oz)       General Exam  General:  patient lying in bed without any acute distress    HEENT:  normocephalic/atraumatic  Cardio:  RRR  Pulmonary:  no respiratory distress  Abdomen:  non-distended  Extremities:  no edema  Skin:  intact, warm/dry     Neuro Exam  Mental Status:  alert, oriented x 3, follows commands, speech clear and fluent, naming and repetition normal  Cranial Nerves:  visual fields intact, PERRL, EOMI with normal smooth pursuit, facial sensation intact and symmetric, facial movements symmetric, hearing not formally tested but intact to conversation, no dysarthria, shoulder shrug strong bilaterally, tongue protrusion midline  Motor:  normal muscle tone and bulk, no abnormal movements, able to move all limbs spontaneously, strength 5/5 throughout upper and lower extremities, no drift in all extremities  Reflexes:  no clonus  Sensory:  light touch sensation intact and symmetric throughout upper and lower extremities, no extinction on double simultaneous stimulation   Coordination:  normal finger-to-nose and heel-to-shin bilaterally without dysmetria, rapid alternating movements symmetric  Station/Gait:  steady gait        Stroke Scales       NIHSS  1a. Level of Consciousness 0-->Alert, keenly responsive   1b. LOC Questions 0-->Answers both questions correctly   1c. LOC Commands 0-->Performs both tasks correctly   2.   Best Gaze 0-->Normal   3.   Visual 0-->No visual loss   4.   Facial Palsy 0-->Normal symmetrical movements   5a. Motor Arm, Left 0-->No drift, limb holds 90 (or 45) degrees for full 10 secs   5b.  Motor Arm, Right 0-->No drift, limb holds 90 (or 45) degrees for full 10 secs   6a. Motor Leg, Left 0-->No drift, leg holds 30 degree position for full 5 secs   6b. Motor Leg, right 0-->No drift, leg holds 30 degree position for full 5 secs   7.   Limb Ataxia 0-->Absent   8.   Sensory 0-->Normal, no sensory loss   9.   Best Language 0-->No aphasia, normal   10. Dysarthria 0-->Normal   11. Extinction and Inattention  0-->No abnormality   Total 0 (03/21/25 2038)            Labs     CBC  Lab Results   Component Value Date    HGB 10.8 (L) 03/20/2025    HCT 35.1 03/20/2025    WBC 4.8 03/20/2025     (L) 03/20/2025       BMP  Lab Results   Component Value Date     03/21/2025    POTASSIUM 4.1 03/21/2025    CHLORIDE 99 03/21/2025    CO2 24 03/21/2025    BUN 25.3 (H) 03/21/2025    CR 3.9 (H) 03/21/2025     (H) 03/21/2025    HARSHA 9.5 03/21/2025       INR  INR   Date Value Ref Range Status   03/20/2025 0.93 0.85 - 1.15 Final   01/28/2025 0.90 0.85 - 1.15 Final     INR POCT   Date Value Ref Range Status   03/21/2025 0.9 0.9 - 1.3 Final       Data   Stroke Code Data  (for stroke code without tele)  Stroke code activated 03/21/25 2025   First stroke provider response 03/21/25 2027   Last known normal 03/21/25  1700   Time of discovery (or onset of symptoms)        Head CT read by Stroke Neuro Provider 03/21/25 2052   Was stroke code de-escalated? Yes  03/21/25 2058        Clinically Significant Risk Factors Present on Admission                 # Thrombocytopenia: Lowest platelets = 126 in last 2 days, will monitor for bleeding   # Hypertension: Noted on problem list      # Anemia: based on hgb <11               # CKD, ESRD on dialysis: Will monitor and treat as appropriate  - last Cr =  3.9 mg/dL (Ref range: 0.5 - 1.0 mg/dL)  - last GFR = 13 mL/min/1.73m2 (Ref range: >60 mL/min/1.73m2)       Time Spent on this Encounter

## 2025-03-22 NOTE — PROGRESS NOTES
Nursing Focus: Discharge    D: Patient discharged to home at 1450. Patient walked and was accompanied by friend.    I: Discharge prescriptions sent to pt home pharmacy to be filled. All discharge medications and instructions reviewed with patient. Patient provided TIA information and information on when to get help if symptoms occur. Pt educated about [facility] that will be sent to her house. Other phone numbers to call with questions or concerns after discharge reviewed. PIV removed. Education completed.    A: Patient verbalized understanding of discharge medications and instructions. Patient will  medications at home pharmacy.    P: Patient to follow-up in clinic with PCP within 2 weeks and with neurology within 6-8 weeks.

## 2025-03-22 NOTE — PLAN OF CARE
Speech pathology deferral - Order received for SLP evaluation. Per chart review and discussion with pt she denies any concerns regarding speech, language or swallow function and she feels at baseline. Pt denies noting any word finding issues today and that has resolved. Pt has passed a swallow screen and diet order was placed.     SLP evaluation not warranted, will complete order. Please re consult should concerns arise.

## 2025-03-22 NOTE — PHARMACY-CONSULT NOTE
Pharmacy Stroke Code Response    Pharmacist responded as part of the Stroke Code Team activation to patient care area ED23. The Stroke Team determined that the patient was not a candidate for IV thrombolytic therapy and the pharmacy team was dismissed at 20:58.    Ray Garrett, RainaD, BCPS

## 2025-03-22 NOTE — H&P
[facility]    Stroke Admission Note    Chief Complaint  Transient right sided sensation changes, word finding difficulty    HPI  Reina Quan is a 60 year old right-handed female with history of FSGS s/p renal transplant (1992) now on iHD, hypertension, depression, anxiety, headaches, recent admission (3/19-3/21) with hypertensive urgency who presented with transient right sided sensation changes and confusion.      Reina shared that she was feeling well after she discharged from the hospital, she took a shower and was doing okay and then around 5 PM, she started to have sensation changes, first in her right leg, then in her right pinky and ring finger, then in her face.  She also felt a little bit more confused than normal.  This sensation changes were transient and isolated, where it for started in her leg then the symptoms resolved then she had the symptoms in her hand, then in her face, each episode lasted about 10 minutes.  On arrival, the patient said that she had some mild word finding difficulties and felt a little confused and had more difficulty hearing than she typically does but this sensation changes had resolved.     On assessment, the patient's blood pressure was 170/94 blood glucose was 136, the patient said that she had a mild headache, no vision changes, no weakness, no trouble swallowing, no difficulty with walking.     Following imaging, the patient felt like she was back to her baseline and did not feel like she was having any trouble finding words.     She does not smoke tobacco or drink alcohol.    Intravenous Thrombolysis  Not given due to:   - minor/isolated/quickly resolving symptoms    Endovascular Treatment  Not initiated due to absence of proximal vessel occlusion    Impression   Transient ischemic attack  Transietn right sided sensation changes, word finding difficulties  Reina Quan is a 60 year old right-handed  female with history of FSGS s/p renal transplant (1992) now on iHD, hypertension, depression, anxiety, headaches, recent admission (3/19-3/21) with hypertensive urgency who presented with transient right sided sensation changes and mild word finding difficulty.  She had a couple episodes of transient sensation changes in different portions of her right arm/leg/face, each lasting about 10 minutes. LKW 5pm. She shared initially on arrival to ED has mild word finding difficulties but that resolved as well. NIHSS 0.   CT head without evidence of hemorrhage, CTA head and neck without evidence of a large vessel occlusion. CT head did show what appeared to be prior lacunar stroke in left basal ganglia.      MR brain without evidence of stroke. Transient ischemic attack remains possibility given transient focal neurologic deficit. ABCD2 score 4. Therefore, recommended admission for TIA workup. The patient was in agreement.     Plan  Transient Ischemic Attack Plan  - ABCD2 Score: 4  - Admit to Neurology  - Neurochecks Q 4 hours  - Statin: continue PTA atorvastatin 80mg daily  - 24-hour Telemetry  - Bedside Glucose Monitoring  - Nutrition: regular diet  - A1c, Lipid Panel  - PT/OT/SLP  - Stroke Education  - Depression Screen  - Apnea Screen  - Euthermia, Euglycemia   - will discuss anti-platelet plan in AM    #Hypertension  #recent admission for hypertensive urgency  - SBP<180  - hold PTA amlodipine 10mg and losartan 100mg daily    #Headaches  - will plan for PRN migraine cocktail as needed    #FSGS s/p renal transplant (1992)  #ESRD on HD  - receives dialysis on MWF, dialyzed in hospital on 3/21  - PTA Cinacalcet 30mg every other day (given on dialysis days)  - PTA mycopneolate 750mg BID  - PTA prednisone 5mg every day   - PTA sodium bicarbonate 1300mg BID     #MDD  #DEBRA  - PTA Paroxetine 20mg QD    Prophylaxis            For VTE Prevention:  - pneumatic compression device  - ambulation (patient is low-risk and no additional  VTE prophylaxis is needed)    For Acid Suppression:  - GI prophylaxis is not indicated    Code Status  Full Code, confirmed on admission    During initial physical assessment, the plan of care was discussed and developed with patient and family.  Plan of care includes: admission for TIA workup .    Patient was admitted via [facility] ED ([place])    The patient was discussed with Stroke Fellow, Dr. Cadena.  The Stroke Staff is Dr. Yee.    AMY PUGA MD  Neurology Resident  Vocera: stroke first call resident  ___________________________________________________    Nutrition:   Orders Placed This Encounter      Regular Diet Adult    Clinically Significant Risk Factors Present on Admission          # Hypochloremia: Lowest Cl = 96 mmol/L in last 2 days, will monitor as appropriate        # Thrombocytopenia: Lowest platelets = 126 in last 2 days, will monitor for bleeding   # Hypertension: Noted on problem list                   # CKD, ESRD on dialysis: Will monitor and treat as appropriate  - last Cr =  3.9 mg/dL (Ref range: 0.5 - 1.0 mg/dL)  - last GFR = 13 mL/min/1.73m2 (Ref range: >60 mL/min/1.73m2)       Past Medical History   Past Medical History:   Diagnosis Date    Actinic keratosis     Allergic rhinitis, cause unspecified     Anemia     anxiety/depression     PAXIL    Arthritis     congenital hearing loss     uses hearing aids    Depressive disorder     Dry eyes     Giant Platelet syndrome     Glomerular Focal Sclerosis--transplant 1985     Hypertension     Kidney replaced by transplant 1992    RAPAMUNE/ SIROLIMUS     Lichen planopilaris     LPP followed by derm on doxycycline/ clobetasol    Lichen planus     Menorrhagia     migraine     Squamous cell carcinoma     8 x     Thrombocytopenia     Ulcerative colitis, unspecified     biposy neg 1996    Unsatisfactory cervical Papanicolaou smear 02/15/2021    UTI (lower urinary tract infection)     recurrent Dr Burns [facility]     Past Surgical  History   Past Surgical History:   Procedure Laterality Date    BIOPSY OF SKIN LESION      CHOLECYSTECTOMY      COLONOSCOPY N/A 1/8/2025    Procedure: Colonoscopy;  Surgeon: Valentin Bull MD;  Location: [facility]    CREATE FISTULA ARTERIOVENOUS UPPER EXTREMITY Left 10/15/2024    Procedure: Left Upper Extremity Brachiobasilic ARTERIOVENOUS FISTULA Creation;  Surgeon: Jesus Alberto Walton MD;  Location: [facility]    CYSTOSCOPY, DILATE URETER(S), COMBINED N/A 3/18/2025    Procedure: Cystoscopy with Urethral dilation;  Surgeon: Valentin Joshua MD;  Location: [facility]    CYSTOSCOPY, RETROGRADES, COMBINED Bilateral 3/18/2025    Procedure: Bilateral retrograde pyelogram .Bilateral ureteral wash;  Surgeon: Valentin Joshua MD;  Location: [facility]    DILATION AND CURETTAGE, OPERATIVE HYSTEROSCOPY WITH MORCELLATOR, COMBINED N/A 11/10/2015    Procedure: COMBINED DILATION AND CURETTAGE, OPERATIVE HYSTEROSCOPY WITH MORCELLATOR;  Surgeon: Ghada Melendez MD;  Location: [facility]    ESOPHAGOSCOPY, GASTROSCOPY, DUODENOSCOPY (EGD), COMBINED  11/20/2012    Procedure: COMBINED ESOPHAGOSCOPY, GASTROSCOPY, DUODENOSCOPY (EGD), BIOPSY SINGLE OR MULTIPLE;;  Surgeon: Valentin Hahn MD;  Location: [facility]    IR CVC TUNNEL PLACEMENT > 5 YRS OF AGE  10/9/2024    IR DIALYSIS FISTULOGRAM LEFT  1/28/2025    IR DIALYSIS FISTULOGRAM LEFT  3/20/2025    IR RENAL BIOPSY RIGHT  07/25/2024    MOHS MICROGRAPHIC PROCEDURE      REVISION FISTULA ARTERIOVENOUS UPPER EXTREMITY Left 11/20/2024    Procedure: Left Upper Extremity brachiobasilic arteriovenous fistula second stage transposition surgery;  Surgeon: Jesus Alberto Walton MD;  Location: [facility]    TRANSPLANT  1992    kidney    ZZC NONSPECIFIC PROCEDURE      Renal transplant right side    ZZC NONSPECIFIC PROCEDURE      cholecystectomy     Medications   Home Meds  Prior to Admission medications    Medication Sig Start Date End Date Taking? Authorizing Provider   amLODIPine  (NORVASC) 10 MG tablet Take 1 tablet (10 mg) by mouth at bedtime. 3/21/25   Maricel Oseguera PA-C   amLODIPine (NORVASC) 5 MG tablet Take 2 tablets (10 mg) by mouth at bedtime. 3/19/25 4/18/25  Jillian Collado MD   atorvastatin (LIPITOR) 80 MG tablet Take 1 tablet (80 mg) by mouth at bedtime. 11/6/24   Zulma Wong APRN CNP   cinacalcet (SENSIPAR) 30 MG tablet Take 1 tablet (30 mg) by mouth every other day 8/9/24   Clayton Sapp MD   clobetasol (TEMOVATE) 0.05 % external ointment Apply topically 2 times daily Use sparingly at active areas of dermatitis for no more than 14 days or until resolved, whichever is sooner. 10/12/23   Brad Bauman MD   clobetasol (TEMOVATE) 0.05 % external solution Apply topically daily as needed (itchyy scalp) Apply to scalp daily as needed. 10/12/23   Brad Bauman MD   clobetasol propionate (CLOBEX) 0.05 % external shampoo APPLY TO SCALP IN SHOWER TWICE WEEKLY 10/24/24   Brad Bauman MD   ketoconazole (NIZORAL) 2 % external shampoo APPLY TOPICALLY DAILY AS NEEDED FOR ITCHING, IRRITATION OR OTHER( SCALP SCALE) USE 2-3 TIMES A WEEK. APPLY AND LATHER THEN RINSE OUT 5 MINUTES 10/24/24   Brad Bauman MD   losartan (COZAAR) 100 MG tablet Take 1 tablet (100 mg) by mouth every evening. 3/21/25   Maricel Oseguera PA-C   losartan (COZAAR) 25 MG tablet Take 2 tablets (50 mg) by mouth daily. 3/19/25 4/18/25  Jillian Collado MD   methylPREDNISolone (MEDROL DOSEPAK) 4 MG tablet therapy pack Follow Package Directions 3/21/25   Maricel Oseguera PA-C   mycophenolate (GENERIC EQUIVALENT) 250 MG capsule Take 3 capsules (750 mg) by mouth 2 times daily 7/30/24   Clayton Sapp MD   niacinamide 500 MG tablet Take 1 tablet (500 mg) by mouth 2 times daily (with meals). 10/24/24   Brad Bauman MD   PARoxetine (PAXIL) 20 MG tablet TAKE 1 TABLET BY MOUTH EVERY MORNING 4/9/24   Lizbeth Andrews, PAJacqueC   predniSONE  (DELTASONE) 5 MG tablet Take 1 tablet (5 mg) by mouth daily  Patient taking differently: Take 5 mg by mouth every morning. 8/7/24   Clayton Sapp MD   prochlorperazine (COMPAZINE) 10 MG tablet Take 10 mg by mouth every 6 hours as needed. 2/10/25   Reported, Patient   sodium bicarbonate 650 MG tablet Take 2 tablets (1,300 mg) by mouth 2 times daily 8/2/24   Clayton Sapp MD   triamcinolone (KENALOG) 0.1 % external cream APPLY SPARINGLY TOPICALLY TO THE AFFECTED AREA TWICE DAILY FOR 14 DAYS 6/27/24   Brad Bauman MD       Scheduled Meds  Current Facility-Administered Medications   Medication Dose Route Frequency Provider Last Rate Last Admin    senna-docusate (SENOKOT-S/PERICOLACE) 8.6-50 MG per tablet 1-2 tablet  1-2 tablet Oral or NG Tube BID Zoraida Najera MD        sodium chloride (PF) 0.9% PF flush 3 mL  3 mL Intracatheter Q8H [facility] Zoraida Najera MD           Infusion Meds  Current Facility-Administered Medications   Medication Dose Route Frequency Provider Last Rate Last Admin       PRN Meds  Current Facility-Administered Medications   Medication Dose Route Frequency Provider Last Rate Last Admin    acetaminophen (TYLENOL) tablet 650 mg  650 mg Oral Q4H PRN Zoraida Najera MD        hydrALAZINE (APRESOLINE) injection 10 mg  10 mg Intravenous Q30 Min PRN Zoraida Najera MD        labetalol (NORMODYNE/TRANDATE) injection 10 mg  10 mg Intravenous Q10 Min PRN Zoraida Najera MD        lidocaine (LMX4) cream   Topical Q1H PRN Zoraida Najera MD        lidocaine 1 % 0.1-1 mL  0.1-1 mL Other Q1H PRN Zoraida Najera MD        ondansetron (ZOFRAN ODT) ODT tab 4 mg  4 mg Oral Q6H PRN Zoraida Najera MD        Or    ondansetron (ZOFRAN) injection 4 mg  4 mg Intravenous Q6H PRN Zoraida Najera MD        polyethylene glycol (MIRALAX) Packet 17 g  17 g Oral or NG Tube Daily PRN Zoraida Najera MD        prochlorperazine (COMPAZINE) injection 10 mg  10 mg  Intravenous Q6H PRN Zoraida Najera MD        Or    prochlorperazine (COMPAZINE) tablet 10 mg  10 mg Oral Q6H PRN Zoraida Najera MD        sodium chloride (PF) 0.9% PF flush 3 mL  3 mL Intracatheter q1 min prn Zoraida Najera MD           Allergies   Allergies   Allergen Reactions    Ampicillin Swelling     Swelling of mouth and tongue.    Seasonal Allergies Other (See Comments)     Itchy eyes and rhinitis.     Family History   Family History   Problem Relation Age of Onset    Hypertension Mother     Depression Mother     Anxiety Disorder Mother     Diabetes Father     Kidney Disease Father         nephrolithiasis    Asthma Father     Asthma Sister     Blood Disease Maternal Grandmother     Diabetes Maternal Grandmother     Hypertension Maternal Grandmother     Hyperlipidemia Maternal Grandmother     Depression Maternal Grandmother     No Known Problems Maternal Grandfather     No Known Problems Paternal Grandmother     C.A.D. Paternal Grandfather     Cancer Other         no family hx of skin cancer    Glaucoma No family hx of     Macular Degeneration No family hx of     Melanoma No family hx of     Skin Cancer No family hx of     Anesthesia Reaction No family hx of     Thrombosis No family hx of      Social History   Social History     Tobacco Use    Smoking status: Never     Passive exposure: Never    Smokeless tobacco: Never   Vaping Use    Vaping status: Never Used   Substance Use Topics    Alcohol use: Not Currently    Drug use: Never       Review of Systems   The 10 point Review of Systems is negative other than noted in the HPI or here.        PHYSICAL EXAMINATION  Temp:  [98  F (36.7  C)-98.5  F (36.9  C)] 98  F (36.7  C)  Pulse:  [] 88  Resp:  [15-18] 18  BP: (118-202)/(71-96) 140/96  SpO2:  [89 %-100 %] 99 %    General Exam  General:  patient lying in bed without any acute distress    HEENT:  normocephalic/atraumatic  Cardio:  RRR  Pulmonary:  no respiratory distress  Abdomen:   non-distended  Extremities:  no edema  Skin:  intact, warm/dry      Neuro Exam  Mental Status:  alert, oriented x 3, follows commands, speech clear and fluent, naming and repetition normal  Cranial Nerves:  visual fields intact, PERRL, EOMI with normal smooth pursuit, facial sensation intact and symmetric, facial movements symmetric, hearing not formally tested but intact to conversation, no dysarthria, shoulder shrug strong bilaterally, tongue protrusion midline  Motor:  normal muscle tone and bulk, no abnormal movements, able to move all limbs spontaneously, strength 5/5 throughout upper and lower extremities, no drift in all extremities  Reflexes:  no clonus  Sensory:  light touch sensation intact and symmetric throughout upper and lower extremities, no extinction on double simultaneous stimulation   Coordination:  normal finger-to-nose and heel-to-shin bilaterally without dysmetria, rapid alternating movements symmetric  Station/Gait:  steady gait    Dysphagia Screen  Per Nursing    Stroke Scales    NIHSS  1a. Level of Consciousness 0-->Alert, keenly responsive   1b. LOC Questions 0-->Answers both questions correctly   1c. LOC Commands 0-->Performs both tasks correctly   2.   Best Gaze 0-->Normal   3.   Visual 0-->No visual loss   4.   Facial Palsy 0-->Normal symmetrical movements   5a. Motor Arm, Left 0-->No drift, limb holds 90 (or 45) degrees for full 10 secs   5b. Motor Arm, Right 0-->No drift, limb holds 90 (or 45) degrees for full 10 secs   6a. Motor Leg, Left 0-->No drift, leg holds 30 degree position for full 5 secs   6b. Motor Leg, right 0-->No drift, leg holds 30 degree position for full 5 secs   7.   Limb Ataxia 0-->Absent   8.   Sensory 0-->Normal, no sensory loss   9.   Best Language 0-->No aphasia, normal   10. Dysarthria 0-->Normal   11. Extinction and Inattention  0-->No abnormality   Total 0 (03/21/25 2038)       Modified [place] Score (Pre-morbid)  0-No deficits    Imaging  I personally reviewed  all imaging; relevant findings per the HPI.    Lab Results Data   CBC  Recent Labs   Lab 03/21/25 2037 03/20/25  0616 03/19/25  1655   WBC 5.5 4.8 6.7   RBC 3.93 3.84 4.22   HGB 11.8 10.8* 11.8   HCT 36.5 35.1 37.5   * 126* 128*     Basic Metabolic Panel   Recent Labs   Lab 03/21/25 2038 03/21/25 2037 03/21/25 2031 03/21/25  0716 03/20/25  0616   NA  --  138  --  137 137   POTASSIUM  --  4.4  --  4.1 4.7   CHLORIDE  --  96*  --  99 98   CO2  --  26  --  24 25   BUN  --  17.5  --  25.3* 17.3   CR 3.9* 3.81*  --  6.27* 4.27*   GLC  --  131* 133* 80 93   HARSHA  --  9.9  --  9.5 9.6     Liver Panel  Recent Labs   Lab 03/19/25  1655   PROTTOTAL 6.8   ALBUMIN 4.1   BILITOTAL 0.2   ALKPHOS 93   AST 34   ALT 10     INR    Recent Labs   Lab Test 03/21/25 2037 03/20/25  0855   INR 0.88  0.9 0.93      Lipid Profile    Recent Labs   Lab Test 03/21/25 2310 04/09/24  1037 05/09/23  1137   CHOL 276* 233* 379*   HDL 96 81 75   * 115* 213*   TRIG 215* 186* 431*     A1C    Recent Labs   Lab Test 03/21/25  2037 10/07/24  1326 01/17/20  1458   A1C 5.0 6.0* 5.7*     Troponin    Recent Labs   Lab 03/21/25 2310 03/21/25 2037 03/19/25  1850   CTROPT 20* 21* 20*          Stroke Code / Stroke Consult Data Data    Stroke code activated 03/21/25 2025   First stroke provider response         Last known normal 03/21/25   1700   Time of discovery   (or onset of symptoms)         Head CT read by Stroke Neuro Dr/Provider 03/21/25 2052   Was stroke code de-escalated? Yes 03/21/25 2058

## 2025-03-22 NOTE — MEDICATION SCRIBE - ADMISSION MEDICATION HISTORY
Medication Scribe Admission Medication History    Admission medication history is complete. The information provided in this note is only as accurate as the sources available at the time of the update.    Information Source(s): Patient, Facility ([facility]/) medication list/MAR, and CareEverywhere/SureScripts via in-person    Pertinent Information: Patient was discharged yesterday 03/21/25 and readmitted few hours later, I therefore used MAR in the system in addition to patient's history to complete med reconciliation. She reported methylprednisolone and Renvela are new RX and has not started them yet. Also unsure of current Amlodipine dosage but according to the MAR she was on 10 mg. I did not change that.    Changes made to PTA medication list:  Added: None  Deleted: None  Changed: losartan (COZAAR) 20 MG tablet-->losartan (COZAAR) 50 MG tablet    Allergies reviewed with patient and updates made in EHR: yes    Medication History Completed By: Belia Tolbert 3/22/2025 11:15 AM    PTA Med List   Medication Sig Last Dose/Taking    amLODIPine (NORVASC) 10 MG tablet Take 1 tablet (10 mg) by mouth at bedtime. 3/19/2025 at 10:15 PM    atorvastatin (LIPITOR) 80 MG tablet Take 1 tablet (80 mg) by mouth at bedtime. 3/19/2025 at 10:15 PM    cinacalcet (SENSIPAR) 30 MG tablet Take 1 tablet (30 mg) by mouth every other day 3/17/2025    clobetasol (TEMOVATE) 0.05 % external ointment Apply topically 2 times daily Use sparingly at active areas of dermatitis for no more than 14 days or until resolved, whichever is sooner. 3/18/2025    clobetasol (TEMOVATE) 0.05 % external solution Apply topically daily as needed (itchyy scalp) Apply to scalp daily as needed. 3/18/2025    clobetasol propionate (CLOBEX) 0.05 % external shampoo APPLY TO SCALP IN SHOWER TWICE WEEKLY 3/18/2025    ketoconazole (NIZORAL) 2 % external shampoo APPLY TOPICALLY DAILY AS NEEDED FOR ITCHING, IRRITATION OR OTHER( SCALP SCALE) USE 2-3 TIMES A WEEK. APPLY AND  LATHER THEN RINSE OUT 5 MINUTES 3/21/2025    losartan (COZAAR) 50 MG tablet Take 50 mg by mouth daily. 3/19/2025 at  8:16 PM    mycophenolate (GENERIC EQUIVALENT) 250 MG capsule Take 3 capsules (750 mg) by mouth 2 times daily 3/21/2025 at  8:37 AM    niacinamide 500 MG tablet Take 1 tablet (500 mg) by mouth 2 times daily (with meals). 3/21/2025 at  8:37 AM    PARoxetine (PAXIL) 20 MG tablet TAKE 1 TABLET BY MOUTH EVERY MORNING 3/21/2025 at  8:37 AM    predniSONE (DELTASONE) 5 MG tablet Take 1 tablet (5 mg) by mouth daily (Patient taking differently: Take 5 mg by mouth every morning.) 3/21/2025 at  8:37 AM    sodium bicarbonate 650 MG tablet Take 2 tablets (1,300 mg) by mouth 2 times daily 3/21/2025 at  8:37 AM

## 2025-03-22 NOTE — ED PROVIDER NOTES
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     Per chart review: patient was recently admitted to [facility] (3/19/2025-3/21/2025) for elevated blood pressure and headaches. Patient also had gross hematuria s/p cystoscopy 3/18/2025.     Patient states that after she was discharged she went home and was feeling okay.  She states that a couple hours ago she developed weakness and numbness in her right leg, then developed some numbness in the fingers of the right hand as well as the right side of her face.  Her member noted that her speech seems slurred and her lip on the right was drooping.  She did not seem to making any sense.  She states that the symptoms of the numbness in the face have improved but she still feeling a little confused, still note some numbness in the right extremities.    Past Medical History  Past Medical History:   Diagnosis Date    Actinic keratosis     Allergic rhinitis, cause unspecified     Anemia     anxiety/depression     PAXIL    Arthritis     congenital hearing loss     uses hearing aids    Depressive disorder     Dry eyes     Giant Platelet syndrome     Glomerular Focal Sclerosis--transplant 1985     Hypertension     Kidney replaced by transplant 1992    RAPAMUNE/ SIROLIMUS     Lichen planopilaris     LPP followed by derm on doxycycline/ clobetasol    Lichen planus     Menorrhagia     migraine     Squamous cell carcinoma     8 x     Thrombocytopenia     Ulcerative colitis, unspecified     biposy neg 1996    Unsatisfactory cervical Papanicolaou smear 02/15/2021    UTI  (lower urinary tract infection)     recurrent Dr Burns [facility]     Past Surgical History:   Procedure Laterality Date    BIOPSY OF SKIN LESION      CHOLECYSTECTOMY      COLONOSCOPY N/A 1/8/2025    Procedure: Colonoscopy;  Surgeon: Valentin Bull MD;  Location: [facility]    CREATE FISTULA ARTERIOVENOUS UPPER EXTREMITY Left 10/15/2024    Procedure: Left Upper Extremity Brachiobasilic ARTERIOVENOUS FISTULA Creation;  Surgeon: Jesus Alberto Walton MD;  Location: [facility] OR    CYSTOSCOPY, DILATE URETER(S), COMBINED N/A 3/18/2025    Procedure: Cystoscopy with Urethral dilation;  Surgeon: Valentin Joshua MD;  Location: [facility]    CYSTOSCOPY, RETROGRADES, COMBINED Bilateral 3/18/2025    Procedure: Bilateral retrograde pyelogram .Bilateral ureteral wash;  Surgeon: Valentin Joshua MD;  Location: [facility]    DILATION AND CURETTAGE, OPERATIVE HYSTEROSCOPY WITH MORCELLATOR, COMBINED N/A 11/10/2015    Procedure: COMBINED DILATION AND CURETTAGE, OPERATIVE HYSTEROSCOPY WITH MORCELLATOR;  Surgeon: Ghada Melendez MD;  Location: [facility]    ESOPHAGOSCOPY, GASTROSCOPY, DUODENOSCOPY (EGD), COMBINED  11/20/2012    Procedure: COMBINED ESOPHAGOSCOPY, GASTROSCOPY, DUODENOSCOPY (EGD), BIOPSY SINGLE OR MULTIPLE;;  Surgeon: Valentin Hahn MD;  Location: [facility]    IR CVC TUNNEL PLACEMENT > 5 YRS OF AGE  10/9/2024    IR DIALYSIS FISTULOGRAM LEFT  1/28/2025    IR DIALYSIS FISTULOGRAM LEFT  3/20/2025    IR RENAL BIOPSY RIGHT  07/25/2024    MOHS MICROGRAPHIC PROCEDURE      REVISION FISTULA ARTERIOVENOUS UPPER EXTREMITY Left 11/20/2024    Procedure: Left Upper Extremity brachiobasilic arteriovenous fistula second stage transposition surgery;  Surgeon: Jesus Alberto Walton MD;  Location: [facility]    TRANSPLANT  1992    kidney    ZZC NONSPECIFIC PROCEDURE      Renal transplant right side    ZZC NONSPECIFIC PROCEDURE      cholecystectomy     amLODIPine (NORVASC) 10 MG tablet  amLODIPine (NORVASC) 5 MG tablet  atorvastatin  (LIPITOR) 80 MG tablet  cinacalcet (SENSIPAR) 30 MG tablet  clobetasol (TEMOVATE) 0.05 % external ointment  clobetasol (TEMOVATE) 0.05 % external solution  clobetasol propionate (CLOBEX) 0.05 % external shampoo  ketoconazole (NIZORAL) 2 % external shampoo  losartan (COZAAR) 100 MG tablet  losartan (COZAAR) 25 MG tablet  methylPREDNISolone (MEDROL DOSEPAK) 4 MG tablet therapy pack  mycophenolate (GENERIC EQUIVALENT) 250 MG capsule  niacinamide 500 MG tablet  PARoxetine (PAXIL) 20 MG tablet  predniSONE (DELTASONE) 5 MG tablet  prochlorperazine (COMPAZINE) 10 MG tablet  sodium bicarbonate 650 MG tablet  triamcinolone (KENALOG) 0.1 % external cream      Allergies   Allergen Reactions    Ampicillin Swelling     Swelling of mouth and tongue.    Seasonal Allergies Other (See Comments)     Itchy eyes and rhinitis.     Family History  Family History   Problem Relation Age of Onset    Hypertension Mother     Depression Mother     Anxiety Disorder Mother     Diabetes Father     Kidney Disease Father         nephrolithiasis    Asthma Father     Asthma Sister     Blood Disease Maternal Grandmother     Diabetes Maternal Grandmother     Hypertension Maternal Grandmother     Hyperlipidemia Maternal Grandmother     Depression Maternal Grandmother     No Known Problems Maternal Grandfather     No Known Problems Paternal Grandmother     C.A.D. Paternal Grandfather     Cancer Other         no family hx of skin cancer    Glaucoma No family hx of     Macular Degeneration No family hx of     Melanoma No family hx of     Skin Cancer No family hx of     Anesthesia Reaction No family hx of     Thrombosis No family hx of      Social History   Social History     Tobacco Use    Smoking status: Never     Passive exposure: Never    Smokeless tobacco: Never   Vaping Use    Vaping status: Never Used   Substance Use Topics    Alcohol use: Not Currently    Drug use: Never      A medically appropriate review of systems was performed with pertinent  "positives and negatives noted in the HPI, and all other systems negative.    Physical Exam   BP: (!) 170/94  Pulse: 107  Temp: 98  F (36.7  C)  Resp: 16  Height: 157.5 cm (5' 2\")  Weight: 53.2 kg (117 lb 3.2 oz)  SpO2: 99 %  Physical Exam  Constitutional:       General: She is not in acute distress.     Appearance: She is not toxic-appearing.   HENT:      Head: Atraumatic.      Mouth/Throat:      Mouth: Mucous membranes are moist.      Pharynx: Oropharynx is clear.   Eyes:      Conjunctiva/sclera: Conjunctivae normal.      Pupils: Pupils are equal, round, and reactive to light.   Cardiovascular:      Rate and Rhythm: Normal rate.   Pulmonary:      Effort: Pulmonary effort is normal. No respiratory distress.   Musculoskeletal:         General: Normal range of motion.      Cervical back: Normal range of motion.   Skin:     General: Skin is warm and dry.   Neurological:      General: No focal deficit present.      Mental Status: She is alert and oriented to person, place, and time.           ED Course, Procedures, & Data      Procedures            EKG Interpretation:      Interpreted by Herman Ashby MD  Time reviewed: 9:15  Symptoms at time of EKG: confusion   Rhythm: normal sinus   Rate: normal  Axis: normal  Ectopy: none  Conduction: prolonged qt otherwise normal  ST Segments/ T Waves: No ST-T wave changes      Clinical Impression: normal EKG            Results for orders placed or performed during the hospital encounter of 03/21/25   CT Head w/o Contrast     Status: None    Narrative    EXAM: CT HEAD W/O CONTRAST  LOCATION: [facility]  DATE: 3/21/2025    INDICATION: Code Stroke, rule out hemorrhage and evaluate for potential thrombolysis thrombectomy. PLEASE READ IMMEDIATELY.  COMPARISON: CT head dated 3/19/2025.  TECHNIQUE: Routine CT Head without IV contrast. Multiplanar reformats. Dose reduction techniques were used.    FINDINGS:  INTRACRANIAL CONTENTS: No " intracranial hemorrhage, extraaxial collection, or mass effect. No CT findings of large transcortical acute or subacute infarct. Small ovoid focus of hypoattenuation in the left lentiform nucleus region, which may represent a   dilated perivascular space or possibly a chronic lacunar infarct. Mild presumed chronic small vessel ischemic changes. Mild generalized volume loss. No hydrocephalus.     VISUALIZED ORBITS/SINUSES/MASTOIDS: No intraorbital abnormality. No paranasal sinus mucosal disease. No middle ear or mastoid effusion.    BONES/SOFT TISSUES: No acute abnormality.      Impression    IMPRESSION:  1.  No acute intracranial hemorrhage, extra-axial fluid collection, or mass effect.  2.  Brain atrophy and presumed chronic small vessel ischemic changes, as described.     CTA Head Neck w Contrast     Status: None    Narrative    EXAM: CTA HEAD NECK W CONTRAST  LOCATION: [facility]  DATE: 3/21/2025    INDICATION: Code Stroke, evaluate for LVO. PLEASE READ IMMEDIATELY.  COMPARISON: CT of the head 3/21/2025.  CONTRAST: Iopamidol (Isovue 370) solution 67 mL  TECHNIQUE: Head and neck CT angiogram with IV contrast. Axial helical CT images of the head and neck vessels obtained during the arterial phase of intravenous contrast administration. Axial 2D reconstructed images and multiplanar 3D MIP reconstructed   images of the head and neck vessels were performed by the technologist. Dose reduction techniques were used. All stenosis measurements made according to NASCET criteria unless otherwise specified.    FINDINGS:  HEAD CTA:  ANTERIOR CIRCULATION: No high-grade proximal arterial stenosis/occlusion, aneurysm, or high flow vascular malformation. Fetal origin of the right posterior cerebral artery from the anterior circulation.    POSTERIOR CIRCULATION: No high-grade proximal arterial stenosis/occlusion, aneurysm, or high flow vascular malformation. Dominant right and smaller  left vertebral artery contribute to a normal basilar artery.     DURAL VENOUS SINUSES: Expected enhancement of the major dural venous sinuses.    NECK CTA:  RIGHT CAROTID: No measurable stenosis or dissection.    LEFT CAROTID: No measurable stenosis or dissection.    VERTEBRAL ARTERIES: No focal stenosis or dissection.    AORTIC ARCH: Bovine origin left common carotid artery. No significant stenosis at the origin of the great vessels.    NONVASCULAR STRUCTURES: Calcified palatine tonsilloliths bilaterally. Multilevel degenerative changes of the cervical spine are noted. There is partial visualization of a central venous catheter extending into the lower aspect of the right internal   jugular vein and continuing into the superior vena cava, incompletely imaged by CT.      Impression    IMPRESSION:   HEAD CTA:  1.  No high-grade proximal arterial stenosis/occlusion of the major intracranial arteries.  2.  No intracranial aneurysm or high flow vascular malformation.    NECK CTA:  1.  No hemodynamically significant stenosis in the neck vessels.  2.  No evidence for dissection.    Findings discussed with the ordering provider by myself by phone at approximately 9:15 PM central time 3/21/2025.     MR Brain w/o Contrast     Status: None    Narrative    EXAM: MR BRAIN W/O CONTRAST  LOCATION: [facility]  DATE: 3/21/2025    INDICATION: CVA  COMPARISON: CT/CTA same day  TECHNIQUE: Routine multiplanar multisequence head MRI without intravenous contrast.    FINDINGS:  INTRACRANIAL CONTENTS: No acute or subacute infarct. No mass, acute hemorrhage, or extra-axial fluid collections. Patchy nonspecific T2/FLAIR hyperintensities within the cerebral white matter most consistent with mild to moderate chronic microvascular   ischemic change. Mild generalized cerebral atrophy. No hydrocephalus. There is a chronic lacunar infarct in the left corona radiata. Normal position of the cerebellar  tonsils.     SELLA: No abnormality accounting for technique.    OSSEOUS STRUCTURES/SOFT TISSUES: Normal marrow signal. The major intracranial vascular flow voids are maintained.     ORBITS: No abnormality accounting for technique.     SINUSES/MASTOIDS: No paranasal sinus mucosal disease. Scattered fluid/membrane thickening in the mastoid air cells bilaterally.       Impression    IMPRESSION:  1.  No recent infarct, intracranial mass or evidence of intracranial hemorrhage.  2.  Chronic lacunar infarct in the left corona radiata.  3.  Mild to moderate presumed chronic small vessel ischemic changes.     Basic metabolic panel     Status: Abnormal   Result Value Ref Range    Sodium 138 135 - 145 mmol/L    Potassium 4.4 3.4 - 5.3 mmol/L    Chloride 96 (L) 98 - 107 mmol/L    Carbon Dioxide (CO2) 26 22 - 29 mmol/L    Anion Gap 16 (H) 7 - 15 mmol/L    Urea Nitrogen 17.5 8.0 - 23.0 mg/dL    Creatinine 3.81 (H) 0.51 - 0.95 mg/dL    GFR Estimate 13 (L) >60 mL/min/1.73m2    Calcium 9.9 8.8 - 10.4 mg/dL    Glucose 131 (H) 70 - 99 mg/dL   INR     Status: Normal   Result Value Ref Range    INR 0.88 0.85 - 1.15   Partial thromboplastin time     Status: Normal   Result Value Ref Range    aPTT 26 22 - 38 Seconds   Troponin T, High Sensitivity     Status: Abnormal   Result Value Ref Range    Troponin T, High Sensitivity 21 (H) <=14 ng/L   CBC with platelets and differential     Status: Abnormal   Result Value Ref Range    WBC Count 5.5 4.0 - 11.0 10e3/uL    RBC Count 3.93 3.80 - 5.20 10e6/uL    Hemoglobin 11.8 11.7 - 15.7 g/dL    Hematocrit 36.5 35.0 - 47.0 %    MCV 93 78 - 100 fL    MCH 30.0 26.5 - 33.0 pg    MCHC 32.3 31.5 - 36.5 g/dL    RDW 15.1 (H) 10.0 - 15.0 %    Platelet Count 128 (L) 150 - 450 10e3/uL    % Neutrophils 76 %    % Lymphocytes 12 %    % Monocytes 9 %    % Eosinophils 2 %    % Basophils 1 %    % Immature Granulocytes 0 %    NRBCs per 100 WBC 0 <1 /100    Absolute Neutrophils 4.2 1.6 - 8.3 10e3/uL    Absolute  Lymphocytes 0.7 (L) 0.8 - 5.3 10e3/uL    Absolute Monocytes 0.5 0.0 - 1.3 10e3/uL    Absolute Eosinophils 0.1 0.0 - 0.7 10e3/uL    Absolute Basophils 0.0 0.0 - 0.2 10e3/uL    Absolute Immature Granulocytes 0.0 <=0.4 10e3/uL    Absolute NRBCs 0.0 10e3/uL   Glucose by meter     Status: Abnormal   Result Value Ref Range    GLUCOSE BY METER POCT 133 (H) 70 - 99 mg/dL   iStat INR, POCT     Status: Normal   Result Value Ref Range    INR POCT 0.9 0.9 - 1.3    Narrative    Effective 11/13/2024, the abnormal flagging for this assay has changed to alert for values below or above the reference range for the patient population. Previous tests were flagged below or above the therapeutic level, so there may be differences in the flagging of prior results with similar values performed with this method.   Creatinine POCT     Status: Abnormal   Result Value Ref Range    Creatinine POCT 3.9 (H) 0.5 - 1.0 mg/dL    GFR, ESTIMATED POCT 13 (L) >60 mL/min/1.73m2    Narrative    Reference intervals for this test were updated on 03/10/2025 to standardize reference intervals for creatinine measured by different instruments. There may be differences in the flagging of prior results with similar values performed with this method. Those prior results can be interpreted in the context of the updated reference intervals.   Troponin T, High Sensitivity     Status: Abnormal   Result Value Ref Range    Troponin T, High Sensitivity 20 (H) <=14 ng/L   EKG 12-lead, tracing only     Status: None (Preliminary result)   Result Value Ref Range    Systolic Blood Pressure  mmHg    Diastolic Blood Pressure  mmHg    Ventricular Rate 87 BPM    Atrial Rate 87 BPM    VA Interval 178 ms    QRS Duration 88 ms     ms    QTc 505 ms    P Axis 55 degrees    R AXIS 42 degrees    T Axis 41 degrees    Interpretation ECG Sinus rhythm  Prolonged QT  Abnormal ECG      CBC with Platelets & Differential     Status: Abnormal    Narrative    The following orders were  created for panel order CBC with Platelets & Differential.  Procedure                               Abnormality         Status                     ---------                               -----------         ------                     CBC with platelets and ...[[id number]]  Abnormal            Final result                 Please view results for these tests on the individual orders.     Medications   iopamidol (ISOVUE-370) solution 67 mL (67 mLs Intravenous $Given 3/21/25 2049)     And   sodium chloride 0.9 % bag for CT scan flush use (100 mLs As instructed $Given 3/21/25 2049)   LORazepam (ATIVAN) injection 1 mg (1 mg Intravenous $Given 3/21/25 2205)   acetaminophen (TYLENOL) tablet 1,000 mg (1,000 mg Oral $Given 3/21/25 2334)     Labs Ordered and Resulted from Time of ED Arrival to Time of ED Departure   BASIC METABOLIC PANEL - Abnormal       Result Value    Sodium 138      Potassium 4.4      Chloride 96 (*)     Carbon Dioxide (CO2) 26      Anion Gap 16 (*)     Urea Nitrogen 17.5      Creatinine 3.81 (*)     GFR Estimate 13 (*)     Calcium 9.9      Glucose 131 (*)    TROPONIN T, HIGH SENSITIVITY - Abnormal    Troponin T, High Sensitivity 21 (*)    CBC WITH PLATELETS AND DIFFERENTIAL - Abnormal    WBC Count 5.5      RBC Count 3.93      Hemoglobin 11.8      Hematocrit 36.5      MCV 93      MCH 30.0      MCHC 32.3      RDW 15.1 (*)     Platelet Count 128 (*)     % Neutrophils 76      % Lymphocytes 12      % Monocytes 9      % Eosinophils 2      % Basophils 1      % Immature Granulocytes 0      NRBCs per 100 WBC 0      Absolute Neutrophils 4.2      Absolute Lymphocytes 0.7 (*)     Absolute Monocytes 0.5      Absolute Eosinophils 0.1      Absolute Basophils 0.0      Absolute Immature Granulocytes 0.0      Absolute NRBCs 0.0     GLUCOSE BY METER - Abnormal    GLUCOSE BY METER POCT 133 (*)    ISTAT CREATININE POCT - Abnormal    Creatinine POCT 3.9 (*)     GFR, ESTIMATED POCT 13 (*)    TROPONIN T, HIGH SENSITIVITY -  Abnormal    Troponin T, High Sensitivity 20 (*)    INR - Normal    INR 0.88     PARTIAL THROMBOPLASTIN TIME - Normal    aPTT 26     ISTAT INR POCT - Normal    INR POCT 0.9     GLUCOSE MONITOR NURSING POCT     MR Brain w/o Contrast   Final Result   IMPRESSION:   1.  No recent infarct, intracranial mass or evidence of intracranial hemorrhage.   2.  Chronic lacunar infarct in the left corona radiata.   3.  Mild to moderate presumed chronic small vessel ischemic changes.         CTA Head Neck w Contrast   Final Result   IMPRESSION:    HEAD CTA:   1.  No high-grade proximal arterial stenosis/occlusion of the major intracranial arteries.   2.  No intracranial aneurysm or high flow vascular malformation.      NECK CTA:   1.  No hemodynamically significant stenosis in the neck vessels.   2.  No evidence for dissection.      Findings discussed with the ordering provider by myself by phone at approximately 9:15 PM central time 3/21/2025.         CT Head w/o Contrast   Final Result   IMPRESSION:   1.  No acute intracranial hemorrhage, extra-axial fluid collection, or mass effect.   2.  Brain atrophy and presumed chronic small vessel ischemic changes, as described.                    Critical Care Addendum  My initial assessment, based on my review of nursing observations, review of vital signs, focused history, and physical exam, established a high suspicion that Reina Quan has ischemic or hemorrhagic stroke, which requires immediate intervention, and therefore she is critically ill.     After the initial assessment, the care team initiated multiple lab tests and consulted with neurology to provide stabilization care. Due to the critical nature of this patient, I reassessed nursing observations, vital signs, physical exam, and neurologic status multiple times prior to her disposition.     Time also spent performing documentation, reviewing test results, discussion with consultants, and coordination of care.      Critical care time (excluding teaching time and procedures): 30 minutes.    Assessment & Plan    This is a 60yoF presenting with history of CKD here with numbness, weakness. She was hypertensive but vitals were otherwise reassuring on arrival.  Given the acute nature of her symptoms, onset less than 4.5 hours ago a stroke page was sent out.  The stroke team did see the patient in the ED and she went to CT. CT scans later did not show any acute findings per radiology.  Neurology recommended to get MRI which did not show signs of acute infarct.  Neurology believe the patient may have had a TIA, plan to that the patient to their service for further management.    I have reviewed the nursing notes. I have reviewed the findings, diagnosis, plan and need for follow up with the patient.    New Prescriptions    No medications on file       Final diagnoses:   Numbness       Herman Ashby MD  [facility] EMERGENCY DEPARTMENT  3/21/2025    --           Herman Ashby MD  Emergency Medicine        Herman Ashby MD  03/22/25 0018

## 2025-03-22 NOTE — DISCHARGE SUMMARY
[facility]    Neurology Stroke Discharge Summary    Date of Admission: 3/21/2025  Date of Discharge: 03/22/2025    Disposition: Discharged to home  Primary Care Physician: Lizbteh Andrews      Admission Diagnosis:   #transient left hemibody paresthesias, resolved c/f Transient ischemic attack  #Hypertension  #recent admission for hypertensive urgency  #Headaches   #FSGS s/p renal transplant (1992)  #ESRD on HD  #MDD  #DEBRA   Discharge Diagnosis:   Transient ischemic attack  #Hypertension  #recent admission for hypertensive urgency  #Headaches   #FSGS s/p renal transplant (1992)  #ESRD on HD  #MDD  #DEBRA     Problem Leading to Hospitalization (from [facility]):   60 year old right-handed female with history of FSGS s/p renal transplant (1992) now on iHD, hypertension, depression, anxiety, headaches, recent admission (3/19-3/21) with hypertensive urgency who presented with transient right sided sensation changes and confusion.      Reina shared that she was feeling well after she discharged from the hospital, she took a shower and was doing okay and then around 5 PM, she started to have sensation changes, first in her right leg, then in her right pinky and ring finger, then in her face.  She also felt a little bit more confused than normal.  This sensation changes were transient and isolated, where it for started in her leg then the symptoms resolved then she had the symptoms in her hand, then in her face, each episode lasted about 10 minutes.  On arrival, the patient said that she had some mild word finding difficulties and felt a little confused and had more difficulty hearing than she typically does but this sensation changes had resolved.     On assessment, the patient's blood pressure was 170/94 blood glucose was 136, the patient said that she had a mild headache, no vision changes, no weakness, no trouble swallowing, no difficulty with walking.    Please see H&P  dated 3/21/2025 for further details about presentation.    Brief Hospital Course:   Patient presented with word finding difficulties, confusion. Transient left hemibody paresthesias had already resolved on arrival.      Did not have a stroke on brain MRI.    IV thrombolysis was not indicated given quickly improving symptoms.      Work-up as stated below under Pertinent Investigations.    Suspect Transient ischemic attack event. Etiology is thought to be embolic versus small vessel ischemic disease.  Probable cortical involvement due to reported language difficulties and sensory symptoms which is more consistent with embolic phenomenon.     Rehab evaluation: none    Smoking Cessation: patient is not a smoker    BP Long-term Goal: <130/80. PTA anti-hypertensives amlodipine 10mg, losartan 100mg  Antithrombotic/Anticoagulant Agent: aspirin 81 mg daily    Statins: Prior to admission statin atorvastatin 80mg  , long term LDL <70.    Hgb A1C Goal: < 7.0    Complications: None.     Hospital course, by problems.  #Hypertension  #recent admission for hypertensive urgency  - ok to resume PTA amlodipine 10mg and losartan 100mg daily at discharge     #Headaches-prn tylenol, discuss with PCP for long-term management.     #FSGS s/p renal transplant (1992)  #ESRD on HD  - dialysis on MWF, dialyzed in hospital on 3/21  - PTA Cinacalcet 30mg every other day (given on dialysis days)  - PTA mycopneolate 750mg BID  - PTA prednisone 5mg every day   - PTA sodium bicarbonate 1300mg BID      #MDD  #DEBRA  - PTA Paroxetine 20mg QD    PERTINENT INVESTIGATIONS    Labs  Lipid Panel:   Recent Labs   Lab Test 03/21/25  2310   CHOL 276*   HDL 96   *   TRIG 215*     A1C:   Lab Results   Component Value Date    A1C 5.0 03/21/2025    A1C 5.7 01/17/2020     INR:   Recent Labs   Lab 03/21/25 2037 03/20/25  0855   INR 0.88  0.9 0.93      Coag Panel / Hypercoag Workup: Not indicated  Pending test results: none    Echo: unremarkable EF 60-65%, no  PFO, no LV or RV abnormalities  Imaging:   MRI Brain 3/21/25  IMPRESSION:  1.  No recent infarct, intracranial mass or evidence of intracranial hemorrhage.  2.  Chronic lacunar infarct in the left corona radiata.  3.  Mild to moderate presumed chronic small vessel ischemic changes.     IMPRESSION:   HEAD CTA:  1.  No high-grade proximal arterial stenosis/occlusion of the major intracranial arteries.  2.  No intracranial aneurysm or high flow vascular malformation.     NECK CTA:  1.  No hemodynamically significant stenosis in the neck vessels.  2.  No evidence for dissection.  Endovascular procedure: None     Cardiac Monitoring: Patient had > 24 hrs of cardiac monitor while in hospital.    Findings: No atrial fibrillation was found. and Patient discharged with 14 day home cardiac monitor    Sleep Apnea Screen:   Questions/Answers      1. Prior to your stroke, have you been told that you snore? No.    2. Prior to your stroke, have you been told that you struggle to breath while you are sleeping? No.    3. Prior to your stroke, do you feel tired and sleepy even after getting a normal night of sleep? No.    Sleep Apnea Screen Findings: Patient has 0-1 symptoms of sleep apnea.  Further sleep study is not recommended at this time.    PHQ-9 Depression Screen Score: 8, on anti depressants and follows with PCP    Education discussed with: patient on blood pressure management, cholesterol management, medical management, exercise recommendation, follow-up recommendations/plan, 911 call if warning signs of stroke, and results (MRI brain, CTA head/neck).    During daily rounds, the plan of care was discussed and developed with patient.  Plan of care includes: blood pressure control .    PHYSICAL EXAMINATION  Vital Signs:  B/P: 153/80, T: 98.6, P: 86, R: 18    Mental Status:  alert, oriented x 3, follows commands, speech clear and fluent, naming and repetition normal  Cranial Nerves:  visual fields intact, PERRL, EOMI with normal  smooth pursuit, facial sensation intact and symmetric, facial movements symmetric, hearing not formally tested but intact to conversation, no dysarthria, shoulder shrug strong bilaterally, tongue protrusion midline  Motor:  normal muscle tone and bulk, no abnormal movements, able to move all limbs spontaneously, strength 5/5 throughout upper and lower extremities, no drift in all extremities  Reflexes:  no clonus  Sensory:  light touch sensation intact and symmetric throughout upper and lower extremities, no extinction on double simultaneous stimulation   Coordination:  normal finger-to-nose and heel-to-shin bilaterally without dysmetria, rapid alternating movements symmetric  Station/Gait:  deferred    National Institutes of Health Stroke Scale (on day of discharge)  NIHSS Total Score: 0    Modified [place] Score (Discharge)  1-No significant disability despite symptoms    Medications    Current Discharge Medication List        START taking these medications    Details   aspirin (ASA) 81 MG chewable tablet Take 1 tablet (81 mg) by mouth daily.  Qty: 30 tablet, Refills: 3    Associated Diagnoses: TIA (transient ischemic attack)           CONTINUE these medications which have NOT CHANGED    Details   !! amLODIPine (NORVASC) 10 MG tablet Take 1 tablet (10 mg) by mouth at bedtime.  Qty: 30 tablet, Refills: 1    Associated Diagnoses: Hypertension, unspecified type      atorvastatin (LIPITOR) 80 MG tablet Take 1 tablet (80 mg) by mouth at bedtime.  Qty: 90 tablet, Refills: 3    Associated Diagnoses: Hyperlipidemia with target LDL less than 130      cinacalcet (SENSIPAR) 30 MG tablet Take 1 tablet (30 mg) by mouth every other day  Qty: 45 tablet, Refills: 3    Comments: Decreased.  Associated Diagnoses: Kidney transplanted; Aftercare following organ transplant; Complications, kidney transplant      clobetasol (TEMOVATE) 0.05 % external ointment Apply topically 2 times daily Use sparingly at active areas of dermatitis for  no more than 14 days or until resolved, whichever is sooner.  Qty: 60 g, Refills: 3    Associated Diagnoses: Dermatitis      clobetasol (TEMOVATE) 0.05 % external solution Apply topically daily as needed (itchyy scalp) Apply to scalp daily as needed.  Qty: 50 mL, Refills: 4    Associated Diagnoses: Lichen planopilaris      clobetasol propionate (CLOBEX) 0.05 % external shampoo APPLY TO SCALP IN SHOWER TWICE WEEKLY  Qty: 118 mL, Refills: 1    Associated Diagnoses: Lichen planopilaris      ketoconazole (NIZORAL) 2 % external shampoo APPLY TOPICALLY DAILY AS NEEDED FOR ITCHING, IRRITATION OR OTHER( SCALP SCALE) USE 2-3 TIMES A WEEK. APPLY AND LATHER THEN RINSE OUT 5 MINUTES  Qty: 120 mL, Refills: 0    Associated Diagnoses: Lichen planopilaris      !! losartan (COZAAR) 50 MG tablet Take 50 mg by mouth daily.      mycophenolate (GENERIC EQUIVALENT) 250 MG capsule Take 3 capsules (750 mg) by mouth 2 times daily  Qty: 540 capsule, Refills: 3    Comments: Profile Rx: patient will contact pharmacy when needed. Just received [facility] delivery.  Associated Diagnoses: Kidney transplanted; Aftercare following organ transplant; Immunosuppressive management encounter following kidney transplant; Proteinuria      niacinamide 500 MG tablet Take 1 tablet (500 mg) by mouth 2 times daily (with meals).  Qty: 60 tablet, Refills: 3    Associated Diagnoses: History of SCC (squamous cell carcinoma) of skin      PARoxetine (PAXIL) 20 MG tablet TAKE 1 TABLET BY MOUTH EVERY MORNING  Qty: 90 tablet, Refills: 2    Comments: Profile Rx: patient will contact pharmacy when needed  Associated Diagnoses: Major depressive disorder with single episode, in partial remission      predniSONE (DELTASONE) 5 MG tablet Take 1 tablet (5 mg) by mouth daily  Qty: 90 tablet, Refills: 3    Comments: Start after pred taper  Associated Diagnoses: Kidney transplanted; Aftercare following organ transplant; Immunosuppressive management encounter following kidney transplant       sodium bicarbonate 650 MG tablet Take 2 tablets (1,300 mg) by mouth 2 times daily  Qty: 360 tablet, Refills: 3    Associated Diagnoses: Metabolic acidosis      !! amLODIPine (NORVASC) 5 MG tablet Take 2 tablets (10 mg) by mouth at bedtime.  Qty: 60 tablet, Refills: 0      !! losartan (COZAAR) 100 MG tablet Take 1 tablet (100 mg) by mouth every evening.  Qty: 30 tablet, Refills: 1    Associated Diagnoses: Hypertension, unspecified type      methylPREDNISolone (MEDROL DOSEPAK) 4 MG tablet therapy pack Follow Package Directions  Qty: 21 tablet, Refills: 0    Associated Diagnoses: Other migraine without status migrainosus, intractable      prochlorperazine (COMPAZINE) 10 MG tablet Take 10 mg by mouth every 6 hours as needed.      triamcinolone (KENALOG) 0.1 % external cream APPLY SPARINGLY TOPICALLY TO THE AFFECTED AREA TWICE DAILY FOR 14 DAYS  Qty: 30 g, Refills: 0    Associated Diagnoses: Flexural eczema       !! - Potential duplicate medications found. Please discuss with provider.          Additional recommendations and follow up:       Reason for your hospital stay    You were evaluated in the hospital due to left sided sensory changes. We completed a MRI brain as well as vessel imaging of head and neck which did not show anything concerning; there was no stroke. However, we cannot rule out a transient ischemic attack (Transient ischemic attack) which does not show up on imaging. We recommend starting Aspirin 81mg daily and following up with stroke neurology in outpatient clinic. The echocardiogram of your heart showed no blood clot in your heart. As a part of the stroke workup, we are mailing to your house a zio patch (heart monitor) that you will need to wear for 14 days to monitor for abnormal heart rhythm.    IF you are experiencing any new onset slurred speech, vision changes, or weakness of any part of your body concerning for stroke, please call 911 or go to the nearest emergency room.    Know the  warning signs and symptoms of stroke: BE FAST   B = Balance loss   E = Eyesight changes   F = Facial droop or numbness   A = Arm or leg weakness   S = Speech difficulty, slurred speech   T = Time to call 911 for help     Activity    Your activity upon discharge: activity as tolerated     Tubes and Drains    Current Tubes and Drains:     CVC Line  Duration           CVC Double Lumen Right Subclavian Tunneled 163 days              Full Code     Diet    Follow this diet upon discharge: Current Diet:Orders Placed This Encounter      Regular Diet Adult     Hospital Follow-up with Existing Primary Care Provider (PCP)    Please see details below          Stroke Hospital Follow Up (for neurologist use only)    [facility] will call you to coordinate care as prescribed by your provider. If you don t hear from a representative within 2 business days, please call [phone].       [facility] PATCH MAIL OUT       Patient was seen and discussed with the Attending, Dr. Yee.    Lindsey Hutson MD  [facility] Stroke

## 2025-03-22 NOTE — ED NOTES
03/21/25 2025 03/21/25 2030 03/21/25 2038   Quick Adds   FSH/FRH Quick Adds Stroke/TIA ;NIH Stroke Scale (FSH/SJN/[facility] Only);Neuro/Cognitive  --   --    Cognitive   Cognitive/Neuro/Behavioral WDL WDL WDL  --    Level of Consciousness alert alert  --    Orientation oriented x 4 oriented x 4  --    Speech spontaneous;clear;logical spontaneous;clear;logical  --    Best Language 0 - No aphasia 0 - No aphasia  --    Mood/Behavior calm;cooperative;behavior appropriate to situation calm;cooperative;behavior appropriate to situation  --    Neuro Assessments   Pupil PERRLA yes yes  --    Pupil Size Left 3 mm 3 mm  --    Pupil Shape Left round round  --    Pupil Reaction Left brisk;equal brisk;equal  --    Pupil Accommodation Left normal response normal response  --    Pupil Size Right 3 mm 3 mm  --    Pupil Shape Right round round  --    Pupil Reaction Right brisk;equal brisk;equal  --    Pupil Accommodation Right normal response normal response  --    Jose Coma Scale   Best Eye Response 4-->(E4) spontaneous 4-->(E4) spontaneous  --    Best Motor Response 6-->(M6) obeys commands 6-->(M6) obeys commands  --    Best Verbal Response 5-->(V5) oriented 5-->(V5) oriented  --    Jose Coma Scale Score 15 15  --    Assessment Qualifiers patient not sedated/intubated patient not sedated/intubated  --    Motor Response   LUE Motor Response purposeful motor response purposeful motor response  --    RUE Motor Response purposeful motor response purposeful motor response  --    LLE Motor Response purposeful motor response purposeful motor response  --    RLE Motor Response purposeful motor response purposeful motor response  --    Hand /Ankle Strength   Hand , Left strong strong  --    Hand , Right strong strong  --    Dorsiflexion, Left strong strong  --    Dorsiflexion, Right strong strong  --    Plantarflexion, Left strong strong  --    Plantarflexion, Right strong strong  --    Visual Assessment (CN II, III, IV, VI)    Visual Acuity Normal acuity  (with glasses) Normal acuity  (with glasses)  --    Visual Tracking normal normal  --    Visual Neglect none none  --    Visual Field Cuts none none  --    Cranial Nerve Assess   Facial Sensation Sensation intact Sensation intact  --    Facial Symmetry Symmetrical Symmetrical  --    Hearing - left diminished  (Pt is [place] with HA's, reports right ear is more diminished in general than baseline.) diminished  (Pt is [place] with HA's, reports right ear is more diminished in general than baseline.)  --    Hearing - right diminished  (Pt is [place] with HA's, reports right ear is more diminished in general than baseline.) diminished  (Pt is [place] with HA's, reports right ear is more diminished in general than baseline.)  --    Shoulder shrug - left Shrug shoulders and head movement side to side intact Shrug shoulders and head movement side to side intact  --    Shoulder shrug - right Shrug shoulders and head movement side to side intact Shrug shoulders and head movement side to side intact  --    Tongue Deviation Tongue extension midline Tongue extension midline  --    Pronator Drift   Left Pronator Drift Absent Absent  --    Right Pronator Drift Absent Absent  --    Motor Strength   Left Upper Motor Strength 5 - active movement against gravity and full resistance 5 - active movement against gravity and full resistance  --    Right Upper Motor Strength 5 - active movement against gravity and full resistance 5 - active movement against gravity and full resistance  --    Left Lower Motor Strength 5 - active movement against gravity and full resistance 5 - active movement against gravity and full resistance  --    Right Lower Motor Strength 5 - active movement against gravity and full resistance 5 - active movement against gravity and full resistance  --    NIH Stroke Scale (* indicates Modified NIHSS row)   Interval baseline  --   --    NIHSS Interval Comments Stroke code diminished hearing, slight  confusion, word finding difficulty and episodic numbness of R 4th and 5th fingers as well as R leg and R face. Symptoms starting to resolve per pt. LNW 1700.   --   --    1a. Level of Consciousness 0-->Alert, keenly responsive 0-->Alert, keenly responsive 0-->Alert, keenly responsive   *1b. LOC Questions 0-->Answers both questions correctly 0-->Answers both questions correctly 0-->Answers both questions correctly   *1c. LOC Commands 0-->Performs both tasks correctly 0-->Performs both tasks correctly 0-->Performs both tasks correctly   *2. Best Gaze 0-->Normal 0-->Normal 0-->Normal   *3. Visual 0-->No visual loss 0-->No visual loss 0-->No visual loss   4. Facial Palsy 0-->Normal symmetrical movements 0-->Normal symmetrical movements 0-->Normal symmetrical movements   *5a. Motor Arm, Left 0-->No drift, limb holds 90 (or 45) degrees for full 10 secs 0-->No drift, limb holds 90 (or 45) degrees for full 10 secs 0-->No drift, limb holds 90 (or 45) degrees for full 10 secs   *5b. Motor Arm, Right 0-->No drift, limb holds 90 (or 45) degrees for full 10 secs 0-->No drift, limb holds 90 (or 45) degrees for full 10 secs 0-->No drift, limb holds 90 (or 45) degrees for full 10 secs   *6a. Motor Leg, Left 0-->No drift, leg holds 30 degree position for full 5 secs 0-->No drift, leg holds 30 degree position for full 5 secs 0-->No drift, leg holds 30 degree position for full 5 secs   *6b. Motor Leg, Right 0-->No drift, leg holds 30 degree position for full 5 secs 0-->No drift, leg holds 30 degree position for full 5 secs 0-->No drift, leg holds 30 degree position for full 5 secs   7. Limb Ataxia 0-->Absent 0-->Absent 0-->Absent   *8. Sensory 0-->Normal, no sensory loss 0-->Normal, no sensory loss 0-->Normal, no sensory loss   *9. Best Language 0-->No aphasia, normal 0-->No aphasia, normal 0-->No aphasia, normal   10. Dysarthria 0-->Normal 0-->Normal 0-->Normal   *11. Extinction and Inattention (formerly Neglect) 0-->No  abnormality 0-->No abnormality 0-->No abnormality   Total (NIH Stroke Scale) 0 0 0   Additional Neuro Documentation   Grit teeth Able Able  --    Other Neurologic Symptoms None None  --       03/21/25 2045 03/21/25 2100   Quick Adds   FSH/FRH Quick Adds  --   --    Cognitive   Cognitive/Neuro/Behavioral WDL WDL WDL   Level of Consciousness alert alert   Orientation oriented x 4 oriented x 4   Speech spontaneous;clear;logical spontaneous;clear;logical   Best Language 0 - No aphasia 0 - No aphasia   Mood/Behavior calm;cooperative;behavior appropriate to situation calm;cooperative;behavior appropriate to situation   Neuro Assessments   Pupil PERRLA yes yes   Pupil Size Left 3 mm 3 mm   Pupil Shape Left round round   Pupil Reaction Left brisk;equal brisk;equal   Pupil Accommodation Left normal response normal response   Pupil Size Right 3 mm 3 mm   Pupil Shape Right round round   Pupil Reaction Right brisk;equal brisk;equal   Pupil Accommodation Right normal response normal response   Jose Coma Scale   Best Eye Response 4-->(E4) spontaneous 4-->(E4) spontaneous   Best Motor Response 6-->(M6) obeys commands 6-->(M6) obeys commands   Best Verbal Response 5-->(V5) oriented 5-->(V5) oriented   [place] Coma Scale Score 15 15   Assessment Qualifiers patient not sedated/intubated patient not sedated/intubated   Motor Response   LUE Motor Response purposeful motor response purposeful motor response   RUE Motor Response purposeful motor response purposeful motor response   LLE Motor Response purposeful motor response purposeful motor response   RLE Motor Response purposeful motor response purposeful motor response   Hand /Ankle Strength   Hand , Left strong strong   Hand , Right strong strong   Dorsiflexion, Left strong strong   Dorsiflexion, Right strong strong   Plantarflexion, Left strong strong   Plantarflexion, Right strong strong   Visual Assessment (CN II, III, IV, VI)   Visual Acuity Normal acuity  (with  glasses) Normal acuity  (with glasses)   Visual Tracking normal normal   Visual Neglect none none   Visual Field Cuts none none   Cranial Nerve Assess   Facial Sensation Sensation intact Sensation intact   Facial Symmetry Symmetrical Symmetrical   Hearing - left diminished  (Pt is [place] with HA's, reports right ear is more diminished in general than baseline.) diminished  (Pt is [place] with HA's, reports right ear is more diminished in general than baseline.)   Hearing - right diminished  (Pt is [place] with HA's, reports right ear is more diminished in general than baseline.) diminished  (Pt is [place] with HA's, reports right ear is more diminished in general than baseline.)   Shoulder shrug - left Shrug shoulders and head movement side to side intact Shrug shoulders and head movement side to side intact   Shoulder shrug - right Shrug shoulders and head movement side to side intact Shrug shoulders and head movement side to side intact   Tongue Deviation Tongue extension midline Tongue extension midline   Pronator Drift   Left Pronator Drift Absent Absent   Right Pronator Drift Absent Absent   Motor Strength   Left Upper Motor Strength 5 - active movement against gravity and full resistance 5 - active movement against gravity and full resistance   Right Upper Motor Strength 5 - active movement against gravity and full resistance 5 - active movement against gravity and full resistance   Left Lower Motor Strength 5 - active movement against gravity and full resistance 5 - active movement against gravity and full resistance   Right Lower Motor Strength 5 - active movement against gravity and full resistance 5 - active movement against gravity and full resistance   NIH Stroke Scale (* indicates Modified NIHSS row)   Interval  --   --    NIHSS Interval Comments  --   --    1a. Level of Consciousness 0-->Alert, keenly responsive 0-->Alert, keenly responsive   *1b. LOC Questions 0-->Answers both questions correctly 0-->Answers both  questions correctly   *1c. LOC Commands 0-->Performs both tasks correctly 0-->Performs both tasks correctly   *2. Best Gaze 0-->Normal 0-->Normal   *3. Visual 0-->No visual loss 0-->No visual loss   4. Facial Palsy 0-->Normal symmetrical movements 0-->Normal symmetrical movements   *5a. Motor Arm, Left 0-->No drift, limb holds 90 (or 45) degrees for full 10 secs 0-->No drift, limb holds 90 (or 45) degrees for full 10 secs   *5b. Motor Arm, Right 0-->No drift, limb holds 90 (or 45) degrees for full 10 secs 0-->No drift, limb holds 90 (or 45) degrees for full 10 secs   *6a. Motor Leg, Left 0-->No drift, leg holds 30 degree position for full 5 secs 0-->No drift, leg holds 30 degree position for full 5 secs   *6b. Motor Leg, Right 0-->No drift, leg holds 30 degree position for full 5 secs 0-->No drift, leg holds 30 degree position for full 5 secs   7. Limb Ataxia 0-->Absent 0-->Absent   *8. Sensory 0-->Normal, no sensory loss 0-->Normal, no sensory loss   *9. Best Language 0-->No aphasia, normal 0-->No aphasia, normal   10. Dysarthria 0-->Normal 0-->Normal   *11. Extinction and Inattention (formerly Neglect) 0-->No abnormality 0-->No abnormality   Total (NIH Stroke Scale) 0 0   Additional Neuro Documentation   Grit teeth Able Able   Other Neurologic Symptoms None None           Stroke Code Nurse-Responder Note    Arrival Time to Stroke Code: 2025    Stroke Code Team interventions:   - De-escalated at 2058 by Neuro stroke team  - MRI ordered, patient will need some type of sedation or calming medication prior to scan. Per patient, she has not tolerated MRI's in the past.    ED/Bedside Nurse providing handoff: [facility]    Time left for CT: 2035    Time arrived to next location (ED/Unit/IR): returned to ED at 2055    ED/Bedside Nurse given handoff (name/time): [facility] 2058    Mary Franks RN

## 2025-03-23 LAB
ANNOTATION COMMENT IMP: NORMAL
METANEPHS SERPL-SCNC: 0.28 NMOL/L
NORMETANEPHRINE SERPL-SCNC: 0.66 NMOL/L

## 2025-03-24 ENCOUNTER — TELEPHONE (OUTPATIENT)
Dept: UROLOGY | Facility: CLINIC | Age: 60
End: 2025-03-24
Payer: COMMERCIAL

## 2025-03-24 LAB — RENIN PLAS-CCNC: 0.2 NG/ML/HR

## 2025-03-24 NOTE — TELEPHONE ENCOUNTER
----- Message from Nathalia FELDER sent at 3/24/2025  9:31 AM CDT -----  Please call to schedule this patient to see Dr oJshua 4/18/25 at 12 noon virtually  Patient needs CT before the appointment   Please assist   thanks  ----- Message -----  From: Valentin Joshua MD  Sent: 3/18/2025  10:44 AM CDT  To: RK Hernandez can you have her come to clinic in a few weeks with a CT urogram prior.  This is to follow-up on her procedure today.  Thank you.

## 2025-03-24 NOTE — TELEPHONE ENCOUNTER
Left Voicemail (1st Attempt) for the patient to call back and schedule the following:    Appointment type: post op  Provider: Dr. Georgi Joshua  Return date: 4/18 at Noon  Specialty phone number: [phone]  Additional appointment(s) needed: Jess

## 2025-03-24 NOTE — DISCHARGE SUMMARY
Dialysis Discharge Summary Brief    [facility]  Division of Nephrology  Nephrology Discharge Dialysis Orders  Ph: [phone]  Fax: [phone]    Reina Quan  MRN: [id number]  YOB: 1965    [facility] D[facility] Unit: [place]  Primary Nephrologist: Dr. 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      #ESKD: dialyzes MWF at [facility] with Dr. Mcdonnell. Access: tunneled RIJ (L AVF not using yet). Run time: 3 hrs. TW 56.5 kg  -s/p successful fistulogram/plasty 3/20 as previously schedule      #HTN/Volume: TW 56.5 kg (but pt has been losing weight and coming in lower). BP's 200's on admission and has had high pressures only in the past ~ 4 weeks, recently started on amlodipine 10 mg qHS and losartan 50 mg qday  - still with some UOP  - continue PTA amlodipine 10 mg qday  - increased losartan to 100 mg qday (ordered 3/21)  - TW 53 kg  - pheo work up negative  - renin/emani pending       #BMD: Ca 9's, alb 4.1, phos 5.8. PTA sensipar 30 mg MWF     #Anemia of CKD: on mircera/venofer protocols at OP HD  - hgb 10.8 g/dL, at goal     #HA: per pt, until about 4 weeks ago, her BP's had been very well controlled. She has only recently developed severe HTN which may well be causing her HA. Imaging without acute findings. Low suspicion for GCA, per neuro  - being seen by neuro as well, thought likely to have TIA    Discharge Diagnosis:    ICD-10-CM    1. TIA (transient ischemic attack)  G45.9 aspirin (ASA) 81 MG chewable tablet     ZIO PATCH MAIL OUT     Stroke Hospital Follow Up (for neurologist use only)      2. Numbness  R20.0             Name of physician completing this form: SAM Edward

## 2025-03-25 LAB — ALDOST/RENIN PLAS-RTO: NORMAL {RATIO}

## 2025-03-25 NOTE — TELEPHONE ENCOUNTER
Left Voicemail (2nd Attempt) for the patient to call back and schedule the following:     Appointment type: Return Patient   Provider:   Return date: Schedule CT urogram and appointment with Dr Joshua on 4/18 at 12pm.   Specialty phone number: [phone]  Additional appointment(s) needed: CT  Additonal Notes:

## 2025-03-26 ENCOUNTER — TELEPHONE (OUTPATIENT)
Dept: UROLOGY | Facility: CLINIC | Age: 60
End: 2025-03-26
Payer: COMMERCIAL

## 2025-03-26 ENCOUNTER — PATIENT OUTREACH (OUTPATIENT)
Dept: NEPHROLOGY | Facility: CLINIC | Age: 60
End: 2025-03-26
Payer: COMMERCIAL

## 2025-03-26 DIAGNOSIS — T86.11 KIDNEY TRANSPLANT REJECTION: ICD-10-CM

## 2025-03-26 NOTE — TELEPHONE ENCOUNTER
Left Voicemail (3rd Attempt) for the patient to call back and schedule the following:     Appointment type: Return Patient   Provider:   Return date: Schedule CT urogram and appointment with Dr Joshua on 4/18 at 12pm.   Specialty phone number: [phone]  Additional appointment(s) needed: CT  Additonal Notes:

## 2025-03-26 NOTE — TELEPHONE ENCOUNTER
[facility] Call Center    Phone Message    May a detailed message be left on voicemail: yes     Reason for Call: Other: pt calling about appt, first appt not till in May, please reach out to pt, she has dialysis on day she was offered by clinic    Action Taken: Other: urology    Travel Screening: Not Applicable     Date of Service:

## 2025-03-26 NOTE — PROGRESS NOTES
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   This was not addressed during the fistulogram, and IR has recommended pt to follow up with the surgeon to address.    Dr. Walton would like to discuss with the pt and schedule in the OR in the coming weeks, sooner than later.    Called pt to discuss and offer appointment with Dr. Walton tomorrow, 3/27 at 1030.  [facility] requesting return call and gave potential appointment info requesting pt to confirm.  Appointment time held for pt.    Will schedule fistula follow up with Dr. Walton upon pt return call.  Will discuss scheduling fistula revision surgery upon pt return call, with appointment to discuss plan prior to surgery time.    ANNABEL SPRING RN on 3/26/2025 at 9:55 AM  Dialysis Access Surgery Care Coordinator  Phone: [phone]  Pool: P_Dialysis_Access_Nurse

## 2025-03-27 NOTE — TELEPHONE ENCOUNTER
Patient confirmed scheduled appointment:  Date: 4/16  Time: 2:40pm  Visit type: return patient  Provider: Dr. Georgi Joshua  Location: Virtual  Testing/imaging: CT; scheduled 4/10

## 2025-04-02 ENCOUNTER — PREP FOR PROCEDURE (OUTPATIENT)
Dept: TRANSPLANT | Facility: CLINIC | Age: 60
End: 2025-04-02
Payer: COMMERCIAL

## 2025-04-02 ENCOUNTER — MYC MEDICAL ADVICE (OUTPATIENT)
Dept: NEPHROLOGY | Facility: CLINIC | Age: 60
End: 2025-04-02
Payer: COMMERCIAL

## 2025-04-02 DIAGNOSIS — T86.11 KIDNEY TRANSPLANT REJECTION: ICD-10-CM

## 2025-04-02 DIAGNOSIS — T82.9XXA COMPLICATION OF DIALYSIS ACCESS INSERTION: Primary | ICD-10-CM

## 2025-04-03 ENCOUNTER — TELEPHONE (OUTPATIENT)
Dept: TRANSPLANT | Facility: CLINIC | Age: 60
End: 2025-04-03
Payer: COMMERCIAL

## 2025-04-03 DIAGNOSIS — T86.11 KIDNEY TRANSPLANT REJECTION: ICD-10-CM

## 2025-04-03 NOTE — TELEPHONE ENCOUNTER
Left Voicemail (1st Attempt) and Sent Mychart (1st Attempt) for the patient to call back and schedule the following:    Appointment type: fistula follow up  Provider: Isabelle  Return date: two weeks after 4/17 surgery  Specialty phone number: [phone]  Additional appointment(s) needed: PAC  Additonal Notes: NA

## 2025-04-07 ASSESSMENT — PATIENT HEALTH QUESTIONNAIRE - PHQ9
10. IF YOU CHECKED OFF ANY PROBLEMS, HOW DIFFICULT HAVE THESE PROBLEMS MADE IT FOR YOU TO DO YOUR WORK, TAKE CARE OF THINGS AT HOME, OR GET ALONG WITH OTHER PEOPLE: SOMEWHAT DIFFICULT
SUM OF ALL RESPONSES TO PHQ QUESTIONS 1-9: 7
SUM OF ALL RESPONSES TO PHQ QUESTIONS 1-9: 7

## 2025-04-08 ENCOUNTER — OFFICE VISIT (OUTPATIENT)
Dept: FAMILY MEDICINE | Facility: CLINIC | Age: 60
End: 2025-04-08
Payer: COMMERCIAL

## 2025-04-08 VITALS
SYSTOLIC BLOOD PRESSURE: 163 MMHG | TEMPERATURE: 97.8 F | BODY MASS INDEX: 21.53 KG/M2 | WEIGHT: 117 LBS | OXYGEN SATURATION: 99 % | HEIGHT: 62 IN | RESPIRATION RATE: 12 BRPM | HEART RATE: 78 BPM | DIASTOLIC BLOOD PRESSURE: 83 MMHG

## 2025-04-08 DIAGNOSIS — Z94.0 HTN, KIDNEY TRANSPLANT RELATED: ICD-10-CM

## 2025-04-08 DIAGNOSIS — I15.1 HTN, KIDNEY TRANSPLANT RELATED: ICD-10-CM

## 2025-04-08 DIAGNOSIS — G45.9 TIA (TRANSIENT ISCHEMIC ATTACK): Primary | ICD-10-CM

## 2025-04-08 DIAGNOSIS — T86.11 KIDNEY TRANSPLANT REJECTION: ICD-10-CM

## 2025-04-08 DIAGNOSIS — R51.9 HEADACHE, UNSPECIFIED HEADACHE TYPE: ICD-10-CM

## 2025-04-08 PROBLEM — Z48.298 AFTERCARE FOLLOWING ORGAN TRANSPLANT: Status: RESOLVED | Noted: 2023-11-02 | Resolved: 2025-04-08

## 2025-04-08 PROBLEM — R11.2 NAUSEA AND VOMITING, UNSPECIFIED VOMITING TYPE: Status: RESOLVED | Noted: 2025-03-20 | Resolved: 2025-04-08

## 2025-04-08 PROBLEM — L57.0 AK (ACTINIC KERATOSIS): Status: RESOLVED | Noted: 2017-10-08 | Resolved: 2025-04-08

## 2025-04-08 PROBLEM — R79.89 ELEVATED SERUM CREATININE: Status: RESOLVED | Noted: 2024-05-09 | Resolved: 2025-04-08

## 2025-04-08 PROCEDURE — G2211 COMPLEX E/M VISIT ADD ON: HCPCS | Performed by: PHYSICIAN ASSISTANT

## 2025-04-08 PROCEDURE — 1126F AMNT PAIN NOTED NONE PRSNT: CPT | Performed by: PHYSICIAN ASSISTANT

## 2025-04-08 PROCEDURE — 3077F SYST BP >= 140 MM HG: CPT | Performed by: PHYSICIAN ASSISTANT

## 2025-04-08 PROCEDURE — 99215 OFFICE O/P EST HI 40 MIN: CPT | Performed by: PHYSICIAN ASSISTANT

## 2025-04-08 PROCEDURE — 3079F DIAST BP 80-89 MM HG: CPT | Performed by: PHYSICIAN ASSISTANT

## 2025-04-08 ASSESSMENT — PAIN SCALES - GENERAL: PAINLEVEL_OUTOF10: NO PAIN (0)

## 2025-04-08 ASSESSMENT — ENCOUNTER SYMPTOMS: HEADACHES: 1

## 2025-04-08 NOTE — PROGRESS NOTES
Assessment & Plan     TIA (transient ischemic attack)  Hospitalized from 3/21/25 - 3/22/25 for numbness, weakness. Hypertensive. CT head showed no bleeding. CTA showed no LVO. MRI of the brain was negative for any signs of acute stroke. She was admitted for TIA work up (ABCD2 of 4). TTE showed no source of clots.      Neurology recommended Aspirin 81 mg for secondary prevention   Continue atorvastatin 80mg   Discharged with 14 day Ziopatch - has it at home but hasn't started it yet     Plan to follow-up with neurology, visit scheduled 6/25/25    Kidney transplant rejection  S/p kidney transplant in 1992 for FSGS. Now with worsening renal function and s/p AV fistula on 10/15/24 for dialysis (currently on HD). Following closely with nephrology.    HTN, kidney transplant related  Headache, unspecified headache type  For blood pressure, she is taking losartan 100 mg, amlodipine 10 mg  BP has been better, though still elevated. She is closely monitored at dialysis and further BP management will be done through nephrology. She is feeling much better and has only had 2 headaches towards end of dialysis but no other headaches since hospital discharge.    The longitudinal plan of care for the diagnosis(es)/condition(s) as documented were addressed during this visit. Due to the added complexity in care, I will continue to support Reina in the subsequent management and with ongoing continuity of care.    41 minutes spent on the date of the encounter doing chart review, history and exam, documentation and further activities per the note      Subjective   Reina is a 60 year old, presenting for the following health issues:  Headache        4/8/2025     2:10 PM   Additional Questions   Roomed by Megha BUSH     History of Present Illness       Headaches:   Since the patient's last clinic visit, headaches are: improved  The patient is getting headaches:  3x a week  She is not able to do normal daily activities when she  "has a migraine.  The patient is taking the following rescue/relief medications:  Ibuprofen (Advil, Motrin), Tylenol and Excedrin   Patient states \"The relief is inconsistent\" from the rescue/relief medications.   The patient is taking the following medications to prevent migraines:  No medications to prevent migraines  In the past 4 weeks, the patient has gone to an Urgent Care or Emergency Room 1 time times due to headaches.   She is taking medications regularly.        Visit initially scheduled a few months ago for headaches but she has since had evaluation, determined headaches were related to HTN. She is feeling much better but wanted to keep visit today just to update me on what's been going on.      She was having daily headaches  BP was very high at dialysis so they recommended she go to ED  Hospitalized 3/19/25 - 3/21/25 for hypertensive urgency, subacute headaches  \"Hypertensive urgency: Newly elevated blood pressure over the past month, started on amlodipine and losartan with persistently high pressures. SBP up to 215 at HD yesterday and did not improve with dialysis so presented to ED for evaluation. Did have a headache and nausea at the time. BP improved with IV labetalol and hydralazine. Nephrology consulted - amlodipine and losartan doses both increased with good improvement.   - Discharged on amlodipine 10 mg daily, losartan 100 mg daily   - HTN work-up in process: emani/renin ratio, urine/plasma metanephrine   - Will have ongoing BP monitoring at HD, next on 3/24   - Follow-up with Nephrology as previously planned    Subacute headaches  Hx of migraine headaches  Increased headache frequency and severity over the past 1 month - having waxing and waning sharp bilateral temporal headaches. No improvement with Excedrin or sumatriptan. Headaches improved with Toradol and Compazine. Neurology consulted: headaches are most likely caused by elevated blood pressure.    - Discharged on Medrol dose pack   - " "Anticipate continued headache improvement with better BP control   - Follow-up with Neurology, referral placed\"    She developed numbness shortly after she was discharged so returned to ER and was subsequently hospitalized from 3/21/25 - 3/22/25 for numbness, weakness. Hypertensive. The stroke team did see the patient in the ED and she went to CT. CT scans later did not show any acute findings per radiology. Neurology recommended to get MRI which did not show signs of acute infarct. Neurology believe the patient may have had a TIA and she was admitted to neurology for further management. TTE showed no source of clots.     Neurology recommended Aspirin 81 mg for secondary prevention   Continue atorvastatin 80mg   Discharged with 14 day Ziopatch - has it at home but hasn't started it yet (she does plan to place soon)    Neurology scheduled for 6/25/25    For blood pressure, she is taking losartan 100 mg, amlodipine 10 mg  BP has been better, closely monitored at dialysis and further BP management will be done through nephrology. She is feeling much better and has only had 2 headaches towards end of dialysis but no other headaches since hospital discharge.                        Objective    BP (!) 163/83 (BP Location: Right arm, Patient Position: Sitting, Cuff Size: Adult Regular)   Pulse 78   Temp 97.8  F (36.6  C) (Oral)   Resp 12   Ht 1.575 m (5' 2\")   Wt 53.1 kg (117 lb)   LMP 05/14/2014 (Approximate)   SpO2 99%   BMI 21.40 kg/m    Body mass index is 21.4 kg/m .  Physical Exam               Signed Electronically by: Lizbeth Andrews PA-C    "

## 2025-04-09 ENCOUNTER — VIRTUAL VISIT (OUTPATIENT)
Dept: SURGERY | Facility: CLINIC | Age: 60
End: 2025-04-09
Payer: COMMERCIAL

## 2025-04-09 ENCOUNTER — ANESTHESIA EVENT (OUTPATIENT)
Dept: SURGERY | Facility: CLINIC | Age: 60
End: 2025-04-09
Payer: COMMERCIAL

## 2025-04-09 VITALS — WEIGHT: 117 LBS | BODY MASS INDEX: 21.53 KG/M2 | HEIGHT: 62 IN

## 2025-04-09 DIAGNOSIS — Z01.818 PRE-OP EVALUATION: Primary | ICD-10-CM

## 2025-04-09 ASSESSMENT — ENCOUNTER SYMPTOMS
ORTHOPNEA: 0
SEIZURES: 0
DYSRHYTHMIAS: 0

## 2025-04-09 NOTE — PROGRESS NOTES
Reina is a 60 year old who is being evaluated via a billable video visit.      How would you like to obtain your AVS? Ynes Wang   Reina is a 60 year old, presenting for the following health issues:  Pre-Op Exam (/)        PAKO Stevens LPN

## 2025-04-09 NOTE — H&P
Pre-Operative H & P     CC:  Preoperative exam to assess for increased cardiopulmonary risk while undergoing surgery and anesthesia.    Date of Encounter: 4/9/2025  Primary Care Physician:  Lizbeth Andrews     Reason for visit:   No diagnosis found.      CARMENCITA Quan is a 60 year old female who presents for pre-operative H & P in preparation for  Procedure Information       Case: [id number] Date/Time: 04/17/25 0730    Procedure: Left Upper ARTERIOVENOUS FISTULA Revision (Left: Arm)    Anesthesia type: General    Diagnosis: Complication of dialysis access insertion [T82.9XXA]    Pre-op diagnosis: Complication of dialysis access insertion [T82.9XXA]    Location:  OR [address] [place] [address] OR    Providers: Jesus Alberto Walton MD            Patient is being evaluated for comorbid conditions of possible recent TIA, HTN, HLD, h/o congenital giant platelet syndrome, anemia, anxiety/depression, allergic rhinitis, migraines, arthritis, and h/o of skin cancer.     She has a history of ESRD on dialysis and is s/p LUE brachiobasilic AVF 2nd stage transposition surgery in November 2024. She has developed a pseudoaneurysm that was not amenable to IR management. She recently completed a visit with Dr. Walton for discussion of further treatment options. She is now scheduled for surgical intervention as above.     History is obtained from the patient and chart review    Hx of abnormal bleeding or anti-platelet use: ASA 81 mg    Menstrual history: Patient's last menstrual period was 05/14/2014 (approximate).     Past Medical History  Past Medical History:   Diagnosis Date    Actinic keratosis     Allergic rhinitis, cause unspecified     Anemia     anxiety/depression     PAXIL    Arthritis     congenital hearing loss     uses hearing aids    Depressive disorder     Dry eyes     Giant Platelet syndrome     Glomerular Focal Sclerosis--transplant 1985     Hypertension     Kidney replaced by transplant 1992    RAPAMUNE/  SIROLIMUS     Lichen planopilaris     LPP followed by derm on doxycycline/ clobetasol    Lichen planus     Menorrhagia     migraine     Squamous cell carcinoma     8 x     Thrombocytopenia     Ulcerative colitis, unspecified     biposy neg 1996    Unsatisfactory cervical Papanicolaou smear 02/15/2021    UTI (lower urinary tract infection)     recurrent Dr [facility]       Past Surgical History  Past Surgical History:   Procedure Laterality Date    BIOPSY OF SKIN LESION      CHOLECYSTECTOMY      COLONOSCOPY N/A 1/8/2025    Procedure: Colonoscopy;  Surgeon: Valentin Bull MD;  Location: [facility]    CREATE FISTULA ARTERIOVENOUS UPPER EXTREMITY Left 10/15/2024    Procedure: Left Upper Extremity Brachiobasilic ARTERIOVENOUS FISTULA Creation;  Surgeon: Jesus Alberto Walton MD;  Location: [facility]    CYSTOSCOPY, DILATE URETER(S), COMBINED N/A 3/18/2025    Procedure: Cystoscopy with Urethral dilation;  Surgeon: Valentin Joshua MD;  Location: [facility]    CYSTOSCOPY, RETROGRADES, COMBINED Bilateral 3/18/2025    Procedure: Bilateral retrograde pyelogram .Bilateral ureteral wash;  Surgeon: Valentin Joshua MD;  Location: [facility]    DILATION AND CURETTAGE, OPERATIVE HYSTEROSCOPY WITH MORCELLATOR, COMBINED N/A 11/10/2015    Procedure: COMBINED DILATION AND CURETTAGE, OPERATIVE HYSTEROSCOPY WITH MORCELLATOR;  Surgeon: Ghada Melendez MD;  [facility]    ESOPHAGOSCOPY, GASTROSCOPY, DUODENOSCOPY (EGD), COMBINED  11/20/2012    Procedure: COMBINED ESOPHAGOSCOPY, GASTROSCOPY, DUODENOSCOPY (EGD), BIOPSY SINGLE OR MULTIPLE;;  Surgeon: Valentin Hahn MD;  Location: [facility]    IR CVC TUNNEL PLACEMENT > 5 YRS OF AGE  10/9/2024    IR DIALYSIS FISTULOGRAM LEFT  1/28/2025    IR DIALYSIS FISTULOGRAM LEFT  3/20/2025    IR RENAL BIOPSY RIGHT  07/25/2024    MOHS MICROGRAPHIC PROCEDURE      REVISION FISTULA ARTERIOVENOUS UPPER EXTREMITY Left 11/20/2024    Procedure: Left Upper Extremity brachiobasilic arteriovenous  fistula second stage transposition surgery;  Surgeon: Jesus Alberto Walton MD;  Location: [facility]    TRANSPLANT  1992    kidney    ZZC NONSPECIFIC PROCEDURE      Renal transplant right side    ZZC NONSPECIFIC PROCEDURE      cholecystectomy       Prior to Admission Medications  Current Outpatient Medications   Medication Sig Dispense Refill    amLODIPine (NORVASC) 10 MG tablet Take 1 tablet (10 mg) by mouth at bedtime. 30 tablet 1    aspirin (ASA) 81 MG chewable tablet Take 1 tablet (81 mg) by mouth daily. (Patient taking differently: Take 81 mg by mouth every evening.) 30 tablet 3    atorvastatin (LIPITOR) 80 MG tablet Take 1 tablet (80 mg) by mouth at bedtime. 90 tablet 3    cinacalcet (SENSIPAR) 30 MG tablet Take 1 tablet (30 mg) by mouth every other day 45 tablet 3    mycophenolate (GENERIC EQUIVALENT) 250 MG capsule Take 3 capsules (750 mg) by mouth 2 times daily 540 capsule 3    niacinamide 500 MG tablet Take 1 tablet (500 mg) by mouth 2 times daily (with meals). 60 tablet 3    PARoxetine (PAXIL) 20 MG tablet TAKE 1 TABLET BY MOUTH EVERY MORNING (Patient taking differently: Take 20 mg by mouth every morning. TAKE 1 TABLET BY MOUTH EVERY MORNING) 90 tablet 2    predniSONE (DELTASONE) 5 MG tablet Take 1 tablet (5 mg) by mouth daily (Patient taking differently: Take 5 mg by mouth every morning.) 90 tablet 3    prochlorperazine (COMPAZINE) 10 MG tablet Take 10 mg by mouth every 6 hours as needed.      sodium bicarbonate 650 MG tablet Take 2 tablets (1,300 mg) by mouth 2 times daily 360 tablet 3    clobetasol (TEMOVATE) 0.05 % external ointment Apply topically 2 times daily Use sparingly at active areas of dermatitis for no more than 14 days or until resolved, whichever is sooner. 60 g 3    clobetasol (TEMOVATE) 0.05 % external solution Apply topically daily as needed (itchyy scalp) Apply to scalp daily as needed. 50 mL 4    clobetasol propionate (CLOBEX) 0.05 % external shampoo APPLY TO SCALP IN SHOWER  TWICE WEEKLY 118 mL 1    ketoconazole (NIZORAL) 2 % external shampoo APPLY TOPICALLY DAILY AS NEEDED FOR ITCHING, IRRITATION OR OTHER( SCALP SCALE) USE 2-3 TIMES A WEEK. APPLY AND LATHER THEN RINSE OUT 5 MINUTES 120 mL 0    losartan (COZAAR) 100 MG tablet Take 1 tablet (100 mg) by mouth every evening. 30 tablet 1    triamcinolone (KENALOG) 0.1 % external cream APPLY SPARINGLY TOPICALLY TO THE AFFECTED AREA TWICE DAILY FOR 14 DAYS 30 g 0       Allergies  Allergies   Allergen Reactions    Ampicillin Swelling     Swelling of mouth and tongue.    Seasonal Allergies Other (See Comments)     Itchy eyes and rhinitis.       Social History  Social History     Socioeconomic History    Marital status:      Spouse name: Not on file    Number of children: 3    Years of education: Not on file    Highest education level: Not on file   Occupational History    Occupation:  - window treatments   Tobacco Use    Smoking status: Never     Passive exposure: Never    Smokeless tobacco: Never   Vaping Use    Vaping status: Never Used   Substance and Sexual Activity    Alcohol use: Not Currently    Drug use: Never    Sexual activity: Not Currently     Partners: Male     Birth control/protection: Post-menopausal   Other Topics Concern    Parent/sibling w/ CABG, MI or angioplasty before 65F 55M? 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 Doesn't have a job lined up afterward but looking forward to some time off. Has three children, one moved to college this fall. Living in [place], has a significant other x 7 months. Previously . In a monogamous relationship. Never smoker, social EtOH use.      Social Drivers of Health     Financial Resource Strain: Low Risk  (3/21/2025)    Financial Resource Strain     Within the past 12 months, have you or your family members you live with been unable to get utilities (heat, electricity) when it was really needed?: No    Food Insecurity: Low Risk  (3/21/2025)    Food Insecurity     Within the past 12 months, did you worry that your food would run out before you got money to buy more?: No     Within the past 12 months, did the food you bought just not last and you didn t have money to get more?: No   Transportation Needs: Low Risk  (3/21/2025)    Transportation Needs     Within the past 12 months, has lack of transportation kept you from medical appointments, getting your medicines, non-medical meetings or appointments, work, or from getting things that you need?: No   Physical Activity: Not on file   Stress: Not on file   Social Connections: Not on file   Interpersonal Safety: Low Risk  (3/18/2025)    Interpersonal Safety     Do you feel physically and emotionally safe where you currently live?: Yes     Within the past 12 months, have you been hit, slapped, kicked or otherwise physically hurt by someone?: No     Within the past 12 months, have you been humiliated or emotionally abused in other ways by your partner or ex-partner?: No   Housing Stability: Low Risk  (3/21/2025)    Housing Stability     Do you have housing? : Yes     Are you worried about losing your housing?: No       Family History  Family History   Problem Relation Age of Onset    Hypertension Mother     Depression Mother     Anxiety Disorder Mother     Diabetes Father     Kidney Disease Father         nephrolithiasis    Asthma Father     Asthma Sister     Blood Disease Maternal Grandmother     Diabetes Maternal Grandmother     Hypertension Maternal Grandmother     Hyperlipidemia Maternal Grandmother     Depression Maternal Grandmother     No Known Problems Maternal Grandfather     No Known Problems Paternal Grandmother     C.A.D. Paternal Grandfather     Cancer Other         no family hx of skin cancer    Glaucoma No family hx of     Macular Degeneration No family hx of     Melanoma No family hx of     Skin Cancer No family hx of     Anesthesia Reaction No family  hx of     Thrombosis No family hx of        Review of Systems  The complete review of systems is negative other than noted in the HPI or here.   Anesthesia Evaluation   Pt has had prior anesthetic. Type: General.    No history of anesthetic complications       ROS/MED HX  ENT/Pulmonary: Comment: Hearing loss    (+)     MARGO risk factors,  hypertension,    allergic rhinitis,                          (-) recent URI   Neurologic:     (+)      migraines,        TIA, date: 3/21/25,              (-) no seizures and no CVA   Cardiovascular: Comment: Denies chest pain/pressure, KAUR, orthopnea, dizziness, palpitations, edema.     (+) Dyslipidemia hypertension- -   -  - -                                 Previous cardiac testing   Echo: Date: 3/22/25 Results:  See H&P  Stress Test:  Date: 10/4/24 Results:     The nuclear stress test is negative for inducible myocardial ischemia or infarction.     The left ventricular ejection fraction at rest is greater than 70%.     There is no prior study for comparison.    ECG Reviewed:  Date: 3/21/25 Results:  Sinus rhythm    Cath:  Date: Results:   (-) orthopnea/PND, arrhythmias and valvular problems/murmurs   METS/Exercise Tolerance: >4 METS Comment: Walking regularly up to 0.5 mile, able to ascend stairs without exertional symptoms   Hematologic: Comments: Hx giant platelet syndrome, thrombocytopenia    (+)      anemia, history of blood transfusion, no previous transfusion reaction,     (-) history of blood clots   Musculoskeletal:   (+)  arthritis,             GI/Hepatic:  - neg GI/hepatic ROS     Renal/Genitourinary:     (+) renal disease, type: ESRD, Pt requires dialysis, type: Hemodialysis, Pt has history of transplant, date: 1992,        Endo:     (+)            Chronic steroid usage for Post Transplant Immunosuppression. Date most recently used: daily.        Psychiatric/Substance Use:     (+) psychiatric history anxiety and depression       Infectious Disease:  - neg infectious  disease ROS     Malignancy:   (+) Malignancy, History of Skin.Skin CA Remission status post Surgery.      Other: Comment: Lichen planoplanis - following with dermatology           Virtual visit -  No vitals were obtained    Physical Exam  Constitutional: Pleasant, no apparent distress, and appears stated age.  Eyes: Pupils equal  HENT: Normocephalic and atraumatic  Respiratory: Non labored breathing on room air  Neurologic: Awake, alert, oriented to name, place and time.   Neuropsychiatric: Calm, cooperative. Normal affect.       Prior Labs/Diagnostic Studies   All labs and imaging personally reviewed     EKG/ stress test - if available please see in ROS above   Echo result w/o MOPS: Interpretation SummaryLeft ventricular size, wall motion and function are normal. The ejectionfraction is 60-65%.Right ventricular function, chamber size, wall motion, and thickness arenormal.The atrial septum is intact as assessed by agitated saline bubble study .           No data to display                  The patient's records and results personally reviewed by this provider.     Outside records reviewed from: Care Everywhere      Assessment  Reina Quan is a 60 year old female seen as a PAC referral for risk assessment and optimization for anesthesia.    Plan/Recommendations  Pt will be optimized for the proposed procedure.  See below for details on the assessment, risk, and preoperative recommendations    NEUROLOGY  - No history of CVA or seizure  - TIA  Admitted 3/21-3/22/25 for HTN crisis, right sided numbness, and word finding difficulties that lasted at least 10 minutes. She was evaluated by neurology and states that they told her she had evidence of an old TIA (6-12 months prior) and that she was discharged without concerns. Per chart review, she underwent CT head, CTA, and MRI of brain. There were no signs of acute stroke. A TTE did not show a source of clots. Per neurology discharge summary, suspect TIA, etiology  felt to be embolic vs small vessel ischemic disease. She was maintained on aspirin 81 mg and was recommended to follow up with neurology as an outpatient. Today, she reports no symptoms or concerns. Writer has sent a message to neurology and surgery teams with concerns about upcoming surgery and possible recent TIA. Patient has stated that if neurology feels safer to delay surgery, she is okay with that. Writer will call patient with an update once I have received message back from neuro/surgery.   - Migraines  Patient reports improvement in headaches with better BP management.    -Post Op delirium risk factors:  High co-morbid index    ENT  - No current airway concerns.  Will need to be reassessed day of surgery.  Mallampati: Unable to assess  TM: Unable to assess    CARDIAC  - No history of CAD and Afib  - Hypertension  Managed with amlodipine and losartan. Patient reports BP at home 140/85  - Hyperlipidemia  Continue statin therapy  - Most recent cardiac testing as above.     - METS (Metabolic Equivalents)  Patient performs 4 or more METS exercise without symptoms             Total Score: 0      RCRI-Moderate risk: Class 3  6.6% complication rate             Total Score: 2    RCRI: Cerebrovascular Disease     RCRI: Elevated Creatinine        PULMONARY    MARGO Low Risk             Total Score: 2    MARGO: Hypertension    MARGO: Over 50 ys old      - Denies asthma or inhaler use  - Tobacco History    History   Smoking Status    Never   Smokeless Tobacco    Never       GI    PONV Medium Risk  Total Score: 2           1 AN PONV: Pt is Female    1 AN PONV: Patient is not a current smoker        /RENAL  - ESRD on HD M/W/F  LUE EV fistula and right chest catheter  Planning for revision of fistula as above  Baseline Creatinine  4-5  - History of kidney transplant for FSGS in 1992  Continue post-transplant immunosuppression  Working toward possible future second transplant  - Recent cystoscopy for hematuria, pathology  "negative for malignancy    ENDOCRINE    - BMI: Estimated body mass index is 21.4 kg/m  as calculated from the following:    Height as of this encounter: 1.575 m (5' 2\").    Weight as of this encounter: 53.1 kg (117 lb).  Healthy Weight (BMI 18.5-24.9)  - No history of Diabetes Mellitus  - Chronic steroid use  Prednisone 5 mg daily for post-transplant immunosuppression.  Consideration of stress dose steroids per anesthesia team day of surgery.     HEME  VTE Low Risk 0.26%             Total Score: 0        -History of congenital giant platelet syndrome  Patient states that many years ago her platelets ranged between 30 and 58.  More recently baseline platelets have been ~100-120.  Platelet count on 3/22/25 112  -Chronic anemia  Secondary to chronic kidney disease  Baseline hemoglobin: 9.3-10.5  Recommend perioperative use of blood conservation techniques intraoperatively and close monitoring for postoperative bleeding.    MSK  Patient is NOT Frail             Total Score: 0        PSYCH  -Anxiety, depression  Continue mental health medications without interruption     OTHER  - History of SCC, in remission    Different anesthesia methods/types have been discussed with the patient, but they are aware that the final plan will be decided by the assigned anesthesia provider on the date of service.      The patient is not yet optimized for their procedure - further discussion with neurology and surgery needed given possible recent TIA.       AVS with information on surgery time/arrival time, meds and NPO status given by nursing staff. No further diagnostic testing indicated.    Please refer to the physical examination documented by the anesthesiologist in the anesthesia record on the day of surgery.    Video-Visit Details    Type of service:  Video Visit    Provider received verbal consent for a Video Visit from the patient? Yes   Video Start Time: 3:41 PM  Video End Time: 3:51 PM     Originating Location (pt. Location): " [facility]    Distant Location (provider location):  Off-site  Mode of Communication:  Video Conference via [employer]  On the day of service:     Prep time: 15 minutes  Visit time: 10 minutes  Documentation time: 15 minutes  ------------------------------------------  Total time: 40 minutes      Nasreen Esteves PA-C  Preoperative Assessment Center  [facility]  Clinic and Surgery Center  Phone: [phone]  Fax: [phone]

## 2025-04-09 NOTE — PATIENT INSTRUCTIONS
Preparing for Your Surgery      Name:  Reina Quan   MRN:  [id number]   :  1965   Today's Date:  2025     The [place] [employer] [address] Construction Project                                Timeline 2025 -2025    This project will affect travel to the [facility] and [facility], as well as the [facility].      Please check the [facility] [address] project website for the most up to date information and give yourself additional time to reach your destination.        Arriving for surgery:  Surgery date:  25  Arrival time:  5:30 am  Surgery time: 7:30 am    Please come to:     Please come to:       [facility]    [address] [place]  [address]     The [facility] ([facility] [place] Patient/Visitor Ramp is at [address]. Patients and visitors who self-park will receive the reduced hospital parking rate. If the Patient /Visitor Ramp is full, please follow the signs to the [employer] car park located at the [facility] entrance.      CheckInPage parking is available (24 hours/ 7 days a week)      Discounted parking pass options are available for patients and visitors. They can be purchased at the [employer] desk at the [facility] entrance.     -    Stop at the security desk and they will direct surgery patients to the Surgery Check in and Family Lounge. [phone]        - If you need directions, a wheelchair or an escort please stop at the Information/security desk in the lobby.     What can I eat or drink?  -  You may eat and drink normally up to 8 hours prior to arrival time. (Until 9:30 pm on 25)  -  You may have clear liquids until 2 hours prior to arrival time. (Until 3:30 am on 25)    Examples of clear liquids:  Water  Clear broth  Juices (apple, white grape, white cranberry  and cider) without  pulp  Noncarbonated, powder based beverages  (lemonade and Sigifredo-Aid)  Sodas (Sprite, 7-Up, ginger ale and seltzer)  Coffee or tea (without milk or cream)  Gatorade    -  No Alcohol or cannabis products for at least 24 hours before surgery.     Which medicines can I take?    Hold Ibuprofen (Advil, Motrin) for 1 day(s) before surgery--unless otherwise directed by surgeon.  Hold Naproxen (Aleve) for 4 days before surgery.    -  DO NOT take these medications the day of surgery:   Niacinamide   No ointments or creams      -  PLEASE TAKE these medications the day of surgery:   Aspirin - stay on   Cinacalcet (Sensipar)   Mycophenolate   Paroxetine (Paxil)   Prednisone    Prochlorperazine (Compazine) as needed   Sodium bicarbonate    How do I prepare myself?  - Please take 2 showers (one the night prior to surgery and one the morning of surgery) using Scrubcare or Hibiclens soap.    Use this soap only from the neck to your toes. Avoid genital area      Leave the soap on your skin for one minute--then rinse thoroughly.      You may use your own shampoo and conditioner. No other hair products.   - Please remove all jewelry and body piercings.  - No lotions, deodorants or fragrance.  - No makeup or fingernail polish.   - Bring your ID and insurance card.    -For patients being admitted to the [facility]  Family members are to take the patient belongings with them and place them in the lockers provided in the [facility].  Please limit the items you bring to 1 bag as the lockers are small.      -If you use a CPAP machine, please bring the CPAP machine, tubing, and mask to hospital.    -If you have a Deep Brain Stimulator, Spinal Cord Stimulator, or any Neuro Stimulator device---you must bring the remote control to the hospital.      ALL PATIENTS GOING HOME THE SAME DAY OF SURGERY ARE REQUIRED TO HAVE A RESPONSIBLE ADULT TO DRIVE AND BE IN ATTENDANCE WITH THEM FOR 24 HOURS FOLLOWING SURGERY.    Covid testing policy as  of 12/06/2022  Your surgeon will notify and schedule you for a COVID test if one is needed before surgery--please direct any questions or COVID symptoms to your surgeon      Questions or Concerns:    - For any questions regarding the day of surgery or your hospital stay, please contact the Pre Admission Nursing Office at [phone].       - If you have health changes between today and your surgery, please call your surgeon.       - For questions after surgery, please call your surgeons office.           Current Visitor Guidelines    2 adult visitors for adult patients in the pre op area    If additional visitors come (beyond a patient care attendant or a group home caregiver), the additional visitors will be asked to wait in the main lobby of the hospital    Visiting hours: 8 a.m. to 8:30 p.m.    Patients confirmed or suspected to have symptoms of COVID 19 or flu:     No visitors allowed for adult patients.   Children (under age 18) can have 1 named visitor.     People who are sick or showing symptoms of COVID 19 or flu:    Are not allowed to visit patients--we can only make exceptions in special situations.       Please follow these guidelines for your visit:          Please maintain social distance          Masking is optional--however at times you may be asked to wear a mask for the safety of yourself and others     Clean your hands with alcohol hand . Do this when you arrive at and leave the building and patient room,    And again after you touch your mask or anything in the room.     Go directly to and from the room you are visiting.     Stay in the patient s room during your visit. Limit going to other places in the hospital as much as possible     Leave bags and jackets at home or in the car.     For everyone s health, please don t come and go during your visit. That includes for smoking   during your visit.

## 2025-04-10 ENCOUNTER — HOSPITAL ENCOUNTER (OUTPATIENT)
Dept: CT IMAGING | Facility: CLINIC | Age: 60
Discharge: HOME | End: 2025-04-10
Attending: UROLOGY
Payer: COMMERCIAL

## 2025-04-10 ENCOUNTER — PRE VISIT (OUTPATIENT)
Dept: UROLOGY | Facility: CLINIC | Age: 60
End: 2025-04-10
Payer: COMMERCIAL

## 2025-04-10 DIAGNOSIS — R31.0 GROSS HEMATURIA: ICD-10-CM

## 2025-04-10 PROCEDURE — 250N000011 HC RX IP 250 OP 636: Performed by: UROLOGY

## 2025-04-10 PROCEDURE — 74178 CT ABD&PLV WO CNTR FLWD CNTR: CPT

## 2025-04-10 RX ORDER — IOPAMIDOL 755 MG/ML
75 INJECTION, SOLUTION INTRAVASCULAR ONCE
Status: COMPLETED | OUTPATIENT
Start: 2025-04-10 | End: 2025-04-10

## 2025-04-10 RX ADMIN — IOPAMIDOL 75 ML: 755 INJECTION, SOLUTION INTRAVENOUS at 12:20

## 2025-04-10 NOTE — TELEPHONE ENCOUNTER
Previsit Planning        Reason for visit: Follow-up     Relevant Information: Review imaging    Records/imaging/labs/orders: Available    Rooming: Virtual

## 2025-04-13 ENCOUNTER — HEALTH MAINTENANCE LETTER (OUTPATIENT)
Age: 60
End: 2025-04-13

## 2025-04-15 ENCOUNTER — PATIENT OUTREACH (OUTPATIENT)
Dept: NEPHROLOGY | Facility: CLINIC | Age: 60
End: 2025-04-15
Payer: COMMERCIAL

## 2025-04-15 DIAGNOSIS — T86.11 KIDNEY TRANSPLANT REJECTION: ICD-10-CM

## 2025-04-15 NOTE — PROGRESS NOTES
"Dialysis Access Care Coordination: General Outreach    REASON FOR CALL:     REASON FOR CALL: Clinic Care Coordination - Follow-up (Dialysis Access Surgery)     Pt is scheduled for LUE brachiobasilic AVF revision surgery with Dr. Walton on 4/17/25 to address/ bypass aneurysm.                                SITUATION/BACKROUND:   Pt seen by PAC on 4/9/25 for pre op.  After PAC visit, discussion between PAC, neuro and Dr. Walton discussing patient's risks for surgery given recent \"mini stroke\"/ TIA 3/21-3/22.  ----- Message -----  From: Nasreen Esteves PA-C  Sent: 4/9/2025   3:11 PM CDT  To: April Van; Jonny Johns MD; *  Subject: Surgery 4/17, Recent TIA                        All,    This patient is scheduled to be virtually in PAC today in preparation for a left AV fistula revision scheduled on 4/17 under general anesthesia.    She has a history of recent TIA (admitted 3/21-3/22/25). I am reaching out to be sure there are no concerns from a neurology perspective for this patient proceeding with general anesthesia less than 30 days after TIA.    Please let me know if any additional information is needed. Thank you for your time.    Sincerely,  Nasreen Esteves PA-C    ----- Message -----  From: Ghada Vaughn NP  Sent: 4/14/2025   8:07 AM CDT  To: Nasreen Esteves PA-C; April Van; *  Subject: RE: Surgery 4/17, Recent TIA                    Not ideal but I'm assuming it needs to be done. We would just ask that anesthesia does their best to avoid large blood pressure fluctuations, hyper- or hypotension. It would also be ideal if she didn't have to hold her aspirin but, if she does, if they could limit the amount of time she isn't on it.    ----- Message -----  Jesus Alberto Walton MD Stroik, Sarah N, NP; Nasreen Esteves PA-C; Jonny Cotton MD; April Van Dialysis Access Nurse  She doesn't need to hold her aspirin- we can deal with that.    If you want to " try block and MAC first we can do that, but we're likely to be working in her upper arm and she might not do well with just that.    It's a small risk but if her aneurysm isn't growing we could push out to wait 30 days. I've cc'd our dialysis access nurse coordinators on here as well to help communicate with the patient. If it's safer from a neuro perpspective I can ask that they call her to make sure that she isn't noticing growth of or ecchymosis around her aneurysm. If those are stable then we can wait another couple of weeks.       Nephrology provider:  Dr. Mcdonnell .    Neph Tracking Flowsheet Last Filled Values       Preferred Modality Home Hemodialysis    Final Modality Hemodialysis    Patient's Referral Dates Auto Populate Patient's Referral Dates    Specialty Care Coordination Referral - Dialysis 7/26/2024    Journey Referral 7/18/24    Vein Mapping/US Order 8/19/24    Vein Mapping/US  08/19/24    Access Surgeon Referral Status  Referred    Dialysis Access Referral 07/26/24  Fistula vs PD consult with Dr. Walton    Access Surgical Consult 08/19/24  PLAN: LUE brachiobasilic (2 stage) AVF vs LUE AVG underger general anesthesia with nerve block.    Diaylsis Access Type AV Fistula  Brachiobasilic AVF    Dialysis Access Site TRINY    Dialysis Access Surgery 10/15/24  1st stage LUE brachiobasilic AVF, if ok at 2wk and 4 wk post op and US, ok for 2nd stage after 6 wks per Dr. Walton    Dialysis Access Surgeon Dr. Walton    Dialysis Access Revision Date 04/17/25  LUJOHNNA BB AVF revision (bypass aneurism with graft), Dr. Walton  2nd stage transposition 11/20/24    Dialysis Access Maturation 01/30/25    Transplant Evaluation Referral 6/21/24    Transplant Status  Referred          Dialysis History        Start End Type Center Comments    10/11/2024  [facility] Dr. [facility]onnell                  Patient is followed by Solid Organ Transplant 2/2 Kidney Transplant Evaluation -  10/7/2024.  Coordinator: Lanette Ortiz    ASSESSMENT:   Received message from pt inquiring about the importance and risks of her upcoming surgery after PAC informed her of her risk of recurring TIA if having surgery within 30 days of TIA.    Responded per Dr. Walton, that pt can continue her ASA as prescribed, which lowers her risk of TIA with anesthesia, and that it would be a relatively low risk to postpone surgery to outside of the 30 day window as long as the AVF aneurysm is not growing in size and does not have signs of bruising around it.  The risk of postponing surgery being the aneurysm growing in size and/or rupturing.    Pt responded requesting to postpone surgery for a few weeks, and requested to reschedule for 4/29 or 5/1.  Asked pt to confirm things with her AVF/ aneurysm are stable, and stated writer would send request to r/s with her requested dates.  If things change, requested pt to update Dialysis Access Surgery Care Coordinator immediately if things start to change.    Pt confirmed her aneurysm has not grown or bruised.  She cannot tell if the actual fistula is growing, but the dialysis staff seem to think things look fine.    Recent Labs      Latest Ref Rng & Units 3/22/2025 3/21/2025 3/20/2025   Neph Labs   Sodium 135 - 145 mmol/L 136  138     137  137    GFR Estimate >60 mL/min/1.73m2 10  13     13     7  11    Potassium 3.4 - 5.3 mmol/L 3.8  4.4     4.1  4.7    Creatinine 0.51 - 0.95 mg/dL 4.68  3.9     3.81     6.27  4.27    Magnesium 1.7 - 2.3 mg/dL 2.2  2.3     Urea Nitrogen 8.0 - 23.0 mg/dL 20.7  17.5     25.3  17.3    Hemoglobin 11.7 - 15.7 g/dL 10.0  11.8  10.8    Hemoglobin A1C <5.7 %  5.0         Multiple values from one day are sorted in reverse-chronological order       PLAN:     Future Appts Next 180 days       Visit Type Date Time Department    SOT FISTULA FOLLOW UP 5/1/2025  1:00 PM  SOT SURGERY            Follow Up:     Request sent to schedule OCTAVIO AVF revision surgery with  Dr. Walton, AFTER 4/21, with requested dates of 4/29/25 or 5/1/25.   Cancelled follow up, will need to r/s upon confirmation of r/s surgery.   Does not need to hold ASA.  Patient to follow up as scheduled.   Patient to continue to monitor AVF and notify Dialysis Access Surgery Care Coordinator of any changes.    Patient verbalized understanding and will follow up as recommended.    ANNABEL SPRING RN  Dialysis Access Surgery Care Coordinator  Phone: [phone]  Pool: P_Dialysis_Access_Nurse    UPDATE 4/16/25:  Patient has been rescheduled for Tuesday, April 29th at 12:45 PM.    ANNABEL SPRING RN on 4/16/2025 at 3:06 PM  Dialysis Access Surgery Care Coordinator  Phone: [phone]  Pool: P_Dialysis_Access_Nurse

## 2025-04-16 ENCOUNTER — PREP FOR PROCEDURE (OUTPATIENT)
Dept: TRANSPLANT | Facility: CLINIC | Age: 60
End: 2025-04-16

## 2025-04-16 DIAGNOSIS — T86.11 KIDNEY TRANSPLANT REJECTION: ICD-10-CM

## 2025-04-17 ENCOUNTER — ANESTHESIA (OUTPATIENT)
Dept: SURGERY | Facility: CLINIC | Age: 60
End: 2025-04-17
Payer: COMMERCIAL

## 2025-04-17 ENCOUNTER — TELEPHONE (OUTPATIENT)
Dept: TRANSPLANT | Facility: CLINIC | Age: 60
End: 2025-04-17
Payer: COMMERCIAL

## 2025-04-17 DIAGNOSIS — T86.11 KIDNEY TRANSPLANT REJECTION: ICD-10-CM

## 2025-04-17 NOTE — TELEPHONE ENCOUNTER
Left Voicemail (1st Attempt) and Sent Mychart (1st Attempt) for the patient to call back and schedule the following:    Appointment type: Fistula Follow Up  Provider: Isabelle  Return date: 5/12  Specialty phone number: [phone]  Additional appointment(s) needed: NA  Additonal Notes: NA

## 2025-04-21 ENCOUNTER — TELEPHONE (OUTPATIENT)
Dept: TRANSPLANT | Facility: CLINIC | Age: 60
End: 2025-04-21
Payer: COMMERCIAL

## 2025-04-21 DIAGNOSIS — T86.11 KIDNEY TRANSPLANT REJECTION: ICD-10-CM

## 2025-04-21 NOTE — TELEPHONE ENCOUNTER
Sent Mychart (1st Attempt) and Left Voicemail (2nd Attempt) for the patient to call back and schedule the following:    Appointment Type: Fistula Follow Up  Provider: Isabelle Lopez date: Approx. 5/13  Specialty phone number: [phone]  Additional appointment(s) needed: NA  Additional Notes:

## 2025-04-24 ENCOUNTER — TELEPHONE (OUTPATIENT)
Dept: TRANSPLANT | Facility: CLINIC | Age: 60
End: 2025-04-24

## 2025-04-24 DIAGNOSIS — T86.11 KIDNEY TRANSPLANT REJECTION: ICD-10-CM

## 2025-04-24 NOTE — TELEPHONE ENCOUNTER
LVM&MC; pt to schedule Fistula Follow Up donald Walton on 5/12, slot on hold for pt; third attempt- KB 4.24.25//

## 2025-04-29 ENCOUNTER — HOSPITAL ENCOUNTER (OUTPATIENT)
Facility: CLINIC | Age: 60
Discharge: HOME | End: 2025-04-29
Attending: SURGERY | Admitting: SURGERY
Payer: COMMERCIAL

## 2025-04-29 VITALS
BODY MASS INDEX: 20.89 KG/M2 | HEART RATE: 82 BPM | RESPIRATION RATE: 21 BRPM | WEIGHT: 113.54 LBS | DIASTOLIC BLOOD PRESSURE: 78 MMHG | TEMPERATURE: 98.3 F | OXYGEN SATURATION: 100 % | SYSTOLIC BLOOD PRESSURE: 116 MMHG | HEIGHT: 62 IN

## 2025-04-29 DIAGNOSIS — T82.9XXS COMPLICATION OF VASCULAR ACCESS FOR DIALYSIS, SEQUELA: Primary | ICD-10-CM

## 2025-04-29 LAB — POTASSIUM SERPL-SCNC: 4.4 MMOL/L (ref 3.4–5.3)

## 2025-04-29 PROCEDURE — 250N000011 HC RX IP 250 OP 636: Performed by: SURGERY

## 2025-04-29 PROCEDURE — 258N000003 HC RX IP 258 OP 636: Performed by: NURSE ANESTHETIST, CERTIFIED REGISTERED

## 2025-04-29 PROCEDURE — 250N000025 HC SEVOFLURANE, PER MIN: Performed by: SURGERY

## 2025-04-29 PROCEDURE — 88304 TISSUE EXAM BY PATHOLOGIST: CPT | Mod: TC | Performed by: SURGERY

## 2025-04-29 PROCEDURE — 360N000076 HC SURGERY LEVEL 3, PER MIN: Performed by: SURGERY

## 2025-04-29 PROCEDURE — 64418 NJX AA&/STRD SPRSCAP NRV: CPT | Mod: XU,LT

## 2025-04-29 PROCEDURE — 250N000012 HC RX MED GY IP 250 OP 636 PS 637: Performed by: SURGERY

## 2025-04-29 PROCEDURE — 36415 COLL VENOUS BLD VENIPUNCTURE: CPT | Performed by: INTERNAL MEDICINE

## 2025-04-29 PROCEDURE — 250N000011 HC RX IP 250 OP 636: Mod: JZ

## 2025-04-29 PROCEDURE — 84132 ASSAY OF SERUM POTASSIUM: CPT | Performed by: INTERNAL MEDICINE

## 2025-04-29 PROCEDURE — 272N000001 HC OR GENERAL SUPPLY STERILE: Performed by: SURGERY

## 2025-04-29 PROCEDURE — 250N000011 HC RX IP 250 OP 636: Mod: JZ | Performed by: INTERNAL MEDICINE

## 2025-04-29 PROCEDURE — 250N000011 HC RX IP 250 OP 636: Mod: JW | Performed by: ANESTHESIOLOGY

## 2025-04-29 PROCEDURE — 250N000009 HC RX 250: Performed by: NURSE ANESTHETIST, CERTIFIED REGISTERED

## 2025-04-29 PROCEDURE — 999N000141 HC STATISTIC PRE-PROCEDURE NURSING ASSESSMENT: Performed by: SURGERY

## 2025-04-29 PROCEDURE — 710N000012 HC RECOVERY PHASE 2, PER MINUTE: Performed by: SURGERY

## 2025-04-29 PROCEDURE — 258N000003 HC RX IP 258 OP 636: Performed by: SURGERY

## 2025-04-29 PROCEDURE — 370N000017 HC ANESTHESIA TECHNICAL FEE, PER MIN: Performed by: SURGERY

## 2025-04-29 PROCEDURE — 76998 US GUIDE INTRAOP: CPT | Mod: 26 | Performed by: SURGERY

## 2025-04-29 PROCEDURE — 250N000011 HC RX IP 250 OP 636: Performed by: NURSE ANESTHETIST, CERTIFIED REGISTERED

## 2025-04-29 PROCEDURE — 250N000011 HC RX IP 250 OP 636: Mod: JZ | Performed by: NURSE ANESTHETIST, CERTIFIED REGISTERED

## 2025-04-29 PROCEDURE — 36832 AV FISTULA REVISION OPEN: CPT | Mod: LT | Performed by: SURGERY

## 2025-04-29 PROCEDURE — 88304 TISSUE EXAM BY PATHOLOGIST: CPT | Mod: 26 | Performed by: PATHOLOGY

## 2025-04-29 PROCEDURE — 710N000009 HC RECOVERY PHASE 1, LEVEL 1, PER MIN: Performed by: SURGERY

## 2025-04-29 RX ORDER — PROPOFOL 10 MG/ML
INJECTION, EMULSION INTRAVENOUS PRN
Status: DISCONTINUED | OUTPATIENT
Start: 2025-04-29 | End: 2025-04-29

## 2025-04-29 RX ORDER — FENTANYL CITRATE 50 UG/ML
INJECTION, SOLUTION INTRAMUSCULAR; INTRAVENOUS PRN
Status: DISCONTINUED | OUTPATIENT
Start: 2025-04-29 | End: 2025-04-29

## 2025-04-29 RX ORDER — DEXAMETHASONE SODIUM PHOSPHATE 4 MG/ML
INJECTION, SOLUTION INTRA-ARTICULAR; INTRALESIONAL; INTRAMUSCULAR; INTRAVENOUS; SOFT TISSUE PRN
Status: DISCONTINUED | OUTPATIENT
Start: 2025-04-29 | End: 2025-04-29

## 2025-04-29 RX ORDER — LIDOCAINE HYDROCHLORIDE 20 MG/ML
INJECTION, SOLUTION INFILTRATION; PERINEURAL PRN
Status: DISCONTINUED | OUTPATIENT
Start: 2025-04-29 | End: 2025-04-29

## 2025-04-29 RX ORDER — NALOXONE HYDROCHLORIDE 0.4 MG/ML
0.1 INJECTION, SOLUTION INTRAMUSCULAR; INTRAVENOUS; SUBCUTANEOUS
Status: DISCONTINUED | OUTPATIENT
Start: 2025-04-29 | End: 2025-04-29 | Stop reason: HOSPADM

## 2025-04-29 RX ORDER — CEFAZOLIN SODIUM 1 G/3ML
INJECTION, POWDER, FOR SOLUTION INTRAMUSCULAR; INTRAVENOUS PRN
Status: DISCONTINUED | OUTPATIENT
Start: 2025-04-29 | End: 2025-04-29

## 2025-04-29 RX ORDER — PAPAVERINE HYDROCHLORIDE 30 MG/ML
INJECTION INTRAMUSCULAR; INTRAVENOUS PRN
Status: DISCONTINUED | OUTPATIENT
Start: 2025-04-29 | End: 2025-04-29 | Stop reason: HOSPADM

## 2025-04-29 RX ORDER — BUPIVACAINE HYDROCHLORIDE 5 MG/ML
INJECTION, SOLUTION EPIDURAL; INTRACAUDAL; PERINEURAL
Status: COMPLETED | OUTPATIENT
Start: 2025-04-29 | End: 2025-04-29

## 2025-04-29 RX ORDER — FENTANYL CITRATE 50 UG/ML
50 INJECTION, SOLUTION INTRAMUSCULAR; INTRAVENOUS EVERY 5 MIN PRN
Status: DISCONTINUED | OUTPATIENT
Start: 2025-04-29 | End: 2025-04-29 | Stop reason: HOSPADM

## 2025-04-29 RX ORDER — NALOXONE HYDROCHLORIDE 0.4 MG/ML
0.2 INJECTION, SOLUTION INTRAMUSCULAR; INTRAVENOUS; SUBCUTANEOUS
Status: DISCONTINUED | OUTPATIENT
Start: 2025-04-29 | End: 2025-04-29 | Stop reason: HOSPADM

## 2025-04-29 RX ORDER — ONDANSETRON 2 MG/ML
4 INJECTION INTRAMUSCULAR; INTRAVENOUS EVERY 30 MIN PRN
Status: DISCONTINUED | OUTPATIENT
Start: 2025-04-29 | End: 2025-04-29 | Stop reason: HOSPADM

## 2025-04-29 RX ORDER — HYDROMORPHONE HCL IN WATER/PF 6 MG/30 ML
0.2 PATIENT CONTROLLED ANALGESIA SYRINGE INTRAVENOUS EVERY 5 MIN PRN
Status: DISCONTINUED | OUTPATIENT
Start: 2025-04-29 | End: 2025-04-29 | Stop reason: HOSPADM

## 2025-04-29 RX ORDER — ESMOLOL HYDROCHLORIDE 10 MG/ML
INJECTION INTRAVENOUS PRN
Status: DISCONTINUED | OUTPATIENT
Start: 2025-04-29 | End: 2025-04-29

## 2025-04-29 RX ORDER — SODIUM CHLORIDE, SODIUM LACTATE, POTASSIUM CHLORIDE, CALCIUM CHLORIDE 600; 310; 30; 20 MG/100ML; MG/100ML; MG/100ML; MG/100ML
INJECTION, SOLUTION INTRAVENOUS CONTINUOUS
Status: DISCONTINUED | OUTPATIENT
Start: 2025-04-29 | End: 2025-04-29 | Stop reason: HOSPADM

## 2025-04-29 RX ORDER — NALOXONE HYDROCHLORIDE 0.4 MG/ML
0.4 INJECTION, SOLUTION INTRAMUSCULAR; INTRAVENOUS; SUBCUTANEOUS
Status: DISCONTINUED | OUTPATIENT
Start: 2025-04-29 | End: 2025-04-29 | Stop reason: HOSPADM

## 2025-04-29 RX ORDER — FENTANYL CITRATE 50 UG/ML
25 INJECTION, SOLUTION INTRAMUSCULAR; INTRAVENOUS EVERY 5 MIN PRN
Status: DISCONTINUED | OUTPATIENT
Start: 2025-04-29 | End: 2025-04-29 | Stop reason: HOSPADM

## 2025-04-29 RX ORDER — ONDANSETRON 4 MG/1
4 TABLET, ORALLY DISINTEGRATING ORAL EVERY 30 MIN PRN
Status: DISCONTINUED | OUTPATIENT
Start: 2025-04-29 | End: 2025-04-29 | Stop reason: HOSPADM

## 2025-04-29 RX ORDER — DEXAMETHASONE SODIUM PHOSPHATE 4 MG/ML
4 INJECTION, SOLUTION INTRA-ARTICULAR; INTRALESIONAL; INTRAMUSCULAR; INTRAVENOUS; SOFT TISSUE
Status: DISCONTINUED | OUTPATIENT
Start: 2025-04-29 | End: 2025-04-29 | Stop reason: HOSPADM

## 2025-04-29 RX ORDER — OXYCODONE HYDROCHLORIDE 5 MG/1
5 TABLET ORAL
Status: DISCONTINUED | OUTPATIENT
Start: 2025-04-29 | End: 2025-04-29 | Stop reason: HOSPADM

## 2025-04-29 RX ORDER — SODIUM CHLORIDE, SODIUM LACTATE, POTASSIUM CHLORIDE, CALCIUM CHLORIDE 600; 310; 30; 20 MG/100ML; MG/100ML; MG/100ML; MG/100ML
INJECTION, SOLUTION INTRAVENOUS CONTINUOUS
Status: CANCELLED | OUTPATIENT
Start: 2025-04-29

## 2025-04-29 RX ORDER — LIDOCAINE 40 MG/G
CREAM TOPICAL
Status: CANCELLED | OUTPATIENT
Start: 2025-04-29

## 2025-04-29 RX ORDER — SODIUM CHLORIDE, SODIUM LACTATE, POTASSIUM CHLORIDE, CALCIUM CHLORIDE 600; 310; 30; 20 MG/100ML; MG/100ML; MG/100ML; MG/100ML
INJECTION, SOLUTION INTRAVENOUS CONTINUOUS PRN
Status: DISCONTINUED | OUTPATIENT
Start: 2025-04-29 | End: 2025-04-29

## 2025-04-29 RX ORDER — HEPARIN SODIUM 1000 [USP'U]/ML
INJECTION, SOLUTION INTRAVENOUS; SUBCUTANEOUS PRN
Status: DISCONTINUED | OUTPATIENT
Start: 2025-04-29 | End: 2025-04-29

## 2025-04-29 RX ORDER — OXYCODONE HYDROCHLORIDE 5 MG/1
5 TABLET ORAL EVERY 6 HOURS PRN
Qty: 12 TABLET | Refills: 0 | Status: SHIPPED | OUTPATIENT
Start: 2025-04-29 | End: 2025-05-02

## 2025-04-29 RX ORDER — FENTANYL CITRATE 50 UG/ML
25-50 INJECTION, SOLUTION INTRAMUSCULAR; INTRAVENOUS
Status: DISCONTINUED | OUTPATIENT
Start: 2025-04-29 | End: 2025-04-29 | Stop reason: HOSPADM

## 2025-04-29 RX ORDER — ONDANSETRON 2 MG/ML
INJECTION INTRAMUSCULAR; INTRAVENOUS PRN
Status: DISCONTINUED | OUTPATIENT
Start: 2025-04-29 | End: 2025-04-29

## 2025-04-29 RX ORDER — HYDROMORPHONE HCL IN WATER/PF 6 MG/30 ML
0.4 PATIENT CONTROLLED ANALGESIA SYRINGE INTRAVENOUS EVERY 5 MIN PRN
Status: DISCONTINUED | OUTPATIENT
Start: 2025-04-29 | End: 2025-04-29 | Stop reason: HOSPADM

## 2025-04-29 RX ORDER — OXYCODONE HYDROCHLORIDE 10 MG/1
10 TABLET ORAL
Status: DISCONTINUED | OUTPATIENT
Start: 2025-04-29 | End: 2025-04-29 | Stop reason: HOSPADM

## 2025-04-29 RX ORDER — FLUMAZENIL 0.1 MG/ML
0.2 INJECTION, SOLUTION INTRAVENOUS
Status: DISCONTINUED | OUTPATIENT
Start: 2025-04-29 | End: 2025-04-29 | Stop reason: HOSPADM

## 2025-04-29 RX ADMIN — Medication 10 MG: at 14:19

## 2025-04-29 RX ADMIN — ESMOLOL HYDROCHLORIDE 10 MG: 10 INJECTION, SOLUTION INTRAVENOUS at 13:40

## 2025-04-29 RX ADMIN — PHENYLEPHRINE HYDROCHLORIDE 100 MCG: 10 INJECTION INTRAVENOUS at 14:00

## 2025-04-29 RX ADMIN — BUPIVACAINE HYDROCHLORIDE 25 ML: 5 INJECTION, SOLUTION EPIDURAL; INTRACAUDAL at 12:25

## 2025-04-29 RX ADMIN — PHENYLEPHRINE HYDROCHLORIDE 0.2 MCG/KG/MIN: 10 INJECTION INTRAVENOUS at 14:28

## 2025-04-29 RX ADMIN — FENTANYL CITRATE 50 MCG: 50 INJECTION, SOLUTION INTRAMUSCULAR; INTRAVENOUS at 12:23

## 2025-04-29 RX ADMIN — FENTANYL CITRATE 50 MCG: 50 INJECTION INTRAMUSCULAR; INTRAVENOUS at 13:22

## 2025-04-29 RX ADMIN — PROPOFOL 50 MG: 10 INJECTION, EMULSION INTRAVENOUS at 13:34

## 2025-04-29 RX ADMIN — MIDAZOLAM 1 MG: 1 INJECTION INTRAMUSCULAR; INTRAVENOUS at 12:22

## 2025-04-29 RX ADMIN — CEFAZOLIN 2 G: 1 INJECTION, POWDER, FOR SOLUTION INTRAMUSCULAR; INTRAVENOUS at 13:37

## 2025-04-29 RX ADMIN — PHENYLEPHRINE HYDROCHLORIDE 100 MCG: 10 INJECTION INTRAVENOUS at 14:26

## 2025-04-29 RX ADMIN — FENTANYL CITRATE 50 MCG: 50 INJECTION, SOLUTION INTRAMUSCULAR; INTRAVENOUS at 17:39

## 2025-04-29 RX ADMIN — SODIUM CHLORIDE, SODIUM LACTATE, POTASSIUM CHLORIDE, AND CALCIUM CHLORIDE: .6; .31; .03; .02 INJECTION, SOLUTION INTRAVENOUS at 13:17

## 2025-04-29 RX ADMIN — DEXAMETHASONE SODIUM PHOSPHATE 4 MG: 4 INJECTION, SOLUTION INTRAMUSCULAR; INTRAVENOUS at 13:42

## 2025-04-29 RX ADMIN — PHENYLEPHRINE HYDROCHLORIDE 100 MCG: 10 INJECTION INTRAVENOUS at 13:30

## 2025-04-29 RX ADMIN — FENTANYL CITRATE 25 MCG: 50 INJECTION INTRAMUSCULAR; INTRAVENOUS at 16:22

## 2025-04-29 RX ADMIN — PROPOFOL 10 MG: 10 INJECTION, EMULSION INTRAVENOUS at 16:26

## 2025-04-29 RX ADMIN — PROPOFOL 100 MG: 10 INJECTION, EMULSION INTRAVENOUS at 13:25

## 2025-04-29 RX ADMIN — ONDANSETRON 4 MG: 2 INJECTION, SOLUTION INTRAMUSCULAR; INTRAVENOUS at 17:20

## 2025-04-29 RX ADMIN — HEPARIN SODIUM 2000 UNITS: 1000 INJECTION INTRAVENOUS; SUBCUTANEOUS at 15:09

## 2025-04-29 RX ADMIN — PROPOFOL 20 MG: 10 INJECTION, EMULSION INTRAVENOUS at 16:23

## 2025-04-29 RX ADMIN — LIDOCAINE HYDROCHLORIDE 40 MG: 20 INJECTION, SOLUTION INFILTRATION; PERINEURAL at 13:24

## 2025-04-29 RX ADMIN — ONDANSETRON 4 MG: 2 INJECTION INTRAMUSCULAR; INTRAVENOUS at 16:19

## 2025-04-29 RX ADMIN — Medication 20 MG: at 13:34

## 2025-04-29 ASSESSMENT — ACTIVITIES OF DAILY LIVING (ADL)
ADLS_ACUITY_SCORE: 55

## 2025-04-29 ASSESSMENT — ENCOUNTER SYMPTOMS
SEIZURES: 0
ORTHOPNEA: 0
DYSRHYTHMIAS: 0

## 2025-04-29 NOTE — ANESTHESIA CARE TRANSFER NOTE
Patient: Reina Quan    Procedure: Procedure(s):  Left Upper ARTERIOVENOUS FISTULA Revision       Diagnosis: Complication of dialysis access insertion [T82.9XXA]  Diagnosis Additional Information: No value filed.    Anesthesia Type:   General     Note:    Oropharynx: oropharynx clear of all foreign objects and spontaneously breathing  Level of Consciousness: drowsy  Oxygen Supplementation: nasal cannula  Level of Supplemental Oxygen (L/min / FiO2): 4  Independent Airway: airway patency satisfactory and stable  Dentition: dentition unchanged  Vital Signs Stable: post-procedure vital signs reviewed and stable  Report to RN Given: handoff report given  Patient transferred to: PACU    Handoff Report: Identifed the Patient, Identified the Reponsible Provider, Reviewed the pertinent medical history, Discussed the surgical course, Reviewed Intra-OP anesthesia mangement and issues during anesthesia, Set expectations for post-procedure period and Allowed opportunity for questions and acknowledgement of understanding      Vitals:  Vitals Value Taken Time   BP     Temp     Pulse     Resp     SpO2         Electronically Signed By: JOSE MANUEL Arrieta CRNA  April 29, 2025  4:45 PM

## 2025-04-29 NOTE — ANESTHESIA PREPROCEDURE EVALUATION
Pre-Operative H & P     CC:  Preoperative exam to assess for increased cardiopulmonary risk while undergoing surgery and anesthesia.    Date of Encounter: 4/9/2025  Primary Care Physician:  Lizbeth Andrews     Reason for visit:   No diagnosis found.      CARMENCITA Quan is a 60 year old female who presents for pre-operative H & P in preparation for  Procedure Information       Case: [id number] Date/Time: 04/29/25 1245    Procedure: Left Upper ARTERIOVENOUS FISTULA Revision (Left: Arm)    Anesthesia type: General with Block    Diagnosis: Complication of dialysis access insertion [T82.9XXA]    Pre-op diagnosis: Complication of dialysis access insertion [T82.9XXA]    Location: [facility]  /  OR    Providers: Jesus Alberto Walton MD            Patient is being evaluated for comorbid conditions of possible recent TIA, HTN, HLD, h/o congenital giant platelet syndrome, anemia, anxiety/depression, allergic rhinitis, migraines, arthritis, and h/o of skin cancer.     She has a history of ESRD on dialysis and is s/p LUE brachiobasilic AVF 2nd stage transposition surgery in November 2024. She has developed a pseudoaneurysm that was not amenable to IR management. She recently completed a visit with Dr. Walton for discussion of further treatment options. She is now scheduled for surgical intervention as above.     History is obtained from the patient and chart review    Hx of abnormal bleeding or anti-platelet use: ASA 81 mg    Menstrual history: Patient's last menstrual period was 05/14/2014 (approximate).     Past Medical History  Past Medical History:   Diagnosis Date    Actinic keratosis     Allergic rhinitis, cause unspecified     Anemia     anxiety/depression     PAXIL    Arthritis     congenital hearing loss     uses hearing aids    Depressive disorder     Dry eyes     Giant Platelet syndrome     Glomerular Focal Sclerosis--transplant 1985     Hypertension     Kidney replaced by transplant 1992     RAPAMUNE/ SIROLIMUS     Lichen planopilaris     LPP followed by derm on doxycycline/ clobetasol    Lichen planus     Menorrhagia     migraine     Squamous cell carcinoma     8 x     Thrombocytopenia     Ulcerative colitis, unspecified     biposy neg 1996    Unsatisfactory cervical Papanicolaou smear 02/15/2021    UTI (lower urinary tract infection)     recurrent Dr Burns [facility]       Past Surgical History  Past Surgical History:   Procedure Laterality Date    BIOPSY OF SKIN LESION      CHOLECYSTECTOMY      COLONOSCOPY N/A 1/8/2025    Procedure: Colonoscopy;  Surgeon: Valentin Bull MD;  [facility]    CREATE FISTULA ARTERIOVENOUS UPPER EXTREMITY Left 10/15/2024    Procedure: Left Upper Extremity Brachiobasilic ARTERIOVENOUS FISTULA Creation;  Surgeon: Jesus Alberto Walton MD;  [facility]    CYSTOSCOPY, DILATE URETER(S), COMBINED N/A 3/18/2025    Procedure: Cystoscopy with Urethral dilation;  Surgeon: Valentin Joshua MD;  [facility]    CYSTOSCOPY, RETROGRADES, COMBINED Bilateral 3/18/2025    Procedure: Bilateral retrograde pyelogram .Bilateral ureteral wash;  Surgeon: Valentin Joshua MD;  [facility]    DILATION AND CURETTAGE, OPERATIVE HYSTEROSCOPY WITH MORCELLATOR, COMBINED N/A 11/10/2015    Procedure: COMBINED DILATION AND CURETTAGE, OPERATIVE HYSTEROSCOPY WITH MORCELLATOR;  Surgeon: Ghada Melendez MD;  [facility]    ESOPHAGOSCOPY, GASTROSCOPY, DUODENOSCOPY (EGD), COMBINED  11/20/2012    Procedure: COMBINED ESOPHAGOSCOPY, GASTROSCOPY, DUODENOSCOPY (EGD), BIOPSY SINGLE OR MULTIPLE;;  Surgeon: Valentin Hahn MD;  [facility]    IR CVC TUNNEL PLACEMENT > 5 YRS OF AGE  10/9/2024    IR DIALYSIS FISTULOGRAM LEFT  1/28/2025    IR DIALYSIS FISTULOGRAM LEFT  3/20/2025    IR RENAL BIOPSY RIGHT  07/25/2024    MOHS MICROGRAPHIC PROCEDURE      REVISION FISTULA ARTERIOVENOUS UPPER EXTREMITY Left 11/20/2024    Procedure: Left Upper Extremity brachiobasilic  arteriovenous fistula second stage transposition surgery;  Surgeon: Jesus Alberto Walton MD;  [facility]    TRANSPLANT  1992    kidney    Z NONSPECIFIC PROCEDURE      Renal transplant right side    [facility] NONSPECIFIC PROCEDURE      cholecystectomy       Prior to Admission Medications  Current Outpatient Medications   Medication Sig Dispense Refill    amLODIPine (NORVASC) 10 MG tablet Take 1 tablet (10 mg) by mouth at bedtime. 30 tablet 1    aspirin (ASA) 81 MG chewable tablet Take 1 tablet (81 mg) by mouth daily. (Patient taking differently: Take 81 mg by mouth every evening.) 30 tablet 3    atorvastatin (LIPITOR) 80 MG tablet Take 1 tablet (80 mg) by mouth at bedtime. 90 tablet 3    cinacalcet (SENSIPAR) 30 MG tablet Take 1 tablet (30 mg) by mouth every other day 45 tablet 3    clobetasol (TEMOVATE) 0.05 % external ointment Apply topically 2 times daily Use sparingly at active areas of dermatitis for no more than 14 days or until resolved, whichever is sooner. 60 g 3    clobetasol (TEMOVATE) 0.05 % external solution Apply topically daily as needed (itchyy scalp) Apply to scalp daily as needed. 50 mL 4    clobetasol propionate (CLOBEX) 0.05 % external shampoo APPLY TO SCALP IN SHOWER TWICE WEEKLY 118 mL 1    ketoconazole (NIZORAL) 2 % external shampoo APPLY TOPICALLY DAILY AS NEEDED FOR ITCHING, IRRITATION OR OTHER( SCALP SCALE) USE 2-3 TIMES A WEEK. APPLY AND LATHER THEN RINSE OUT 5 MINUTES 120 mL 0    losartan (COZAAR) 100 MG tablet Take 1 tablet (100 mg) by mouth every evening. 30 tablet 1    mycophenolate (GENERIC EQUIVALENT) 250 MG capsule Take 3 capsules (750 mg) by mouth 2 times daily 540 capsule 3    niacinamide 500 MG tablet Take 1 tablet (500 mg) by mouth 2 times daily (with meals). 60 tablet 3    PARoxetine (PAXIL) 20 MG tablet TAKE 1 TABLET BY MOUTH EVERY MORNING (Patient taking differently: Take 20 mg by mouth every morning. TAKE 1 TABLET BY MOUTH EVERY MORNING) 90 tablet 2     predniSONE (DELTASONE) 5 MG tablet Take 1 tablet (5 mg) by mouth daily (Patient taking differently: Take 5 mg by mouth every morning.) 90 tablet 3    prochlorperazine (COMPAZINE) 10 MG tablet Take 10 mg by mouth every 6 hours as needed.      sodium bicarbonate 650 MG tablet Take 2 tablets (1,300 mg) by mouth 2 times daily 360 tablet 3    triamcinolone (KENALOG) 0.1 % external cream APPLY SPARINGLY TOPICALLY TO THE AFFECTED AREA TWICE DAILY FOR 14 DAYS 30 g 0       Allergies  Allergies   Allergen Reactions    Ampicillin Swelling     Swelling of mouth and tongue.    Seasonal Allergies Other (See Comments)     Itchy eyes and rhinitis.       Social History  Social History     Socioeconomic History    Marital status:      Spouse name: Not on file    Number of children: 3    Years of education: Not on file    Highest education level: Not on file   Occupational History    Occupation:  - window treatments   Tobacco Use    Smoking status: Never     Passive exposure: Never    Smokeless tobacco: Never   Vaping Use    Vaping status: Never Used   Substance and Sexual Activity    Alcohol use: Not Currently    Drug use: Never    Sexual activity: Not Currently     Partners: Male     Birth control/protection: Post-menopausal   Other Topics Concern    Parent/sibling w/ CABG, MI or angioplasty before 65F 55M? 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 Doesn't have a job lined up afterward but looking forward to some time off. Has three children, one moved to college this fall. Living in [place], has a significant other x 7 months. Previously . In a monogamous relationship. Never smoker, social EtOH use.      Social Drivers of Health     Financial Resource Strain: Low Risk  (3/21/2025)    Financial Resource Strain     Within the past 12 months, have you or your family members you live with been unable to get utilities (heat, electricity) when it  was really needed?: No   Food Insecurity: Low Risk  (3/21/2025)    Food Insecurity     Within the past 12 months, did you worry that your food would run out before you got money to buy more?: No     Within the past 12 months, did the food you bought just not last and you didn t have money to get more?: No   Transportation Needs: Low Risk  (3/21/2025)    Transportation Needs     Within the past 12 months, has lack of transportation kept you from medical appointments, getting your medicines, non-medical meetings or appointments, work, or from getting things that you need?: No   Physical Activity: Not on file   Stress: Not on file   Social Connections: Not on file   Interpersonal Safety: Low Risk  (3/18/2025)    Interpersonal Safety     Do you feel physically and emotionally safe where you currently live?: Yes     Within the past 12 months, have you been hit, slapped, kicked or otherwise physically hurt by someone?: No     Within the past 12 months, have you been humiliated or emotionally abused in other ways by your partner or ex-partner?: No   Housing Stability: Low Risk  (3/21/2025)    Housing Stability     Do you have housing? : Yes     Are you worried about losing your housing?: No       Family History  Family History   Problem Relation Age of Onset    Hypertension Mother     Depression Mother     Anxiety Disorder Mother     Diabetes Father     Kidney Disease Father         nephrolithiasis    Asthma Father     Asthma Sister     Blood Disease Maternal Grandmother     Diabetes Maternal Grandmother     Hypertension Maternal Grandmother     Hyperlipidemia Maternal Grandmother     Depression Maternal Grandmother     No Known Problems Maternal Grandfather     No Known Problems Paternal Grandmother     C.A.D. Paternal Grandfather     Cancer Other         no family hx of skin cancer    Glaucoma No family hx of     Macular Degeneration No family hx of     Melanoma No family hx of     Skin Cancer No family hx of      Anesthesia Reaction No family hx of     Thrombosis No family hx of        Review of Systems  The complete review of systems is negative other than noted in the HPI or here.   Anesthesia Evaluation   Pt has had prior anesthetic. Type: General.    No history of anesthetic complications       ROS/MED HX  ENT/Pulmonary: Comment: Hearing loss    (+)     MARGO risk factors,  hypertension,    allergic rhinitis,                          (-) recent URI   Neurologic:     (+)      migraines,        TIA, date: 3/21/25,              (-) no seizures and no CVA   Cardiovascular: Comment: Denies chest pain/pressure, KAUR, orthopnea, dizziness, palpitations, edema.     (+) Dyslipidemia hypertension- -   -  - -                                 Previous cardiac testing   Echo: Date: 3/22/25 Results:  See H&P  Stress Test:  Date: 10/4/24 Results:     The nuclear stress test is negative for inducible myocardial ischemia or infarction.     The left ventricular ejection fraction at rest is greater than 70%.     There is no prior study for comparison.    ECG Reviewed:  Date: 3/21/25 Results:  Sinus rhythm    Cath:  Date: Results:   (-) orthopnea/PND, arrhythmias and valvular problems/murmurs   METS/Exercise Tolerance: >4 METS Comment: Walking regularly up to 0.5 mile, able to ascend stairs without exertional symptoms   Hematologic: Comments: Hx giant platelet syndrome, thrombocytopenia    (+)      anemia, history of blood transfusion, no previous transfusion reaction,     (-) history of blood clots   Musculoskeletal:   (+)  arthritis,             GI/Hepatic: Comment: Ulcerative colitis - neg GI/hepatic ROS     Renal/Genitourinary:     (+) renal disease, type: ESRD, Pt requires dialysis, type: Hemodialysis, Pt has history of transplant, date: 1992,        Endo:     (+)            Chronic steroid usage for Post Transplant Immunosuppression. Date most recently used: daily.        Psychiatric/Substance Use:     (+) psychiatric history anxiety and  depression       Infectious Disease:  - neg infectious disease ROS     Malignancy:   (+) Malignancy, History of Skin.Skin CA Remission status post Surgery.      Other: Comment: Lichen planoplanis - following with dermatology           Virtual visit -  No vitals were obtained    Physical Exam  Constitutional: Pleasant, no apparent distress, and appears stated age.  Eyes: Pupils equal  HENT: Normocephalic and atraumatic  Respiratory: Non labored breathing on room air  Neurologic: Awake, alert, oriented to name, place and time.   Neuropsychiatric: Calm, cooperative. Normal affect.       Prior Labs/Diagnostic Studies   All labs and imaging personally reviewed     EKG/ stress test - if available please see in ROS above   Echo result w/o MOPS: Interpretation SummaryLeft ventricular size, wall motion and function are normal. The ejectionfraction is 60-65%.Right ventricular function, chamber size, wall motion, and thickness arenormal.The atrial septum is intact as assessed by agitated saline bubble study .           No data to display                  The patient's records and results personally reviewed by this provider.     Outside records reviewed from: Care Everywhere      Assessment  Reina Quan is a 60 year old female seen as a PAC referral for risk assessment and optimization for anesthesia.    Plan/Recommendations  Pt will be optimized for the proposed procedure.  See below for details on the assessment, risk, and preoperative recommendations    NEUROLOGY  - No history of CVA or seizure  - TIA  Admitted 3/21-3/22/25 for HTN crisis, right sided numbness, and word finding difficulties that lasted at least 10 minutes. She was evaluated by neurology and states that they told her she had evidence of an old TIA (6-12 months prior) and that she was discharged without concerns. Per chart review, she underwent CT head, CTA, and MRI of brain. There were no signs of acute stroke. A TTE did not show a source of clots.  Per neurology discharge summary, suspect TIA, etiology felt to be embolic vs small vessel ischemic disease. She was maintained on aspirin 81 mg and was recommended to follow up with neurology as an outpatient. Today, she reports no symptoms or concerns. Writer has sent a message to neurology and surgery teams with concerns about upcoming surgery and possible recent TIA. Patient has stated that if neurology feels safer to delay surgery, she is okay with that. Writer will call patient with an update once I have received message back from neuro/surgery.   - Migraines  Patient reports improvement in headaches with better BP management.    -Post Op delirium risk factors:  High co-morbid index    ENT  - No current airway concerns.  Will need to be reassessed day of surgery.  Mallampati: Unable to assess  TM: Unable to assess    CARDIAC  - No history of CAD and Afib  - Hypertension  Managed with amlodipine and losartan. Patient reports BP at home 140/85  - Hyperlipidemia  Continue statin therapy  - Most recent cardiac testing as above.     - METS (Metabolic Equivalents)  Patient performs 4 or more METS exercise without symptoms             Total Score: 0      RCRI-Moderate risk: Class 3  6.6% complication rate             Total Score: 2    RCRI: Cerebrovascular Disease     RCRI: Elevated Creatinine        PULMONARY    MARGO Low Risk             Total Score: 2    MARGO: Hypertension    MARGO: Over 50 ys old      - Denies asthma or inhaler use  - Tobacco History    History   Smoking Status    Never   Smokeless Tobacco    Never       GI    PONV Medium Risk  Total Score: 2           1 AN PONV: Pt is Female    1 AN PONV: Patient is not a current smoker        /RENAL  - ESRD on HD M/W/F  LUE EV fistula and right chest catheter  Planning for revision of fistula as above  Baseline Creatinine  4-5  - History of kidney transplant for FSGS in 1992  Continue post-transplant immunosuppression  Working toward possible future second  "transplant  - Recent cystoscopy for hematuria, pathology negative for malignancy    ENDOCRINE    - BMI: Estimated body mass index is 21.4 kg/m  as calculated from the following:    Height as of 4/9/25: 1.575 m (5' 2\").    Weight as of 4/9/25: 53.1 kg (117 lb).  Healthy Weight (BMI 18.5-24.9)  - No history of Diabetes Mellitus  - Chronic steroid use  Prednisone 5 mg daily for post-transplant immunosuppression.  Consideration of stress dose steroids per anesthesia team day of surgery.     HEME  VTE Low Risk 0.26%             Total Score: 0        -History of congenital giant platelet syndrome  Patient states that many years ago her platelets ranged between 30 and 58.  More recently baseline platelets have been ~100-120.  Platelet count on 3/22/25 112  -Chronic anemia  Secondary to chronic kidney disease  Baseline hemoglobin: 9.3-10.5  Recommend perioperative use of blood conservation techniques intraoperatively and close monitoring for postoperative bleeding.    MSK  Patient is NOT Frail             Total Score: 0        PSYCH  -Anxiety, depression  Continue mental health medications without interruption     OTHER  - History of SCC, in remission    Different anesthesia methods/types have been discussed with the patient, but they are aware that the final plan will be decided by the assigned anesthesia provider on the date of service.      The patient is not yet optimized for their procedure - further discussion with neurology and surgery needed given possible recent TIA.       AVS with information on surgery time/arrival time, meds and NPO status given by nursing staff. No further diagnostic testing indicated.    Please refer to the physical examination documented by the anesthesiologist in the anesthesia record on the day of surgery.    Video-Visit Details    Type of service:  Video Visit    Provider received verbal consent for a Video Visit from the patient? Yes   Video Start Time: 3:41 PM  Video End Time: 3:51 PM " "    Originating Location (pt. Location): [facility]    Distant Location (provider location):  Off-site  Mode of Communication:  Video Conference via [employer]  On the day of service:     Prep time: 15 minutes  Visit time: 10 minutes  Documentation time: 15 minutes  ------------------------------------------  Total time: 40 minutes      Nasreen Esteves PA-C  Preoperative Assessment Center  [facility]  [facility]  Phone: [phone]  Fax: [phone]    [Addendum] Per staff message from neruology (Dr. Joselito Johns) \"Despite implicit risks. This is not absolute contraindication.\" Writer has not heard further from surgical team regarding urgency of surgery. Writer made two attempts to contact patient by phone with update from neurology but patient did not answer. A [employer] message was also sent with this information. Writer will follow up again on Monday.  Shahrzad Sharp MD on 4/11/2025 at 12:18 PM    [Second Addendum] Per staff message from Dr. Walton \"She doesn't need to hold her aspirin- we can deal with that. If you want to try block and MAC first we can do that, but we're likely to be working in her upper arm and she might not do well with just that. It's a small risk but if her aneurysm isn't growing we could push out to wait 30 days. I've cc'd our dialysis access nurse coordinators on here as well to help communicate with the patient. If it's safer from a neuro perpspective I can ask that they call her to make sure that she isn't noticing growth of or ecchymosis around her aneurysm. If those are stable then we can wait another couple of weeks.\" On further chart review, surgery team has tried to reach patient to confirm whether or not things are stable from a fistula standpoint. Decision to proceed will be based on that assessment/urgency of surgery.  Shahrzad Sharp MD on 4/14/2025 at 3:01 PM        Physical Exam    Airway        Mallampati: II   TM distance: " > 3 FB   Neck ROM: full   Mouth opening: > 3 cm    Respiratory Devices and Support         Dental       (+) Modest Abnormalities - crowns, retainers, 1 or 2 missing teeth      Cardiovascular   cardiovascular exam normal          Pulmonary   pulmonary exam normal                Anesthesia Plan    ASA Status:  3    NPO Status:  NPO Appropriate    Anesthesia Type: General.     - Airway: LMA   Induction: Intravenous.   Maintenance: Balanced.        Consents    Anesthesia Plan(s) and associated risks, benefits, and realistic alternatives discussed. Questions answered and patient/representative(s) expressed understanding.     - Discussed: Risks, Benefits and Alternatives for BOTH SEDATION and the PROCEDURE were discussed     - Discussed with:  Patient      - Extended Intubation/Ventilatory Support Discussed: No.      - Patient is DNR/DNI Status: No     Use of blood products discussed: No .     Postoperative Care    Pain management: IV analgesics, Peripheral nerve block (Single Shot), Oral pain medications, Multi-modal analgesia.   PONV prophylaxis: Ondansetron (or other 5HT-3), Dexamethasone or Solumedrol     Comments:

## 2025-04-29 NOTE — ANESTHESIA PROCEDURE NOTES
Airway       Patient location during procedure: OR       Procedure Start/Stop Times: 4/29/2025 1:37 PM  Staff -        CRNA: Jessy Simental       Performed By: CRNA  Consent for Airway        Urgency: elective  Indications and Patient Condition       Indications for airway management: triston-procedural       Induction type:intravenous       Mask difficulty assessment: 1 - vent by mask    Final Airway Details       Final airway type: endotracheal airway       Successful airway: ETT - single  Endotracheal Airway Details        ETT size (mm): 6.5       Cuffed: yes       Successful intubation technique: video laryngoscopy       VL Blade Size: Glidescope 3       Grade View of Cords: 1       Adjucts: stylet       Position: Right       Measured from: lips       Secured at (cm): 22       Bite block used: None    Post intubation assessment        Placement verified by: capnometry and chest rise        Number of attempts at approach: 1       Number of other approaches attempted: 0       Secured with: tape       Ease of procedure: easy       Dentition: Intact and Unchanged    Medication(s) Administered   Medication Administration Time: 4/29/2025 1:37 PM

## 2025-04-29 NOTE — OP NOTE
Transplant Surgery  Operative Note    Preop Dx:  left arm brachiobasilic AVF with pseudoaneurysm  Postop Dx: same  Procedure: excision of pseudoaneurysm with reanastomosis, intraoperative ultrasound  Surgeon: Jesus Alberto Walton MD  ASSISTANT:  Brittney Chacko MD resident.  was the primary assistant for the procedure and participated in all aspects of the case under my supervision,.   Anesthesia: General and Scalene Block  EBL: 50 ml  Fluids: crystalloid  UO: unmeasured  Drains: no drain  Specimen: pseudoaneurysm (permanent.  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 Fistula dissected above and below and mobilized to permit excision and primary reanastomosis of fistula. Strong thrill preserved, flow measured ~800-900mL/min above and below anastomosis.   Complications: None.    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     DETAILS OF PROCEDURE:  The patient was brought to the operating room, placed in a supine position.  Perioperative prophylactic IV antibiotics were given.  Anesthesia was adminisitered. The left arm was prepped and draped in the usual sterile fashion.  Time out was performed.    We ultrasounded the arm to identify and bishop the extent of the pseudoaneurysm. A longitudinal incision was made overlying the fistula and used to dissect the fistula vein and pseudoaneurysm free of the subcutaneous tissue. The fistula was dissected free at such length to facilitate mobilization of the fistula and primary excision and anastomosis. We palpated an excellent thrill in the fistula. Of note, there was a difference in caliber between the proximal (arterial side) and distal (venous side) fistula vein, with the venous side being 6-7mm and the arterial side being 8-9mm in diameter.     The patient  was heparinized. Vascular clamps were placed proximally and distally. The pseudoaneurysm was excised and the ends of the fistula vein beveled and freshened for primary anastomosis. This was performed with running 6-0 prolene sutures. The distal clamp was removed to de-air the fistula and allow back bleeding. The proximal clamp was removed and the fistula was allowed to dilate up. The sutures were tied and hemostasis achieved. There remained an excellent palpable thrill. Distal pulse was also excellent.     I was worried about narrowing at the anastomosis so we checked flow with the veriQ, measuring flows around 800-900mL/min both above and below the fistula with an excellent palpable thrill. It may have appeared somewhat stenosed also due to the size discrepancy in the veins proximally and distally. With these findings we elected to close, as these flows were generally consistent with those noted on her preop duplex US.     We irrigated the wound and achieved hemostasis. We closed the incision with interrupted 3-0 nylon mattress sutures. The wound was dressed with dry gauze and tape.     All needle, sponge, and instrument counts were accurate.  The patient tolerated the procedure well without apparent complications and was trasfered to the PACU in good condition.  Faculty was present for critical portions of the procedure.

## 2025-04-29 NOTE — ANESTHESIA PROCEDURE NOTES
Suprascapular Procedure Note    Pre-Procedure   Staff -        Anesthesiologist:  Valentin Maddox MD       Resident/Fellow: Perry Daly MD       Performed By: resident, anesthesiologist and with residents       Procedure performed by resident/fellow/CRNA in presence of a teaching physician.         Location: pre-op       Procedure Start/Stop Times: 4/29/2025 12:25 PM and 4/29/2025 12:35 PM       Pre-Anesthestic Checklist: patient identified, IV checked, site marked, risks and benefits discussed, informed consent, monitors and equipment checked, pre-op evaluation, at physician/surgeon's request and post-op pain management  Timeout:       Correct Patient: Yes        Correct Procedure: Yes        Correct Site: Yes        Correct Position: Yes        Correct Laterality: Yes        Site Marked: Yes  Procedure Documentation  Procedure: Suprascapular         Laterality: left       Patient Position: supine       Patient Prep/Sterile Barriers: sterile gloves, mask       Skin prep: Chloraprep       Needle Type: short bevel       Needle Gauge: 21.        Needle Length (millimeters): 110        Ultrasound guided       1. Ultrasound was used to identify targeted nerve, plexus, vascular marker, or fascial plane and place a needle adjacent to it in real-time.       2. Ultrasound was used to visualize the spread of anesthetic in close proximity to the above referenced structure.       3. A permanent image is entered into the patient's record.       4. The visualized anatomic structures appeared normal.       5. There were no apparent abnormal pathologic findings.    Assessment/Narrative         The placement was negative for: blood aspirated, painful injection and site bleeding       Paresthesias: No.       Bolus given via needle. no blood aspirated via catheter.        Secured via.        Insertion/Infusion Method: Single Shot       Complications: none    Medication(s) Administered   Bupivacaine 0.5% PF (Infiltration) -  "Infiltration   25 mL - 4/29/2025 12:25:00 PM  20 mL BUPivacaine liposome 1.3 %  Medication Administration Time: 4/29/2025 12:25 PM      FOR [facility] ONLY:   Pain Team Contact information: please page the Pain Team Via [employer]. Search \"Pain\". During daytime hours, please page the attending first. At night please page the resident first.      "

## 2025-04-29 NOTE — DISCHARGE INSTRUCTIONS
"Contacting your Doctor -   To contact a doctor, call Dr [facility]'s [facility] at [phone]  or:  [phone] and ask for the resident on call for Transplant Surgery (answered 24 hours a day)   Emergency Department:  [facility]: [phone]  [facility]: [phone]  [phone] if you are in need of immediate or emergent help       Hemodialysis Access Surgery: What to Expect at Home  Your Recovery  Hemodialysis is a way to remove wastes from the blood when your kidneys can no longer do the job. It's not a cure, but it can help you live longer and feel better. It's a lifesaving treatment when you have kidney failure. Hemodialysis is often called dialysis. Your doctor created a place (called an access) in your arm for your blood to flow in and out of your body during your dialysis sessions.  Your arm will probably be bruised and swollen. It may hurt. The cut (incision) may bleed. The pain and bleeding will get better over several days. You will probably need only over-the-counter pain medicine. You can reduce swelling by propping up your arm on 1 or 2 pillows and keeping your elbow straight.  Be sure to use your hand and arm as much as tolerated, do not \"baby\" the arm.  You will have stitches. These may dissolve on their own, or your doctor will tell you when to come in to have them removed. You should also be able to return to work in a few days.  You may feel some coolness or numbness in your hand. These feelings usually go away in a few weeks. Your doctor may suggest squeezing a soft object, or using your hand (opening and closing the fist) as much as possible. This will strengthen your access and increase the blood flow to your hand/ the rest of your arm.  You should always be able to feel blood rushing through the fistula or graft. It feels like a slight vibration or buzzing when you put your fingers on the skin over the fistula or graft. This feeling is called a " "thrill.  You should also be able to hear the blood flowing through the access, especially as it matures and gets stronger/ bigger.  It should sound like a \"whooshing\" sound.  This is called a bruit.  This care sheet gives you a general idea about how long it will take for you to recover. But each person recovers at a different pace. Follow the steps below to get better as quickly as possible.    How can you care for yourself at home?  Activity    Rest when you feel tired. Getting enough sleep will help you recover. Do not lie on or sleep on the arm with the access.     Avoid strenuous activities with your affected arm until your incisions are healed, approximately 2 weeks.     You may use your arm, but do not lift anything that weighs more than about 15 pounds for 2 weeks. This may include a child, heavy grocery bags, a heavy briefcase or backpack, cat litter or dog food bags, or a vacuum .     You can shower, but keep the access dry for the first 2 days. Cover the area with a plastic bag to keep it dry.     Do not soak or scrub the incision until it has healed.     You may drive when you feel ready, as long as you are no longer taking narcotic pain medication, and are able to use your arm as needed. This is usually in 1 to 2 days.     Most people are able to return to work about 1 or 2 days after surgery.    If you were given a nerve block, to numb the arm, use caution to prevent injury to the arm while it is still numb.  Wear the sling provided when doing activities, until you regain feeling/ function of your arm again (typically 1-2 days).  Use your arm as much as possible as the nerve block wears off.  It can take approximately 4+ days for the nerve block to fully wear off.  As it wears off, you may experience numbness, tingling and/or decreased function of your arm, hand and fingers.   Diet    Follow an eating plan that is good for your kidneys. Your nephrologist will tell you if you need to have diet " modifications or restrictions. You may need to limit protein, salt, fluids, and certain foods.   Medicines    Your doctor will tell you if and when you can restart your medicines. You will also be given instructions about taking any new medicines.     If you stopped taking aspirin or some other blood thinner, your doctor will tell you when to start taking it again.     Take pain medicines exactly as directed.  If the doctor gave you a prescription medicine for pain, take it as prescribed.  Do not drive if you are taking prescription pain medication.  If you are not taking a prescription pain medicine, ask your doctor if you can take acetaminophen (Tylenol). Do not take ibuprofen (Advil, Motrin) or naproxen (Aleve), or similar medicines, unless your doctor tells you to. They may make chronic kidney disease worse.  Do not take two or more pain medicines at the same time unless the doctor told you to. Many pain medicines have acetaminophen, which is Tylenol. Too much acetaminophen (Tylenol) can be harmful.     If you think your pain medicine is making you sick to your stomach:  Take your medicine after meals (unless your doctor has told you not to).  Ask your doctor for a different pain medicine.     If your doctor prescribed antibiotics, take them as directed. Do not stop taking them just because you feel better. You need to take the full course of antibiotics.   Incision care    Keep the area dry for 2 days. After 2 days, wash the area with soap and water every day, and always before dialysis.     Do not soak or scrub the incision until it has healed.     If you have a bandage, change it every day or as your doctor recommends. Your doctor will tell you when you can remove it.   Exercise    Squeeze a soft ball or other object as your doctor tells you. This will help blood flow through the access and help prevent blood clots.  (Suggested to use 4-6 times per day, for 10+ repetitions each)    Once your incisions have  healed (after approximately 2 weeks), you can start to do hammer curls with a 5lb weight to increase the blood flow to your fistula and help it mature faster.    Use your arm and hand as much as tolerated.   Elevation    Prop up the sore arm on a pillow anytime you sit or lie down during the next 3 days. Try to keep it above the level of your heart. This will help reduce swelling.   Other instructions    Every day, check your access for a pulse or thrill in the fistula or graft area. A thrill is a vibration. To feel a pulse or thrill, place the first two fingers of your hand lightly over the access.     Do not bump your arm.     Do not wear tight clothing, jewelry, or anything else that may squeeze the access.     Use your other arm to have blood drawn or blood pressure taken.     Do not put cream or lotion on or near the access.     Make sure all doctors you deal with know that you have a vascular access.   Follow-up care is a key part of your treatment and safety. Be sure to make and go to all appointments, and call your doctor if you are having problems. It's also a good idea to know your test results and keep a list of the medicines you take.  When should you call for help?   Call 911 anytime you think you may need emergency care. For example, call if:    You passed out (lost consciousness).     You have chest pain, are short of breath, or cough up blood.   Call your doctor now or seek immediate medical care if:    Your hand or arm is cold or dark-colored.     You have no pulse in your access.     You have nausea or you vomit for more than four hours.     You have pain that does not get better after you take pain medicine.     You have loose stitches, or your incision comes open.     You are bleeding from the incision.     You have signs of infection, such as:  Increased pain, swelling, warmth, or redness.  Red streaks leading from the area.  Pus draining from the area.  A fever.     You have signs of a blood  "clot in your leg (called a deep vein thrombosis), such as:  Pain in your calf, back of the knee, thigh, or groin.  Redness or swelling in your leg.   Watch closely for changes in your health, and be sure to contact your doctor if you have any problems.    Please call the dialysis access care coordinator (Zeina or Patricia) with any questions or concerns, Monday-Friday: [phone]      For any urgent after hours concerns, please contact the Transplant Surgery Fellow [phone], option 4, ask to page [phone]       Where can you learn more?  Go to [email or web address]://[email or web address]/patiented  Enter P616 in the search box to learn more about \"Hemodialysis Access Surgery: What to Expect at Home.\"  Current as of: February 28, 2023               Content Version: 13.8    7202-7448 [place].   Care instructions adapted under license by your healthcare professional. If you have questions about a medical condition or this instruction, always ask your healthcare professional. [employer], [employer] disclaims any warranty or liability for your use of this information.   "

## 2025-04-29 NOTE — ANESTHESIA POSTPROCEDURE EVALUATION
Patient: Reina Quan    Procedure: Procedure(s):  Left Upper ARTERIOVENOUS FISTULA Revision       Anesthesia Type:  General    Note:  Disposition: Inpatient   Postop Pain Control: Uneventful            Sign Out: Well controlled pain   PONV: No   Neuro/Psych: Uneventful            Sign Out: Acceptable/Baseline neuro status   Airway/Respiratory: Uneventful            Sign Out: Acceptable/Baseline resp. status   CV/Hemodynamics: Uneventful            Sign Out: Acceptable CV status   Other NRE: NONE   DID A NON-ROUTINE EVENT OCCUR? No           Last vitals:  Vitals Value Taken Time   /77 04/29/25 1730   Temp 36.2  C (97.2  F) 04/29/25 1645   Pulse 73 04/29/25 1743   Resp 11 04/29/25 1743   SpO2 98 % 04/29/25 1743   Vitals shown include unfiled device data.    Electronically Signed By: Jose Daigle MD  April 29, 2025  5:44 PM

## 2025-04-30 ENCOUNTER — PATIENT OUTREACH (OUTPATIENT)
Dept: NEPHROLOGY | Facility: CLINIC | Age: 60
End: 2025-04-30
Payer: COMMERCIAL

## 2025-04-30 DIAGNOSIS — T86.11 KIDNEY TRANSPLANT REJECTION: ICD-10-CM

## 2025-04-30 DIAGNOSIS — T82.898A PROBLEM WITH DIALYSIS ACCESS, INITIAL ENCOUNTER: Primary | ICD-10-CM

## 2025-04-30 NOTE — PROGRESS NOTES
Pt called to report continued oozing/ bleeding over incision s/p LUE AVF revision surgery yesterday, 4/29/25 with Dr. Walton.    Pt reports bandage was changed at dialysis today, and HD RN noted the bandage appeared to be decently saturated with blood.    Pt reports that the dressing seemed fine through dialysis, but as soon as treatment was complete and pt started moving the arm, pt started to bleed through the dressing.  HD RN patched with another dressing on top and advised pt to call Dialysis Access Surgery Care Coordinator.    HD RN expressed concern about not having enough stitches over the incision, or it not being sutured very tight - pt wasn't sure what the HD nurse was implying.    Discussed with SOT Fellow, Dr. Blue.  Per Dr. Blue, will request pt to send a picture of the incision to see if the edges are still approximated.  Pt to hold pressure for double the amount of time she initially did to get the 'oozing' to stop.  Pt to wrap mild pressure wrap around arm, over incision.  If it causes discomfort, it is too tight.  Assess the thrill of the fistula above and below the wrap.  If the bleeding starts gain, pt to report to ED and request to page SOT Fellow on Dialysis Access or Kidney service.    [phone], option 4, and ask to page [phone] (the Transplant Surgery Fellow on call).    Relayed instructions to patient over the phone.  She will send a picture of the incision via [employer] and follow the above instructions.  Gave pt the number to page the SOT Fellow if she has concerns after hours, otherwise, she should report to the ED and request to page the transplant fellow if the bleeding continues.    Will continue to follow.    ANNABEL SPRING RN on 4/30/2025 at 3:46 PM  Dialysis Access Surgery Care Coordinator  Phone: [phone]  Pool: P_Dialysis_Access_Nurse    Picture pt submitted via [employer].  SOT fellow to review and update RNCC if plan is different upon review of incision.      Bandage  that was applied at dialysis around 11am today:      Will continue to follow.    ANNABEL SPRING RN on 4/30/2025 at 3:53 PM  Dialysis Access Surgery Care Coordinator  Phone: [phone]  Pool: P_Dialysis_Access_Nurse

## 2025-04-30 NOTE — PROGRESS NOTES
Discussed with Dr. Walton.    Dialysis Access Care Coordination: General Outreach    REASON FOR CALL:     REASON FOR CALL: Clinic Care Coordination - Follow-up (S/p AVF revision)                                   SITUATION/BACKROUND:   Pt s/p LUE brachiobasilic AVF revision surgery with Dr. Walton yesterday, 4/29/25.    Hand off received from Dr. Walton:  Jesus Alberto Walton MD  P Dialysis Access Nurse  Hi y'all    I made an incision directly over the anuerysm and fistula, just excised the segment, and then put it back together. The anastomosis is close to the middle of the new incision. She had a good thrill above and below the anastomosis when we were done, and I measured flows around 800-900 above and below. The vein proximal (closer to the artery) is pretty big, and it's smaller (though still greater than 6mm) above the anastomosis. It was smaller before too but there may also be a bit of stenosis at the anastomosis.    We should:  -have her check for a thrill twice a day and make sure it's palpable and strong  -see her next week for follow up and again the week after with an ultrasound  -OK to place one needle for access, but I want them to put in the return needle and I want it closer to the AC fossa than her incision (basically pushing blood through the fistula and anastomosis to help stretch it more open)  -when the dust settles in 3-4 weeks, she may need a fistulogram and plasty to dilate up the outflow vein to give her places to stick higher up in her arm. It also may just grow well on its own.    Let me know with questions-  Jesus Alberto    Nephrology provider:  Dr. Mcdonnell .    Neph Tracking Flowsheet Last Filled Values       Preferred Modality Home Hemodialysis    Final Modality Hemodialysis    Patient's Referral Dates Auto Populate Patient's Referral Dates    Specialty Care Coordination Referral - Dialysis 7/26/2024    Journey Referral 7/18/24    Vein Mapping/US Order 8/19/24    Vein  Mapping/US  08/19/24    Access Surgeon Referral Status  Referred    Dialysis Access Referral 07/26/24  Fistula vs PD consult with Dr. Walton    Access Surgical Consult 08/19/24  PLAN: LUE brachiobasilic (2 stage) AVF vs LUE AVG underger general anesthesia with nerve block.    Diaylsis Access Type AV Fistula  Brachiobasilic AVF    Dialysis Access Site TRINY    Dialysis Access Surgery 10/15/24  1st stage LUE brachiobasilic AVF, if ok at 2wk and 4 wk post op and US, ok for 2nd stage after 6 wks per Dr. Walton    Dialysis Access Surgeon Dr. Walton    Dialysis Access Revision Date 04/29/25  LUJOHNNA BB AVF revision (bypass aneurism with graft), Dr. Walton  2nd stage transposition 11/20/24    Dialysis Access Maturation 01/30/25    Transplant Evaluation Referral 6/21/24    Transplant Status  Referred          Dialysis History        Start End Type Center Comments    10/11/2024  [facility]WF[facility]. Mcdonnell                  Patient is followed by Solid Organ Transplant 2/2 Kidney Transplant Evaluation - 10/7/2024.  Coordinator: Lanette Ortiz    ASSESSMENT:     Recent Labs      Latest Ref Rng & Units 4/29/2025 3/22/2025 3/21/2025   Neph Labs   Sodium 135 - 145 mmol/L  136  138     137    GFR Estimate >60 mL/min/1.73m2  10  13     13     7    Potassium 3.4 - 5.3 mmol/L 4.4  3.8  4.4     4.1    Creatinine 0.51 - 0.95 mg/dL  4.68  3.9     3.81     6.27    Magnesium 1.7 - 2.3 mg/dL  2.2  2.3    Urea Nitrogen 8.0 - 23.0 mg/dL  20.7  17.5     25.3    Hemoglobin 11.7 - 15.7 g/dL  10.0  11.8    Hemoglobin A1C <5.7 %   5.0        Multiple values from one day are sorted in reverse-chronological order       PLAN:     Future Appts Next 180 days       Visit Type Date Time Department    SOT FISTULA FOLLOW UP 5/15/2025  2:30 PM  SOT SURGERY            PLAN:  Pt to check for thrill over AVF at least BID  Notify Dialysis Access Surgery Care Coordinator immediately if it is absent or diminished  Follow up  with Dr. Walton next week  Follow up with Dr. Walton in 2 weeks, US prior  Dialysis unit to cannulate distal to incision (between elbow and incision) with venous return needle.  If able to, ok to use both needles as long as both are distal to yesterdays incision.  If there are only able to cannulate one needle distal to the incision, it needs to be the venous return needle.  Start with 17g needles and increase needle size after 2 successful treatments.  17g x2 treatments  16g x2 treatments  15g  Monitor for indications/ need for fistulogram/ ultrasound in 3-4 weeks   may stenose near new anastomosis affecting proximal fistula/ cannulatable length    Follow Up:     Ultrasound ordered: [id number] OCTAVIO brachiobasilic AVF s/p revision surgery 4/29/25 (aneurismal segment excised and vein reanastomosed). Assess size and flow of AVF distal and proximal to vein-vein anastomosis, note/measure any stenotic segments.  Fistula follow up with Dr. Walton on 5/5 at 3:15pm appointment(s) held to be scheduled.  Will schedule/ request to schedule 2 week follow up US upon pt response to dialysis day/ time.  US cannot be after dialysis, as tape/ bandages will be in the way of the exam.   Patient to follow up as scheduled.   Patient to call/[employer] message with updates.   Handoff report given to Rey Su via faxed letter including cannulation instructions.   MyChart message sent to pt with follow up instructions and questions.  Will follow up upon pts response/ next week.    ANNABEL SPRING RN  Dialysis Access Surgery Care Coordinator  Phone: [phone]  Pool: P_Dialysis_Access_Nurse

## 2025-04-30 NOTE — LETTER
"April 30, 2025    Reina Quan  [address] [facility] [address]    ATTN: LORENA RN    Reina Quan had left upper extremity brachiobasilic AV fistula revision surgery on 4/29/25.  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  The patient has an incision directly over that area.  (See \"Image 1\" for clarification).    IMAGE 1:    Please follow the following orders for cannulation per Dr. Walton:    Date: 4/30/2025  Time of Call: 11:45 AM  Diagnosis:  Problem with dialysis access [id number]     [ VORB ] Ordering provider: Dr. Jesus Alberto Walton  Order: Cannulate for dialysis with venous return needle, distal to incision over LUE AVF (cannulation should be between AC fossa [elbow] and new incision).  See \"Image 2\" for clarification.  If there is enough length in that area for 2 needles, ok to cannulate with both needles.  If there is only enough length for 1 needle, cannulate with the venous return needle.  Cannulate with 17g needles for at least 2 successful treatments, then increase to 16g needles for at least 2 successful treatments, and increase to 15g needles (always cannulating distal to revision surgery incision with at least the venous return needle).     Order received by: ANNABEL BIGGS RN     Follow-up/additional notes: Patient to assess thrill at least BID.  Follow up with provider in 1 week (5/5).  Follow up with provider and ultrasound in 2 weeks (5/12).  Will monitor closely for indications for fistulogram in 3-4 weeks.  Notify Dialysis Access Surgery Care Coordinators with any questions or concerns.    IMAGE 2:      Sincerely,     Annabel Biggs RN, BSN  Patricia Haddad RN  Dialysis Access Surgery Care Coordinator - Nephrology  [phone]      CC:  Jesus Alberto Moscoso MD  Assistant Professor of Surgery  Transplantation Division  Department of Surgery  [facility]     "

## 2025-04-30 NOTE — OR NURSING
Dr Walton came to bedside to give patient an  update Latricia Damico RN on 4/29/2025 at 5:25 PM   
RN messaged Dr. Henson regarding patients dressing having a large amount of drainage on it. RN also wondering when patient should be changing dressing. Per MD, RN should reinforce the dressing without any concerns about discharge. Patient can change dressing tomorrow, one of the dialysis nurses will reach out to her.  
No

## 2025-05-01 NOTE — PROGRESS NOTES
Called pt to follow up on incision 'oozing' report yesterday.  LVM requesting callback with update, or Blink Bookinghart update.    Will follow up upon pt response.    ANNABEL SPRING RN on 5/1/2025 at 8:45 AM  Dialysis Access Surgery Care Coordinator  Phone: [phone]  Pool: P_Dialysis_Access_Nurse

## 2025-05-02 LAB
PATH REPORT.COMMENTS IMP SPEC: NORMAL
PATH REPORT.COMMENTS IMP SPEC: NORMAL
PATH REPORT.FINAL DX SPEC: NORMAL
PATH REPORT.GROSS SPEC: NORMAL
PATH REPORT.MICROSCOPIC SPEC OTHER STN: NORMAL
PATH REPORT.RELEVANT HX SPEC: NORMAL
PHOTO IMAGE: NORMAL

## 2025-05-05 ENCOUNTER — PRE VISIT (OUTPATIENT)
Dept: UROLOGY | Facility: CLINIC | Age: 60
End: 2025-05-05

## 2025-05-05 ENCOUNTER — PATIENT OUTREACH (OUTPATIENT)
Dept: NEPHROLOGY | Facility: CLINIC | Age: 60
End: 2025-05-05
Payer: COMMERCIAL

## 2025-05-05 ENCOUNTER — OFFICE VISIT (OUTPATIENT)
Dept: TRANSPLANT | Facility: CLINIC | Age: 60
End: 2025-05-05
Attending: SURGERY
Payer: COMMERCIAL

## 2025-05-05 VITALS
TEMPERATURE: 98.7 F | RESPIRATION RATE: 20 BRPM | DIASTOLIC BLOOD PRESSURE: 72 MMHG | OXYGEN SATURATION: 98 % | HEIGHT: 62 IN | WEIGHT: 118.4 LBS | HEART RATE: 83 BPM | BODY MASS INDEX: 21.79 KG/M2 | SYSTOLIC BLOOD PRESSURE: 129 MMHG

## 2025-05-05 DIAGNOSIS — T86.11 KIDNEY TRANSPLANT REJECTION: ICD-10-CM

## 2025-05-05 PROCEDURE — 99213 OFFICE O/P EST LOW 20 MIN: CPT | Performed by: SURGERY

## 2025-05-05 RX ORDER — SUMATRIPTAN 50 MG/1
50 TABLET, FILM COATED ORAL
COMMUNITY
Start: 2025-03-10

## 2025-05-05 ASSESSMENT — PAIN SCALES - GENERAL: PAINLEVEL_OUTOF10: MILD PAIN (3)

## 2025-05-05 NOTE — TELEPHONE ENCOUNTER
Dialysis Access Care Coordination: General Outreach    REASON FOR VISIT:     REASON FOR VISIT: Clinic Care Coordination - Face To Face (Dialysis Access - fistula follow up, s/p fistula revision)                                   SITUATION/BACKROUND:     Patient presents to clinic for a fistula follow up with Dr. Walton. She had a fistula revision on 4/29/25 to excise a pseudoaneurysm.    Neph Tracking Flowsheet Last Filled Values       Preferred Modality Home Hemodialysis    Final Modality Hemodialysis    Patient's Referral Dates Auto Populate Patient's Referral Dates    Specialty Care Coordination Referral - Dialysis 7/26/2024    Journey Referral 7/18/24    Vein Mapping/US Order 8/19/24    Vein Mapping/US  08/19/24    Access Surgeon Referral Status  Referred    Dialysis Access Referral 07/26/24  Fistula vs PD consult with Dr. Walton    Access Surgical Consult 08/19/24  PLAN: LUE brachiobasilic (2 stage) AVF vs LUE AVG underger general anesthesia with nerve block.    Diaylsis Access Type AV Fistula  Brachiobasilic AVF    Dialysis Access Site TRINY    Dialysis Access Surgery 10/15/24  1st stage LUE brachiobasilic AVF, if ok at 2wk and 4 wk post op and US, ok for 2nd stage after 6 wks per Dr. Walton    Dialysis Access Surgeon Dr. Walton    Dialysis Access Revision Date 04/29/25  LUJOHNNA BB AVF revision (bypass aneurism with graft), Dr. Walton  2nd stage transposition 11/20/24    Dialysis Access Maturation 01/30/25    Transplant Evaluation Referral 6/21/24    Transplant Status  Referred          Dialysis History        Start End Type Center Comments    10/11/2024  [facility] ([facility] F, Dr. Mcdonnell                  Patient is followed by Solid Organ Transplant 2/2 Kidney Transplant Evaluation - 10/7/2024.  Coordinator: Lanette Ortiz    ASSESSMENT:     Recent Labs      Latest Ref Rng & Units 4/29/2025 3/22/2025 3/21/2025   Neph Labs   Sodium 135 - 145 mmol/L  136  138     137    GFR  Estimate >60 mL/min/1.73m2  10  13     13     7    Potassium 3.4 - 5.3 mmol/L 4.4  3.8  4.4     4.1    Creatinine 0.51 - 0.95 mg/dL  4.68  3.9     3.81     6.27    Magnesium 1.7 - 2.3 mg/dL  2.2  2.3    Urea Nitrogen 8.0 - 23.0 mg/dL  20.7  17.5     25.3    Hemoglobin 11.7 - 15.7 g/dL  10.0  11.8    Hemoglobin A1C <5.7 %   5.0        Multiple values from one day are sorted in reverse-chronological order       Dialysis Access Assessments:  Fistula / Graft  Fever/Chills: no  Redness/warmth: no  Swelling: yes  Swelling site: incisional  Drainage: none (oozing blood last week, now resolved)  Current infection: no  Steal Symptoms: none  Thrill: positive  Bruit: positive  Interventions: expert cannulation  Minor bruising near incision.  Incision healing well.    PLAN:     Future Appts Next 180 days       Visit Type Date Time Department    SOT FISTULA FOLLOW UP 5/5/2025  3:15 PM [facility] SOT SURGERY    US EXT ART LEYDI DIAL ACC GRFT 5/15/2025 12:00 PM [facility]    SOT FISTULA FOLLOW UP 5/15/2025  2:30 PM [facility] SOT SURGERY            Follow Up:    RN CC discussed patient with dialysis nurse.  Patient may begin using 1 needle for venous return as previously planned.  Patient to follow up as scheduled next week for ultrasound and surgeon visit for suture removal.    Patient verbalized understanding and will follow up as recommended.    Patricia Haddad RN  Dialysis Access Surgery Care Coordinator  Phone: [phone]  Pool: P_Dialysis_Access_Nurse

## 2025-05-05 NOTE — NURSING NOTE
"Chief Complaint   Patient presents with    Surgical Followup     Fistula revision 4/29/25     Vital signs:  Temp: 98.7  F (37.1  C) Temp src: Oral BP: 129/72 Pulse: 83   Resp: 20 SpO2: 98 %     Height: 157.5 cm (5' 2.01\") Weight: 53.7 kg (118 lb 6.4 oz)  Estimated body mass index is 21.65 kg/m  as calculated from the following:    Height as of this encounter: 1.575 m (5' 2.01\").    Weight as of this encounter: 53.7 kg (118 lb 6.4 oz).      Mary Núñez, [facility]  5/5/2025 3:01 PM    "

## 2025-05-05 NOTE — LETTER
5/5/2025      Reina Quan  [address] [facility] [address]      Dear Colleague,    Thank you for referring your patient, Reina Quan, to the [facility]PL[facility]. Please see a copy of my visit note below.    AV Fistula Maturity Assessment:  S/p LUE brachiobasilic AVF 2nd stage transposition surgery on 11/20/24. Was using but had issues with infiltration, some resistance to flow, and then development of pseudoaneurysm. Failed IR treatment. S/p open excision of aneurysm with primary anastomosis 4/29/25. Tolerated surgery well. Did have some oozing during dialysis between sutures but has since stopped.         Bruit: +  Thrill: +  Pulsatile: No  Ulnar Pulse: +  Radial Pulse: +  Numbness/tingling in fingers/ hand: --  Hand strength: +, equal bilaterally  Hand temp: WARM COLD: warm, equal  bilaterally  Cap refill of finger tips: +, equal bilaterally  Ecchymosis in mid/upper arm around incision, old. Incision healing well. Palpable thrill throughout.     Impression: 59 yo F with BB AVF s/p open repair of pseudoaneurysm. Good thrill throughout.     OK to use one needle in AVF close to AC fossa and one in CVL. Recommend using return needle to reduce risk of restenosis of outflow vein. Follow up two weeks with ultrasound.     Total time: 15 minutes    Jesus Alberto Walton MD          Again, thank you for allowing me to participate in the care of your patient.        Sincerely,        Jesus Alberto Walton MD    Electronically signed

## 2025-05-05 NOTE — PROGRESS NOTES
AV Fistula Maturity Assessment:  S/p LUE brachiobasilic AVF 2nd stage transposition surgery on 11/20/24. Was using but had issues with infiltration, some resistance to flow, and then development of pseudoaneurysm. Failed IR treatment. S/p open excision of aneurysm with primary anastomosis 4/29/25. Tolerated surgery well. Did have some oozing during dialysis between sutures but has since stopped.         Bruit: +  Thrill: +  Pulsatile: No  Ulnar Pulse: +  Radial Pulse: +  Numbness/tingling in fingers/ hand: --  Hand strength: +, equal bilaterally  Hand temp: WARM COLD: warm, equal  bilaterally  Cap refill of finger tips: +, equal bilaterally  Ecchymosis in mid/upper arm around incision, old. Incision healing well. Palpable thrill throughout.     Impression: 61 yo F with BB AVF s/p open repair of pseudoaneurysm. Good thrill throughout.     OK to use one needle in AVF close to AC fossa and one in CVL. Recommend using return needle to reduce risk of restenosis of outflow vein. Follow up two weeks with ultrasound.     Total time: 15 minutes    Jesus Alberto Walton MD

## 2025-05-06 DIAGNOSIS — F32.4 MAJOR DEPRESSIVE DISORDER WITH SINGLE EPISODE, IN PARTIAL REMISSION: ICD-10-CM

## 2025-05-06 RX ORDER — PAROXETINE 20 MG/1
20 TABLET, FILM COATED ORAL EVERY MORNING
Qty: 90 TABLET | Refills: 0 | Status: SHIPPED | OUTPATIENT
Start: 2025-05-06

## 2025-05-06 NOTE — TELEPHONE ENCOUNTER
LVM to call back for an appointment. Two more attempts will be made.     Genny Mckeon  Lead   [facility] Loreto Duran

## 2025-05-06 NOTE — TELEPHONE ENCOUNTER
4/8/2024    12:05 PM 1/2/2025     4:19 PM 4/7/2025    11:25 AM   PHQ   PHQ-9 Total Score 8 18  7    Q9: Thoughts of better off dead/self-harm past 2 weeks Not at all Several days Not at all   F/U: Thoughts of suicide or self-harm  No    F/U: Safety concerns  No        Patient-reported     PHQ was elevated when last checked, please help schedule virtual visit for mood check.     Thanks!  Lizbeth Andrews PA-C

## 2025-05-07 ENCOUNTER — TELEPHONE (OUTPATIENT)
Dept: TRANSPLANT | Facility: CLINIC | Age: 60
End: 2025-05-07
Payer: COMMERCIAL

## 2025-05-07 DIAGNOSIS — T86.11 KIDNEY TRANSPLANT REJECTION: ICD-10-CM

## 2025-05-07 NOTE — TELEPHONE ENCOUNTER
Sent MyChart x2 attempt.      Megha OROPEZA, - [facility]  [facility]

## 2025-05-07 NOTE — TELEPHONE ENCOUNTER
Returned call to Kathy in response to her [email or web address] message. Was only able to leave a voice message. Mentioned to her she is seeing Dr Joshua of urology on 5/9 and we need a decision whether or not she needs a cysto in the OR since her previous one could not get into her bladder. Also wondering if she got her Hepatitis B and dental done. Asked her to let me know how her medication is going. There were concerns about her compliance with meds but the issue really was she was so fatigued she would come home and fall asleep and miss her medication alarms.

## 2025-05-09 ENCOUNTER — VIRTUAL VISIT (OUTPATIENT)
Dept: UROLOGY | Facility: CLINIC | Age: 60
End: 2025-05-09
Payer: COMMERCIAL

## 2025-05-09 DIAGNOSIS — R31.0 GROSS HEMATURIA: Primary | ICD-10-CM

## 2025-05-09 PROCEDURE — 98006 SYNCH AUDIO-VIDEO EST MOD 30: CPT | Performed by: UROLOGY

## 2025-05-09 NOTE — LETTER
5/9/2025       RE: Reina Quan  [address] [facility] [address]     Dear Colleague,    Thank you for referring your patient, Reina Quan, to the [facility] UROLOGY [facility]IN[facility] [place] at [facility]. Please see a copy of my visit note below.     [facility] UROLOGY CL[facility]IC [place].  Phone: [phone]   Fax: [phone]  DIA Joshua MD  Visit Date: May 9, 2025  Patient:  Reina Quan  Date of birth 1965, 60 year old female     Microscopic hematuria  3/18/25 cystoscopy, retrograde pyelogram showed right renal extravasation.  4/10/25 CT Urogram shows possible 1-2mm kidney transplant stone.  No contrast in native kidneys, but not mass or urinoma.    Kidney transplant  1992    Plan  Follow-up with PA in 1 year with ct to confirm no kidney stone in kidney transplant.  She is cleared for transplant from my perspective.    _____________________________________________________________________    HPI  She is here today to follow-up on her recent CT scan which was completed due to extravasation seen at her March 2025 retrograde pyelogram.    She denies any problems since her last visit.    I reviewed the radiologic images and reports from the radiologic exam (4/10/25 CT) listed above.  My independent interpretation is listed there as well.        Video-Visit Details  Video Start Time: 310  Video End Time: 318  Time spent on pre-visit work, post visit work, and documentation on day of visit outside of time during video visit: 5 min  Total time on the day of the visit: 13 min  Originating Location (pt. Location): Home.    Distant Location (provider location):  [facility].  Platform used for Video Visit: SecureLink        Patient Care Team:  Lizbeth Andrews PA-C as PCP - General (Family Medicine)  Jamar Kennedy MD as Hospitalist (Student in organized health care education/training program)  Amanda Disla  MD Paul as MD (Infectious Diseases)  Tr Ni MD as MD (Dermatology)  Brad Bauman MD as MD (Dermapathology)  Lizbeth Andrews PA-C as Referring Physician (Family Medicine)  Lizbeth Andrews PA-C as Assigned PCP  Cynthia Loyd MD as Assigned Endocrinology Provider  Reji Sinclair APRN CNP as Nurse Practitioner (Nephrology)  Reji Sinclair APRN CNP as Nurse Practitioner (Nephrology)  Brittney Brown RD as Registered Dietitian (Dietitian, Registered)  Jason Wilson MD as MD (Surgery)  Bimal Baez MSW (Inactive) as  ( - Clinical)  Patricia Haddad, RN as Specialty Care Coordinator (Nephrology)  Zeina Biggs, RN as Specialty Care Coordinator (Nephrology)  Latricia Henson PA-C as Assigned Nephrology Provider  Katlyn Alonso APRN CNP as Nurse Practitioner (Anesthesiology)  Jesus Alberto Walton MD as MD (Surgery)  Latonya Gaming PA as Physician Assistant (Urology)  Zulma Wong APRN CNP as Nurse Practitioner (Cardiovascular Disease)  Brittney Pate PA-C as Physician Assistant (Anesthesiology)  Tuan Barkley PA-C as Assigned Musculoskeletal Provider  Jesus Alberto Walton MD as Assigned Surgical Provider  Zulma Wong APRN CNP as Assigned Heart and Vascular Provider  Brad Bauman MD as Assigned Dermatology Provider  Ghada Vaughn NP as Nurse Practitioner (Psychiatry & Neurology Vascular Neurology)  Nasreen Esteves PA-C as Physician Assistant (Anesthesiology)  River Joshua MD as MD (Urology)  RIVER JOSHUA    Copy to patient  JANET IQBAL  [address] [facility] [address]      Again, thank you for allowing me to participate in the care of your patient.      Sincerely,    River Joshua MD

## 2025-05-09 NOTE — NURSING NOTE
Current patient location: Patient driving from Dialysis clinic in [place]    Is the patient currently in the state of [place]? YES    Visit mode: VIDEO    If the visit is dropped, the patient can be reconnected by:VIDEO VISIT: Text to cell phone:   Telephone Information:   Mobile [phone]       Will anyone else be joining the visit? NO  (If patient encounters technical issues they should call [phone])    Are changes needed to the allergy or medication list? No and Pt stated no med changes    Are refills needed on medications prescribed by this physician? NO    Rooming Documentation:  Not applicable    Reason for visit: MARCELA ALVAREZF

## 2025-05-09 NOTE — PROGRESS NOTES
"Virtual Visit Details    Type of service:  Video Visit     Originating Location (pt. Location): {video visit patient location:621059::\"Home\"}  {PROVIDER LOCATION On-site should be selected for visits conducted from your clinic location or adjoining [facility] hospital, academic office, or other nearby [facility] building. Off-site should be selected for all other provider locations, including home:232506}  Distant Location (provider location):  {virtual location provider:214037}  Platform used for Video Visit: {Virtual Visit Platforms:733276::\"Infinancials\"}    "

## 2025-05-09 NOTE — PROGRESS NOTES
[facility] UROLOGY CLINIC [place].  Phone: [phone]   Fax: [phone]  DIA Joshua MD  Visit Date: May 9, 2025  Patient:  Reina Quan  Date of birth 1965, 60 year old female     Microscopic hematuria  3/18/25 cystoscopy, retrograde pyelogram showed right renal extravasation.  4/10/25 CT Urogram shows possible 1-2mm kidney transplant stone.  No contrast in native kidneys, but not mass or urinoma.    Kidney transplant  1992    Plan  Follow-up with PA in 1 year with ct to confirm no kidney stone in kidney transplant.  She is cleared for transplant from my perspective.    _____________________________________________________________________    HPI  She is here today to follow-up on her recent CT scan which was completed due to extravasation seen at her March 2025 retrograde pyelogram.    She denies any problems since her last visit.    I reviewed the radiologic images and reports from the radiologic exam (4/10/25 CT) listed above.  My independent interpretation is listed there as well.        Video-Visit Details  Video Start Time: 310  Video End Time: 318  Time spent on pre-visit work, post visit work, and documentation on day of visit outside of time during video visit: 5 min  Total time on the day of the visit: 13 min  Originating Location (pt. Location): Home.    Distant Location (provider location):  [facility].  Platform used for Video Visit: [employer]ding [employer]        Patient Care Team:  Lizbeth Andrews PA-C as PCP - General (Family Medicine)  Jamar Kennedy MD as Hospitalist (Student in organized health care education/training program)  Amanda Disla MD as MD (Infectious Diseases)  Tr Ni MD as MD (Dermatology)  Brad Bauman MD as MD (Dermapathology)  Lizbeth Andrews PA-C as Referring Physician (Family Medicine)  Lizbeth Andrews PA-C as Assigned PCP  Cynthia Loyd MD as Assigned Endocrinology Provider  Reji Sinclair APRN CNP as  Nurse Practitioner (Nephrology)  Reji Sinclair APRN CNP as Nurse Practitioner (Nephrology)  Brittney Brown RD as Registered Dietitian (Dietitian, Registered)  Jason Wilson MD as MD (Surgery)  Bimal Baez MSW (Inactive) as  ( - Clinical)  Patricia Haddad, RN as Specialty Care Coordinator (Nephrology)  Zeina Biggs, RN as Specialty Care Coordinator (Nephrology)  Latricia Henson PA-C as Assigned Nephrology Provider  Katlyn Alonso APRN CNP as Nurse Practitioner (Anesthesiology)  Jesus Alberto Walton MD as MD (Surgery)  Latonya Gaming PA as Physician Assistant (Urology)  Zulma Wong APRN CNP as Nurse Practitioner (Cardiovascular Disease)  Brittney Pate PA-C as Physician Assistant (Anesthesiology)  Tuan Barkley PA-C as Assigned Musculoskeletal Provider  Jesus Alberto Walton MD as Assigned Surgical Provider  Zulma Wong APRN CNP as Assigned Heart and Vascular Provider  Brad Bauman MD as Assigned Dermatology Provider  Ghada Vaughn NP as Nurse Practitioner (Psychiatry & Neurology Vascular Neurology)  Nasreen Esteves PA-C as Physician Assistant (Anesthesiology)  River Joshua MD as MD (Urology)  RIVER JOSHUA    Copy to patient  JANET IQBAL  [address] [facility] [address]

## 2025-05-15 ENCOUNTER — PATIENT OUTREACH (OUTPATIENT)
Dept: NEPHROLOGY | Facility: CLINIC | Age: 60
End: 2025-05-15

## 2025-05-15 ENCOUNTER — OFFICE VISIT (OUTPATIENT)
Dept: TRANSPLANT | Facility: CLINIC | Age: 60
End: 2025-05-15
Attending: SURGERY
Payer: COMMERCIAL

## 2025-05-15 VITALS
WEIGHT: 115.2 LBS | SYSTOLIC BLOOD PRESSURE: 146 MMHG | DIASTOLIC BLOOD PRESSURE: 73 MMHG | BODY MASS INDEX: 21.06 KG/M2 | OXYGEN SATURATION: 97 % | HEART RATE: 88 BPM

## 2025-05-15 DIAGNOSIS — T86.11 KIDNEY TRANSPLANT REJECTION: ICD-10-CM

## 2025-05-15 PROCEDURE — 99213 OFFICE O/P EST LOW 20 MIN: CPT | Performed by: SURGERY

## 2025-05-15 NOTE — PROGRESS NOTES
AV Fistula Maturity Assessment:  S/p LUE brachiobasilic AVF 2nd stage transposition surgery on 11/20/24. Was using but had issues with infiltration, some resistance to flow, and then development of pseudoaneurysm. Failed IR treatment. S/p open excision of aneurysm with primary anastomosis 4/29/25. Tolerated surgery well.    Recovering well overall. Has been dialyzing with return needle below repair and drawing blood from CVL.         Bruit: +  Thrill: +  Pulsatile: No  Ulnar Pulse: +  Radial Pulse: +  Numbness/tingling in fingers/ hand: --  Hand strength: +, equal bilaterally  Hand temp: WARM COLD: warm, equal  bilaterally  Cap refill of finger tips: +, equal bilaterally  Ecchymosis in mid/upper arm around incision, old. Small firm hematoma. 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 Good flow in spite of stenosis    Impression: 59 yo F with BB AVF s/p open repair of pseudoaneurysm. Good thrill throughout.     OK to use one needle in AVF close to AC fossa and one in CVL, continue with return needle. Can transition to two needles in AVF 6 weeks after surgery, when hematoma has resolved. Will discuss timing/need for fistulogram with IR for downstream narrowing in setting of recent repair above that.     Total time: 15 minutes    Jesus Alberto Walton MD

## 2025-05-15 NOTE — PROGRESS NOTES
Dialysis Access Care Coordination: General Outreach    REASON FOR CALL:     REASON FOR CALL: Clinic Care Coordination - Follow-up (Fistula follow up s/p revision surgery)     Pt seen with Dr. Walton.                              SITUATION/BACKROUND:   Pt with LUE brachiobasilic AVF created by Dr. Walton on 10/15/24 with 2nd stage transposition surgery on 11/20/24.  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    Nephrology provider: Dr. Mcdonnell.    Neph Tracking Flowsheet Last Filled Values       Preferred Modality Home Hemodialysis    Final Modality Hemodialysis    Patient's Referral Dates Auto Populate Patient's Referral Dates    Specialty Care Coordination Referral - Dialysis 7/26/2024    Journey Referral 7/18/24    Vein Mapping/US Order 8/19/24    Vein Mapping/US  08/19/24    Access Surgeon Referral Status  Referred    Dialysis Access Referral 07/26/24  Fistula vs PD consult with Dr. Walton    Access Surgical Consult 08/19/24  PLAN: LUE brachiobasilic (2 stage) AVF vs LUE AVG underger general anesthesia with nerve block.    Diaylsis Access Type AV Fistula  Brachiobasilic AVF    Dialysis Access Site TRINY    Dialysis Access Surgery 10/15/24  1st stage LUE brachiobasilic AVF, if ok at 2wk and 4 wk post op and US, ok for 2nd stage after 6 wks per Dr. Walton    Dialysis Access Surgeon Dr. Walton    Dialysis Access Revision Date 04/29/25  LUE BB AVF revision (bypass aneurism with graft), Dr. Walton  2nd stage transposition 11/20/24    Dialysis Access Maturation 01/30/25    Transplant Evaluation Referral 6/21/24    Transplant Status  Referred          Dialysis History        Start End Type Center Comments    10/11/2024  Hemo-In Center [facility] ([facility]) MWF, Dr. Mcdonnell                  Patient is followed by Solid Organ Transplant 2/2 Kidney Transplant Evaluation - 10/7/2024.  Coordinator: Lanette Ortiz    ASSESSMENT:     Recent  Labs      Latest Ref Rng & Units 4/29/2025 3/22/2025 3/21/2025   Neph Labs   Sodium 135 - 145 mmol/L  136  138     137    GFR Estimate >60 mL/min/1.73m2  10  13     13     7    Potassium 3.4 - 5.3 mmol/L 4.4  3.8  4.4     4.1    Creatinine 0.51 - 0.95 mg/dL  4.68  3.9     3.81     6.27    Magnesium 1.7 - 2.3 mg/dL  2.2  2.3    Urea Nitrogen 8.0 - 23.0 mg/dL  20.7  17.5     25.3    Hemoglobin 11.7 - 15.7 g/dL  10.0  11.8    Hemoglobin A1C <5.7 %   5.0        Multiple values from one day are sorted in reverse-chronological order     Attempted to call HD RN at [facility] for follow up on how AVF is working.  Unable to reach [facility] staff.    US completed today, 5/15/25:  Impression:     1. Arterial inflow: Patent brachial artery arterial inflow without  focal stenosis per velocity criteria.     2. Fistula anastomosis evaluation: Widely patent anastomosis.     3. Venous outflow: Patient status post AV fistula revision with  overlying hematoma in the area. There is a focal area of basilic vein  narrowing in the mid arm with measures 1.5 mm in increased velocity of  740 cm/s.     4. Flow volume measurements as per above. Adequate flow volumes.     5. No central venous stenosis.     TEZ RIBEIRO MD     Dialysis Access Assessments:  Fistula / Graft  Fever/Chills: no  Redness/warmth: no  Swelling: no  Pain: none  Drainage: none  Current infection: no  Steal Symptoms: none  Thrill: positive  Bruit: positive  Dysfunction: none  Interventions:  (will discuss with IR if fistulogram is indicated, or if should wait until being cannulated with 2 needles before intervention)    Incision CDI, 6 stitches removed per Dr. Walton.  Bruising noted proximal to incision, pt reports that has been there since surgery and is improving.  Pt reports tenderness and random shooting pain, but seems to be improving and it's tolerable.  Pt reports some ongoing tingling on the underside of her forearm, which has been consistent since the  initial surgery.  Cap refill and  strength good and equal bilaterally    Pt reports they have cannulated 4x for HD since last week with the venous return needle.  Pt reports no issues with dialysis or difficulty with cannulation.  Unsure if there is enough cannulatable space for a 2nd needle until they can cannulate where incision is from revision surgery.    PLAN:     Future Appts Next 180 days       Visit Type Date Time Department    US EXT ART LEYDI DIAL ACC GRFT 5/15/2025 12:00 PM [facility] ULTRASOUND    SOT FISTULA FOLLOW UP 5/15/2025  2:30 PM [facility] SOT SURGERY            Follow Up:     Message sent to radiologist, Dr. Khoury, inquiring about fistulogram and timing post revision surgery.  Pt to continue to use fistula for venous return needle until 6 weeks post op, then ok to start cannulating with 2 needles anywhere along the fistula.  (After 6/10/25)   RN CC updated dialysis team with plan via fax (unable to speak with staff upon calling)  Continue cannulating with venous return needle distal to the revision incision.  After 6/10/25 ok to cannulate with 2 needles.  Let us know if there are difficulties, concerns, or indications for a fistulogram.  Will follow up with pt after radiologist response with recommendation re: fistulogram.     Patient verbalized understanding and will follow up as recommended.    ANNABEL SPRING RN  Dialysis Access Surgery Care Coordinator  Phone: [phone]  Pool: P_Dialysis_Access_Nurse

## 2025-05-15 NOTE — LETTER
5/15/2025      Reina Quan  [address] [address]      Dear Colleague,    Thank you for referring your patient, Reina Quan, to the [facility]. Please see a copy of my visit note below.    AV Fistula Maturity Assessment:  S/p LUE brachiobasilic AVF 2nd stage transposition surgery on 11/20/24. Was using but had issues with infiltration, some resistance to flow, and then development of pseudoaneurysm. Failed IR treatment. S/p open excision of aneurysm with primary anastomosis 4/29/25. Tolerated surgery well.    Recovering well overall. Has been dialyzing with return needle below repair and drawing blood from CVL.         Bruit: +  Thrill: +  Pulsatile: No  Ulnar Pulse: +  Radial Pulse: +  Numbness/tingling in fingers/ hand: --  Hand strength: +, equal bilaterally  Hand temp: WARM COLD: warm, equal  bilaterally  Cap refill of finger tips: +, equal bilaterally  Ecchymosis in mid/upper arm around incision, old. Small firm hematoma. 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 Good flow in spite of stenosis    Impression: 61 yo F with BB AVF s/p open repair of pseudoaneurysm. Good thrill throughout.     OK to use one needle in AVF close to AC fossa and one in CVL, continue with return needle. Can transition to two needles in AVF 6 weeks after surgery, when hematoma has resolved. Will discuss timing/need for fistulogram with IR for downstream narrowing in setting of recent repair above that.     Total time: 15 minutes    Jesus Alberto Walton MD          Again, thank you for allowing me to participate in the care of your patient.        Sincerely,        Jesus Alberto Walton MD    Electronically signed

## 2025-05-15 NOTE — LETTER
Reina Quan  [address] [address]   : 1965    To whom it may concern:    To whom it may concern,     Reina Quan was seen in clinic today for LUE AV fistula surgical follow up s/p revision surgery on 25 with Dr. Walton.    Date: 5/15/2025    Time of Call: 4:26 PM     Diagnosis:  Problem with dialysis access     [ VORB ] Ordering provider: Dr. Jesus Alberto Walton  Order: Continue cannulating with venous return needle distal to the revision incision.  After 6/10/25 ok to cannulate with 2 needles.  Let us know if there are difficulties, concerns, or indications for a fistulogram.     Order received by: ANNABEL BIGGS RN, BSN     Follow-up/additional notes: There is a stenosis noted in the proximal fistula.  Please let us know right away if there are indications for a fistulogram.      Please contact our Dialysis Access Care Coordinators with any issues or concerns you encounter, at [phone].      Thank you,  Annabel Biggs RN, BSN  Patricia Haddad RN  Dialysis Access Surgery Care Coordinators  [phone]    On behalf of:    Jesus Alberto Moscoso MD   Assistant Professor of Surgery   Transplantation Division  Department of Surgery   [facility]

## 2025-05-20 ENCOUNTER — TELEPHONE (OUTPATIENT)
Dept: TRANSPLANT | Facility: CLINIC | Age: 60
End: 2025-05-20
Payer: COMMERCIAL

## 2025-05-20 DIAGNOSIS — T86.11 KIDNEY TRANSPLANT REJECTION: ICD-10-CM

## 2025-05-20 NOTE — TELEPHONE ENCOUNTER
Provider Call: General  Route to LPN    Reason for call: Emely from [employer] called to request update on ongoing evaluation, as pt's Zone 1 (already extended) will  on 25.    Ok to leave detailed VM, or to fax update to [phone]    Call back needed? Yes    Return Call Needed  Same as documented in contacts section  When to return call?: Greater than one day: Route standard priority

## 2025-06-24 ENCOUNTER — OFFICE VISIT (OUTPATIENT)
Dept: FAMILY MEDICINE | Facility: CLINIC | Age: 60
End: 2025-06-24
Payer: COMMERCIAL

## 2025-06-24 VITALS
OXYGEN SATURATION: 99 % | HEART RATE: 76 BPM | DIASTOLIC BLOOD PRESSURE: 80 MMHG | RESPIRATION RATE: 17 BRPM | TEMPERATURE: 97.9 F | HEIGHT: 62 IN | BODY MASS INDEX: 20.43 KG/M2 | SYSTOLIC BLOOD PRESSURE: 122 MMHG | WEIGHT: 111 LBS

## 2025-06-24 DIAGNOSIS — T16.1XXA FOREIGN BODY OF RIGHT EAR, INITIAL ENCOUNTER: Primary | ICD-10-CM

## 2025-06-24 RX ORDER — ASPIRIN 81 MG/1
81 TABLET, CHEWABLE ORAL EVERY EVENING
COMMUNITY
Start: 2025-06-24

## 2025-06-24 RX ORDER — PREDNISONE 5 MG/1
5 TABLET ORAL EVERY MORNING
COMMUNITY
Start: 2025-06-24

## 2025-06-24 ASSESSMENT — PATIENT HEALTH QUESTIONNAIRE - PHQ9
SUM OF ALL RESPONSES TO PHQ QUESTIONS 1-9: 16
SUM OF ALL RESPONSES TO PHQ QUESTIONS 1-9: 16
10. IF YOU CHECKED OFF ANY PROBLEMS, HOW DIFFICULT HAVE THESE PROBLEMS MADE IT FOR YOU TO DO YOUR WORK, TAKE CARE OF THINGS AT HOME, OR GET ALONG WITH OTHER PEOPLE: VERY DIFFICULT

## 2025-06-24 NOTE — PROGRESS NOTES
"    Subjective   Kathy is a 60 year old, presenting for the following health issues:  Ear Problem (Has a small piece of her hearing aid in the right ear. )        6/24/2025     3:14 PM   Additional Questions   Roomed by JOHANA Escobar     Ear Problem    History of Present Illness       Reason for visit:  Something stuck in my ear  Symptom onset:  Today   She is taking medications regularly.        Has a piece of an old hearing aid stuck in her right ear- unsure how long it's been there.  No ear pain.  Decreased hearing.         Objective    /80 (BP Location: Left arm, Patient Position: Sitting, Cuff Size: Adult Regular)   Pulse 76   Temp 97.9  F (36.6  C) (Oral)   Resp 17   Ht 1.575 m (5' 2\")   Wt 50.3 kg (111 lb)   LMP 05/14/2014 (Approximate)   SpO2 99%   BMI 20.30 kg/m    Body mass index is 20.3 kg/m .  Physical Exam   GENERAL: alert and no distress  HENT: right ear: silicone end of hearing aid removed from right ear canal after multiple attempts with otic spatula and alligator forceps per CNP.  Patient tolerated procedure well.   RESP:  respirations even, non-labored   PSYCH: mentation appears normal, affect normal/bright    A/P:  1. Foreign body of right ear, initial encounter (Primary)  Resolved  - Foreign body removal          Signed Electronically by: Dana Farooq CNP    "

## 2025-06-30 NOTE — PROGRESS NOTES
__________________________________________________________      [facility]   [phone]  __________________________________________________________         History of Present Illness   Chief Complaint: Patient presents with:  Hospital F/U: Wants to know hwy it happened and check this off for the transplant      Reina Quan is a 60 year old female presenting for follow-up for possible TIA.     She was hospitalized at [facility] on 3/21/2025. Prior to the hospital stay, she had a past medical history of kidney transplant in 1992 for FSGS now on HD since September 2024, HTN, HLD, MDD, DEBRA, migraines (when on OCP).    In Feb/March of this year she started having headaches and was found to have high BP. She states prior to that she never had HTN.      She was admitted to [facility] (3/19-3/21) with hypertensive urgency, then returned to the ED 3/21 with transient right sided sensation changes and mild word finding difficulty. She had a couple episodes of transient sensation changes in different portions of her right arm/leg/face, each lasting about 10 minutes. MR brain without evidence of acute stroke. Presenting /94. No significant stenosis on vessel imaging. Treated as possible TIA with asa 81 mg daily for stroke prevention.    7/1/2025: She has the Zio patch but has not yet done it - she was encouraged to complete it and states she will. States that since being started on BP meds her numbers have been ok. States SBP on average 120s. Needs to get new BP cuff. States the prescription for losartan is for 50 mg not 100 mg. I encouraged her to touch base with either PCP or nephrology (whoever is managing BP which she is unsure) to clarify.Talking aspirin, statin and BP meds without issue. Headaches have resolved since starting the BP meds. No recurrence of focal neuro symptoms. She is waiting to get on the transplant list and states she needed to be seen in follow up  by our team for that to happen.    Modified Gavin Scale  Score: 0-No symptoms    Stroke Evaluation Summarized:  New results resulted and reviewed by me today are in BOLD below.  I personally reviewed the following neuroimaging studies today and the comments above reflect my own personal interpretation of the images: images: CTA head/neck and MRI brain    MRI/Head CT MRI: no acute stroke, Chronic lacune left corona radiata    Intracranial Vasculature CTA: no significant stenosis   Cervical Vasculature CTA: no significant stenosis     Echocardiogram LVEF 60-65%.  The atrial septum is intact as assessed by agitated saline bubble study.  Normal LA size.   EKG/Telemetry SR   Other Testing Zio: no result available     Labs Lab Results   Component Value Date     (H) 03/21/2025    A1C 5.0 03/21/2025    CTROPT 20 (H) 03/21/2025    INR 0.88 03/21/2025    INR 0.9 03/21/2025               Home Medications     Current Outpatient Medications   Medication Sig Dispense Refill    amLODIPine (NORVASC) 10 MG tablet Take 1 tablet (10 mg) by mouth at bedtime. 30 tablet 1    aspirin (ASA) 81 MG chewable tablet Take 1 tablet (81 mg) by mouth every evening.      atorvastatin (LIPITOR) 80 MG tablet Take 1 tablet (80 mg) by mouth at bedtime. 90 tablet 3    cinacalcet (SENSIPAR) 30 MG tablet Take 1 tablet (30 mg) by mouth every other day 45 tablet 3    clobetasol (TEMOVATE) 0.05 % external ointment Apply topically 2 times daily Use sparingly at active areas of dermatitis for no more than 14 days or until resolved, whichever is sooner. 60 g 3    clobetasol (TEMOVATE) 0.05 % external solution Apply topically daily as needed (itchyy scalp) Apply to scalp daily as needed. 50 mL 4    clobetasol propionate (CLOBEX) 0.05 % external shampoo APPLY TO SCALP IN SHOWER TWICE WEEKLY 118 mL 1    ketoconazole (NIZORAL) 2 % external shampoo APPLY TOPICALLY DAILY AS NEEDED FOR ITCHING, IRRITATION OR OTHER( SCALP SCALE) USE 2-3 TIMES A WEEK. APPLY AND  "LATHER THEN RINSE OUT 5 MINUTES 120 mL 0    losartan (COZAAR) 100 MG tablet Take 1 tablet (100 mg) by mouth every evening. (Patient taking differently: Take 100 mg by mouth every evening. States she is taking 50 mg daily.) 30 tablet 1    mycophenolate (GENERIC EQUIVALENT) 250 MG capsule Take 3 capsules (750 mg) by mouth 2 times daily 540 capsule 3    niacinamide 500 MG tablet Take 1 tablet (500 mg) by mouth 2 times daily (with meals). 60 tablet 3    PARoxetine (PAXIL) 20 MG tablet Take 1 tablet (20 mg) by mouth every morning. 90 tablet 0    predniSONE (DELTASONE) 5 MG tablet Take 1 tablet (5 mg) by mouth every morning.      prochlorperazine (COMPAZINE) 10 MG tablet Take 10 mg by mouth every 6 hours as needed.      sodium bicarbonate 650 MG tablet Take 2 tablets (1,300 mg) by mouth 2 times daily 360 tablet 3    triamcinolone (KENALOG) 0.1 % external cream APPLY SPARINGLY TOPICALLY TO THE AFFECTED AREA TWICE DAILY FOR 14 DAYS 30 g 0     No current facility-administered medications for this visit.            Physical Examination     Physical Exam    Estimated body mass index is 20.3 kg/m  as calculated from the following:    Height as of 6/24/25: 1.575 m (5' 2\").    Weight as of 6/24/25: 50.3 kg (111 lb).    /76 (BP Location: Right arm, Patient Position: Sitting, Cuff Size: Adult Regular)   Pulse 80   LMP 05/14/2014 (Approximate)   SpO2 98%        General Exam  General: Sitting up in chair in no acute distress    Neurologic:  Mental Status:  alert, oriented x 3, follows commands, speech clear and fluent, naming and repetition normal  Cranial Nerves:  visual fields intact, PERRL, EOMI with normal smooth pursuit, facial sensation intact and symmetric, facial movements symmetric, hearing not formally tested but intact to conversation, no dysarthria  Motor:  normal muscle tone and bulk, no abnormal movements, able to move all limbs spontaneously, strength 5/5 throughout upper and lower extremities, no pronator " drift  Sensory:  light touch sensation intact and symmetric throughout upper and lower extremities  Coordination:  normal finger-to-nose and heel-to-shin bilaterally without dysmetria, rapid alternating movements symmetric  Station/Gait:  normal width, turn, arm swing           Screenings and Questionnaires:     Tobacco:    Tobacco Use      Smoking status: Never        Passive exposure: Never      Smokeless tobacco: Never      Sleep Apnea:      3/6/2025    10:53 AM   STOP-Bang Questionnaire   Do you SNORE loudly (louder than talking or loud enough to be heard through closed doors)? 0   Do you often feel TIRED, fatigued, or sleepy during daytime? 0   Has anyone OBSERVED you stop breathing during your sleep? 0   Do you have or are you being treated for high blood PRESSURE? 1   BMI more than 35kg/m2? 0   AGE over 50 years old? 1   NECK circumference > 16 inches (40cm)? 0   GENDER: Male? 0   Total Score 2       Depression:      3/6/2025     9:14 AM 1/31/2025     8:04 AM   PHQ-2 ( 1999 [employer])   Q1: Little interest or pleasure in doing things 0 2   Q2: Feeling down, depressed or hopeless 0 0   PHQ-2 Score 0 2       Stroke Recovery and Risk Factors:      6/28/2025    10:26 AM   Stroke Questionnaire   Residual effects: Any residual effects from stroke? No, I feel totally back to how I was before the stroke   Level of independence: Could you live alone? Yes   Quality of Life: Rating (0-100%) 10   Quality of Life: What work now or before stroke Employed full time   Quality of Life: Current work status On long term disability   Quality of Life: How do you get around Drive myself   Quality of Life: Living situation before stroke With family or significant other   Quality of Life: Living situation now With family or significant other   Medication: How do you take your meds Myself, from individual bottles   Medication: Do you ever miss or forget meds Rarely   Risk Factor: Checking blood pressure at home Yes   Risk Factor: Usual  blood pressure numbers 130/80   Risk Factor: Checking blood sugar at home No   Risk Factor: Second-hand smoke at home No   Risk Factor: How much caffeine per day 1 cup of coffee   Who completed this questionnaire? The patient independently             Assessment and Plan       1. Possible TIA with symptoms of transient R sided numbness and speech changes    -Antiplatelet therapy: Aspirin 81 mg daily for stroke prevention  -Statin therapy: Atorvastatin 80 mg, recheck lipid panel today, titrate to goal LDL 40-70, LDL <40 associated with increased risk for ICH  -Long term outpatient blood pressure goal <130/80, tighter control associated with overall decreased cardiovascular risk, recommend home blood pressure monitoring, keep log and bring to f/u with PCP  -A1c goal <7%  -Mediterranean diet can be beneficial for overall decreased cardiovascular risk  -She was encouraged to complete the Zio patch  -No obvious contraindications from a stroke neurology standpoint to pursue repeat Kidney transplant      - Return for TIA, as needed.    Stroke Education provided.  She will call us with any questions.  For any acute neurologic deficits she was advised to  go directly to the hospital rather than call the clinic.      Daphney Engle PA-C  Neurology  07/01/2025         ____________________________________________________________________    Billing:  Please note: for coding purposes this visit should be billed only as established and not new, since this patient was seen by my same subspecialty team ([facility]) during the hospitalization that this visit is a follow up for.  I spent a total of 40 minutes on the day of the visit.   Time spent by me today doing chart review, history and exam, documentation and further activities per the note

## 2025-07-01 ENCOUNTER — OFFICE VISIT (OUTPATIENT)
Dept: NEUROLOGY | Facility: CLINIC | Age: 60
End: 2025-07-01
Payer: COMMERCIAL

## 2025-07-01 VITALS — OXYGEN SATURATION: 98 % | DIASTOLIC BLOOD PRESSURE: 76 MMHG | HEART RATE: 80 BPM | SYSTOLIC BLOOD PRESSURE: 113 MMHG

## 2025-07-01 DIAGNOSIS — G45.9 TIA (TRANSIENT ISCHEMIC ATTACK): Primary | ICD-10-CM

## 2025-07-01 NOTE — PATIENT INSTRUCTIONS
**Complete the heart monitor as soon as you can    Stroke Resources and Support Groups  [place] Stroke Association  strokemn.org/resources/index.php   strokemn.org/resources/support.php    The American Stroke Association  Stroke Support Group Finder  American Stroke Association    Stroke Prevention Recommendations  High blood pressure, or hypertension, is a leading cause of stroke and the most significant controllable risk factor. You should aim to keep your blood pressure below 130/80.     Smoking cessation: The nicotine and carbon monoxide in cigarette smoke damage the cardiovascular system and pave the way for a stroke. If you use nicotine, please reach out to your primary care provider for assistance with quitting or consider utilizing one of [place]'s free quit support programs (https://[email or web address]./communities/tobacco/quitting/index.html)    If you have Type 1 or 2 diabetes, control you blood sugar. You should aim to keep your HGBA1C below 7.0.     High cholesterol increases stroke risk. Aim to keep your LDL cholesterol between 40 and 70.     Eat an overall health dietary pattern that emphasizes:  - a wide variety of fruits and vegetables   - whole grains and products made up of mostly whole grains   - healthy sources of protein (mostly plants such as legumes and nuts; fish and seafood; low-fat or non-fat dairy; and, if you eat meat and poultry, ensuring it is lean and unprocessed)  - liquid non-tropical vegetable oils  - minimally processed foods   - minimize intake of added sugars  - foods prepared with little or no salt  - limited or preferably no alcohol intake    Excess body weight and obesity are linked with an increased risk of high blood pressure, diabetes, heart disease, and stroke. Losing as little as 5 to 10 pounds can make a significant difference in your risks. [facility] has a Comprehensive Weight Management program if you would like assistance/support:  https://www.[facility].org/treatments/comprehensive-weight-management    Aim for being active at least 150 minutes a week, but if you don't want to sweat the numbers, just move more and sit less.    Continue long term follow-up of stroke risk factor management with PCP    Recrudescence  Recrudescence of stroke symptoms refers to when a person experiences previously resolved symptoms of a stroke. These symptoms are usually mild and resolve within a few days. The condition is also sometimes referred to as ischemic stroke recrudescence. In simple terms, ischemic stroke recrudescence, or anamnestic syndrome, occurs when someone redevelops symptoms they experienced after having a stroke they had previously recovered from. This may result in the reappearance of neurological or physical symptoms associated with stroke. In most cases, people tend to experience a mild, rather than severe, worsening or return of symptoms. Recognized triggers of ischemic stroke recrudescence include:  - infections  - stress  - conditions that cause metabolic dysregulation, such as diabetes  - insomnia or lack of proper sleep  - hypertension (high blood pressure)  - low sodium levels (hyponatremia)  - use of sedating medications, such as benzodiazepines, and anesthetic drugs, such as midazolam hydrochloride and fentanyl citrate      -Stroke Clinic Phone Number: [phone]  -Stroke nurse care coordinator Isa with any questions or concerns at [phone], or send a [employer] medical advice message  -Call 911 with any new stroke symptoms

## 2025-07-01 NOTE — LETTER
7/1/2025      Reina Quan  [address] [address]      Dear Colleague,    Thank you for referring your patient, Reina Quan, to the [facility]. Please see a copy of my visit note below.    __________________________________________________________      [facility]   [phone]  __________________________________________________________         History of Present Illness   Chief Complaint: Patient presents with:  Hospital F/U: Wants to know hwy it happened and check this off for the transplant      Reina Quan is a 60 year old female presenting for follow-up for possible TIA.     She was hospitalized at [facility] on 3/21/2025. Prior to the hospital stay, she had a past medical history of kidney transplant in 1992 for FSGS now on HD since September 2024, HTN, HLD, MDD, DEBRA, migraines (when on OCP).    In Feb/March of this year she started having headaches and was found to have high BP. She states prior to that she never had HTN.      She was admitted to [facility] (3/19-3/21) with hypertensive urgency, then returned to the ED 3/21 with transient right sided sensation changes and mild word finding difficulty. She had a couple episodes of transient sensation changes in different portions of her right arm/leg/face, each lasting about 10 minutes. MR brain without evidence of acute stroke. Presenting /94. No significant stenosis on vessel imaging. Treated as possible TIA with asa 81 mg daily for stroke prevention.    7/1/2025: She has the Zio patch but has not yet done it - she was encouraged to complete it and states she will. States that since being started on BP meds her numbers have been ok. States SBP on average 120s. Needs to get new BP cuff. States the prescription for losartan is for 50 mg not 100 mg. I encouraged her to touch base with either PCP or nephrology (whoever is managing BP which she is  unsure) to clarify.Talking aspirin, statin and BP meds without issue. Headaches have resolved since starting the BP meds. No recurrence of focal neuro symptoms. She is waiting to get on the transplant list and states she needed to be seen in follow up by our team for that to happen.    Modified [place] Scale  Score: 0-No symptoms    Stroke Evaluation Summarized:  New results resulted and reviewed by me today are in BOLD below.  I personally reviewed the following neuroimaging studies today and the comments above reflect my own personal interpretation of the images: images: CTA head/neck and MRI brain    MRI/Head CT MRI: no acute stroke, Chronic lacune left corona radiata    Intracranial Vasculature CTA: no significant stenosis   Cervical Vasculature CTA: no significant stenosis     Echocardiogram LVEF 60-65%.  The atrial septum is intact as assessed by agitated saline bubble study.  Normal LA size.   EKG/Telemetry SR   Other Testing Zio: no result available     Labs Lab Results   Component Value Date     (H) 03/21/2025    A1C 5.0 03/21/2025    CTROPT 20 (H) 03/21/2025    INR 0.88 03/21/2025    INR 0.9 03/21/2025               Home Medications     Current Outpatient Medications   Medication Sig Dispense Refill     amLODIPine (NORVASC) 10 MG tablet Take 1 tablet (10 mg) by mouth at bedtime. 30 tablet 1     aspirin (ASA) 81 MG chewable tablet Take 1 tablet (81 mg) by mouth every evening.       atorvastatin (LIPITOR) 80 MG tablet Take 1 tablet (80 mg) by mouth at bedtime. 90 tablet 3     cinacalcet (SENSIPAR) 30 MG tablet Take 1 tablet (30 mg) by mouth every other day 45 tablet 3     clobetasol (TEMOVATE) 0.05 % external ointment Apply topically 2 times daily Use sparingly at active areas of dermatitis for no more than 14 days or until resolved, whichever is sooner. 60 g 3     clobetasol (TEMOVATE) 0.05 % external solution Apply topically daily as needed (itchyy scalp) Apply to scalp daily as needed. 50 mL 4      "clobetasol propionate (CLOBEX) 0.05 % external shampoo APPLY TO SCALP IN SHOWER TWICE WEEKLY 118 mL 1     ketoconazole (NIZORAL) 2 % external shampoo APPLY TOPICALLY DAILY AS NEEDED FOR ITCHING, IRRITATION OR OTHER( SCALP SCALE) USE 2-3 TIMES A WEEK. APPLY AND LATHER THEN RINSE OUT 5 MINUTES 120 mL 0     losartan (COZAAR) 100 MG tablet Take 1 tablet (100 mg) by mouth every evening. (Patient taking differently: Take 100 mg by mouth every evening. States she is taking 50 mg daily.) 30 tablet 1     mycophenolate (GENERIC EQUIVALENT) 250 MG capsule Take 3 capsules (750 mg) by mouth 2 times daily 540 capsule 3     niacinamide 500 MG tablet Take 1 tablet (500 mg) by mouth 2 times daily (with meals). 60 tablet 3     PARoxetine (PAXIL) 20 MG tablet Take 1 tablet (20 mg) by mouth every morning. 90 tablet 0     predniSONE (DELTASONE) 5 MG tablet Take 1 tablet (5 mg) by mouth every morning.       prochlorperazine (COMPAZINE) 10 MG tablet Take 10 mg by mouth every 6 hours as needed.       sodium bicarbonate 650 MG tablet Take 2 tablets (1,300 mg) by mouth 2 times daily 360 tablet 3     triamcinolone (KENALOG) 0.1 % external cream APPLY SPARINGLY TOPICALLY TO THE AFFECTED AREA TWICE DAILY FOR 14 DAYS 30 g 0     No current facility-administered medications for this visit.            Physical Examination     Physical Exam    Estimated body mass index is 20.3 kg/m  as calculated from the following:    Height as of 6/24/25: 1.575 m (5' 2\").    Weight as of 6/24/25: 50.3 kg (111 lb).    /76 (BP Location: Right arm, Patient Position: Sitting, Cuff Size: Adult Regular)   Pulse 80   LMP 05/14/2014 (Approximate)   SpO2 98%        General Exam  General: Sitting up in chair in no acute distress    Neurologic:  Mental Status:  alert, oriented x 3, follows commands, speech clear and fluent, naming and repetition normal  Cranial Nerves:  visual fields intact, PERRL, EOMI with normal smooth pursuit, facial sensation intact and " symmetric, facial movements symmetric, hearing not formally tested but intact to conversation, no dysarthria  Motor:  normal muscle tone and bulk, no abnormal movements, able to move all limbs spontaneously, strength 5/5 throughout upper and lower extremities, no pronator drift  Sensory:  light touch sensation intact and symmetric throughout upper and lower extremities  Coordination:  normal finger-to-nose and heel-to-shin bilaterally without dysmetria, rapid alternating movements symmetric  Station/Gait:  normal width, turn, arm swing           Screenings and Questionnaires:     Tobacco:    Tobacco Use      Smoking status: Never        Passive exposure: Never      Smokeless tobacco: Never      Sleep Apnea:      3/6/2025    10:53 AM   STOP-Bang Questionnaire   Do you SNORE loudly (louder than talking or loud enough to be heard through closed doors)? 0   Do you often feel TIRED, fatigued, or sleepy during daytime? 0   Has anyone OBSERVED you stop breathing during your sleep? 0   Do you have or are you being treated for high blood PRESSURE? 1   BMI more than 35kg/m2? 0   AGE over 50 years old? 1   NECK circumference > 16 inches (40cm)? 0   GENDER: Male? 0   Total Score 2       Depression:      3/6/2025     9:14 AM 1/31/2025     8:04 AM   PHQ-2 ( 1999 [employer])   Q1: Little interest or pleasure in doing things 0 2   Q2: Feeling down, depressed or hopeless 0 0   PHQ-2 Score 0 2       Stroke Recovery and Risk Factors:      6/28/2025    10:26 AM   Stroke Questionnaire   Residual effects: Any residual effects from stroke? No, I feel totally back to how I was before the stroke   Level of independence: Could you live alone? Yes   Quality of Life: Rating (0-100%) 10   Quality of Life: What work now or before stroke Employed full time   Quality of Life: Current work status On long term disability   Quality of Life: How do you get around Drive myself   Quality of Life: Living situation before stroke With family or significant  other   Quality of Life: Living situation now With family or significant other   Medication: How do you take your meds Myself, from individual bottles   Medication: Do you ever miss or forget meds Rarely   Risk Factor: Checking blood pressure at home Yes   Risk Factor: Usual blood pressure numbers 130/80   Risk Factor: Checking blood sugar at home No   Risk Factor: Second-hand smoke at home No   Risk Factor: How much caffeine per day 1 cup of coffee   Who completed this questionnaire? The patient independently             Assessment and Plan       1. Possible TIA with symptoms of transient R sided numbness and speech changes    -Antiplatelet therapy: Aspirin 81 mg daily for stroke prevention  -Statin therapy: Atorvastatin 80 mg, recheck lipid panel today, titrate to goal LDL 40-70, LDL <40 associated with increased risk for ICH  -Long term outpatient blood pressure goal <130/80, tighter control associated with overall decreased cardiovascular risk, recommend home blood pressure monitoring, keep log and bring to f/u with PCP  -A1c goal <7%  -Mediterranean diet can be beneficial for overall decreased cardiovascular risk  -She was encouraged to complete the Zio patch  -No obvious contraindications from a stroke neurology standpoint to pursue repeat Kidney transplant      - Return for TIA, as needed.    Stroke Education provided.  She will call us with any questions.  For any acute neurologic deficits she was advised to  go directly to the hospital rather than call the clinic.      Daphney Engle PA-C  Neurology  07/01/2025         ____________________________________________________________________    Billing:  Please note: for coding purposes this visit should be billed only as established and not new, since this patient was seen by my same subspecialty team ([facility]) during the hospitalization that this visit is a follow up for.  I spent a total of 40 minutes on the day of the visit.   Time spent by  me today doing chart review, history and exam, documentation and further activities per the note          Again, thank you for allowing me to participate in the care of your patient.        Sincerely,        Daphney Engle PA-C    Electronically signed

## 2025-07-01 NOTE — NURSING NOTE
Symptoms or concerns today: why and prevention    Med comments: current    Who the patient is here with today: alone    Aly Zaragoza

## 2025-07-15 ENCOUNTER — OFFICE VISIT (OUTPATIENT)
Dept: FAMILY MEDICINE | Facility: CLINIC | Age: 60
End: 2025-07-15
Payer: COMMERCIAL

## 2025-07-15 VITALS
BODY MASS INDEX: 20.43 KG/M2 | WEIGHT: 111.7 LBS | OXYGEN SATURATION: 100 % | SYSTOLIC BLOOD PRESSURE: 120 MMHG | HEART RATE: 76 BPM | DIASTOLIC BLOOD PRESSURE: 74 MMHG | RESPIRATION RATE: 16 BRPM | TEMPERATURE: 97.4 F

## 2025-07-15 DIAGNOSIS — K62.89 ANAL PAIN: ICD-10-CM

## 2025-07-15 DIAGNOSIS — Z94.0 HTN, KIDNEY TRANSPLANT RELATED: ICD-10-CM

## 2025-07-15 DIAGNOSIS — R63.4 UNINTENTIONAL WEIGHT LOSS: ICD-10-CM

## 2025-07-15 DIAGNOSIS — R19.7 WATERY DIARRHEA: Primary | ICD-10-CM

## 2025-07-15 DIAGNOSIS — K52.9 CHRONIC DIARRHEA OF UNKNOWN ORIGIN: ICD-10-CM

## 2025-07-15 DIAGNOSIS — R10.32 ABDOMINAL PAIN, LEFT LOWER QUADRANT: ICD-10-CM

## 2025-07-15 DIAGNOSIS — Z94.0 KIDNEY REPLACED BY TRANSPLANT: ICD-10-CM

## 2025-07-15 DIAGNOSIS — D84.9 IMMUNOSUPPRESSED STATUS: ICD-10-CM

## 2025-07-15 DIAGNOSIS — I15.1 HTN, KIDNEY TRANSPLANT RELATED: ICD-10-CM

## 2025-07-15 LAB
ALBUMIN UR-MCNC: >=300 MG/DL
APPEARANCE UR: CLEAR
BACTERIA #/AREA URNS HPF: ABNORMAL /HPF
BILIRUB UR QL STRIP: NEGATIVE
COLOR UR AUTO: YELLOW
ERYTHROCYTE [DISTWIDTH] IN BLOOD BY AUTOMATED COUNT: 14.8 % (ref 10–15)
GLUCOSE UR STRIP-MCNC: 100 MG/DL
GRAN CASTS #/AREA URNS LPF: ABNORMAL /LPF
HCT VFR BLD AUTO: 36.5 % (ref 35–47)
HGB BLD-MCNC: 11.1 G/DL (ref 11.7–15.7)
HGB UR QL STRIP: ABNORMAL
HYALINE CASTS #/AREA URNS LPF: ABNORMAL /LPF
KETONES UR STRIP-MCNC: NEGATIVE MG/DL
LEUKOCYTE ESTERASE UR QL STRIP: NEGATIVE
MCH RBC QN AUTO: 30.7 PG (ref 26.5–33)
MCHC RBC AUTO-ENTMCNC: 30.4 G/DL (ref 31.5–36.5)
MCV RBC AUTO: 101 FL (ref 78–100)
MUCOUS THREADS #/AREA URNS LPF: PRESENT /LPF
NITRATE UR QL: NEGATIVE
PH UR STRIP: 8.5 [PH] (ref 5–7)
PLATELET # BLD AUTO: 150 10E3/UL (ref 150–450)
RBC # BLD AUTO: 3.62 10E6/UL (ref 3.8–5.2)
RBC #/AREA URNS AUTO: ABNORMAL /HPF
SP GR UR STRIP: 1.02 (ref 1–1.03)
SQUAMOUS #/AREA URNS AUTO: ABNORMAL /LPF
UROBILINOGEN UR STRIP-ACNC: 0.2 E.U./DL
WBC # BLD AUTO: 6.6 10E3/UL (ref 4–11)
WBC #/AREA URNS AUTO: ABNORMAL /HPF
WBC CLUMPS #/AREA URNS HPF: PRESENT /HPF

## 2025-07-15 PROCEDURE — 3078F DIAST BP <80 MM HG: CPT | Performed by: STUDENT IN AN ORGANIZED HEALTH CARE EDUCATION/TRAINING PROGRAM

## 2025-07-15 PROCEDURE — 3074F SYST BP LT 130 MM HG: CPT | Performed by: STUDENT IN AN ORGANIZED HEALTH CARE EDUCATION/TRAINING PROGRAM

## 2025-07-15 PROCEDURE — 82310 ASSAY OF CALCIUM: CPT | Performed by: STUDENT IN AN ORGANIZED HEALTH CARE EDUCATION/TRAINING PROGRAM

## 2025-07-15 PROCEDURE — 81001 URINALYSIS AUTO W/SCOPE: CPT | Performed by: STUDENT IN AN ORGANIZED HEALTH CARE EDUCATION/TRAINING PROGRAM

## 2025-07-15 PROCEDURE — 85027 COMPLETE CBC AUTOMATED: CPT | Performed by: STUDENT IN AN ORGANIZED HEALTH CARE EDUCATION/TRAINING PROGRAM

## 2025-07-15 PROCEDURE — 36415 COLL VENOUS BLD VENIPUNCTURE: CPT | Performed by: STUDENT IN AN ORGANIZED HEALTH CARE EDUCATION/TRAINING PROGRAM

## 2025-07-15 PROCEDURE — 99214 OFFICE O/P EST MOD 30 MIN: CPT | Performed by: STUDENT IN AN ORGANIZED HEALTH CARE EDUCATION/TRAINING PROGRAM

## 2025-07-15 RX ORDER — LIDOCAINE HYDROCHLORIDE 20 MG/ML
JELLY TOPICAL
Qty: 28.33 G | Refills: 0 | Status: SHIPPED | OUTPATIENT
Start: 2025-07-15

## 2025-07-15 ASSESSMENT — ENCOUNTER SYMPTOMS: DIARRHEA: 1

## 2025-07-15 NOTE — PROGRESS NOTES
Assessment & Plan   Problem List Items Addressed This Visit       Kidney replaced by transplant    Relevant Orders    CBC with platelets (Completed)    Enteric Bacteria and Virus Panel by [facility]ADY Stool    C. difficile Toxin B PCR with reflex to C. difficile EIA    Comprehensive metabolic panel (BMP + Alb, Alk Phos, ALT, AST, Total. Bili, TP) (Completed)    HTN, kidney transplant related    Relevant Orders    CBC with platelets (Completed)    Enteric Bacteria and Virus Panel by YADY Stool    C. difficile Toxin B PCR with reflex to C. difficile EIA    Comprehensive metabolic panel (BMP + Alb, Alk Phos, ALT, AST, Total. Bili, TP) (Completed)    Immunosuppressed status    Relevant Orders    CBC with platelets (Completed)    Enteric Bacteria and Virus Panel by YADY Stool    C. difficile Toxin B PCR with reflex to C. difficile EIA    Comprehensive metabolic panel (BMP + Alb, Alk Phos, ALT, AST, Total. Bili, TP) (Completed)     Other Visit Diagnoses         Watery diarrhea    -  Primary    Relevant Orders    CBC with platelets (Completed)    Enteric Bacteria and Virus Panel by YADY Stool    C. difficile Toxin B PCR with reflex to C. difficile EIA    Comprehensive metabolic panel (BMP + Alb, Alk Phos, ALT, AST, Total. Bili, TP) (Completed)      Chronic diarrhea of unknown origin        Relevant Orders    CBC with platelets (Completed)    Enteric Bacteria and Virus Panel by YADY Stool    C. difficile Toxin B PCR with reflex to C. difficile EIA    Comprehensive metabolic panel (BMP + Alb, Alk Phos, ALT, AST, Total. Bili, TP) (Completed)      Abdominal pain, left lower quadrant        Relevant Orders    UA Macroscopic with reflex to Microscopic and Culture - Lab Collect (Completed)    UA Microscopic with Reflex to Culture (Completed)    CBC with platelets (Completed)    Enteric Bacteria and Virus Panel by YADY Stool    C. difficile Toxin B PCR with reflex to C. difficile EIA    Comprehensive metabolic panel (BMP + Alb, Alk Phos, ALT,  AST, Total. Bili, TP) (Completed)      Unintentional weight loss        Relevant Orders    CBC with platelets (Completed)    Enteric Bacteria and Virus Panel by YADY Stool    C. difficile Toxin B PCR with reflex to C. difficile EIA    Comprehensive metabolic panel (BMP + Alb, Alk Phos, ALT, AST, Total. Bili, TP) (Completed)      Anal pain        Relevant Medications    lidocaine (XYLOCAINE) external gel           New patient to me, on HD MWF through [facility], with BB AVF surgery on 11/20/2024, s/P transplant 1992 for FSGS.  She presents with 2 months of watery diarrhea that comes and goes but has been quite severe in the last few days, with upwards of 10 episodes today.  Has been associated with slight weight loss of a few pounds.  Came in today to be evaluated for UTI as she was experiencing new though mild left lower quadrant pain.  Her daughter is having a baby on Friday and she wants to ensure she has nothing serious going on.  She was also concerned about significant anal pain in the setting of the diarrhea.  Exam shows mild tenderness in the left lower quadrant, and some small anal fissures without infection on the anus.  Given her immunocompromise status, recommend workup above.  She is stable today, well-appearing with normal vitals reassuringly.  No need for imaging at this time, though if abdominal pain worsened would obtain CT.  Will make her transplant team aware.    Felipe Chavez is a 60 year old, presenting for the following health issues:  Diarrhea (Pt states she has been having diarrhea for a couple of months. She states it comes and goes. She states because of this her bottom is really sore. She is just wanting to make sure nothing gets infected) and Pelvic Pain (Pelvic pain that started a couple days ago. She states the pain has been constant. She doesn't have any urinary symptoms.)        7/15/2025     3:57 PM   Additional Questions   Roomed by Arcelia     Diarrhea    History of Present  Illness       Reason for visit:  Anal pain from excessive diarrhea  Symptom onset:  3-4 weeks ago  Symptoms include:  Pain  Symptom intensity:  Severe  Symptom progression:  Worsening  Had these symptoms before:  No  What makes it worse:  Going to the bathroom  What makes it better:  Bath   She is taking medications regularly.        Daughter having baby on Friday    Started having pelvic pain - left sided low abdomen    Bottom hurts very bad - feels like she is sitting on baseball    Has told Alex about diarrhea  Last week was rough with dialysis       [facility] - MWF  Does make urine - goes to the bathroom a few times a day - maybe 5    Diarrheea   - couple months  - more days than not  - will have a weeks without symptoms  - low appetite  - trying to watch dairy - no correlation  - some abdominal pain, stomach aches after  - feels under the weather bu tno fever  - no changes to suppression meds  - consistent with meds  - has lost a few pounds  - will have up to 10-15 episodes per day on bad days          Objective    /74   Pulse 76   Temp 97.4  F (36.3  C) (Tympanic)   Resp 16   Wt 50.7 kg (111 lb 11.2 oz)   LMP 05/14/2014 (Approximate)   SpO2 100%   BMI 20.43 kg/m    Body mass index is 20.43 kg/m .  Physical Exam  Constitutional:       General: She is not in acute distress.     Appearance: Normal appearance. She is not ill-appearing.   HENT:      Nose: Nose normal.      Mouth/Throat:      Mouth: Mucous membranes are moist.   Eyes:      Conjunctiva/sclera: Conjunctivae normal.   Cardiovascular:      Rate and Rhythm: Normal rate and regular rhythm.      Heart sounds: Normal heart sounds.   Pulmonary:      Effort: Pulmonary effort is normal.      Breath sounds: Normal breath sounds.   Abdominal:      General: Abdomen is flat. There is no distension.      Palpations: Abdomen is soft. There is no mass (No mass outside of known kidney, right lower quadrant).      Tenderness: There is abdominal tenderness  (Mild, left lower quadrant). There is no guarding.   Musculoskeletal:      Right lower leg: No edema.      Left lower leg: No edema.   Lymphadenopathy:      Cervical: No cervical adenopathy.   Skin:     General: Skin is warm.   Neurological:      General: No focal deficit present.      Mental Status: She is alert.   Psychiatric:         Mood and Affect: Mood normal.         Behavior: Behavior normal.                  Signed Electronically by: Isa Mariano MD

## 2025-07-15 NOTE — Clinical Note
Dr. Walton, I saw your transplant patient Kathy yesterday, she reports 2 months of watery diarrhea.  Please see my note, let me know if you have any further recommendations. Thx! Frances

## 2025-07-16 LAB
ALBUMIN SERPL BCG-MCNC: 4.3 G/DL (ref 3.5–5.2)
ALP SERPL-CCNC: 114 U/L (ref 40–150)
ALT SERPL W P-5'-P-CCNC: 117 U/L (ref 0–50)
ANION GAP SERPL CALCULATED.3IONS-SCNC: 14 MMOL/L (ref 7–15)
AST SERPL W P-5'-P-CCNC: 67 U/L (ref 0–45)
BILIRUB SERPL-MCNC: 0.3 MG/DL
BUN SERPL-MCNC: 23.9 MG/DL (ref 8–23)
CALCIUM SERPL-MCNC: 9.1 MG/DL (ref 8.8–10.4)
CHLORIDE SERPL-SCNC: 97 MMOL/L (ref 98–107)
CREAT SERPL-MCNC: 5.15 MG/DL (ref 0.51–0.95)
EGFRCR SERPLBLD CKD-EPI 2021: 9 ML/MIN/1.73M2
GLUCOSE SERPL-MCNC: 137 MG/DL (ref 70–99)
HCO3 SERPL-SCNC: 25 MMOL/L (ref 22–29)
POTASSIUM SERPL-SCNC: 5.5 MMOL/L (ref 3.4–5.3)
PROT SERPL-MCNC: 6.8 G/DL (ref 6.4–8.3)
SODIUM SERPL-SCNC: 136 MMOL/L (ref 135–145)

## 2025-07-17 ENCOUNTER — ANCILLARY PROCEDURE (OUTPATIENT)
Dept: INTERVENTIONAL RADIOLOGY/VASCULAR | Facility: CLINIC | Age: 60
End: 2025-07-17
Attending: PHYSICIAN ASSISTANT
Payer: COMMERCIAL

## 2025-07-17 DIAGNOSIS — N18.6 ESRD (END STAGE RENAL DISEASE) (H): ICD-10-CM

## 2025-07-17 PROCEDURE — 87507 IADNA-DNA/RNA PROBE TQ 12-25: CPT | Performed by: STUDENT IN AN ORGANIZED HEALTH CARE EDUCATION/TRAINING PROGRAM

## 2025-07-17 PROCEDURE — 87493 C DIFF AMPLIFIED PROBE: CPT | Performed by: STUDENT IN AN ORGANIZED HEALTH CARE EDUCATION/TRAINING PROGRAM

## 2025-07-17 PROCEDURE — 36589 REMOVAL TUNNELED CV CATH: CPT | Mod: RT | Performed by: PHYSICIAN ASSISTANT

## 2025-07-17 RX ORDER — LIDOCAINE HYDROCHLORIDE 10 MG/ML
5 INJECTION, SOLUTION EPIDURAL; INFILTRATION; INTRACAUDAL; PERINEURAL ONCE
Status: COMPLETED | OUTPATIENT
Start: 2025-07-17 | End: 2025-07-17

## 2025-07-17 RX ADMIN — LIDOCAINE HYDROCHLORIDE 5 ML: 10 INJECTION, SOLUTION EPIDURAL; INFILTRATION; INTRACAUDAL; PERINEURAL at 14:40

## 2025-07-17 NOTE — DISCHARGE INSTRUCTIONS
A collaboration between [facility] and [facility]  Experts in minimally invasive, targeted treatments performed using imaging guidance    Tunneled Central Venous Catheter Removal    Today you had your existing tunneled central venous catheter removed because it was no longer needed for treatment.    One of our Radiology PAs performed this procedure for you today:  Date: 07/17/2025  Provider: Brittany VARGAS:PETRONA  Location: [facility]    After you go home:  Avoid lying flat or bending at the waist for 2 hours following removal of the catheter to reduce risk of bleeding from catheter site.  Keep any applied tape/gauze dressings clean and dry. Change tape/gauze dressings if they get wet or soiled.  You may shower following the procedure, however cover and protect from moisture any tape/gauze dressings.   You may remove tape/gauze dressings after 3 days if the site looks like it is in the process of healing or scabbing over.  Avoid strenuous activities (elevating heart rate) or lifting more than 10 pounds for the next 3 days. Walking, elliptical and golf are examples of acceptable activities.  If there is bleeding or oozing from the procedure site, apply firm pressure to the area above your collar bone (where the catheter entered the bloodstream) for 10 minutes.  If the bleeding continues, continue to hold pressure and seek medical attention at the phone numbers below.  Mild procedure site discomfort can be treated with an ice pack and over-the-counter pain relievers.           Call your primary provider or the provider managing the central line if:  If you start bleeding from the procedure site. Our radiology provider can help you decide if you need to return to the hospital.  If you have new or worsening pain related to the procedure.  If you have concerning swelling at the procedure site.  If you develop hives or a rash or any unexplained itching.  If you  have a fever of greater than 100.5  F and chills in the first 5 days after procedure.  Any other concerns related to your procedure.    Contact Number:  [phone]  IR RN Triage Line, Monday - Friday 8:00 am - 4:30 pm    After hours for urgent concerns:  [phone]  After 4:30 pm Monday - Friday, Weekends and Holidays.   Ask for Interventional Radiology on-call.  Someone is available 24 hours a day.  [facility] toll free number:  [phone]

## 2025-07-22 ENCOUNTER — TELEPHONE (OUTPATIENT)
Dept: TRANSPLANT | Facility: CLINIC | Age: 60
End: 2025-07-22
Payer: COMMERCIAL

## 2025-07-22 DIAGNOSIS — T86.11 KIDNEY TRANSPLANT REJECTION: ICD-10-CM

## 2025-07-23 ENCOUNTER — DOCUMENTATION ONLY (OUTPATIENT)
Dept: TRANSPLANT | Facility: CLINIC | Age: 60
End: 2025-07-23
Payer: COMMERCIAL

## 2025-07-23 DIAGNOSIS — T86.11 KIDNEY TRANSPLANT REJECTION: ICD-10-CM

## 2025-07-23 NOTE — PROGRESS NOTES
Received notification of failed kidney allograft  from [facility] review.  Fail is indicated by resumption of dialysis.  Date of organ failure was reported as 10/11/2024.  Cause of failure is reported as chronic rejection.  TIS verfication is complete.

## 2025-07-27 ENCOUNTER — OFFICE VISIT (OUTPATIENT)
Dept: URGENT CARE | Facility: URGENT CARE | Age: 60
End: 2025-07-27
Payer: COMMERCIAL

## 2025-07-27 VITALS
SYSTOLIC BLOOD PRESSURE: 146 MMHG | HEIGHT: 62 IN | RESPIRATION RATE: 16 BRPM | WEIGHT: 110 LBS | BODY MASS INDEX: 20.24 KG/M2 | OXYGEN SATURATION: 99 % | TEMPERATURE: 98.8 F | HEART RATE: 78 BPM | DIASTOLIC BLOOD PRESSURE: 77 MMHG

## 2025-07-27 DIAGNOSIS — B02.9 HERPES ZOSTER WITHOUT COMPLICATION: Primary | ICD-10-CM

## 2025-07-27 PROCEDURE — 3078F DIAST BP <80 MM HG: CPT | Performed by: PHYSICIAN ASSISTANT

## 2025-07-27 PROCEDURE — 99214 OFFICE O/P EST MOD 30 MIN: CPT | Performed by: PHYSICIAN ASSISTANT

## 2025-07-27 PROCEDURE — 3077F SYST BP >= 140 MM HG: CPT | Performed by: PHYSICIAN ASSISTANT

## 2025-07-27 RX ORDER — VALACYCLOVIR HYDROCHLORIDE 500 MG/1
TABLET, FILM COATED ORAL
Qty: 3 TABLET | Refills: 0 | Status: SHIPPED | OUTPATIENT
Start: 2025-07-27

## 2025-07-27 RX ORDER — TRAMADOL HYDROCHLORIDE 25 MG/1
25 TABLET, COATED ORAL 2 TIMES DAILY
Qty: 14 TABLET | Refills: 0 | Status: SHIPPED | OUTPATIENT
Start: 2025-07-27 | End: 2025-08-03

## 2025-07-27 NOTE — PROGRESS NOTES
Patient presents with:  Rash: Started with rash on left side possible shingles      Clinical Decision Making:  Treatment with valacyclovir 500 mg 3 times weekly after her dialysis.  Renally adjusted dose for her dialysis.  Additionally tramadol for pain relief with use of adjusted dose. Expected course of resolution and indication for return was gone over and questions were answered to patient/parent's satisfaction before discharge.    30 min spent on the date of the encounter in chart review, patient visit, review of tests, documentation and/ordiscussion with other providers about the issues documented above.        ICD-10-CM    1. Herpes zoster without complication  B02.9 valACYclovir (VALTREX) 500 MG tablet     traMADol 25 MG TABS tablet          Patient Instructions     Discussed differentials and treatment options, lesions are benign and patient is reassured.  Take medication as prescribed, and recheck with primary MD if not resolving as expected, sooner with Walk-In Clinic or Emergency Room if worsening.      Herpes Zoster:    This is a viral illness caused by the chicken post virus that lies dormant in the nerve root. It can be stimulated by stress. If effects one nerve pattern, so will not spread to another area or cross the midline. The secretions are contagious, but once the lesions dry, they are not contagious. It is treated with antiviral medication that is most effective when given in the first 72 hours to help dry the lesions and reduce the risk of long-term pain from the condition.     Prescribed: Valtrex. Take after dialysis on Monday, Wednesday and Friday.  Discussed pain medication options and prescribed: OTC Tylenol. If requires stonger or more pain medication call primary to discuss.     Patient handout on Herpes zoster provided.     Follow-up with primary for persistence or worsening of symptoms.                HPI:  Reina Quan is a 60 year old female who has a history of chronic kidney  disease and is on dialysis three times weekly who presents today complaining of a one week history of prodrome of burning and irritation on the left posterior lateral aspect of the thoracic chest wall and onto the left upper quadrant of the abdomen.  Subsequently patient developed a red painful rash with paresthesias in the same distribution starting today.  She has not had headache fever myalgias arthralgias or rash anywhere else on the body to include the face or the genitalia.  No treatment was tried for this at home.    History obtained from chart review and the patient.    Problem List:  2025-04: TIA (transient ischemic attack)  2025-03: Numbness  2025-03: Headache, unspecified headache type  2025-03: Hypertension, unspecified type  2025-03: Nausea and vomiting, unspecified vomiting type  2024-10: Seborrheic keratoses  2024-10: FSGS (focal segmental glomerulosclerosis)  2024-10: Chronic kidney disease, stage V (H)  2024-10: Organ transplant candidate  2024-07: Complications, kidney transplant  2024-07: Kidney transplant rejection  2024-05: Elevated serum creatinine  2024-01: Hypercalcemia  2023-11: Dermatitis  2023-11: Squamous cell carcinoma of right thigh  2023-11: Aftercare following organ transplant  2023-02: Neoplasm of unspecified behavior of bone, soft tissue, and   skin  2023-02: Porokeratosis  2021-02: Screening for cervical cancer  2019-06: Multiple benign melanocytic nevi  2019-06: Immunosuppressed status  2018-02: HTN, kidney transplant related  2018-01: History of nonmelanoma skin cancer  2017-10: AK (actinic keratosis)  2017-10: Neoplasm of uncertain behavior of skin  2016-11: Enterococcus UTI  2016-05: Status post kidney transplant  2015-09: Hyperlipidemia with target LDL less than 130  2015-03: Vitiligo  2015-03: History of SCC (squamous cell carcinoma) of skin  2015-02: Pyelonephritis  2014-12: UTI (urinary tract infection)  2014-04: Menorrhagia  2013-04: Dermatitis  2013-01: UTI (urinary tract  infection), bacterial  2013-01: High risk medications (not anticoagulants) long-term use  2013-01: Seborrheic dermatitis  2012-10: Stage 3b chronic kidney disease (H)  2012-06: History of skin cancer  2012-06: History of immunosuppression therapy  2011-07: Actinic keratosis  2010-12: Health Care Home  2010-02: CARDIOVASCULAR SCREENING; LDL GOAL LESS THAN 100  2009-12: Major depression in partial remission  2003-04: Ulcerative colitis (H)  2003-04: Migraine  2003-04: Allergic rhinitis  2003-04: Hearing loss  2003-04: Kidney replaced by transplant  2003-04: anxiety/depression  2003-04: Lichen planus  2003-04: Giant Platelet syndrome  2003-04: Nephrotic syndrome in diseases classified elsewhere  Lichen planopilaris  Screening for cervical cancer      Past Medical History:   Diagnosis Date    Actinic keratosis     Allergic rhinitis, cause unspecified     Anemia     anxiety/depression     PAXIL    Arthritis     Cerebral infarction (H) 3/21/25    TIA    congenital hearing loss     uses hearing aids    Depressive disorder     Dry eyes     Giant Platelet syndrome     Glomerular Focal Sclerosis--transplant 1985     History of blood transfusion 8/22/99    Hypertension     Kidney replaced by transplant 1992    RAPAMUNE/ SIROLIMUS     Lichen planopilaris     LPP followed by derm on doxycycline/ clobetasol    Lichen planus     Menorrhagia     migraine     Squamous cell carcinoma     8 x     Thrombocytopenia     Ulcerative colitis, unspecified     biposy neg 1996    Unsatisfactory cervical Papanicolaou smear 02/15/2021    UTI (lower urinary tract infection)     recurrent Dr Burns [facility]       Social History     Tobacco Use    Smoking status: Never     Passive exposure: Never    Smokeless tobacco: Never   Substance Use Topics    Alcohol use: Not Currently       Review of Systems  As above in HPI otherwise negative.    Vitals:    07/27/25 1418   BP: (!) 146/77   Pulse: 78   Resp: 16   Temp: 98.8  F (37.1  C)   TempSrc: Oral  "  SpO2: 99%   Weight: 49.9 kg (110 lb)   Height: 1.575 m (5' 2\")       General: Patient is resting comfortably no acute distress is afebrile  HEENT: Head is normocephalic atraumatic   eyes are PERRL EOMI sclera anicteric   Skin: With vesicular rash on erythematous base that was in distribution as described in HPI.    Physical Exam      At the end of the encounter, I discussed results, diagnosis, medications. Discussed red flags for immediate return to clinic/ER, as well as indications for follow up if no improvement. Patient understood and agreed to plan. Patient was stable for discharge.  "

## 2025-07-27 NOTE — PROGRESS NOTES
Urgent Care Clinic Visit    Chief Complaint   Patient presents with    Rash     Started with rash on left side possible shingles

## 2025-07-27 NOTE — PATIENT INSTRUCTIONS
Discussed differentials and treatment options, lesions are benign and patient is reassured.  Take medication as prescribed, and recheck with primary MD if not resolving as expected, sooner with Walk-In Clinic or Emergency Room if worsening.      Herpes Zoster:    This is a viral illness caused by the chicken post virus that lies dormant in the nerve root. It can be stimulated by stress. If effects one nerve pattern, so will not spread to another area or cross the midline. The secretions are contagious, but once the lesions dry, they are not contagious. It is treated with antiviral medication that is most effective when given in the first 72 hours to help dry the lesions and reduce the risk of long-term pain from the condition.     Prescribed: Valtrex. Take after dialysis on Monday, Wednesday and Friday.  Discussed pain medication options and prescribed: OTC Tylenol. If requires stonger or more pain medication call primary to discuss.     Patient handout on Herpes zoster provided.     Follow-up with primary for persistence or worsening of symptoms.

## 2025-07-29 ENCOUNTER — TELEPHONE (OUTPATIENT)
Dept: TRANSPLANT | Facility: CLINIC | Age: 60
End: 2025-07-29
Payer: COMMERCIAL

## 2025-07-29 ENCOUNTER — PATIENT OUTREACH (OUTPATIENT)
Dept: NEPHROLOGY | Facility: CLINIC | Age: 60
End: 2025-07-29
Payer: COMMERCIAL

## 2025-07-29 DIAGNOSIS — Z94.0 KIDNEY TRANSPLANTED: Primary | ICD-10-CM

## 2025-07-29 DIAGNOSIS — T86.11 KIDNEY TRANSPLANT REJECTION: ICD-10-CM

## 2025-07-29 DIAGNOSIS — R80.9 PROTEINURIA: ICD-10-CM

## 2025-07-29 DIAGNOSIS — Z48.298 AFTERCARE FOLLOWING ORGAN TRANSPLANT: ICD-10-CM

## 2025-07-29 DIAGNOSIS — Z79.899 IMMUNOSUPPRESSIVE MANAGEMENT ENCOUNTER FOLLOWING KIDNEY TRANSPLANT: ICD-10-CM

## 2025-07-29 DIAGNOSIS — Z94.0 IMMUNOSUPPRESSIVE MANAGEMENT ENCOUNTER FOLLOWING KIDNEY TRANSPLANT: ICD-10-CM

## 2025-07-29 NOTE — TELEPHONE ENCOUNTER
Per patient;  Has had severe diarrhea along with tremors in her hands  stated has lost a lot of weight, is on in - center hemo., Is wondering if she should cut her Cellcept dose?    Please call patient # [phone].

## 2025-07-29 NOTE — TELEPHONE ENCOUNTER
Spoke to pt who reports worsening tremors X 1 mo.  Pt currently on dialysis since Oct. 2024.  Pt also has shingles and is taking Valtrex 500 mg MWF.  Pt is asking if she should lower Cellcept dose and/or repeat txp labs.  Reaching out to team for recommendations.

## 2025-07-29 NOTE — TELEPHONE ENCOUNTER
Dialysis Access Care Coordination: General Outreach    REASON FOR CALL:     REASON FOR CALL: Clinic Care Coordination - Chart Review (Dialysis Access)                                   SITUATION/BACKROUND:     Patient had her CVC removed 7/17/25. She is using her LUE AV fistula for hemodialysis.    Neph Tracking Flowsheet Last Filled Values       Preferred Modality Home Hemodialysis    Final Modality Hemodialysis    Patient's Referral Dates Auto Populate Patient's Referral Dates    Specialty Care Coordination Referral - Dialysis 7/26/2024    Journey Referral 7/18/24    Vein Mapping/US Order 8/19/24    Vein Mapping/US  08/19/24    Access Surgeon Referral Status  Referred    Dialysis Access Referral 07/26/24  Fistula vs PD consult with Dr. Walton    Access Surgical Consult 08/19/24  PLAN: LUE brachiobasilic (2 stage) AVF vs LUE AVG underger general anesthesia with nerve block.    Diaylsis Access Type AV Fistula  Brachiobasilic AVF    Dialysis Access Site TRINY    Dialysis Access Surgery 10/15/24  1st stage LUE brachiobasilic AVF, if ok at 2wk and 4 wk post op and US, ok for 2nd stage after 6 wks per Dr. Walton    Dialysis Access Surgeon Dr. Walton    Dialysis Access Revision Date 04/29/25  LUE BB AVF revision (bypass aneurism with graft), Dr. Walton  2nd stage transposition 11/20/24    Dialysis Access Maturation 01/30/25    Transplant Evaluation Referral 6/21/24    Transplant Status  Referred          Dialysis History        Start End Type Center Comments    10/11/2024  Hemo-In Center [facility] ([facility] MWF, Dr. Mcdonnell                  Patient is followed by Solid Organ Transplant 2/2 Kidney Transplant Evaluation - 10/7/2024.  Coordinator: Lanette Ortiz    ASSESSMENT:     Recent Labs      Latest Ref Rng & Units 7/15/2025 4/29/2025 3/22/2025   Neph Labs   Sodium 135 - 145 mmol/L 136   136    GFR Estimate >60 mL/min/1.73m2 9   10    Potassium 3.4 - 5.3 mmol/L 5.5  4.4  3.8    Creatinine 0.51 - 0.95  mg/dL 5.15   4.68    Magnesium 1.7 - 2.3 mg/dL   2.2    Urea Nitrogen 8.0 - 23.0 mg/dL 23.9   20.7    Hemoglobin 11.7 - 15.7 g/dL 11.1   10.0          Dialysis Access Assessments:  No assessment indicated    PLAN:     Future Appts Next 180 days       No appointments to display            Follow Up:    Removed Dialysis Access Care Coordinators from care team and resolved Dialysis Access episode.    Patricia Haddad RN  Dialysis Access Surgery Care Coordinator  Phone: [phone]  Pool: P_Dialysis_Access_Nurse

## 2025-07-30 RX ORDER — MYCOPHENOLATE MOFETIL 250 MG/1
500 CAPSULE ORAL 2 TIMES DAILY
Qty: 360 CAPSULE | Refills: 3 | Status: SHIPPED | OUTPATIENT
Start: 2025-07-30

## 2025-07-30 NOTE — TELEPHONE ENCOUNTER
Per txp team recommendations, called and left message with the following info:  Would decrease mycophenolate down to 250 mg bid x 7 days, then can go to 500 mg bid and stay there.  Can review again in 6 months   Also, sent mychart  message with info.

## 2025-07-31 ENCOUNTER — TELEPHONE (OUTPATIENT)
Dept: TRANSPLANT | Facility: CLINIC | Age: 60
End: 2025-07-31
Payer: COMMERCIAL

## 2025-07-31 DIAGNOSIS — T86.11 KIDNEY TRANSPLANT REJECTION: ICD-10-CM

## 2025-07-31 NOTE — LETTER
07/31/25        Reina Quan  [address] [facility] [address] [facility] [address]        Dear Reina,    It was a pleasure to see you recently for consideration of kidney transplantation. Your pre-transplant evaluation results were reviewed at our Multidisciplinary Selection Committee 07/30/2025. The Committee is requesting the following items are completed for your evaluation.     Current Shingles episode needs to be fully resolved. You will be listed 2 weeks after Shingles has resolved to ensure you have not had a relapse.   PRA sample to be drawn at your next convenient time at dialysis. I have sent the PRA order to both yourself and your dialysis unit.  I have asked our outpatient lab to send a package of PRA mailers to your residence. Please provide the mailers to your dialysis unit.        For any questions, please contact myself at the Transplant Office Main Number at [phone] or at my Direct Number at [phone]. You can also send me My Chart messages.      Sincerely,  Lanette Ortiz RN, BSN  Pre Kidney Pancreas Transplant Coordinator   [facility]  Solid Organ Transplant Care   [address] [place] [address] [address]  [address] [address]  [email or web address]   Office Number: [phone]  Direct Number: [phone]   Fax Number: [phone]  Employed by [facility]     CC's: Lizbeth VARGAS, [facility]

## 2025-07-31 NOTE — TELEPHONE ENCOUNTER
Message today to Anabelle Boogie to have dialysis unit draw PRA at next opportunity and to have 2728 and last 3 provider notes sent to our Office. Generated PRA order in EPIC -  electronically sent to pt/ providers. Emailed outreach lab to mail out PRA mailers.     Called pt today, reached her VM.  LM I am calling to talk about the outcome of the Selection Committee, explained she is approved to list on kidney waitlist after her Shingles is resolved. Instructed pt to call me to discuss further and I am also sending her a letter.

## 2025-07-31 NOTE — LETTER
PHYSICIAN ORDER   ALA/PRA BLOOD    DATE & TIME ISSUED: 2025 2:46 PM  PATIENT NAME: Reina Quan   : 1965     [facility] MR#  [id number]     DIAGNOSIS/ICD-10 CODE: Awaiting Organ transplant [Z76.82}   EXPIRES: (1 YEAR AFTER DATE ISSUED)  EVERY 12 weeks / 3 months   1. Please draw 20ml of blood in red top (plain) tube for Antileukocyte Antibody (ALA or PRA).   2. Label tubes with the patient s name, complete lab slip.         3. Mailers, lab slips with instructions are sent to patient separately.      4. Call the Outreach Lab at [phone] to reorder mailers.       5. Mail blood to (this address is also on the mailers):    IMMUNOLOGY LABORATORY   [facility]   Room [phone] [address]  [address]    .  David Silverman in Immunology and Transplantation  Surgical Director, Kidney & Pancreas Transplant Programs  Medical Director, Solid Organ Transplant Unit

## 2025-08-08 ENCOUNTER — LAB (OUTPATIENT)
Dept: LAB | Facility: CLINIC | Age: 60
End: 2025-08-08
Payer: COMMERCIAL

## 2025-08-08 DIAGNOSIS — Z76.82 AWAITING ORGAN TRANSPLANT: ICD-10-CM

## 2025-08-11 ASSESSMENT — PATIENT HEALTH QUESTIONNAIRE - PHQ9
SUM OF ALL RESPONSES TO PHQ QUESTIONS 1-9: 10
SUM OF ALL RESPONSES TO PHQ QUESTIONS 1-9: 10
10. IF YOU CHECKED OFF ANY PROBLEMS, HOW DIFFICULT HAVE THESE PROBLEMS MADE IT FOR YOU TO DO YOUR WORK, TAKE CARE OF THINGS AT HOME, OR GET ALONG WITH OTHER PEOPLE: SOMEWHAT DIFFICULT

## 2025-08-12 ENCOUNTER — OFFICE VISIT (OUTPATIENT)
Dept: FAMILY MEDICINE | Facility: CLINIC | Age: 60
End: 2025-08-12
Payer: COMMERCIAL

## 2025-08-12 VITALS
WEIGHT: 114.9 LBS | DIASTOLIC BLOOD PRESSURE: 80 MMHG | RESPIRATION RATE: 16 BRPM | SYSTOLIC BLOOD PRESSURE: 135 MMHG | HEIGHT: 62 IN | OXYGEN SATURATION: 99 % | HEART RATE: 80 BPM | BODY MASS INDEX: 21.14 KG/M2

## 2025-08-12 DIAGNOSIS — D69.8 OTHER SPECIFIED HEMORRHAGIC CONDITIONS: ICD-10-CM

## 2025-08-12 DIAGNOSIS — K51.90 ULCERATIVE COLITIS WITHOUT COMPLICATIONS, UNSPECIFIED LOCATION (H): ICD-10-CM

## 2025-08-12 DIAGNOSIS — N18.5 CHRONIC KIDNEY DISEASE, STAGE V (H): ICD-10-CM

## 2025-08-12 DIAGNOSIS — Z94.0 KIDNEY REPLACED BY TRANSPLANT: ICD-10-CM

## 2025-08-12 DIAGNOSIS — B02.8 HERPES ZOSTER WITH COMPLICATION: Primary | ICD-10-CM

## 2025-08-12 PROCEDURE — 3079F DIAST BP 80-89 MM HG: CPT | Performed by: FAMILY MEDICINE

## 2025-08-12 PROCEDURE — 99214 OFFICE O/P EST MOD 30 MIN: CPT | Performed by: FAMILY MEDICINE

## 2025-08-12 PROCEDURE — 3075F SYST BP GE 130 - 139MM HG: CPT | Performed by: FAMILY MEDICINE

## 2025-08-12 RX ORDER — VALACYCLOVIR HYDROCHLORIDE 500 MG/1
TABLET, FILM COATED ORAL
Qty: 6 TABLET | Refills: 0 | Status: SHIPPED | OUTPATIENT
Start: 2025-08-12

## 2025-08-12 RX ORDER — GABAPENTIN 100 MG/1
100 CAPSULE ORAL 3 TIMES DAILY
COMMUNITY
Start: 2025-08-11 | End: 2025-08-12

## 2025-08-12 RX ORDER — PREGABALIN 75 MG/1
CAPSULE ORAL
Qty: 30 CAPSULE | Refills: 2 | Status: SHIPPED | OUTPATIENT
Start: 2025-08-12

## 2025-08-12 RX ORDER — CARVEDILOL 3.12 MG/1
1 TABLET ORAL
COMMUNITY
Start: 2025-07-30

## 2025-08-12 RX ORDER — LIDOCAINE 50 MG/G
3 PATCH TOPICAL EVERY 24 HOURS
Qty: 90 PATCH | Refills: 1 | Status: SHIPPED | OUTPATIENT
Start: 2025-08-12

## 2025-08-12 RX ORDER — LIDOCAINE 50 MG/G
1 PATCH TOPICAL EVERY 24 HOURS
Qty: 30 PATCH | Refills: 2 | Status: SHIPPED | OUTPATIENT
Start: 2025-08-12 | End: 2025-08-12 | Stop reason: DRUGHIGH

## 2025-08-13 ENCOUNTER — PATIENT OUTREACH (OUTPATIENT)
Dept: NEPHROLOGY | Facility: CLINIC | Age: 60
End: 2025-08-13
Payer: COMMERCIAL

## 2025-08-13 DIAGNOSIS — T86.11 KIDNEY TRANSPLANT REJECTION: ICD-10-CM

## 2025-08-13 DIAGNOSIS — T82.898A PROBLEM WITH DIALYSIS ACCESS, INITIAL ENCOUNTER: Primary | ICD-10-CM

## 2025-08-14 ENCOUNTER — TELEPHONE (OUTPATIENT)
Dept: INTERVENTIONAL RADIOLOGY/VASCULAR | Facility: CLINIC | Age: 60
End: 2025-08-14
Payer: COMMERCIAL

## 2025-08-15 ENCOUNTER — HOSPITAL ENCOUNTER (OUTPATIENT)
Facility: CLINIC | Age: 60
Discharge: HOME | End: 2025-08-15
Attending: RADIOLOGY | Admitting: RADIOLOGY
Payer: COMMERCIAL

## 2025-08-15 ENCOUNTER — APPOINTMENT (OUTPATIENT)
Dept: INTERVENTIONAL RADIOLOGY/VASCULAR | Facility: CLINIC | Age: 60
End: 2025-08-15
Attending: SURGERY
Payer: COMMERCIAL

## 2025-08-15 VITALS
HEART RATE: 69 BPM | DIASTOLIC BLOOD PRESSURE: 77 MMHG | WEIGHT: 113 LBS | BODY MASS INDEX: 20.8 KG/M2 | HEIGHT: 62 IN | SYSTOLIC BLOOD PRESSURE: 140 MMHG | OXYGEN SATURATION: 99 % | RESPIRATION RATE: 16 BRPM | TEMPERATURE: 98.1 F

## 2025-08-15 DIAGNOSIS — T82.898A PROBLEM WITH DIALYSIS ACCESS, INITIAL ENCOUNTER: ICD-10-CM

## 2025-08-15 LAB
ANION GAP SERPL CALCULATED.3IONS-SCNC: 14 MMOL/L (ref 7–15)
BUN SERPL-MCNC: 78.7 MG/DL (ref 8–23)
CALCIUM SERPL-MCNC: 9.2 MG/DL (ref 8.8–10.4)
CHLORIDE SERPL-SCNC: 102 MMOL/L (ref 98–107)
CREAT SERPL-MCNC: 8.55 MG/DL (ref 0.51–0.95)
EGFRCR SERPLBLD CKD-EPI 2021: 5 ML/MIN/1.73M2
ERYTHROCYTE [DISTWIDTH] IN BLOOD BY AUTOMATED COUNT: 15.2 % (ref 10–15)
GLUCOSE SERPL-MCNC: 100 MG/DL (ref 70–99)
HCO3 SERPL-SCNC: 16 MMOL/L (ref 22–29)
HCT VFR BLD AUTO: 30.9 % (ref 35–47)
HGB BLD-MCNC: 9.6 G/DL (ref 11.7–15.7)
INR PPP: 0.87 (ref 0.85–1.15)
MCH RBC QN AUTO: 31 PG (ref 26.5–33)
MCHC RBC AUTO-ENTMCNC: 31.1 G/DL (ref 31.5–36.5)
MCV RBC AUTO: 99.7 FL (ref 78–100)
PLATELET # BLD AUTO: 170 10E3/UL (ref 150–450)
POTASSIUM SERPL-SCNC: 5.9 MMOL/L (ref 3.4–5.3)
PROTHROMBIN TIME: 12.1 SECONDS (ref 11.8–14.8)
RBC # BLD AUTO: 3.1 10E6/UL (ref 3.8–5.2)
SODIUM SERPL-SCNC: 132 MMOL/L (ref 135–145)
WBC # BLD AUTO: 7.09 10E3/UL (ref 4–11)

## 2025-08-15 PROCEDURE — 36902 INTRO CATH DIALYSIS CIRCUIT: CPT | Performed by: RADIOLOGY

## 2025-08-15 PROCEDURE — C1769 GUIDE WIRE: HCPCS

## 2025-08-15 PROCEDURE — 999N000132 HC STATISTIC PP CARE STAGE 1

## 2025-08-15 PROCEDURE — 250N000009 HC RX 250

## 2025-08-15 PROCEDURE — 85018 HEMOGLOBIN: CPT | Performed by: PHYSICIAN ASSISTANT

## 2025-08-15 PROCEDURE — 272N000302 HC DEVICE INFLATION CR5

## 2025-08-15 PROCEDURE — 99152 MOD SED SAME PHYS/QHP 5/>YRS: CPT

## 2025-08-15 PROCEDURE — 250N000011 HC RX IP 250 OP 636: Performed by: PHYSICIAN ASSISTANT

## 2025-08-15 PROCEDURE — 85610 PROTHROMBIN TIME: CPT | Performed by: PHYSICIAN ASSISTANT

## 2025-08-15 PROCEDURE — 36415 COLL VENOUS BLD VENIPUNCTURE: CPT | Performed by: PHYSICIAN ASSISTANT

## 2025-08-15 PROCEDURE — 999N000142 HC STATISTIC PROCEDURE PREP ONLY

## 2025-08-15 PROCEDURE — 76937 US GUIDE VASCULAR ACCESS: CPT | Mod: 26 | Performed by: RADIOLOGY

## 2025-08-15 PROCEDURE — 272N000564 HC SHEATH CR2

## 2025-08-15 PROCEDURE — 76937 US GUIDE VASCULAR ACCESS: CPT

## 2025-08-15 PROCEDURE — 272N000504 HC NEEDLE CR4

## 2025-08-15 PROCEDURE — 80048 BASIC METABOLIC PNL TOTAL CA: CPT | Performed by: PHYSICIAN ASSISTANT

## 2025-08-15 PROCEDURE — 250N000011 HC RX IP 250 OP 636

## 2025-08-15 PROCEDURE — 99152 MOD SED SAME PHYS/QHP 5/>YRS: CPT | Performed by: RADIOLOGY

## 2025-08-15 PROCEDURE — 255N000002 HC RX 255 OP 636: Performed by: SURGERY

## 2025-08-15 RX ORDER — LIDOCAINE 40 MG/G
CREAM TOPICAL
Status: DISCONTINUED | OUTPATIENT
Start: 2025-08-15 | End: 2025-08-15 | Stop reason: HOSPADM

## 2025-08-15 RX ORDER — FENTANYL CITRATE 50 UG/ML
25-50 INJECTION, SOLUTION INTRAMUSCULAR; INTRAVENOUS EVERY 5 MIN PRN
Refills: 0 | Status: DISCONTINUED | OUTPATIENT
Start: 2025-08-15 | End: 2025-08-15 | Stop reason: HOSPADM

## 2025-08-15 RX ORDER — NALOXONE HYDROCHLORIDE 0.4 MG/ML
0.4 INJECTION, SOLUTION INTRAMUSCULAR; INTRAVENOUS; SUBCUTANEOUS
Status: DISCONTINUED | OUTPATIENT
Start: 2025-08-15 | End: 2025-08-15 | Stop reason: HOSPADM

## 2025-08-15 RX ORDER — HEPARIN SODIUM 200 [USP'U]/100ML
1 INJECTION, SOLUTION INTRAVENOUS EVERY 5 MIN PRN
Status: DISCONTINUED | OUTPATIENT
Start: 2025-08-15 | End: 2025-08-15 | Stop reason: HOSPADM

## 2025-08-15 RX ORDER — NALOXONE HYDROCHLORIDE 0.4 MG/ML
0.2 INJECTION, SOLUTION INTRAMUSCULAR; INTRAVENOUS; SUBCUTANEOUS
Status: DISCONTINUED | OUTPATIENT
Start: 2025-08-15 | End: 2025-08-15 | Stop reason: HOSPADM

## 2025-08-15 RX ORDER — FLUMAZENIL 0.1 MG/ML
0.2 INJECTION, SOLUTION INTRAVENOUS
Status: DISCONTINUED | OUTPATIENT
Start: 2025-08-15 | End: 2025-08-15 | Stop reason: HOSPADM

## 2025-08-15 RX ORDER — IODIXANOL 320 MG/ML
100 INJECTION, SOLUTION INTRAVASCULAR ONCE
Status: COMPLETED | OUTPATIENT
Start: 2025-08-15 | End: 2025-08-15

## 2025-08-15 RX ADMIN — FENTANYL CITRATE 50 MCG: 50 INJECTION INTRAMUSCULAR; INTRAVENOUS at 14:12

## 2025-08-15 RX ADMIN — MIDAZOLAM 0.5 MG: 1 INJECTION INTRAMUSCULAR; INTRAVENOUS at 15:01

## 2025-08-15 RX ADMIN — FENTANYL CITRATE 25 MCG: 50 INJECTION INTRAMUSCULAR; INTRAVENOUS at 14:37

## 2025-08-15 RX ADMIN — FENTANYL CITRATE 25 MCG: 50 INJECTION INTRAMUSCULAR; INTRAVENOUS at 15:00

## 2025-08-15 RX ADMIN — LIDOCAINE HYDROCHLORIDE 5 ML: 10 INJECTION, SOLUTION EPIDURAL; INFILTRATION; INTRACAUDAL; PERINEURAL at 14:38

## 2025-08-15 RX ADMIN — MIDAZOLAM 1 MG: 1 INJECTION INTRAMUSCULAR; INTRAVENOUS at 14:12

## 2025-08-15 RX ADMIN — HEPARIN SODIUM IN SODIUM CHLORIDE 1 BAG: 200 INJECTION INTRAVENOUS at 14:39

## 2025-08-15 RX ADMIN — MIDAZOLAM 0.5 MG: 1 INJECTION INTRAMUSCULAR; INTRAVENOUS at 14:38

## 2025-08-15 RX ADMIN — IODIXANOL 20 ML: 320 INJECTION, SOLUTION INTRAVASCULAR at 15:14

## 2025-08-15 ASSESSMENT — ACTIVITIES OF DAILY LIVING (ADL)
ADLS_ACUITY_SCORE: 58

## 2025-08-25 LAB
A24: 591
DONOR IDENTIFICATION: NORMAL
DSA COMMENTS: NORMAL
DSA PRESENT: YES
DSA TEST METHOD: NORMAL
ORGAN: NORMAL
SA 1  COMMENTS: NORMAL
SA 1 CELL: NORMAL
SA 1 TEST METHOD: NORMAL
SA 2 CELL: NORMAL
SA 2 COMMENTS: NORMAL
SA 2 TEST METHOD: NORMAL
SA1 HI RISK ABY: NORMAL
SA1 MOD RISK ABY: NORMAL
SA2 HI RISK ABY: NORMAL
SA2 MOD RISK ABY: NORMAL
UNACCEPTABLE ANTIGENS: NORMAL
UNOS CPRA: 23

## 2025-09-04 ENCOUNTER — VIRTUAL VISIT (OUTPATIENT)
Dept: FAMILY MEDICINE | Facility: CLINIC | Age: 60
End: 2025-09-04
Payer: COMMERCIAL

## 2025-09-04 DIAGNOSIS — N18.5 CHRONIC KIDNEY DISEASE, STAGE V (H): Primary | ICD-10-CM

## 2025-09-04 DIAGNOSIS — D69.8 OTHER SPECIFIED HEMORRHAGIC CONDITIONS: ICD-10-CM

## 2025-09-04 DIAGNOSIS — Z86.19 HISTORY OF SHINGLES: ICD-10-CM

## (undated) DEVICE — SOL WATER IRRIG 1000ML BOTTLE 2F7114

## (undated) DEVICE — ESU GROUND PAD ADULT W/CORD E7507

## (undated) DEVICE — SURGICEL ABSORBABLE HEMOSTAT SNOW 4"X4" 2083

## (undated) DEVICE — LINEN TOWEL PACK X6 WHITE 5487

## (undated) DEVICE — PAD CHUX UNDERPAD 23X24" 7136

## (undated) DEVICE — PREP CHLORAPREP 26ML TINTED HI-LITE ORANGE 930815

## (undated) DEVICE — SU PROLENE 7-0 BV-1DA 4X24" M8702

## (undated) DEVICE — GEL ULTRASOUND AQUASONIC 20GM 01-01

## (undated) DEVICE — PACK CYSTO UMMC CUSTOM

## (undated) DEVICE — SU MONOCRYL 4-0 PS-2 27" UND Y426H

## (undated) DEVICE — SU SILK 5-0 TIE 12X30" A302H

## (undated) DEVICE — DRAPE EXTREMITY UPPER 120X76" 29414

## (undated) DEVICE — PACK AV FISTULA

## (undated) DEVICE — SU SILK 3-0 TIE 12X30" A304H

## (undated) DEVICE — SU PROLENE 6-0 RB-2DA 18" 8714H

## (undated) DEVICE — GLOVE BIOGEL PI MICRO INDICATOR UNDERGLOVE SZ 8.0 48980

## (undated) DEVICE — COVER ULTRASOUND PROBE W/GEL FLEXI-FEEL 6"X58" LF  25-FF658

## (undated) DEVICE — BNDG ELASTIC 4"X5YDS STERILE 6611-4S

## (undated) DEVICE — SU DERMABOND ADVANCED .7ML DNX12

## (undated) DEVICE — SU SILK 4-0 TIE 12X30" A303H

## (undated) DEVICE — LINEN TOWEL PACK X30 5481

## (undated) DEVICE — SPONGE LAP 18X18" X8435

## (undated) DEVICE — SU PROLENE 6-0 RB-2DA 30" 8711H

## (undated) DEVICE — PREP POVIDONE-IODINE 7.5% SCRUB 4OZ BOTTLE MDS093945

## (undated) DEVICE — Device

## (undated) DEVICE — SU PROLENE 4-0 SHDA 36" 8521H

## (undated) DEVICE — DRAPE C-ARM W/STRAPS 42X72" 07-CA104

## (undated) DEVICE — SOL NACL 0.9% IRRIG 1000ML BOTTLE 2F7124

## (undated) DEVICE — GLOVE BIOGEL PI MICRO INDICATOR UNDERGLOVE SZ 7.5 48975

## (undated) DEVICE — SU SILK 2-0 TIE 12X30" A305H

## (undated) DEVICE — SU VICRYL 3-0 SH 27" UND J416H

## (undated) DEVICE — GLOVE BIOGEL PI MICRO SZ 8.0 48580

## (undated) DEVICE — SUCTION MANIFOLD NEPTUNE 2 SYS 4 PORT 0702-020-000

## (undated) DEVICE — GLOVE BIOGEL PI MICRO SZ 8.5 48585

## (undated) DEVICE — SYR 10ML LL W/O NDL 302995

## (undated) DEVICE — PREP POVIDONE-IODINE 10% SOLUTION 4OZ BOTTLE MDS093944

## (undated) DEVICE — DRAPE TIBURON HAND 29427

## (undated) DEVICE — MINOR SINGLE BASIN KIT CSR WRAPX2 7QT SSK3002

## (undated) DEVICE — GUIDEWIRE SENSOR DUAL FLEX STR 0.035"X150CM M0066703080

## (undated) DEVICE — SYR 10ML FINGER CONTROL W/O NDL 309695

## (undated) DEVICE — CATH URETERAL OPEN END 5FRX70CM M0064002010

## (undated) DEVICE — CUP AND LID 2PK 2OZ STERILE  SSK9006A

## (undated) DEVICE — DRAPE SHEET REV FOLD 3/4 9349

## (undated) DEVICE — LABEL MEDICATION SYSTEM 3303-P

## (undated) DEVICE — SOL NACL 0.9% IRRIG 3000ML BAG 2B7127

## (undated) DEVICE — CLIP HORIZON SM RED WIDE SLOT 001201

## (undated) DEVICE — SU ETHILON 2-0 FS 18" 664H

## (undated) DEVICE — SU VICRYL+ 3-0 27IN SH UND VCP416H

## (undated) DEVICE — SU ETHILON 3-0 PS-1 18" 1663H

## (undated) DEVICE — TUBING IRRIG CYSTO/BLADDER SET 81" LF 2C4040

## (undated) DEVICE — DRSG KERLIX 4 1/2"X4YDS ROLL 6715

## (undated) DEVICE — JELLY LUBRICATING SURGILUBE 2OZ TUBE

## (undated) DEVICE — LINEN TOWEL PACK X5 5464

## (undated) DEVICE — SU MONOCRYL 4-0 PS-2 18" UND Y496G

## (undated) DEVICE — EYE SPONGE SPEAR WECK CEL 0008685

## (undated) DEVICE — SYR 01ML 27GA 0.5" NDL TBC 309623

## (undated) DEVICE — BASIN SET SINGLE STERILE 13752-624

## (undated) DEVICE — DRAPE STOCKINETTE 4" 8544

## (undated) RX ORDER — LIDOCAINE HYDROCHLORIDE 10 MG/ML
INJECTION, SOLUTION EPIDURAL; INFILTRATION; INTRACAUDAL; PERINEURAL
Status: DISPENSED
Start: 2024-07-25

## (undated) RX ORDER — LABETALOL HYDROCHLORIDE 5 MG/ML
INJECTION, SOLUTION INTRAVENOUS
Status: DISPENSED
Start: 2025-03-20

## (undated) RX ORDER — FENTANYL CITRATE 50 UG/ML
INJECTION, SOLUTION INTRAMUSCULAR; INTRAVENOUS
Status: DISPENSED
Start: 2024-10-15

## (undated) RX ORDER — HEPARIN SODIUM 1000 [USP'U]/ML
INJECTION, SOLUTION INTRAVENOUS; SUBCUTANEOUS
Status: DISPENSED
Start: 2025-08-15

## (undated) RX ORDER — HEPARIN SODIUM 1000 [USP'U]/ML
INJECTION, SOLUTION INTRAVENOUS; SUBCUTANEOUS
Status: DISPENSED
Start: 2025-03-20

## (undated) RX ORDER — FENTANYL CITRATE 50 UG/ML
INJECTION, SOLUTION INTRAMUSCULAR; INTRAVENOUS
Status: DISPENSED
Start: 2025-03-20

## (undated) RX ORDER — LIDOCAINE HYDROCHLORIDE 10 MG/ML
INJECTION, SOLUTION EPIDURAL; INFILTRATION; INTRACAUDAL; PERINEURAL
Status: DISPENSED
Start: 2025-01-28

## (undated) RX ORDER — HYDRALAZINE HYDROCHLORIDE 20 MG/ML
INJECTION INTRAMUSCULAR; INTRAVENOUS
Status: DISPENSED
Start: 2025-03-20

## (undated) RX ORDER — ONDANSETRON 2 MG/ML
INJECTION INTRAMUSCULAR; INTRAVENOUS
Status: DISPENSED
Start: 2025-04-29

## (undated) RX ORDER — ACETAMINOPHEN 325 MG/1
TABLET ORAL
Status: DISPENSED
Start: 2025-03-18

## (undated) RX ORDER — LIDOCAINE HYDROCHLORIDE 10 MG/ML
INJECTION, SOLUTION EPIDURAL; INFILTRATION; INTRACAUDAL; PERINEURAL
Status: DISPENSED
Start: 2025-07-17

## (undated) RX ORDER — PAPAVERINE HYDROCHLORIDE 30 MG/ML
INJECTION INTRAMUSCULAR; INTRAVENOUS
Status: DISPENSED
Start: 2025-04-29

## (undated) RX ORDER — PHENYLEPHRINE HCL IN 0.9% NACL 50MG/250ML
PLASTIC BAG, INJECTION (ML) INTRAVENOUS
Status: DISPENSED
Start: 2024-10-15

## (undated) RX ORDER — FENTANYL CITRATE 50 UG/ML
INJECTION, SOLUTION INTRAMUSCULAR; INTRAVENOUS
Status: DISPENSED
Start: 2024-11-20

## (undated) RX ORDER — EPHEDRINE SULFATE 50 MG/ML
INJECTION, SOLUTION INTRAMUSCULAR; INTRAVENOUS; SUBCUTANEOUS
Status: DISPENSED
Start: 2024-10-15

## (undated) RX ORDER — REGADENOSON 0.08 MG/ML
INJECTION, SOLUTION INTRAVENOUS
Status: DISPENSED
Start: 2024-10-04

## (undated) RX ORDER — FENTANYL CITRATE-0.9 % NACL/PF 10 MCG/ML
PLASTIC BAG, INJECTION (ML) INTRAVENOUS
Status: DISPENSED
Start: 2025-04-29

## (undated) RX ORDER — BUPIVACAINE HYDROCHLORIDE 2.5 MG/ML
INJECTION, SOLUTION EPIDURAL; INFILTRATION; INTRACAUDAL; PERINEURAL
Status: DISPENSED
Start: 2025-04-29

## (undated) RX ORDER — PAPAVERINE HYDROCHLORIDE 30 MG/ML
INJECTION INTRAMUSCULAR; INTRAVENOUS
Status: DISPENSED
Start: 2024-10-15

## (undated) RX ORDER — FENTANYL CITRATE 50 UG/ML
INJECTION, SOLUTION INTRAMUSCULAR; INTRAVENOUS
Status: DISPENSED
Start: 2025-01-28

## (undated) RX ORDER — ONDANSETRON 2 MG/ML
INJECTION INTRAMUSCULAR; INTRAVENOUS
Status: DISPENSED
Start: 2024-11-20

## (undated) RX ORDER — PROPOFOL 10 MG/ML
INJECTION, EMULSION INTRAVENOUS
Status: DISPENSED
Start: 2024-10-15

## (undated) RX ORDER — ONDANSETRON 2 MG/ML
INJECTION INTRAMUSCULAR; INTRAVENOUS
Status: DISPENSED
Start: 2024-10-15

## (undated) RX ORDER — HEPARIN SODIUM 1000 [USP'U]/ML
INJECTION, SOLUTION INTRAVENOUS; SUBCUTANEOUS
Status: DISPENSED
Start: 2025-04-29

## (undated) RX ORDER — PROPOFOL 10 MG/ML
INJECTION, EMULSION INTRAVENOUS
Status: DISPENSED
Start: 2025-03-18

## (undated) RX ORDER — FENTANYL CITRATE 50 UG/ML
INJECTION, SOLUTION INTRAMUSCULAR; INTRAVENOUS
Status: DISPENSED
Start: 2025-08-15

## (undated) RX ORDER — GLYCOPYRROLATE 0.2 MG/ML
INJECTION, SOLUTION INTRAMUSCULAR; INTRAVENOUS
Status: DISPENSED
Start: 2024-10-15

## (undated) RX ORDER — HEPARIN SODIUM 1000 [USP'U]/ML
INJECTION, SOLUTION INTRAVENOUS; SUBCUTANEOUS
Status: DISPENSED
Start: 2024-10-09

## (undated) RX ORDER — HEPARIN SODIUM 1000 [USP'U]/ML
INJECTION, SOLUTION INTRAVENOUS; SUBCUTANEOUS
Status: DISPENSED
Start: 2024-11-20

## (undated) RX ORDER — HEPARIN SODIUM 5000 [USP'U]/.5ML
INJECTION, SOLUTION INTRAVENOUS; SUBCUTANEOUS
Status: DISPENSED
Start: 2025-04-29

## (undated) RX ORDER — HEPARIN SODIUM 200 [USP'U]/100ML
INJECTION, SOLUTION INTRAVENOUS
Status: DISPENSED
Start: 2024-10-09

## (undated) RX ORDER — CEFAZOLIN SODIUM/WATER 2 G/20 ML
SYRINGE (ML) INTRAVENOUS
Status: DISPENSED
Start: 2025-03-18

## (undated) RX ORDER — LIDOCAINE HYDROCHLORIDE 10 MG/ML
INJECTION, SOLUTION INFILTRATION; PERINEURAL
Status: DISPENSED
Start: 2024-10-09

## (undated) RX ORDER — DEXAMETHASONE SODIUM PHOSPHATE 4 MG/ML
INJECTION, SOLUTION INTRA-ARTICULAR; INTRALESIONAL; INTRAMUSCULAR; INTRAVENOUS; SOFT TISSUE
Status: DISPENSED
Start: 2024-10-15

## (undated) RX ORDER — PROPOFOL 10 MG/ML
INJECTION, EMULSION INTRAVENOUS
Status: DISPENSED
Start: 2024-11-20

## (undated) RX ORDER — PROPOFOL 10 MG/ML
INJECTION, EMULSION INTRAVENOUS
Status: DISPENSED
Start: 2025-04-29

## (undated) RX ORDER — FENTANYL CITRATE 50 UG/ML
INJECTION, SOLUTION INTRAMUSCULAR; INTRAVENOUS
Status: DISPENSED
Start: 2025-04-29

## (undated) RX ORDER — FENTANYL CITRATE 50 UG/ML
INJECTION, SOLUTION INTRAMUSCULAR; INTRAVENOUS
Status: DISPENSED
Start: 2024-10-09

## (undated) RX ORDER — LIDOCAINE HYDROCHLORIDE 10 MG/ML
INJECTION, SOLUTION EPIDURAL; INFILTRATION; INTRACAUDAL; PERINEURAL
Status: DISPENSED
Start: 2025-08-15

## (undated) RX ORDER — BUPIVACAINE HYDROCHLORIDE AND EPINEPHRINE 2.5; 5 MG/ML; UG/ML
INJECTION, SOLUTION EPIDURAL; INFILTRATION; INTRACAUDAL; PERINEURAL
Status: DISPENSED
Start: 2024-10-15

## (undated) RX ORDER — DEXAMETHASONE SODIUM PHOSPHATE 4 MG/ML
INJECTION, SOLUTION INTRA-ARTICULAR; INTRALESIONAL; INTRAMUSCULAR; INTRAVENOUS; SOFT TISSUE
Status: DISPENSED
Start: 2024-11-20

## (undated) RX ORDER — LIDOCAINE HYDROCHLORIDE 10 MG/ML
INJECTION, SOLUTION EPIDURAL; INFILTRATION; INTRACAUDAL; PERINEURAL
Status: DISPENSED
Start: 2025-04-29

## (undated) RX ORDER — TETRACAINE HYDROCHLORIDE 5 MG/ML
SOLUTION OPHTHALMIC
Status: DISPENSED
Start: 2019-05-20

## (undated) RX ORDER — SODIUM CHLORIDE 9 MG/ML
INJECTION, SOLUTION INTRAVENOUS
Status: DISPENSED
Start: 2024-07-25

## (undated) RX ORDER — DEXAMETHASONE SODIUM PHOSPHATE 4 MG/ML
INJECTION, SOLUTION INTRA-ARTICULAR; INTRALESIONAL; INTRAMUSCULAR; INTRAVENOUS; SOFT TISSUE
Status: DISPENSED
Start: 2025-04-29

## (undated) RX ORDER — FENTANYL CITRATE 50 UG/ML
INJECTION, SOLUTION INTRAMUSCULAR; INTRAVENOUS
Status: DISPENSED
Start: 2025-03-18

## (undated) RX ORDER — PAPAVERINE HYDROCHLORIDE 30 MG/ML
INJECTION INTRAMUSCULAR; INTRAVENOUS
Status: DISPENSED
Start: 2024-11-20

## (undated) RX ORDER — IOPAMIDOL 510 MG/ML
INJECTION, SOLUTION INTRAVASCULAR
Status: DISPENSED
Start: 2025-03-18

## (undated) RX ORDER — HEPARIN SODIUM 1000 [USP'U]/ML
INJECTION, SOLUTION INTRAVENOUS; SUBCUTANEOUS
Status: DISPENSED
Start: 2024-10-15

## (undated) RX ORDER — HEPARIN SODIUM 200 [USP'U]/100ML
INJECTION, SOLUTION INTRAVENOUS
Status: DISPENSED
Start: 2025-03-20

## (undated) RX ORDER — CEFAZOLIN SODIUM/WATER 2 G/20 ML
SYRINGE (ML) INTRAVENOUS
Status: DISPENSED
Start: 2024-11-20

## (undated) RX ORDER — INDOMETHACIN 25 MG/1
CAPSULE ORAL
Status: DISPENSED
Start: 2025-04-29

## (undated) RX ORDER — FENTANYL CITRATE-0.9 % NACL/PF 10 MCG/ML
PLASTIC BAG, INJECTION (ML) INTRAVENOUS
Status: DISPENSED
Start: 2024-11-20

## (undated) RX ORDER — FENTANYL CITRATE 50 UG/ML
INJECTION, SOLUTION INTRAMUSCULAR; INTRAVENOUS
Status: DISPENSED
Start: 2025-01-08

## (undated) RX ORDER — LIDOCAINE HYDROCHLORIDE 10 MG/ML
INJECTION, SOLUTION EPIDURAL; INFILTRATION; INTRACAUDAL; PERINEURAL
Status: DISPENSED
Start: 2025-03-20